# Patient Record
Sex: FEMALE | Race: BLACK OR AFRICAN AMERICAN | Employment: UNEMPLOYED | URBAN - METROPOLITAN AREA
[De-identification: names, ages, dates, MRNs, and addresses within clinical notes are randomized per-mention and may not be internally consistent; named-entity substitution may affect disease eponyms.]

---

## 2020-07-17 ENCOUNTER — APPOINTMENT (OUTPATIENT)
Dept: CT IMAGING | Age: 60
DRG: 466 | End: 2020-07-17
Attending: EMERGENCY MEDICINE
Payer: COMMERCIAL

## 2020-07-17 ENCOUNTER — HOSPITAL ENCOUNTER (INPATIENT)
Age: 60
LOS: 19 days | Discharge: HOME HEALTH | DRG: 466 | End: 2020-08-07
Attending: EMERGENCY MEDICINE | Admitting: FAMILY MEDICINE
Payer: COMMERCIAL

## 2020-07-17 ENCOUNTER — APPOINTMENT (OUTPATIENT)
Dept: GENERAL RADIOLOGY | Age: 60
DRG: 466 | End: 2020-07-17
Attending: EMERGENCY MEDICINE
Payer: COMMERCIAL

## 2020-07-17 DIAGNOSIS — N19 RENAL FAILURE, UNSPECIFIED CHRONICITY: ICD-10-CM

## 2020-07-17 DIAGNOSIS — R31.0 GROSS HEMATURIA: Primary | ICD-10-CM

## 2020-07-17 DIAGNOSIS — R10.84 ABDOMINAL PAIN, GENERALIZED: ICD-10-CM

## 2020-07-17 PROBLEM — R31.9 HEMATURIA: Status: ACTIVE | Noted: 2020-07-17

## 2020-07-17 LAB
ANION GAP SERPL CALC-SCNC: 15 MMOL/L (ref 5–15)
APPEARANCE UR: ABNORMAL
BACTERIA URNS QL MICRO: NEGATIVE /HPF
BASOPHILS # BLD: 0.1 K/UL (ref 0–0.1)
BASOPHILS NFR BLD: 0 % (ref 0–1)
BILIRUB UR QL: NEGATIVE
BUN SERPL-MCNC: 93 MG/DL (ref 6–20)
BUN/CREAT SERPL: 21 (ref 12–20)
CALCIUM SERPL-MCNC: 9.9 MG/DL (ref 8.5–10.1)
CHLORIDE SERPL-SCNC: 107 MMOL/L (ref 97–108)
CO2 SERPL-SCNC: 18 MMOL/L (ref 21–32)
COLOR UR: ABNORMAL
COMMENT, HOLDF: NORMAL
CREAT SERPL-MCNC: 4.45 MG/DL (ref 0.55–1.02)
DIFFERENTIAL METHOD BLD: ABNORMAL
EOSINOPHIL # BLD: 0.5 K/UL (ref 0–0.4)
EOSINOPHIL NFR BLD: 3 % (ref 0–7)
EPITH CASTS URNS QL MICRO: ABNORMAL /LPF
ERYTHROCYTE [DISTWIDTH] IN BLOOD BY AUTOMATED COUNT: 15.4 % (ref 11.5–14.5)
GLUCOSE SERPL-MCNC: 162 MG/DL (ref 65–100)
GLUCOSE UR STRIP.AUTO-MCNC: NEGATIVE MG/DL
HCT VFR BLD AUTO: 32 % (ref 35–47)
HGB BLD-MCNC: 9.7 G/DL (ref 11.5–16)
HGB UR QL STRIP: ABNORMAL
IMM GRANULOCYTES # BLD AUTO: 0.1 K/UL (ref 0–0.04)
IMM GRANULOCYTES NFR BLD AUTO: 1 % (ref 0–0.5)
KETONES UR QL STRIP.AUTO: NEGATIVE MG/DL
LACTATE SERPL-SCNC: 0.6 MMOL/L (ref 0.4–2)
LEUKOCYTE ESTERASE UR QL STRIP.AUTO: ABNORMAL
LYMPHOCYTES # BLD: 1.1 K/UL (ref 0.8–3.5)
LYMPHOCYTES NFR BLD: 7 % (ref 12–49)
MCH RBC QN AUTO: 28.1 PG (ref 26–34)
MCHC RBC AUTO-ENTMCNC: 30.3 G/DL (ref 30–36.5)
MCV RBC AUTO: 92.8 FL (ref 80–99)
MONOCYTES # BLD: 1 K/UL (ref 0–1)
MONOCYTES NFR BLD: 7 % (ref 5–13)
NEUTS SEG # BLD: 13.2 K/UL (ref 1.8–8)
NEUTS SEG NFR BLD: 82 % (ref 32–75)
NITRITE UR QL STRIP.AUTO: NEGATIVE
NRBC # BLD: 0 K/UL (ref 0–0.01)
NRBC BLD-RTO: 0 PER 100 WBC
PH UR STRIP: 6.5 [PH] (ref 5–8)
PLATELET # BLD AUTO: 353 K/UL (ref 150–400)
PMV BLD AUTO: 9.7 FL (ref 8.9–12.9)
POTASSIUM SERPL-SCNC: 4.8 MMOL/L (ref 3.5–5.1)
PROT UR STRIP-MCNC: >300 MG/DL
RBC # BLD AUTO: 3.45 M/UL (ref 3.8–5.2)
RBC #/AREA URNS HPF: >100 /HPF (ref 0–5)
SAMPLES BEING HELD,HOLD: NORMAL
SODIUM SERPL-SCNC: 140 MMOL/L (ref 136–145)
SP GR UR REFRACTOMETRY: 1.02 (ref 1–1.03)
TROPONIN I SERPL-MCNC: <0.05 NG/ML
UR CULT HOLD, URHOLD: NORMAL
UROBILINOGEN UR QL STRIP.AUTO: 0.2 EU/DL (ref 0.2–1)
WBC # BLD AUTO: 16 K/UL (ref 3.6–11)
WBC URNS QL MICRO: >100 /HPF (ref 0–4)

## 2020-07-17 PROCEDURE — 81001 URINALYSIS AUTO W/SCOPE: CPT

## 2020-07-17 PROCEDURE — 74176 CT ABD & PELVIS W/O CONTRAST: CPT

## 2020-07-17 PROCEDURE — 83605 ASSAY OF LACTIC ACID: CPT

## 2020-07-17 PROCEDURE — 74011250636 HC RX REV CODE- 250/636: Performed by: EMERGENCY MEDICINE

## 2020-07-17 PROCEDURE — 80048 BASIC METABOLIC PNL TOTAL CA: CPT

## 2020-07-17 PROCEDURE — 36415 COLL VENOUS BLD VENIPUNCTURE: CPT

## 2020-07-17 PROCEDURE — 74011250637 HC RX REV CODE- 250/637: Performed by: EMERGENCY MEDICINE

## 2020-07-17 PROCEDURE — 71045 X-RAY EXAM CHEST 1 VIEW: CPT

## 2020-07-17 PROCEDURE — 85025 COMPLETE CBC W/AUTO DIFF WBC: CPT

## 2020-07-17 PROCEDURE — 99285 EMERGENCY DEPT VISIT HI MDM: CPT

## 2020-07-17 PROCEDURE — 99218 HC RM OBSERVATION: CPT

## 2020-07-17 PROCEDURE — 87040 BLOOD CULTURE FOR BACTERIA: CPT

## 2020-07-17 PROCEDURE — 93005 ELECTROCARDIOGRAM TRACING: CPT

## 2020-07-17 PROCEDURE — 84484 ASSAY OF TROPONIN QUANT: CPT

## 2020-07-17 RX ORDER — AMOXICILLIN 500 MG/1
TABLET, FILM COATED ORAL DAILY
COMMUNITY
End: 2020-08-07

## 2020-07-17 RX ORDER — TRAMADOL HYDROCHLORIDE 50 MG/1
50-100 TABLET ORAL
COMMUNITY

## 2020-07-17 RX ORDER — ASCORBIC ACID 500 MG
500 TABLET ORAL 2 TIMES DAILY
COMMUNITY
End: 2021-07-27

## 2020-07-17 RX ORDER — FOLIC ACID 1 MG/1
TABLET ORAL DAILY
COMMUNITY
End: 2021-03-18

## 2020-07-17 RX ORDER — GLIPIZIDE 5 MG/1
TABLET ORAL 2 TIMES DAILY
Status: ON HOLD | COMMUNITY
End: 2021-03-19

## 2020-07-17 RX ORDER — ACETAMINOPHEN 500 MG
1000 TABLET ORAL
Status: COMPLETED | OUTPATIENT
Start: 2020-07-17 | End: 2020-07-17

## 2020-07-17 RX ORDER — AMOXICILLIN 250 MG
1 CAPSULE ORAL DAILY
COMMUNITY

## 2020-07-17 RX ORDER — PANTOPRAZOLE SODIUM 40 MG/1
40 TABLET, DELAYED RELEASE ORAL DAILY
COMMUNITY

## 2020-07-17 RX ORDER — CEPHALEXIN 500 MG/1
500 CAPSULE ORAL 2 TIMES DAILY
COMMUNITY
End: 2020-08-07

## 2020-07-17 RX ORDER — ONDANSETRON HYDROCHLORIDE 8 MG/1
8 TABLET, FILM COATED ORAL
COMMUNITY

## 2020-07-17 RX ORDER — FERROUS FUMARATE 324(106)MG
1 TABLET ORAL DAILY
COMMUNITY
End: 2021-03-18

## 2020-07-17 RX ORDER — SODIUM CHLORIDE 0.9 % (FLUSH) 0.9 %
5-10 SYRINGE (ML) INJECTION AS NEEDED
Status: DISCONTINUED | OUTPATIENT
Start: 2020-07-17 | End: 2020-08-07 | Stop reason: HOSPADM

## 2020-07-17 RX ORDER — ASPIRIN 325 MG
325 TABLET, DELAYED RELEASE (ENTERIC COATED) ORAL
COMMUNITY
End: 2021-03-18

## 2020-07-17 RX ORDER — GABAPENTIN 100 MG/1
CAPSULE ORAL 3 TIMES DAILY
Status: ON HOLD | COMMUNITY
End: 2021-03-19

## 2020-07-17 RX ORDER — ACETAMINOPHEN 500 MG
500 TABLET ORAL
COMMUNITY

## 2020-07-17 RX ADMIN — ACETAMINOPHEN 1000 MG: 500 TABLET ORAL at 22:32

## 2020-07-17 RX ADMIN — SODIUM CHLORIDE 1000 ML: 900 INJECTION, SOLUTION INTRAVENOUS at 21:02

## 2020-07-17 NOTE — ED TRIAGE NOTES
Patient arrives from home by EMS with c/o urinary pain for \"a few weeks\". She has been on cephalexin, amoxicillin for UTI. Patient finished medication as prescribed and the pain never went away. Patient states she has been passing clots. Having involuntary urges to urinate. 's during triage. EKG at bedside.

## 2020-07-18 ENCOUNTER — ANESTHESIA (OUTPATIENT)
Dept: SURGERY | Age: 60
DRG: 466 | End: 2020-07-18
Payer: COMMERCIAL

## 2020-07-18 ENCOUNTER — APPOINTMENT (OUTPATIENT)
Dept: GENERAL RADIOLOGY | Age: 60
DRG: 466 | End: 2020-07-18
Attending: UROLOGY
Payer: COMMERCIAL

## 2020-07-18 ENCOUNTER — ANESTHESIA EVENT (OUTPATIENT)
Dept: SURGERY | Age: 60
DRG: 466 | End: 2020-07-18
Payer: COMMERCIAL

## 2020-07-18 LAB
ANION GAP SERPL CALC-SCNC: 9 MMOL/L (ref 5–15)
BASOPHILS # BLD: 0.1 K/UL (ref 0–0.1)
BASOPHILS NFR BLD: 1 % (ref 0–1)
BUN SERPL-MCNC: 89 MG/DL (ref 6–20)
BUN/CREAT SERPL: 20 (ref 12–20)
CALCIUM SERPL-MCNC: 9 MG/DL (ref 8.5–10.1)
CHLORIDE SERPL-SCNC: 114 MMOL/L (ref 97–108)
CO2 SERPL-SCNC: 16 MMOL/L (ref 21–32)
CREAT SERPL-MCNC: 4.36 MG/DL (ref 0.55–1.02)
DIFFERENTIAL METHOD BLD: ABNORMAL
EOSINOPHIL # BLD: 0.5 K/UL (ref 0–0.4)
EOSINOPHIL NFR BLD: 4 % (ref 0–7)
ERYTHROCYTE [DISTWIDTH] IN BLOOD BY AUTOMATED COUNT: 15.1 % (ref 11.5–14.5)
GLUCOSE BLD STRIP.AUTO-MCNC: 138 MG/DL (ref 65–100)
GLUCOSE BLD STRIP.AUTO-MCNC: 160 MG/DL (ref 65–100)
GLUCOSE BLD STRIP.AUTO-MCNC: 164 MG/DL (ref 65–100)
GLUCOSE BLD STRIP.AUTO-MCNC: 176 MG/DL (ref 65–100)
GLUCOSE BLD STRIP.AUTO-MCNC: 202 MG/DL (ref 65–100)
GLUCOSE SERPL-MCNC: 154 MG/DL (ref 65–100)
HCT VFR BLD AUTO: 32 % (ref 35–47)
HGB BLD-MCNC: 9.2 G/DL (ref 11.5–16)
IMM GRANULOCYTES # BLD AUTO: 0.1 K/UL (ref 0–0.04)
IMM GRANULOCYTES NFR BLD AUTO: 1 % (ref 0–0.5)
LYMPHOCYTES # BLD: 1.2 K/UL (ref 0.8–3.5)
LYMPHOCYTES NFR BLD: 10 % (ref 12–49)
MCH RBC QN AUTO: 27.8 PG (ref 26–34)
MCHC RBC AUTO-ENTMCNC: 28.8 G/DL (ref 30–36.5)
MCV RBC AUTO: 96.7 FL (ref 80–99)
MONOCYTES # BLD: 1.1 K/UL (ref 0–1)
MONOCYTES NFR BLD: 9 % (ref 5–13)
NEUTS SEG # BLD: 9.2 K/UL (ref 1.8–8)
NEUTS SEG NFR BLD: 75 % (ref 32–75)
NRBC # BLD: 0 K/UL (ref 0–0.01)
NRBC BLD-RTO: 0 PER 100 WBC
PLATELET # BLD AUTO: 304 K/UL (ref 150–400)
PMV BLD AUTO: 9.6 FL (ref 8.9–12.9)
POTASSIUM SERPL-SCNC: 4.4 MMOL/L (ref 3.5–5.1)
RBC # BLD AUTO: 3.31 M/UL (ref 3.8–5.2)
RBC MORPH BLD: ABNORMAL
RBC MORPH BLD: ABNORMAL
SERVICE CMNT-IMP: ABNORMAL
SODIUM SERPL-SCNC: 139 MMOL/L (ref 136–145)
WBC # BLD AUTO: 12.2 K/UL (ref 3.6–11)

## 2020-07-18 PROCEDURE — 36415 COLL VENOUS BLD VENIPUNCTURE: CPT

## 2020-07-18 PROCEDURE — P9045 ALBUMIN (HUMAN), 5%, 250 ML: HCPCS | Performed by: NURSE ANESTHETIST, CERTIFIED REGISTERED

## 2020-07-18 PROCEDURE — 77030026438 HC STYL ET INTUB CARD -A: Performed by: ANESTHESIOLOGY

## 2020-07-18 PROCEDURE — 99218 HC RM OBSERVATION: CPT

## 2020-07-18 PROCEDURE — 77030005546 HC CATH URETH FOL 3W BARD -A: Performed by: UROLOGY

## 2020-07-18 PROCEDURE — 74011250637 HC RX REV CODE- 250/637: Performed by: INTERNAL MEDICINE

## 2020-07-18 PROCEDURE — 74011000258 HC RX REV CODE- 258: Performed by: EMERGENCY MEDICINE

## 2020-07-18 PROCEDURE — 74011000258 HC RX REV CODE- 258: Performed by: INTERNAL MEDICINE

## 2020-07-18 PROCEDURE — 77030040831 HC BAG URINE DRNG MDII -A: Performed by: UROLOGY

## 2020-07-18 PROCEDURE — 74011250636 HC RX REV CODE- 250/636: Performed by: ANESTHESIOLOGY

## 2020-07-18 PROCEDURE — 74011250636 HC RX REV CODE- 250/636: Performed by: INTERNAL MEDICINE

## 2020-07-18 PROCEDURE — C2617 STENT, NON-COR, TEM W/O DEL: HCPCS | Performed by: UROLOGY

## 2020-07-18 PROCEDURE — 77030018832 HC SOL IRR H20 ICUM -A: Performed by: UROLOGY

## 2020-07-18 PROCEDURE — 74011250637 HC RX REV CODE- 250/637

## 2020-07-18 PROCEDURE — 77030018836 HC SOL IRR NACL ICUM -A

## 2020-07-18 PROCEDURE — 76210000017 HC OR PH I REC 1.5 TO 2 HR: Performed by: UROLOGY

## 2020-07-18 PROCEDURE — 74011250636 HC RX REV CODE- 250/636: Performed by: NURSE ANESTHETIST, CERTIFIED REGISTERED

## 2020-07-18 PROCEDURE — 74011250637 HC RX REV CODE- 250/637: Performed by: UROLOGY

## 2020-07-18 PROCEDURE — 0T768DZ DILATION OF RIGHT URETER WITH INTRALUMINAL DEVICE, VIA NATURAL OR ARTIFICIAL OPENING ENDOSCOPIC: ICD-10-PCS | Performed by: UROLOGY

## 2020-07-18 PROCEDURE — 77030008684 HC TU ET CUF COVD -B: Performed by: ANESTHESIOLOGY

## 2020-07-18 PROCEDURE — 74011250636 HC RX REV CODE- 250/636: Performed by: EMERGENCY MEDICINE

## 2020-07-18 PROCEDURE — 74011000250 HC RX REV CODE- 250: Performed by: NURSE ANESTHETIST, CERTIFIED REGISTERED

## 2020-07-18 PROCEDURE — 85025 COMPLETE CBC W/AUTO DIFF WBC: CPT

## 2020-07-18 PROCEDURE — 96365 THER/PROPH/DIAG IV INF INIT: CPT

## 2020-07-18 PROCEDURE — 74011636637 HC RX REV CODE- 636/637: Performed by: INTERNAL MEDICINE

## 2020-07-18 PROCEDURE — 80048 BASIC METABOLIC PNL TOTAL CA: CPT

## 2020-07-18 PROCEDURE — 74430 CONTRAST X-RAY BLADDER: CPT

## 2020-07-18 PROCEDURE — 0TCB8ZZ EXTIRPATION OF MATTER FROM BLADDER, VIA NATURAL OR ARTIFICIAL OPENING ENDOSCOPIC: ICD-10-PCS | Performed by: UROLOGY

## 2020-07-18 PROCEDURE — 82962 GLUCOSE BLOOD TEST: CPT

## 2020-07-18 PROCEDURE — C1769 GUIDE WIRE: HCPCS | Performed by: UROLOGY

## 2020-07-18 PROCEDURE — 74011636637 HC RX REV CODE- 636/637: Performed by: NURSE PRACTITIONER

## 2020-07-18 PROCEDURE — 77030019927 HC TBNG IRR CYSTO BAXT -A: Performed by: UROLOGY

## 2020-07-18 PROCEDURE — 0TP98DZ REMOVAL OF INTRALUMINAL DEVICE FROM URETER, VIA NATURAL OR ARTIFICIAL OPENING ENDOSCOPIC: ICD-10-PCS | Performed by: UROLOGY

## 2020-07-18 PROCEDURE — 76060000035 HC ANESTHESIA 2 TO 2.5 HR: Performed by: UROLOGY

## 2020-07-18 PROCEDURE — 76010000153 HC OR TIME 1.5 TO 2 HR: Performed by: UROLOGY

## 2020-07-18 PROCEDURE — 0T9B80Z DRAINAGE OF BLADDER WITH DRAINAGE DEVICE, VIA NATURAL OR ARTIFICIAL OPENING ENDOSCOPIC: ICD-10-PCS | Performed by: UROLOGY

## 2020-07-18 PROCEDURE — 77030018836 HC SOL IRR NACL ICUM -A: Performed by: UROLOGY

## 2020-07-18 RX ORDER — ACETAMINOPHEN 325 MG/1
650 TABLET ORAL ONCE
Status: CANCELLED | OUTPATIENT
Start: 2020-07-18 | End: 2020-07-18

## 2020-07-18 RX ORDER — PHENYLEPHRINE HCL IN 0.9% NACL 0.4MG/10ML
SYRINGE (ML) INTRAVENOUS AS NEEDED
Status: DISCONTINUED | OUTPATIENT
Start: 2020-07-18 | End: 2020-07-18 | Stop reason: HOSPADM

## 2020-07-18 RX ORDER — HYDROMORPHONE HYDROCHLORIDE 1 MG/ML
0.2 INJECTION, SOLUTION INTRAMUSCULAR; INTRAVENOUS; SUBCUTANEOUS
Status: DISCONTINUED | OUTPATIENT
Start: 2020-07-18 | End: 2020-07-18 | Stop reason: HOSPADM

## 2020-07-18 RX ORDER — DIPHENHYDRAMINE HYDROCHLORIDE 50 MG/ML
12.5 INJECTION, SOLUTION INTRAMUSCULAR; INTRAVENOUS AS NEEDED
Status: DISCONTINUED | OUTPATIENT
Start: 2020-07-18 | End: 2020-07-18 | Stop reason: HOSPADM

## 2020-07-18 RX ORDER — INSULIN LISPRO 100 [IU]/ML
INJECTION, SOLUTION INTRAVENOUS; SUBCUTANEOUS
Status: DISCONTINUED | OUTPATIENT
Start: 2020-07-18 | End: 2020-07-18 | Stop reason: SDUPTHER

## 2020-07-18 RX ORDER — FENTANYL CITRATE 50 UG/ML
25 INJECTION, SOLUTION INTRAMUSCULAR; INTRAVENOUS ONCE
Status: COMPLETED | OUTPATIENT
Start: 2020-07-18 | End: 2020-07-18

## 2020-07-18 RX ORDER — INSULIN LISPRO 100 [IU]/ML
INJECTION, SOLUTION INTRAVENOUS; SUBCUTANEOUS EVERY 6 HOURS
Status: DISCONTINUED | OUTPATIENT
Start: 2020-07-18 | End: 2020-07-18

## 2020-07-18 RX ORDER — MAGNESIUM SULFATE 100 %
4 CRYSTALS MISCELLANEOUS AS NEEDED
Status: DISCONTINUED | OUTPATIENT
Start: 2020-07-18 | End: 2020-08-07 | Stop reason: HOSPADM

## 2020-07-18 RX ORDER — FENTANYL CITRATE 50 UG/ML
50 INJECTION, SOLUTION INTRAMUSCULAR; INTRAVENOUS AS NEEDED
Status: CANCELLED | OUTPATIENT
Start: 2020-07-18

## 2020-07-18 RX ORDER — ROCURONIUM BROMIDE 10 MG/ML
INJECTION, SOLUTION INTRAVENOUS AS NEEDED
Status: DISCONTINUED | OUTPATIENT
Start: 2020-07-18 | End: 2020-07-18 | Stop reason: HOSPADM

## 2020-07-18 RX ORDER — FENTANYL CITRATE 50 UG/ML
INJECTION, SOLUTION INTRAMUSCULAR; INTRAVENOUS AS NEEDED
Status: DISCONTINUED | OUTPATIENT
Start: 2020-07-18 | End: 2020-07-18 | Stop reason: HOSPADM

## 2020-07-18 RX ORDER — SODIUM CHLORIDE 0.9 % (FLUSH) 0.9 %
5-40 SYRINGE (ML) INJECTION AS NEEDED
Status: DISCONTINUED | OUTPATIENT
Start: 2020-07-18 | End: 2020-07-18 | Stop reason: HOSPADM

## 2020-07-18 RX ORDER — DEXMEDETOMIDINE HYDROCHLORIDE 100 UG/ML
INJECTION, SOLUTION INTRAVENOUS AS NEEDED
Status: DISCONTINUED | OUTPATIENT
Start: 2020-07-18 | End: 2020-07-18 | Stop reason: HOSPADM

## 2020-07-18 RX ORDER — ATROPA BELLADONNA AND OPIUM 16.2; 3 MG/1; MG/1
1 SUPPOSITORY RECTAL
Status: DISCONTINUED | OUTPATIENT
Start: 2020-07-18 | End: 2020-08-07 | Stop reason: HOSPADM

## 2020-07-18 RX ORDER — ATROPA BELLADONNA AND OPIUM 16.2; 3 MG/1; MG/1
SUPPOSITORY RECTAL
Status: COMPLETED
Start: 2020-07-18 | End: 2020-07-18

## 2020-07-18 RX ORDER — GABAPENTIN 100 MG/1
100 CAPSULE ORAL 3 TIMES DAILY
Status: DISCONTINUED | OUTPATIENT
Start: 2020-07-18 | End: 2020-07-20

## 2020-07-18 RX ORDER — OXYBUTYNIN CHLORIDE 5 MG/1
10 TABLET, EXTENDED RELEASE ORAL DAILY
Status: DISCONTINUED | OUTPATIENT
Start: 2020-07-18 | End: 2020-08-07 | Stop reason: HOSPADM

## 2020-07-18 RX ORDER — ONDANSETRON 2 MG/ML
4 INJECTION INTRAMUSCULAR; INTRAVENOUS AS NEEDED
Status: DISCONTINUED | OUTPATIENT
Start: 2020-07-18 | End: 2020-07-18 | Stop reason: HOSPADM

## 2020-07-18 RX ORDER — ALBUMIN HUMAN 50 G/1000ML
SOLUTION INTRAVENOUS AS NEEDED
Status: DISCONTINUED | OUTPATIENT
Start: 2020-07-18 | End: 2020-07-18 | Stop reason: HOSPADM

## 2020-07-18 RX ORDER — PROPOFOL 10 MG/ML
INJECTION, EMULSION INTRAVENOUS AS NEEDED
Status: DISCONTINUED | OUTPATIENT
Start: 2020-07-18 | End: 2020-07-18 | Stop reason: HOSPADM

## 2020-07-18 RX ORDER — LIDOCAINE HYDROCHLORIDE 10 MG/ML
0.1 INJECTION, SOLUTION EPIDURAL; INFILTRATION; INTRACAUDAL; PERINEURAL AS NEEDED
Status: CANCELLED | OUTPATIENT
Start: 2020-07-18

## 2020-07-18 RX ORDER — ESMOLOL HYDROCHLORIDE 10 MG/ML
INJECTION INTRAVENOUS AS NEEDED
Status: DISCONTINUED | OUTPATIENT
Start: 2020-07-18 | End: 2020-07-18 | Stop reason: HOSPADM

## 2020-07-18 RX ORDER — SODIUM CHLORIDE 0.9 % (FLUSH) 0.9 %
5-40 SYRINGE (ML) INJECTION EVERY 8 HOURS
Status: DISCONTINUED | OUTPATIENT
Start: 2020-07-18 | End: 2020-07-18 | Stop reason: HOSPADM

## 2020-07-18 RX ORDER — OXYCODONE HYDROCHLORIDE 5 MG/1
5 TABLET ORAL AS NEEDED
Status: DISCONTINUED | OUTPATIENT
Start: 2020-07-18 | End: 2020-07-18 | Stop reason: HOSPADM

## 2020-07-18 RX ORDER — SODIUM CHLORIDE 9 MG/ML
25 INJECTION, SOLUTION INTRAVENOUS CONTINUOUS
Status: CANCELLED | OUTPATIENT
Start: 2020-07-18 | End: 2020-07-19

## 2020-07-18 RX ORDER — SODIUM CHLORIDE, SODIUM LACTATE, POTASSIUM CHLORIDE, CALCIUM CHLORIDE 600; 310; 30; 20 MG/100ML; MG/100ML; MG/100ML; MG/100ML
125 INJECTION, SOLUTION INTRAVENOUS CONTINUOUS
Status: DISCONTINUED | OUTPATIENT
Start: 2020-07-18 | End: 2020-07-18 | Stop reason: HOSPADM

## 2020-07-18 RX ORDER — MORPHINE SULFATE 2 MG/ML
INJECTION, SOLUTION INTRAMUSCULAR; INTRAVENOUS AS NEEDED
Status: DISCONTINUED | OUTPATIENT
Start: 2020-07-18 | End: 2020-07-18 | Stop reason: HOSPADM

## 2020-07-18 RX ORDER — SODIUM CHLORIDE, SODIUM LACTATE, POTASSIUM CHLORIDE, CALCIUM CHLORIDE 600; 310; 30; 20 MG/100ML; MG/100ML; MG/100ML; MG/100ML
25 INJECTION, SOLUTION INTRAVENOUS CONTINUOUS
Status: CANCELLED | OUTPATIENT
Start: 2020-07-18 | End: 2020-07-19

## 2020-07-18 RX ORDER — ACETAMINOPHEN 500 MG
500 TABLET ORAL
Status: DISCONTINUED | OUTPATIENT
Start: 2020-07-18 | End: 2020-08-07 | Stop reason: HOSPADM

## 2020-07-18 RX ORDER — FENTANYL CITRATE 50 UG/ML
25 INJECTION, SOLUTION INTRAMUSCULAR; INTRAVENOUS
Status: COMPLETED | OUTPATIENT
Start: 2020-07-18 | End: 2020-07-18

## 2020-07-18 RX ORDER — MIDAZOLAM HYDROCHLORIDE 1 MG/ML
0.5 INJECTION, SOLUTION INTRAMUSCULAR; INTRAVENOUS
Status: DISCONTINUED | OUTPATIENT
Start: 2020-07-18 | End: 2020-07-18 | Stop reason: HOSPADM

## 2020-07-18 RX ORDER — MORPHINE SULFATE 10 MG/ML
2 INJECTION, SOLUTION INTRAMUSCULAR; INTRAVENOUS
Status: DISCONTINUED | OUTPATIENT
Start: 2020-07-18 | End: 2020-07-18 | Stop reason: HOSPADM

## 2020-07-18 RX ORDER — SODIUM CHLORIDE, SODIUM LACTATE, POTASSIUM CHLORIDE, CALCIUM CHLORIDE 600; 310; 30; 20 MG/100ML; MG/100ML; MG/100ML; MG/100ML
INJECTION, SOLUTION INTRAVENOUS
Status: DISCONTINUED | OUTPATIENT
Start: 2020-07-18 | End: 2020-07-18 | Stop reason: HOSPADM

## 2020-07-18 RX ORDER — DEXTROSE MONOHYDRATE 100 MG/ML
0-250 INJECTION, SOLUTION INTRAVENOUS AS NEEDED
Status: DISCONTINUED | OUTPATIENT
Start: 2020-07-18 | End: 2020-08-07 | Stop reason: HOSPADM

## 2020-07-18 RX ORDER — INSULIN LISPRO 100 [IU]/ML
INJECTION, SOLUTION INTRAVENOUS; SUBCUTANEOUS
Status: DISCONTINUED | OUTPATIENT
Start: 2020-07-19 | End: 2020-08-07 | Stop reason: HOSPADM

## 2020-07-18 RX ORDER — MORPHINE SULFATE 2 MG/ML
2 INJECTION, SOLUTION INTRAMUSCULAR; INTRAVENOUS
Status: DISCONTINUED | OUTPATIENT
Start: 2020-07-18 | End: 2020-07-24

## 2020-07-18 RX ORDER — ATROPA BELLADONNA AND OPIUM 16.2; 3 MG/1; MG/1
1 SUPPOSITORY RECTAL
Status: DISCONTINUED | OUTPATIENT
Start: 2020-07-18 | End: 2020-07-24 | Stop reason: ALTCHOICE

## 2020-07-18 RX ORDER — MIDAZOLAM HYDROCHLORIDE 1 MG/ML
INJECTION, SOLUTION INTRAMUSCULAR; INTRAVENOUS AS NEEDED
Status: DISCONTINUED | OUTPATIENT
Start: 2020-07-18 | End: 2020-07-18 | Stop reason: HOSPADM

## 2020-07-18 RX ORDER — LIDOCAINE HYDROCHLORIDE 20 MG/ML
INJECTION, SOLUTION EPIDURAL; INFILTRATION; INTRACAUDAL; PERINEURAL AS NEEDED
Status: DISCONTINUED | OUTPATIENT
Start: 2020-07-18 | End: 2020-07-18 | Stop reason: HOSPADM

## 2020-07-18 RX ORDER — SODIUM CHLORIDE 0.9 % (FLUSH) 0.9 %
5-40 SYRINGE (ML) INJECTION EVERY 8 HOURS
Status: CANCELLED | OUTPATIENT
Start: 2020-07-18

## 2020-07-18 RX ORDER — ONDANSETRON 2 MG/ML
INJECTION INTRAMUSCULAR; INTRAVENOUS AS NEEDED
Status: DISCONTINUED | OUTPATIENT
Start: 2020-07-18 | End: 2020-07-18 | Stop reason: HOSPADM

## 2020-07-18 RX ORDER — SUCCINYLCHOLINE CHLORIDE 20 MG/ML
INJECTION INTRAMUSCULAR; INTRAVENOUS AS NEEDED
Status: DISCONTINUED | OUTPATIENT
Start: 2020-07-18 | End: 2020-07-18 | Stop reason: HOSPADM

## 2020-07-18 RX ORDER — MIDAZOLAM HYDROCHLORIDE 1 MG/ML
1 INJECTION, SOLUTION INTRAMUSCULAR; INTRAVENOUS AS NEEDED
Status: CANCELLED | OUTPATIENT
Start: 2020-07-18

## 2020-07-18 RX ORDER — DEXAMETHASONE SODIUM PHOSPHATE 4 MG/ML
INJECTION, SOLUTION INTRA-ARTICULAR; INTRALESIONAL; INTRAMUSCULAR; INTRAVENOUS; SOFT TISSUE AS NEEDED
Status: DISCONTINUED | OUTPATIENT
Start: 2020-07-18 | End: 2020-07-18 | Stop reason: HOSPADM

## 2020-07-18 RX ORDER — SODIUM CHLORIDE 0.9 % (FLUSH) 0.9 %
5-40 SYRINGE (ML) INJECTION AS NEEDED
Status: CANCELLED | OUTPATIENT
Start: 2020-07-18

## 2020-07-18 RX ORDER — PANTOPRAZOLE SODIUM 40 MG/1
40 TABLET, DELAYED RELEASE ORAL DAILY
Status: DISCONTINUED | OUTPATIENT
Start: 2020-07-18 | End: 2020-08-07 | Stop reason: HOSPADM

## 2020-07-18 RX ADMIN — LIDOCAINE HYDROCHLORIDE 100 MG: 20 INJECTION, SOLUTION EPIDURAL; INFILTRATION; INTRACAUDAL; PERINEURAL at 11:34

## 2020-07-18 RX ADMIN — FENTANYL CITRATE 25 MCG: 50 INJECTION, SOLUTION INTRAMUSCULAR; INTRAVENOUS at 13:43

## 2020-07-18 RX ADMIN — FENTANYL CITRATE 25 MCG: 50 INJECTION, SOLUTION INTRAMUSCULAR; INTRAVENOUS at 12:42

## 2020-07-18 RX ADMIN — CEFTRIAXONE 1 G: 1 INJECTION, POWDER, FOR SOLUTION INTRAMUSCULAR; INTRAVENOUS at 21:22

## 2020-07-18 RX ADMIN — Medication 80 MCG: at 12:12

## 2020-07-18 RX ADMIN — FENTANYL CITRATE 100 MCG: 50 INJECTION, SOLUTION INTRAMUSCULAR; INTRAVENOUS at 11:34

## 2020-07-18 RX ADMIN — FENTANYL CITRATE 50 MCG: 50 INJECTION, SOLUTION INTRAMUSCULAR; INTRAVENOUS at 12:37

## 2020-07-18 RX ADMIN — DEXMEDETOMIDINE HYDROCHLORIDE 10 MCG: 100 INJECTION, SOLUTION, CONCENTRATE INTRAVENOUS at 12:30

## 2020-07-18 RX ADMIN — MORPHINE SULFATE 2 MG: 2 INJECTION, SOLUTION INTRAMUSCULAR; INTRAVENOUS at 13:11

## 2020-07-18 RX ADMIN — ATROPA BELLADONNA AND OPIUM 1 SUPPOSITORY: 16.2; 3 SUPPOSITORY RECTAL at 19:00

## 2020-07-18 RX ADMIN — SUCCINYLCHOLINE CHLORIDE 180 MG: 20 INJECTION, SOLUTION INTRAMUSCULAR; INTRAVENOUS at 11:34

## 2020-07-18 RX ADMIN — ESMOLOL HYDROCHLORIDE 5 MG: 10 INJECTION, SOLUTION INTRAVENOUS at 12:52

## 2020-07-18 RX ADMIN — ONDANSETRON HYDROCHLORIDE 4 MG: 2 INJECTION, SOLUTION INTRAMUSCULAR; INTRAVENOUS at 11:56

## 2020-07-18 RX ADMIN — FENTANYL CITRATE 25 MCG: 50 INJECTION, SOLUTION INTRAMUSCULAR; INTRAVENOUS at 13:38

## 2020-07-18 RX ADMIN — Medication 120 MCG: at 12:23

## 2020-07-18 RX ADMIN — MORPHINE SULFATE 2 MG: 2 INJECTION, SOLUTION INTRAMUSCULAR; INTRAVENOUS at 13:14

## 2020-07-18 RX ADMIN — INSULIN LISPRO 2 UNITS: 100 INJECTION, SOLUTION INTRAVENOUS; SUBCUTANEOUS at 07:18

## 2020-07-18 RX ADMIN — FENTANYL CITRATE 25 MCG: 50 INJECTION, SOLUTION INTRAMUSCULAR; INTRAVENOUS at 06:45

## 2020-07-18 RX ADMIN — ROCURONIUM BROMIDE 10 MG: 10 SOLUTION INTRAVENOUS at 11:34

## 2020-07-18 RX ADMIN — ATROPA BELLADONNA AND OPIUM 1 SUPPOSITORY: 16.2; 3 SUPPOSITORY RECTAL at 14:05

## 2020-07-18 RX ADMIN — MEPERIDINE HYDROCHLORIDE 12.5 MG: 25 INJECTION INTRAMUSCULAR; INTRAVENOUS; SUBCUTANEOUS at 14:22

## 2020-07-18 RX ADMIN — DEXMEDETOMIDINE HYDROCHLORIDE 10 MCG: 100 INJECTION, SOLUTION, CONCENTRATE INTRAVENOUS at 11:55

## 2020-07-18 RX ADMIN — FENTANYL CITRATE 25 MCG: 50 INJECTION, SOLUTION INTRAMUSCULAR; INTRAVENOUS at 13:48

## 2020-07-18 RX ADMIN — Medication 80 MCG: at 11:49

## 2020-07-18 RX ADMIN — ALBUMIN (HUMAN) 250 ML: 12.5 INJECTION, SOLUTION INTRAVENOUS at 12:34

## 2020-07-18 RX ADMIN — PROPOFOL 200 MG: 10 INJECTION, EMULSION INTRAVENOUS at 11:34

## 2020-07-18 RX ADMIN — INSULIN LISPRO 2 UNITS: 100 INJECTION, SOLUTION INTRAVENOUS; SUBCUTANEOUS at 14:12

## 2020-07-18 RX ADMIN — CEFTRIAXONE 1 G: 1 INJECTION, POWDER, FOR SOLUTION INTRAMUSCULAR; INTRAVENOUS at 01:51

## 2020-07-18 RX ADMIN — DEXAMETHASONE SODIUM PHOSPHATE 4 MG: 4 INJECTION, SOLUTION INTRAMUSCULAR; INTRAVENOUS at 11:56

## 2020-07-18 RX ADMIN — MIDAZOLAM 2 MG: 1 INJECTION INTRAMUSCULAR; INTRAVENOUS at 11:34

## 2020-07-18 RX ADMIN — FENTANYL CITRATE 25 MCG: 50 INJECTION, SOLUTION INTRAMUSCULAR; INTRAVENOUS at 13:54

## 2020-07-18 RX ADMIN — SODIUM CHLORIDE, POTASSIUM CHLORIDE, SODIUM LACTATE AND CALCIUM CHLORIDE: 600; 310; 30; 20 INJECTION, SOLUTION INTRAVENOUS at 11:26

## 2020-07-18 RX ADMIN — PANTOPRAZOLE SODIUM 40 MG: 40 TABLET, DELAYED RELEASE ORAL at 08:16

## 2020-07-18 RX ADMIN — OXYBUTYNIN CHLORIDE 10 MG: 5 TABLET, EXTENDED RELEASE ORAL at 19:18

## 2020-07-18 RX ADMIN — INSULIN LISPRO 2 UNITS: 100 INJECTION, SOLUTION INTRAVENOUS; SUBCUTANEOUS at 23:45

## 2020-07-18 RX ADMIN — Medication 120 MCG: at 12:45

## 2020-07-18 NOTE — CONSULTS
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 Need to consider eventual right stent replacement. Possibly later today. Hematurianoted. Consider UTI high in differential.  We will also assess during cystoscopy. Blevins catheter placement with nursing staff this morning at 6 AM.    Left atrophic kidneynoted and chronic. CKD with DAPHNEbaseline creatinine approximately 3. Infection/sepsisas noted previously risk for decompensation is elevated. I agree with patient's transfer to coronary care ICU from previous telemetry bed as we had discussed with the ER team previous. Alternative to intervention with cystoscopy stent could be nephrostomy tube placement. Notably patient DNR. She does wish to proceed. She understands risks of anesthesia are elevated and risk for morbidity and mortality is elevated. Keep n.p.o.  Plan for cystoscopy right stent exchange w Dr. Ismael Tapia later today if ok with medical team.  Mk Bright with OR team for ~11AM today if possible.   ____________________________________________________________________________    SUBJECTIVE:  Date of Consultation:  July 18, 2020  Referring Physician: Floyd Solano MD  Reason for Consultation:  Hematuria, chronic stent out of urethra    Primary care MD: Eulogio Jovel MD  - None  Code state: DNR    History of Present Illness:   62y.o. year old female - She was admitted to the hospital for Hematuria [R31.9]. Established patient of Dr. 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   Comes in for hematuria and signs of infection with stent migrated partially out of urethra. Films and images from previous history including prior HCA images/notes reviewed per prior note. Patient denies any significant right CVA tenderness at this time. Past Medical History:   Diagnosis Date    Diabetes ([address]     Hernia, hiatal     Hypertension     Kidney stones     both kidneys    Osteoarthritis     Renal failure     left kidney      Past Surgical History:   Procedure Laterality Date    HX HYSTERECTOMY      uterus not removed    HX OTHER SURGICAL      right kidney stent      History reviewed. No pertinent family history. Social History     Tobacco Use    Smoking status: Never Smoker    Smokeless tobacco: Never Used   Substance Use Topics    Alcohol use: Never     Frequency: Never     Allergies   Allergen Reactions    Bacitracin Other (comments)     Syncope and lip swelling. Prior to Admission medications    Medication Sig Start Date End Date Taking? Authorizing Provider   ascorbic acid, vitamin C, (Vitamin C) 500 mg tablet Take 500 mg by mouth two (2) times a day. Yes Hakeem, MD Juan Luis   aspirin delayed-release 325 mg tablet Take 325 mg by mouth every six (6) hours as needed for Pain. Yes Hakeem, MD Juan Luis   cephALEXin (KEFLEX) 500 mg capsule Take 500 mg by mouth two (2) times a day. Yes Juan Luis Palacio MD   gabapentin (NEURONTIN) 100 mg capsule Take  by mouth three (3) times daily. Yes Juan Luis Palacio MD   pantoprazole (PROTONIX) 40 mg tablet Take 40 mg by mouth daily. Yes Juan Luis Palacio MD   traMADoL (ULTRAM) 50 mg tablet Take  mg by mouth every eight (8) hours as needed for Pain. Yes Hakeem, MD Juan Luis   senna-docusate (Senokot-S) 8.6-50 mg per tablet Take 1 Tab by mouth daily. Yes Juan Luis Palacio MD   ferrous fumarate 324 mg (106 mg iron) tab Take 1 Tab by mouth daily. Yes Juan Luis Palacio MD   acetaminophen (TYLENOL) 500 mg tablet Take 500 mg by mouth every eight (8) hours as needed for Pain.    Yes Juan Luis Palacio MD   multivitamin tablet Take 1 Tab by mouth daily. Yes Hakeem, MD Juan Luis   ondansetron hcl (Zofran) 8 mg tablet Take 8 mg by mouth every eight (8) hours as needed for Nausea or Vomiting. Yes Hakeem, MD Juan Luis   folic acid (FOLVITE) 1 mg tablet Take  by mouth daily. Yes Hakeem, MD Juan Luis   glipiZIDE (GLUCOTROL) 5 mg tablet Take  by mouth two (2) times a day. Yes Hakeem, MD Juan Luis   amoxicillin 500 mg tab Take  by mouth daily. Yes Hakeem, MD Juan Luis         Review of Systems: (positive-underlined)   Unexplained weight loss, Dry eyes, Dry mouth, Chest pain, SOB, Constipation, Extremity weakness, Pallor, TIA symptoms, Confusion, Easy bruising    OBJECTIVE:  Patient Vitals for the past 8 hrs:   BP Temp Pulse Resp SpO2 Weight   20 0530 106/52 98.4 °F (36.9 °C) 100 30 98 % 150.4 kg (331 lb 9.2 oz)   20 0357 92/60 98.9 °F (37.2 °C) 100 20 99 %    20 0339 106/64 98.4 °F (36.9 °C) 100 20 99 %    20 0200 119/70  100 20 96 %    20 0145 102/55  (!) 106 20 97 %    20 0130 118/61  99 19 95 %    20 0115 115/63  (!) 103 20 96 %    20 0100 126/61  (!) 106 21 97 %    20 0045 129/68  (!) 111 19 97 %    20 0030  98.6 °F (37 °C) (!) 105 16 95 %    20 0015 108/67  (!) 104 21 98 %    20 0000 106/59  (!) 111 21 96 %    20 2345 126/67  (!) 105 23 95 %    20 2330 119/75  89 16 96 %    20 2315 116/60  (!) 112 21 96 %    20 2300 123/67  (!) 105 23 98 %      Temp (24hrs), Av °F (37.2 °C), Min:98.4 °F (36.9 °C), Max:100.8 °F (38.2 °C)    Intake and Output:   No intake/output data recorded. Physical Exam:  Appearance: Well-developed no acute distress  Conjunctiva: WNL  Pupil/iris: WNL  External ears/nose: Normal no lesions or deformities  Hearing:  WNL  Neck: Supple without masses  Thyroid: WNL  Respiratory effort: Breathing easily  Chest palpation: No tactile fremitus  Aortic: No enlargement or bruits  Lower extremity: No edema  Abdomen/flank: Soft nontender without masses no CVA tenderness  Liver/spleen: No organomegaly  Hernia: No ing/ventral hernia  Lymph: No adenopathy  Digits/nails: No clubbing cyanosis or petechiae  Skin inspection: Warm and dry  Skin palpation: No subcutaneous nodularity  Sensation: No sensory deficits  Judgment/Insight: intact  Mood/Affect: normal    Blevins catheter placement at bedside with nurses present. Unable to place stent curled back in bladder secondary to patient discomfort. Recent Results (from the past 24 hour(s))   CBC WITH AUTOMATED DIFF    Collection Time: 07/17/20  8:03 PM   Result Value Ref Range    WBC 16.0 (H) 3.6 - 11.0 K/uL    RBC 3.45 (L) 3.80 - 5.20 M/uL    HGB 9.7 (L) 11.5 - 16.0 g/dL    HCT 32.0 (L) 35.0 - 47.0 %    MCV 92.8 80.0 - 99.0 FL    MCH 28.1 26.0 - 34.0 PG    MCHC 30.3 30.0 - 36.5 g/dL    RDW 15.4 (H) 11.5 - 14.5 %    PLATELET 526 583 - 891 K/uL    MPV 9.7 8.9 - 12.9 FL    NRBC 0.0 0  WBC    ABSOLUTE NRBC 0.00 0.00 - 0.01 K/uL    NEUTROPHILS 82 (H) 32 - 75 %    LYMPHOCYTES 7 (L) 12 - 49 %    MONOCYTES 7 5 - 13 %    EOSINOPHILS 3 0 - 7 %    BASOPHILS 0 0 - 1 %    IMMATURE GRANULOCYTES 1 (H) 0.0 - 0.5 %    ABS. NEUTROPHILS 13.2 (H) 1.8 - 8.0 K/UL    ABS. LYMPHOCYTES 1.1 0.8 - 3.5 K/UL    ABS. MONOCYTES 1.0 0.0 - 1.0 K/UL    ABS. EOSINOPHILS 0.5 (H) 0.0 - 0.4 K/UL    ABS. BASOPHILS 0.1 0.0 - 0.1 K/UL    ABS. IMM.  GRANS. 0.1 (H) 0.00 - 0.04 K/UL    DF AUTOMATED     METABOLIC PANEL, BASIC    Collection Time: 07/17/20  8:03 PM   Result Value Ref Range    Sodium 140 136 - 145 mmol/L    Potassium 4.8 3.5 - 5.1 mmol/L    Chloride 107 97 - 108 mmol/L    CO2 18 (L) 21 - 32 mmol/L    Anion gap 15 5 - 15 mmol/L    Glucose 162 (H) 65 - 100 mg/dL    BUN 93 (H) 6 - 20 MG/DL    Creatinine 4.45 (H) 0.55 - 1.02 MG/DL    BUN/Creatinine ratio 21 (H) 12 - 20      GFR est AA 12 (L) >60 ml/min/1.73m2    GFR est non-AA 10 (L) >60 ml/min/1.73m2    Calcium 9.9 8.5 - 10.1 MG/DL   TROPONIN I Collection Time: 07/17/20  8:03 PM   Result Value Ref Range    Troponin-I, Qt. <0.05 <0.05 ng/mL   URINALYSIS W/MICROSCOPIC    Collection Time: 07/17/20  8:08 PM   Result Value Ref Range    Color RED      Appearance TURBID (A) CLEAR      Specific gravity 1.020 1.003 - 1.030      pH (UA) 6.5 5.0 - 8.0      Protein >300 (A) NEG mg/dL    Glucose Negative NEG mg/dL    Ketone Negative NEG mg/dL    Bilirubin Negative NEG      Blood LARGE (A) NEG      Urobilinogen 0.2 0.2 - 1.0 EU/dL    Nitrites Negative NEG      Leukocyte Esterase MODERATE (A) NEG      WBC >100 (H) 0 - 4 /hpf    RBC >100 (H) 0 - 5 /hpf    Epithelial cells FEW FEW /lpf    Bacteria Negative NEG /hpf   URINE CULTURE HOLD SAMPLE    Collection Time: 07/17/20  8:08 PM    Specimen: Serum; Urine   Result Value Ref Range    Urine culture hold        Urine on hold in Microbiology dept for 2 days. If unpreserved urine is submitted, it cannot be used for addtional testing after 24 hours, recollection will be required. SAMPLES BEING HELD    Collection Time: 07/17/20  8:08 PM   Result Value Ref Range    SAMPLES BEING HELD 1RED 1BLUE 1PINK     COMMENT        Add-on orders for these samples will be processed based on acceptable specimen integrity and analyte stability, which may vary by analyte. LACTIC ACID    Collection Time: 07/17/20  8:08 PM   Result Value Ref Range    Lactic acid 0.6 0.4 - 2.0 MMOL/L   GLUCOSE, POC    Collection Time: 07/18/20  2:29 AM   Result Value Ref Range    Glucose (POC) 138 (H) 65 - 100 mg/dL    Performed by Norma Liriano ([facility])        Current Antimicrobial Therapy (168h ago, onward)    Ordered     Start Stop    07/18/20 0503  cefTRIAXone (ROCEPHIN) 1 g in 0.9% sodium chloride (MBP/ADV) 50 mL  1 g,   IntraVENous,   EVERY 24 HOURS      07/18/20 2200 07/25/20 2159        Cultures:    All Micro Results     Procedure Component Value Units Date/Time    CULTURE, BLOOD [[id number]] Collected:  07/17/20 2003    Order Status:  Completed Specimen:  Blood Updated:  07/17/20 2252    URINE CULTURE HOLD SAMPLE [[id number]] Collected:  07/17/20 2008    Order Status:  Completed Specimen:  Urine from Serum Updated:  07/17/20 2133     Urine culture hold       Urine on hold in Microbiology dept for 2 days.  If unpreserved urine is submitted, it cannot be used for addtional testing after 24 hours, recollection will be required.          CULTURE, URINE [[id number]]     Order Status:  Sent Specimen:  Cath Urine           Signed By: Demarcus Dominguez MD                         July 18, 2020

## 2020-07-18 NOTE — PROGRESS NOTES
TRANSFER - IN REPORT:    Verbal report received from Nava Lion, [facility] (name) on [facility]  being received from PACU (unit) for routine post - op      Report consisted of patients Situation, Background, Assessment and   Recommendations(SBAR). Information from the following report(s) SBAR was reviewed with the receiving nurse. Opportunity for questions and clarification was provided. Assessment completed upon patients arrival to unit and care assumed.

## 2020-07-18 NOTE — PROGRESS NOTES
As documented by Dr. Alfredo Heath. Reviewed with patient and questions answered. Will proceed as planned.

## 2020-07-18 NOTE — PERIOP NOTES
TRANSFER - OUT REPORT:    Verbal report given to [facility] RN on Harrison Varela  being transferred to [facility] for routine post - op       Report consisted of patients Situation, Background, Assessment and   Recommendations(SBAR). Time Pre op antibiotic given: On antibiotics on floor  Anesthesia Stop time: 1328  Blevins Present on Transfer to floor:yes  Order for Blevins on Chart:yes  Discharge Prescriptions with Chart:no    Information from the following report(s) SBAR was reviewed with the receiving nurse. Opportunity for questions and clarification was provided. Is the patient on 02? NO       L/Min        Other     Is the patient on a monitor? YES    Is the nurse transporting with the patient? YES    Surgical Waiting Area notified of patient's transfer from PACU? NO      The following personal items collected during your admission accompanied patient upon transfer:   Dental Appliance: Dental Appliances: None  Vision: Visual Aid: Glasses  Hearing Aid:    Jewelry: Jewelry: None  Clothing: Clothing: None  Other Valuables: Other Valuables:  Other (comment)(smart phone)  Valuables sent to safe: Personal Items Sent to Safe: none

## 2020-07-18 NOTE — ACP (ADVANCE CARE PLANNING)
[facility]  Hospitalist Group                                      Advance Care Planning Note    Name: Melissa Sutton  YOB: 1960  MRN: [id number]  Admission Date: 7/17/2020  7:39 PM    Date of discussion: 7/18/2020    Active Diagnoses:    Hospital Problems  Never Reviewed          Codes Class Noted POA    Hematuria ICD-10-CM: R31.9  ICD-9-CM: 599.70  7/17/2020 Unknown              These active diagnoses are of sufficient risk that focused discussion on advance care planning is indicated in order to allow the patient to thoughtfully consider personal goals of care, and if situations arise that prevent the ability to personally give input, to ensure appropriate representation of their personal desires for different levels and aggressiveness of care. Discussion:     Persons present and participating in discussion: Chelo Hall MD    Discussion: 16-year-old female with multiple comorbidities including diabetes, obesity, bedbound due to pain secondary to wounds, history of recurrent ureteral stent placement and CKD presents with sepsis secondary to UTI and significant hematuria. Patient states that in worsening clinical deterioration and cardiac arrest she does not want to be resuscitated or intubated. She is only okay with intubation just for surgical intervention but no as part of the ACLS or for respiratory decompensation. Patient would like to have central line placement if worsening hypotension. Patient CODE STATUS is DNR/DNI. Time Spent:     Total time spent face-to-face in education and discussion: 18 minutes.      Chelo Eckert MD  Date of Service:  7/18/2020  6:51 AM

## 2020-07-18 NOTE — PROGRESS NOTES
TRANSFER - IN REPORT:    Verbal report received from Ramila RN (name) on Manuel  being received from CCU (unit) for routine post - op and progression of care. Report consisted of patients Situation, Background, Assessment and   Recommendations(SBAR). Information from the following report(s) SBAR was reviewed with the receiving nurse. Opportunity for questions and clarification was provided. Assessment completed upon patients arrival to unit and care assumed.

## 2020-07-18 NOTE — ED PROVIDER NOTES
Date of Service:  7/17/2020    Patient:  Kristen Faith    Chief Complaint:  Urinary Pain       HPI:  Kristen Faith is a 62 y.o.  female who presents for evaluation of flank pain and hematuria. Patient has a longstanding history of renal disease and has current renal stent in place. She is noticed that today she started having hematuria and some right flank pain. She is been seen for this multiple times in the past arrives here tachycardic with complaints of these discomforts. She has some nausea but no vomiting. No chest pain or shortness of breath. No cough fevers or chills. She states that whenever she goes to urinate she has extreme discomfort and large amounts of blood. Past Medical History:   Diagnosis Date    Diabetes ([facility] [address]     Hernia, hiatal     Hypertension     Kidney stones     both kidneys    Osteoarthritis     Renal failure     left kidney       Past Surgical History:   Procedure Laterality Date    HX HYSTERECTOMY      uterus not removed    HX OTHER SURGICAL      right kidney stent         History reviewed. No pertinent family history.     Social History     Socioeconomic History    Marital status: Not on file     Spouse name: Not on file    Number of children: Not on file    Years of education: Not on file    Highest education level: Not on file   Occupational History    Not on file   Social Needs    Financial resource strain: Not on file    Food insecurity     Worry: Not on file     Inability: Not on file    Transportation needs     Medical: Not on file     Non-medical: Not on file   Tobacco Use    Smoking status: Never Smoker    Smokeless tobacco: Never Used   Substance and Sexual Activity    Alcohol use: Never     Frequency: Never    Drug use: Never    Sexual activity: Not on file   Lifestyle    Physical activity     Days per week: Not on file     Minutes per session: Not on file    Stress: Not on file   Relationships    Social connections     Talks on phone: Not on file     Gets together: Not on file     Attends [employer] service: Not on file     Active member of club or organization: Not on file     Attends meetings of clubs or organizations: Not on file     Relationship status: Not on file    Intimate partner violence     Fear of current or ex partner: Not on file     Emotionally abused: Not on file     Physically abused: Not on file     Forced sexual activity: Not on file   Other Topics Concern    Not on file   Social History Narrative    Not on file         ALLERGIES: Bacitracin    Review of Systems   Constitutional: Negative for fever. HENT: Negative for hearing loss. Eyes: Negative for visual disturbance. Respiratory: Negative for shortness of breath. Cardiovascular: Negative for chest pain. Gastrointestinal: Positive for nausea. Negative for abdominal pain, diarrhea and vomiting. Genitourinary: Positive for flank pain and hematuria. Negative for dysuria. Musculoskeletal: Negative for back pain. Skin: Negative for rash. Neurological: Negative for dizziness and light-headedness. Psychiatric/Behavioral: Negative for confusion. There were no vitals filed for this visit. Physical Exam  Vitals signs and nursing note reviewed. Constitutional:       General: She is not in acute distress. Appearance: She is well-developed. She is not ill-appearing, toxic-appearing or diaphoretic. HENT:      Head: Normocephalic and atraumatic. Eyes:      General: No scleral icterus. Conjunctiva/sclera: Conjunctivae normal.      Pupils: Pupils are equal, round, and reactive to light. Neck:      Musculoskeletal: Neck supple. Vascular: No JVD. Trachea: No tracheal deviation. Cardiovascular:      Rate and Rhythm: Normal rate and regular rhythm. Pulmonary:      Effort: Pulmonary effort is normal. No respiratory distress. Abdominal:      General: Bowel sounds are normal. There is no distension.       Palpations: Abdomen is soft.      Tenderness: There is abdominal tenderness in the right lower quadrant and suprapubic area. There is no right CVA tenderness, left CVA tenderness or guarding.   Musculoskeletal: Normal range of motion.         General: No tenderness.   Skin:     General: Skin is warm.      Capillary Refill: Capillary refill takes less than 2 seconds.      Findings: No rash.   Neurological:      Mental Status: She is alert and oriented to person, place, and time.   Psychiatric:         Mood and Affect: Mood normal.         Behavior: Behavior normal.          MDM  Number of Diagnoses or Management Options  Abdominal pain, generalized:   Gross hematuria:   Renal failure, unspecified chronicity:     ED Course as of Jul 17 2247 Fri Jul 17, 2020 2101 WBC(!): 16.0 [GG]   2102 Temp(!): 100.8 °F (38.2 °C) [GG]   2102 Pulse (Heart Rate)(!): 114 [GG]   2206 Leukocyte Esterase(!): MODERATE [GG]      ED Course User Index  [GG] Ambrose Leahy S, DO     VITAL SIGNS:  Patient Vitals for the past 4 hrs:   Temp Pulse Resp BP SpO2   07/17/20 2045 — (!) 114 25 107/59 95 %   07/17/20 2030 (!) 100.8 °F (38.2 °C) — — — —   07/17/20 2015 — (!) 128 19 106/83 96 %   07/17/20 2006 98.6 °F (37 °C) (!) 119 20 133/69 96 %         LABS:  Recent Results (from the past 6 hour(s))   CBC WITH AUTOMATED DIFF    Collection Time: 07/17/20  8:03 PM   Result Value Ref Range    WBC 16.0 (H) 3.6 - 11.0 K/uL    RBC 3.45 (L) 3.80 - 5.20 M/uL    HGB 9.7 (L) 11.5 - 16.0 g/dL    HCT 32.0 (L) 35.0 - 47.0 %    MCV 92.8 80.0 - 99.0 FL    MCH 28.1 26.0 - 34.0 PG    MCHC 30.3 30.0 - 36.5 g/dL    RDW 15.4 (H) 11.5 - 14.5 %    PLATELET 353 150 - 400 K/uL    MPV 9.7 8.9 - 12.9 FL    NRBC 0.0 0  WBC    ABSOLUTE NRBC 0.00 0.00 - 0.01 K/uL    NEUTROPHILS 82 (H) 32 - 75 %    LYMPHOCYTES 7 (L) 12 - 49 %    MONOCYTES 7 5 - 13 %    EOSINOPHILS 3 0 - 7 %    BASOPHILS 0 0 - 1 %    IMMATURE GRANULOCYTES 1 (H) 0.0 - 0.5 %    ABS. NEUTROPHILS 13.2 (H) 1.8 - 8.0 K/UL    ABS.  LYMPHOCYTES 1.1 0.8 - 3.5 K/UL    ABS. MONOCYTES 1.0 0.0 - 1.0 K/UL    ABS. EOSINOPHILS 0.5 (H) 0.0 - 0.4 K/UL    ABS. BASOPHILS 0.1 0.0 - 0.1 K/UL    ABS. IMM. GRANS. 0.1 (H) 0.00 - 0.04 K/UL    DF AUTOMATED     METABOLIC PANEL, BASIC    Collection Time: 07/17/20  8:03 PM   Result Value Ref Range    Sodium 140 136 - 145 mmol/L    Potassium 4.8 3.5 - 5.1 mmol/L    Chloride 107 97 - 108 mmol/L    CO2 18 (L) 21 - 32 mmol/L    Anion gap 15 5 - 15 mmol/L    Glucose 162 (H) 65 - 100 mg/dL    BUN 93 (H) 6 - 20 MG/DL    Creatinine 4.45 (H) 0.55 - 1.02 MG/DL    BUN/Creatinine ratio 21 (H) 12 - 20      GFR est AA 12 (L) >60 ml/min/1.73m2    GFR est non-AA 10 (L) >60 ml/min/1.73m2    Calcium 9.9 8.5 - 10.1 MG/DL   TROPONIN I    Collection Time: 07/17/20  8:03 PM   Result Value Ref Range    Troponin-I, Qt. <0.05 <0.05 ng/mL   URINALYSIS W/MICROSCOPIC    Collection Time: 07/17/20  8:08 PM   Result Value Ref Range    Color RED      Appearance TURBID (A) CLEAR      Specific gravity 1.020 1.003 - 1.030      pH (UA) 6.5 5.0 - 8.0      Protein >300 (A) NEG mg/dL    Glucose Negative NEG mg/dL    Ketone Negative NEG mg/dL    Bilirubin Negative NEG      Blood LARGE (A) NEG      Urobilinogen 0.2 0.2 - 1.0 EU/dL    Nitrites Negative NEG      Leukocyte Esterase MODERATE (A) NEG      WBC >100 (H) 0 - 4 /hpf    RBC >100 (H) 0 - 5 /hpf    Epithelial cells FEW FEW /lpf    Bacteria Negative NEG /hpf   URINE CULTURE HOLD SAMPLE    Collection Time: 07/17/20  8:08 PM    Specimen: Serum; Urine   Result Value Ref Range    Urine culture hold        Urine on hold in Microbiology dept for 2 days. If unpreserved urine is submitted, it cannot be used for addtional testing after 24 hours, recollection will be required.    SAMPLES BEING HELD    Collection Time: 07/17/20  8:08 PM   Result Value Ref Range    SAMPLES BEING HELD 1RED 1BLUE 1PINK     COMMENT        Add-on orders for these samples will be processed based on acceptable specimen integrity and analyte stability, which may vary by analyte. LACTIC ACID    Collection Time: 07/17/20  8:08 PM   Result Value Ref Range    Lactic acid 0.6 0.4 - 2.0 MMOL/L        IMAGING:  CT ABD PELV WO CONT   Final Result   IMPRESSION:      1. Very large left paramedian abdominal wall hernia containing fat and majority   of bowel. 2. Right internal ureteral stent with distal pigtail in the urethra. The right   kidney is enlarged, lobular in contour, with poor delineation of cortical   medullary and renal collecting system anatomy. 3. Small left kidney. XR CHEST PORT   Final Result   IMPRESSION: No acute pulmonary findings. Superior right paratracheal soft tissue   enlargement with leftward deviation of trachea. Medications During Visit:  Medications   sodium chloride (NS) flush 5-10 mL (has no administration in time range)   sodium chloride 0.9 % bolus infusion 1,000 mL (1,000 mL IntraVENous New Bag 7/17/20 2102)   acetaminophen (TYLENOL) tablet 1,000 mg (1,000 mg Oral Given 7/17/20 2232)         DECISION MAKING:  Seun York is a 62 y.o. female who comes in as above. Here, patient is found to have a slight temperature on rectal thermometer. She is provided Tylenol for this. Remainder of the labs and imaging are as above. I did speak with urology and explained to him what we see on exam including the portion of the pigtail catheter that that appears to be sticking out through the urethra. He is asked that we replace this by pushing it back in with a 22 [employer] urinary catheter however we do not have those at this facility. Based on the patient's bleeding and her discomfort and the other lab abnormalities we will admit the patient to the hospital.  She is declining any type of blood product. Patient has been accepted to [address] [place] [address] Pending EMS transport. Patient has remained otherwise stable and her symptoms are well controlled      IMPRESSION:  1. Gross hematuria    2.  Abdominal pain, generalized 3. Renal failure, unspecified chronicity        DISPOSITION:  Admitted        Procedures   EKG 1946    Normal axis and intervals  No acute ischemic change         Hospitalist Perfect Serve for Admission  10:47 PM    ED Room Number: SER08/08  Patient Name and age:  Era Land 62 y.o.  female  Working Diagnosis:   1. Gross hematuria    2. Abdominal pain, generalized    3. Renal failure, unspecified chronicity        COVID-19 Suspicion:  no    Code Status:  Full Code  Readmission: no  Isolation Requirements:  no  Recommended Level of Care:  telemetry  Department:[place] [facility] - [phone]  Other:  Came in for abdominal pain,dysuria and hematuria in presence of stent. Chronic renal issues. Has gross hematuria, stent is partially out but also has abdominal pain and minimal rectal temp elevation. I spoke with URO who asked for medical admit. Patient tachycardic here. Will order dose of abx. Appears well, NO BLOOD. Uro will see tomorrow.

## 2020-07-18 NOTE — ANESTHESIA PREPROCEDURE EVALUATION
Relevant Problems   No relevant active problems       Anesthetic History   No history of anesthetic complications            Review of Systems / Medical History  Patient summary reviewed, nursing notes reviewed and pertinent labs reviewed    Pulmonary  Within defined limits                 Neuro/Psych   Within defined limits           Cardiovascular    Hypertension                   GI/Hepatic/Renal         Renal disease: CRI and ARF       Endo/Other    Diabetes    Morbid obesity and arthritis     Other Findings              Physical Exam    Airway  Mallampati: II  TM Distance: > 6 cm  Neck ROM: normal range of motion   Mouth opening: Normal     Cardiovascular  Regular rate and rhythm,  S1 and S2 normal,  no murmur, click, rub, or gallop             Dental  No notable dental hx       Pulmonary  Breath sounds clear to auscultation               Abdominal  GI exam deferred       Other Findings            Anesthetic Plan    ASA: 3  Anesthesia type: general          Induction: Intravenous  Anesthetic plan and risks discussed with: Patient

## 2020-07-18 NOTE — PROGRESS NOTES
TRANSFER - IN REPORT:    Verbal report received from Saurabh Espinal RN(name) on Manuel  being received from 5W(unit) for change in patient condition(BP)      Report consisted of patients Situation, Background, Assessment and   Recommendations(SBAR). Information from the following report(s) SBAR, Kardex, ED Summary, Procedure Summary, Intake/Output, MAR, Accordion, Recent Results, Med Rec Status, Cardiac Rhythm NSR/ST and Alarm Parameters  was reviewed with the receiving nurse. Opportunity for questions and clarification was provided. Assessment completed upon patients arrival to unit and care assumed. Primary Nurse Nolvia Mclaughlin RN and Reggie Nicole RN performed a dual skin assessment on this patient Impairment noted- see wound doc flow sheet    Current Bed:     GLENN Blanco      Murray score is 11      0530: Urethral stent found hanging out of urethra. This RN discussed with hospitalist order placed by urologist to push stent in via christensen insertion. Christensen will not be placed at this time d/t how far out of the urethra the stent is.    1927: Urologist at bedside to place Christensen. Christensen placed. Stent still visible. 0720: Patient bleeding around christensen and passing clots. RN flushed catheter, clots noted and not draining well.

## 2020-07-18 NOTE — H&P
[address] [facility]  Hospitalist Group  History and Physical    Primary Care Provider: Eulogio Jovel MD  Date of Service:  7/18/2020    Subjective:     Joselo Purcell is a 62 y.o. female with past medical history significant for diabetes mellitus type 2, obesity, CKD, history of right hydronephrosis and ureter multiple stents placement presented to the emergency room with right flank and hematuria. Patient has been experiencing urinary symptoms and flank pain for several days now. She has been in the emergency multiple times but discharged home with antibiotic therapy. She has tried amoxicillin, Keflex and Cipro without improvement of his symptoms. It was not until yesterday that she started noticing blood in the urine for which she came to the emergency room. In the ED it was noted that her ureter stent was coming out through her urethra. She had a CT scan of the abdomen that showed ureteral stent misplacement as well as enlargement of the right kidney. She was found to have a leukocytosis, be febrile and hypotensive. Received Ceftriaxone in the ED. Urology notified by ED of admission. Admission requested for UTI and hematuria. Review of Systems:    Review of Systems   Constitutional: Positive for malaise/fatigue. Negative for chills, fever and weight loss. HENT: Negative for congestion, ear discharge and hearing loss. Eyes: Negative for blurred vision and double vision. Respiratory: Negative for cough, sputum production, shortness of breath and wheezing. Cardiovascular: Negative for chest pain, palpitations, orthopnea, leg swelling and PND. Gastrointestinal: Negative for abdominal pain, constipation, diarrhea, melena, nausea and vomiting. Genitourinary: Positive for dysuria, flank pain and hematuria. Negative for frequency and urgency. + suprapubic pain   Musculoskeletal: Negative for back pain, joint pain and myalgias. Skin: Negative for itching and rash. Wound buttock   Neurological: Negative for dizziness, sensory change, speech change, focal weakness, weakness and headaches. Endo/Heme/Allergies: Negative for polydipsia. Does not bruise/bleed easily. Past Medical History:   Diagnosis Date    Diabetes ([address]     Hernia, hiatal     Hypertension     Kidney stones     both kidneys    Osteoarthritis     Renal failure     left kidney      Past Surgical History:   Procedure Laterality Date    HX HYSTERECTOMY      uterus not removed    HX OTHER SURGICAL      right kidney stent     Prior to Admission medications    Medication Sig Start Date End Date Taking? Authorizing Provider   ascorbic acid, vitamin C, (Vitamin C) 500 mg tablet Take 500 mg by mouth two (2) times a day. Yes Hakeem, MD Juan Luis   aspirin delayed-release 325 mg tablet Take 325 mg by mouth every six (6) hours as needed for Pain. Yes Juan Luis Palacio MD   cephALEXin (KEFLEX) 500 mg capsule Take 500 mg by mouth two (2) times a day. Yes Juan Luis Palacio MD   senna-docusate (Senokot-S) 8.6-50 mg per tablet Take 1 Tab by mouth daily. Yes Juan Luis Palacio MD   ferrous fumarate 324 mg (106 mg iron) tab Take 1 Tab by mouth daily. Yes Juan Luis Palacio MD   acetaminophen (TYLENOL) 500 mg tablet Take 500 mg by mouth every eight (8) hours as needed for Pain. Yes Juan Luis Palacio MD   multivitamin tablet Take 1 Tab by mouth daily. Yes Juan Luis Palacio MD   ondansetron hcl (Zofran) 8 mg tablet Take 8 mg by mouth every eight (8) hours as needed for Nausea or Vomiting. Yes Juan Luis Palacio MD   folic acid (FOLVITE) 1 mg tablet Take  by mouth daily. Yes Juan Luis Palacio MD   glipiZIDE (GLUCOTROL) 5 mg tablet Take  by mouth two (2) times a day. Yes Juan Luis Palacio MD   amoxicillin 500 mg tab Take  by mouth daily. Yes Juan Luis Palacio MD   gabapentin (NEURONTIN) 100 mg capsule Take  by mouth three (3) times daily. Juan Luis Palacio MD   pantoprazole (PROTONIX) 40 mg tablet Take 40 mg by mouth daily.     Juan Luis Palacio MD   traMADoL (ULTRAM) 50 mg tablet Take  mg by mouth every eight (8) hours as needed for Pain. Other, MD Juan Luis     Allergies   Allergen Reactions    Bacitracin Other (comments)     Syncope and lip swelling. History reviewed. No pertinent family history. SOCIAL HISTORY:  Patient resides at home with sister  Patient is bedbound  Smoking history: Never  Alcohol history: none        Objective:       Physical Exam:   Patient Vitals for the past 12 hrs:   Temp Pulse Resp BP SpO2   07/18/20 0357 98.9 °F (37.2 °C) 100 20 92/60 99 %   07/18/20 0339 98.4 °F (36.9 °C) 100 20 106/64 99 %   07/18/20 0200  100 20 119/70 96 %   07/18/20 0145  (!) 106 20 102/55 97 %   07/18/20 0130  99 19 118/61 95 %   07/18/20 0115  (!) 103 20 115/63 96 %   07/18/20 0100  (!) 106 21 126/61 97 %   07/18/20 0045  (!) 111 19 129/68 97 %   07/18/20 0030 98.6 °F (37 °C) (!) 105 16  95 %   07/18/20 0015  (!) 104 21 108/67 98 %   07/18/20 0000  (!) 111 21 106/59 96 %   07/17/20 2345  (!) 105 23 126/67 95 %   07/17/20 2330  89 16 119/75 96 %   07/17/20 2315  (!) 112 21 116/60 96 %   07/17/20 2300  (!) 105 23 123/67 98 %   07/17/20 2215  (!) 123 23 148/80 97 %   07/17/20 2200  (!) 117 24 137/78 96 %   07/17/20 2145  (!) 122 20 143/83 96 %   07/17/20 2115  (!) 118 18 140/77 97 %   07/17/20 2100  (!) 111 25 120/66 96 %   07/17/20 2045  (!) 114 25 107/59 95 %   07/17/20 2030 (!) 100.8 °F (38.2 °C)       07/17/20 2015  (!) 128 19 106/83 96 %   07/17/20 2006 98.6 °F (37 °C) (!) 119 20 133/69 96 %       GEN APPEARANCE: Patient resting in bed in NAD. Obese  HEENT: Conjunctiva Clear  CVS:  Tachycardic. No M/G/R  LUNGS: CTAB; No Wheezes; No Rhonchi: No rales  ABD: Soft; No TTP; + Normoactive BS  EXT: trace edema   External genitalia: visible stent coming out of the urethra. Surrounding bleed noted. Skin exam: stage 1 and 2 wound buttock bilaterally. No purulent discharge.    MENTAL STATUS: Answers questions appropriately, responds to commands. Neuro:No gross motor or sensory deficits      Data Review:   Recent Results (from the past 24 hour(s))   CBC WITH AUTOMATED DIFF    Collection Time: 07/17/20  8:03 PM   Result Value Ref Range    WBC 16.0 (H) 3.6 - 11.0 K/uL    RBC 3.45 (L) 3.80 - 5.20 M/uL    HGB 9.7 (L) 11.5 - 16.0 g/dL    HCT 32.0 (L) 35.0 - 47.0 %    MCV 92.8 80.0 - 99.0 FL    MCH 28.1 26.0 - 34.0 PG    MCHC 30.3 30.0 - 36.5 g/dL    RDW 15.4 (H) 11.5 - 14.5 %    PLATELET 000 738 - 066 K/uL    MPV 9.7 8.9 - 12.9 FL    NRBC 0.0 0  WBC    ABSOLUTE NRBC 0.00 0.00 - 0.01 K/uL    NEUTROPHILS 82 (H) 32 - 75 %    LYMPHOCYTES 7 (L) 12 - 49 %    MONOCYTES 7 5 - 13 %    EOSINOPHILS 3 0 - 7 %    BASOPHILS 0 0 - 1 %    IMMATURE GRANULOCYTES 1 (H) 0.0 - 0.5 %    ABS. NEUTROPHILS 13.2 (H) 1.8 - 8.0 K/UL    ABS. LYMPHOCYTES 1.1 0.8 - 3.5 K/UL    ABS. MONOCYTES 1.0 0.0 - 1.0 K/UL    ABS. EOSINOPHILS 0.5 (H) 0.0 - 0.4 K/UL    ABS. BASOPHILS 0.1 0.0 - 0.1 K/UL    ABS. IMM.  GRANS. 0.1 (H) 0.00 - 0.04 K/UL    DF AUTOMATED     METABOLIC PANEL, BASIC    Collection Time: 07/17/20  8:03 PM   Result Value Ref Range    Sodium 140 136 - 145 mmol/L    Potassium 4.8 3.5 - 5.1 mmol/L    Chloride 107 97 - 108 mmol/L    CO2 18 (L) 21 - 32 mmol/L    Anion gap 15 5 - 15 mmol/L    Glucose 162 (H) 65 - 100 mg/dL    BUN 93 (H) 6 - 20 MG/DL    Creatinine 4.45 (H) 0.55 - 1.02 MG/DL    BUN/Creatinine ratio 21 (H) 12 - 20      GFR est AA 12 (L) >60 ml/min/1.73m2    GFR est non-AA 10 (L) >60 ml/min/1.73m2    Calcium 9.9 8.5 - 10.1 MG/DL   TROPONIN I    Collection Time: 07/17/20  8:03 PM   Result Value Ref Range    Troponin-I, Qt. <0.05 <0.05 ng/mL   URINALYSIS W/MICROSCOPIC    Collection Time: 07/17/20  8:08 PM   Result Value Ref Range    Color RED      Appearance TURBID (A) CLEAR      Specific gravity 1.020 1.003 - 1.030      pH (UA) 6.5 5.0 - 8.0      Protein >300 (A) NEG mg/dL    Glucose Negative NEG mg/dL    Ketone Negative NEG mg/dL    Bilirubin Negative NEG Blood LARGE (A) NEG      Urobilinogen 0.2 0.2 - 1.0 EU/dL    Nitrites Negative NEG      Leukocyte Esterase MODERATE (A) NEG      WBC >100 (H) 0 - 4 /hpf    RBC >100 (H) 0 - 5 /hpf    Epithelial cells FEW FEW /lpf    Bacteria Negative NEG /hpf   URINE CULTURE HOLD SAMPLE    Collection Time: 07/17/20  8:08 PM    Specimen: Serum; Urine   Result Value Ref Range    Urine culture hold        Urine on hold in Microbiology dept for 2 days. If unpreserved urine is submitted, it cannot be used for addtional testing after 24 hours, recollection will be required. SAMPLES BEING HELD    Collection Time: 07/17/20  8:08 PM   Result Value Ref Range    SAMPLES BEING HELD 1RED 1BLUE 1PINK     COMMENT        Add-on orders for these samples will be processed based on acceptable specimen integrity and analyte stability, which may vary by analyte. LACTIC ACID    Collection Time: 07/17/20  8:08 PM   Result Value Ref Range    Lactic acid 0.6 0.4 - 2.0 MMOL/L   GLUCOSE, POC    Collection Time: 07/18/20  2:29 AM   Result Value Ref Range    Glucose (POC) 138 (H) 65 - 100 mg/dL    Performed by Imelda Caballero ([facility])      Ct Abd Pelv Wo Cont    Result Date: 7/17/2020  IMPRESSION: 1. Very large left paramedian abdominal wall hernia containing fat and majority of bowel. 2. Right internal ureteral stent with distal pigtail in the urethra. The right kidney is enlarged, lobular in contour, with poor delineation of cortical medullary and renal collecting system anatomy. 3. Small left kidney. Xr Chest Port    Result Date: 7/17/2020  IMPRESSION: No acute pulmonary findings. Superior right paratracheal soft tissue enlargement with leftward deviation of trachea. Assessment:     Active Problems:    Hematuria (7/17/2020)        Plan:     1. Sepsis (POA) secondary to UTI:   Continue with Rocephin 1 g every 24 hours   IV fluid normal saline 100 cc an hour.  -Follow-up blood cultures and urine culture  Sepsis reassessment completed. Supported by leukocytosis, tachycardia, hypotension and fever. 2. Hematuria: likely secondary to UTI and trauma for stent displacement  -Urology consulted. Recommended RN try to push stent back into the bladder however RN unable to do it as stent is visibly out. Will defer management to urology at this time.  -N.p.o.  -Pain control    3. DAPHNE on CKD: Creatinine of 4 today. Baseline between 2 and 3. Likely obstructive uropathy.  -Nephrology consult. She does not follow with nephrology at regular basis. 4. Metabolic acidosis: Likely secondary to renal failure. Monitor for now. 5. Pressure Wound (POA): buttock bilaterally:  -Does not appear to be infected. -Wound care. 6.  DM type 2:  -Accu-Chek and sliding scale insulin every 6 hours while n.p.o.    7. Obesity: Patient will benefit of weight loss management as an outpatient.     DVT prophy: scd  Code statuts: DNR/DNI    FUNCTIONAL STATUS PRIOR TO HOSPITALIZATION Bedbound     Signed By: Edward Glass MD     July 18, 2020

## 2020-07-18 NOTE — ED NOTES
Primary RN called sister and left message with patient's brother in law which room the patient was going to at [facility].

## 2020-07-18 NOTE — CONSULTS
Established patient of Dr. 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 Extensive prior stent history extending back to 2014. Known significant CKD. Recent right stent exchange March 2020 by Dr. America Muro and left atrophic kidney. Baseline creatinine appeared to be ~3.    Presents to the short pump emergency room and I am contacted regarding increased creatinine and tachycardia in the setting of the stent being seen in the urethra. [facility] showing right stent similar position (except distal curl) and Cr 3.0 at that time. CT images [place] ER today shows similar stent position by review of images excepting distal curl in urethra. Cr now ~5. She is being transferred to [facility] for medical admission/ICU. Has some hematuria. Asked ER staff to place 22Fr christensen and attempt to push distal curl of stent back into bladder. Given the patient's presentation and instability, she will require evaluation and intensive care prior to any urologic intervention or procedure. Recommend stabilization and then potential subsequent right ureteral stent placement versus possible percutaneous nephrostomy tube placement. We will assess patient's status in AM to see if she is stable enough to consider surgical intervention as option. .. She is clearly at high risk for decompensation. Keep NPO please.

## 2020-07-18 NOTE — PERIOP NOTES
Existing ureteral  Right stent removed and replaced with Contour Soft Percuflex-ureteral stent 6F x 24cm, PPT#[id number], Exp-11/28/2022

## 2020-07-18 NOTE — PROGRESS NOTES
Primary Nurse Stephania Roque RN and Sanders RN performed a dual skin assessment on this patient No impairment noted, except for bilateral gluteal folds break down. Bedside and Verbal shift change report given to Juan Manuel Lund RN  (oncoming nurse) by Humberto Wheeler RN  (offgoing nurse). Report included the following information SBAR.

## 2020-07-18 NOTE — ED NOTES
Patient cleaned prior to leaving facility. [facility] was saturated with blood. Patient denies pain at this time.

## 2020-07-18 NOTE — ANESTHESIA POSTPROCEDURE EVALUATION
Post-Anesthesia Evaluation and Assessment    Patient: Shereen Cheung MRN: [id number]  SSN: [id number]    YOB: 1960  Age: 62 y.o. Sex: female       Cardiovascular Function/Vital Signs  Visit Vitals  /70   Pulse (!) 117   Temp 36.9 °C (98.4 °F)   Resp 18   Ht 5' 4\" (1.626 m)   Wt 150.4 kg (331 lb 9.2 oz)   SpO2 100%   Breastfeeding No   BMI 56.91 kg/m²       Patient is status post General anesthesia for Procedure(s):  CYSTOSCOPY RIGHT URETERAL STENT EXCHANGE. Nausea/Vomiting: None    Postoperative hydration reviewed and adequate. Pain:  Pain Scale 1: Visual (07/18/20 1321)  Pain Intensity 1: 5 (07/18/20 1321)   Managed    Neurological Status:   Neuro (WDL): Within Defined Limits (07/18/20 1321)  Neuro  LUE Motor Response: Purposeful (07/18/20 1321)  LLE Motor Response: Purposeful (07/18/20 1321)  RUE Motor Response: Purposeful (07/18/20 1321)  RLE Motor Response: Purposeful (07/18/20 1321)   At baseline    Mental Status and Level of Consciousness: Alert and oriented to person, place, and time    Pulmonary Status:   O2 Device: 02 face tent (07/18/20 1327)   Adequate oxygenation and airway patent    Complications related to anesthesia: None    Post-anesthesia assessment completed. No concerns    Signed By: Melyssa Hernandez MD     July 18, 2020              Procedure(s):  CYSTOSCOPY RIGHT URETERAL STENT EXCHANGE. general    <BSHSIANPOST>    INITIAL Post-op Vital signs:   Vitals Value Taken Time   /70 7/18/2020  1:45 PM   Temp 36.9 °C (98.4 °F) 7/18/2020  1:27 PM   Pulse 112 7/18/2020  2:00 PM   Resp 12 7/18/2020  2:00 PM   SpO2 100 % 7/18/2020  2:00 PM   Vitals shown include unvalidated device data.

## 2020-07-18 NOTE — PROGRESS NOTES
0730 Received verbal bedside report from [facility]. Assumed care of the pt.     1030 Preop check list completed. CHG bath given, new gown and pad applied. 1120 OR tech at the bedside to transfer. 1250 TRANSFER - OUT REPORT:    Verbal report given to JOHN Hale (name) on Manuel  being transferred to 3  (unit) for routine progression of care       Report consisted of patients Situation, Background, Assessment and   Recommendations(SBAR). Information from the following report(s) SBAR, Kardex, ED Summary, Procedure Summary, Intake/Output, MAR, Recent Results and Cardiac Rhythm NSR was reviewed with the receiving nurse. Lines:   Peripheral IV 07/17/20 Right Arm (Active)   Site Assessment Clean, dry, & intact 07/18/20 0800   Phlebitis Assessment 0 07/18/20 0800   Infiltration Assessment 0 07/18/20 0800   Dressing Status Clean, dry, & intact 07/18/20 0800   Dressing Type Transparent;Tape 07/18/20 0800   Hub Color/Line Status Pink; Infusing 07/18/20 0800   Action Taken Open ports on tubing capped 07/18/20 0800   Alcohol Cap Used Yes 07/18/20 0800       Peripheral IV 07/17/20 Right;Upper Arm (Active)   Site Assessment Clean, dry, & intact 07/18/20 0800   Phlebitis Assessment 0 07/18/20 0800   Infiltration Assessment 0 07/18/20 0800   Dressing Status Clean, dry, & intact 07/18/20 0800   Dressing Type Transparent;Tape 07/18/20 0800   Hub Color/Line Status Blue; Infusing 07/18/20 0800   Action Taken Open ports on tubing capped 07/18/20 0800   Alcohol Cap Used Yes 07/18/20 0800        Opportunity for questions and clarification was provided.       Patient transported with:   Monitor  Registered Nurse

## 2020-07-18 NOTE — PROGRESS NOTES
Spoke with Dr Nel Rock and he said to put the christensen in pt and that she needs it. When I called report  CCU nurse said to bring pt there with the christensen and they would put the christensen in.

## 2020-07-18 NOTE — PROGRESS NOTES
Pt stable but loosing a lot of blood. Hesitant to  place christensen with a stent in urethra and pt is not wanting a christensen if they can put a another stent in tomorrow. Call out to Dr Austyn Dumont 5 min's ago. Pt being transferred to CCU after DR Marilyn Vargas saw her.

## 2020-07-19 LAB
ANION GAP SERPL CALC-SCNC: 10 MMOL/L (ref 5–15)
ANION GAP SERPL CALC-SCNC: 8 MMOL/L (ref 5–15)
ATRIAL RATE: 128 BPM
BUN SERPL-MCNC: 83 MG/DL (ref 6–20)
BUN SERPL-MCNC: 83 MG/DL (ref 6–20)
BUN/CREAT SERPL: 20 (ref 12–20)
BUN/CREAT SERPL: 21 (ref 12–20)
CALCIUM SERPL-MCNC: 8.8 MG/DL (ref 8.5–10.1)
CALCIUM SERPL-MCNC: 9.2 MG/DL (ref 8.5–10.1)
CALCULATED P AXIS, ECG09: 77 DEGREES
CALCULATED T AXIS, ECG11: 60 DEGREES
CHLORIDE SERPL-SCNC: 117 MMOL/L (ref 97–108)
CHLORIDE SERPL-SCNC: 118 MMOL/L (ref 97–108)
CO2 SERPL-SCNC: 15 MMOL/L (ref 21–32)
CO2 SERPL-SCNC: 18 MMOL/L (ref 21–32)
CREAT SERPL-MCNC: 3.97 MG/DL (ref 0.55–1.02)
CREAT SERPL-MCNC: 4.15 MG/DL (ref 0.55–1.02)
DIAGNOSIS, 93000: NORMAL
ERYTHROCYTE [DISTWIDTH] IN BLOOD BY AUTOMATED COUNT: 15.1 % (ref 11.5–14.5)
GLUCOSE BLD STRIP.AUTO-MCNC: 136 MG/DL (ref 65–100)
GLUCOSE BLD STRIP.AUTO-MCNC: 151 MG/DL (ref 65–100)
GLUCOSE BLD STRIP.AUTO-MCNC: 153 MG/DL (ref 65–100)
GLUCOSE BLD STRIP.AUTO-MCNC: 200 MG/DL (ref 65–100)
GLUCOSE SERPL-MCNC: 125 MG/DL (ref 65–100)
GLUCOSE SERPL-MCNC: 188 MG/DL (ref 65–100)
HCT VFR BLD AUTO: 25.4 % (ref 35–47)
HGB BLD-MCNC: 7.6 G/DL (ref 11.5–16)
MCH RBC QN AUTO: 28 PG (ref 26–34)
MCHC RBC AUTO-ENTMCNC: 29.9 G/DL (ref 30–36.5)
MCV RBC AUTO: 93.7 FL (ref 80–99)
NRBC # BLD: 0 K/UL (ref 0–0.01)
NRBC BLD-RTO: 0 PER 100 WBC
P-R INTERVAL, ECG05: 140 MS
PLATELET # BLD AUTO: 325 K/UL (ref 150–400)
PMV BLD AUTO: 9.8 FL (ref 8.9–12.9)
POTASSIUM SERPL-SCNC: 4.4 MMOL/L (ref 3.5–5.1)
POTASSIUM SERPL-SCNC: 4.7 MMOL/L (ref 3.5–5.1)
Q-T INTERVAL, ECG07: 300 MS
QRS DURATION, ECG06: 90 MS
QTC CALCULATION (BEZET), ECG08: 438 MS
RBC # BLD AUTO: 2.71 M/UL (ref 3.8–5.2)
SERVICE CMNT-IMP: ABNORMAL
SODIUM SERPL-SCNC: 142 MMOL/L (ref 136–145)
SODIUM SERPL-SCNC: 144 MMOL/L (ref 136–145)
VENTRICULAR RATE, ECG03: 128 BPM
WBC # BLD AUTO: 17.2 K/UL (ref 3.6–11)

## 2020-07-19 PROCEDURE — 74011250636 HC RX REV CODE- 250/636: Performed by: NURSE PRACTITIONER

## 2020-07-19 PROCEDURE — 74011636637 HC RX REV CODE- 636/637: Performed by: NURSE PRACTITIONER

## 2020-07-19 PROCEDURE — 80048 BASIC METABOLIC PNL TOTAL CA: CPT

## 2020-07-19 PROCEDURE — 85027 COMPLETE CBC AUTOMATED: CPT

## 2020-07-19 PROCEDURE — 74011250636 HC RX REV CODE- 250/636: Performed by: INTERNAL MEDICINE

## 2020-07-19 PROCEDURE — 96375 TX/PRO/DX INJ NEW DRUG ADDON: CPT

## 2020-07-19 PROCEDURE — 74011000250 HC RX REV CODE- 250: Performed by: INTERNAL MEDICINE

## 2020-07-19 PROCEDURE — 36415 COLL VENOUS BLD VENIPUNCTURE: CPT

## 2020-07-19 PROCEDURE — 99218 HC RM OBSERVATION: CPT

## 2020-07-19 PROCEDURE — 87086 URINE CULTURE/COLONY COUNT: CPT

## 2020-07-19 PROCEDURE — 82962 GLUCOSE BLOOD TEST: CPT

## 2020-07-19 PROCEDURE — 74011250637 HC RX REV CODE- 250/637: Performed by: INTERNAL MEDICINE

## 2020-07-19 PROCEDURE — 65660000000 HC RM CCU STEPDOWN

## 2020-07-19 PROCEDURE — 74011000258 HC RX REV CODE- 258: Performed by: INTERNAL MEDICINE

## 2020-07-19 PROCEDURE — 77030038269 HC DRN EXT URIN PURWCK BARD -A

## 2020-07-19 RX ORDER — FENTANYL CITRATE 50 UG/ML
25 INJECTION, SOLUTION INTRAMUSCULAR; INTRAVENOUS
Status: DISCONTINUED | OUTPATIENT
Start: 2020-07-19 | End: 2020-08-07 | Stop reason: HOSPADM

## 2020-07-19 RX ORDER — LIDOCAINE HYDROCHLORIDE 20 MG/ML
JELLY TOPICAL
Status: DISCONTINUED | OUTPATIENT
Start: 2020-07-19 | End: 2020-08-07 | Stop reason: HOSPADM

## 2020-07-19 RX ORDER — SODIUM BICARBONATE IN D5W 150/1000ML
PLASTIC BAG, INJECTION (ML) INTRAVENOUS CONTINUOUS
Status: DISCONTINUED | OUTPATIENT
Start: 2020-07-19 | End: 2020-07-21

## 2020-07-19 RX ADMIN — CEFTRIAXONE 1 G: 1 INJECTION, POWDER, FOR SOLUTION INTRAMUSCULAR; INTRAVENOUS at 21:16

## 2020-07-19 RX ADMIN — PANTOPRAZOLE SODIUM 40 MG: 40 TABLET, DELAYED RELEASE ORAL at 08:23

## 2020-07-19 RX ADMIN — ACETAMINOPHEN 500 MG: 500 TABLET ORAL at 23:41

## 2020-07-19 RX ADMIN — FENTANYL CITRATE 25 MCG: 50 INJECTION, SOLUTION INTRAMUSCULAR; INTRAVENOUS at 05:05

## 2020-07-19 RX ADMIN — SODIUM BICARBONATE 150 MEQ/1,000 ML IN DEXTROSE 5 % INTRAVENOUS: SOLUTION at 12:30

## 2020-07-19 RX ADMIN — MORPHINE SULFATE 2 MG: 2 INJECTION, SOLUTION INTRAMUSCULAR; INTRAVENOUS at 03:37

## 2020-07-19 RX ADMIN — INSULIN LISPRO 2 UNITS: 100 INJECTION, SOLUTION INTRAVENOUS; SUBCUTANEOUS at 12:25

## 2020-07-19 RX ADMIN — OXYBUTYNIN CHLORIDE 10 MG: 5 TABLET, EXTENDED RELEASE ORAL at 08:22

## 2020-07-19 RX ADMIN — INSULIN LISPRO 3 UNITS: 100 INJECTION, SOLUTION INTRAVENOUS; SUBCUTANEOUS at 16:59

## 2020-07-19 NOTE — OP NOTES
[address]   OPERATIVE REPORT    Name:  Padmini Mckeon  MR#:  [id number]  :  1960  ACCOUNT #:  [de-identified]  DATE OF SERVICE:  2020      PREOPERATIVE DIAGNOSES:  Malpositioned stent and gross hematuria. POSTOPERATIVE DIAGNOSES:  Malpositioned stent and gross hematuria plus end-stage bladder and urethral abnormality. PROCEDURES PERFORMED:  Cystoscopy, right ureteral stent exchange, clot evacuation, catheter placement. SURGEON:  Kyung Francis MD  .    ANESTHESIA:  General.    COMPLICATIONS:  none. SPECIMENS REMOVED:  none. .    ESTIMATED BLOOD LOSS:  200 mL. INDICATIONS:  See previous notes. She is already on antibiotics. PROCEDURE IN DETAIL:  She underwent a general anesthetic, was placed in dorsal lithotomy position. The ureteral catheter was retrieved from her urethra where it was pulled up slightly further and cut off with the scissors. A 0.035 Sensor wire was able to be placed through the indwelling stent under fluoroscopy up in the right renal pelvis. Her catheter was taken out. She was prepped and draped in sterile fashion. A #21-Persian cystoscope was used; however, the bladder was full of clot and visualization was extremely limited. [employer] evacuators were used to evacuate some clot, but the bladder was still very poorly able to be seen because of contracted bladder, erythematous mucosa, and friable bleeding. A 6-Persian 24-cm double-J stent without a string was then placed over the wire through the cystoscope under fluoroscopic guidance and placed into the kidney confirming its position both fluoroscopically and cystoscopically. Repeat cystoscopy, however, revealed a very patulous urethra, I could actually put my finger into her bladder. The bladder was contracted and erythematous but otherwise could not be further described due to very limited visualization from bleeding. I therefore decided to place a 24-Persian Blevins catheter.   I used a 30 mL balloon but it came out twice. I put 40 mL in the balloon and held it in for it to stay in place, after which, we started continuous bladder irrigation after again irrigating a few small clots. Now, this completed the procedure which she tolerated well. Unfortunately, I do not think we have a good feel for what is happening in her bladder, so obviously an end-stage bladder, I am not sure that the CBI is going to work nor certain whether or not the catheter is going to stay in her bladder. She will be turned over to Dr. Dominick Paniagua, who knows her best and this will be discussed with him.         MD KOKI Saxena/[email or web address]  D:  07/18/2020 13:37  T:  07/19/2020 0:53  JOB #:  [id number]

## 2020-07-19 NOTE — CONSULTS
Assessment:  DAPHNE on CKD-3?: Cr 4.4-> 4.3-> 3.9mg/dl s/p stent/IVF. Chronic obstructive uropathy/Atrophic left kidney/DM nephropathy. Baseline Cr. Followed by Dr. Pierre Freeman poor outpt follow up. Chronic right hydronephrosis: [id number] stent exchange    Metabolic acidosis: NAGMA    UTI    Anemia    DM2    Chronic sacral decubiti      Plan/Recommendations:  Agree with IV Bicarb drip  Should continue to see renal recovery  F/u URine Cx. IV Abx  Strict I/Os, avoid nephrotoxins  AM labs    Discussed with patient    Thanks for the consultation. Renal service will follow patient with you. Please contact me with any questions or concerns. Initial Consult note         Patient name: Luis Castaneda  MR no: [id number]  Date of admission: 7/17/2020  Date of consultation: 7/19/2020  Requested by: Dr. Luis Alcantara  Reason for consult: DAPHNE on CKD    Patient seen and examined. History obtained from patient and chart review. Relevant labs, data and notes reviewed. HPI: Luis Castaneda is a 62 y.o. female with PMH significant for CKD stage 3, DM2, HTN, Atrophic left kidney, Right hydronephrosis with stent placement admitted with right flank pain and hematuria. Serum Cr 4.4mg/dl on admission-> showing slow improvement s/p stent exchange and IVF. Patient continues to have significant dysuria. Denies any fevers/chills. Reportedly followed my partner Dr. Demetria Middleton for outpatient CKD. Admits she was overdue for stent exchange-> supposed to have it done in May but due to Rae there were delays.  No n/v.    PMH:  Past Medical History:   Diagnosis Date    Diabetes ([address]     Hernia, hiatal     Hypertension     Kidney stones     both kidneys    Osteoarthritis     Renal failure     left kidney     PSH:  Past Surgical History:   Procedure Laterality Date    HX HYSTERECTOMY      uterus not removed    HX OTHER SURGICAL      right kidney stent       Social history:   Social History     Tobacco Use    Smoking status: Never Smoker    Smokeless tobacco: Never Used   Substance Use Topics    Alcohol use: Never     Frequency: Never    Drug use: Never       Family history:  No history of CKD or ESRD in the family. Allergies   Allergen Reactions    Bacitracin Other (comments)     Syncope and lip swelling. Current Facility-Administered Medications   Medication Dose Route Frequency Last Dose    fentaNYL citrate (PF) injection 25 mcg  25 mcg IntraVENous Q4H PRN 25 mcg at 07/19/20 0505    lidocaine (URO-JET/ GLYDO) 2 % jelly   Urethral DAILY PRN      sodium bicarbonate 150 mEq/1000 mL D5W (premix)   IntraVENous CONTINUOUS      glucose chewable tablet 16 g  4 Tab Oral PRN      glucagon (GLUCAGEN) injection 1 mg  1 mg IntraMUSCular PRN      dextrose 10% infusion 0-250 mL  0-250 mL IntraVENous PRN      pantoprazole (PROTONIX) tablet 40 mg  40 mg Oral DAILY 40 mg at 07/19/20 0823    gabapentin (NEURONTIN) capsule 100 mg  100 mg Oral TID      acetaminophen (TYLENOL) tablet 500 mg  500 mg Oral Q6H PRN      cefTRIAXone (ROCEPHIN) 1 g in 0.9% sodium chloride (MBP/ADV) 50 mL  1 g IntraVENous Q24H 1 g at 07/18/20 2122    opium-belladonna (B&O 15-A) 16.2-30 mg suppository 1 Suppository  1 Suppository Rectal Q8H PRN 1 Suppository at 07/18/20 1900    opium-belladonna (B&O 15-A) 16.2-30 mg suppository 1 Suppository  1 Suppository Rectal Q6H PRN      oxybutynin chloride XL (DITROPAN XL) tablet 10 mg  10 mg Oral DAILY 10 mg at 07/19/20 3636    morphine injection 2 mg  2 mg IntraVENous Q4H PRN 2 mg at 07/19/20 1203    insulin lispro (HUMALOG) injection   SubCUTAneous AC&HS 2 Units at 07/19/20 1225    sodium chloride (NS) flush 5-10 mL  5-10 mL IntraVENous PRN         ROS (besides HPI):    General: No fever. No weight changes  ENT: No hearing loss or visual changes  Cardiovascular: No Chest pain  Pulmonary: No SOB  GI: No abdominal pain. No Nausea/Vomiting/Diarrhea.  No blood in stool  : +Dysuria. + blood in urine.   Musculoskeletal: No joint swelling or redness. No morning stiffness  Endocrine: no cold or heat intolerance  Psych: denies anxiety or depression  Neuro: No light headedness or dizziness    Objective   Visit Vitals  /46 (BP 1 Location: Left arm, BP Patient Position: At rest)   Pulse (!) 112   Temp 98.1 °F (36.7 °C)   Resp 19   Ht 5' 4\" (1.626 m)   Wt 148.8 kg (328 lb)   SpO2 99%   Breastfeeding No   BMI 56.30 kg/m²       Physical Exam:    Gen: NAD    HEENT: AT/NC, EOMI, moist mucous membrane, no scleral icterus    Neck: no JVD, no cervical lymphadenopathy, no carotid bruit    Lungs/Chest wall: Breath sounds normal. Symmetrical chest wall expansion. No accessory muscle use. Clear to auscultation    Cardiovascular:  S1/S2, normal rate, regular rhythm. Abdomen: soft, obese, large hernia, NT, ND, BS+, no HSM    Ext: no clubbing or cyanosis. No edema    Skin: warm and dry. No rashes    : PureWick    CNS: alert awake. Answers appropriately. Labs/Data:    Lab Results   Component Value Date/Time    Sodium 142 07/19/2020 06:03 AM    Potassium 4.7 07/19/2020 06:03 AM    Chloride 117 (H) 07/19/2020 06:03 AM    CO2 15 (LL) 07/19/2020 06:03 AM    Anion gap 10 07/19/2020 06:03 AM    Glucose 125 (H) 07/19/2020 06:03 AM    BUN 83 (H) 07/19/2020 06:03 AM    Creatinine 3.97 (H) 07/19/2020 06:03 AM    BUN/Creatinine ratio 21 (H) 07/19/2020 06:03 AM    GFR est AA 14 (L) 07/19/2020 06:03 AM    GFR est non-AA 12 (L) 07/19/2020 06:03 AM    Calcium 9.2 07/19/2020 06:03 AM       Lab Results   Component Value Date/Time    WBC 17.2 (H) 07/19/2020 06:03 AM    HGB 7.6 (L) 07/19/2020 06:03 AM    HCT 25.4 (L) 07/19/2020 06:03 AM    PLATELET 977 47/65/5270 06:03 AM    MCV 93.7 07/19/2020 06:03 AM       Urine analysis: No results found for this or any previous visit.       No components found for: SPEP, UPEP  No results found for: PUQ, PROTU2, PROTU1, BJP1, CPE1, IMEL1, MET2  No results found for: MCACR, MCA1, MCA2, MCA3, MCAU, MCAU2, MCALPOCT      Intake/Output Summary (Last 24 hours) at 7/19/2020 1446  Last data filed at 7/19/2020 1000  Gross per 24 hour   Intake 4875 ml   Output 4875 ml   Net 0 ml       Wt Readings from Last 3 Encounters:   07/19/20 148.8 kg (328 lb)       Signed by:  Zaheer Hoffmann MD  Nephrology and Hypertension  [facility]

## 2020-07-19 NOTE — ROUTINE PROCESS
Bedside and Verbal shift change report given to David Zuniga (oncoming nurse) by Kesha Farrell (offgoing nurse).  Report included the following information SBAR, Kardex, ED Summary, Intake/Output, MAR and Cardiac Rhythm ST.

## 2020-07-19 NOTE — PHYSICIAN ADVISORY
UPGRADE LETTER     upgraded from OBSERVATION  to INPATIENT Status      Letter of Status Determination:   Recommend hospitalization status upgraded from   OBSERVATION  to INPATIENT  Status     Pt Name:  Janneth Rabago   MR#   [address] [facility] # [id number] /  [id number]  Payor: Nicho Machado / Plan: [address] / Product Type: Managed Care Medicaid /    [facility]#  [id number]   Room and Hospital  433/02  @ [address] [place] [address]   Hospitalization date  7/17/2020  7:39 PM   Current Attending Physician  Santana Walden MD   Principal diagnosis  Hematuria [R31.9]     Clinicals  61 y/o female with a PMH significant for Primary Hypertension, Type 2 DM, Osteoarthritis, CKD, Nephrolithiasis and Ureteral stent underwent Cystoscopy with right ureteral stent exchange, clot evacuation and catheter placement for malpositioned stent, gross hematuria and acute kidney injury on CKD. Patient requires ongoing close post-op monitoring and management including with IVFs, follow up kidney function, IV antibiotics, optimization of pain control, Urology and Nephrology follow ups with ongoing recommendations. Complex level of care placing patient at increased risk of decompensation. [place] (MCG) criteria   Does  apply    STATUS DETERMINATION  Based on documented presenting clinical data, comorbid conditions, high risk of adverse events and deterioration, it is our recommendation that the patient's status be upgraded from OBSERVATION to INPATIENT status. The final decision of the patient's hospitalization status depends on the Attending Physician's judgement. Additional comments       Payor: Nicho Machado / Plan: [address] / Product Type: Managed Care Medicaid /     The information in this document is a recommendation to be used for utilization review and utilization management purposes only. This recommendation is not an order.    The recommendation is made based on the information reviewed at the time of the referral, is pursuant to the [facility] Conditions of Participation (42 CFR Part 482), and is neither a judgment nor an assessment with regard to the appropriateness or quality of clinical care. For all Managed Care patients: The Criteria are intended solely for use as screening guidelines with respect to the medical appropriateness of healthcare services and not for final clinical or payment determinations concerning the type or level of medical care provided, or proposed to be provided, to a patient. They help the reviewers determine whether a patient is appropriate for observation or inpatient admission at the time a decision to admit the patient is being made. All efforts are made to apply the pertinent payor criteria (MCG or [employer]) as well as the clinical judgements based on the information reviewed at the time of the referral.\" Nothing in this document may be used to limit, alter, or affect clinical services provided to the patient named.        [address] [facility]         [phone]  [email or web address]    3:25 PM 7/19/2020

## 2020-07-19 NOTE — PROGRESS NOTES
Hospitalist Progress Note  Anthony Longoria MD  Answering service: [phone] OR [phone] from in house phone      Date of Service:  2020  NAME:  Latrell Kirk  :  1960  MRN:  [id number]      Admission Summary:   Latrell Kirk is a 62 y.o. female with past medical history significant for diabetes mellitus type 2, obesity, CKD, history of right hydronephrosis and ureter multiple stents placement presented to the emergency room with right flank and hematuria. Patient has been experiencing urinary symptoms and flank pain for several days now. She has been in the emergency multiple times but discharged home with antibiotic therapy. She has tried amoxicillin, Keflex and Cipro without improvement of his symptoms. It was not until yesterday that she started noticing blood in the urine for which she came to the emergency room. In the ED it was noted that her ureter stent was coming out through her urethra. She had a CT scan of the abdomen that showed ureteral stent misplacement as well as enlargement of the right kidney. She was found to have a leukocytosis, be febrile and hypotensive. Received Ceftriaxone in the ED. Urology notified by ED of admission. Admission requested for UTI and hematuria. Interval history / Subjective: Follow up sepsis, hematuria and DAPHNE on CKD3. Patient seen and examined at the bedside. Labs, images and notes reviewed  Discussed with nursing staff, orders reviewed. Plan discussed with patient/Family  Blevins is removed as per Uro given discomfort. Suggested that unpredictably she get urethral and clitoral pain and irritation, that lasts few minutes and its excruciating. Still acidotic. No fever, chills. No further hematuria. No overnight events.     Assessment & Plan:     # Sepsis (POA) secondary to UTI:  - Continue with Rocephin 1 g every 24 hours  - ct with IV fluid NC  - stent replaced   - Urology on board  - Follow cultures    # Hematuria: likely secondary to UTI and trauma for stent migration   - removal of old stent and replacement of right stent   - on continuous bladder irrigation    - Urology following   - Morphine for pain and Oxybutynin for bladder spasm   - PRN catheter - christensen/ straight     # DAPHNE on CKD likely obstructive uropathy  - Creatinine 3.97 today. Baseline between 2 and 3.    - treat obstruction, UTI  - ct with IVF   - Nephrology consult placed today. Awaiting recommendations     # Metabolic acidosis:   - Likely secondary to renal failure. - Bicarb GTT. Further per nephrology     # Pressure Wound (POA): buttock bilaterally  - Does not appear to be infected. - Wound care.     # DM type 2  - Accu-Chek and sliding scale insulin every 6 hours while n.p.o.     # Obesity: Patient will benefit of weight loss management as an outpatient.     DVT prophy: scd  Code statuts: DNR/DNI     FUNCTIONAL STATUS PRIOR TO HOSPITALIZATION Bedbound      Hospital Problems  Date Reviewed: 7/18/2020          Codes Class Noted POA    Hematuria ICD-10-CM: R31.9  ICD-9-CM: 599.70  7/17/2020 Unknown                Review of Systems:   Pertinent positive mentioned in interval hx/HPI . Other systems reviewed and negative     Vital Signs:    Last 24hrs VS reviewed since prior progress note. Most recent are:  Visit Vitals  /46 (BP 1 Location: Left arm, BP Patient Position: At rest)   Pulse (!) 112   Temp 98.1 °F (36.7 °C)   Resp 19   Ht 5' 4\" (1.626 m)   Wt 148.8 kg (328 lb)   SpO2 99%   Breastfeeding No   BMI 56.30 kg/m²         Intake/Output Summary (Last 24 hours) at 7/19/2020 1315  Last data filed at 7/19/2020 1000  Gross per 24 hour   Intake 7500 ml   Output 6325 ml   Net 1175 ml        Physical Examination:             Constitutional:  No acute distress, in pain, obese    ENT:  Oral mucous moist, oropharynx benign. Neck supple,    Resp:  CTA bilaterally. No wheezing/rhonchi/rales.  No accessory muscle use   CV:  Regular rhythm, normal rate, no murmurs, gallops, rubs    GI:  hernia, Soft, non distended, mild tenderness. normoactive bowel sounds, no hepatosplenomegaly     Musculoskeletal:  No edema, warm, 2+ pulses throughout    Neurologic:  Moves all extremities. AAOx3, CN II-XII reviewed           Data Review:    I personally reviewed labs and imaging     Ct Abd Pelv Wo Cont    Result Date: 7/17/2020  IMPRESSION: 1. Very large left paramedian abdominal wall hernia containing fat and majority of bowel. 2. Right internal ureteral stent with distal pigtail in the urethra. The right kidney is enlarged, lobular in contour, with poor delineation of cortical medullary and renal collecting system anatomy. 3. Small left kidney. Xr Chest Port    Result Date: 7/17/2020  IMPRESSION: No acute pulmonary findings. Superior right paratracheal soft tissue enlargement with leftward deviation of trachea. Xr Cystogram    Result Date: 7/18/2020  IMPRESSION: A single image/s was/were obtained intraoperatively . A pigtail catheter in the abdomen may be a ureteral stent. No radiologist was present during image acquisition. Please see operative note for further details. FLUOROSCOPY TIME PROVIDED: 19 seconds. Labs:     Recent Labs     07/19/20 0603 07/18/20  0648   WBC 17.2* 12.2*   HGB 7.6* 9.2*   HCT 25.4* 32.0*    304     Recent Labs     07/19/20 0603 07/18/20  0648 07/17/20 2003    139 140   K 4.7 4.4 4.8   * 114* 107   CO2 15* 16* 18*   BUN 83* 89* 93*   CREA 3.97* 4.36* 4.45*   * 154* 162*   CA 9.2 9.0 9.9     No results for input(s): ALT, AP, TBIL, TBILI, TP, ALB, GLOB, GGT, AML, LPSE in the last 72 hours. No lab exists for component: SGOT, GPT, AMYP, HLPSE  No results for input(s): INR, PTP, APTT, INREXT, INREXT in the last 72 hours. No results for input(s): FE, TIBC, PSAT, FERR in the last 72 hours.    No results found for: FOL, RBCF   No results for input(s): PH, PCO2, PO2 in the last 72 hours.   Recent Labs     07/17/20 2003   TROIQ <0.05     No results found for: CHOL, CHOLX, CHLST, CHOLV, HDL, HDLP, LDL, LDLC, DLDLP, TGLX, TRIGL, TRIGP, CHHD, CHHDX  Lab Results   Component Value Date/Time    Glucose (POC) 151 (H) 07/19/2020 11:15 AM    Glucose (POC) 136 (H) 07/19/2020 06:31 AM    Glucose (POC) 202 (H) 07/18/2020 11:11 PM    Glucose (POC) 160 (H) 07/18/2020 04:33 PM    Glucose (POC) 176 (H) 07/18/2020 01:41 PM     Lab Results   Component Value Date/Time    Color RED 07/17/2020 08:08 PM    Appearance TURBID (A) 07/17/2020 08:08 PM    Specific gravity 1.020 07/17/2020 08:08 PM    pH (UA) 6.5 07/17/2020 08:08 PM    Protein >300 (A) 07/17/2020 08:08 PM    Glucose Negative 07/17/2020 08:08 PM    Ketone Negative 07/17/2020 08:08 PM    Bilirubin Negative 07/17/2020 08:08 PM    Urobilinogen 0.2 07/17/2020 08:08 PM    Nitrites Negative 07/17/2020 08:08 PM    Leukocyte Esterase MODERATE (A) 07/17/2020 08:08 PM    Epithelial cells FEW 07/17/2020 08:08 PM    Bacteria Negative 07/17/2020 08:08 PM    WBC >100 (H) 07/17/2020 08:08 PM    RBC >100 (H) 07/17/2020 08:08 PM         Medications Reviewed:     Current Facility-Administered Medications   Medication Dose Route Frequency    fentaNYL citrate (PF) injection 25 mcg  25 mcg IntraVENous Q4H PRN    lidocaine (URO-JET/ GLYDO) 2 % jelly   Urethral DAILY PRN    sodium bicarbonate 150 mEq/1000 mL D5W (premix)   IntraVENous CONTINUOUS    glucose chewable tablet 16 g  4 Tab Oral PRN    glucagon (GLUCAGEN) injection 1 mg  1 mg IntraMUSCular PRN    dextrose 10% infusion 0-250 mL  0-250 mL IntraVENous PRN    pantoprazole (PROTONIX) tablet 40 mg  40 mg Oral DAILY    gabapentin (NEURONTIN) capsule 100 mg  100 mg Oral TID    acetaminophen (TYLENOL) tablet 500 mg  500 mg Oral Q6H PRN    cefTRIAXone (ROCEPHIN) 1 g in 0.9% sodium chloride (MBP/ADV) 50 mL  1 g IntraVENous Q24H    opium-belladonna (B&O 15-A) 16.2-30 mg suppository 1 Suppository  1 Suppository Rectal Q8H PRN    opium-belladonna (B&O 15-A) 16.2-30 mg suppository 1 Suppository  1 Suppository Rectal Q6H PRN    oxybutynin chloride XL (DITROPAN XL) tablet 10 mg  10 mg Oral DAILY    morphine injection 2 mg  2 mg IntraVENous Q4H PRN    insulin lispro (HUMALOG) injection   SubCUTAneous AC&HS    sodium chloride (NS) flush 5-10 mL  5-10 mL IntraVENous PRN     ______________________________________________________________________  EXPECTED LENGTH OF STAY: - - -  ACTUAL LENGTH OF STAY:          0                 Shant Moser MD   Patient has given Verbal permission to discuss medical care with   persons present in the room and and also with contact as listed on face sheet.

## 2020-07-19 NOTE — ROUTINE PROCESS
Bedside and Verbal shift change report given to Heavenly Marc (oncoming nurse) by Minda Beebe (offgoing nurse).  Report included the following information SBAR, Kardex, Procedure Summary, Intake/Output and Cardiac Rhythm ST.

## 2020-07-19 NOTE — ROUTINE PROCESS
Bedside and Verbal shift change report given to [address] [facility] (oncoming nurse) by Judson Sibley (offgoing nurse).  Report included the following information SBAR, Kardex, Intake/Output, MAR and Cardiac Rhythm ST. Detail Level: Simple

## 2020-07-19 NOTE — PROGRESS NOTES
Patient has gluteal fold escoriations and wounds. Complains of the bed pinching, she has been in bed since December. She reports her doctor has told her to stay in bed until wounds heal as they open with any movement.   Sizewise bed ordered confirmation number [id number]

## 2020-07-19 NOTE — PROGRESS NOTES
Urology Progress Note    Subjective:     Daily Progress Note: 2020 8:43 AM    Aleta Gardner is doing better than expected. She reports pain is well controlled. Indwelling catheter is draining well. Clear on slow CBI    Objective:     Visit Vitals  BP (!) 89/45 (BP 1 Location: Left arm, BP Patient Position: At rest)   Pulse (!) 112   Temp 98.3 °F (36.8 °C)   Resp 19   Ht 5' 4\" (1.626 m)   Wt 148.8 kg (328 lb)   SpO2 97%   Breastfeeding No   BMI 56.30 kg/m²        Temp (24hrs), Av.2 °F (36.8 °C), Min:97.6 °F (36.4 °C), Max:98.5 °F (36.9 °C)      Intake and Output:   1901 -  0700  In: 4241 [I.V.:1075]  Out: 4215 [Urine:4015]   0701 -  1900  In: 2470   Out: 975 [Urine:975]    Physical Exam:   General appearance: alert, cooperative, no distress    Lab/Data Review: All lab results for the last 24 hours reviewed. Assessment/Plan:     Active Problems:    Hematuria (2020)        Plan:  DC Blevins. Diapers. OK to advance diet. Reinsert prn clots that can't pass.   Dr. Stan King to f/u

## 2020-07-19 NOTE — ED NOTES
TRANSFER - OUT REPORT:    Verbal report given to González Hamlin RN (name) on Manuel  being transferred to [id number] (unit) for routine progression of care       Report consisted of patients Situation, Background, Assessment and   Recommendations(SBAR). Information from the following report(s) SBAR, ED Summary, Intake/Output, MAR, Recent Results and Cardiac Rhythm Sinus tach was reviewed with the receiving nurse. Lines:   Peripheral IV 07/17/20 Right Arm (Active)   Site Assessment Clean, dry, & intact 07/18/20 2019   Phlebitis Assessment 0 07/18/20 2019   Infiltration Assessment 0 07/18/20 2019   Dressing Status Clean, dry, & intact 07/18/20 2019   Dressing Type Tape;Transparent 07/18/20 2019   Hub Color/Line Status Blue; Infusing 07/18/20 2019   Action Taken Open ports on tubing capped 07/18/20 2019   Alcohol Cap Used Yes 07/18/20 2019       Peripheral IV 07/17/20 Right;Upper Arm (Active)   Site Assessment Clean, dry, & intact 07/18/20 2019   Phlebitis Assessment 0 07/18/20 2019   Infiltration Assessment 0 07/18/20 2019   Dressing Status Clean, dry, & intact 07/18/20 2019   Dressing Type Tape;Transparent 07/18/20 2019   Hub Color/Line Status Blue;Capped 07/18/20 2019   Action Taken Open ports on tubing capped 07/18/20 2019   Alcohol Cap Used Yes 07/18/20 2019        Opportunity for questions and clarification was provided.       Patient transported with:  [facility] ALS team.

## 2020-07-19 NOTE — PROGRESS NOTES
[address]  QP   :1960                       Consulted for renal failure  She has been seen by NS in past  I will ask nurse to call them      Dereck Vera MD  [facility]  [facility] [address], [address]  [place], [address] [place]  Phone - [phone]         Fax - [phone] [address]  Phone - [phone]        Fax - [phone]     [email or web address]

## 2020-07-19 NOTE — ROUTINE PROCESS
0330 Pt complaining of pain, stated \"It feels like my clitoris is on fire! \" pt refusing to take suppository for her pain stated it doesn't help. this rn explained that the sensation she's feeling is from her urethra not her clitoris. Pt verbalized understanding, stated she would like something else for pain. 9496 IV morphine given. 0445 Small clots & sediment noted in christensen tubing, pt continuing to have waves of pain. Jordana Flowers, NP made aware, fentanyl ordered for pain. 0600 Pt agreed to increase bladder irrigation rate, tolerated well.

## 2020-07-19 NOTE — PROGRESS NOTES
2000: Bedside shift change report given to Derrell Lundy RN (oncoming nurse) by Maritza Griffith RN (offgoing nurse). Report included the following information SBAR, Kardex, ED Summary, Intake/Output, MAR, Recent Results, Med Rec Status, Cardiac Rhythm SR/ST and Alarm Parameters . 2330: Bedside shift change report given to Courtney Gomez RN (oncoming nurse) by Derrell Lundy RN (offgoing nurse). Report included the following information SBAR, Kardex, ED Summary, Intake/Output, MAR, Recent Results, Med Rec Status, Cardiac Rhythm SR/ST and Alarm Parameters .

## 2020-07-20 ENCOUNTER — APPOINTMENT (OUTPATIENT)
Dept: ULTRASOUND IMAGING | Age: 60
DRG: 466 | End: 2020-07-20
Attending: INTERNAL MEDICINE
Payer: COMMERCIAL

## 2020-07-20 LAB
ALBUMIN SERPL-MCNC: 2.3 G/DL (ref 3.5–5)
ALBUMIN/GLOB SERPL: 0.5 {RATIO} (ref 1.1–2.2)
ALP SERPL-CCNC: 49 U/L (ref 45–117)
ALT SERPL-CCNC: 9 U/L (ref 12–78)
ANION GAP SERPL CALC-SCNC: 9 MMOL/L (ref 5–15)
AST SERPL-CCNC: 4 U/L (ref 15–37)
BACTERIA SPEC CULT: NORMAL
BILIRUB SERPL-MCNC: 0.1 MG/DL (ref 0.2–1)
BUN SERPL-MCNC: 83 MG/DL (ref 6–20)
BUN/CREAT SERPL: 18 (ref 12–20)
CALCIUM SERPL-MCNC: 8.6 MG/DL (ref 8.5–10.1)
CC UR VC: NORMAL
CHLORIDE SERPL-SCNC: 115 MMOL/L (ref 97–108)
CO2 SERPL-SCNC: 18 MMOL/L (ref 21–32)
COMMENT, HOLDF: NORMAL
CREAT SERPL-MCNC: 4.55 MG/DL (ref 0.55–1.02)
ERYTHROCYTE [DISTWIDTH] IN BLOOD BY AUTOMATED COUNT: 15.5 % (ref 11.5–14.5)
GLOBULIN SER CALC-MCNC: 4.3 G/DL (ref 2–4)
GLUCOSE BLD STRIP.AUTO-MCNC: 136 MG/DL (ref 65–100)
GLUCOSE BLD STRIP.AUTO-MCNC: 150 MG/DL (ref 65–100)
GLUCOSE BLD STRIP.AUTO-MCNC: 151 MG/DL (ref 65–100)
GLUCOSE BLD STRIP.AUTO-MCNC: 191 MG/DL (ref 65–100)
GLUCOSE SERPL-MCNC: 130 MG/DL (ref 65–100)
HCT VFR BLD AUTO: 22 % (ref 35–47)
HGB BLD-MCNC: 6.3 G/DL (ref 11.5–16)
IRON SATN MFR SERPL: 11 % (ref 20–50)
IRON SERPL-MCNC: 25 UG/DL (ref 35–150)
MAGNESIUM SERPL-MCNC: 2.3 MG/DL (ref 1.6–2.4)
MCH RBC QN AUTO: 27.2 PG (ref 26–34)
MCHC RBC AUTO-ENTMCNC: 28.6 G/DL (ref 30–36.5)
MCV RBC AUTO: 94.8 FL (ref 80–99)
NRBC # BLD: 0 K/UL (ref 0–0.01)
NRBC BLD-RTO: 0 PER 100 WBC
PHOSPHATE SERPL-MCNC: 3.8 MG/DL (ref 2.6–4.7)
PLATELET # BLD AUTO: 267 K/UL (ref 150–400)
PMV BLD AUTO: 9.7 FL (ref 8.9–12.9)
POTASSIUM SERPL-SCNC: 4.2 MMOL/L (ref 3.5–5.1)
PROT SERPL-MCNC: 6.6 G/DL (ref 6.4–8.2)
RBC # BLD AUTO: 2.32 M/UL (ref 3.8–5.2)
SAMPLES BEING HELD,HOLD: NORMAL
SERVICE CMNT-IMP: ABNORMAL
SERVICE CMNT-IMP: NORMAL
SODIUM SERPL-SCNC: 142 MMOL/L (ref 136–145)
TIBC SERPL-MCNC: 219 UG/DL (ref 250–450)
WBC # BLD AUTO: 11.1 K/UL (ref 3.6–11)

## 2020-07-20 PROCEDURE — 82962 GLUCOSE BLOOD TEST: CPT

## 2020-07-20 PROCEDURE — 85027 COMPLETE CBC AUTOMATED: CPT

## 2020-07-20 PROCEDURE — 74011000250 HC RX REV CODE- 250: Performed by: INTERNAL MEDICINE

## 2020-07-20 PROCEDURE — 74011000258 HC RX REV CODE- 258: Performed by: INTERNAL MEDICINE

## 2020-07-20 PROCEDURE — 76770 US EXAM ABDO BACK WALL COMP: CPT

## 2020-07-20 PROCEDURE — 83540 ASSAY OF IRON: CPT

## 2020-07-20 PROCEDURE — 65660000000 HC RM CCU STEPDOWN

## 2020-07-20 PROCEDURE — 74011250637 HC RX REV CODE- 250/637: Performed by: INTERNAL MEDICINE

## 2020-07-20 PROCEDURE — 97530 THERAPEUTIC ACTIVITIES: CPT

## 2020-07-20 PROCEDURE — 97162 PT EVAL MOD COMPLEX 30 MIN: CPT

## 2020-07-20 PROCEDURE — 83735 ASSAY OF MAGNESIUM: CPT

## 2020-07-20 PROCEDURE — 97165 OT EVAL LOW COMPLEX 30 MIN: CPT

## 2020-07-20 PROCEDURE — 36415 COLL VENOUS BLD VENIPUNCTURE: CPT

## 2020-07-20 PROCEDURE — 74011250637 HC RX REV CODE- 250/637: Performed by: NURSE PRACTITIONER

## 2020-07-20 PROCEDURE — 84100 ASSAY OF PHOSPHORUS: CPT

## 2020-07-20 PROCEDURE — 74011636637 HC RX REV CODE- 636/637: Performed by: NURSE PRACTITIONER

## 2020-07-20 PROCEDURE — 74011250636 HC RX REV CODE- 250/636: Performed by: INTERNAL MEDICINE

## 2020-07-20 PROCEDURE — 80053 COMPREHEN METABOLIC PANEL: CPT

## 2020-07-20 RX ORDER — GABAPENTIN 100 MG/1
100 CAPSULE ORAL 2 TIMES DAILY
Status: DISCONTINUED | OUTPATIENT
Start: 2020-07-20 | End: 2020-07-24

## 2020-07-20 RX ORDER — TRAMADOL HYDROCHLORIDE 50 MG/1
50 TABLET ORAL
Status: DISCONTINUED | OUTPATIENT
Start: 2020-07-20 | End: 2020-08-07 | Stop reason: HOSPADM

## 2020-07-20 RX ADMIN — SODIUM BICARBONATE 150 MEQ/1,000 ML IN DEXTROSE 5 % INTRAVENOUS: SOLUTION at 02:38

## 2020-07-20 RX ADMIN — PANTOPRAZOLE SODIUM 40 MG: 40 TABLET, DELAYED RELEASE ORAL at 08:59

## 2020-07-20 RX ADMIN — CEFTRIAXONE 1 G: 1 INJECTION, POWDER, FOR SOLUTION INTRAMUSCULAR; INTRAVENOUS at 23:56

## 2020-07-20 RX ADMIN — INSULIN LISPRO 2 UNITS: 100 INJECTION, SOLUTION INTRAVENOUS; SUBCUTANEOUS at 12:13

## 2020-07-20 RX ADMIN — OXYBUTYNIN CHLORIDE 10 MG: 5 TABLET, EXTENDED RELEASE ORAL at 08:59

## 2020-07-20 RX ADMIN — TRAMADOL HYDROCHLORIDE 50 MG: 50 TABLET, FILM COATED ORAL at 06:58

## 2020-07-20 RX ADMIN — SODIUM BICARBONATE 150 MEQ/1,000 ML IN DEXTROSE 5 % INTRAVENOUS: SOLUTION at 16:40

## 2020-07-20 RX ADMIN — TRAMADOL HYDROCHLORIDE 50 MG: 50 TABLET, FILM COATED ORAL at 18:14

## 2020-07-20 RX ADMIN — INSULIN LISPRO 2 UNITS: 100 INJECTION, SOLUTION INTRAVENOUS; SUBCUTANEOUS at 16:48

## 2020-07-20 NOTE — PROGRESS NOTES
Patient name: Kate Hernandez  MRN: [id number]    Nephrology Progress note:    Assessment:  DAPHNE on CKD-3?: Cr 4.4-> 4.3-> 3.9mg/dl s/p stent/IVF. But today Cr bumped to 4.55mg/dl. Chronic obstructive uropathy/Atrophic left kidney/DM nephropathy. Baseline Cr? Followed by Dr. Yasmin Atkins poor outpt follow up.     Chronic right hydronephrosis: [id number] stent exchange     Metabolic acidosis: NAGMA     UTI     Anemia: Hgb suboptimal    DM2     Chronic sacral decubiti    Plan/Recommendations:  Increase bicarb drip to 100ml/hr  Renal ultrasound  Iron studies. Will consider CATHIE vs IV Iron  F/u URine Cx. IV Abx  Strict I/Os, avoid nephrotoxins  AM labs      Subjective:  UOP 2.3L. Reports feeling slightly better but continues to have dysuria    ROS:   No nausea, no vomiting  No chest pain, no shortness of breath    Exam:  Visit Vitals  /54 (BP 1 Location: Left arm, BP Patient Position: Lying left side; At rest)   Pulse 87   Temp 98.2 °F (36.8 °C)   Resp 18   Ht 5' 4\" (1.626 m)   Wt 149.7 kg (330 lb)   SpO2 100%   Breastfeeding No   BMI 56.64 kg/m²     Wt Readings from Last 3 Encounters:   07/20/20 149.7 kg (330 lb)       Intake/Output Summary (Last 24 hours) at 7/20/2020 0948  Last data filed at 7/20/2020 0730  Gross per 24 hour   Intake 540 ml   Output 1925 ml   Net -1385 ml       Gen: NAD  HEENT: No icterus, mmm, no oral exudate, AT/NC  Neck: No JVD. No cervical lymphadenopathy. No carotid bruit  Lungs/Chest wall: Clear. No accessory muscle use.  Bilateral symmetrical chest wall expansion  Cardiovascular: Regular rate, normal rhythm. Palpable peripheral pulses. No murmur. No pericardial rub  Abdomen/: Soft, NT, ND, BS+. No palpable organomegaly  Ext: No clubbing, No cyanosis. No peripheral edema  Skin: warm, dry. No rash or ulcers  CNS: alert and awake.  Answers appropriately      Current Facility-Administered Medications   Medication Dose Route Frequency Last Dose    traMADoL (ULTRAM) tablet 50 mg  50 mg Oral Q8H PRN 50 mg at 07/20/20 0658    fentaNYL citrate (PF) injection 25 mcg  25 mcg IntraVENous Q4H PRN 25 mcg at 07/19/20 0505    lidocaine (URO-JET/ GLYDO) 2 % jelly   Urethral DAILY PRN      sodium bicarbonate 150 mEq/1000 mL D5W (premix)   IntraVENous CONTINUOUS      glucose chewable tablet 16 g  4 Tab Oral PRN      glucagon (GLUCAGEN) injection 1 mg  1 mg IntraMUSCular PRN      dextrose 10% infusion 0-250 mL  0-250 mL IntraVENous PRN      pantoprazole (PROTONIX) tablet 40 mg  40 mg Oral DAILY 40 mg at 07/20/20 0859    gabapentin (NEURONTIN) capsule 100 mg  100 mg Oral TID      acetaminophen (TYLENOL) tablet 500 mg  500 mg Oral Q6H  mg at 07/19/20 2341    cefTRIAXone (ROCEPHIN) 1 g in 0.9% sodium chloride (MBP/ADV) 50 mL  1 g IntraVENous Q24H 1 g at 07/19/20 2116    opium-belladonna (B&O 15-A) 16.2-30 mg suppository 1 Suppository  1 Suppository Rectal Q8H PRN 1 Suppository at 07/18/20 1900    opium-belladonna (B&O 15-A) 16.2-30 mg suppository 1 Suppository  1 Suppository Rectal Q6H PRN      oxybutynin chloride XL (DITROPAN XL) tablet 10 mg  10 mg Oral DAILY 10 mg at 07/20/20 0859    morphine injection 2 mg  2 mg IntraVENous Q4H PRN 2 mg at 07/19/20 2852    insulin lispro (HUMALOG) injection   SubCUTAneous AC&HS Stopped at 07/19/20 2200    sodium chloride (NS) flush 5-10 mL  5-10 mL IntraVENous PRN         Labs/Data:    Lab Results   Component Value Date/Time    WBC 11.1 (H) 07/20/2020 06:20 AM    HGB 6.3 (L) 07/20/2020 06:20 AM    HCT 22.0 (L) 07/20/2020 06:20 AM    PLATELET 434 32/71/0615 06:20 AM    MCV 94.8 07/20/2020 06:20 AM       Lab Results   Component Value Date/Time    Sodium 142 07/20/2020 06:20 AM    Potassium 4.2 07/20/2020 06:20 AM    Chloride 115 (H) 07/20/2020 06:20 AM    CO2 18 (L) 07/20/2020 06:20 AM    Anion gap 9 07/20/2020 06:20 AM    Glucose 130 (H) 07/20/2020 06:20 AM    BUN 83 (H) 07/20/2020 06:20 AM    Creatinine 4.55 (H) 07/20/2020 06:20 AM    BUN/Creatinine ratio 18 07/20/2020 06:20 AM    GFR est AA 12 (L) 07/20/2020 06:20 AM    GFR est non-AA 10 (L) 07/20/2020 06:20 AM    Calcium 8.6 07/20/2020 06:20 AM       Patient seen and examined. Chart reviewed. Labs, data and other pertinent notes reviewed in last 24 hrs.     Discussed with patient, RN and Dr. Bobby Watts by:  Jany Joseph MD  [id number] [email or web address]

## 2020-07-20 NOTE — PROGRESS NOTES
Verbal shift change report given to Daniela Nath RN (oncoming nurse) by Ashlyn Nix RN (offgoing nurse). Report included the following information SBAR, Procedure Summary, Intake/Output, MAR, Recent Results and Cardiac Rhythm Sinus tach.

## 2020-07-20 NOTE — PROGRESS NOTES
Progress Note    Patient: Rajesh Pineda MRN: [id number]  SSN: [id number]    YOB: 1960  Age: 62 y.o. Sex: female        ADMITTED:  7/17/2020 to Anthony Rock MD  for Hematuria [R31.9]  Hematuria [R31.9]         Rajesh Pineda is 2 Days Post-Op Procedure(s):  CYSTOSCOPY RIGHT URETERAL STENT EXCHANGE. This is a known patient of Dr. Mik Collazo. Last OV note below from 02/2020 (by Dr. Nel Hilton). Blevins discontinued yesterday, currently utilizing a pure wick    Patient reports an engorged/painful sensation to her clitoris when she voids, that dissipates post void. Reports continued dysuria. UA is clear yellow with slight pink tinge with no clots. Denies SP discomfort and flank pain. Denies fevers. afvss  WBC: 11.1  Bcx: ngtd  Hgb: 6.3  Cr: 4.55, nephrology following  UA: >100 WBCs, >100 RBCs  Ucx: pending  +rocephin  +oxybutynin  Good UOP: 2350cc    CT abd/pelv wo: IMPRESSION:     1. Very large left paramedian abdominal wall hernia containing fat and majority  of bowel. 2. Right internal ureteral stent with distal pigtail in the urethra. The right  kidney is enlarged, lobular in contour, with poor delineation of cortical  medullary and renal collecting system anatomy. 3. Small left kidney.    ==last OV note below==    Ms. Ben Damian is a 69-year-old female with a history of chronic kidney disease, hydronephrosis, morbid obesity, diabetes, uric acid kidney stones and hypertension. She presents in the clinic hospital follow up. She currently has a right-sided indwelling ureteral stent that was placed sometime in October. Creatinine at time of hospital admission was 5.74. She is here today with her sister who provides part of the history. Her sister is currently her caregiver and notes that she has worked as a nurse. Currently on Amoxicillin but when she voided in her pad, it was very milky in appearance so switched her to Cipro 500mg x 10 days.     The patient has decubitus ulcers and has a very limited range of motion given body habitus. Her and her sister are possible interested in getting a christensen catheter. PAST MEDICAL HISTORY:    Allergies: NEOSPORIN  ([id number]-PRAMOXINE OINT) (Critical)  DENIES: Latex, Shellfish, X-Ray Dye, Iodine. Medications: CIPROFLOXACIN  MG ORAL TABLET (CIPROFLOXACIN HCL) one bid; Route: ORAL  MULTIVITAMIN WOMEN ORAL TABLET (MULTIPLE VITAMINS-MINERALS) daily; Route: ORAL  GLIPIZIDE 5 MG ORAL TABLET (GLIPIZIDE) ; Route: ORAL  BUSPIRONE HCL 5 MG ORAL TABLET (BUSPIRONE HCL) 2x a day; Route: ORAL  VITAMIN C PLUS 500 MG ORAL TABLET (BIOFLAVONOID PRODUCTS) 2x a day; Route: ORAL  GABAPENTIN 100 MG ORAL CAPSULE (GABAPENTIN) 3x a day; Route: ORAL  OXYCODONE HCL 5 MG ORAL TABLET (OXYCODONE HCL) every 8 hours; Route: ORAL  TRAMADOL HCL 50 MG ORAL TABLET (TRAMADOL HCL) 3xdaily; Route: ORAL  FERROUS SULFATE 325 (65 FE) MG ORAL TABLET DELAYED RELEASE (FERROUS SULFATE) daily; Route: ORAL    Problems: Decubitus ulcer (ICD-707.00) (HMQ18-R91.90)  Morbid obesity (ICD-278.01) (SHB27-N98.01)  Acute renal failure (ICD-584.9) (DHC90-Z82.9)    Illnesses: Diabetes. DENIES: Heart Disease, Pacemaker/Defibrillator, Lung Disease, High Blood Pressure, Bowel Problems, Stroke/Seizure, Kidney Problems, Bleeding Problems, HIV, Hepatitis, or Cancer. Surgeries: Lithotripsy, D & C, and Hysterectomy. Family History: Breast cancer. DENIES: Kidney cancer, Kidney disease, Kidney stones, Uterine cancer, Cervical cancer, Ovarian cancer. Social History: Unemployed. Single. Smoking status: never smoker. Does not drink alcohol. System Review: Admits to: Dry Mouth, Leg Swelling, Shortness of Breath, Lower Extremity Weakness, Dry Skin, Difficulty Walking, and Easy Bleeding. DENIES: Unexplained Weight Loss, Dry Eyes, Constipation, Involuntary Urine Loss, Psychiatric Problems, Impaired Sex Drive, Rash.      EXAMINATION: Appearance: well-developed NAD Eyes: anicteric  Respiratory: breathing easily Abdomen/Flank: morbid obesity  Musculoskelatal: appears bedridden  Skin: turgor, dressing on right buttock  Neuro: grossly intact Psych: normal affect       URINALYSIS    Prescription(s) Today: CIPROFLOXACIN  MG ORAL TABLET (CIPROFLOXACIN HCL) one bid  #20[Tablet] x 0,  2020, Jane Benitez LPN    IMPRESSION:    1. ACUTE RENAL FAILURE (QZB09-V39.9) - New    2. MORBID OBESITY (FGB12-I26.01) - New    3. DECUBITUS ULCER (EHH46-G68.90) - New    PLAN: Ms. Jenny Dangelo agrees to a cysto stent change. Discussed also placing a temporary wilson at the time of her procedure. She agrees to a CMP & H&H today. She is aware that her procedure will need to be done in the hospital. All questions and concerns were addressed prior to the patient leaving the clinic. The patient understands and agrees with the plan. Today's Services  E&M Service    Upcoming Orders  Schedule Surgery - Schedule for RIGHT CYSTO STENT CHANGE WITH PROBABLE PLACEMENT OF TEMPORARY WILSON CATHETER at Hospital under General anesthesia. Requesting Next Available (Me).  CMP, Hematocrit          By signing my name below, Toribio Barth, attest that this documentation has been prepared under the direct and in the presence of Bess Rivas MD, MD.  Electronically Signed: Yanique Tijerina   2020 2:49 PM    I, Dr. Bess Rivas MD, personally performed the services described in this documentation. All medical record entries made by the scribe were at my direction and in my presence. I have reviewed the chart and agree that the record reflects my personal performance and is accurate and complete.           Electronically signed by Bess Rivas MD on 2020 at 10:04 PM    ________________________________________________________________________              Vitals:  Temp (24hrs), Av.3 °F (36.8 °C), Min:98.1 °F (36.7 °C), Max:98.6 °F (37 °C)     Blood pressure 110/54, pulse 87, temperature 98.2 °F (36.8 °C), resp. rate 18, height 5' 4\" (1.626 m), weight 149.7 kg (330 lb), SpO2 100 %, not currently breastfeeding. I&O's:  07/18 1901 - 07/20 0700  In: 5465 [P.O.:540; I.V.:50]  Out: 6896 [Urine:5175]   07/20 0701 - 07/20 1900  In: -   Out: 550 [Urine:550]           Labs:   Recent Labs     07/20/20  0620 07/19/20  0603 07/18/20  0648   WBC 11.1* 17.2* 12.2*   HGB 6.3* 7.6* 9.2*   HCT 22.0* 25.4* 32.0*    325 304     Recent Labs     07/20/20  0620 07/19/20  1559 07/19/20  0603    144 142   K 4.2 4.4 4.7   * 118* 117*   CO2 18* 18* 15*   * 188* 125*   BUN 83* 83* 83*   CREA 4.55* 4.15* 3.97*   CA 8.6 8.8 9.2        Cultures:      Imaging:       Assessment:     - 2 Days Post-Op Procedure(s):  CYSTOSCOPY RIGHT URETERAL STENT EXCHANGE    Active Problems:    Hematuria (7/17/2020)        Plan:     pod2 cysto, right ureteral stent exchange  Hematuria  Anemia  DAPHNE on CKD-3?- nephrology following  Transfuse per protocol. Monitor H&H. Continue with purewick. Would consider pyridium PRN, however Cr 4.55. Reinsert christensen prn for clots that can't pass.    Patient will need outpatient f/u post discharge with VU. [phone]    Signed By: Nacho Mead NP - July 20, 2020

## 2020-07-20 NOTE — ROUTINE PROCESS
Bedside shift change report given to Sofia Espitia (oncoming nurse) by Luz Ribeiro (offgoing nurse). Report included the following information SBAR, Kardex, Intake/Output, MAR, Recent Results and Cardiac Rhythm NSR/ST.

## 2020-07-20 NOTE — WOUND CARE
WOCN Note:     New consult for sacrum. Chart shows:  Admitted for hematuria  History of stents, DM    Assessment:   A&O x 4 and reports tenderness on gluteal fold. Able to turn independently for assessment. Bed: SizeWise with foam mattress    Bilateral heel and sacral skin intact and without erythema. 1. POA, MASD to gluteal cleft and bilateral gluteal folds; all with linear splits in a larger field of intact hypopigmented skin. Dusted with stoma powder over splits and then applied barrier cream.     Recommendations:    Gluteal cleft & folds: clean gently, dust open splits with stoma powder and then apply z-guard cream over all of buttocks.       Transition of Care: Plan to follow weekly and as needed while admitted to hospital.    HOLLY CliftonN, RN, [facility]  Certified Wound, Ostomy, Continence Nurse  office [phone]  pager [phone] or call  to page

## 2020-07-20 NOTE — PROGRESS NOTES
Problem: Mobility Impaired (Adult and Pediatric)  Goal: *Acute Goals and Plan of Care (Insert Text)  Description: FUNCTIONAL STATUS PRIOR TO ADMISSION: Pt has been essentially bed bound since December 10th. Pt reported she was walking with a RW short distances prior to this. Pt had sustained a fall in October and had gone to rehab and then fell again in December. HOME SUPPORT PRIOR TO ADMISSION: The patient lived with her sister. Physical Therapy Goals  Initiated 7/20/2020  1. Patient will move from supine to sit and sit to supine , scoot up and down, and roll side to side in bed with minimal assistance/contact guard assist within 7 day(s). 2.  Patient will transfer from bed to chair and chair to bed with moderate assistance (squat pivot versus sliding board) using the least restrictive device within 7 day(s). 3.  Patient will tolerate seated EOB x 10 minutes with supervision within 7 days in prep for transfers OOB    Outcome: Not Progressing Towards Goal   PHYSICAL THERAPY EVALUATION  Patient: Ildefonso Reyes (62 y.o. female)  Date: 7/20/2020  Primary Diagnosis: Hematuria [R31.9]  Hematuria [R31.9]  Procedure(s) (LRB):  CYSTOSCOPY RIGHT URETERAL STENT EXCHANGE (Right) 2 Days Post-Op   Precautions:   Fall      ASSESSMENT  Based on the objective data described below, the patient presents with generalized weakness, decreased bilateral knee ROM, decreased functional mobility, chronic bilateral posterior thigh wounds limiting mobility since December s/p admission on 7/17/2020 for R ureteral stent exchange. Pt received supine in bed and agreeable to therapy. Pt reported she has been bedbound since December and has not had clearance from MDs to sit up with wounds. Pt demonstrated ability to roll L and R with use of bed railings with supervision. Pt educated on pressure relief and frequent repositioning to improve mobility as well as optimize wound healing.  Pt will continue to benefit from PT to progress mobility as tolerated. Pt is appears to be motivated to regain strength and improve ability to transfer OOB. Pt will likely need rehab upon discharge once level of activity has been clarified (time limit to sit up?) and pts ability to tolerate increased activity    Current Level of Function Impacting Discharge (mobility/balance): SBA rolling with use of bed railings    Functional Outcome Measure: The patient scored Total: 25/100 on the Barthel Index which is indicative of 75% impaired ability to care for basic self needs/dependency on others. Other factors to consider for discharge: ? Time restriction sitting up     Patient will benefit from skilled therapy intervention to address the above noted impairments. PLAN :  Recommendations and Planned Interventions: bed mobility training, transfer training, gait training, therapeutic exercises, neuromuscular re-education, patient and family training/education, and therapeutic activities      Frequency/Duration: Patient will be followed by physical therapy:  3 times a week to address goals. Recommendation for discharge: (in order for the patient to meet his/her long term goals)  To be determined: Rehab pending level of tolerance to activity    This discharge recommendation:  Has not yet been discussed the attending provider and/or case management    IF patient discharges home will need the following DME: mechanical lift, wheelchair, cushion         SUBJECTIVE:   Patient stated I would like to get up and move again.     OBJECTIVE DATA SUMMARY:   HISTORY:    Past Medical History:   Diagnosis Date    Diabetes ([address]     Hernia, hiatal     Hypertension     Kidney stones     both kidneys    Osteoarthritis     Renal failure     left kidney     Past Surgical History:   Procedure Laterality Date    HX HYSTERECTOMY      uterus not removed    HX OTHER SURGICAL      right kidney stent       Personal factors and/or comorbidities impacting plan of care:     Home Situation  Home Environment: Private residence  # Steps to Enter: 3  One/Two Story Residence: Two story, live on 1st floor  Living Alone: No  Support Systems: Family member(s)  Patient Expects to be Discharged to[de-identified] Private residence  Current DME Used/Available at Home: Hospital bed, Crutches, [address], [place], straight, Walker, rolling, Commode, bedside    EXAMINATION/PRESENTATION/DECISION MAKING:   Critical Behavior:              Hearing: Auditory  Auditory Impairment: None  Skin:    Edema:   Range Of Motion:  AROM: Generally decreased, functional(R shoulder decr, bilateral knees decr)                       Strength:    Strength: Generally decreased, functional                    Tone & Sensation:                  Sensation: Intact               Coordination:     Vision:      Functional Mobility:  Bed Mobility:  Rolling: Stand-by assistance(use of bed railings for support)       Balance:   Sitting: (NT)      Therapeutic Exercises: Ankle pumps/circles, knee flexion with assist    Functional Measure:  Barthel Index:    Bathin  Bladder: 5  Bowels: 5  Groomin  Dressin  Feeding: 10  Mobility: 0  Stairs: 0  Toilet Use: 0  Transfer (Bed to Chair and Back): 0  Total: 25/100       The Barthel ADL Index: Guidelines  1. The index should be used as a record of what a patient does, not as a record of what a patient could do. 2. The main aim is to establish degree of independence from any help, physical or verbal, however minor and for whatever reason. 3. The need for supervision renders the patient not independent. 4. A patient's performance should be established using the best available evidence. Asking the patient, friends/relatives and nurses are the usual sources, but direct observation and common sense are also important. However direct testing is not needed. 5. Usually the patient's performance over the preceding 24-48 hours is important, but occasionally longer periods will be relevant.   6. Middle categories imply that the patient supplies over 50 per cent of the effort. 7. Use of aids to be independent is allowed. Dilan Alberts., Barthel, DMesfinWMesfin (1557). Functional evaluation: the Barthel Index. [address] (14)2. ASHLEY London Gwenith Parma., [place]., [place], [address] (1999). Measuring the change indisability after inpatient rehabilitation; comparison of the responsiveness of the Barthel Index and Functional [place] Measure. Journal of Neurology, Neurosurgery, and Psychiatry, 66(4), 291-341. Susan Salinas, NANTHONY.JEANETTE, ANDREA Gonzalez, & Annemarie Hernandez M.A. (2004.) Assessment of post-stroke quality of life in cost-effectiveness studies: The usefulness of the Barthel Index and the EuroQoL-5D. Quality of Life Research, 13, 810-16           Based on the above components, the patient evaluation is determined to be of the following complexity level: LOW     Pain Rating:  Pt c/o R shoulder and bilateral knee pain    Activity Tolerance:   Fair  Please refer to the flowsheet for vital signs taken during this treatment. After treatment patient left in no apparent distress:   Supine in bed    COMMUNICATION/EDUCATION:   The patients plan of care was discussed with: Occupational therapist and Registered nurse. Fall prevention education was provided and the patient/caregiver indicated understanding., Patient/family have participated as able in goal setting and plan of care. , and Patient/family agree to work toward stated goals and plan of care.     Thank you for this referral.  Toma Sanchez, PT, DPT   Time Calculation: 19 mins

## 2020-07-20 NOTE — PROGRESS NOTES
Bedside and Verbal shift change report given to Ivy (oncoming nurse) by Marla Pierson (offgoing nurse).  Report included the following information SBAR, MAR, Recent Results, Med Rec Status and Cardiac Rhythm NSR ST.

## 2020-07-20 NOTE — PROGRESS NOTES
Problem: Self Care Deficits Care Plan (Adult)  Goal: *Acute Goals and Plan of Care (Insert Text)  Description:   FUNCTIONAL STATUS PRIOR TO ADMISSION:  Patient was last modified independent for ADLs/ ambulatory with RW in December 2019. Patient reports she has been bedbound since d/t wounds. Patient reports she has not even been able to sit EOB at home. Performing all ADLs at bed-level with assistance for bathing, dressing, and toileting. HOME SUPPORT: Patient lived with sister to provide assistance    Occupational Therapy Goals  Initiated 7/20/2020  1. Patient will perform lower body dressing using AE PRN with moderate assistance  within 7 day(s). 2.  Patient will perform bathing with minimal assistance  within 7 day(s). 4.  Patient will perform toilet transfers with moderate assistance  within 7 day(s). 5.  Patient will perform all aspects of toileting with moderate assistance  within 7 day(s). 6.  Patient will participate in upper extremity therapeutic exercise/activities with supervision/set-up for 10 minutes within 7 day(s). Outcome: Progressing Towards Goal    OCCUPATIONAL THERAPY EVALUATION  Patient: Macey Sandoval (89 y.o. female)  Date: 7/20/2020  Primary Diagnosis: Hematuria [R31.9]  Hematuria [R31.9]  Procedure(s) (LRB):  CYSTOSCOPY RIGHT URETERAL STENT EXCHANGE (Right) 2 Days Post-Op   Precautions: Fall    ASSESSMENT  Based on the objective data described below, the patient presents with general debility, R shoulder pain with impaired AROM (patient reports d/t possible rotator cuff injury PTA), impaired LB access, and limited mobility s/p admission for hematuria POD 2 R uretal stent exchange. Patient report she was last modified independent for ADLs/ ambulatory with RW in December 2019. Patient reports she has been bedbound since d/t b/l posterior thigh wounds.  Patient reports she has not even been able to sit EOB at home and has been performing all ADLs at bed-level with assistance for bathing, dressing, and toileting from sister. In OT eval today patient practiced rolling to each side with stand-by assistance. She reports she has not received clearance from wound care or MD to mobilize with wounds and will need clarification. Discharge recommendation TBD, likely rehab, pending activity tolerance/ clarification on permitted mobility with wounds/ progress in acute setting. Current Level of Function Impacting Discharge (ADLs/self-care): set-up UB ADLs, total assistance LB ADLs    Functional Outcome Measure: The patient scored 25/100 on the Barthel Index outcome measure which is indicative of ~75% impairment in functional performance. Patient will benefit from skilled therapy intervention to address the above noted impairments. PLAN :  Recommendations and Planned Interventions: self care training, functional mobility training, therapeutic exercise, balance training, therapeutic activities, endurance activities, patient education, home safety training, and family training/education    Frequency/Duration: Patient will be followed by occupational therapy 3 times a week to address goals. Recommendation for discharge: (in order for the patient to meet his/her long term goals)  Discharge recommendation TBD, likely rehab, pending activity tolerance/ clarification on permitted mobility with wounds/ progress in acute setting. This discharge recommendation:  Has not yet been discussed the attending provider and/or case management       SUBJECTIVE:   Patient stated I can't sit up yet with my wounds.     OBJECTIVE DATA SUMMARY:   HISTORY:   Past Medical History:   Diagnosis Date    Diabetes ([address]     Hernia, hiatal     Hypertension     Kidney stones     both kidneys    Osteoarthritis     Renal failure     left kidney     Past Surgical History:   Procedure Laterality Date    HX HYSTERECTOMY      uterus not removed    HX OTHER SURGICAL      right kidney stent       Expanded or extensive additional review of patient history:     Home Situation  Home Environment: Private residence  # Steps to Enter: 3  One/Two Story Residence: Two story, live on 1st floor  Living Alone: No  Support Systems: Family member(s)  Patient Expects to be Discharged to[de-identified] Private residence  Current DME Used/Available at Home: Hospital bed, Crutches, Sushant Radha, straight, Walker, rolling, Commode, bedside    Hand dominance: Left    EXAMINATION OF PERFORMANCE DEFICITS:  Cognitive/Behavioral Status:  Neurologic State: Alert  Orientation Level: Oriented X4  Cognition: Follows commands             Skin: see wound care note    Edema: none noted    Hearing: Auditory  Auditory Impairment: None    Vision/Perceptual:                           Acuity: Within Defined Limits         Range of Motion:    AROM: Generally decreased, functional(R shoulder decr, bilateral knees decr)                         Strength:    Strength: Generally decreased, functional                Coordination:     Fine Motor Skills-Upper: Left Intact; Right Intact    Gross Motor Skills-Upper: Left Intact; Right Intact    Tone & Sensation:       Sensation: Intact                      Balance:  Sitting: (NT)    Functional Mobility and Transfers for ADLs:  Bed Mobility:  Rolling: Stand-by assistance(use of bed railings for support)        ADL Assessment:  Feeding: Independent(inferred)    Oral Facial Hygiene/Grooming: Setup(inferred, in bed)    Bathing: Moderate assistance(inferred for LB)    Upper Body Dressing: Setup(inferred)    Lower Body Dressing: Total assistance(inferred d/t impaired LB access/ limited mobility)    Toileting: Total assistance(inferred d/t impaired LB access/ limited mobility)            Functional Measure:  Barthel Index:    Bathin  Bladder: 5  Bowels: 5  Groomin  Dressin  Feeding: 10  Mobility: 0  Stairs: 0  Toilet Use: 0  Transfer (Bed to Chair and Back): 0  Total: 25/100        The Barthel ADL Index: Guidelines  1.  The index should be used as a record of what a patient does, not as a record of what a patient could do. 2. The main aim is to establish degree of independence from any help, physical or verbal, however minor and for whatever reason. 3. The need for supervision renders the patient not independent. 4. A patient's performance should be established using the best available evidence. Asking the patient, friends/relatives and nurses are the usual sources, but direct observation and common sense are also important. However direct testing is not needed. 5. Usually the patient's performance over the preceding 24-48 hours is important, but occasionally longer periods will be relevant. 6. Middle categories imply that the patient supplies over 50 per cent of the effort. 7. Use of aids to be independent is allowed. Kalee Brewer., Barthel, D.W. (1616). Functional evaluation: the Barthel Index. [address] [facility] (14)2. ASHLEY Anton, Herbert Andino., Lily Morrison., [place], [address] [place] [address] (1999). Measuring the change indisability after inpatient rehabilitation; comparison of the responsiveness of the Barthel Index and Functional [place] Measure. Journal of Neurology, Neurosurgery, and Psychiatry, 66(4), 698-286. Teresa Felix, N.J.A, CELESTE Gonzalez.NALDO, & Adam Guerrier, M.A. (2004.) Assessment of post-stroke quality of life in cost-effectiveness studies: The usefulness of the Barthel Index and the EuroQoL-5D. Quality of Life Research, 15, 910-08         Occupational Therapy Evaluation Charge Determination   History Examination Decision-Making   LOW Complexity : Brief history review  MEDIUM Complexity : 3-5 performance deficits relating to physical, cognitive , or psychosocial skils that result in activity limitations and / or participation restrictions MEDIUM Complexity : Patient may present with comorbidities that affect occupational performnce.  Miniml to moderate modification of tasks or assistance (eg, physical or verbal ) with assesment(s) is necessary to enable patient to complete evaluation       Based on the above components, the patient evaluation is determined to be of the following complexity level: LOW   Pain Rating:  Patient did not report pain    Activity Tolerance:   VSS, good    After treatment patient left in no apparent distress:    Sidelying to L, call bell left within reach    COMMUNICATION/EDUCATION:   The patients plan of care was discussed with: Physical therapist and Registered nurse. Home safety education was provided and the patient/caregiver indicated understanding., Patient/family have participated as able in goal setting and plan of care. , and Patient/family agree to work toward stated goals and plan of care. This patients plan of care is appropriate for delegation to NIKITA.     Thank you for this referral.  Espinoza Jamil, TRAVIS  Time Calculation: 20 mins

## 2020-07-21 LAB
ALBUMIN SERPL-MCNC: 2.2 G/DL (ref 3.5–5)
ALBUMIN/GLOB SERPL: 0.5 {RATIO} (ref 1.1–2.2)
ALP SERPL-CCNC: 48 U/L (ref 45–117)
ALT SERPL-CCNC: 7 U/L (ref 12–78)
ANION GAP SERPL CALC-SCNC: 8 MMOL/L (ref 5–15)
AST SERPL-CCNC: 4 U/L (ref 15–37)
BILIRUB SERPL-MCNC: 0.1 MG/DL (ref 0.2–1)
BUN SERPL-MCNC: 79 MG/DL (ref 6–20)
BUN/CREAT SERPL: 17 (ref 12–20)
CALCIUM SERPL-MCNC: 8 MG/DL (ref 8.5–10.1)
CHLORIDE SERPL-SCNC: 108 MMOL/L (ref 97–108)
CO2 SERPL-SCNC: 24 MMOL/L (ref 21–32)
CREAT SERPL-MCNC: 4.7 MG/DL (ref 0.55–1.02)
ERYTHROCYTE [DISTWIDTH] IN BLOOD BY AUTOMATED COUNT: 15.3 % (ref 11.5–14.5)
GLOBULIN SER CALC-MCNC: 4.4 G/DL (ref 2–4)
GLUCOSE BLD STRIP.AUTO-MCNC: 138 MG/DL (ref 65–100)
GLUCOSE BLD STRIP.AUTO-MCNC: 146 MG/DL (ref 65–100)
GLUCOSE BLD STRIP.AUTO-MCNC: 177 MG/DL (ref 65–100)
GLUCOSE BLD STRIP.AUTO-MCNC: 185 MG/DL (ref 65–100)
GLUCOSE SERPL-MCNC: 170 MG/DL (ref 65–100)
HCT VFR BLD AUTO: 20.6 % (ref 35–47)
HGB BLD-MCNC: 6.2 G/DL (ref 11.5–16)
MAGNESIUM SERPL-MCNC: 2 MG/DL (ref 1.6–2.4)
MCH RBC QN AUTO: 28.2 PG (ref 26–34)
MCHC RBC AUTO-ENTMCNC: 30.1 G/DL (ref 30–36.5)
MCV RBC AUTO: 93.6 FL (ref 80–99)
NRBC # BLD: 0.02 K/UL (ref 0–0.01)
NRBC BLD-RTO: 0.2 PER 100 WBC
PHOSPHATE SERPL-MCNC: 2.5 MG/DL (ref 2.6–4.7)
PLATELET # BLD AUTO: 273 K/UL (ref 150–400)
PMV BLD AUTO: 9.9 FL (ref 8.9–12.9)
POTASSIUM SERPL-SCNC: 3.9 MMOL/L (ref 3.5–5.1)
PROT SERPL-MCNC: 6.6 G/DL (ref 6.4–8.2)
RBC # BLD AUTO: 2.2 M/UL (ref 3.8–5.2)
SERVICE CMNT-IMP: ABNORMAL
SODIUM SERPL-SCNC: 140 MMOL/L (ref 136–145)
WBC # BLD AUTO: 12 K/UL (ref 3.6–11)

## 2020-07-21 PROCEDURE — 97110 THERAPEUTIC EXERCISES: CPT

## 2020-07-21 PROCEDURE — 83735 ASSAY OF MAGNESIUM: CPT

## 2020-07-21 PROCEDURE — 74011250636 HC RX REV CODE- 250/636: Performed by: INTERNAL MEDICINE

## 2020-07-21 PROCEDURE — 74011000258 HC RX REV CODE- 258: Performed by: INTERNAL MEDICINE

## 2020-07-21 PROCEDURE — 36415 COLL VENOUS BLD VENIPUNCTURE: CPT

## 2020-07-21 PROCEDURE — 74011250637 HC RX REV CODE- 250/637: Performed by: INTERNAL MEDICINE

## 2020-07-21 PROCEDURE — 82962 GLUCOSE BLOOD TEST: CPT

## 2020-07-21 PROCEDURE — 97535 SELF CARE MNGMENT TRAINING: CPT

## 2020-07-21 PROCEDURE — 85027 COMPLETE CBC AUTOMATED: CPT

## 2020-07-21 PROCEDURE — 74011000250 HC RX REV CODE- 250: Performed by: INTERNAL MEDICINE

## 2020-07-21 PROCEDURE — 80053 COMPREHEN METABOLIC PANEL: CPT

## 2020-07-21 PROCEDURE — 84100 ASSAY OF PHOSPHORUS: CPT

## 2020-07-21 PROCEDURE — 65660000000 HC RM CCU STEPDOWN

## 2020-07-21 PROCEDURE — 97530 THERAPEUTIC ACTIVITIES: CPT

## 2020-07-21 PROCEDURE — 74011250637 HC RX REV CODE- 250/637: Performed by: NURSE PRACTITIONER

## 2020-07-21 PROCEDURE — 74011636637 HC RX REV CODE- 636/637: Performed by: NURSE PRACTITIONER

## 2020-07-21 RX ORDER — SODIUM CHLORIDE 9 MG/ML
50 INJECTION, SOLUTION INTRAVENOUS CONTINUOUS
Status: DISCONTINUED | OUTPATIENT
Start: 2020-07-21 | End: 2020-08-05

## 2020-07-21 RX ADMIN — CEFTRIAXONE 1 G: 1 INJECTION, POWDER, FOR SOLUTION INTRAMUSCULAR; INTRAVENOUS at 21:43

## 2020-07-21 RX ADMIN — SODIUM CHLORIDE 75 ML/HR: 900 INJECTION, SOLUTION INTRAVENOUS at 13:10

## 2020-07-21 RX ADMIN — PANTOPRAZOLE SODIUM 40 MG: 40 TABLET, DELAYED RELEASE ORAL at 10:00

## 2020-07-21 RX ADMIN — TRAMADOL HYDROCHLORIDE 50 MG: 50 TABLET, FILM COATED ORAL at 21:43

## 2020-07-21 RX ADMIN — OXYBUTYNIN CHLORIDE 10 MG: 5 TABLET, EXTENDED RELEASE ORAL at 10:00

## 2020-07-21 RX ADMIN — IRON SUCROSE 200 MG: 20 INJECTION, SOLUTION INTRAVENOUS at 13:11

## 2020-07-21 RX ADMIN — INSULIN LISPRO 2 UNITS: 100 INJECTION, SOLUTION INTRAVENOUS; SUBCUTANEOUS at 13:10

## 2020-07-21 RX ADMIN — INSULIN LISPRO 2 UNITS: 100 INJECTION, SOLUTION INTRAVENOUS; SUBCUTANEOUS at 07:48

## 2020-07-21 RX ADMIN — SODIUM BICARBONATE 150 MEQ/1,000 ML IN DEXTROSE 5 % INTRAVENOUS: SOLUTION at 03:20

## 2020-07-21 RX ADMIN — TRAMADOL HYDROCHLORIDE 50 MG: 50 TABLET, FILM COATED ORAL at 10:04

## 2020-07-21 NOTE — ROUTINE PROCESS
Pt HGB 6.2, Hospitalist made aware. Discussed with patient about her hgb drop & only needs one unit to get back to a safe minimum level, patient verbalized understanding. Pt still refuses blood transfusion because she is [employer], wants to talk to nephrology about Epogen in the morning.

## 2020-07-21 NOTE — PROGRESS NOTES
Patient: aKte Hernandez MRN: [id number]  SSN: [id number]    YOB: 1960  Age: 62 y.o. Sex: female        ADMITTED: 2020 to Yasmany Shepherd MD by Grupo Willard MD for Hematuria [R31.9]  Hematuria [R31.9]  POD# 3 Days Post-Op Procedure(s):  CYSTOSCOPY RIGHT URETERAL STENT EXCHANGE    Seen and examined by Dr. Arsenio Conte this AM. Currently utilizing a pure wick, UA clearing.      Patient reports an engorged/painful sensation to her clitoris when she voids, that dissipates post void. Reports continued dysuria. Unable to add pyridium or hiprex d/t renal function    Patient is a [address] witness, therefore no blood transfusions. Per nephrology, considering CATHIE vs Iron IV. DNR  Afebrile;   WBC: 12  Bcx: ngtd  Hgb: 6.2  Cr: 4.70  UA: >100 WBCs, >100 RBCs  Ucx: mixed urogenital wenyd  +rocephin  +oxybutynin  +B&O PRN for bladder spasms  UOP: 1250cc     CT abd/pelv wo:  KIDNEYS/URETERS: The left kidney is small. Mild left renal pelvocaliectasis and  ureteral distention is shown. There is a right internal ureteral stent. The  right kidney appears enlarged, irregular in contour, and has poorly defined  cortical medullary differentiation as well as differentiation of parenchyma and  urine. The distal pigtail portion of the catheter is within the urethra. IMPRESSION:  1. Very large left paramedian abdominal wall hernia containing fat and majority  of bowel. 2. Right internal ureteral stent with distal pigtail in the urethra. The right  kidney is enlarged, lobular in contour, with poor delineation of cortical  medullary and renal collecting system anatomy. 3. Small left kidney. Renal US: IMPRESSION: Bilateral hydronephrosis right much greater than left. Right  ureteral stent in position. Vitals: Temp (24hrs), Av.7 °F (37.1 °C), Min:98.2 °F (36.8 °C), Max:99.1 °F (37.3 °C)    Blood pressure 112/83, pulse (!) 105, temperature 98.8 °F (37.1 °C), resp.  rate 18, height 5' 4\" (1.626 m), weight 149.1 kg (328 lb 10.9 oz), SpO2 97 %, not currently breastfeeding. Intake and Output:  07/19 1901 - 07/21 0700  In: 1414.6 [P.O.:120; I.V.:1294.6]  Out: 1250 [Urine:1250]  No intake/output data recorded. ROMA Output lats 24 hrs: No data found. ROMA Output last 8 hrs: No data found. BM over last 24 hrs: No data found. Labs:  CBC:   Lab Results   Component Value Date/Time    WBC 12.0 (H) 07/21/2020 03:19 AM    HCT 20.6 (L) 07/21/2020 03:19 AM    PLATELET 166 76/89/9820 03:19 AM     BMP:   Lab Results   Component Value Date/Time    Glucose 170 (H) 07/21/2020 03:19 AM    Sodium 140 07/21/2020 03:19 AM    Potassium 3.9 07/21/2020 03:19 AM    Chloride 108 07/21/2020 03:19 AM    CO2 24 07/21/2020 03:19 AM    BUN 79 (H) 07/21/2020 03:19 AM    Creatinine 4.70 (H) 07/21/2020 03:19 AM    Calcium 8.0 (L) 07/21/2020 03:19 AM     Assessment/Plan:     pod3 cysto, right ureteral stent exchange  Bilateral hydronephrosis, R>L  Anemia  DAPHNE on CKD-3?- nephrology following  Monitor H&H, nephrology following, CATHIE vs IV iron  Monitor kidney function  Continue with purewick. Reinsert christensen prn for clots that can't pass.    Patient will need outpatient f/u post discharge with VU. [phone]    Signed By: Angela Vazquez NP - July 21, 2020

## 2020-07-21 NOTE — ROUTINE PROCESS
Bedside and Verbal shift change report given to Rena (oncoming nurse) by Sapphire Galicia (offgoing nurse). Report included the following information SBAR, Kardex, ED Summary, Intake/Output, MAR, Recent Results and Cardiac Rhythm NSR/ST.

## 2020-07-21 NOTE — PROGRESS NOTES
Problem: Falls - Risk of  Goal: *Absence of Falls  Description: Document Melyssa Pinon Fall Risk and appropriate interventions in the flowsheet. Outcome: Progressing Towards Goal  Note: Fall Risk Interventions:            Medication Interventions: Evaluate medications/consider consulting pharmacy    Elimination Interventions: Call light in reach, Patient to call for help with toileting needs    History of Falls Interventions: Investigate reason for fall         Problem: Patient Education: Go to Patient Education Activity  Goal: Patient/Family Education  Outcome: Progressing Towards Goal     Problem: Pressure Injury - Risk of  Goal: *Prevention of pressure injury  Description: Document Murray Scale and appropriate interventions in the flowsheet.   Outcome: Progressing Towards Goal  Note: Pressure Injury Interventions:       Moisture Interventions: Minimize layers    Activity Interventions: PT/OT evaluation    Mobility Interventions: Assess need for specialty bed    Nutrition Interventions: Document food/fluid/supplement intake    Friction and Shear Interventions: Lift sheet, Minimize layers                Problem: Patient Education: Go to Patient Education Activity  Goal: Patient/Family Education  Outcome: Progressing Towards Goal     Problem: Infection - Risk of, Urinary Catheter-Associated Urinary Tract Infection  Goal: *Absence of infection signs and symptoms  Outcome: Progressing Towards Goal     Problem: Patient Education: Go to Patient Education Activity  Goal: Patient/Family Education  Outcome: Progressing Towards Goal     Problem: Chronic Renal Failure  Goal: *Fluid and electrolytes stabilized  Outcome: Progressing Towards Goal     Problem: Patient Education: Go to Patient Education Activity  Goal: Patient/Family Education  Outcome: Progressing Towards Goal     Problem: Pain  Goal: *Control of Pain  Outcome: Progressing Towards Goal  Goal: *PALLIATIVE CARE:  Alleviation of Pain  Outcome: Progressing Towards Goal Problem: Patient Education: Go to Patient Education Activity  Goal: Patient/Family Education  Outcome: Progressing Towards Goal     Problem: Patient Education: Go to Patient Education Activity  Goal: Patient/Family Education  Outcome: Progressing Towards Goal     Problem: Patient Education: Go to Patient Education Activity  Goal: Patient/Family Education  Outcome: Progressing Towards Goal    2000 Bedside and Verbal shift change report given to [facility]  by Denisha Thompson  Report included the following information SBAR, Kardex, ED Summary, Procedure Summary, MAR, Accordion, Recent Results, Cardiac Rhythm SR and Alarm Parameters .

## 2020-07-21 NOTE — PROGRESS NOTES
Problem: Self Care Deficits Care Plan (Adult)  Goal: *Acute Goals and Plan of Care (Insert Text)  Description:   FUNCTIONAL STATUS PRIOR TO ADMISSION:  Patient was last modified independent for ADLs/ ambulatory with RW in December 2019. Patient reports she has been bedbound since d/t wounds. Patient reports she has not even been able to sit EOB at home. Performing all ADLs at bed-level with assistance for bathing, dressing, and toileting. HOME SUPPORT: Patient lived with sister to provide assistance    Occupational Therapy Goals  Initiated 7/20/2020  1. Patient will perform lower body dressing using AE PRN with moderate assistance  within 7 day(s). 2.  Patient will perform bathing with minimal assistance  within 7 day(s). 4.  Patient will perform toilet transfers with moderate assistance  within 7 day(s). 5.  Patient will perform all aspects of toileting with moderate assistance  within 7 day(s). 6.  Patient will participate in upper extremity therapeutic exercise/activities with supervision/set-up for 10 minutes within 7 day(s). Outcome: Progressing Towards Goal    OCCUPATIONAL THERAPY TREATMENT  Patient: Giselle Gutierrez (42 y.o. female)  Date: 7/21/2020  Diagnosis: Hematuria [R31.9]  Hematuria [R31.9]   <principal problem not specified>  Procedure(s) (LRB):  CYSTOSCOPY RIGHT URETERAL STENT EXCHANGE (Right) 3 Days Post-Op  Precautions: Fall  Chart, occupational therapy assessment, plan of care, and goals were reviewed. ASSESSMENT  Patient continues with skilled OT services and is progressing towards goals. Patient is agreeable, participated in session as below. Is D of 3 for sit to supine and to lift /scoot in bed in supine, educated on AE instruction for LE dressing-c/o pain as attempting to lift LE for AE use was painful on buttocks. Patient initiating UE HEP today-issued green theraband. Will bnefit from rehab when appropriate.  Gluteal wounds are a barrier/challenge for participation in therapy--will likely require extensive rehab as she has been in bed x 7 months now and is very debilitated. Continue per POC. Current Level of Function Impacting Discharge (ADLs): total A x 3 to scoot to [facility] and for sit to supine    Other factors to consider for discharge: rec rehab as A x 3 for sit to supine         PLAN :  Patient continues to benefit from skilled intervention to address the above impairments. Continue treatment per established plan of care. to address goals. Recommend with staff: sidelying with pillow for propping to stay off gluteal wounds    Recommend next OT session: progress POC    Recommendation for discharge: (in order for the patient to meet his/her long term goals)  To be determined: will definitely benefit from rehab    This discharge recommendation:  Has not yet been discussed the attending provider and/or case management    IF patient discharges home will need the following DME: TBD       SUBJECTIVE:   Patient stated I have just been in bed since December--they told me to stay in bed.     OBJECTIVE DATA SUMMARY:   Cognitive/Behavioral Status:                      Functional Mobility and Transfers for ADLs:  Bed Mobility:  Rolling: Stand-by assistance  Supine to Sit: Maximum assistance;Assist x1;Additional time(with sheet and danna sliding in bed)  Sit to Supine: Total assistance  Scooting: (UNABLE with A x 1)    Transfers:  Sit to Stand: (of 3 staff)          Balance:       ADL Intervention:          Educated on sidelying for pressure relief off gluteal wounds--verbalied 2-3/10 pain in buttocks in sidelying. Educated on bridging for scooting hips to EOB, completed on silky bed with sheet pull with max A to scoot to EOB    L hip stretch to L hip ER with \"contract/release\" technique utilized x 5 reps to increase L hip IR for positioning L LE /foot in position for bridging in bed. Lower Body Dressing Assistance  Socks:  Moderate assistance(with AE)  Leg Crossed Method Used: No  Position Performed: Seated edge of bed  Adaptive Equipment Used: Sock aid              Therapeutic Exercises:   2 sets x 10 reps for unilateral UE exercises on B UE for tricep extension, seated row  ISSUED green jyoti HESTER GH to @100 degrees of [address] elevation with stiffness, c/o pain at 90 degrees briefly with AAROM--ed to perform SROM for shoulder flexion when supine in bed    Pain:  10/10 pain in buttocks with attempt to scoot EOB to HOE  2-3/10 pain inbuttocks in sidelying. Activity Tolerance:   Good, requires rest breaks, and increaed HR up to 141 bpm with activity EOB--recovers with rest break to >100, was 100 bpm when OT arrived in supine at rest  Please refer to the flowsheet for vital signs taken during this treatment. After treatment patient left in no apparent distress:   Supine in bed, Call bell within reach, and Caregiver / family present    COMMUNICATION/COLLABORATION:   The patients plan of care was discussed with: Registered nurse.      Courtney Bello OTR/L  Time Calculation: 63 mins

## 2020-07-21 NOTE — PROGRESS NOTES
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 But now Cr back up to 4.7mg/dl. Baseline Cr? Chronic obstructive uropathy/Atrophic left kidney/DM nephropathy. Followed by Dr. Nurys david outpt follow up. Last seen in 2015.     Chronic right hydronephrosis: [id number] stent exchange     Metabolic acidosis: NAGMA-> better with IV Bicarb     UTI     Anemia: Hgb suboptimal    DM2     Chronic sacral decubiti    Plan/Recommendations:  Change IVF to NS at 75cc/hr  IV Venofer  F/u URine Cx. IV Abx  Strict I/Os, avoid nephrotoxins  AM labs      Subjective:  UOP 1.2L. Reports improving dysuria. No more hematuria    ROS:   No nausea, no vomiting  No chest pain, no shortness of breath    Exam:  Visit Vitals  /83 (BP 1 Location: Right arm, BP Patient Position: At rest;Supine)   Pulse (!) 105   Temp 98.8 °F (37.1 °C)   Resp 18   Ht 5' 4\" (1.626 m)   Wt 149.1 kg (328 lb 10.9 oz)   SpO2 97%   Breastfeeding No   BMI 56.42 kg/m²     Wt Readings from Last 3 Encounters:   07/21/20 149.1 kg (328 lb 10.9 oz)       Intake/Output Summary (Last 24 hours) at 7/21/2020 1125  Last data filed at 7/20/2020 1912  Gross per 24 hour   Intake    Output 700 ml   Net -700 ml       Gen: NAD  HEENT: AT/NC  Lungs/Chest wall: Clear. No accessory muscle use. Cardiovascular: Regular rate, normal rhythm. Abdomen/: Soft,obese, large abd hernia. PureWick  Ext:  No peripheral edema  CNS: alert and awake.  Answers appropriately      Current Facility-Administered Medications   Medication Dose Route Frequency Last Dose    traMADoL (ULTRAM) tablet 50 mg  50 mg Oral Q8H PRN 50 mg at 07/21/20 1004    gabapentin (NEURONTIN) capsule 100 mg  100 mg Oral BID      fentaNYL citrate (PF) injection 25 mcg  25 mcg IntraVENous Q4H PRN 25 mcg at 07/19/20 0505    lidocaine (URO-JET/ GLYDO) 2 % jelly   Urethral DAILY PRN      sodium bicarbonate 150 mEq/1000 mL D5W (premix)   IntraVENous CONTINUOUS      glucose chewable tablet 16 g  4 Tab Oral PRN      glucagon (GLUCAGEN) injection 1 mg  1 mg IntraMUSCular PRN      dextrose 10% infusion 0-250 mL  0-250 mL IntraVENous PRN      pantoprazole (PROTONIX) tablet 40 mg  40 mg Oral DAILY 40 mg at 07/21/20 1000    acetaminophen (TYLENOL) tablet 500 mg  500 mg Oral Q6H  mg at 07/19/20 2341    cefTRIAXone (ROCEPHIN) 1 g in 0.9% sodium chloride (MBP/ADV) 50 mL  1 g IntraVENous Q24H 1 g at 07/20/20 2356    opium-belladonna (B&O 15-A) 16.2-30 mg suppository 1 Suppository  1 Suppository Rectal Q8H PRN 1 Suppository at 07/18/20 1900    opium-belladonna (B&O 15-A) 16.2-30 mg suppository 1 Suppository  1 Suppository Rectal Q6H PRN      oxybutynin chloride XL (DITROPAN XL) tablet 10 mg  10 mg Oral DAILY 10 mg at 07/21/20 1000    morphine injection 2 mg  2 mg IntraVENous Q4H PRN 2 mg at 07/19/20 0337    insulin lispro (HUMALOG) injection   SubCUTAneous AC&HS 2 Units at 07/21/20 0748    sodium chloride (NS) flush 5-10 mL  5-10 mL IntraVENous PRN         Labs/Data:    Lab Results   Component Value Date/Time    WBC 12.0 (H) 07/21/2020 03:19 AM    HGB 6.2 (L) 07/21/2020 03:19 AM    HCT 20.6 (L) 07/21/2020 03:19 AM    PLATELET 909 47/79/5536 03:19 AM    MCV 93.6 07/21/2020 03:19 AM       Lab Results   Component Value Date/Time    Sodium 140 07/21/2020 03:19 AM    Potassium 3.9 07/21/2020 03:19 AM    Chloride 108 07/21/2020 03:19 AM    CO2 24 07/21/2020 03:19 AM    Anion gap 8 07/21/2020 03:19 AM Glucose 170 (H) 07/21/2020 03:19 AM    BUN 79 (H) 07/21/2020 03:19 AM    Creatinine 4.70 (H) 07/21/2020 03:19 AM    BUN/Creatinine ratio 17 07/21/2020 03:19 AM    GFR est AA 12 (L) 07/21/2020 03:19 AM    GFR est non-AA 10 (L) 07/21/2020 03:19 AM    Calcium 8.0 (L) 07/21/2020 03:19 AM       Patient seen and examined. Chart reviewed. Labs, data and other pertinent notes reviewed in last 24 hrs.     Discussed with patient, RN     Signed by:  Tory Lucia MD  [address] [place]

## 2020-07-21 NOTE — PROGRESS NOTES
JOESPH PLAN:    Patient will need home IV abx. He is a self pay    I asked Sherri Vivas to screen patient for Medicaid last week. I also had a long conversation with patient this morning regarding discharge planning. He wants to move to [place] to leave with his daughter. There are two hospitals close to [facility], [address] [place] [address] and [facility] that I plan to call to see if they could provide the same care that we give to our self pay patients in the [facility]. Patient also asked if he could find out the cost of the drugs for recommended duration of his treatment as his daughter Anshul Corbett [phone] is talking to her [employer] for assistance with part of the cost of the drugs. CM also asked patient if he could afford to stay in a hotel in [place] so he could use the OPIC at [facility], he said he could not afford it at this time as he has not worked since his illness. I plan to speak with Dr. Shaquille Guadalupe to know the exact drugs. However, he is waiting for the culture report. I don't know if he will qualify for a bed at [address] [place] [address] or [facility]. I      will continue to follow and ask IV infusion agencies for pricing as soon as Dr. Shaquille Guadalupe tells me the drug of choice.     Timbo Aguirre MSA, RN,CRM

## 2020-07-21 NOTE — PROGRESS NOTES
Problem: Mobility Impaired (Adult and Pediatric)  Goal: *Acute Goals and Plan of Care (Insert Text)  Description: FUNCTIONAL STATUS PRIOR TO ADMISSION: Pt has been essentially bed bound since December 10th. Pt reported she was walking with a RW short distances prior to this. Pt had sustained a fall in October and had gone to rehab and then fell again in December. HOME SUPPORT PRIOR TO ADMISSION: The patient lived with her sister. Physical Therapy Goals  Initiated 7/20/2020  1. Patient will move from supine to sit and sit to supine , scoot up and down, and roll side to side in bed with minimal assistance/contact guard assist within 7 day(s). 2.  Patient will transfer from bed to chair and chair to bed with moderate assistance (squat pivot versus sliding board) using the least restrictive device within 7 day(s). 3.  Patient will tolerate seated EOB x 10 minutes with supervision within 7 days in prep for transfers OOB    Outcome: Progressing Towards Goal       PHYSICAL THERAPY TREATMENT  Patient: Amanda Aceves (78 y.o. female)  Date: 7/21/2020  Diagnosis: Hematuria [R31.9]  Hematuria [R31.9]   <principal problem not specified>  Procedure(s) (LRB):  CYSTOSCOPY RIGHT URETERAL STENT EXCHANGE (Right) 3 Days Post-Op  Precautions: Fall  Chart, physical therapy assessment, plan of care and goals were reviewed. ASSESSMENT  Patient continues with skilled PT services and is progressing towards goals. Pt agreeable to therapy eager to improve her mobility. Pt was able to roll, but unable to complete supine to sit. Pt is limited by buttock pain. Pt was able to demonstrate supine exercises. Pt has limited right shoulder ROM/      Current Level of Function Impacting Discharge (mobility/balance): total     Other factors to consider for discharge: *bed bound, buttock wound          PLAN :  Patient continues to benefit from skilled intervention to address the above impairments.   Continue treatment per established plan of care. to address goals. Recommendation for discharge: (in order for the patient to meet his/her long term goals)  Therapy up to 5 days/week in SNF setting    This discharge recommendation:  Has not yet been discussed the attending provider and/or case management    IF patient discharges home will need the following DME: to be determined (TBD)       SUBJECTIVE:   Patient stated I want to get better.     OBJECTIVE DATA SUMMARY:   Critical Behavior:  Neurologic State: Alert  Orientation Level: Oriented X4  Cognition: Follows commands     Functional Mobility Training:  Bed Mobility:  Rolling: Stand-by assistance; Additional time          Transfers:                               Balance:     Ambulation/Gait Training:                                                       Stairs: Therapeutic Exercises:   SLR, HS, AP  Pain Rating:  Buttocks     Activity Tolerance:   poor  Please refer to the flowsheet for vital signs taken during this treatment. After treatment patient left in no apparent distress:   Supine in bed and Call bell within reach    COMMUNICATION/COLLABORATION:   The patients plan of care was discussed with: Registered nurse.      Radha Anderson PTA   Time Calculation: 45 mins

## 2020-07-21 NOTE — ROUTINE PROCESS
Faculty or Preceptor Review of Student Work    7/21/2020  - Shift times - 0730 to 1300    The student documentation of patient care for [facility] has been reviewed and approved. All medications have been administered under the direct supervision of the faculty or preceptor.     Nazanin Alejo RN

## 2020-07-21 NOTE — PROGRESS NOTES
Problem: Falls - Risk of  Goal: *Absence of Falls  Description: Document Leo Led Fall Risk and appropriate interventions in the flowsheet. Outcome: Progressing Towards Goal  Note: Fall Risk Interventions:            Medication Interventions: Evaluate medications/consider consulting pharmacy    Elimination Interventions: Call light in reach, Patient to call for help with toileting needs    History of Falls Interventions:  Investigate reason for fall         Problem: Pain  Goal: *Control of Pain  Outcome: Progressing Towards Goal     Problem: Patient Education: Go to Patient Education Activity  Goal: Patient/Family Education  Outcome: Progressing Towards Goal

## 2020-07-21 NOTE — PROGRESS NOTES
Hospitalist Progress Note  Daly Barros MD  Answering service: [phone] OR [phone] from in house phone      Date of Service:  2020  NAME:  Romaine Meza  :  1960  MRN:  [id number]      Admission Summary:   Romaine Meza is a 62 y.o. female with past medical history significant for diabetes mellitus type 2, obesity, CKD, history of right hydronephrosis and ureter multiple stents placement presented to the emergency room with right flank and hematuria. Patient has been experiencing urinary symptoms and flank pain for several days now. She has been in the emergency multiple times but discharged home with antibiotic therapy. She has tried amoxicillin, Keflex and Cipro without improvement of his symptoms. It was not until yesterday that she started noticing blood in the urine for which she came to the emergency room. In the ED it was noted that her ureter stent was coming out through her urethra. She had a CT scan of the abdomen that showed ureteral stent misplacement as well as enlargement of the right kidney. She was found to have a leukocytosis, be febrile and hypotensive. Received Ceftriaxone in the ED. Urology notified by ED of admission. Admission requested for UTI and hematuria. Interval history / Subjective: Follow up sepsis, hematuria and DAPHNE on CKD3. Patient seen and examined at the bedside. Labs, images and notes reviewed  Discussed with nursing staff, orders reviewed. Plan discussed with patient/Family  Feeling better. Able to void without much discomfort significantly improved. Hoping that it will be normal soon. No fever or chills. On IV fentanyl 25 mcg every 4 hours as needed. Creatinine up trended. D/W with nephrology. Also Hb 6.3, patient is Tenriism. No other concerns or questions.     Assessment & Plan:     # Sepsis (POA) secondary to UTI:  - Continue with Rocephin 1 g every 24 hours  - ct with IV fluid NC  - stent replaced   - Urology on board  - Follow cultures    # Hematuria: likely secondary to UTI and trauma for stent migration   - removal of old stent and replacement of right stent   - on continuous bladder irrigation    - Urology following   - Morphine for pain change to fentanyl along with opium/belladonna suppository/tramadol as needed and scheduled oxybutynin for bladder spasm   - PRN catheter - christensen/ straight     # DAPHNE on CKD likely obstructive uropathy  - Creatinine 4.55 today. Baseline between 2 and 3.    - treat obstruction, UTI  - ct with IVF   - Nephrology on board. Recommendations noted. Renal ultrasound. # Anemia, chronic disease/ iron deficiency as well  - Defer IV iron infusion with EPO given patient is [employer] to nephrology  - Iron studies     # Metabolic acidosis:   - Likely secondary to renal failure. - Bicarb GTT. Further per nephrology     # Pressure Wound (POA): buttock bilaterally  - Does not appear to be infected. - Wound care.     # DM type 2  - Accu-Chek and sliding scale insulin every 6 hours while n.p.o.     # Obesity: Patient will benefit of weight loss management as an outpatient.     DVT prophy: scd  Code statuts: DNR/DNI     FUNCTIONAL STATUS PRIOR TO HOSPITALIZATION Bedbound      Hospital Problems  Date Reviewed: 7/18/2020          Codes Class Noted POA    Hematuria ICD-10-CM: R31.9  ICD-9-CM: 599.70  7/17/2020 Unknown                Review of Systems:   Pertinent positive mentioned in interval hx/HPI . Other systems reviewed and negative     Vital Signs:    Last 24hrs VS reviewed since prior progress note.  Most recent are:  Visit Vitals  BP (!) 93/33 (BP 1 Location: Right arm, BP Patient Position: At rest)   Pulse (!) 101   Temp 98.9 °F (37.2 °C)   Resp 18   Ht 5' 4\" (1.626 m)   Wt 149.7 kg (330 lb)   SpO2 100%   Breastfeeding No   BMI 56.64 kg/m²         Intake/Output Summary (Last 24 hours) at 7/20/2020 2051  Last data filed at 7/20/2020 1912  Gross per 24 hour   Intake 1414.6 ml   Output 1250 ml   Net 164.6 ml        Physical Examination:             Constitutional:  No acute distress, in pain, obese    ENT:  Oral mucous moist, oropharynx benign. Neck supple,    Resp:  CTA bilaterally. No wheezing/rhonchi/rales. No accessory muscle use   CV:  Regular rhythm, normal rate, no murmurs, gallops, rubs    GI:  hernia, Soft, non distended, mild tenderness. normoactive bowel sounds, no hepatosplenomegaly     Musculoskeletal:  No edema, warm, 2+ pulses throughout    Neurologic:  Moves all extremities. AAOx3, CN II-XII reviewed           Data Review:    I personally reviewed labs and imaging     Ct Abd Pelv Wo Cont    Result Date: 7/17/2020  IMPRESSION: 1. Very large left paramedian abdominal wall hernia containing fat and majority of bowel. 2. Right internal ureteral stent with distal pigtail in the urethra. The right kidney is enlarged, lobular in contour, with poor delineation of cortical medullary and renal collecting system anatomy. 3. Small left kidney. Us Retroperitoneum Comp    Result Date: 7/20/2020  IMPRESSION: Bilateral hydronephrosis right much greater than left. Right ureteral stent in position. Xr Chest Port    Result Date: 7/17/2020  IMPRESSION: No acute pulmonary findings. Superior right paratracheal soft tissue enlargement with leftward deviation of trachea. Xr Cystogram    Result Date: 7/18/2020  IMPRESSION: A single image/s was/were obtained intraoperatively . A pigtail catheter in the abdomen may be a ureteral stent. No radiologist was present during image acquisition. Please see operative note for further details. FLUOROSCOPY TIME PROVIDED: 19 seconds.         Labs:     Recent Labs     07/20/20  0620 07/19/20  0603   WBC 11.1* 17.2*   HGB 6.3* 7.6*   HCT 22.0* 25.4*    325     Recent Labs     07/20/20  0620 07/19/20  1559 07/19/20  0603    144 142   K 4.2 4.4 4.7   * 118* 117*   CO2 18* 18* 15*   BUN 83* 83* 83*   CREA 4.55* 4.15* 3.97*   * 188* 125*   CA 8.6 8.8 9.2   MG 2.3  --   --    PHOS 3.8  --   --      Recent Labs     07/20/20  0620   ALT 9*   AP 49   TBILI 0.1*   TP 6.6   ALB 2.3*   GLOB 4.3*     No results for input(s): INR, PTP, APTT, INREXT, INREXT in the last 72 hours. Recent Labs     07/20/20  0620   TIBC 219*   PSAT 11*      No results found for: FOL, RBCF   No results for input(s): PH, PCO2, PO2 in the last 72 hours. No results for input(s): CPK, CKNDX, TROIQ in the last 72 hours.     No lab exists for component: CPKMB  No results found for: CHOL, CHOLX, CHLST, CHOLV, HDL, HDLP, LDL, LDLC, DLDLP, TGLX, TRIGL, TRIGP, CHHD, CHHDX  Lab Results   Component Value Date/Time    Glucose (POC) 150 (H) 07/20/2020 04:19 PM    Glucose (POC) 151 (H) 07/20/2020 11:56 AM    Glucose (POC) 136 (H) 07/20/2020 06:41 AM    Glucose (POC) 153 (H) 07/19/2020 10:19 PM    Glucose (POC) 200 (H) 07/19/2020 04:53 PM     Lab Results   Component Value Date/Time    Color RED 07/17/2020 08:08 PM    Appearance TURBID (A) 07/17/2020 08:08 PM    Specific gravity 1.020 07/17/2020 08:08 PM    pH (UA) 6.5 07/17/2020 08:08 PM    Protein >300 (A) 07/17/2020 08:08 PM    Glucose Negative 07/17/2020 08:08 PM    Ketone Negative 07/17/2020 08:08 PM    Bilirubin Negative 07/17/2020 08:08 PM    Urobilinogen 0.2 07/17/2020 08:08 PM    Nitrites Negative 07/17/2020 08:08 PM    Leukocyte Esterase MODERATE (A) 07/17/2020 08:08 PM    Epithelial cells FEW 07/17/2020 08:08 PM    Bacteria Negative 07/17/2020 08:08 PM    WBC >100 (H) 07/17/2020 08:08 PM    RBC >100 (H) 07/17/2020 08:08 PM         Medications Reviewed:     Current Facility-Administered Medications   Medication Dose Route Frequency    traMADoL (ULTRAM) tablet 50 mg  50 mg Oral Q8H PRN    gabapentin (NEURONTIN) capsule 100 mg  100 mg Oral BID    fentaNYL citrate (PF) injection 25 mcg  25 mcg IntraVENous Q4H PRN    lidocaine (URO-JET/ GLYDO) 2 % jelly   Urethral DAILY PRN    sodium bicarbonate 150 mEq/1000 mL D5W (premix)   IntraVENous CONTINUOUS    glucose chewable tablet 16 g  4 Tab Oral PRN    glucagon (GLUCAGEN) injection 1 mg  1 mg IntraMUSCular PRN    dextrose 10% infusion 0-250 mL  0-250 mL IntraVENous PRN    pantoprazole (PROTONIX) tablet 40 mg  40 mg Oral DAILY    acetaminophen (TYLENOL) tablet 500 mg  500 mg Oral Q6H PRN    cefTRIAXone (ROCEPHIN) 1 g in 0.9% sodium chloride (MBP/ADV) 50 mL  1 g IntraVENous Q24H    opium-belladonna (B&O 15-A) 16.2-30 mg suppository 1 Suppository  1 Suppository Rectal Q8H PRN    opium-belladonna (B&O 15-A) 16.2-30 mg suppository 1 Suppository  1 Suppository Rectal Q6H PRN    oxybutynin chloride XL (DITROPAN XL) tablet 10 mg  10 mg Oral DAILY    morphine injection 2 mg  2 mg IntraVENous Q4H PRN    insulin lispro (HUMALOG) injection   SubCUTAneous AC&HS    sodium chloride (NS) flush 5-10 mL  5-10 mL IntraVENous PRN     ______________________________________________________________________  EXPECTED LENGTH OF STAY: 5d 12h  ACTUAL LENGTH OF STAY:          1                 Bendeict Paula MD   Patient has given Verbal permission to discuss medical care with   persons present in the room and and also with contact as listed on face sheet.

## 2020-07-22 LAB
ALBUMIN SERPL-MCNC: 2 G/DL (ref 3.5–5)
ALBUMIN/GLOB SERPL: 0.5 {RATIO} (ref 1.1–2.2)
ALP SERPL-CCNC: 48 U/L (ref 45–117)
ALT SERPL-CCNC: 9 U/L (ref 12–78)
ANION GAP SERPL CALC-SCNC: 7 MMOL/L (ref 5–15)
AST SERPL-CCNC: 7 U/L (ref 15–37)
BILIRUB SERPL-MCNC: 0.1 MG/DL (ref 0.2–1)
BUN SERPL-MCNC: 73 MG/DL (ref 6–20)
BUN/CREAT SERPL: 16 (ref 12–20)
CALCIUM SERPL-MCNC: 8 MG/DL (ref 8.5–10.1)
CHLORIDE SERPL-SCNC: 107 MMOL/L (ref 97–108)
CO2 SERPL-SCNC: 24 MMOL/L (ref 21–32)
CREAT SERPL-MCNC: 4.6 MG/DL (ref 0.55–1.02)
ERYTHROCYTE [DISTWIDTH] IN BLOOD BY AUTOMATED COUNT: 15 % (ref 11.5–14.5)
EST. AVERAGE GLUCOSE BLD GHB EST-MCNC: 143 MG/DL
GLOBULIN SER CALC-MCNC: 4.2 G/DL (ref 2–4)
GLUCOSE BLD STRIP.AUTO-MCNC: 139 MG/DL (ref 65–100)
GLUCOSE BLD STRIP.AUTO-MCNC: 139 MG/DL (ref 65–100)
GLUCOSE BLD STRIP.AUTO-MCNC: 141 MG/DL (ref 65–100)
GLUCOSE BLD STRIP.AUTO-MCNC: 168 MG/DL (ref 65–100)
GLUCOSE SERPL-MCNC: 148 MG/DL (ref 65–100)
HBA1C MFR BLD: 6.6 % (ref 4–5.6)
HCT VFR BLD AUTO: 20.5 % (ref 35–47)
HGB BLD-MCNC: 6 G/DL (ref 11.5–16)
MAGNESIUM SERPL-MCNC: 1.9 MG/DL (ref 1.6–2.4)
MCH RBC QN AUTO: 28.2 PG (ref 26–34)
MCHC RBC AUTO-ENTMCNC: 29.3 G/DL (ref 30–36.5)
MCV RBC AUTO: 96.2 FL (ref 80–99)
NRBC # BLD: 0 K/UL (ref 0–0.01)
NRBC BLD-RTO: 0 PER 100 WBC
PHOSPHATE SERPL-MCNC: 2.9 MG/DL (ref 2.6–4.7)
PLATELET # BLD AUTO: 253 K/UL (ref 150–400)
PMV BLD AUTO: 9.9 FL (ref 8.9–12.9)
POTASSIUM SERPL-SCNC: 3.8 MMOL/L (ref 3.5–5.1)
PROT SERPL-MCNC: 6.2 G/DL (ref 6.4–8.2)
RBC # BLD AUTO: 2.13 M/UL (ref 3.8–5.2)
SERVICE CMNT-IMP: ABNORMAL
SODIUM SERPL-SCNC: 138 MMOL/L (ref 136–145)
WBC # BLD AUTO: 12.1 K/UL (ref 3.6–11)

## 2020-07-22 PROCEDURE — 84100 ASSAY OF PHOSPHORUS: CPT

## 2020-07-22 PROCEDURE — 82962 GLUCOSE BLOOD TEST: CPT

## 2020-07-22 PROCEDURE — 65660000000 HC RM CCU STEPDOWN

## 2020-07-22 PROCEDURE — 74011250637 HC RX REV CODE- 250/637: Performed by: INTERNAL MEDICINE

## 2020-07-22 PROCEDURE — 83735 ASSAY OF MAGNESIUM: CPT

## 2020-07-22 PROCEDURE — 36415 COLL VENOUS BLD VENIPUNCTURE: CPT

## 2020-07-22 PROCEDURE — 74011000258 HC RX REV CODE- 258: Performed by: INTERNAL MEDICINE

## 2020-07-22 PROCEDURE — 85027 COMPLETE CBC AUTOMATED: CPT

## 2020-07-22 PROCEDURE — 74011250637 HC RX REV CODE- 250/637: Performed by: NURSE PRACTITIONER

## 2020-07-22 PROCEDURE — 83036 HEMOGLOBIN GLYCOSYLATED A1C: CPT

## 2020-07-22 PROCEDURE — 74011250636 HC RX REV CODE- 250/636: Performed by: INTERNAL MEDICINE

## 2020-07-22 PROCEDURE — 80053 COMPREHEN METABOLIC PANEL: CPT

## 2020-07-22 PROCEDURE — 74011636637 HC RX REV CODE- 636/637: Performed by: NURSE PRACTITIONER

## 2020-07-22 RX ADMIN — IRON SUCROSE 200 MG: 20 INJECTION, SOLUTION INTRAVENOUS at 14:58

## 2020-07-22 RX ADMIN — PANTOPRAZOLE SODIUM 40 MG: 40 TABLET, DELAYED RELEASE ORAL at 10:02

## 2020-07-22 RX ADMIN — TRAMADOL HYDROCHLORIDE 50 MG: 50 TABLET, FILM COATED ORAL at 06:40

## 2020-07-22 RX ADMIN — INSULIN LISPRO 2 UNITS: 100 INJECTION, SOLUTION INTRAVENOUS; SUBCUTANEOUS at 07:01

## 2020-07-22 RX ADMIN — CEFTRIAXONE 1 G: 1 INJECTION, POWDER, FOR SOLUTION INTRAMUSCULAR; INTRAVENOUS at 22:06

## 2020-07-22 RX ADMIN — OXYBUTYNIN CHLORIDE 10 MG: 5 TABLET, EXTENDED RELEASE ORAL at 10:02

## 2020-07-22 RX ADMIN — TRAMADOL HYDROCHLORIDE 50 MG: 50 TABLET, FILM COATED ORAL at 18:52

## 2020-07-22 RX ADMIN — EPOETIN ALFA-EPBX 20000 UNITS: 10000 INJECTION, SOLUTION INTRAVENOUS; SUBCUTANEOUS at 12:24

## 2020-07-22 RX ADMIN — SODIUM CHLORIDE 75 ML/HR: 900 INJECTION, SOLUTION INTRAVENOUS at 15:01

## 2020-07-22 NOTE — ACP (ADVANCE CARE PLANNING)
[address]  Hospitalist Group                                      Advance Care Planning Note    Name: India Sharma  YOB: 1960  MRN: [id number]  Admission Date: 7/17/2020  7:39 PM    Date of discussion: 7/21/2020    Active Diagnoses:    Hospital Problems  Date Reviewed: 7/18/2020          Codes Class Noted POA    Hematuria ICD-10-CM: R31.9  ICD-9-CM: 599.70  7/17/2020 Unknown          # Sepsis  # Hematuria  # DAPHNE on CKD 3  # Anemia, SREEDHAR, unable to accept transfusion due to [address] [place] witness  # Obstructive uropathy with bilateral hydronephrosis  # DM2  # Morbid obesity  # Pressure wounds    These active diagnoses are of sufficient risk that focused discussion on advance care planning is indicated in order to allow the patient to thoughtfully consider personal goals of care, and if situations arise that prevent the ability to personally give input, to ensure appropriate representation of their personal desires for different levels and aggressiveness of care. Discussion:     Persons present and participating in discussion: Carie Whatley MD     Discussion:   I had a detailed discussion regarding patient's CODE STATUS with patient. I explained to him why we need to have a discussion regarding the CODE STATUS, what the discussion entails, and what we want accomplished with this discussion. I explained to him what CODE STATUS means, the various kinds of code statuses including DNR/DNI, partial code, full code. Explained to him what each of that entails, the resuscitative modalities and partial code and full code, what DNR/DNI entails, and explained to him in detail the expectation associated with each CODE STATUS. I answered all his questions and concerns. He reports that he would like him to be a Full Code at this time  [address] Full code. Time Spent:     Total time spent face-to-face in education and discussion: 17 minutes.      Roosevelt Pena MD  Date of Service:  7/21/2020  10:52 PM

## 2020-07-22 NOTE — PROGRESS NOTES
Problem: Falls - Risk of  Goal: *Absence of Falls  Description: Document Thora Bare Fall Risk and appropriate interventions in the flowsheet. Outcome: Progressing Towards Goal  Note: Fall Risk Interventions:            Medication Interventions: Evaluate medications/consider consulting pharmacy    Elimination Interventions: Call light in reach    History of Falls Interventions: Door open when patient unattended         Problem: Pressure Injury - Risk of  Goal: *Prevention of pressure injury  Description: Document Murray Scale and appropriate interventions in the flowsheet.   Outcome: Progressing Towards Goal  Note: Pressure Injury Interventions:  Sensory Interventions: Assess need for specialty bed    Moisture Interventions: Absorbent underpads    Activity Interventions: Pressure redistribution bed/mattress(bed type), PT/OT evaluation    Mobility Interventions: Pressure redistribution bed/mattress (bed type)    Nutrition Interventions: Document food/fluid/supplement intake    Friction and Shear Interventions: Apply protective barrier, creams and emollients, Minimize layers                Problem: Chronic Renal Failure  Goal: *Fluid and electrolytes stabilized  Outcome: Progressing Towards Goal     Problem: Pain  Goal: *Control of Pain  Outcome: Progressing Towards Goal

## 2020-07-22 NOTE — PROGRESS NOTES
JOESPH PLAN:    RUR-17%    Patient was admitted from her sister's residence    Patient declined SNF placement    Patient prefers home PT/OT. She has no preference of [facility]     CM sent referrals to [address] via [facility]. CM to follow up in am.    Patient will need transportation home to [facility] (Sister's residence)    Reason for Admission:   Hematuria                   RUR Score: 17%                    Plan for utilizing home health:  TBD        PCP: First and Last name:  Sonia Jiang   Name of Practice:    Are you a current patient: Yes    Approximate date of last visit: 7/16/20   Can you participate in a virtual visit with your PCP: Yes                  Current Advanced Directive/Advance Care Plan: Patient has a DNR order                         Transition of Care Plan:  Cm met with patient to discuss discharge planning. She is independent with some of her ADLs and her sister Marsha Lot [phone] whom she lives with assists with her IADLs needs. Patient has a wound that her sister a retired nurse does the wound care. Patient has a cane and a walker but has not  walked since December last year. Patient did not voice any discharge barriers. CM to follow for any discharge needs. Theodora Zuniga MSA, RN, CRM  Care Management Interventions  PCP Verified by CM: Yes  Palliative Care Criteria Met (RRAT>21 & CHF Dx)?: No  Mode of Transport at Discharge: [facility]  Transition of Care Consult (CM Consult): [address]: No  Reason Outside [facility]: Out of service area  [place] [address]: No  Discharge Durable Medical Equipment: No  Health Maintenance Reviewed: Yes  Physical Therapy Consult: Yes  Occupational Therapy Consult: Yes  Speech Therapy Consult: No  Current Support Network: Other(Camilaoves with her sister)  Confirm Follow Up Transport: Family  The Plan for Transition of Care is Related to the Following Treatment Goals :  To continue therpies at home  The Patient and/or Patient Representative was Provided with a Choice of Provider and Agrees with the Discharge Plan?: Yes  Freedom of Choice List was Provided with Basic Dialogue that Supports the Patient's Individualized Plan of Care/Goals, Treatment Preferences and Shares the Quality Data Associated with the Providers?: Yes  Discharge Location  Discharge Placement: Home with home health

## 2020-07-22 NOTE — PROGRESS NOTES
[facility]  Hospitalist Group                                                                                          Hospitalist Progress Note  Se Alves MD  Answering service: [phone] from in house phone        Date of Service:  2020  NAME:  Malinda Bradshaw  :  1960  MRN:  [id number]      Admission Summary:     Malinda Bradshaw is a 62 y.o. female with past medical history significant for diabetes mellitus type 2, obesity, CKD, history of right hydronephrosis and ureter multiple stents placement presented to the emergency room with right flank and hematuria. Patient has been experiencing urinary symptoms and flank pain for several days now. She has been in the emergency multiple times but discharged home with antibiotic therapy. She has tried amoxicillin, Keflex and Cipro without improvement of his symptoms. It was not until yesterday that she started noticing blood in the urine for which she came to the emergency room. In the ED it was noted that her ureter stent was coming out through her urethra. She had a CT scan of the abdomen that showed ureteral stent misplacement as well as enlargement of the right kidney. She was found to have a leukocytosis, be febrile and hypotensive. Received Ceftriaxone in the ED. Urology notified by ED of admission. Admission requested for UTI and hematuria.      Interval history / Subjective:     She said she feels better, urine is clear     Assessment & Plan:     Sepsis secondary to UTI POA  -has leukocytosis, tachycardia, hypotension and fever  -continue rocephin, IVF  -Blood cx no growth so far  -follow up urine cx    Hematuria likely secondary to UTI and stent displacement   -s/p removal of old stent and replacement of right stent  -on ditropan  -urine is cleared, off CBI  -continue prn morphine, tylenol  -Urologist is consulted    Acute blood loss anemia due to hematuria  -trending down, Hgb 6.0 , patient is Monique Connor witness and doesn't receive blood transfusion   -on iv iron sucrose, epoetin  -monitor H/H    DAPHNE on CKD stage V  -creatinine up but stable, creatine 4.60  -continue IVF  -avoid nephrotoxin   -monitor renal function  -nephrologist on board    T2DM with hyperglycemia  -A1c 6.6  -continue sliding scale and monitor finger stick glucose    Obesity  -diet and weight loss program    Debility  -bed bound    Moisture associated skin injury to bilateral gluteal fold  -continue wound care per wound care nurse       Code status: DNR  DVT prophylaxis: SCD    Care Plan discussed with: Patient/Family and Nurse  Anticipated Disposition: Home w/Family  Anticipated Discharge: 24 hours to 48 hours     Hospital Problems  Date Reviewed: 7/18/2020          Codes Class Noted POA    Hematuria ICD-10-CM: R31.9  ICD-9-CM: 599.70  7/17/2020 Unknown                Vital Signs:    Last 24hrs VS reviewed since prior progress note. Most recent are:  Visit Vitals  /53 (BP 1 Location: Right arm, BP Patient Position: At rest)   Pulse 93   Temp 98.5 °F (36.9 °C)   Resp 16   Ht 5' 4\" (1.626 m)   Wt 149.1 kg (328 lb 10.9 oz)   SpO2 99%   Breastfeeding No   BMI 56.42 kg/m²         Intake/Output Summary (Last 24 hours) at 7/22/2020 1225  Last data filed at 7/22/2020 1005  Gross per 24 hour   Intake 510 ml   Output 1900 ml   Net -1390 ml        Physical Examination:             Constitutional:  No acute distress, cooperative, pleasant    ENT:  Oral mucosa moist, oropharynx benign. Resp:  CTA bilaterally. No wheezing/rhonchi/rales. No accessory muscle use   CV:  Regular rhythm, normal rate, no murmurs, gallops, rubs    GI:  Soft, non distended, non tender. normoactive bowel sounds, no hepatosplenomegaly     Musculoskeletal:  No edema,      Neurologic:  Moves all extremities, motor UE 5/5, LE 4/5.   AAOx3, CN II-XII reviewed     Skin:  Hypopigmented skin  area bilateral gluteal fold       Data Review:    Review and/or order of clinical lab test  Review and/or order of tests in the radiology section of CPT  Review and/or order of tests in the medicine section of CPT      Labs:     Recent Labs     07/22/20 0330 07/21/20 0319   WBC 12.1* 12.0*   HGB 6.0* 6.2*   HCT 20.5* 20.6*    273     Recent Labs     07/22/20  0330 07/21/20 0319 07/20/20  0620    140 142   K 3.8 3.9 4.2    108 115*   CO2 24 24 18*   BUN 73* 79* 83*   CREA 4.60* 4.70* 4.55*   * 170* 130*   CA 8.0* 8.0* 8.6   MG 1.9 2.0 2.3   PHOS 2.9 2.5* 3.8     Recent Labs     07/22/20 0330 07/21/20 0319 07/20/20  0620   ALT 9* 7* 9*   AP 48 48 49   TBILI 0.1* 0.1* 0.1*   TP 6.2* 6.6 6.6   ALB 2.0* 2.2* 2.3*   GLOB 4.2* 4.4* 4.3*     No results for input(s): INR, PTP, APTT, INREXT, INREXT in the last 72 hours. Recent Labs     07/20/20 0620   TIBC 219*   PSAT 11*      No results found for: FOL, RBCF   No results for input(s): PH, PCO2, PO2 in the last 72 hours. No results for input(s): CPK, CKNDX, TROIQ in the last 72 hours.     No lab exists for component: CPKMB  No results found for: CHOL, CHOLX, CHLST, CHOLV, HDL, HDLP, LDL, LDLC, DLDLP, TGLX, TRIGL, TRIGP, CHHD, CHHDX  Lab Results   Component Value Date/Time    Glucose (POC) 139 (H) 07/22/2020 11:02 AM    Glucose (POC) 168 (H) 07/22/2020 06:39 AM    Glucose (POC) 146 (H) 07/21/2020 09:41 PM    Glucose (POC) 138 (H) 07/21/2020 04:26 PM    Glucose (POC) 177 (H) 07/21/2020 11:02 AM     Lab Results   Component Value Date/Time    Color RED 07/17/2020 08:08 PM    Appearance TURBID (A) 07/17/2020 08:08 PM    Specific gravity 1.020 07/17/2020 08:08 PM    pH (UA) 6.5 07/17/2020 08:08 PM    Protein >300 (A) 07/17/2020 08:08 PM    Glucose Negative 07/17/2020 08:08 PM    Ketone Negative 07/17/2020 08:08 PM    Bilirubin Negative 07/17/2020 08:08 PM    Urobilinogen 0.2 07/17/2020 08:08 PM    Nitrites Negative 07/17/2020 08:08 PM    Leukocyte Esterase MODERATE (A) 07/17/2020 08:08 PM    Epithelial cells FEW 07/17/2020 08:08 PM    Bacteria Negative 07/17/2020 08:08 PM    WBC >100 (H) 07/17/2020 08:08 PM    RBC >100 (H) 07/17/2020 08:08 PM         Medications Reviewed:     Current Facility-Administered Medications   Medication Dose Route Frequency    epoetin frieda-epbx (RETACRIT) injection 20,000 Units  20,000 Units SubCUTAneous Q7D    0.9% sodium chloride infusion  75 mL/hr IntraVENous CONTINUOUS    iron sucrose (VENOFER) 200 mg in 0.9% sodium chloride 100 mL IVPB  200 mg IntraVENous Q24H    traMADoL (ULTRAM) tablet 50 mg  50 mg Oral Q8H PRN    gabapentin (NEURONTIN) capsule 100 mg  100 mg Oral BID    fentaNYL citrate (PF) injection 25 mcg  25 mcg IntraVENous Q4H PRN    lidocaine (URO-JET/ GLYDO) 2 % jelly   Urethral DAILY PRN    glucose chewable tablet 16 g  4 Tab Oral PRN    glucagon (GLUCAGEN) injection 1 mg  1 mg IntraMUSCular PRN    dextrose 10% infusion 0-250 mL  0-250 mL IntraVENous PRN    pantoprazole (PROTONIX) tablet 40 mg  40 mg Oral DAILY    acetaminophen (TYLENOL) tablet 500 mg  500 mg Oral Q6H PRN    cefTRIAXone (ROCEPHIN) 1 g in 0.9% sodium chloride (MBP/ADV) 50 mL  1 g IntraVENous Q24H    opium-belladonna (B&O 15-A) 16.2-30 mg suppository 1 Suppository  1 Suppository Rectal Q8H PRN    opium-belladonna (B&O 15-A) 16.2-30 mg suppository 1 Suppository  1 Suppository Rectal Q6H PRN    oxybutynin chloride XL (DITROPAN XL) tablet 10 mg  10 mg Oral DAILY    morphine injection 2 mg  2 mg IntraVENous Q4H PRN    insulin lispro (HUMALOG) injection   SubCUTAneous AC&HS    sodium chloride (NS) flush 5-10 mL  5-10 mL IntraVENous PRN     ______________________________________________________________________  EXPECTED LENGTH OF STAY: 5d 12h  ACTUAL LENGTH OF STAY:          3                 Yolanda Joshua MD

## 2020-07-22 NOTE — PROGRESS NOTES
Patient name: Kate Hernandez  MRN: [id number]    Nephrology Progress note:    Assessment:  DAPHNE on CKD-3/4?: Cr continues to fluctuate despite right ureteral stent replacement. Baseline Cr? Chronic obstructive uropathy/Atrophic left kidney/DM nephropathy. Followed by Dr. Yasmin david outpt follow up. Last seen in 2015.     Chronic right hydronephrosis: x9anmay stent exchange. Missed her last one due to COVID     Metabolic acidosis: NAGMA-> better with IV Bicarb     UTI     Anemia: Hgb sub-optimal. Yazidi. IV Iron/CATHIE    DM2: HgbA1c 6.6     Chronic sacral decubiti    Plan/Recommendations:  Change IVF to NS at 75cc/hr  IV Venofer  Retacrit  IV Abx  Strict I/Os, avoid nephrotoxins  AM labs      Subjective:  UOP incompletely recorded. Reports improving dysuria. No more hematuria    ROS:   No nausea, no vomiting  No chest pain, no shortness of breath    Exam:  Visit Vitals  /74 (BP 1 Location: Right arm, BP Patient Position: At rest)   Pulse 97   Temp 98.3 °F (36.8 °C)   Resp 16   Ht 5' 4\" (1.626 m)   Wt 149.1 kg (328 lb 10.9 oz)   SpO2 99%   Breastfeeding No   BMI 56.42 kg/m²     Wt Readings from Last 3 Encounters:   07/21/20 149.1 kg (328 lb 10.9 oz)       Intake/Output Summary (Last 24 hours) at 7/22/2020 0955  Last data filed at 7/22/2020 6101  Gross per 24 hour   Intake 1010 ml   Output 1000 ml   Net 10 ml       Gen: NAD  HEENT: AT/NC  Lungs/Chest wall: Clear. No accessory muscle use. Cardiovascular: Regular rate, normal rhythm.    Abdomen/: Soft,obese, large abd hernia. PureWick  Ext:  No peripheral edema  CNS: alert and awake.  Answers appropriately      Current Facility-Administered Medications   Medication Dose Route Frequency Last Dose    0.9% sodium chloride infusion  75 mL/hr IntraVENous CONTINUOUS 75 mL/hr at 07/21/20 1310    iron sucrose (VENOFER) 200 mg in 0.9% sodium chloride 100 mL IVPB  200 mg IntraVENous Q24H 200 mg at 07/21/20 1311    traMADoL (ULTRAM) tablet 50 mg  50 mg Oral Q8H PRN 50 mg at 07/22/20 0640    gabapentin (NEURONTIN) capsule 100 mg  100 mg Oral BID      fentaNYL citrate (PF) injection 25 mcg  25 mcg IntraVENous Q4H PRN 25 mcg at 07/19/20 0505    lidocaine (URO-JET/ GLYDO) 2 % jelly   Urethral DAILY PRN      glucose chewable tablet 16 g  4 Tab Oral PRN      glucagon (GLUCAGEN) injection 1 mg  1 mg IntraMUSCular PRN      dextrose 10% infusion 0-250 mL  0-250 mL IntraVENous PRN      pantoprazole (PROTONIX) tablet 40 mg  40 mg Oral DAILY 40 mg at 07/21/20 1000    acetaminophen (TYLENOL) tablet 500 mg  500 mg Oral Q6H  mg at 07/19/20 2341    cefTRIAXone (ROCEPHIN) 1 g in 0.9% sodium chloride (MBP/ADV) 50 mL  1 g IntraVENous Q24H 1 g at 07/21/20 2143    opium-belladonna (B&O 15-A) 16.2-30 mg suppository 1 Suppository  1 Suppository Rectal Q8H PRN 1 Suppository at 07/18/20 1900    opium-belladonna (B&O 15-A) 16.2-30 mg suppository 1 Suppository  1 Suppository Rectal Q6H PRN      oxybutynin chloride XL (DITROPAN XL) tablet 10 mg  10 mg Oral DAILY 10 mg at 07/21/20 1000    morphine injection 2 mg  2 mg IntraVENous Q4H PRN 2 mg at 07/19/20 0337    insulin lispro (HUMALOG) injection   SubCUTAneous AC&HS 2 Units at 07/22/20 0701    sodium chloride (NS) flush 5-10 mL  5-10 mL IntraVENous PRN         Labs/Data:    Lab Results   Component Value Date/Time    WBC 12.1 (H) 07/22/2020 03:30 AM    HGB 6.0 (L) 07/22/2020 03:30 AM    HCT 20.5 (L) 07/22/2020 03:30 AM    PLATELET 691 65/70/9296 03:30 AM    MCV 96.2 07/22/2020 03:30 AM       Lab Results   Component Value Date/Time    Sodium 138 07/22/2020 03:30 AM    Potassium 3.8 07/22/2020 03:30 AM    Chloride 107 07/22/2020 03:30 AM    CO2 24 07/22/2020 03:30 AM    Anion gap 7 07/22/2020 03:30 AM    Glucose 148 (H) 07/22/2020 03:30 AM    BUN 73 (H) 07/22/2020 03:30 AM    Creatinine 4.60 (H) 07/22/2020 03:30 AM    BUN/Creatinine ratio 16 07/22/2020 03:30 AM    GFR est AA 12 (L) 07/22/2020 03:30 AM    GFR est non-AA 10 (L) 07/22/2020 03:30 AM    Calcium 8.0 (L) 07/22/2020 03:30 AM       Patient seen and examined. Chart reviewed. Labs, data and other pertinent notes reviewed in last 24 hrs.     Discussed with patient    Signed by:  Sara Ashby MD  [id number]

## 2020-07-23 LAB
ALBUMIN SERPL-MCNC: 2.1 G/DL (ref 3.5–5)
ALBUMIN/GLOB SERPL: 0.5 {RATIO} (ref 1.1–2.2)
ALP SERPL-CCNC: 50 U/L (ref 45–117)
ALT SERPL-CCNC: 8 U/L (ref 12–78)
ANION GAP SERPL CALC-SCNC: 9 MMOL/L (ref 5–15)
AST SERPL-CCNC: 6 U/L (ref 15–37)
BACTERIA SPEC CULT: NORMAL
BILIRUB SERPL-MCNC: 0.2 MG/DL (ref 0.2–1)
BUN SERPL-MCNC: 68 MG/DL (ref 6–20)
BUN/CREAT SERPL: 16 (ref 12–20)
CALCIUM SERPL-MCNC: 8 MG/DL (ref 8.5–10.1)
CHLORIDE SERPL-SCNC: 109 MMOL/L (ref 97–108)
CO2 SERPL-SCNC: 21 MMOL/L (ref 21–32)
CREAT SERPL-MCNC: 4.22 MG/DL (ref 0.55–1.02)
ERYTHROCYTE [DISTWIDTH] IN BLOOD BY AUTOMATED COUNT: 14.9 % (ref 11.5–14.5)
GLOBULIN SER CALC-MCNC: 4.2 G/DL (ref 2–4)
GLUCOSE BLD STRIP.AUTO-MCNC: 119 MG/DL (ref 65–100)
GLUCOSE BLD STRIP.AUTO-MCNC: 121 MG/DL (ref 65–100)
GLUCOSE BLD STRIP.AUTO-MCNC: 123 MG/DL (ref 65–100)
GLUCOSE BLD STRIP.AUTO-MCNC: 136 MG/DL (ref 65–100)
GLUCOSE SERPL-MCNC: 129 MG/DL (ref 65–100)
HCT VFR BLD AUTO: 21 % (ref 35–47)
HGB BLD-MCNC: 6 G/DL (ref 11.5–16)
MAGNESIUM SERPL-MCNC: 1.9 MG/DL (ref 1.6–2.4)
MCH RBC QN AUTO: 27.8 PG (ref 26–34)
MCHC RBC AUTO-ENTMCNC: 28.6 G/DL (ref 30–36.5)
MCV RBC AUTO: 97.2 FL (ref 80–99)
NRBC # BLD: 0.03 K/UL (ref 0–0.01)
NRBC BLD-RTO: 0.2 PER 100 WBC
PHOSPHATE SERPL-MCNC: 3.4 MG/DL (ref 2.6–4.7)
PLATELET # BLD AUTO: 275 K/UL (ref 150–400)
PMV BLD AUTO: 9.7 FL (ref 8.9–12.9)
POTASSIUM SERPL-SCNC: 4 MMOL/L (ref 3.5–5.1)
PROT SERPL-MCNC: 6.3 G/DL (ref 6.4–8.2)
RBC # BLD AUTO: 2.16 M/UL (ref 3.8–5.2)
SERVICE CMNT-IMP: ABNORMAL
SERVICE CMNT-IMP: NORMAL
SODIUM SERPL-SCNC: 139 MMOL/L (ref 136–145)
WBC # BLD AUTO: 14.1 K/UL (ref 3.6–11)

## 2020-07-23 PROCEDURE — 74011250636 HC RX REV CODE- 250/636: Performed by: INTERNAL MEDICINE

## 2020-07-23 PROCEDURE — 84100 ASSAY OF PHOSPHORUS: CPT

## 2020-07-23 PROCEDURE — 82962 GLUCOSE BLOOD TEST: CPT

## 2020-07-23 PROCEDURE — 36415 COLL VENOUS BLD VENIPUNCTURE: CPT

## 2020-07-23 PROCEDURE — 80053 COMPREHEN METABOLIC PANEL: CPT

## 2020-07-23 PROCEDURE — 74011250637 HC RX REV CODE- 250/637: Performed by: NURSE PRACTITIONER

## 2020-07-23 PROCEDURE — 74011250637 HC RX REV CODE- 250/637: Performed by: INTERNAL MEDICINE

## 2020-07-23 PROCEDURE — 97110 THERAPEUTIC EXERCISES: CPT

## 2020-07-23 PROCEDURE — 97530 THERAPEUTIC ACTIVITIES: CPT

## 2020-07-23 PROCEDURE — 97110 THERAPEUTIC EXERCISES: CPT | Performed by: OCCUPATIONAL THERAPIST

## 2020-07-23 PROCEDURE — 74011000258 HC RX REV CODE- 258: Performed by: INTERNAL MEDICINE

## 2020-07-23 PROCEDURE — 65660000000 HC RM CCU STEPDOWN

## 2020-07-23 PROCEDURE — 85027 COMPLETE CBC AUTOMATED: CPT

## 2020-07-23 PROCEDURE — 83735 ASSAY OF MAGNESIUM: CPT

## 2020-07-23 RX ADMIN — IRON SUCROSE 200 MG: 20 INJECTION, SOLUTION INTRAVENOUS at 11:41

## 2020-07-23 RX ADMIN — OXYBUTYNIN CHLORIDE 10 MG: 5 TABLET, EXTENDED RELEASE ORAL at 09:43

## 2020-07-23 RX ADMIN — CEFTRIAXONE 1 G: 1 INJECTION, POWDER, FOR SOLUTION INTRAMUSCULAR; INTRAVENOUS at 21:24

## 2020-07-23 RX ADMIN — TRAMADOL HYDROCHLORIDE 50 MG: 50 TABLET, FILM COATED ORAL at 19:49

## 2020-07-23 RX ADMIN — TRAMADOL HYDROCHLORIDE 50 MG: 50 TABLET, FILM COATED ORAL at 09:43

## 2020-07-23 RX ADMIN — PANTOPRAZOLE SODIUM 40 MG: 40 TABLET, DELAYED RELEASE ORAL at 09:43

## 2020-07-23 RX ADMIN — SODIUM CHLORIDE 75 ML/HR: 900 INJECTION, SOLUTION INTRAVENOUS at 03:03

## 2020-07-23 RX ADMIN — SODIUM CHLORIDE 75 ML/HR: 900 INJECTION, SOLUTION INTRAVENOUS at 19:23

## 2020-07-23 NOTE — PROGRESS NOTES
B/l hydro, R>L  pod5 cysto, right ureteral stent exchange    Zoroastrianism. Currently on epoetin frieda-epbx for anemia     DNR  Afebrile; VSS  WBC: 14.1  Bcx: ngtd  Hgb: 6.0  Cr: 4.22  UA: >100 WBCs, >100 RBCs  Ucx: mixed urogenital wendy  +rocephin  +oxybutynin  +epoetin frieda-epbx  +iron sucrose  +B&O PRN for bladder spasms  UOP: 4200cc    CT abd/pelv wo:  KIDNEYS/URETERS: The left kidney is small. Mild left renal pelvocaliectasis and  ureteral distention is shown. There is a right internal ureteral stent. The  right kidney appears enlarged, irregular in contour, and has poorly defined  cortical medullary differentiation as well as differentiation of parenchyma and  urine. The distal pigtail portion of the catheter is within the urethra.     IMPRESSION:  1. Very large left paramedian abdominal wall hernia containing fat and majority  of bowel. 2. Right internal ureteral stent with distal pigtail in the urethra. The right  kidney is enlarged, lobular in contour, with poor delineation of cortical  medullary and renal collecting system anatomy. 3. Small left kidney.     Renal US: IMPRESSION: Bilateral hydronephrosis right much greater than left. Right  ureteral stent in position. PLAN:  pod5 cysto, right ureteral stent exchange  Bilateral hydronephrosis, R>L  Anemia  DAPHNE on CKD-3?  - Monitor H&H, on epoetin frieda-epbx  - Monitor kidney function  - Continue with purewick  - Reinsert christensen prn for clots that can't pass.    - Unfortunately due to her renal function that she is not a candidate for urinary analgesic    - Patient will need outpatient f/u post discharge with VU. [phone]

## 2020-07-23 NOTE — PROGRESS NOTES
Bedside and Verbal shift change report given to Cinda Butler (oncoming nurse) by Violetta Foley (offgoing nurse). Report included the following information SBAR, Kardex, ED Summary, Procedure Summary, Intake/Output, MAR, Accordion, Recent Results, Cardiac Rhythm SR and Alarm Parameters .

## 2020-07-23 NOTE — PROGRESS NOTES
Patient name: Saumya Ren  MRN: [id number]    Nephrology Progress note:    Assessment:  DAPHNE on CKD-3/4?: Cr finally showing some improvement to 4.2mg/dl after showing fluctuations despite right ureteral stent replacement. Baseline Cr? Chronic obstructive uropathy/Atrophic left kidney/DM nephropathy. Followed by Dr. Bebeto david outpt follow up. Last seen in 2015.     Chronic right hydronephrosis: [id number] stent exchange. Missed her last one due to COVID     Metabolic acidosis: NAGMA-> better with IV Bicarb     UTI     Anemia: Hgb sub-optimal. Cheondoism. IV Iron/CATHIE    DM2: HgbA1c 6.6     Chronic sacral decubiti    Plan/Recommendations:  Ct IV NS at 75cc/hr  IV Venofer  Retacrit  IV Abx  Strict I/Os, avoid nephrotoxins  AM labs      Subjective:  No events overnight. Feels well. UOP 4.2L? Reports improving dysuria. No more hematuria    ROS:   No nausea, no vomiting  No chest pain, no shortness of breath    Exam:  Visit Vitals  /57 (BP 1 Location: Right arm, BP Patient Position: At rest)   Pulse 100   Temp 98.1 °F (36.7 °C)   Resp 18   Ht 5' 4\" (1.626 m)   Wt 149.1 kg (328 lb 10.9 oz)   SpO2 98%   Breastfeeding No   BMI 56.42 kg/m²     Wt Readings from Last 3 Encounters:   07/21/20 149.1 kg (328 lb 10.9 oz)       Intake/Output Summary (Last 24 hours) at 7/23/2020 1750  Last data filed at 7/23/2020 1403  Gross per 24 hour   Intake 150 ml   Output 2150 ml   Net -2000 ml       Gen: NAD  HEENT: AT/NC  Lungs/Chest wall: Clear. No accessory muscle use.   Cardiovascular: Regular rate, normal rhythm. Abdomen/: Soft,obese, large abd hernia. PureWick  Ext:  No peripheral edema  CNS: alert and awake.  Answers appropriately      Current Facility-Administered Medications   Medication Dose Route Frequency Last Dose    epoetin frieda-epbx (RETACRIT) injection 20,000 Units  20,000 Units SubCUTAneous Q7D 20,000 Units at 07/22/20 1224    0.9% sodium chloride infusion  75 mL/hr IntraVENous CONTINUOUS 75 mL/hr at 07/23/20 0303    traMADoL (ULTRAM) tablet 50 mg  50 mg Oral Q8H PRN 50 mg at 07/23/20 0943    gabapentin (NEURONTIN) capsule 100 mg  100 mg Oral BID Stopped at 07/23/20 1800    fentaNYL citrate (PF) injection 25 mcg  25 mcg IntraVENous Q4H PRN 25 mcg at 07/19/20 0505    lidocaine (URO-JET/ GLYDO) 2 % jelly   Urethral DAILY PRN      glucose chewable tablet 16 g  4 Tab Oral PRN      glucagon (GLUCAGEN) injection 1 mg  1 mg IntraMUSCular PRN      dextrose 10% infusion 0-250 mL  0-250 mL IntraVENous PRN      pantoprazole (PROTONIX) tablet 40 mg  40 mg Oral DAILY 40 mg at 07/23/20 0943    acetaminophen (TYLENOL) tablet 500 mg  500 mg Oral Q6H  mg at 07/19/20 2341    cefTRIAXone (ROCEPHIN) 1 g in 0.9% sodium chloride (MBP/ADV) 50 mL  1 g IntraVENous Q24H 1 g at 07/22/20 2206    opium-belladonna (B&O 15-A) 16.2-30 mg suppository 1 Suppository  1 Suppository Rectal Q8H PRN 1 Suppository at 07/18/20 1900    opium-belladonna (B&O 15-A) 16.2-30 mg suppository 1 Suppository  1 Suppository Rectal Q6H PRN      oxybutynin chloride XL (DITROPAN XL) tablet 10 mg  10 mg Oral DAILY 10 mg at 07/23/20 0943    morphine injection 2 mg  2 mg IntraVENous Q4H PRN 2 mg at 07/19/20 9371    insulin lispro (HUMALOG) injection   SubCUTAneous AC&HS Stopped at 07/22/20 1130    sodium chloride (NS) flush 5-10 mL  5-10 mL IntraVENous PRN         Labs/Data:    Lab Results   Component Value Date/Time    WBC 14.1 (H) 07/23/2020 03:00 AM    HGB 6.0 (L) 07/23/2020 03:00 AM    HCT 21.0 (L) 07/23/2020 03:00 AM    PLATELET 281 51/18/4760 03:00 AM    MCV 97.2 07/23/2020 03:00 AM       Lab Results   Component Value Date/Time    Sodium 139 07/23/2020 03:00 AM    Potassium 4.0 07/23/2020 03:00 AM    Chloride 109 (H) 07/23/2020 03:00 AM    CO2 21 07/23/2020 03:00 AM    Anion gap 9 07/23/2020 03:00 AM    Glucose 129 (H) 07/23/2020 03:00 AM    BUN 68 (H) 07/23/2020 03:00 AM    Creatinine 4.22 (H) 07/23/2020 03:00 AM    BUN/Creatinine ratio 16 07/23/2020 03:00 AM    GFR est AA 13 (L) 07/23/2020 03:00 AM    GFR est non-AA 11 (L) 07/23/2020 03:00 AM    Calcium 8.0 (L) 07/23/2020 03:00 AM       Patient seen and examined. Chart reviewed. Labs, data and other pertinent notes reviewed in last 24 hrs.     Discussed with patient    Signed by:  Foster Lu MD  [id number] [employer]

## 2020-07-23 NOTE — PROGRESS NOTES
[address] [facility] Adult  Hospitalist Group                                                                                          Hospitalist Progress Note  Zackary Galarza MD  Answering service: [phone] from in house phone        Date of Service:  2020  NAME:  Romaine Meza  :  1960  MRN:  [id number]      Admission Summary:     Romaine Meza is a 62 y.o. female with past medical history significant for diabetes mellitus type 2, obesity, CKD, history of right hydronephrosis and ureter multiple stents placement presented to the emergency room with right flank and hematuria. Patient has been experiencing urinary symptoms and flank pain for several days now. She has been in the emergency multiple times but discharged home with antibiotic therapy. She has tried amoxicillin, Keflex and Cipro without improvement of his symptoms. It was not until yesterday that she started noticing blood in the urine for which she came to the emergency room. In the ED it was noted that her ureter stent was coming out through her urethra. She had a CT scan of the abdomen that showed ureteral stent misplacement as well as enlargement of the right kidney. She was found to have a leukocytosis, be febrile and hypotensive. Received Ceftriaxone in the ED. Urology notified by ED of admission. Admission requested for UTI and hematuria.      Interval history / Subjective:     She said she feels better, urine is clear     Assessment & Plan:     Sepsis   -Patient presents with leukocytosis, tachycardia, hypotension and fever meeting sepsis criteria  -Sepsis secondary to UTI, blood cultures no growth so far  -continue rocephin, IVF    Hematuria   -likely secondary to UTI and stent displacement   -Status post removal of old stent and replacement of right stent  -on ditropan  -urine is cleared, off CBI  -continue prn morphine, tylenol  -Urology following    Acute blood loss anemia   -due to hematuria  -trending down, Hgb 6.0 7/22, patient is Mu-ism and doesn't receive blood transfusion   -on iv iron sucrose, epoetin  -monitor H/H    DAPHNE on CKD stage V  -creatinine trending down  -continue IVF, avoid nephrotoxin   -monitor renal function  -Nephrology following    Diabetes   -At baseline controlled with hemoglobin A1c of 6.6  -continue sliding scale and monitor blood sugars    Obesity  -Outpatient follow-up for diet and weight loss program    Debility  -bed bound    Moisture associated skin injury to bilateral gluteal fold  -continue wound care per wound care nurse     Code status: DNR  DVT prophylaxis: SCD    Care Plan discussed with: Patient/Family and Nurse  Anticipated Disposition: Home w/Family  Anticipated Discharge: 24 hours to 48 hours     Hospital Problems  Date Reviewed: 7/18/2020          Codes Class Noted POA    Hematuria ICD-10-CM: R31.9  ICD-9-CM: 599.70  7/17/2020 Unknown                Vital Signs:    Last 24hrs VS reviewed since prior progress note. Most recent are:  Visit Vitals  /57 (BP 1 Location: Right arm, BP Patient Position: At rest)   Pulse 100   Temp 98.1 °F (36.7 °C)   Resp 18   Ht 5' 4\" (1.626 m)   Wt 149.1 kg (328 lb 10.9 oz)   SpO2 98%   Breastfeeding No   BMI 56.42 kg/m²         Intake/Output Summary (Last 24 hours) at 7/23/2020 1607  Last data filed at 7/23/2020 1403  Gross per 24 hour   Intake 150 ml   Output 3050 ml   Net -2900 ml        Physical Examination:             Constitutional:  No acute distress, cooperative, pleasant    ENT:  Oral mucosa moist, oropharynx benign. Resp:  CTA bilaterally. No wheezing/rhonchi/rales. No accessory muscle use   CV:  Regular rhythm, normal rate, no murmurs, gallops, rubs    GI:  Soft, non distended, non tender. normoactive bowel sounds, no hepatosplenomegaly     Musculoskeletal:  No edema,      Neurologic:  Moves all extremities, motor UE 5/5, LE 4/5.   AAOx3, CN II-XII reviewed     Skin:  Hypopigmented skin  area bilateral gluteal fold Data Review:    Review and/or order of clinical lab test  Review and/or order of tests in the radiology section of CPT  Review and/or order of tests in the medicine section of CPT      Labs:     Recent Labs     07/23/20  0300 07/22/20  0330   WBC 14.1* 12.1*   HGB 6.0* 6.0*   HCT 21.0* 20.5*    253     Recent Labs     07/23/20  0300 07/22/20  0330 07/21/20  0319    138 140   K 4.0 3.8 3.9   * 107 108   CO2 21 24 24   BUN 68* 73* 79*   CREA 4.22* 4.60* 4.70*   * 148* 170*   CA 8.0* 8.0* 8.0*   MG 1.9 1.9 2.0   PHOS 3.4 2.9 2.5*     Recent Labs     07/23/20  0300 07/22/20  0330 07/21/20  0319   ALT 8* 9* 7*   AP 50 48 48   TBILI 0.2 0.1* 0.1*   TP 6.3* 6.2* 6.6   ALB 2.1* 2.0* 2.2*   GLOB 4.2* 4.2* 4.4*     No results for input(s): INR, PTP, APTT, INREXT, INREXT in the last 72 hours. No results for input(s): FE, TIBC, PSAT, FERR in the last 72 hours. No results found for: FOL, RBCF   No results for input(s): PH, PCO2, PO2 in the last 72 hours. No results for input(s): CPK, CKNDX, TROIQ in the last 72 hours.     No lab exists for component: CPKMB  No results found for: CHOL, CHOLX, CHLST, CHOLV, HDL, HDLP, LDL, LDLC, DLDLP, TGLX, TRIGL, TRIGP, CHHD, CHHDX  Lab Results   Component Value Date/Time    Glucose (POC) 123 (H) 07/23/2020 11:03 AM    Glucose (POC) 121 (H) 07/23/2020 06:33 AM    Glucose (POC) 141 (H) 07/22/2020 09:21 PM    Glucose (POC) 139 (H) 07/22/2020 04:19 PM    Glucose (POC) 139 (H) 07/22/2020 11:02 AM     Lab Results   Component Value Date/Time    Color RED 07/17/2020 08:08 PM    Appearance TURBID (A) 07/17/2020 08:08 PM    Specific gravity 1.020 07/17/2020 08:08 PM    pH (UA) 6.5 07/17/2020 08:08 PM    Protein >300 (A) 07/17/2020 08:08 PM    Glucose Negative 07/17/2020 08:08 PM    Ketone Negative 07/17/2020 08:08 PM    Bilirubin Negative 07/17/2020 08:08 PM    Urobilinogen 0.2 07/17/2020 08:08 PM    Nitrites Negative 07/17/2020 08:08 PM    Leukocyte Esterase MODERATE (A) 07/17/2020 08:08 PM    Epithelial cells FEW 07/17/2020 08:08 PM    Bacteria Negative 07/17/2020 08:08 PM    WBC >100 (H) 07/17/2020 08:08 PM    RBC >100 (H) 07/17/2020 08:08 PM         Medications Reviewed:     Current Facility-Administered Medications   Medication Dose Route Frequency    epoetin frieda-epbx (RETACRIT) injection 20,000 Units  20,000 Units SubCUTAneous Q7D    0.9% sodium chloride infusion  75 mL/hr IntraVENous CONTINUOUS    traMADoL (ULTRAM) tablet 50 mg  50 mg Oral Q8H PRN    gabapentin (NEURONTIN) capsule 100 mg  100 mg Oral BID    fentaNYL citrate (PF) injection 25 mcg  25 mcg IntraVENous Q4H PRN    lidocaine (URO-JET/ GLYDO) 2 % jelly   Urethral DAILY PRN    glucose chewable tablet 16 g  4 Tab Oral PRN    glucagon (GLUCAGEN) injection 1 mg  1 mg IntraMUSCular PRN    dextrose 10% infusion 0-250 mL  0-250 mL IntraVENous PRN    pantoprazole (PROTONIX) tablet 40 mg  40 mg Oral DAILY    acetaminophen (TYLENOL) tablet 500 mg  500 mg Oral Q6H PRN    cefTRIAXone (ROCEPHIN) 1 g in 0.9% sodium chloride (MBP/ADV) 50 mL  1 g IntraVENous Q24H    opium-belladonna (B&O 15-A) 16.2-30 mg suppository 1 Suppository  1 Suppository Rectal Q8H PRN    opium-belladonna (B&O 15-A) 16.2-30 mg suppository 1 Suppository  1 Suppository Rectal Q6H PRN    oxybutynin chloride XL (DITROPAN XL) tablet 10 mg  10 mg Oral DAILY    morphine injection 2 mg  2 mg IntraVENous Q4H PRN    insulin lispro (HUMALOG) injection   SubCUTAneous AC&HS    sodium chloride (NS) flush 5-10 mL  5-10 mL IntraVENous PRN     ______________________________________________________________________  EXPECTED LENGTH OF STAY: 5d 12h  ACTUAL LENGTH OF STAY:          4                 Anuja Campo MD

## 2020-07-23 NOTE — PROGRESS NOTES
Bedside shift change report given to Renetta Orlando (oncoming nurse) by Norberto Cheadle (offgoing nurse).  Report included the following information SBAR, MAR, Recent Results and Cardiac Rhythm SR.

## 2020-07-23 NOTE — CDMP QUERY
Patient admitted with sepsis secondary to UTI POA, noted documentation of urine culture with mixed urogenital wendy-insignificant in 7/21 PN.  Please indicate one of the following and document in the medical record:     Sepsis POA (please specify causative organism)    Sepsis secondary to UTI was ruled out after study   Other, please specify   Clinically unable to determine    The medical record reflects the following:     Risk Factors: DM, R hydronephrosis- stent displacement      Clinical Indicators: WBC: 16.0, Temp 100.8, HR >90 on admission  urine cx- mixed urogenital wendy  7/21 PN- Urine culture mixed urogenital wendy insignificant     Treatment: UA, urine cx, IVFs, rocephin, monitoring       Thank you,    Jovany Valente RN  [facility]  [phone]

## 2020-07-23 NOTE — PROGRESS NOTES
Bedside and Verbal shift change report given to Grecia Dill (oncoming nurse) by Shannen Miranda (offgoing nurse). Report included the following information Kardex, Intake/Output, Recent Results and Cardiac Rhythm Sinus Tach.

## 2020-07-23 NOTE — PROGRESS NOTES
Problem: Mobility Impaired (Adult and Pediatric)  Goal: *Acute Goals and Plan of Care (Insert Text)  Description: FUNCTIONAL STATUS PRIOR TO ADMISSION: Pt has been essentially bed bound since December 10th. Pt reported she was walking with a RW short distances prior to this. Pt had sustained a fall in October and had gone to rehab and then fell again in December. HOME SUPPORT PRIOR TO ADMISSION: The patient lived with her sister. Physical Therapy Goals  Initiated 7/20/2020  1. Patient will move from supine to sit and sit to supine , scoot up and down, and roll side to side in bed with minimal assistance/contact guard assist within 7 day(s). 2.  Patient will transfer from bed to chair and chair to bed with moderate assistance (squat pivot versus sliding board) using the least restrictive device within 7 day(s). 3.  Patient will tolerate seated EOB x 10 minutes with supervision within 7 days in prep for transfers OOB    Outcome: Progressing Towards Goal    PHYSICAL THERAPY TREATMENT  Patient: India Sharma (2 y.o. female)  Date: 7/23/2020  Diagnosis: Hematuria [R31.9]  Hematuria [R31.9]   <principal problem not specified>  Procedure(s) (LRB):  CYSTOSCOPY RIGHT URETERAL STENT EXCHANGE (Right) 5 Days Post-Op  Precautions: Fall  Chart, physical therapy assessment, plan of care and goals were reviewed. ASSESSMENT  Patient continues with skilled PT services and is progressing towards goals. Pt was able to achieve EOB utilizing bed controls. Pt unable to sit fully erect due to buttocks  pain. Pt with a left lateral lean Pt was able to verbalize LE exercises. Pt with increase pain with returning supine but improved once sideline. Would recommend SNF to improve mobility and independence and to decrease caregiver burden. Per note pt declined. If declining rehab then HHPT with assistance for self care. Pt is bed bound at this point with inability to tolerate full sitting.      Current Level of Function Impacting Discharge (mobility/balance): bed bound     Other factors to consider for discharge: buttocks pain, bed bound          PLAN :  Patient continues to benefit from skilled intervention to address the above impairments. Continue treatment per established plan of care. to address goals. Recommendation for discharge: (in order for the patient to meet his/her long term goals)  Therapy up to 5 days/week in SNF setting. If declined then HHPT with physical assistance     This discharge recommendation:  Has not yet been discussed the attending provider and/or case management    IF patient discharges home will need the following DME: patient owns DME required for discharge       SUBJECTIVE:   Patient stated I can do more    OBJECTIVE DATA SUMMARY:   Critical Behavior:  Neurologic State: Alert  Orientation Level: Oriented X4  Cognition: Follows commands, Appropriate for age attention/concentration     Functional Mobility Training:  Bed Mobility:  Rolling: Stand-by assistance; Additional time  Supine to Sit: Moderate assistance(utilizing bed controls elevated HOB)  Sit to Supine: Moderate assistance(assisted with bed controls )           Transfers:                                   Balance:  Sitting: Impaired(left lean due to pain)  Ambulation/Gait Training:                                                   Stairs: Therapeutic Exercises:   Seated ankle pumps, LAQ with green band, hamstring curls, hip abd/add. Pain Rating:  Buttocks     Activity Tolerance:   Poor  Please refer to the flowsheet for vital signs taken during this treatment. After treatment patient left in no apparent distress:   Patient positioned in right sidelying for pressure relief and Call bell within reach    COMMUNICATION/COLLABORATION:   The patients plan of care was discussed with: Registered nurse.      Radha Anderson PTA   Time Calculation: 38 mins

## 2020-07-23 NOTE — PROGRESS NOTES
Problem: Self Care Deficits Care Plan (Adult)  Goal: *Acute Goals and Plan of Care (Insert Text)  Description:   FUNCTIONAL STATUS PRIOR TO ADMISSION:  Patient was last modified independent for ADLs/ ambulatory with RW in December 2019. Patient reports she has been bedbound since d/t wounds. Patient reports she has not even been able to sit EOB at home. Performing all ADLs at bed-level with assistance for bathing, dressing, and toileting. HOME SUPPORT: Patient lived with sister to provide assistance    Occupational Therapy Goals  Initiated 7/20/2020  1. Patient will perform lower body dressing using AE PRN with moderate assistance  within 7 day(s). 2.  Patient will perform bathing with minimal assistance  within 7 day(s). 4.  Patient will perform toilet transfers with moderate assistance  within 7 day(s). 5.  Patient will perform all aspects of toileting with moderate assistance  within 7 day(s). 6.  Patient will participate in upper extremity therapeutic exercise/activities with supervision/set-up for 10 minutes within 7 day(s). Outcome: Progressing Towards Goal     OCCUPATIONAL THERAPY TREATMENT  Patient: Isis Landeros (60 y.o. female)  Date: 7/23/2020  Diagnosis: Hematuria [R31.9]  Hematuria [R31.9]   <principal problem not specified>  Procedure(s) (LRB):  CYSTOSCOPY RIGHT URETERAL STENT EXCHANGE (Right) 5 Days Post-Op  Precautions: Fall  Chart, occupational therapy assessment, plan of care, and goals were reviewed. ASSESSMENT  Patient continues with skilled OT services and is progressing towards goals. Receptive to instruction on bed level exercises and feel she will incorporate more throughout day following instruction. Will benefit from further review. Verbalized having trouble motivating herself to do exercises however was concerned how difficult it was to try and sit up and her muscles sore following that she feels she now needs to do more.       Current Level of Function Impacting Discharge (ADLs): reports ability to bathe at bed level     Other factors to consider for discharge: lives with her sister who is a nurse         PLAN :  Patient continues to benefit from skilled intervention to address the above impairments. Continue treatment per established plan of care. to address goals. Recommend with staff: encourage participation at bed level with ADL's    Recommend next OT session: re-ed on isometric exercises    Recommendation for discharge: (in order for the patient to meet his/her long term goals)  Occupational therapy at least 2 days/week in the home     This discharge recommendation:  Has not yet been discussed the attending provider and/or case management    IF patient discharges home will need the following DME: none       SUBJECTIVE:   Patient stated You gave me something to think about.  re: exercises    OBJECTIVE DATA SUMMARY:   Cognitive/Behavioral Status:  Neurologic State: Alert  Orientation Level: Oriented X4  Cognition: Follows commands; Appropriate for age attention/concentration  Perception: Appears intact  Perseveration: No perseveration noted             Therapeutic Exercises:   Educated on benefit of incorporating exercises throughout day and to change perception of exercise to activity and need to keep moving throughout day for benefit of strength, activity tolerance, skin protection, digestion  Instructed on benefit and techniques to grade exercises to alter focus from activity tolerance to strengthening and verrbalized understanding    Instructed in performance and benefit of isometric exercises due to report of right shoulder pain/rotator cuff tear and to provide additional exercises to theraband   Instructed in:   -scapular retraction using bed for resistance   -shoulder extension with elbows at 90 degrees flexion and pushing into bed, cues for breathing technique   -elbow extension with hands fisted, neutral alignment, elbow full extension and pushing into ulnar aspect of palm   -instructed to lower bed and at this time with pillow behind head instructed in modified abdominal \"crunch\" with cue for cervical alignment and breathing   -instructed to alter focus to angle shoulder toward opposite hip to focus on obliques and completed with min cues for breathing    -instructed on glute squeeze as tolerated    -re-educated on use of theraband  And set up for joint protection, completed elbow extension, elbow flexion, horizontal abduciton    Educated on pain management and and performance of all exercises in pain free range/alter performance to perform without pain  Pain:  No complaint of    Activity Tolerance:   Good  Please refer to the flowsheet for vital signs taken during this treatment. After treatment patient left in no apparent distress:   Supine in bed, Call bell within reach and Side rails x 3    COMMUNICATION/COLLABORATION:   The patients plan of care was discussed with: Registered nurse.      Blaire Prasad OTR/L  Time Calculation: 34 mins

## 2020-07-24 LAB
ALBUMIN SERPL-MCNC: 2.2 G/DL (ref 3.5–5)
ALBUMIN/GLOB SERPL: 0.5 {RATIO} (ref 1.1–2.2)
ALP SERPL-CCNC: 52 U/L (ref 45–117)
ALT SERPL-CCNC: 9 U/L (ref 12–78)
ANION GAP SERPL CALC-SCNC: 7 MMOL/L (ref 5–15)
AST SERPL-CCNC: 6 U/L (ref 15–37)
BASOPHILS # BLD: 0.1 K/UL (ref 0–0.1)
BASOPHILS NFR BLD: 1 % (ref 0–1)
BILIRUB SERPL-MCNC: 0.2 MG/DL (ref 0.2–1)
BUN SERPL-MCNC: 60 MG/DL (ref 6–20)
BUN/CREAT SERPL: 16 (ref 12–20)
CALCIUM SERPL-MCNC: 8.4 MG/DL (ref 8.5–10.1)
CHLORIDE SERPL-SCNC: 112 MMOL/L (ref 97–108)
CO2 SERPL-SCNC: 21 MMOL/L (ref 21–32)
CREAT SERPL-MCNC: 3.84 MG/DL (ref 0.55–1.02)
DIFFERENTIAL METHOD BLD: ABNORMAL
EOSINOPHIL # BLD: 0.7 K/UL (ref 0–0.4)
EOSINOPHIL NFR BLD: 5 % (ref 0–7)
ERYTHROCYTE [DISTWIDTH] IN BLOOD BY AUTOMATED COUNT: 15.1 % (ref 11.5–14.5)
GLOBULIN SER CALC-MCNC: 4.5 G/DL (ref 2–4)
GLUCOSE BLD STRIP.AUTO-MCNC: 119 MG/DL (ref 65–100)
GLUCOSE BLD STRIP.AUTO-MCNC: 134 MG/DL (ref 65–100)
GLUCOSE BLD STRIP.AUTO-MCNC: 152 MG/DL (ref 65–100)
GLUCOSE BLD STRIP.AUTO-MCNC: 171 MG/DL (ref 65–100)
GLUCOSE SERPL-MCNC: 126 MG/DL (ref 65–100)
HCT VFR BLD AUTO: 22.5 % (ref 35–47)
HGB BLD-MCNC: 6.4 G/DL (ref 11.5–16)
IMM GRANULOCYTES # BLD AUTO: 0 K/UL
IMM GRANULOCYTES NFR BLD AUTO: 0 %
LYMPHOCYTES # BLD: 1.8 K/UL (ref 0.8–3.5)
LYMPHOCYTES NFR BLD: 13 % (ref 12–49)
MAGNESIUM SERPL-MCNC: 1.9 MG/DL (ref 1.6–2.4)
MCH RBC QN AUTO: 27.6 PG (ref 26–34)
MCHC RBC AUTO-ENTMCNC: 28.4 G/DL (ref 30–36.5)
MCV RBC AUTO: 97 FL (ref 80–99)
MONOCYTES # BLD: 1.6 K/UL (ref 0–1)
MONOCYTES NFR BLD: 11 % (ref 5–13)
MYELOCYTES NFR BLD MANUAL: 1 %
NEUTS BAND NFR BLD MANUAL: 4 % (ref 0–6)
NEUTS SEG # BLD: 9.7 K/UL (ref 1.8–8)
NEUTS SEG NFR BLD: 65 % (ref 32–75)
NRBC # BLD: 0.05 K/UL (ref 0–0.01)
NRBC BLD-RTO: 0.4 PER 100 WBC
PHOSPHATE SERPL-MCNC: 3.3 MG/DL (ref 2.6–4.7)
PLATELET # BLD AUTO: 315 K/UL (ref 150–400)
PLATELET COMMENTS,PCOM: ABNORMAL
PMV BLD AUTO: 9.6 FL (ref 8.9–12.9)
POTASSIUM SERPL-SCNC: 4.3 MMOL/L (ref 3.5–5.1)
PROT SERPL-MCNC: 6.7 G/DL (ref 6.4–8.2)
RBC # BLD AUTO: 2.32 M/UL (ref 3.8–5.2)
RBC MORPH BLD: ABNORMAL
SERVICE CMNT-IMP: ABNORMAL
SODIUM SERPL-SCNC: 140 MMOL/L (ref 136–145)
WBC # BLD AUTO: 14.1 K/UL (ref 3.6–11)

## 2020-07-24 PROCEDURE — 84100 ASSAY OF PHOSPHORUS: CPT

## 2020-07-24 PROCEDURE — 82962 GLUCOSE BLOOD TEST: CPT

## 2020-07-24 PROCEDURE — 74011250636 HC RX REV CODE- 250/636: Performed by: INTERNAL MEDICINE

## 2020-07-24 PROCEDURE — 85025 COMPLETE CBC W/AUTO DIFF WBC: CPT

## 2020-07-24 PROCEDURE — 36415 COLL VENOUS BLD VENIPUNCTURE: CPT

## 2020-07-24 PROCEDURE — 74011250637 HC RX REV CODE- 250/637: Performed by: INTERNAL MEDICINE

## 2020-07-24 PROCEDURE — 74011250637 HC RX REV CODE- 250/637: Performed by: NURSE PRACTITIONER

## 2020-07-24 PROCEDURE — 65660000000 HC RM CCU STEPDOWN

## 2020-07-24 PROCEDURE — 83735 ASSAY OF MAGNESIUM: CPT

## 2020-07-24 PROCEDURE — 80053 COMPREHEN METABOLIC PANEL: CPT

## 2020-07-24 PROCEDURE — 74011000258 HC RX REV CODE- 258: Performed by: UROLOGY

## 2020-07-24 PROCEDURE — 74011636637 HC RX REV CODE- 636/637: Performed by: NURSE PRACTITIONER

## 2020-07-24 PROCEDURE — 74011250636 HC RX REV CODE- 250/636: Performed by: UROLOGY

## 2020-07-24 RX ORDER — MORPHINE SULFATE 2 MG/ML
2 INJECTION, SOLUTION INTRAMUSCULAR; INTRAVENOUS
Status: DISCONTINUED | OUTPATIENT
Start: 2020-07-24 | End: 2020-08-07 | Stop reason: HOSPADM

## 2020-07-24 RX ADMIN — SODIUM CHLORIDE 75 ML/HR: 900 INJECTION, SOLUTION INTRAVENOUS at 22:26

## 2020-07-24 RX ADMIN — INSULIN LISPRO 2 UNITS: 100 INJECTION, SOLUTION INTRAVENOUS; SUBCUTANEOUS at 11:24

## 2020-07-24 RX ADMIN — SODIUM CHLORIDE 75 ML/HR: 900 INJECTION, SOLUTION INTRAVENOUS at 09:03

## 2020-07-24 RX ADMIN — TRAMADOL HYDROCHLORIDE 50 MG: 50 TABLET, FILM COATED ORAL at 22:31

## 2020-07-24 RX ADMIN — ACETAMINOPHEN 500 MG: 500 TABLET ORAL at 08:59

## 2020-07-24 RX ADMIN — CEFTRIAXONE 1 G: 1 INJECTION, POWDER, FOR SOLUTION INTRAMUSCULAR; INTRAVENOUS at 22:26

## 2020-07-24 RX ADMIN — OXYBUTYNIN CHLORIDE 10 MG: 5 TABLET, EXTENDED RELEASE ORAL at 08:59

## 2020-07-24 RX ADMIN — PANTOPRAZOLE SODIUM 40 MG: 40 TABLET, DELAYED RELEASE ORAL at 09:00

## 2020-07-24 NOTE — PROGRESS NOTES
Bedside and Verbal shift change report given to Henna Gracia (oncoming nurse) by Judy Frankel (offgoing nurse). Report included the following information Kardex, Intake/Output, MAR, Recent Results and Cardiac Rhythm Sinus tach.

## 2020-07-24 NOTE — PROGRESS NOTES
TRANSFER - OUT REPORT:    Verbal report given to Pauline(name) on Kate Hernandez  being transferred to [address] [place] [address](unit) for routine progression of care       Report consisted of patients Situation, Background, Assessment and   Recommendations(SBAR). Information from the following report(s) SBAR, ED Summary, Procedure Summary, Intake/Output, MAR and Cardiac Rhythm normal sinus was reviewed with the receiving nurse. Lines:   Peripheral IV 07/20/20 Distal;Left;Posterior Forearm (Active)   Site Assessment Clean, dry, & intact 07/24/20 0026   Phlebitis Assessment 0 07/24/20 0026   Infiltration Assessment 0 07/24/20 0026   Dressing Status Clean, dry, & intact 07/24/20 0026   Dressing Type Tape;Transparent 07/24/20 0026   Hub Color/Line Status Blue; Infusing 07/24/20 0756   Action Taken Open ports on tubing capped 07/24/20 0026   Alcohol Cap Used Yes 07/24/20 0756        Opportunity for questions and clarification was provided.       Patient transported with:   [employer]

## 2020-07-24 NOTE — PROGRESS NOTES
JOESPH PLAN:     RUR-17%     Patient was admitted from her sister's residence     Patient declined SNF placement     Patient prefers home PT/OT. She has no preference of [facility]      YULISA sent a referrals to [facility], [facility] [address] [facility] [phone] and [facility] [phone] for SN/PT/OT this am.    Patient will need transportation home to [address] [place] [address], [facility] [address]. (Sister's residence        None of the [facility] CM sent referrals to via [employer] on 7/22/20 accepted patient. [facility], [facility], [facility], [facility], [facility] are out of network. [facility] is out of service area. [facility] is out of the network    RAI Felix, [address] [place], RN, CRM.

## 2020-07-24 NOTE — WOUND CARE
WOCN Note:     Follow-up visit for gluteal cleft and gluteal folds. Chart shows:  Admitted for hematuria  History of stents, DM     Assessment:   A&O x 4 and reports tenderness on gluteal fold. Able to turn independently for assessment from supine to right. Bed: SizeWise with air mattress     1. POA, MASD to gluteal cleft and bilateral gluteal folds; all with linear splits in a larger field of intact hypopigmented skin. Areas appear smaller with skip epithelialization. All open areas have 100% moist red bases with depth less than 0.2cm. cleaned away buildup of barrier cream and stoma powder. She is requesting a dressing to cover area for pain relief when sitting. I applied Opticel AG and foam dressing.       Recommendations:    Gluteal cleft & folds: apply small piece of Opticel AG and cover with foam dressing.  Change daily and as needed.      Transition of Care: Plan to follow weekly and as needed while admitted to hospital.    HOLLY HillsN, RN, [facility]  Certified Wound, Ostomy, Continence Nurse  office [phone]  pager [phone] or call  to page

## 2020-07-24 NOTE — PROGRESS NOTES
[facility]                                                                                          Hospitalist Progress Note  Zackary Galarza MD  Answering service: [phone] OR 36 from in house phone        Date of Service:  2020  NAME:  Romaine Meza  :  1960  MRN:  [id number]      Admission Summary:     Romaine Meza is a 62 y.o. female with past medical history significant for diabetes mellitus type 2, obesity, CKD, history of right hydronephrosis and ureter multiple stents placement presented to the emergency room with right flank and hematuria. Patient has been experiencing urinary symptoms and flank pain for several days now. She has been in the emergency multiple times but discharged home with antibiotic therapy. She has tried amoxicillin, Keflex and Cipro without improvement of his symptoms. It was not until yesterday that she started noticing blood in the urine for which she came to the emergency room. In the ED it was noted that her ureter stent was coming out through her urethra. She had a CT scan of the abdomen that showed ureteral stent misplacement as well as enlargement of the right kidney. She was found to have a leukocytosis, be febrile and hypotensive. Received Ceftriaxone in the ED. Urology notified by ED of admission. Admission requested for UTI and hematuria.      Interval history / Subjective:     She said she feels better, urine is clear     Assessment & Plan:     Sepsis   -Patient presents with leukocytosis, tachycardia, hypotension and fever meeting sepsis criteria  -Sepsis secondary to UTI, blood cultures no growth so far  -continue rocephin, IVF  -Recheck urinalysis today as patient complaining of still burning sensation    Hematuria   -likely secondary to UTI and stent displacement   -Status post removal of old stent and replacement of right stent  -on ditropan  -urine is cleared, off CBI  -continue prn morphine, tylenol  -Urology following    Acute blood loss anemia   -due to hematuria  -trending down, Hgb 6.0 7/22, patient is [place] and doesn't receive blood transfusion   -on iv iron sucrose, epoetin  -monitor H/H    DAPHNE on CKD stage V  -creatinine trending down  -continue IVF, avoid nephrotoxin   -monitor renal function  -Nephrology following    Diabetes   -At baseline controlled with hemoglobin A1c of 6.6  -continue sliding scale and monitor blood sugars    Obesity  -Outpatient follow-up for diet and weight loss program    Debility  -bed bound    Moisture associated skin injury to bilateral gluteal fold  -continue wound care per wound care nurse     Code status: DNR  DVT prophylaxis: SCD    Care Plan discussed with: Patient/Family and Nurse  Anticipated Disposition: Home w/Family  Anticipated Discharge: 24 hours to 48 hours     Hospital Problems  Date Reviewed: 7/18/2020          Codes Class Noted POA    Hematuria ICD-10-CM: R31.9  ICD-9-CM: 599.70  7/17/2020 Unknown                Vital Signs:    Last 24hrs VS reviewed since prior progress note. Most recent are:  Visit Vitals  BP 96/72 (BP 1 Location: Left arm, BP Patient Position: At rest)   Pulse 87   Temp 98.3 °F (36.8 °C)   Resp 18   Ht 5' 4\" (1.626 m)   Wt 149.1 kg (328 lb 10.9 oz)   SpO2 97%   Breastfeeding No   BMI 56.42 kg/m²         Intake/Output Summary (Last 24 hours) at 7/24/2020 1301  Last data filed at 7/24/2020 1126  Gross per 24 hour   Intake 350 ml   Output 3800 ml   Net -3450 ml        Physical Examination:             Constitutional:  No acute distress, cooperative, pleasant    ENT:  Oral mucosa moist, oropharynx benign. Resp:  CTA bilaterally. No wheezing/rhonchi/rales. No accessory muscle use   CV:  Regular rhythm, normal rate, no murmurs, gallops, rubs    GI:  Soft, non distended, non tender. normoactive bowel sounds, no hepatosplenomegaly     Musculoskeletal:  No edema,      Neurologic:  Moves all extremities, motor UE 5/5, LE 4/5. AAOx3, CN II-XII reviewed     Skin:  Hypopigmented skin  area bilateral gluteal fold       Data Review:    Review and/or order of clinical lab test  Review and/or order of tests in the radiology section of CPT  Review and/or order of tests in the medicine section of CPT      Labs:     Recent Labs     07/24/20 0225 07/23/20  0300   WBC 14.1* 14.1*   HGB 6.4* 6.0*   HCT 22.5* 21.0*    275     Recent Labs     07/24/20 0225 07/23/20 0300 07/22/20  0330    139 138   K 4.3 4.0 3.8   * 109* 107   CO2 21 21 24   BUN 60* 68* 73*   CREA 3.84* 4.22* 4.60*   * 129* 148*   CA 8.4* 8.0* 8.0*   MG 1.9 1.9 1.9   PHOS 3.3 3.4 2.9     Recent Labs     07/24/20 0225 07/23/20 0300 07/22/20  0330   ALT 9* 8* 9*   AP 52 50 48   TBILI 0.2 0.2 0.1*   TP 6.7 6.3* 6.2*   ALB 2.2* 2.1* 2.0*   GLOB 4.5* 4.2* 4.2*     No results for input(s): INR, PTP, APTT, INREXT, INREXT in the last 72 hours. No results for input(s): FE, TIBC, PSAT, FERR in the last 72 hours. No results found for: FOL, RBCF   No results for input(s): PH, PCO2, PO2 in the last 72 hours. No results for input(s): CPK, CKNDX, TROIQ in the last 72 hours.     No lab exists for component: CPKMB  No results found for: CHOL, CHOLX, CHLST, CHOLV, HDL, HDLP, LDL, LDLC, DLDLP, TGLX, TRIGL, TRIGP, CHHD, CHHDX  Lab Results   Component Value Date/Time    Glucose (POC) 171 (H) 07/24/2020 10:57 AM    Glucose (POC) 134 (H) 07/24/2020 06:13 AM    Glucose (POC) 136 (H) 07/23/2020 09:17 PM    Glucose (POC) 119 (H) 07/23/2020 04:49 PM    Glucose (POC) 123 (H) 07/23/2020 11:03 AM     Lab Results   Component Value Date/Time    Color RED 07/17/2020 08:08 PM    Appearance TURBID (A) 07/17/2020 08:08 PM    Specific gravity 1.020 07/17/2020 08:08 PM    pH (UA) 6.5 07/17/2020 08:08 PM    Protein >300 (A) 07/17/2020 08:08 PM    Glucose Negative 07/17/2020 08:08 PM    Ketone Negative 07/17/2020 08:08 PM    Bilirubin Negative 07/17/2020 08:08 PM    Urobilinogen 0.2 07/17/2020 08:08 PM    Nitrites Negative 07/17/2020 08:08 PM    Leukocyte Esterase MODERATE (A) 07/17/2020 08:08 PM    Epithelial cells FEW 07/17/2020 08:08 PM    Bacteria Negative 07/17/2020 08:08 PM    WBC >100 (H) 07/17/2020 08:08 PM    RBC >100 (H) 07/17/2020 08:08 PM         Medications Reviewed:     Current Facility-Administered Medications   Medication Dose Route Frequency    epoetin frieda-epbx (RETACRIT) injection 20,000 Units  20,000 Units SubCUTAneous Q7D    0.9% sodium chloride infusion  75 mL/hr IntraVENous CONTINUOUS    traMADoL (ULTRAM) tablet 50 mg  50 mg Oral Q8H PRN    fentaNYL citrate (PF) injection 25 mcg  25 mcg IntraVENous Q4H PRN    lidocaine (URO-JET/ GLYDO) 2 % jelly   Urethral DAILY PRN    glucose chewable tablet 16 g  4 Tab Oral PRN    glucagon (GLUCAGEN) injection 1 mg  1 mg IntraMUSCular PRN    dextrose 10% infusion 0-250 mL  0-250 mL IntraVENous PRN    pantoprazole (PROTONIX) tablet 40 mg  40 mg Oral DAILY    acetaminophen (TYLENOL) tablet 500 mg  500 mg Oral Q6H PRN    cefTRIAXone (ROCEPHIN) 1 g in 0.9% sodium chloride (MBP/ADV) 50 mL  1 g IntraVENous Q24H    opium-belladonna (B&O 15-A) 16.2-30 mg suppository 1 Suppository  1 Suppository Rectal Q8H PRN    opium-belladonna (B&O 15-A) 16.2-30 mg suppository 1 Suppository  1 Suppository Rectal Q6H PRN    oxybutynin chloride XL (DITROPAN XL) tablet 10 mg  10 mg Oral DAILY    morphine injection 2 mg  2 mg IntraVENous Q4H PRN    insulin lispro (HUMALOG) injection   SubCUTAneous AC&HS    sodium chloride (NS) flush 5-10 mL  5-10 mL IntraVENous PRN     ______________________________________________________________________  EXPECTED LENGTH OF STAY: 5d 12h  ACTUAL LENGTH OF STAY:          5                 Tia Dee MD

## 2020-07-24 NOTE — PROGRESS NOTES
Patient name: Malinda Bradshaw  MRN: [id number]    Nephrology Progress note:    Assessment:  DAPHNE on CKD-3/4?: Cr finally showing improvement over the last few days-> Cr down to 3.8mg/dl after showing fluctuations s/p right ureteral stent replacement. Baseline Cr? Chronic obstructive uropathy/Atrophic left kidney/DM nephropathy. Followed by Dr. Niki Gates poor outpt follow up. Last seen in 2015.     Chronic right hydronephrosis: d0nlqer stent exchange. Missed her last one due to COVID pandemic     Metabolic acidosis: NAGMA-> better with IV Bicarb     UTI     Anemia: Hgb sub-optimal. [employer]. IV Iron/CATHIE-> slightly better     DM2: HgbA1c 6.6     Chronic sacral decubiti    Plan/Recommendations:  Ct IV NS at 75cc/hr  Finished IV Venofer  Retacrit  IV Abx  Strict I/Os, avoid nephrotoxins  AM labs      Subjective:  No events overnight. Feels well. UOP excellent. Reports improving dysuria.  No more hematuria    ROS:   No nausea, no vomiting  No chest pain, no shortness of breath    Exam:  Visit Vitals  /72 (BP 1 Location: Left arm, BP Patient Position: At rest)   Pulse 82   Temp 98.1 °F (36.7 °C)   Resp 18   Ht 5' 4\" (1.626 m)   Wt 149.1 kg (328 lb 10.9 oz)   SpO2 98%   Breastfeeding No   BMI 56.42 kg/m²     Wt Readings from Last 3 Encounters:   No data found for Wt       Intake/Output Summary (Last 24 hours) at 7/24/2020 1631  Last data filed at 7/24/2020 1504  Gross per 24 hour   Intake 700 ml   Output 3750 ml   Net -3050 ml       Gen: NAD  HEENT: AT/NC  Lungs/Chest wall: Clear. No accessory muscle use. Cardiovascular: Regular rate, normal rhythm. Abdomen/: Soft,obese, large abd hernia. [employer]  Ext:  No peripheral edema  CNS: alert and awake.  Answers appropriately      Current Facility-Administered Medications   Medication Dose Route Frequency Last Dose    morphine injection 2 mg  2 mg IntraVENous Q4H PRN      epoetin frieda-epbx (RETACRIT) injection 20,000 Units  20,000 Units SubCUTAneous Q7D 20,000 Units at 07/22/20 1224    0.9% sodium chloride infusion  75 mL/hr IntraVENous CONTINUOUS 75 mL/hr at 07/24/20 0903    traMADoL (ULTRAM) tablet 50 mg  50 mg Oral Q8H PRN 50 mg at 07/23/20 1949    fentaNYL citrate (PF) injection 25 mcg  25 mcg IntraVENous Q4H PRN 25 mcg at 07/19/20 0505    lidocaine (URO-JET/ GLYDO) 2 % jelly   Urethral DAILY PRN      glucose chewable tablet 16 g  4 Tab Oral PRN      glucagon (GLUCAGEN) injection 1 mg  1 mg IntraMUSCular PRN      dextrose 10% infusion 0-250 mL  0-250 mL IntraVENous PRN      pantoprazole (PROTONIX) tablet 40 mg  40 mg Oral DAILY 40 mg at 07/24/20 0900    acetaminophen (TYLENOL) tablet 500 mg  500 mg Oral Q6H  mg at 07/24/20 0859    cefTRIAXone (ROCEPHIN) 1 g in 0.9% sodium chloride (MBP/ADV) 50 mL  1 g IntraVENous Q24H 1 g at 07/23/20 2124    opium-belladonna (B&O 15-A) 16.2-30 mg suppository 1 Suppository  1 Suppository Rectal Q8H PRN 1 Suppository at 07/18/20 1900    oxybutynin chloride XL (DITROPAN XL) tablet 10 mg  10 mg Oral DAILY 10 mg at 07/24/20 0859    insulin lispro (HUMALOG) injection   SubCUTAneous AC&HS 2 Units at 07/24/20 1124    sodium chloride (NS) flush 5-10 mL  5-10 mL IntraVENous PRN         Labs/Data:    Lab Results   Component Value Date/Time    WBC 14.1 (H) 07/24/2020 02:25 AM    HGB 6.4 (L) 07/24/2020 02:25 AM    HCT 22.5 (L) 07/24/2020 02:25 AM    PLATELET 490 86/35/3604 02:25 AM    MCV 97.0 07/24/2020 02:25 AM       Lab Results   Component Value Date/Time    Sodium 140 07/24/2020 02:25 AM Potassium 4.3 07/24/2020 02:25 AM    Chloride 112 (H) 07/24/2020 02:25 AM    CO2 21 07/24/2020 02:25 AM    Anion gap 7 07/24/2020 02:25 AM    Glucose 126 (H) 07/24/2020 02:25 AM    BUN 60 (H) 07/24/2020 02:25 AM    Creatinine 3.84 (H) 07/24/2020 02:25 AM    BUN/Creatinine ratio 16 07/24/2020 02:25 AM    GFR est AA 15 (L) 07/24/2020 02:25 AM    GFR est non-AA 12 (L) 07/24/2020 02:25 AM    Calcium 8.4 (L) 07/24/2020 02:25 AM       Patient seen and examined. Chart reviewed. Labs, data and other pertinent notes reviewed in last 24 hrs.     Discussed with patient    Signed by:  Charissa Joe MD  [id number]

## 2020-07-25 LAB
ANION GAP SERPL CALC-SCNC: 8 MMOL/L (ref 5–15)
APPEARANCE UR: ABNORMAL
BACTERIA URNS QL MICRO: ABNORMAL /HPF
BILIRUB UR QL: NEGATIVE
BUN SERPL-MCNC: 55 MG/DL (ref 6–20)
BUN/CREAT SERPL: 15 (ref 12–20)
CALCIUM SERPL-MCNC: 8.5 MG/DL (ref 8.5–10.1)
CHLORIDE SERPL-SCNC: 115 MMOL/L (ref 97–108)
CO2 SERPL-SCNC: 22 MMOL/L (ref 21–32)
COLOR UR: ABNORMAL
CREAT SERPL-MCNC: 3.67 MG/DL (ref 0.55–1.02)
EPITH CASTS URNS QL MICRO: ABNORMAL /LPF
ERYTHROCYTE [DISTWIDTH] IN BLOOD BY AUTOMATED COUNT: 15.4 % (ref 11.5–14.5)
GLUCOSE BLD STRIP.AUTO-MCNC: 124 MG/DL (ref 65–100)
GLUCOSE BLD STRIP.AUTO-MCNC: 133 MG/DL (ref 65–100)
GLUCOSE BLD STRIP.AUTO-MCNC: 137 MG/DL (ref 65–100)
GLUCOSE BLD STRIP.AUTO-MCNC: 137 MG/DL (ref 65–100)
GLUCOSE SERPL-MCNC: 110 MG/DL (ref 65–100)
GLUCOSE UR STRIP.AUTO-MCNC: NEGATIVE MG/DL
HCT VFR BLD AUTO: 22.7 % (ref 35–47)
HGB BLD-MCNC: 6.5 G/DL (ref 11.5–16)
HGB UR QL STRIP: ABNORMAL
KETONES UR QL STRIP.AUTO: NEGATIVE MG/DL
LEUKOCYTE ESTERASE UR QL STRIP.AUTO: ABNORMAL
MAGNESIUM SERPL-MCNC: 2 MG/DL (ref 1.6–2.4)
MCH RBC QN AUTO: 28 PG (ref 26–34)
MCHC RBC AUTO-ENTMCNC: 28.6 G/DL (ref 30–36.5)
MCV RBC AUTO: 97.8 FL (ref 80–99)
NITRITE UR QL STRIP.AUTO: NEGATIVE
NRBC # BLD: 0.1 K/UL (ref 0–0.01)
NRBC BLD-RTO: 0.8 PER 100 WBC
PH UR STRIP: 6.5 [PH] (ref 5–8)
PHOSPHATE SERPL-MCNC: 3.2 MG/DL (ref 2.6–4.7)
PLATELET # BLD AUTO: 338 K/UL (ref 150–400)
PMV BLD AUTO: 9.2 FL (ref 8.9–12.9)
POTASSIUM SERPL-SCNC: 4.4 MMOL/L (ref 3.5–5.1)
PROT UR STRIP-MCNC: 30 MG/DL
RBC # BLD AUTO: 2.32 M/UL (ref 3.8–5.2)
RBC #/AREA URNS HPF: ABNORMAL /HPF (ref 0–5)
SERVICE CMNT-IMP: ABNORMAL
SODIUM SERPL-SCNC: 145 MMOL/L (ref 136–145)
SP GR UR REFRACTOMETRY: 1.01 (ref 1–1.03)
UROBILINOGEN UR QL STRIP.AUTO: 0.2 EU/DL (ref 0.2–1)
WBC # BLD AUTO: 12.5 K/UL (ref 3.6–11)
WBC URNS QL MICRO: >100 /HPF (ref 0–4)

## 2020-07-25 PROCEDURE — 87086 URINE CULTURE/COLONY COUNT: CPT

## 2020-07-25 PROCEDURE — 74011250637 HC RX REV CODE- 250/637: Performed by: NURSE PRACTITIONER

## 2020-07-25 PROCEDURE — 82962 GLUCOSE BLOOD TEST: CPT

## 2020-07-25 PROCEDURE — 74011250637 HC RX REV CODE- 250/637: Performed by: INTERNAL MEDICINE

## 2020-07-25 PROCEDURE — 97535 SELF CARE MNGMENT TRAINING: CPT

## 2020-07-25 PROCEDURE — 80048 BASIC METABOLIC PNL TOTAL CA: CPT

## 2020-07-25 PROCEDURE — 85027 COMPLETE CBC AUTOMATED: CPT

## 2020-07-25 PROCEDURE — 83735 ASSAY OF MAGNESIUM: CPT

## 2020-07-25 PROCEDURE — 65660000000 HC RM CCU STEPDOWN

## 2020-07-25 PROCEDURE — 87077 CULTURE AEROBIC IDENTIFY: CPT

## 2020-07-25 PROCEDURE — 74011250637 HC RX REV CODE- 250/637: Performed by: UROLOGY

## 2020-07-25 PROCEDURE — 84100 ASSAY OF PHOSPHORUS: CPT

## 2020-07-25 PROCEDURE — 87186 SC STD MICRODIL/AGAR DIL: CPT

## 2020-07-25 PROCEDURE — 36415 COLL VENOUS BLD VENIPUNCTURE: CPT

## 2020-07-25 PROCEDURE — 81001 URINALYSIS AUTO W/SCOPE: CPT

## 2020-07-25 RX ADMIN — TRAMADOL HYDROCHLORIDE 50 MG: 50 TABLET, FILM COATED ORAL at 17:37

## 2020-07-25 RX ADMIN — PANTOPRAZOLE SODIUM 40 MG: 40 TABLET, DELAYED RELEASE ORAL at 11:04

## 2020-07-25 RX ADMIN — TRAMADOL HYDROCHLORIDE 50 MG: 50 TABLET, FILM COATED ORAL at 07:08

## 2020-07-25 RX ADMIN — OXYBUTYNIN CHLORIDE 10 MG: 5 TABLET, EXTENDED RELEASE ORAL at 11:04

## 2020-07-25 NOTE — PROGRESS NOTES
[facility]                                                                                          Hospitalist Progress Note  Katie Pinto NP  Answering service: [phone] OR [phone] from in house phone        Date of Service:  2020  NAME:  Shawanda Coon  :  1960  MRN:  [id number]      Admission Summary:   Laura Saavedra a 62 y.o. female with past medical history significant for diabetes mellitus type 2, obesity, CKD, history of kidney stones, right hydronephrosis and ureter multiple stents placement presented to the emergency room with right flank and hematuria.  Patient has been experiencing urinary symptoms and flank pain for several days now. Rupert Kumar has been in the emergency multiple times but discharged home with antibiotic therapy. Rupert Kumar has tried amoxicillin, Keflex and Cipro without improvement of his symptoms.  It was not until yesterday that she started noticing blood in the urine for which she came to the emergency room.  In the ED it was noted that her ureter stent was coming out through her urethra.  She had a CT scan of the abdomen that showed ureteral stent misplacement as well as enlargement of the right kidney.  She was found to have a leukocytosis, febrile and hypotensive. Received Ceftriaxone in the ED. Urology notified by ED of admission and patient admitted for UTI and hematuria. Interval history / Subjective: Follow-up for Sepsis, DAPHNE on CKD V, IDDM, Morbid Obesity.   -Patient seen and examined, c/o bladder spasms however states better with ditropan and improved since admit to hospital.     Assessment & Plan:     Sepsis: presents with leukocytosis, tachycardia, hypotension and fever meeting sepsis criteria.   -Sepsis secondary to UTI  -blood cultures: NGTD  -continue rocephin, IVF  -Urinalysis, culture     Hematuria: likely secondary to UTI and malpositioned stent and gross hematuria plus end-stage bladder and urethral abnormality: Urology.  -Status post removal of old stent and replacement of right stent  -on ditropan  -urine has cleared, off CBI  -continue prn morphine, tylenol  -Urology: 7/18 Cystoscopy, right ureteral stent exchange, clot evacuation, catheter placement.     Acute blood loss anemia:   -due to hematuria  -trend: patient is [employer]ism and doesn't receive blood transfusion   -on IV iron sucrose, epoetin  -monitor H/H  -Hgb 6.5 on 7/25     DAPHNE on CKD stage V:  -creatinine trending down  -continue IVF, avoid nephrotoxin   -monitor renal function  -Nephrology following     Diabetes   -At baseline controlled with hemoglobin A1c of 6.6  -continue sliding scale and monitor blood sugars     Obesity  -Outpatient follow-up for diet and weight loss program     Debility  -bed bound     Moisture associated skin injury to bilateral gluteal fold  -continue wound care per wound care nurse       DVTppx: SCDs  Code Status: DNR  Diet: Cardiac  Activity: OOB to chair TID and PRN  Discharge: TBD       Care Plan discussed with: Patient/Family and Nurse  Anticipated Disposition: Home w/Family  Anticipated Discharge: 24 hours to 48 hours     Hospital Problems  Date Reviewed: 7/18/2020          Codes Class Noted POA    Hematuria ICD-10-CM: R31.9  ICD-9-CM: 599.70  7/17/2020 Unknown                Review of Systems:   A comprehensive review of systems was negative except for that written in the HPI. Vital Signs:    Last 24hrs VS reviewed since prior progress note.  Most recent are:  Visit Vitals  /82 (BP 1 Location: Left arm, BP Patient Position: At rest)   Pulse 82   Temp 98.3 °F (36.8 °C)   Resp 16   Ht 5' 4\" (1.626 m)   Wt 149.1 kg (328 lb 10.9 oz)   SpO2 98%   Breastfeeding No   BMI 56.42 kg/m²         Intake/Output Summary (Last 24 hours) at 7/25/2020 1145  Last data filed at 7/25/2020 1122  Gross per 24 hour   Intake 650 ml   Output 1850 ml   Net -1200 ml        Physical Examination:       Constitutional:  No acute distress, cooperative, pleasant    ENT:  Oral mucosa moist, oropharynx benign. Resp:  CTA bilaterally. No wheezing/rhonchi/rales. No accessory muscle use   CV:  Regular rhythm, normal rate, no murmurs, gallops, rubs    GI:  Soft, non distended, non tender, morbidly obese, bowel sounds present, no  hepatosplenomegaly     Musculoskeletal:  No edema, moves all extremities. Neurologic:  Moves all extremities, motor UE 5/5, LE 4/5. AAOx3, CN II-XII reviewed                         Skin:  Hypopigmented skin  area bilateral gluteal fold         Data Review:    Review and/or order of clinical lab test      Labs:     Recent Labs     07/25/20 0450 07/24/20 0225   WBC 12.5* 14.1*   HGB 6.5* 6.4*   HCT 22.7* 22.5*    315     Recent Labs     07/25/20 0450 07/24/20 0225 07/23/20  0300    140 139   K 4.4 4.3 4.0   * 112* 109*   CO2 22 21 21   BUN 55* 60* 68*   CREA 3.67* 3.84* 4.22*   * 126* 129*   CA 8.5 8.4* 8.0*   MG 2.0 1.9 1.9   PHOS 3.2 3.3 3.4     Recent Labs     07/24/20 0225 07/23/20  0300   ALT 9* 8*   AP 52 50   TBILI 0.2 0.2   TP 6.7 6.3*   ALB 2.2* 2.1*   GLOB 4.5* 4.2*     No results for input(s): INR, PTP, APTT, INREXT in the last 72 hours. No results for input(s): FE, TIBC, PSAT, FERR in the last 72 hours. No results found for: FOL, RBCF   No results for input(s): PH, PCO2, PO2 in the last 72 hours. No results for input(s): CPK, CKNDX, TROIQ in the last 72 hours.     No lab exists for component: CPKMB  No results found for: CHOL, CHOLX, CHLST, CHOLV, HDL, HDLP, LDL, LDLC, DLDLP, TGLX, TRIGL, TRIGP, CHHD, CHHDX  Lab Results   Component Value Date/Time    Glucose (POC) 124 (H) 07/25/2020 06:40 AM    Glucose (POC) 152 (H) 07/24/2020 10:05 PM    Glucose (POC) 119 (H) 07/24/2020 04:30 PM    Glucose (POC) 171 (H) 07/24/2020 10:57 AM    Glucose (POC) 134 (H) 07/24/2020 06:13 AM     Lab Results   Component Value Date/Time    Color RED 07/17/2020 08:08 PM    Appearance TURBID (A) 07/17/2020 08:08 PM    Specific gravity 1.020 07/17/2020 08:08 PM    pH (UA) 6.5 07/17/2020 08:08 PM    Protein >300 (A) 07/17/2020 08:08 PM    Glucose Negative 07/17/2020 08:08 PM    Ketone Negative 07/17/2020 08:08 PM    Bilirubin Negative 07/17/2020 08:08 PM    Urobilinogen 0.2 07/17/2020 08:08 PM    Nitrites Negative 07/17/2020 08:08 PM    Leukocyte Esterase MODERATE (A) 07/17/2020 08:08 PM    Epithelial cells FEW 07/17/2020 08:08 PM    Bacteria Negative 07/17/2020 08:08 PM    WBC >100 (H) 07/17/2020 08:08 PM    RBC >100 (H) 07/17/2020 08:08 PM         Medications Reviewed:     Current Facility-Administered Medications   Medication Dose Route Frequency    morphine injection 2 mg  2 mg IntraVENous Q4H PRN    epoetin frieda-epbx (RETACRIT) injection 20,000 Units  20,000 Units SubCUTAneous Q7D    0.9% sodium chloride infusion  75 mL/hr IntraVENous CONTINUOUS    traMADoL (ULTRAM) tablet 50 mg  50 mg Oral Q8H PRN    fentaNYL citrate (PF) injection 25 mcg  25 mcg IntraVENous Q4H PRN    lidocaine (URO-JET/ GLYDO) 2 % jelly   Urethral DAILY PRN    glucose chewable tablet 16 g  4 Tab Oral PRN    glucagon (GLUCAGEN) injection 1 mg  1 mg IntraMUSCular PRN    dextrose 10% infusion 0-250 mL  0-250 mL IntraVENous PRN    pantoprazole (PROTONIX) tablet 40 mg  40 mg Oral DAILY    acetaminophen (TYLENOL) tablet 500 mg  500 mg Oral Q6H PRN    opium-belladonna (B&O 15-A) 16.2-30 mg suppository 1 Suppository  1 Suppository Rectal Q8H PRN    oxybutynin chloride XL (DITROPAN XL) tablet 10 mg  10 mg Oral DAILY    insulin lispro (HUMALOG) injection   SubCUTAneous AC&HS    sodium chloride (NS) flush 5-10 mL  5-10 mL IntraVENous PRN     ______________________________________________________________________  EXPECTED LENGTH OF STAY: 5d 12h  ACTUAL LENGTH OF STAY:          6                 Kaylan Balderrama NP

## 2020-07-25 NOTE — PROGRESS NOTES
Problem: Self Care Deficits Care Plan (Adult)  Goal: *Acute Goals and Plan of Care (Insert Text)  Description:   FUNCTIONAL STATUS PRIOR TO ADMISSION:  Patient was last modified independent for ADLs/ ambulatory with RW in December 2019. Patient reports she has been bedbound since d/t wounds. Patient reports she has not even been able to sit EOB at home. Performing all ADLs at bed-level with assistance for bathing, dressing, and toileting. HOME SUPPORT: Patient lived with sister to provide assistance    Occupational Therapy Goals  Initiated 7/20/2020  1. Patient will perform lower body dressing using AE PRN with moderate assistance  within 7 day(s). 2.  Patient will perform bathing with minimal assistance  within 7 day(s). 4.  Patient will perform toilet transfers with moderate assistance  within 7 day(s). 5.  Patient will perform all aspects of toileting with moderate assistance  within 7 day(s). 6.  Patient will participate in upper extremity therapeutic exercise/activities with supervision/set-up for 10 minutes within 7 day(s). Outcome: Progressing Towards Goal   OCCUPATIONAL THERAPY TREATMENT  Patient: Macey Sandoval (89 y.o. female)  Date: 7/25/2020  Diagnosis: Hematuria [R31.9]  Hematuria [R31.9]   <principal problem not specified>  Procedure(s) (LRB):  CYSTOSCOPY RIGHT URETERAL STENT EXCHANGE (Right) 7 Days Post-Op  Precautions: Fall  Chart, occupational therapy assessment, plan of care, and goals were reviewed. ASSESSMENT  Patient continues with skilled OT services and is progressing towards goals. Patient demonstrating understanding of theraband and isometric exercises this AM. Participating in self care with set up for UEs, mod to max assist perineal and total assist LEs. Noted Hgb of 6.5, but patient does not accept blood transfusions. Participation impacted by body habitus, large left abdominal hernia, impaired reach to LEs, incontinence of urine, buttock wounds.       Current Level of Function Impacting Discharge (ADLs): self care with set up for UEs, mod to max assist perineal and total assist LEs, toileting with total assist    Other factors to consider for discharge: lives with sister         PLAN :  Patient continues to benefit from skilled intervention to address the above impairments. Continue treatment per established plan of care. to address goals. Recommend with staff: UE self care with set up overall, LE self care with max to total assist, toileting with total assist     Recommend next OT session: LE self care, EOB? Recommendation for discharge: (in order for the patient to meet his/her long term goals)  Occupational therapy at least 2 days/week in the home AND ensure assist and/or supervision for safety with mobility and self care    This discharge recommendation:  Has been made in collaboration with the attending provider and/or case management    IF patient discharges home will need the following DME: none       SUBJECTIVE:   Patient stated I like to do as much as I can.     OBJECTIVE DATA SUMMARY:   Cognitive/Behavioral Status:     Orientation Level: Oriented X4  Cognition: Appropriate decision making; Appropriate for age attention/concentration; Appropriate safety awareness; Follows commands  Perception: Appears intact  Perseveration: No perseveration noted  Safety/Judgement: Awareness of environment    Functional Mobility and Transfers for ADLs:  Bed Mobility:  Rolling: Modified independent;Assist x1      ADL Intervention:       Grooming  Grooming Assistance: Set-up  Position Performed: Long sitting on bed  Washing Face: Modified independent    Upper Body Bathing  Bathing Assistance: Set-up  Position Performed: Long sitting on bed    Lower Body Bathing  Perineal  : Maximum assistance  Position Performed: Other(long sit)  Cues:  Other(comment)(physical assist)  Lower Body : Total assistance (dependent)  Position Performed: Long sitting on bed  Cues: Physical assistance    Upper [address]: Moderate assistance(IV)         Toileting  Bladder Hygiene: Total assistance (dependent)  Adaptive Equipment: Other (comment)(; incontinence)    Cognitive Retraining  Safety/Judgement: Awareness of environment    Therapeutic Exercises:   Demonstrated understanding of isometric and theraband exercises. Performed theraband exercises for triceps extension for 1 set of 10 reps bilaterally without cues. Activity Tolerance:   Fair  Please refer to the flowsheet for vital signs taken during this treatment. After treatment patient left in no apparent distress:   Patient positioned in left sidelying for pressure relief and Call bell within reach    COMMUNICATION/COLLABORATION:   The patients plan of care was discussed with: Registered nurse.      AGUS Dewitt  Time Calculation: 38 mins

## 2020-07-25 NOTE — PROGRESS NOTES
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 Baseline Cr? Chronic obstructive uropathy/Atrophic left kidney/DM nephropathy. Followed by Dr. Missael david outpt follow up. Last seen in 2015.     Chronic right hydronephrosis: [id number] stent exchange. Missed her last one due to COVID pandemic     Metabolic acidosis: NAGMA-> better with IV Bicarb     UTI     Anemia: Hgb sub-optimal. Gnosticism. IV Iron/CATHIE     DM2: HgbA1c 6.6     Chronic sacral decubiti    Plan/Recommendations:  Very anemic,  Renal function better  Cathie. .. Subjective:  No events overnight.  Exam:  Visit Vitals  /82 (BP 1 Location: Left arm, BP Patient Position: At rest)   Pulse 90   Temp 98.1 °F (36.7 °C)   Resp 13   Ht 5' 4\" (1.626 m)   Wt 149.1 kg (328 lb 10.9 oz)   SpO2 97%   Breastfeeding No   BMI 56.42 kg/m²     Wt Readings from Last 3 Encounters:   No data found for Wt       Intake/Output Summary (Last 24 hours) at 7/25/2020 1231  Last data filed at 7/25/2020 1122  Gross per 24 hour   Intake 650 ml   Output 1850 ml   Net -1200 ml       Gen: NAD           Labs/Data:    Lab Results   Component Value Date/Time    WBC 12.5 (H) 07/25/2020 04:50 AM    HGB 6.5 (L) 07/25/2020 04:50 AM    HCT 22.7 (L) 07/25/2020 04:50 AM    PLATELET 765 67/68/9553 04:50 AM    MCV 97.8 07/25/2020 04:50 AM       Lab Results Component Value Date/Time    Sodium 145 07/25/2020 04:50 AM    Potassium 4.4 07/25/2020 04:50 AM    Chloride 115 (H) 07/25/2020 04:50 AM    CO2 22 07/25/2020 04:50 AM    Anion gap 8 07/25/2020 04:50 AM    Glucose 110 (H) 07/25/2020 04:50 AM    BUN 55 (H) 07/25/2020 04:50 AM    Creatinine 3.67 (H) 07/25/2020 04:50 AM    BUN/Creatinine ratio 15 07/25/2020 04:50 AM    GFR est AA 15 (L) 07/25/2020 04:50 AM    GFR est non-AA 13 (L) 07/25/2020 04:50 AM    Calcium 8.5 07/25/2020 04:50 AM

## 2020-07-25 NOTE — PROGRESS NOTES
Spiritual Care Assessment/Progress Note  [facility]      NAME: Joselo Purcell      MRN: [id number]  AGE: 62 y.o.  SEX: female  Zoroastrianism Affiliation: Ez witness   Language: English     7/25/2020     Total Time (in minutes): 6     Spiritual Assessment begun in [facility] [phone] through conversation with:         [x]Patient        [] Family    [] Friend(s)        Reason for Consult: Initial/Spiritual assessment, patient floor     Spiritual beliefs: (Please include comment if needed)     [x] Identifies with a elizabeth tradition:         [x] Supported by a elizabeth community:            [] Claims no spiritual orientation:           [] Seeking spiritual identity:                [] Adheres to an individual form of spirituality:           [] Not able to assess:                           Identified resources for coping:      [x] Prayer                               [] Music                  [] Guided Imagery     [x] Family/friends                 [] Pet visits     [] Devotional reading                         [] Unknown     [] Other:                                               Interventions offered during this visit: (See comments for more details)    Patient Interventions: Affirmation of emotions/emotional suffering, Affirmation of elizabeth, Normalization of emotional/spiritual concerns, Prayer (assurance of)           Plan of Care:     [x] Support spiritual and/or cultural needs    [] Support AMD and/or advance care planning process      [] Support grieving process   [] Coordinate Rites and/or Rituals    [] Coordination with community clergy   [] No spiritual needs identified at this time   [] Detailed Plan of Care below (See Comments)  [] Make referral to Music Therapy  [] Make referral to Pet Therapy     [] Make referral to Addiction services  [] Make referral to [facility]  [] Make referral to Spiritual Care Partner  [] No future visits requested        [x] Follow up visits as needed     Comments:  for initial visit. Pt welcomed visit and was appreciative of  support. Pt shared that she is a Jehovah Witness and its a little different. Let her know of  support and availability.  provided pastoral listening, support and assurance of prayer. Pt welcomed to continued prayer.  follow up as needed.      [address] [place], MACE   [phone]

## 2020-07-26 LAB
ALBUMIN SERPL-MCNC: 2.2 G/DL (ref 3.5–5)
ANION GAP SERPL CALC-SCNC: 7 MMOL/L (ref 5–15)
BUN SERPL-MCNC: 54 MG/DL (ref 6–20)
BUN/CREAT SERPL: 15 (ref 12–20)
CALCIUM SERPL-MCNC: 8.3 MG/DL (ref 8.5–10.1)
CHLORIDE SERPL-SCNC: 115 MMOL/L (ref 97–108)
CO2 SERPL-SCNC: 22 MMOL/L (ref 21–32)
CREAT SERPL-MCNC: 3.55 MG/DL (ref 0.55–1.02)
ERYTHROCYTE [DISTWIDTH] IN BLOOD BY AUTOMATED COUNT: 15.5 % (ref 11.5–14.5)
GLUCOSE BLD STRIP.AUTO-MCNC: 101 MG/DL (ref 65–100)
GLUCOSE BLD STRIP.AUTO-MCNC: 119 MG/DL (ref 65–100)
GLUCOSE BLD STRIP.AUTO-MCNC: 120 MG/DL (ref 65–100)
GLUCOSE BLD STRIP.AUTO-MCNC: 152 MG/DL (ref 65–100)
GLUCOSE SERPL-MCNC: 123 MG/DL (ref 65–100)
HCT VFR BLD AUTO: 22.4 % (ref 35–47)
HGB BLD-MCNC: 6.3 G/DL (ref 11.5–16)
MCH RBC QN AUTO: 28 PG (ref 26–34)
MCHC RBC AUTO-ENTMCNC: 28.1 G/DL (ref 30–36.5)
MCV RBC AUTO: 99.6 FL (ref 80–99)
NRBC # BLD: 0.09 K/UL (ref 0–0.01)
NRBC BLD-RTO: 0.7 PER 100 WBC
PHOSPHATE SERPL-MCNC: 3.3 MG/DL (ref 2.6–4.7)
PLATELET # BLD AUTO: 374 K/UL (ref 150–400)
PMV BLD AUTO: 9.5 FL (ref 8.9–12.9)
POTASSIUM SERPL-SCNC: 4.4 MMOL/L (ref 3.5–5.1)
RBC # BLD AUTO: 2.25 M/UL (ref 3.8–5.2)
SERVICE CMNT-IMP: ABNORMAL
SODIUM SERPL-SCNC: 144 MMOL/L (ref 136–145)
WBC # BLD AUTO: 12.7 K/UL (ref 3.6–11)

## 2020-07-26 PROCEDURE — 36415 COLL VENOUS BLD VENIPUNCTURE: CPT

## 2020-07-26 PROCEDURE — 74011250637 HC RX REV CODE- 250/637: Performed by: INTERNAL MEDICINE

## 2020-07-26 PROCEDURE — 85027 COMPLETE CBC AUTOMATED: CPT

## 2020-07-26 PROCEDURE — 74011636637 HC RX REV CODE- 636/637: Performed by: NURSE PRACTITIONER

## 2020-07-26 PROCEDURE — 80069 RENAL FUNCTION PANEL: CPT

## 2020-07-26 PROCEDURE — 74011250637 HC RX REV CODE- 250/637: Performed by: NURSE PRACTITIONER

## 2020-07-26 PROCEDURE — 82962 GLUCOSE BLOOD TEST: CPT

## 2020-07-26 PROCEDURE — 74011250637 HC RX REV CODE- 250/637: Performed by: UROLOGY

## 2020-07-26 PROCEDURE — 65270000032 HC RM SEMIPRIVATE

## 2020-07-26 RX ADMIN — TRAMADOL HYDROCHLORIDE 50 MG: 50 TABLET, FILM COATED ORAL at 14:50

## 2020-07-26 RX ADMIN — INSULIN LISPRO 2 UNITS: 100 INJECTION, SOLUTION INTRAVENOUS; SUBCUTANEOUS at 13:19

## 2020-07-26 RX ADMIN — PANTOPRAZOLE SODIUM 40 MG: 40 TABLET, DELAYED RELEASE ORAL at 10:10

## 2020-07-26 RX ADMIN — OXYBUTYNIN CHLORIDE 10 MG: 5 TABLET, EXTENDED RELEASE ORAL at 10:10

## 2020-07-26 RX ADMIN — TRAMADOL HYDROCHLORIDE 50 MG: 50 TABLET, FILM COATED ORAL at 22:29

## 2020-07-26 RX ADMIN — TRAMADOL HYDROCHLORIDE 50 MG: 50 TABLET, FILM COATED ORAL at 02:48

## 2020-07-26 NOTE — PROGRESS NOTES
[address] [facility] Adult  Hospitalist Group                                                                                          Hospitalist Progress Note  Ivett Herrera NP  Answering service: [phone] OR [phone] from in house phone        Date of Service:  2020  NAME:  Inna Hull  :  1960  MRN:  [id number]      Admission Summary:   Vannesa Ward a 62 y.o. female with past medical history significant for diabetes mellitus type 2, obesity, CKD, history of kidney stones, right hydronephrosis and ureter multiple stents placement [id number] stent exchange, missed her last one due to COVID pandemic, presented to the emergency room with right flank pain and hematuria.  Patient has been experiencing urinary symptoms and flank pain for several days now. Antelmo Park has been in the emergency multiple times but discharged home with antibiotic therapy. Antelmo Park has tried amoxicillin, Keflex and Cipro without improvement of his symptoms.  It was not until yesterday that she started noticing blood in the urine for which she came to the emergency room.  In the ED it was noted that her ureter stent was coming out through her urethra.  She had a CT scan of the abdomen that showed ureteral stent misplacement as well as enlargement of the right kidney.  She was found to have a leukocytosis, febrile and hypotensive. Received Ceftriaxone in the ED. Urology notified by ED of admission and patient admitted for UTI and hematuria.        Interval history / Subjective: Follow-up for Sepsis, DAPHNE on CKD V, IDDM, Morbid Obesity.   -Patient seen and examined, no c/o's. Assessment & Plan:     Sepsis: presents with leukocytosis, tachycardia, hypotension and fever meeting sepsis criteria.   -Sepsis secondary to UTI  -blood cultures: NGTD  -continue rocephin, IVF  -Urinalysis, culture     Hematuria: likely secondary to UTI and malpositioned stent and gross hematuria plus end-stage bladder and urethral abnormality: Urology.  -Status post removal of old stent and replacement of right stent  -on ditropan  -urine has cleared, off CBI  -continue prn morphine, tylenol  -Urology: 7/18 Cystoscopy, right ureteral stent exchange, clot evacuation, catheter placement.     Acute blood loss anemia:   -due to hematuria  -trend: patient is Christianity and doesn't receive blood transfusion   -on IV iron sucrose, epoetin  -monitor H/H  -Hgb 6.3 on 7/26     DAPHNE on CKD stage V:  -creatinine trending down  -continue IVF, avoid nephrotoxin   -monitor renal function  -Nephrology following     Diabetes   -At baseline controlled with hemoglobin A1c of 6.6  -continue sliding scale and monitor blood sugars     Obesity  -Outpatient follow-up for diet and weight loss program     Debility  -bed bound     Moisture associated skin injury to bilateral gluteal fold  -continue wound care per wound care nurse         DVTppx: SCDs  Code Status: DNR  Diet: Cardiac  Activity: OOB to chair TID and PRN  Discharge: TBD      Care Plan discussed with: Patient/Family and Nurse  Anticipated Disposition: Home w/Family  Anticipated Discharge: 24 hours to 48 hours     Hospital Problems  Date Reviewed: 7/18/2020          Codes Class Noted POA    Hematuria ICD-10-CM: R31.9  ICD-9-CM: 599.70  7/17/2020 Unknown                Review of Systems:   A comprehensive review of systems was negative except for that written in the HPI. Vital Signs:    Last 24hrs VS reviewed since prior progress note.  Most recent are:  Visit Vitals  /65 (BP 1 Location: Right arm, BP Patient Position: At rest)   Pulse 81   Temp 98.3 °F (36.8 °C)   Resp 16   Ht 5' 4\" (1.626 m)   Wt 149.1 kg (328 lb 10.9 oz)   SpO2 96%   Breastfeeding No   BMI 56.42 kg/m²         Intake/Output Summary (Last 24 hours) at 7/26/2020 1249  Last data filed at 7/26/2020 1008  Gross per 24 hour   Intake 200 ml   Output 4400 ml   Net -4200 ml        Physical Examination:       Constitutional:  No acute distress, cooperative, pleasant    ENT:  Oral mucosa moist.    Resp:  CTA bilaterally. No wheezing/rhonchi/rales. No accessory muscle use   CV:  Regular rhythm, normal rate, no murmurs, gallops, rubs    GI:  Soft, non distended, non tender, morbidly obese, bowel  sounds present, no  hepatosplenomegaly     Musculoskeletal:  No edema, moves all extremities.     [facility], motor UE 5/5, LE 4/5.  AAOx3.                         Skin:  Hypopigmented skin  area bilateral gluteal fold         Data Review:    Review and/or order of clinical lab test      Labs:     Recent Labs     07/26/20 0300 07/25/20 0450   WBC 12.7* 12.5*   HGB 6.3* 6.5*   HCT 22.4* 22.7*    338     Recent Labs     07/26/20 0300 07/25/20 0450 07/24/20 0225    145 140   K 4.4 4.4 4.3   * 115* 112*   CO2 22 22 21   BUN 54* 55* 60*   CREA 3.55* 3.67* 3.84*   * 110* 126*   CA 8.3* 8.5 8.4*   MG  --  2.0 1.9   PHOS 3.3 3.2 3.3     Recent Labs     07/26/20 0300 07/24/20 0225   ALT  --  9*   AP  --  52   TBILI  --  0.2   TP  --  6.7   ALB 2.2* 2.2*   GLOB  --  4.5*     No results for input(s): INR, PTP, APTT, INREXT in the last 72 hours. No results for input(s): FE, TIBC, PSAT, FERR in the last 72 hours. No results found for: FOL, RBCF   No results for input(s): PH, PCO2, PO2 in the last 72 hours. No results for input(s): CPK, CKNDX, TROIQ in the last 72 hours.     No lab exists for component: CPKMB  No results found for: CHOL, CHOLX, CHLST, CHOLV, HDL, HDLP, LDL, LDLC, DLDLP, TGLX, TRIGL, TRIGP, CHHD, CHHDX  Lab Results   Component Value Date/Time    Glucose (POC) 152 (H) 07/26/2020 11:33 AM    Glucose (POC) 120 (H) 07/26/2020 06:36 AM    Glucose (POC) 137 (H) 07/25/2020 10:29 PM    Glucose (POC) 137 (H) 07/25/2020 04:33 PM    Glucose (POC) 133 (H) 07/25/2020 12:11 PM     Lab Results   Component Value Date/Time    Color YELLOW/STRAW 07/25/2020 02:15 PM    Appearance TURBID (A) 07/25/2020 02:15 PM    Specific gravity 1.008 07/25/2020 02:15 PM    Specific gravity 1.020 07/17/2020 08:08 PM    pH (UA) 6.5 07/25/2020 02:15 PM    Protein 30 (A) 07/25/2020 02:15 PM    Glucose Negative 07/25/2020 02:15 PM    Ketone Negative 07/25/2020 02:15 PM    Bilirubin Negative 07/25/2020 02:15 PM    Urobilinogen 0.2 07/25/2020 02:15 PM    Nitrites Negative 07/25/2020 02:15 PM    Leukocyte Esterase LARGE (A) 07/25/2020 02:15 PM    Epithelial cells FEW 07/25/2020 02:15 PM    Bacteria 1+ (A) 07/25/2020 02:15 PM    WBC >100 (H) 07/25/2020 02:15 PM    RBC 5-10 07/25/2020 02:15 PM         Medications Reviewed:     Current Facility-Administered Medications   Medication Dose Route Frequency    morphine injection 2 mg  2 mg IntraVENous Q4H PRN    epoetin frieda-epbx (RETACRIT) injection 20,000 Units  20,000 Units SubCUTAneous Q7D    0.9% sodium chloride infusion  75 mL/hr IntraVENous CONTINUOUS    traMADoL (ULTRAM) tablet 50 mg  50 mg Oral Q8H PRN    fentaNYL citrate (PF) injection 25 mcg  25 mcg IntraVENous Q4H PRN    lidocaine (URO-JET/ GLYDO) 2 % jelly   Urethral DAILY PRN    glucose chewable tablet 16 g  4 Tab Oral PRN    glucagon (GLUCAGEN) injection 1 mg  1 mg IntraMUSCular PRN    dextrose 10% infusion 0-250 mL  0-250 mL IntraVENous PRN    pantoprazole (PROTONIX) tablet 40 mg  40 mg Oral DAILY    acetaminophen (TYLENOL) tablet 500 mg  500 mg Oral Q6H PRN    opium-belladonna (B&O 15-A) 16.2-30 mg suppository 1 Suppository  1 Suppository Rectal Q8H PRN    oxybutynin chloride XL (DITROPAN XL) tablet 10 mg  10 mg Oral DAILY    insulin lispro (HUMALOG) injection   SubCUTAneous AC&HS    sodium chloride (NS) flush 5-10 mL  5-10 mL IntraVENous PRN     ______________________________________________________________________  EXPECTED LENGTH OF STAY: 5d 12h  ACTUAL LENGTH OF STAY:          7                 Kaylan Balderrama NP

## 2020-07-26 NOTE — PROGRESS NOTES
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 Baseline Cr? Chronic obstructive uropathy/Atrophic left kidney/DM nephropathy. Followed by Dr. Jacquie Santiago poor outpt follow up. Last seen in 2015.     Chronic right hydronephrosis: [id number] stent exchange. Missed her last one due to COVID pandemic     Metabolic acidosis: NAGMA-> better with IV Bicarb     UTI     Anemia: Hgb sub-optimal. Zoroastrianism. IV Iron/CATHIE     DM2: HgbA1c 6.6     Chronic sacral decubiti    Plan/Recommendations:  Very anemic,  Renal function better  Cathie. ..     Subjective:  Resting       Visit Vitals  /65 (BP 1 Location: Right arm, BP Patient Position: At rest)   Pulse 81   Temp 98.3 °F (36.8 °C)   Resp 16   Ht 5' 4\" (1.626 m)   Wt 149.1 kg (328 lb 10.9 oz)   SpO2 96%   Breastfeeding No   BMI 56.42 kg/m²     Wt Readings from Last 3 Encounters:   No data found for Wt       Intake/Output Summary (Last 24 hours) at 7/26/2020 1359  Last data filed at 7/26/2020 1008  Gross per 24 hour   Intake 200 ml   Output 4400 ml   Net -4200 ml       Gen: NAD           Labs/Data:    Lab Results   Component Value Date/Time    WBC 12.7 (H) 07/26/2020 03:00 AM    HGB 6.3 (L) 07/26/2020 03:00 AM    HCT 22.4 (L) 07/26/2020 03:00 AM    PLATELET 198 69/95/7992 03:00 AM    MCV 99.6 (H) 07/26/2020 03:00 AM       Lab Results   Component Value Date/Time    Sodium 144 07/26/2020 03:00 AM    Potassium 4.4 07/26/2020 03:00 AM    Chloride 115 (H) 07/26/2020 03:00 AM    CO2 22 07/26/2020 03:00 AM    Anion gap 7 07/26/2020 03:00 AM    Glucose 123 (H) 07/26/2020 03:00 AM    BUN 54 (H) 07/26/2020 03:00 AM    Creatinine 3.55 (H) 07/26/2020 03:00 AM    BUN/Creatinine ratio 15 07/26/2020 03:00 AM    GFR est AA 16 (L) 07/26/2020 03:00 AM    GFR est non-AA 13 (L) 07/26/2020 03:00 AM    Calcium 8.3 (L) 07/26/2020 03:00 AM

## 2020-07-26 NOTE — PROGRESS NOTES
Bedside and Verbal shift change report given to Bev LOPEZ  (oncoming nurse) by Vanessa Leahy (offgoing nurse). Report included the following information SBAR and Kardex.

## 2020-07-27 LAB
ALBUMIN SERPL-MCNC: 2.3 G/DL (ref 3.5–5)
ANION GAP SERPL CALC-SCNC: 7 MMOL/L (ref 5–15)
BUN SERPL-MCNC: 48 MG/DL (ref 6–20)
BUN/CREAT SERPL: 14 (ref 12–20)
CALCIUM SERPL-MCNC: 8.3 MG/DL (ref 8.5–10.1)
CHLORIDE SERPL-SCNC: 113 MMOL/L (ref 97–108)
CO2 SERPL-SCNC: 21 MMOL/L (ref 21–32)
CREAT SERPL-MCNC: 3.5 MG/DL (ref 0.55–1.02)
ERYTHROCYTE [DISTWIDTH] IN BLOOD BY AUTOMATED COUNT: 16 % (ref 11.5–14.5)
GLUCOSE BLD STRIP.AUTO-MCNC: 119 MG/DL (ref 65–100)
GLUCOSE BLD STRIP.AUTO-MCNC: 124 MG/DL (ref 65–100)
GLUCOSE BLD STRIP.AUTO-MCNC: 124 MG/DL (ref 65–100)
GLUCOSE BLD STRIP.AUTO-MCNC: 128 MG/DL (ref 65–100)
GLUCOSE SERPL-MCNC: 105 MG/DL (ref 65–100)
HCT VFR BLD AUTO: 22.5 % (ref 35–47)
HGB BLD-MCNC: 6.3 G/DL (ref 11.5–16)
MCH RBC QN AUTO: 28 PG (ref 26–34)
MCHC RBC AUTO-ENTMCNC: 28 G/DL (ref 30–36.5)
MCV RBC AUTO: 100 FL (ref 80–99)
NRBC # BLD: 0.03 K/UL (ref 0–0.01)
NRBC BLD-RTO: 0.2 PER 100 WBC
PHOSPHATE SERPL-MCNC: 3.6 MG/DL (ref 2.6–4.7)
PLATELET # BLD AUTO: 380 K/UL (ref 150–400)
PMV BLD AUTO: 9.2 FL (ref 8.9–12.9)
POTASSIUM SERPL-SCNC: 4.3 MMOL/L (ref 3.5–5.1)
RBC # BLD AUTO: 2.25 M/UL (ref 3.8–5.2)
SERVICE CMNT-IMP: ABNORMAL
SODIUM SERPL-SCNC: 141 MMOL/L (ref 136–145)
WBC # BLD AUTO: 15 K/UL (ref 3.6–11)

## 2020-07-27 PROCEDURE — 97530 THERAPEUTIC ACTIVITIES: CPT

## 2020-07-27 PROCEDURE — 74011250637 HC RX REV CODE- 250/637: Performed by: NURSE PRACTITIONER

## 2020-07-27 PROCEDURE — 97535 SELF CARE MNGMENT TRAINING: CPT

## 2020-07-27 PROCEDURE — 74011250636 HC RX REV CODE- 250/636: Performed by: NURSE PRACTITIONER

## 2020-07-27 PROCEDURE — 85027 COMPLETE CBC AUTOMATED: CPT

## 2020-07-27 PROCEDURE — 74011250637 HC RX REV CODE- 250/637: Performed by: INTERNAL MEDICINE

## 2020-07-27 PROCEDURE — 97110 THERAPEUTIC EXERCISES: CPT

## 2020-07-27 PROCEDURE — 74011250637 HC RX REV CODE- 250/637: Performed by: UROLOGY

## 2020-07-27 PROCEDURE — 36415 COLL VENOUS BLD VENIPUNCTURE: CPT

## 2020-07-27 PROCEDURE — 82962 GLUCOSE BLOOD TEST: CPT

## 2020-07-27 PROCEDURE — 74011250636 HC RX REV CODE- 250/636: Performed by: INTERNAL MEDICINE

## 2020-07-27 PROCEDURE — 80069 RENAL FUNCTION PANEL: CPT

## 2020-07-27 PROCEDURE — 74011000258 HC RX REV CODE- 258: Performed by: NURSE PRACTITIONER

## 2020-07-27 PROCEDURE — 65270000032 HC RM SEMIPRIVATE

## 2020-07-27 RX ADMIN — SODIUM CHLORIDE 75 ML/HR: 900 INJECTION, SOLUTION INTRAVENOUS at 21:14

## 2020-07-27 RX ADMIN — TRAMADOL HYDROCHLORIDE 50 MG: 50 TABLET, FILM COATED ORAL at 15:43

## 2020-07-27 RX ADMIN — SODIUM CHLORIDE 75 ML/HR: 900 INJECTION, SOLUTION INTRAVENOUS at 06:50

## 2020-07-27 RX ADMIN — PANTOPRAZOLE SODIUM 40 MG: 40 TABLET, DELAYED RELEASE ORAL at 09:57

## 2020-07-27 RX ADMIN — CEFEPIME HYDROCHLORIDE 1 G: 1 INJECTION, POWDER, FOR SOLUTION INTRAMUSCULAR; INTRAVENOUS at 11:14

## 2020-07-27 RX ADMIN — OXYBUTYNIN CHLORIDE 10 MG: 5 TABLET, EXTENDED RELEASE ORAL at 09:57

## 2020-07-27 RX ADMIN — TRAMADOL HYDROCHLORIDE 50 MG: 50 TABLET, FILM COATED ORAL at 06:44

## 2020-07-27 NOTE — PROGRESS NOTES
[address] [facility] Adult  Hospitalist Group                                                                                          Hospitalist Progress Note  Sydney Campbell NP  Answering service: [phone] OR [phone] from in house phone        Date of Service:  2020  NAME:  Janneth Rabago  :  1960  MRN:  [id number]      Admission Summary:   Prince Velasquez a 62 y.o. female with past medical history significant for diabetes mellitus type 2, obesity, CKD, history of kidney stones, right hydronephrosis and ureter multiple stents placement [id number] stent exchange, missed her last one due to COVID pandemic, presented to the emergency room with right flank pain and hematuria.  Patient has been experiencing urinary symptoms and flank pain for several days now. Adina Rogers has been in the emergency multiple times but discharged home with antibiotic therapy. Adina Rogers has tried amoxicillin, Keflex and Cipro without improvement of his symptoms.  It was not until yesterday that she started noticing blood in the urine for which she came to the emergency room.  In the ED it was noted that her ureter stent was coming out through her urethra.  She had a CT scan of the abdomen that showed ureteral stent misplacement as well as enlargement of the right kidney.  She was found to have a leukocytosis, febrile and hypotensive. Received Ceftriaxone in the ED. Urology notified by ED of admission and patient admitted for UTI and hematuria.     Interval history / Subjective: Follow-up for Sepsis, DAPHNE on CKD V, IDDM, Morbid Obesity.   -Patient seen and examined, no c/o's. Assessment & Plan:     Sepsis: presents with leukocytosis, tachycardia, hypotension and fever meeting sepsis criteria.   -Sepsis secondary to UTI  -blood cultures: NGTD  -continue rocephin, IVF  -Urinalysis, culture: GNR, cefepime added      Hematuria: likely secondary to UTI and malpositioned stent and gross hematuria plus end-stage bladder and urethral abnormality: Urology.  -Status post removal of old stent and replacement of right stent  -on ditropan  -off CBI, urine cloudy repeat culture- GNR 7/27, Cefepime  -continue prn morphine, tylenol  -Urology: 7/18 Cystoscopy, right ureteral stent exchange, clot evacuation, catheter placement.     Acute blood loss anemia:   -due to hematuria  -trend: patient is [employer] and doesn't receive blood transfusion   -on IV iron sucrose, epoetin  -monitor H/H  -Hgb 6.3 on 7/27     DAPHNE on CKD stage V:  -creatinine monitored, improving  -continue IVF, avoid nephrotoxin   -Nephrology following     Diabetes   -At baseline controlled with hemoglobin A1c of 6.6  -continue sliding scale and monitor blood sugars     Obesity  -Outpatient follow-up for diet and weight loss program     Debility  -bed bound     Moisture associated skin injury to bilateral gluteal fold  -continue wound care per wound care nurse         DVTppx: SCDs  Code Status: DNR  Diet: Cardiac  Activity: OOB to chair TID and PRN  Discharge: TBD      Care Plan discussed with: Patient/Family and Nurse  Anticipated Disposition: Home w/Family  Anticipated Discharge: 24 hours to 48 hours     Hospital Problems  Date Reviewed: 7/18/2020          Codes Class Noted POA    Hematuria ICD-10-CM: R31.9  ICD-9-CM: 599.70  7/17/2020 Unknown                Review of Systems:   A comprehensive review of systems was negative except for that written in the HPI. Vital Signs:    Last 24hrs VS reviewed since prior progress note.  Most recent are:  Visit Vitals  /74 (BP 1 Location: Right arm, BP Patient Position: At rest;Supine)   Pulse 83   Temp 98.8 °F (37.1 °C)   Resp 18   Ht 5' 4\" (1.626 m)   Wt 156.9 kg (346 lb)   SpO2 96%   Breastfeeding No   BMI 59.39 kg/m²         Intake/Output Summary (Last 24 hours) at 7/27/2020 1355  Last data filed at 7/27/2020 1226  Gross per 24 hour   Intake 480 ml   Output 3525 ml   Net -3045 ml        Physical Examination: Constitutional:  No acute distress, cooperative, pleasant    ENT:  Oral mucosa moist.    Resp:  CTA bilaterally. No wheezing/rhonchi/rales. No accessory muscle use   CV:  Regular rhythm, normal rate, no murmurs, gallops, rubs    GI:  Soft, non distended, non tender, morbidly obese, bowel  sounds present, no  hepatosplenomegaly     Musculoskeletal:  No edema, moves all extremities.     Neurologic: [facility] all extremities, motor UE 5/5, LE 4/5.  AAOx3.                         Skin:  Hypopigmented skin  area bilateral gluteal fold    Psych:  Good insight, Not anxious nor agitated. Data Review:    Review and/or order of clinical lab test      Labs:     Recent Labs     07/27/20  0543 07/26/20  0300   WBC 15.0* 12.7*   HGB 6.3* 6.3*   HCT 22.5* 22.4*    374     Recent Labs     07/27/20  0543 07/26/20  0300 07/25/20  0450    144 145   K 4.3 4.4 4.4   * 115* 115*   CO2 21 22 22   BUN 48* 54* 55*   CREA 3.50* 3.55* 3.67*   * 123* 110*   CA 8.3* 8.3* 8.5   MG  --   --  2.0   PHOS 3.6 3.3 3.2     Recent Labs     07/27/20  0543 07/26/20  0300   ALB 2.3* 2.2*     No results for input(s): INR, PTP, APTT, INREXT in the last 72 hours. No results for input(s): FE, TIBC, PSAT, FERR in the last 72 hours. No results found for: FOL, RBCF   No results for input(s): PH, PCO2, PO2 in the last 72 hours. No results for input(s): CPK, CKNDX, TROIQ in the last 72 hours.     No lab exists for component: CPKMB  No results found for: CHOL, CHOLX, CHLST, CHOLV, HDL, HDLP, LDL, LDLC, DLDLP, TGLX, TRIGL, TRIGP, CHHD, CHHDX  Lab Results   Component Value Date/Time    Glucose (POC) 124 (H) 07/27/2020 11:16 AM    Glucose (POC) 119 (H) 07/27/2020 06:12 AM    Glucose (POC) 119 (H) 07/26/2020 09:21 PM    Glucose (POC) 101 (H) 07/26/2020 04:05 PM    Glucose (POC) 152 (H) 07/26/2020 11:33 AM     Lab Results   Component Value Date/Time    Color YELLOW/STRAW 07/25/2020 02:15 PM    Appearance TURBID (A) 07/25/2020 02:15 PM    Specific gravity 1.008 07/25/2020 02:15 PM    Specific gravity 1.020 07/17/2020 08:08 PM    pH (UA) 6.5 07/25/2020 02:15 PM    Protein 30 (A) 07/25/2020 02:15 PM    Glucose Negative 07/25/2020 02:15 PM    Ketone Negative 07/25/2020 02:15 PM    Bilirubin Negative 07/25/2020 02:15 PM    Urobilinogen 0.2 07/25/2020 02:15 PM    Nitrites Negative 07/25/2020 02:15 PM    Leukocyte Esterase LARGE (A) 07/25/2020 02:15 PM    Epithelial cells FEW 07/25/2020 02:15 PM    Bacteria 1+ (A) 07/25/2020 02:15 PM    WBC >100 (H) 07/25/2020 02:15 PM    RBC 5-10 07/25/2020 02:15 PM         Medications Reviewed:     Current Facility-Administered Medications   Medication Dose Route Frequency    cefepime (MAXIPIME) 1 g in 0.9% sodium chloride (MBP/ADV) 50 mL  1 g IntraVENous Q24H    morphine injection 2 mg  2 mg IntraVENous Q4H PRN    epoetin frieda-epbx (RETACRIT) injection 20,000 Units  20,000 Units SubCUTAneous Q7D    0.9% sodium chloride infusion  75 mL/hr IntraVENous CONTINUOUS    traMADoL (ULTRAM) tablet 50 mg  50 mg Oral Q8H PRN    fentaNYL citrate (PF) injection 25 mcg  25 mcg IntraVENous Q4H PRN    lidocaine (URO-JET/ GLYDO) 2 % jelly   Urethral DAILY PRN    glucose chewable tablet 16 g  4 Tab Oral PRN    glucagon (GLUCAGEN) injection 1 mg  1 mg IntraMUSCular PRN    dextrose 10% infusion 0-250 mL  0-250 mL IntraVENous PRN    pantoprazole (PROTONIX) tablet 40 mg  40 mg Oral DAILY    acetaminophen (TYLENOL) tablet 500 mg  500 mg Oral Q6H PRN    opium-belladonna (B&O 15-A) 16.2-30 mg suppository 1 Suppository  1 Suppository Rectal Q8H PRN    oxybutynin chloride XL (DITROPAN XL) tablet 10 mg  10 mg Oral DAILY    insulin lispro (HUMALOG) injection   SubCUTAneous AC&HS    sodium chloride (NS) flush 5-10 mL  5-10 mL IntraVENous PRN     ______________________________________________________________________  EXPECTED LENGTH OF STAY: 5d 12h  ACTUAL LENGTH OF STAY:          8                 Kaylan Balderrama NP

## 2020-07-27 NOTE — PROGRESS NOTES
Problem: Self Care Deficits Care Plan (Adult)  Goal: *Acute Goals and Plan of Care (Insert Text)  Description:   FUNCTIONAL STATUS PRIOR TO ADMISSION:  Patient was last modified independent for ADLs/ ambulatory with RW in December 2019. Patient reports she has been bedbound since d/t wounds. Patient reports she has not even been able to sit EOB at home. Performing all ADLs at bed-level with assistance for bathing, dressing, and toileting. HOME SUPPORT: Patient lived with sister to provide assistance    Occupational Therapy Goals  Goals reviewed and updated: 7/27/2020  Initiated 7/20/2020  1. Patient will perform lower body dressing using AE PRN with moderate assistance  within 7 day(s). 2.  Patient will perform bathing with minimal assistance  within 7 day(s). MET for UEs with set up 7/27/2020. Continue for LEs within 7 days. 4.  Patient will perform toilet transfers with moderate assistance  within 7 day(s). 5.  Patient will perform all aspects of toileting with moderate assistance  within 7 day(s). 6.  Patient will participate in upper extremity therapeutic exercise/activities with supervision/set-up for 10 minutes within 7 day(s).      7/27/2020 1638 by AGUS New  Outcome: Progressing Towards Goal   OCCUPATIONAL THERAPY TREATMENT/WEEKLY RE-ASSESSMENT  Patient: India Sharma (83 y.o. female)  Date: 7/27/2020  Diagnosis: Hematuria [R31.9]  Hematuria [R31.9]   <principal problem not specified>  Procedure(s) (LRB):  CYSTOSCOPY RIGHT URETERAL STENT EXCHANGE (Right) 9 Days Post-Op  Precautions: Fall  Chart, occupational therapy assessment, plan of care, and goals were reviewed. ASSESSMENT  Patient continues with skilled OT services and is progressing towards goals. Participation impacted by wounds on buttocks, body habitus, impaired right shoulder mobility and strength (reports a torn rotator cuff), impaired reach to perineal and LEs, urinary incontinence. Patient highly motivated.  Reviewed her exercise program of isometrics and theraband and added triceps extension exercise. Patient demonstrates understanding. Goals upgraded. Noted patient with Hgb of 6.3, but patient does not accept blood transfusions. Current Level of Function Impacting Discharge (ADLs): UE self care with set up, grooming with set up, LE and toileting self care with max to total assist.    Other factors to consider for discharge: wounds on buttocks         PLAN :  Goals have been updated based on progression since last assessment. Patient continues to benefit from skilled intervention to address the above impairments. Continue to follow patient 3 times a week to address goals. Recommend with staff: encourage participation in all aspects of self care in supine and long sit    Recommend next OT session: self care on EOB    Recommendation for discharge: (in order for the patient to meet his/her long term goals)  Occupational therapy at least 2 days/week in the home AND ensure assist and/or supervision for safety with mobility and self care    This discharge recommendation:  Has been made in collaboration with the attending provider and/or case management    IF patient discharges home will need the following DME: none       SUBJECTIVE:   Patient stated I wish one thing would go right for me.     OBJECTIVE DATA SUMMARY:   Cognitive/Behavioral Status:  Neurologic State: Alert  Orientation Level: Oriented X4  Cognition: Appropriate safety awareness; Appropriate decision making; Follows commands  Perception: Appears intact  Perseveration: No perseveration noted  Safety/Judgement: Awareness of environment    Functional Mobility and Transfers for ADLs:  Bed Mobility:  Rolling: Supervision;Assist x1  Supine to Sit: Moderate assistance; Additional time;Bed Modified(HOB elevated)  Sit to Supine: Maximum assistance; Additional time(for LEs)  Scooting: Moderate assistance; Additional time    Transfers:             Balance:  Sitting: Impaired; Without support  Sitting - Static: Fair (occasional)  Sitting - Dynamic: Fair (occasional)  Standing: (unable to attempt)    ADL Intervention:                 Lower Body Bathing  Perineal  : Moderate assistance  Position Performed: Other;Supine(seated in long sit and supine)  Cues: Other(comment)(physical assist)  Adaptive Equipment: Other(comment)(bedrails)              Toileting  Bladder Hygiene: Total assistance (dependent)  Adaptive Equipment: Other (comment)(bed rails)    Cognitive Retraining  Safety/Judgement: Awareness of environment    Therapeutic Exercises:   See above      Activity Tolerance:   Fair  Please refer to the flowsheet for vital signs taken during this treatment. After treatment patient left in no apparent distress:   Supine in bed, Patient positioned in right sidelying for pressure relief, and Call bell within reach    COMMUNICATION/COLLABORATION:   The patients plan of care was discussed with: Occupational therapist and Registered nurse.      AGUS Marks  Time Calculation: 30 mins

## 2020-07-27 NOTE — PROGRESS NOTES
JOESPH: In need of home health    RUR: 18%    Noted transfer to this floor and need for home health. Multiple referrals were sent by previous  with no success due to no one accepting patients' insurance. This CM sent more referrals; waiting for response. 3:00pm: [facility] does not travel to patients' home.      Juvenal BARRERAW, ACM

## 2020-07-27 NOTE — PROGRESS NOTES
Day #1 of Cefepime   Indication:  UTI  Current regimen:  2 g IV every 8 hours  Abx regimen: Cefepime monotherapy  Recent Labs     20  0543 20  0300 20  0450   WBC 15.0* 12.7* 12.5*   CREA 3.50* 3.55* 3.67*   BUN 48* 54* 55*     Est CrCl: 26.1 ml/min; UO: 0.9 ml/kg/hr  Temp (24hrs), Av.4 °F (36.9 °C), Min:98.2 °F (36.8 °C), Max:98.8 °F (37.1 °C)    Cultures:    Blood: NG (final)   Urine: Mixed urogenital wendy (final)   Urine: >100,000 colonies/mL GNR (prelim)    Plan: Change to cefepime 1 g IV every 24 hours per [facility] P&T Committee Protocol with respect to renal function and indication. Pharmacy will continue to monitor patient daily and will make dosage adjustments based upon changing renal function.

## 2020-07-27 NOTE — PROGRESS NOTES
Nutrition Assessment     Type and Reason for Visit: RD nutrition re-screen/LOS    Nutrition Recommendations/Plan:   1. Recommend referral outpatient dietitian for wt loss education in more appropriate setting  2. Continue current diet    Nutrition Assessment:     Pt admitted for sepsis 2/2 UTI, abx rx. Past Medical History:   Diagnosis Date    Diabetes ([address]     Hernia, hiatal     Hypertension     Kidney stones     both kidneys    Osteoarthritis     Renal failure     left kidney   Urology following with cystoscopy and right ureteral stent exchange on 7/18. Pt bedbound with morbid obesity. Renal following for CKD4. IV iron given this admit. Appetite has been excellent. Morbid obesity but would recommend outpatient education in more appropriate setting. Will rescreen per protocol.     Malnutrition Assessment:  Malnutrition Status: No malnutrition     Estimated Daily Nutrient Needs:  Energy (kcal):  2129  Protein (g):  125 (0.8g/kg)       Fluid (ml/day):  2129 - 1ml/kcal    Nutrition Related Findings:  BM 7/25       Current Nutrition Therapies:  DIET: DIABETIC CONSISTENT CARB Regular  Meal intake:   Patient Vitals for the past 168 hrs:   % Diet Eaten   07/26/20 1452 100 %   07/26/20 1000 100 %   07/25/20 1739 100 %   07/25/20 1122 100 %   07/24/20 1430 100 %   07/24/20 0859 100 %     Anthropometric Measures:  · Height:  5' 4\" (162.6 cm)  · Current Body Wt:  156.9 kg (345 lb 14.4 oz)  · BMI: 59.3   Wt Readings from Last 10 Encounters:   07/27/20 156.9 kg (346 lb)     Nutrition Diagnosis:   · Overweight/obesity related to excessive energy intake(limited mobility) as evidenced by BMI(bedbound)    Nutrition Intervention:  Food and/or Nutrient Delivery: Continue current diet  Nutrition Education and Counseling: Education needed(in outpatient setting)  Coordination of Nutrition Care: No recommendation at this time    Goals:  Gradual wt loss of 1-2# per week       Nutrition Monitoring and Evaluation: Behavioral-Environmental Outcomes: Knowledge or skill, Readiness for change  Food/Nutrient Intake Outcomes: Food and nutrient intake  Physical Signs/Symptoms Outcomes: Weight    Discharge Planning:    Recommend pursue outpatient nutrition counseling     Kallie Olivas, FERNANDO [address], Pager #[phone] or via ALTHIA

## 2020-07-27 NOTE — PROGRESS NOTES
Problem: Mobility Impaired (Adult and Pediatric)  Goal: *Acute Goals and Plan of Care (Insert Text)  Description: FUNCTIONAL STATUS PRIOR TO ADMISSION: Pt has been essentially bed bound since December 10th. Pt reported she was walking with a RW short distances prior to this. Pt had sustained a fall in October and had gone to rehab and then fell again in December. HOME SUPPORT PRIOR TO ADMISSION: The patient lived with her sister. Physical Therapy Goals  Initiated 7/20/2020, reassessment completed 7/25/20 and goals remain appropriate at this time. 1.  Patient will move from supine to sit and sit to supine , scoot up and down, and roll side to side in bed with minimal assistance/contact guard assist within 7 day(s). 2.  Patient will transfer from bed to chair and chair to bed with moderate assistance (squat pivot versus sliding board) using the least restrictive device within 7 day(s). 3.  Patient will tolerate seated EOB x 10 minutes with supervision within 7 days in prep for transfers OOB    Outcome: Progressing Towards Goal   PHYSICAL THERAPY TREATMENT: WEEKLY REASSESSMENT  Patient: Macey Sandoval (79 y.o. female)  Date: 7/27/2020  Primary Diagnosis: Hematuria [R31.9]  Hematuria [R31.9]  Procedure(s) (LRB):  CYSTOSCOPY RIGHT URETERAL STENT EXCHANGE (Right) 9 Days Post-Op   Precautions:   Fall      ASSESSMENT  Patient continues with skilled PT services and is progressing towards goals. She is motivated to be able to mobilize more and do more for herself and her pain pain is somewhat improved compared to initial assessment. However,  she remains very weak throughout her extremities and trunk. She was able to tolerate sitting EOB for about 15 minutes today, working on lateral scoot along the EOB with assistance to clear buttocks fully.   We will increase her frequency to enable more work on exercises and bed mobility as she is able to tolerate working towards eventual sliding board transfer to a wheelchair, which is her ultimate goal.     Patient's progression toward goals since last assessment: improved pain control, improving strength    Current Level of Function Impacting Discharge (mobility/balance): mod assist supine to sit, but max assist to return to supine    Other factors to consider for discharge: bed bound since 12/19         PLAN :  Goals have been updated based on progression since last assessment. Patient continues to benefit from skilled intervention to address the above impairments. Recommendations and Planned Interventions: bed mobility training, transfer training, therapeutic exercises, patient and family training/education, and therapeutic activities      Frequency/Duration: Patient will be followed by physical therapy:  5 times a week to address goals. Recommendation for discharge: (in order for the patient to meet his/her long term goals)  Physical therapy at least 2 days/week in the home AND ensure assist and/or supervision for safety with all bed mobility    This discharge recommendation:  A follow-up discussion with the attending provider and/or case management is planned    IF patient discharges home will need the following DME: none at this time, but eventually, she will need a wheelchair since she does not have one yet. She has been at her sister's home in hospital bed for months already. SUBJECTIVE:   Patient stated I just want to be able to get in a chair and push myself around.     OBJECTIVE DATA SUMMARY:   HISTORY:    Past Medical History:   Diagnosis Date    Diabetes ([address]     Hernia, hiatal     Hypertension     Kidney stones     both kidneys    Osteoarthritis     Renal failure     left kidney     Past Surgical History:   Procedure Laterality Date    HX HYSTERECTOMY      uterus not removed    HX OTHER SURGICAL      right kidney stent         Home Situation  Home Environment: Private residence  # Steps to Enter: 3  One/Two Story Residence: Two story, live on 1st floor  Living Alone: No  Support Systems: Family member(s)  Patient Expects to be Discharged to[de-identified] Private residence  Current DME Used/Available at Home: Hospital bed, Crutches, [place], straight, Walker, rolling, Commode, bedside    EXAMINATION/PRESENTATION/DECISION MAKING:   Critical Behavior:  Neurologic State: Alert  Orientation Level: Oriented X4  Cognition: Follows commands  Safety/Judgement: Awareness of environment  Hearing: Auditory  Auditory Impairment: None  Skin:  per wound healing nurses  Edema:   Range Of Motion:  AROM: Generally decreased, functional(bilat knee flexion contractures)                       Strength:    Strength: Generally decreased, functional(LEs 3/5, UEs 3+/5. trunk 3/5)                    Tone & Sensation:   Tone: Normal              Sensation: Intact               Coordination:  Coordination: Within functional limits  Vision:      Functional Mobility:  Bed Mobility:  Rolling: Supervision; Additional time;Bed Modified(using bedrails to assist)  Supine to Sit: Moderate assistance; Additional time;Bed Modified(HOB elevated)  Sit to Supine: Maximum assistance; Additional time(for LEs)  Scooting: Moderate assistance; Additional time  Transfers:                             Balance:   Sitting: Impaired; Without support  Sitting - Static: Fair (occasional)  Sitting - Dynamic: Fair (occasional)  Standing: (unable to attempt)  Ambulation/Gait Training:                                                         Stairs: Therapeutic Exercises:   Sitting balance, press up in sitting, lateral scoot, arm press while in supine with HOB elevated          Pain Rating:  Pain in L buttock wound moreso than the R    Activity Tolerance:   Fair and requires rest breaks  Please refer to the flowsheet for vital signs taken during this treatment.     After treatment patient left in no apparent distress:   Supine in bed, Call bell within reach, and Side rails x 3    COMMUNICATION/EDUCATION:   The patients plan of care was discussed with: Registered nurse. Fall prevention education was provided and the patient/caregiver indicated understanding., Patient/family have participated as able in goal setting and plan of care. , and Patient/family agree to work toward stated goals and plan of care.     Thank you for this referral.  Magdalena Jin, PT   Time Calculation: 35 mins

## 2020-07-27 NOTE — WOUND CARE
WOCN Note: Follow-up visit for gluteal cleft and gluteal folds.  
  
Chart shows: 
Admitted for hematuria History of stents, DM 
  
Assessment:  
A&O x 4 and reports tenderness on gluteal fold. Able to turn independently for assessment from supine to right.    
Bed: SizeWise with air mattress 
  
1. POA, MASD to gluteal cleft and bilateral gluteal folds; all with linear splits in a larger field of intact hypopigmented skin.   
Areas appear smaller with skip epithelialization. All open areas have 100% moist red bases. Gluteal cleft = 1.7 x 0.2 x 0.1 cm Right gluteal fold = 0.8 x 1 x 0.2 cm with pinpoint satellite area Left gluteal fold = 0.7 x 1.2 x 0.2 cm Applied Opticel AG and foam dressing.   
  
Recommendations:   
Gluteal cleft & folds: apply small piece of Opticel AG and cover with foam dressing. Change daily and as needed.  
  
Transition of Care: Plan to follow weekly and as needed while admitted to hospital. 
 
HOLLY MedellinN, RN, [facility] Certified Wound, Ostomy, Continence Nurse 
office [phone] 
pager [phone] or call  to page

## 2020-07-27 NOTE — PROGRESS NOTES
Patient name: Mirela Abreu  MRN: [id number]    Nephrology Progress note:    Assessment:  DAPHNE on CKD-3/4?: Cr finally showing improvement over the last few days-> Cr down to 3.8mg/dl after showing fluctuations s/p right ureteral stent replacement. Baseline Cr? Chronic obstructive uropathy/Atrophic left kidney/DM nephropathy. Followed by Dr. Luz Maria Fernandes poor outpt follow up. Last seen in 2015.     Chronic right hydronephrosis: [id number] stent exchange. Missed her last one due to COVID pandemic     Metabolic acidosis: NAGMA-> better with IV Bicarb     UTI     Anemia: Hgb sub-optimal. Mandaeism. IV Iron/TONG     DM2: HgbA1c 6.6     Chronic sacral decubiti    Plan/Recommendations:  Very anemic,  Renal function better  Tong. ..   Will follow  Subjective:  Resting again      Visit Vitals  /74 (BP 1 Location: Right arm, BP Patient Position: At rest;Supine)   Pulse 83   Temp 98.8 °F (37.1 °C)   Resp 18   Ht 5' 4\" (1.626 m)   Wt 156.9 kg (346 lb)   SpO2 96%   Breastfeeding No   BMI 59.39 kg/m²     Wt Readings from Last 3 Encounters:   07/27/20 156.9 kg (346 lb)       Intake/Output Summary (Last 24 hours) at 7/27/2020 1240  Last data filed at 7/27/2020 1226  Gross per 24 hour   Intake 480 ml   Output 3525 ml   Net -3045 ml       Gen: NAD           Labs/Data:    Lab Results   Component Value Date/Time    WBC 15.0 (H) 07/27/2020 05:43 AM    HGB 6.3 (L) 07/27/2020 05:43 AM    HCT 22.5 (L) 07/27/2020 05:43 AM    PLATELET 657 46/26/5144 05:43 AM    .0 (H) 07/27/2020 05:43 AM       Lab Results   Component Value Date/Time    Sodium 141 07/27/2020 05:43 AM    Potassium 4.3 07/27/2020 05:43 AM    Chloride 113 (H) 07/27/2020 05:43 AM    CO2 21 07/27/2020 05:43 AM    Anion gap 7 07/27/2020 05:43 AM    Glucose 105 (H) 07/27/2020 05:43 AM    BUN 48 (H) 07/27/2020 05:43 AM    Creatinine 3.50 (H) 07/27/2020 05:43 AM    BUN/Creatinine ratio 14 07/27/2020 05:43 AM    GFR est AA 16 (L) 07/27/2020 05:43 AM    GFR est non-AA 13 (L) 07/27/2020 05:43 AM    Calcium 8.3 (L) 07/27/2020 05:43 AM

## 2020-07-28 LAB
ALBUMIN SERPL-MCNC: 2.3 G/DL (ref 3.5–5)
ANION GAP SERPL CALC-SCNC: 7 MMOL/L (ref 5–15)
BUN SERPL-MCNC: 45 MG/DL (ref 6–20)
BUN/CREAT SERPL: 14 (ref 12–20)
CALCIUM SERPL-MCNC: 8.5 MG/DL (ref 8.5–10.1)
CHLORIDE SERPL-SCNC: 115 MMOL/L (ref 97–108)
CO2 SERPL-SCNC: 22 MMOL/L (ref 21–32)
CREAT SERPL-MCNC: 3.18 MG/DL (ref 0.55–1.02)
ERYTHROCYTE [DISTWIDTH] IN BLOOD BY AUTOMATED COUNT: 16.2 % (ref 11.5–14.5)
GLUCOSE BLD STRIP.AUTO-MCNC: 105 MG/DL (ref 65–100)
GLUCOSE BLD STRIP.AUTO-MCNC: 114 MG/DL (ref 65–100)
GLUCOSE BLD STRIP.AUTO-MCNC: 120 MG/DL (ref 65–100)
GLUCOSE BLD STRIP.AUTO-MCNC: 120 MG/DL (ref 65–100)
GLUCOSE SERPL-MCNC: 105 MG/DL (ref 65–100)
HCT VFR BLD AUTO: 22.5 % (ref 35–47)
HGB BLD-MCNC: 6.4 G/DL (ref 11.5–16)
MCH RBC QN AUTO: 28.1 PG (ref 26–34)
MCHC RBC AUTO-ENTMCNC: 28.4 G/DL (ref 30–36.5)
MCV RBC AUTO: 98.7 FL (ref 80–99)
NRBC # BLD: 0.04 K/UL (ref 0–0.01)
NRBC BLD-RTO: 0.3 PER 100 WBC
PHOSPHATE SERPL-MCNC: 3.7 MG/DL (ref 2.6–4.7)
PLATELET # BLD AUTO: 369 K/UL (ref 150–400)
PMV BLD AUTO: 8.8 FL (ref 8.9–12.9)
POTASSIUM SERPL-SCNC: 4.8 MMOL/L (ref 3.5–5.1)
RBC # BLD AUTO: 2.28 M/UL (ref 3.8–5.2)
SERVICE CMNT-IMP: ABNORMAL
SODIUM SERPL-SCNC: 144 MMOL/L (ref 136–145)
WBC # BLD AUTO: 13.9 K/UL (ref 3.6–11)

## 2020-07-28 PROCEDURE — 74011250637 HC RX REV CODE- 250/637: Performed by: NURSE PRACTITIONER

## 2020-07-28 PROCEDURE — 74011250636 HC RX REV CODE- 250/636: Performed by: INTERNAL MEDICINE

## 2020-07-28 PROCEDURE — 97530 THERAPEUTIC ACTIVITIES: CPT

## 2020-07-28 PROCEDURE — 74011250637 HC RX REV CODE- 250/637: Performed by: INTERNAL MEDICINE

## 2020-07-28 PROCEDURE — 65270000032 HC RM SEMIPRIVATE

## 2020-07-28 PROCEDURE — 82962 GLUCOSE BLOOD TEST: CPT

## 2020-07-28 PROCEDURE — 80069 RENAL FUNCTION PANEL: CPT

## 2020-07-28 PROCEDURE — 97110 THERAPEUTIC EXERCISES: CPT

## 2020-07-28 PROCEDURE — 85027 COMPLETE CBC AUTOMATED: CPT

## 2020-07-28 PROCEDURE — 74011250637 HC RX REV CODE- 250/637: Performed by: UROLOGY

## 2020-07-28 PROCEDURE — 36415 COLL VENOUS BLD VENIPUNCTURE: CPT

## 2020-07-28 PROCEDURE — 74011000258 HC RX REV CODE- 258: Performed by: NURSE PRACTITIONER

## 2020-07-28 PROCEDURE — 74011250636 HC RX REV CODE- 250/636: Performed by: NURSE PRACTITIONER

## 2020-07-28 RX ORDER — LANOLIN ALCOHOL/MO/W.PET/CERES
9 CREAM (GRAM) TOPICAL
Status: DISCONTINUED | OUTPATIENT
Start: 2020-07-28 | End: 2020-08-07 | Stop reason: HOSPADM

## 2020-07-28 RX ADMIN — TRAMADOL HYDROCHLORIDE 50 MG: 50 TABLET, FILM COATED ORAL at 16:50

## 2020-07-28 RX ADMIN — OXYBUTYNIN CHLORIDE 10 MG: 5 TABLET, EXTENDED RELEASE ORAL at 08:45

## 2020-07-28 RX ADMIN — CEFEPIME HYDROCHLORIDE 1 G: 1 INJECTION, POWDER, FOR SOLUTION INTRAMUSCULAR; INTRAVENOUS at 08:46

## 2020-07-28 RX ADMIN — SODIUM CHLORIDE 75 ML/HR: 900 INJECTION, SOLUTION INTRAVENOUS at 22:29

## 2020-07-28 RX ADMIN — PANTOPRAZOLE SODIUM 40 MG: 40 TABLET, DELAYED RELEASE ORAL at 08:46

## 2020-07-28 RX ADMIN — TRAMADOL HYDROCHLORIDE 50 MG: 50 TABLET, FILM COATED ORAL at 00:01

## 2020-07-28 RX ADMIN — TRAMADOL HYDROCHLORIDE 50 MG: 50 TABLET, FILM COATED ORAL at 08:46

## 2020-07-28 RX ADMIN — MELATONIN 9 MG: at 22:28

## 2020-07-28 RX ADMIN — SODIUM CHLORIDE 75 ML/HR: 900 INJECTION, SOLUTION INTRAVENOUS at 08:49

## 2020-07-28 NOTE — PROGRESS NOTES
Patient name: Macey Sandoval  MRN: [id number]    Nephrology Progress note:    Assessment:  DAPHNE on CKD-3/4?: Cr finally showing improvement over the last few days-> Cr down to 3.8mg/dl after showing fluctuations s/p right ureteral stent replacement. Baseline Cr? Chronic obstructive uropathy/Atrophic left kidney/DM nephropathy. Followed by Dr. Sebastián david outpt follow up. Last seen in 2015.     Chronic right hydronephrosis: [id number] stent exchange. Missed her last one due to COVID pandemic     Metabolic acidosis: NAGMA-> better with IV Bicarb     UTI     Anemia: Hgb sub-optimal. [facility]. IV Iron/CATHIE     DM2: HgbA1c 6.6     Chronic sacral decubiti    Plan/Recommendations:  Very anemic,  Renal function better  Cathie. ..   Will follow  Subjective:  weak      Visit Vitals  /74   Pulse 89   Temp 98.9 °F (37.2 °C)   Resp 18   Ht 5' 4\" (1.626 m)   Wt 156.9 kg (346 lb)   SpO2 98%   Breastfeeding No   BMI 59.39 kg/m²     Wt Readings from Last 3 Encounters:   07/27/20 156.9 kg (346 lb)       Intake/Output Summary (Last 24 hours) at 7/28/2020 1047  Last data filed at 7/28/2020 0811  Gross per 24 hour   Intake 480 ml   Output 3200 ml   Net -2720 ml       Gen: NAD           Labs/Data:    Lab Results   Component Value Date/Time    WBC 13.9 (H) 07/28/2020 05:38 AM    HGB 6.4 (L) 07/28/2020 05:38 AM    HCT 22.5 (L) 07/28/2020 05:38 AM    PLATELET 246 65/72/0596 05:38 AM    MCV 98.7 07/28/2020 05:38 AM       Lab Results   Component Value Date/Time    Sodium 144 07/28/2020 05:38 AM Potassium 4.8 07/28/2020 05:38 AM    Chloride 115 (H) 07/28/2020 05:38 AM    CO2 22 07/28/2020 05:38 AM    Anion gap 7 07/28/2020 05:38 AM    Glucose 105 (H) 07/28/2020 05:38 AM    BUN 45 (H) 07/28/2020 05:38 AM    Creatinine 3.18 (H) 07/28/2020 05:38 AM    BUN/Creatinine ratio 14 07/28/2020 05:38 AM    GFR est AA 18 (L) 07/28/2020 05:38 AM    GFR est non-AA 15 (L) 07/28/2020 05:38 AM    Calcium 8.5 07/28/2020 05:38 AM

## 2020-07-28 NOTE — PROGRESS NOTES
Problem: Mobility Impaired (Adult and Pediatric)  Goal: *Acute Goals and Plan of Care (Insert Text)  Description: FUNCTIONAL STATUS PRIOR TO ADMISSION: Pt has been essentially bed bound since December 10th. Pt reported she was walking with a RW short distances prior to this. Pt had sustained a fall in October and had gone to rehab and then fell again in December. HOME SUPPORT PRIOR TO ADMISSION: The patient lived with her sister. Physical Therapy Goals  Initiated 7/20/2020, reassessment completed 7/25/20 and goals remain appropriate at this time. 1.  Patient will move from supine to sit and sit to supine , scoot up and down, and roll side to side in bed with minimal assistance/contact guard assist within 7 day(s). 2.  Patient will transfer from bed to chair and chair to bed with moderate assistance (squat pivot versus sliding board) using the least restrictive device within 7 day(s). 3.  Patient will tolerate seated EOB x 10 minutes with supervision within 7 days in prep for transfers OOB    Outcome: Progressing Towards Goal   PHYSICAL THERAPY TREATMENT  Patient: Seun York (22 y.o. female)  Date: 7/28/2020  Diagnosis: Hematuria [R31.9]  Hematuria [R31.9]   <principal problem not specified>  Procedure(s) (LRB):  CYSTOSCOPY RIGHT URETERAL STENT EXCHANGE (Right) 10 Days Post-Op  Precautions: Fall  Chart, physical therapy assessment, plan of care and goals were reviewed. ASSESSMENT  Patient continues with skilled PT services and is slowly progressing towards goals. Pt presents with decreased activity tolerance due to episodes of increased pain in L thigh/buttock pain, limited due to R shoulder with rotator cuff injury, and painful R knee with ROM. Pt is very motivated and hopeful she will be able to return to her previous level of function of 8 -9 months ago. Pt puts forth her best effort throughout session.   She was able to complete supine to sit transfer with bed modified and heavy use of bed rails and minimal assist only to lift her LLE to return to supine position. Pt tolerated sitting ~ 20 min while performing BUE/BLE exercises, weight shifting, and attempt to lift buttocks from bed. Pt limited with clearing buttocks from bed due to onset of increased pain. Pt reports her short term goal is to be able to transfer to a wheelchair and to have intensive rehab when she is able to mobilize and tolerate increased activity at a later time. Current Level of Function Impacting Discharge (mobility/balance): minimal assistance for bed mobility     Other factors to consider for discharge: well below her baseline, limiting by pain, bilateral buttock wounds, fall risk, obesity          PLAN :  Patient continues to benefit from skilled intervention to address the above impairments. Continue treatment per established plan of care. to address goals. Recommendation for discharge: (in order for the patient to meet his/her long term goals)  Physical therapy at least 2 days/week in the home AND ensure assist and/or supervision for safety with mobility and ADLs    This discharge recommendation:  Has not yet been discussed the attending provider and/or case management    IF patient discharges home will need the following DME: wheelchair and sliding/transfer board       SUBJECTIVE:   Patient stated I don't want to stay in a wheelchair for the rest of my life.     OBJECTIVE DATA SUMMARY:   Critical Behavior:  Neurologic State: Alert  Orientation Level: Oriented X4  Cognition: Follows commands  Safety/Judgement: Awareness of environment  Functional Mobility Training:  Bed Mobility:     Supine to Sit: Adaptive equipment;Bed Modified;Supervision;Assist x1, heavy use of bed rail   Sit to Supine: Adaptive equipment;Bed Modified;Assist x1;Minimum assistance      Pt tolerated sitting EOB ~ 20 min, attempted to clear buttocks from bed however after one attempt pt had onset of increased L buttock/posterior thigh pain Balance:  Sitting: Intact              Pain Rating:  Verbal: 5  Location: buttock/posterior thigh pain     Activity Tolerance:   Fair    After treatment patient left in no apparent distress:   Supine in bed and Call bell within reach    COMMUNICATION/COLLABORATION:   The patients plan of care was discussed with: Registered nurse.      Raimla Vieira Service   Time Calculation: 37 mins

## 2020-07-28 NOTE — PROGRESS NOTES
[facility] Adult  Hospitalist Group                                                                                          Hospitalist Progress Note  Chelsie Ness NP  Answering service: [phone] OR [phone] from in house phone        Date of Service:  2020  NAME:  Amanda Aceves  :  1960  MRN:  [id number]      Admission Summary:   Carli Sosa a 62 y.o. female with past medical history significant for diabetes mellitus type 2, obesity, CKD, history of kidney stones, right hydronephrosis and ureter multiple stents placement [id number] stent exchange, missed her last one due to COVID pandemic, presented to the emergency room with right flank pain and hematuria.  Patient has been experiencing urinary symptoms and flank pain for several days now. She has been in the emergency multiple times but discharged home with antibiotic therapy. Unruly Villafana has tried amoxicillin, Keflex and Cipro without improvement of his symptoms.  It was not until yesterday that she started noticing blood in the urine for which she came to the emergency room.  In the ED it was noted that her ureter stent was coming out through her urethra.  She had a CT scan of the abdomen that showed ureteral stent misplacement as well as enlargement of the right kidney.  She was found to have a leukocytosis, febrile and hypotensive. Received Ceftriaxone in the ED. Urology notified by ED of admission and patient admitted for UTI and hematuria.     Interval history / Subjective: Follow-up for Sepsis, DAPHNE on CKD V, IDDM, Morbid Obesity.   -Patient seen and examined, no c/o's. She did state she has not been sleeping well, will add melatonin. Assessment & Plan:     Sepsis: presents with leukocytosis, tachycardia, hypotension and fever meeting sepsis criteria.   -Sepsis secondary to UTI  -blood cultures: NGTD  -continue rocephin, IVF  -Urinalysis, culture: GNR, cefepime added   -ID consulted     Hematuria: likely secondary to UTI and malpositioned stent and gross hematuria plus end-stage bladder and urethral abnormality: Urology.  -Status post removal of old stent and replacement of right stent  -on ditropan  -off CBI, urine cloudy repeat culture- GNR 7/27, Cefepime  -continue prn morphine, tylenol  -Urology: 7/18 Cystoscopy, right ureteral stent exchange, clot evacuation, catheter placement.     Acute blood loss anemia:   -due to hematuria  -trend: patient is Caodaism and doesn't receive blood transfusion   -on IV iron sucrose, epoetin  -monitor H/H  -Hgb 6.3 on 7/27     DAPHNE on CKD stage V:  -creatinine monitored, improving  -continue IVF, avoid nephrotoxin   -Nephrology following     Diabetes   -At baseline controlled with hemoglobin A1c of 6.6  -continue sliding scale and monitor blood sugars     Obesity  -Outpatient follow-up for diet and weight loss program     Debility  -bed bound     Moisture associated skin injury to bilateral gluteal fold  -continue wound care per wound care nurse         DVTppx: SCDs  Code Status: DNR  Diet: Cardiac  Activity: OOB to chair TID and PRN  Discharge: TBD      Care Plan discussed with: Patient/Family and Nurse  Anticipated Disposition: Home w/Family  Anticipated Discharge: 24 hours to 48 hours     Hospital Problems  Date Reviewed: 7/18/2020          Codes Class Noted POA    Hematuria ICD-10-CM: R31.9  ICD-9-CM: 599.70  7/17/2020 Unknown                Review of Systems:   A comprehensive review of systems was negative except for that written in the HPI. Vital Signs:    Last 24hrs VS reviewed since prior progress note.  Most recent are:  Visit Vitals  /74   Pulse 89   Temp 98.9 °F (37.2 °C)   Resp 18   Ht 5' 4\" (1.626 m)   Wt 156.9 kg (346 lb)   SpO2 98%   Breastfeeding No   BMI 59.39 kg/m²         Intake/Output Summary (Last 24 hours) at 7/28/2020 1401  Last data filed at 7/28/2020 1239  Gross per 24 hour   Intake 240 ml   Output 3100 ml   Net -2860 ml        Physical Examination: Constitutional:  No acute distress, cooperative, pleasant    ENT:  Oral mucosa moist.    Resp:  CTA bilaterally. No wheezing/rhonchi/rales. No accessory muscle use   CV:  Regular rhythm, normal rate, no murmurs, gallops, rubs    GI:  Soft, non distended, non tender, morbidly obese, bowel  sounds present, no  hepatosplenomegaly     Musculoskeletal:  No edema, moves all extremities.     Neurologic: [facility] all extremities, motor UE 5/5, LE 4/5.  AAOx3.                         Skin:  Hypopigmented skin  area bilateral gluteal fold                        Psych:  Good insight, Not anxious nor agitated. Data Review:    Review and/or order of clinical lab test      Labs:     Recent Labs     07/28/20 0538 07/27/20  0543   WBC 13.9* 15.0*   HGB 6.4* 6.3*   HCT 22.5* 22.5*    380     Recent Labs     07/28/20 0538 07/27/20  0543 07/26/20  0300    141 144   K 4.8 4.3 4.4   * 113* 115*   CO2 22 21 22   BUN 45* 48* 54*   CREA 3.18* 3.50* 3.55*   * 105* 123*   CA 8.5 8.3* 8.3*   PHOS 3.7 3.6 3.3     Recent Labs     07/28/20 0538 07/27/20  0543 07/26/20  0300   ALB 2.3* 2.3* 2.2*     No results for input(s): INR, PTP, APTT, INREXT in the last 72 hours. No results for input(s): FE, TIBC, PSAT, FERR in the last 72 hours. No results found for: FOL, RBCF   No results for input(s): PH, PCO2, PO2 in the last 72 hours. No results for input(s): CPK, CKNDX, TROIQ in the last 72 hours.     No lab exists for component: CPKMB  No results found for: CHOL, CHOLX, CHLST, CHOLV, HDL, HDLP, LDL, LDLC, DLDLP, TGLX, TRIGL, TRIGP, CHHD, CHHDX  Lab Results   Component Value Date/Time    Glucose (POC) 120 (H) 07/28/2020 11:26 AM    Glucose (POC) 105 (H) 07/28/2020 06:37 AM    Glucose (POC) 128 (H) 07/27/2020 10:05 PM    Glucose (POC) 124 (H) 07/27/2020 04:18 PM    Glucose (POC) 124 (H) 07/27/2020 11:16 AM     Lab Results   Component Value Date/Time    Color YELLOW/STRAW 07/25/2020 02:15 PM    Appearance TURBID (A) 07/25/2020 02:15 PM    Specific gravity 1.008 07/25/2020 02:15 PM    Specific gravity 1.020 07/17/2020 08:08 PM    pH (UA) 6.5 07/25/2020 02:15 PM    Protein 30 (A) 07/25/2020 02:15 PM    Glucose Negative 07/25/2020 02:15 PM    Ketone Negative 07/25/2020 02:15 PM    Bilirubin Negative 07/25/2020 02:15 PM    Urobilinogen 0.2 07/25/2020 02:15 PM    Nitrites Negative 07/25/2020 02:15 PM    Leukocyte Esterase LARGE (A) 07/25/2020 02:15 PM    Epithelial cells FEW 07/25/2020 02:15 PM    Bacteria 1+ (A) 07/25/2020 02:15 PM    WBC >100 (H) 07/25/2020 02:15 PM    RBC 5-10 07/25/2020 02:15 PM         Medications Reviewed:     Current Facility-Administered Medications   Medication Dose Route Frequency    melatonin tablet 9 mg  9 mg Oral QHS    cefepime (MAXIPIME) 1 g in 0.9% sodium chloride (MBP/ADV) 50 mL  1 g IntraVENous Q24H    morphine injection 2 mg  2 mg IntraVENous Q4H PRN    epoetin fridea-epbx (RETACRIT) injection 20,000 Units  20,000 Units SubCUTAneous Q7D    0.9% sodium chloride infusion  75 mL/hr IntraVENous CONTINUOUS    traMADoL (ULTRAM) tablet 50 mg  50 mg Oral Q8H PRN    fentaNYL citrate (PF) injection 25 mcg  25 mcg IntraVENous Q4H PRN    lidocaine (URO-JET/ GLYDO) 2 % jelly   Urethral DAILY PRN    glucose chewable tablet 16 g  4 Tab Oral PRN    glucagon (GLUCAGEN) injection 1 mg  1 mg IntraMUSCular PRN    dextrose 10% infusion 0-250 mL  0-250 mL IntraVENous PRN    pantoprazole (PROTONIX) tablet 40 mg  40 mg Oral DAILY    acetaminophen (TYLENOL) tablet 500 mg  500 mg Oral Q6H PRN    opium-belladonna (B&O 15-A) 16.2-30 mg suppository 1 Suppository  1 Suppository Rectal Q8H PRN    oxybutynin chloride XL (DITROPAN XL) tablet 10 mg  10 mg Oral DAILY    insulin lispro (HUMALOG) injection   SubCUTAneous AC&HS    sodium chloride (NS) flush 5-10 mL  5-10 mL IntraVENous PRN     ______________________________________________________________________  EXPECTED LENGTH OF STAY: 5d 12h  ACTUAL LENGTH OF STAY:          [address], NP

## 2020-07-28 NOTE — PROGRESS NOTES
JOESPH:    RUR: 16%    CM followed up with patient in the room, informed her this CM has yet to find a home health agency that accepts her insurance. Patient suggested this CM call her sister to see if she could assist with agencies that travel to their address. CM called sister Aron Mahajan, [phone]) to suggested CM send a referral to [facility] (called [facility] in that area). CM called them at [phone] and they do not accept her insurance. 2:35pm: CM sent a mass referral to about 150 home health agencies to see if any accept her insurance.      Amol GARCIA, ACM

## 2020-07-28 NOTE — ROUTINE PROCESS
Bedside and Verbal shift change report given to Don Feldman (oncoming nurse) by Antonella Manuel RN (offgoing nurse). Report included the following information SBAR, Kardex, Intake/Output, MAR and Recent Results.

## 2020-07-29 LAB
ALBUMIN SERPL-MCNC: 2.3 G/DL (ref 3.5–5)
ANION GAP SERPL CALC-SCNC: 8 MMOL/L (ref 5–15)
BACTERIA SPEC CULT: ABNORMAL
BASOPHILS # BLD: 0 K/UL (ref 0–0.1)
BASOPHILS NFR BLD: 0 % (ref 0–1)
BUN SERPL-MCNC: 41 MG/DL (ref 6–20)
BUN/CREAT SERPL: 13 (ref 12–20)
CALCIUM SERPL-MCNC: 8.4 MG/DL (ref 8.5–10.1)
CC UR VC: ABNORMAL
CHLORIDE SERPL-SCNC: 115 MMOL/L (ref 97–108)
CO2 SERPL-SCNC: 20 MMOL/L (ref 21–32)
CREAT SERPL-MCNC: 3.18 MG/DL (ref 0.55–1.02)
DIFFERENTIAL METHOD BLD: ABNORMAL
EOSINOPHIL # BLD: 0.4 K/UL (ref 0–0.4)
EOSINOPHIL NFR BLD: 3 % (ref 0–7)
ERYTHROCYTE [DISTWIDTH] IN BLOOD BY AUTOMATED COUNT: 16.4 % (ref 11.5–14.5)
GLUCOSE BLD STRIP.AUTO-MCNC: 105 MG/DL (ref 65–100)
GLUCOSE BLD STRIP.AUTO-MCNC: 122 MG/DL (ref 65–100)
GLUCOSE BLD STRIP.AUTO-MCNC: 137 MG/DL (ref 65–100)
GLUCOSE BLD STRIP.AUTO-MCNC: 145 MG/DL (ref 65–100)
GLUCOSE SERPL-MCNC: 97 MG/DL (ref 65–100)
HCT VFR BLD AUTO: 22.6 % (ref 35–47)
HGB BLD-MCNC: 6.5 G/DL (ref 11.5–16)
IMM GRANULOCYTES # BLD AUTO: 0 K/UL
IMM GRANULOCYTES NFR BLD AUTO: 0 %
LYMPHOCYTES # BLD: 2.1 K/UL (ref 0.8–3.5)
LYMPHOCYTES NFR BLD: 17 % (ref 12–49)
MCH RBC QN AUTO: 28.5 PG (ref 26–34)
MCHC RBC AUTO-ENTMCNC: 28.8 G/DL (ref 30–36.5)
MCV RBC AUTO: 99.1 FL (ref 80–99)
METAMYELOCYTES NFR BLD MANUAL: 2 %
MONOCYTES # BLD: 1.1 K/UL (ref 0–1)
MONOCYTES NFR BLD: 9 % (ref 5–13)
NEUTS SEG # BLD: 8.3 K/UL (ref 1.8–8)
NEUTS SEG NFR BLD: 69 % (ref 32–75)
NRBC # BLD: 0.03 K/UL (ref 0–0.01)
NRBC BLD-RTO: 0.2 PER 100 WBC
PHOSPHATE SERPL-MCNC: 4 MG/DL (ref 2.6–4.7)
PLATELET # BLD AUTO: 369 K/UL (ref 150–400)
PMV BLD AUTO: 9 FL (ref 8.9–12.9)
POTASSIUM SERPL-SCNC: 4.2 MMOL/L (ref 3.5–5.1)
RBC # BLD AUTO: 2.28 M/UL (ref 3.8–5.2)
RBC MORPH BLD: ABNORMAL
SERVICE CMNT-IMP: ABNORMAL
SODIUM SERPL-SCNC: 143 MMOL/L (ref 136–145)
WBC # BLD AUTO: 12.1 K/UL (ref 3.6–11)

## 2020-07-29 PROCEDURE — 74011250637 HC RX REV CODE- 250/637: Performed by: NURSE PRACTITIONER

## 2020-07-29 PROCEDURE — 74011250636 HC RX REV CODE- 250/636: Performed by: INTERNAL MEDICINE

## 2020-07-29 PROCEDURE — 74011000258 HC RX REV CODE- 258: Performed by: NURSE PRACTITIONER

## 2020-07-29 PROCEDURE — 36415 COLL VENOUS BLD VENIPUNCTURE: CPT

## 2020-07-29 PROCEDURE — 82962 GLUCOSE BLOOD TEST: CPT

## 2020-07-29 PROCEDURE — 97530 THERAPEUTIC ACTIVITIES: CPT

## 2020-07-29 PROCEDURE — 74011250637 HC RX REV CODE- 250/637: Performed by: INTERNAL MEDICINE

## 2020-07-29 PROCEDURE — 74011250636 HC RX REV CODE- 250/636: Performed by: NURSE PRACTITIONER

## 2020-07-29 PROCEDURE — 85025 COMPLETE CBC W/AUTO DIFF WBC: CPT

## 2020-07-29 PROCEDURE — 74011636637 HC RX REV CODE- 636/637: Performed by: NURSE PRACTITIONER

## 2020-07-29 PROCEDURE — 97110 THERAPEUTIC EXERCISES: CPT

## 2020-07-29 PROCEDURE — 74011250637 HC RX REV CODE- 250/637: Performed by: UROLOGY

## 2020-07-29 PROCEDURE — 65270000032 HC RM SEMIPRIVATE

## 2020-07-29 PROCEDURE — 80069 RENAL FUNCTION PANEL: CPT

## 2020-07-29 RX ADMIN — EPOETIN ALFA-EPBX 20000 UNITS: 10000 INJECTION, SOLUTION INTRAVENOUS; SUBCUTANEOUS at 17:51

## 2020-07-29 RX ADMIN — SODIUM CHLORIDE 75 ML/HR: 900 INJECTION, SOLUTION INTRAVENOUS at 20:00

## 2020-07-29 RX ADMIN — CEFEPIME HYDROCHLORIDE 1 G: 1 INJECTION, POWDER, FOR SOLUTION INTRAMUSCULAR; INTRAVENOUS at 08:58

## 2020-07-29 RX ADMIN — TRAMADOL HYDROCHLORIDE 50 MG: 50 TABLET, FILM COATED ORAL at 05:33

## 2020-07-29 RX ADMIN — PANTOPRAZOLE SODIUM 40 MG: 40 TABLET, DELAYED RELEASE ORAL at 08:58

## 2020-07-29 RX ADMIN — INSULIN LISPRO 2 UNITS: 100 INJECTION, SOLUTION INTRAVENOUS; SUBCUTANEOUS at 12:24

## 2020-07-29 RX ADMIN — TRAMADOL HYDROCHLORIDE 50 MG: 50 TABLET, FILM COATED ORAL at 23:30

## 2020-07-29 RX ADMIN — MEROPENEM 500 MG: 500 INJECTION, POWDER, FOR SOLUTION INTRAVENOUS at 15:27

## 2020-07-29 RX ADMIN — TRAMADOL HYDROCHLORIDE 50 MG: 50 TABLET, FILM COATED ORAL at 15:26

## 2020-07-29 RX ADMIN — MELATONIN 9 MG: at 21:37

## 2020-07-29 RX ADMIN — OXYBUTYNIN CHLORIDE 10 MG: 5 TABLET, EXTENDED RELEASE ORAL at 08:58

## 2020-07-29 RX ADMIN — MEROPENEM 500 MG: 500 INJECTION, POWDER, FOR SOLUTION INTRAVENOUS at 23:30

## 2020-07-29 NOTE — PROGRESS NOTES
Patient name: Cristo Yeung  MRN: [id number]    Nephrology Progress note:    Assessment:  DAPHNE on CKD-3/4?: Cr finally showing improvement over the last few days-> Cr down to 3.8mg/dl after showing fluctuations s/p right ureteral stent replacement. Baseline Cr? Chronic obstructive uropathy/Atrophic left kidney/DM nephropathy. Followed by Dr. Ruby Ramsay poor outpt follow up. Last seen in 2015.     Chronic right hydronephrosis: o2rtsib stent exchange. Missed her last one due to COVID pandemic     Metabolic acidosis: NAGMA-> better with IV Bicarb     UTI     Anemia: Hgb sub-optimal. Gnosticism. IV Iron/CATHIE     DM2: HgbA1c 6.6     Chronic sacral decubiti    Plan/Recommendations:  Very anemic,  Renal function better  Cathie. ..   Will follow  Subjective:  Labs reviewed      Visit Vitals  /63   Pulse 80   Temp 98.1 °F (36.7 °C)   Resp 16   Ht 5' 4\" (1.626 m)   Wt (!) 160.1 kg (353 lb)   SpO2 96%   Breastfeeding No   BMI 60.59 kg/m²     Wt Readings from Last 3 Encounters:   07/29/20 (!) 160.1 kg (353 lb)       Intake/Output Summary (Last 24 hours) at 7/29/2020 1215  Last data filed at 7/29/2020 0700  Gross per 24 hour   Intake 240 ml   Output 3650 ml   Net -3410 ml       Gen: NAD           Labs/Data:    Lab Results   Component Value Date/Time    WBC 12.1 (H) 07/29/2020 05:30 AM    HGB 6.5 (L) 07/29/2020 05:30 AM    HCT 22.6 (L) 07/29/2020 05:30 AM    PLATELET 171 54/26/6640 05:30 AM    MCV 99.1 (H) 07/29/2020 05:30 AM       Lab Results   Component Value Date/Time    Sodium 143 07/29/2020 05:30 AM    Potassium 4.2 07/29/2020 05:30 AM    Chloride 115 (H) 07/29/2020 05:30 AM    CO2 20 (L) 07/29/2020 05:30 AM    Anion gap 8 07/29/2020 05:30 AM    Glucose 97 07/29/2020 05:30 AM    BUN 41 (H) 07/29/2020 05:30 AM    Creatinine 3.18 (H) 07/29/2020 05:30 AM    BUN/Creatinine ratio 13 07/29/2020 05:30 AM    GFR est AA 18 (L) 07/29/2020 05:30 AM    GFR est non-AA 15 (L) 07/29/2020 05:30 AM    Calcium 8.4 (L) 07/29/2020 05:30 AM

## 2020-07-29 NOTE — PROGRESS NOTES
Problem: Self Care Deficits Care Plan (Adult)  Goal: *Acute Goals and Plan of Care (Insert Text)  Description:   FUNCTIONAL STATUS PRIOR TO ADMISSION:  Patient was last modified independent for ADLs/ ambulatory with RW in December 2019. Patient reports she has been bedbound since d/t wounds. Patient reports she has not even been able to sit EOB at home. Performing all ADLs at bed-level with assistance for bathing, dressing, and toileting. HOME SUPPORT: Patient lived with sister to provide assistance    Occupational Therapy Goals  Goals reviewed and updated: 7/27/2020  Initiated 7/20/2020  1. Patient will perform lower body dressing using AE PRN with moderate assistance  within 7 day(s). 2.  Patient will perform bathing with minimal assistance  within 7 day(s). MET for UEs with set up 7/27/2020. Continue for LEs within 7 days. 4.  Patient will perform toilet transfers with moderate assistance  within 7 day(s). 5.  Patient will perform all aspects of toileting with moderate assistance  within 7 day(s). 6.  Patient will participate in upper extremity therapeutic exercise/activities with supervision/set-up for 10 minutes within 7 day(s). Outcome: Progressing Towards Goal     OCCUPATIONAL THERAPY TREATMENT  Patient: Charbel Marte (79 y.o. female)  Date: 7/29/2020  Diagnosis: Hematuria [R31.9]  Hematuria [R31.9]   <principal problem not specified>  Procedure(s) (LRB):  CYSTOSCOPY RIGHT URETERAL STENT EXCHANGE (Right) 11 Days Post-Op  Precautions: Fall  Chart, occupational therapy assessment, plan of care, and goals were reviewed. ASSESSMENT  Patient continues with skilled OT services and is slowly progressing towards goals.   Pt noted with increased EOB balance and sit tolerance, completing 32 minutes of 0-1 UE supported EOB sitting, with increased challenges of UE strengthening and dynamic core movement, with no LOB and decreased c/o pain (pt reported 5/10; however, stated today's EOB sitting hurt less than  yesterday). Pt with continued active engagement in therapy; however, body habitus continues to limit self-care independence. Pt continues to benefit from skilled OT to address functional deficits in an overall effort at maximizing pt's highest level of safe functional independence prior to discharge. Current Level of Function Impacting Discharge (ADLs): Max A    Other factors to consider for discharge: morbid obesity         PLAN :  Patient continues to benefit from skilled intervention to address the above impairments. Continue treatment per established plan of care. to address goals. Recommend with staff: frequent positional changes, active bed level self-care    Recommend next OT session: POC progression    Recommendation for discharge: (in order for the patient to meet his/her long term goals)  Occupational therapy at least 2 days/week in the home AND ensure assist and/or supervision for safety with ADLs/IADLs    This discharge recommendation:  Has not yet been discussed the attending provider and/or case management    IF patient discharges home will need the following DME: W/C and slide board       SUBJECTIVE:   Patient stated it doesn't hurt as much to sit up today.     OBJECTIVE DATA SUMMARY:   Cognitive/Behavioral Status:  Neurologic State: Alert  Orientation Level: Oriented X4  Cognition: Follows commands  Perception: Appears intact  Perseveration: No perseveration noted  Safety/Judgement: Awareness of environment; Insight into deficits    Functional Mobility and Transfers for ADLs:  Bed Mobility:  Rolling: Supervision; Additional time  Supine to Sit: Supervision; Additional time;Bed Modified  Sit to Supine: Minimum assistance; Additional time    Balance:  Sitting: Intact; Without support  Sitting - Static: Good (unsupported)  Sitting - Dynamic: Good (unsupported); Fair (occasional)  Standing: (unable)    ADL Intervention:    In prep for increased EOB ADL indep, pt engaged in 32 minutes of EOB sitting, with trunk flexion/extension, lateral flexion and rotation, as well as, sustained reach above heart level, 0-1 UE support, no LOB, intermittent rest breaks. Cognitive Retraining  Safety/Judgement: Awareness of environment; Insight into deficits    Therapeutic Exercises:   Completed 3 sets of 10 reps of triceps extension, 2 sets of 10 reps of seated rows and 2 sets of 10 reps of biceps flexion, seated EOB, with Supervision and Min verbal/visual cues for technique. Pain:  5/10; nursing aware and following    Activity Tolerance:   Fair and requires rest breaks  Please refer to the flowsheet for vital signs taken during this treatment. After treatment patient left in no apparent distress:   Supine in bed, Call bell within reach, and Side rails x 3    COMMUNICATION/COLLABORATION:   The patients plan of care was discussed with: Physical therapist and Registered nurse.      Abdelrahman Jennings OT  Time Calculation: 54 mins

## 2020-07-29 NOTE — PROGRESS NOTES
[address] [facility]                                                                                          Hospitalist Progress Note  Radha Trujillo NP  Answering service: [phone] OR [phone] from in house phone        Date of Service:  2020  NAME:  Reid Madrigal  :  1960  MRN:  [id number]      Admission Summary:   Cherie Vega a 62 y.o. female with past medical history significant for diabetes mellitus type 2, obesity, CKD, history of kidney stones, right hydronephrosis and ureter multiple stents placement [id number] stent exchange, missed her last one due to COVID pandemic, presented to the emergency room with right flank pain and hematuria. Patient has been experiencing urinary symptoms and flank pain for several days now. She has been in the emergency multiple times but discharged home with antibiotic therapy. Benji Rivera has tried amoxicillin, Keflex and Cipro without improvement of his symptoms.  It was not until yesterday that she started noticing blood in the urine for which she came to the emergency room.  In the ED it was noted that her ureter stent was coming out through her urethra.  She had a CT scan of the abdomen that showed ureteral stent misplacement as well as enlargement of the right kidney.  She was found to have a leukocytosis, febrile and hypotensive. Received Ceftriaxone in the ED. Urology notified by ED of admission and patient admitted for UTI and hematuria. Interval history / Subjective: Follow-up for Sepsis, DAPHNE on CKD V, IDDM, Morbid Obesity.   -Patient seen and examined, no c/o's. Assessment & Plan:     Sepsis: presents with leukocytosis, tachycardia, hypotension and fever meeting sepsis criteria.   -Sepsis secondary to UTI  -blood cultures: NGTD  -continue rocephin, IVF  -Urinalysis, culture: GNR, KLEBSIELLA PNEUMONIAE: cefepime added   -ID consulted     Hematuria: likely secondary to UTI and malpositioned stent and gross hematuria plus end-stage bladder and urethral abnormality: Urology.  -Status post removal of old stent and replacement of right stent  -on ditropan  -off CBI, urine cloudy repeat culture- GNR 7/27, Cefepime  -continue prn morphine, tylenol  -Urology: 7/18 Cystoscopy, right ureteral stent exchange, clot evacuation, catheter placement.     Acute blood loss anemia:   -due to hematuria  -trend: patient is [employer] and doesn't receive blood transfusion   -on IV iron sucrose, epoetin  -monitor H/H  -Hgb 6.5 on 7/29     DAPHNE on CKD stage V:  -creatinine monitored, improving  -continue IVF, avoid nephrotoxin   -Nephrology following     Diabetes   -At baseline controlled with hemoglobin A1c of 6.6  -continue sliding scale and monitor blood sugars     Obesity  -Outpatient follow-up for diet and weight loss program     Debility  -bed bound     Moisture associated skin injury to bilateral gluteal fold  -continue wound care per wound care nurse         DVTppx: SCDs  Code Status: DNR  Diet: Cardiac  Activity: OOB to chair TID and PRN  Discharge: TBD   Care Plan discussed with: Patient/Family and Nurse  Anticipated Disposition: Home w/Family  Anticipated Discharge: 24 hours to 48 hours     Hospital Problems  Date Reviewed: 7/18/2020          Codes Class Noted POA    Hematuria ICD-10-CM: R31.9  ICD-9-CM: 599.70  7/17/2020 Unknown                Review of Systems:   A comprehensive review of systems was negative except for that written in the HPI. Vital Signs:    Last 24hrs VS reviewed since prior progress note.  Most recent are:  Visit Vitals  /63   Pulse 80   Temp 98.1 °F (36.7 °C)   Resp 16   Ht 5' 4\" (1.626 m)   Wt (!) 160.1 kg (353 lb)   SpO2 96%   Breastfeeding No   BMI 60.59 kg/m²         Intake/Output Summary (Last 24 hours) at 7/29/2020 1134  Last data filed at 7/29/2020 0700  Gross per 24 hour   Intake 240 ml   Output 3650 ml   Net -3410 ml        Physical Examination:                    Constitutional:  No acute distress, cooperative, pleasant    ENT:  Oral mucosa moist.    Resp:  CTA bilaterally. No wheezing/rhonchi/rales. No accessory muscle use   CV:  Regular rhythm, normal rate, no murmurs, gallops, rubs    GI:  Soft, non distended, non tender, morbidly obese, bowel  sounds present, no  hepatosplenomegaly     Musculoskeletal:  No edema, moves all extremities.     Neurologic: [facility] all extremities, motor UE 5/5, LE 4/5.  AAOx3.                         Skin:  Hypopigmented skin  area bilateral gluteal fold                        Psych:  Good insight, Not anxious nor agitated. Data Review:    Review and/or order of clinical lab test      Labs:     Recent Labs     07/29/20 0530 07/28/20  0538   WBC 12.1* 13.9*   HGB 6.5* 6.4*   HCT 22.6* 22.5*    369     Recent Labs     07/29/20 0530 07/28/20  0538 07/27/20  0543    144 141   K 4.2 4.8 4.3   * 115* 113*   CO2 20* 22 21   BUN 41* 45* 48*   CREA 3.18* 3.18* 3.50*   GLU 97 105* 105*   CA 8.4* 8.5 8.3*   PHOS 4.0 3.7 3.6     Recent Labs     07/29/20 0530 07/28/20  0538 07/27/20  0543   ALB 2.3* 2.3* 2.3*     No results for input(s): INR, PTP, APTT, INREXT in the last 72 hours. No results for input(s): FE, TIBC, PSAT, FERR in the last 72 hours. No results found for: FOL, RBCF   No results for input(s): PH, PCO2, PO2 in the last 72 hours. No results for input(s): CPK, CKNDX, TROIQ in the last 72 hours.     No lab exists for component: CPKMB  No results found for: CHOL, CHOLX, CHLST, CHOLV, HDL, HDLP, LDL, LDLC, DLDLP, TGLX, TRIGL, TRIGP, CHHD, CHHDX  Lab Results   Component Value Date/Time    Glucose (POC) 105 (H) 07/29/2020 06:23 AM    Glucose (POC) 120 (H) 07/28/2020 09:38 PM    Glucose (POC) 114 (H) 07/28/2020 04:45 PM    Glucose (POC) 120 (H) 07/28/2020 11:26 AM    Glucose (POC) 105 (H) 07/28/2020 06:37 AM     Lab Results   Component Value Date/Time    Color YELLOW/STRAW 07/25/2020 02:15 PM    Appearance TURBID (A) 07/25/2020 02:15 PM Specific gravity 1.008 07/25/2020 02:15 PM    Specific gravity 1.020 07/17/2020 08:08 PM    pH (UA) 6.5 07/25/2020 02:15 PM    Protein 30 (A) 07/25/2020 02:15 PM    Glucose Negative 07/25/2020 02:15 PM    Ketone Negative 07/25/2020 02:15 PM    Bilirubin Negative 07/25/2020 02:15 PM    Urobilinogen 0.2 07/25/2020 02:15 PM    Nitrites Negative 07/25/2020 02:15 PM    Leukocyte Esterase LARGE (A) 07/25/2020 02:15 PM    Epithelial cells FEW 07/25/2020 02:15 PM    Bacteria 1+ (A) 07/25/2020 02:15 PM    WBC >100 (H) 07/25/2020 02:15 PM    RBC 5-10 07/25/2020 02:15 PM         Medications Reviewed:     Current Facility-Administered Medications   Medication Dose Route Frequency    melatonin tablet 9 mg  9 mg Oral QHS    cefepime (MAXIPIME) 1 g in 0.9% sodium chloride (MBP/ADV) 50 mL  1 g IntraVENous Q24H    morphine injection 2 mg  2 mg IntraVENous Q4H PRN    epoetin frieda-epbx (RETACRIT) injection 20,000 Units  20,000 Units SubCUTAneous Q7D    0.9% sodium chloride infusion  75 mL/hr IntraVENous CONTINUOUS    traMADoL (ULTRAM) tablet 50 mg  50 mg Oral Q8H PRN    fentaNYL citrate (PF) injection 25 mcg  25 mcg IntraVENous Q4H PRN    lidocaine (URO-JET/ GLYDO) 2 % jelly   Urethral DAILY PRN    glucose chewable tablet 16 g  4 Tab Oral PRN    glucagon (GLUCAGEN) injection 1 mg  1 mg IntraMUSCular PRN    dextrose 10% infusion 0-250 mL  0-250 mL IntraVENous PRN    pantoprazole (PROTONIX) tablet 40 mg  40 mg Oral DAILY    acetaminophen (TYLENOL) tablet 500 mg  500 mg Oral Q6H PRN    opium-belladonna (B&O 15-A) 16.2-30 mg suppository 1 Suppository  1 Suppository Rectal Q8H PRN    oxybutynin chloride XL (DITROPAN XL) tablet 10 mg  10 mg Oral DAILY    insulin lispro (HUMALOG) injection   SubCUTAneous AC&HS    sodium chloride (NS) flush 5-10 mL  5-10 mL IntraVENous PRN     ______________________________________________________________________  EXPECTED LENGTH OF STAY: 5d 12h  ACTUAL LENGTH OF STAY: [address], NP

## 2020-07-29 NOTE — PROGRESS NOTES
JOESPH: home with sister and home health    RUR: 18%    Response received from [facility] ([phone]) that they can accept patient for home health services and do travel to [place], [place]. They will need to be called once patient is discharged with any updates on orders (current orders are only for PT and OT) and discharge summary will need to be faxed as well. CM notified patient of the good news.     Colton Landa BSW, ACM

## 2020-07-29 NOTE — PROGRESS NOTES
Bedside shift change report given to Kassi Tijerina RN (oncoming nurse) by Joy Jon RN (offgoing nurse). Report included the following information SBAR, Kardex, Intake/Output, MAR and Recent Results.

## 2020-07-29 NOTE — PROGRESS NOTES
Bedside shift change report given to Jeramie Bridges (oncoming nurse) by Gilma Hess RN (offgoing nurse). Report included the following information SBAR and Kardex.

## 2020-07-30 LAB
ALBUMIN SERPL-MCNC: 2.4 G/DL (ref 3.5–5)
ANION GAP SERPL CALC-SCNC: 7 MMOL/L (ref 5–15)
BASOPHILS # BLD: 0.2 K/UL (ref 0–0.1)
BASOPHILS NFR BLD: 2 % (ref 0–1)
BUN SERPL-MCNC: 37 MG/DL (ref 6–20)
BUN/CREAT SERPL: 12 (ref 12–20)
CALCIUM SERPL-MCNC: 8.4 MG/DL (ref 8.5–10.1)
CHLORIDE SERPL-SCNC: 115 MMOL/L (ref 97–108)
CO2 SERPL-SCNC: 21 MMOL/L (ref 21–32)
CREAT SERPL-MCNC: 3.08 MG/DL (ref 0.55–1.02)
DIFFERENTIAL METHOD BLD: ABNORMAL
EOSINOPHIL # BLD: 0 K/UL (ref 0–0.4)
EOSINOPHIL NFR BLD: 0 % (ref 0–7)
ERYTHROCYTE [DISTWIDTH] IN BLOOD BY AUTOMATED COUNT: 16.5 % (ref 11.5–14.5)
GLUCOSE BLD STRIP.AUTO-MCNC: 120 MG/DL (ref 65–100)
GLUCOSE BLD STRIP.AUTO-MCNC: 123 MG/DL (ref 65–100)
GLUCOSE BLD STRIP.AUTO-MCNC: 129 MG/DL (ref 65–100)
GLUCOSE BLD STRIP.AUTO-MCNC: 139 MG/DL (ref 65–100)
GLUCOSE BLD STRIP.AUTO-MCNC: 140 MG/DL (ref 65–100)
GLUCOSE SERPL-MCNC: 109 MG/DL (ref 65–100)
HCT VFR BLD AUTO: 23.2 % (ref 35–47)
HGB BLD-MCNC: 6.7 G/DL (ref 11.5–16)
IMM GRANULOCYTES # BLD AUTO: 0 K/UL
IMM GRANULOCYTES NFR BLD AUTO: 0 %
LYMPHOCYTES # BLD: 2.7 K/UL (ref 0.8–3.5)
LYMPHOCYTES NFR BLD: 23 % (ref 12–49)
MCH RBC QN AUTO: 28.6 PG (ref 26–34)
MCHC RBC AUTO-ENTMCNC: 28.9 G/DL (ref 30–36.5)
MCV RBC AUTO: 99.1 FL (ref 80–99)
METAMYELOCYTES NFR BLD MANUAL: 1 %
MONOCYTES # BLD: 0.1 K/UL (ref 0–1)
MONOCYTES NFR BLD: 1 % (ref 5–13)
NEUTS BAND NFR BLD MANUAL: 2 % (ref 0–6)
NEUTS SEG # BLD: 8.5 K/UL (ref 1.8–8)
NEUTS SEG NFR BLD: 71 % (ref 32–75)
NRBC # BLD: 0.03 K/UL (ref 0–0.01)
NRBC BLD-RTO: 0.3 PER 100 WBC
PHOSPHATE SERPL-MCNC: 4 MG/DL (ref 2.6–4.7)
PLATELET # BLD AUTO: 366 K/UL (ref 150–400)
PMV BLD AUTO: 8.8 FL (ref 8.9–12.9)
POTASSIUM SERPL-SCNC: 4.4 MMOL/L (ref 3.5–5.1)
RBC # BLD AUTO: 2.34 M/UL (ref 3.8–5.2)
RBC MORPH BLD: ABNORMAL
RBC MORPH BLD: ABNORMAL
SERVICE CMNT-IMP: ABNORMAL
SODIUM SERPL-SCNC: 143 MMOL/L (ref 136–145)
WBC # BLD AUTO: 11.7 K/UL (ref 3.6–11)

## 2020-07-30 PROCEDURE — 74011250637 HC RX REV CODE- 250/637: Performed by: INTERNAL MEDICINE

## 2020-07-30 PROCEDURE — 97110 THERAPEUTIC EXERCISES: CPT

## 2020-07-30 PROCEDURE — 97530 THERAPEUTIC ACTIVITIES: CPT

## 2020-07-30 PROCEDURE — 36415 COLL VENOUS BLD VENIPUNCTURE: CPT

## 2020-07-30 PROCEDURE — 74011250637 HC RX REV CODE- 250/637: Performed by: NURSE PRACTITIONER

## 2020-07-30 PROCEDURE — 65270000032 HC RM SEMIPRIVATE

## 2020-07-30 PROCEDURE — 74011250636 HC RX REV CODE- 250/636: Performed by: INTERNAL MEDICINE

## 2020-07-30 PROCEDURE — 74011250637 HC RX REV CODE- 250/637: Performed by: UROLOGY

## 2020-07-30 PROCEDURE — 85025 COMPLETE CBC W/AUTO DIFF WBC: CPT

## 2020-07-30 PROCEDURE — 74011250636 HC RX REV CODE- 250/636: Performed by: NURSE PRACTITIONER

## 2020-07-30 PROCEDURE — 82962 GLUCOSE BLOOD TEST: CPT

## 2020-07-30 PROCEDURE — 74011000258 HC RX REV CODE- 258: Performed by: NURSE PRACTITIONER

## 2020-07-30 PROCEDURE — 80069 RENAL FUNCTION PANEL: CPT

## 2020-07-30 RX ADMIN — MEROPENEM 500 MG: 500 INJECTION, POWDER, FOR SOLUTION INTRAVENOUS at 06:14

## 2020-07-30 RX ADMIN — MEROPENEM 500 MG: 500 INJECTION, POWDER, FOR SOLUTION INTRAVENOUS at 14:33

## 2020-07-30 RX ADMIN — PANTOPRAZOLE SODIUM 40 MG: 40 TABLET, DELAYED RELEASE ORAL at 08:14

## 2020-07-30 RX ADMIN — SODIUM CHLORIDE 75 ML/HR: 900 INJECTION, SOLUTION INTRAVENOUS at 06:14

## 2020-07-30 RX ADMIN — SODIUM CHLORIDE 75 ML/HR: 900 INJECTION, SOLUTION INTRAVENOUS at 20:16

## 2020-07-30 RX ADMIN — MEROPENEM 500 MG: 500 INJECTION, POWDER, FOR SOLUTION INTRAVENOUS at 22:48

## 2020-07-30 RX ADMIN — TRAMADOL HYDROCHLORIDE 50 MG: 50 TABLET, FILM COATED ORAL at 08:20

## 2020-07-30 RX ADMIN — OXYBUTYNIN CHLORIDE 10 MG: 5 TABLET, EXTENDED RELEASE ORAL at 08:14

## 2020-07-30 RX ADMIN — MELATONIN 9 MG: at 22:48

## 2020-07-30 NOTE — CONSULTS
ID Consult Note  NAME:  Severo Elliot   :   1960   MRN:   [id number]   Date/Time:  2020 7:21 PM  Subjective:   REASON FOR CONSULT: Urinary tract infection  Mylene Cabrera is a 62 y.o. with a history of right hydronephrosis, renal failure and nephrolithiasis. Over the past days leading to her admission, she had been complaining of right flank pain which she rated as severe. It would radiate towards her abdomen. she says that it felt like it was cutting her belly. She also had some associated dysuria. She Also developed hematuria. She did not have any relief as an outpatient. She did not have any nausea or vomiting. She did not have any chills. Because of her symptoms, she came to the emergency room where a CT scan revealed right hydronephrosis. Her urinalysis was consistent with a urinary tract infection. She was started on ceftriaxone. She was seen by urology who noted that the right ureteral stent has migrated out of the meatus. On , she had a cystoscopy, right ureteral stent exchange and clot evacuation. She was then placed on continuous bladder irrigation. Despite this, she continued to have dysuria. A repeat urinalysis was done on  and it showed more than 100 white blood cells the culture is growing a Klebsiella pneumoniae that is very resistant. We are now being asked to see her in consult. Coincidentally, her kidney function has worsened during this admission. Her creatinine peaked to about four 4.7 it is now in the threes.       Past Medical History:   Diagnosis Date    Diabetes ([address]     Hernia, hiatal     Hypertension     Kidney stones     both kidneys    Osteoarthritis     Renal failure     left kidney      Past Surgical History:   Procedure Laterality Date    HX HYSTERECTOMY      uterus not removed    HX OTHER SURGICAL      right kidney stent     Social History     Tobacco Use    Smoking status: Never Smoker    Smokeless tobacco: Never Used   Substance Use Topics    Alcohol use: Never     Frequency: Never   She does not engage in recreational drug use    Family history    Her mother had lung cancer. Her father had a myocardial infarction. Both parents were diabetics. Allergies   Allergen Reactions    Bacitracin Other (comments)     Syncope and lip swelling. Home Medications:  Prior to Admission Medications   Prescriptions Last Dose Informant Patient Reported? Taking?   acetaminophen (TYLENOL) 500 mg tablet   Yes Yes   Sig: Take 500 mg by mouth every eight (8) hours as needed for Pain.   amoxicillin 500 mg tab   Yes Yes   Sig: Take  by mouth daily. ascorbic acid, vitamin C, (Vitamin C) 500 mg tablet   Yes Yes   Sig: Take 500 mg by mouth two (2) times a day. aspirin delayed-release 325 mg tablet   Yes Yes   Sig: Take 325 mg by mouth every six (6) hours as needed for Pain. cephALEXin (KEFLEX) 500 mg capsule   Yes Yes   Sig: Take 500 mg by mouth two (2) times a day. ferrous fumarate 324 mg (106 mg iron) tab   Yes Yes   Sig: Take 1 Tab by mouth daily. folic acid (FOLVITE) 1 mg tablet   Yes Yes   Sig: Take  by mouth daily. gabapentin (NEURONTIN) 100 mg capsule   Yes No   Sig: Take  by mouth three (3) times daily. glipiZIDE (GLUCOTROL) 5 mg tablet   Yes Yes   Sig: Take  by mouth two (2) times a day. multivitamin tablet   Yes Yes   Sig: Take 1 Tab by mouth daily. ondansetron hcl (Zofran) 8 mg tablet   Yes Yes   Sig: Take 8 mg by mouth every eight (8) hours as needed for Nausea or Vomiting. pantoprazole (PROTONIX) 40 mg tablet   Yes No   Sig: Take 40 mg by mouth daily. senna-docusate (Senokot-S) 8.6-50 mg per tablet   Yes Yes   Sig: Take 1 Tab by mouth daily. traMADoL (ULTRAM) 50 mg tablet   Yes No   Sig: Take  mg by mouth every eight (8) hours as needed for Pain.       Facility-Administered Medications: None     Hospital medications:  Current Facility-Administered Medications   Medication Dose Route Frequency    meropenem (MERREM) 500 mg in 0.9% sodium chloride (MBP/ADV) 50 mL  500 mg IntraVENous Q8H    melatonin tablet 9 mg  9 mg Oral QHS    morphine injection 2 mg  2 mg IntraVENous Q4H PRN    epoetin frieda-epbx (RETACRIT) injection 20,000 Units  20,000 Units SubCUTAneous Q7D    0.9% sodium chloride infusion  75 mL/hr IntraVENous CONTINUOUS    traMADoL (ULTRAM) tablet 50 mg  50 mg Oral Q8H PRN    fentaNYL citrate (PF) injection 25 mcg  25 mcg IntraVENous Q4H PRN    lidocaine (URO-JET/ GLYDO) 2 % jelly   Urethral DAILY PRN    glucose chewable tablet 16 g  4 Tab Oral PRN    glucagon (GLUCAGEN) injection 1 mg  1 mg IntraMUSCular PRN    dextrose 10% infusion 0-250 mL  0-250 mL IntraVENous PRN    pantoprazole (PROTONIX) tablet 40 mg  40 mg Oral DAILY    acetaminophen (TYLENOL) tablet 500 mg  500 mg Oral Q6H PRN    opium-belladonna (B&O 15-A) 16.2-30 mg suppository 1 Suppository  1 Suppository Rectal Q8H PRN    oxybutynin chloride XL (DITROPAN XL) tablet 10 mg  10 mg Oral DAILY    insulin lispro (HUMALOG) injection   SubCUTAneous AC&HS    sodium chloride (NS) flush 5-10 mL  5-10 mL IntraVENous PRN     REVIEW OF SYSTEMS:        Const:   negative weight loss  Eyes:   negative diplopia or visual changes, negative eye pain  ENT:   negative coryza, negative sore throat  Resp:   negative cough, hemoptysis, dyspnea  Cards:  negative for chest pain, palpitations  GI:   Negative for hematemesis and hematochezia  Skin:   negative for rash and pruritus  Heme:   negative for easy bruising and gum/nose bleeding  MS:  negative for myalgias, arthralgias  Neurolo:  negative for headaches, dizziness, vertigo, memory problems   Psych:  negative for hallucinations        Objective:   VITALS:    Visit Vitals  /65 (BP 1 Location: Right arm, BP Patient Position: At rest)   Pulse 99   Temp 98.2 °F (36.8 °C)   Resp 18   Ht 5' 4\" (1.626 m)   Wt (!) 161.9 kg (357 lb)   SpO2 97%   Breastfeeding No   BMI 61.28 kg/m²     Temp (24hrs), Av.2 °F (36.8 °C), Min:98 °F (36.7 °C), Max:98.4 °F (36.9 °C)    PHYSICAL EXAM:   General:    Alert, cooperative, no distress, appears stated age. Head:   Normocephalic, without obvious abnormality, atraumatic. Eyes:   Conjunctivae clear, anicteric sclerae. Nose:  Nares normal.   Throat:    Lips and tongue normal.  No Thrush  Neck:  Supple, symmetrical    no carotid bruit and no JVD. :    No CVA tenderness  Lungs:   Clear to auscultation bilaterally. No Wheezing or Rhonchi. No rales. Heart:   Regular rate and rhythm,  no murmur, rub or gallop. Abdomen:   Soft, non-tender,not distended. Bowel sounds normal.   Extremities: Knees, ankles, wrists, elbows are not warm and not tender. No pedal edema  Skin:     No rashes or lesions. Not Jaundiced  Lymph: Cervical normal  Neurologic: Full use of extraocular muscles, no facial asymmetry, tongue midline muscle strength 5 out of 5    LAB DATA REVIEWED:    Recent Results (from the past 48 hour(s))   GLUCOSE, POC    Collection Time: 07/28/20  9:38 PM   Result Value Ref Range    Glucose (POC) 120 (H) 65 - 100 mg/dL    Performed by [employer]    CBC WITH AUTOMATED DIFF    Collection Time: 07/29/20  5:30 AM   Result Value Ref Range    WBC 12.1 (H) 3.6 - 11.0 K/uL    RBC 2.28 (L) 3.80 - 5.20 M/uL    HGB 6.5 (L) 11.5 - 16.0 g/dL    HCT 22.6 (L) 35.0 - 47.0 %    MCV 99.1 (H) 80.0 - 99.0 FL    MCH 28.5 26.0 - 34.0 PG    MCHC 28.8 (L) 30.0 - 36.5 g/dL    RDW 16.4 (H) 11.5 - 14.5 %    PLATELET 422 210 - 640 K/uL    MPV 9.0 8.9 - 12.9 FL    NRBC 0.2 (H) 0  WBC    ABSOLUTE NRBC 0.03 (H) 0.00 - 0.01 K/uL    NEUTROPHILS 69 32 - 75 %    LYMPHOCYTES 17 12 - 49 %    MONOCYTES 9 5 - 13 %    EOSINOPHILS 3 0 - 7 %    BASOPHILS 0 0 - 1 %    METAMYELOCYTES 2 (H) 0 %    IMMATURE GRANULOCYTES 0 %    ABS. NEUTROPHILS 8.3 (H) 1.8 - 8.0 K/UL    ABS. LYMPHOCYTES 2.1 0.8 - 3.5 K/UL    ABS. MONOCYTES 1.1 (H) 0.0 - 1.0 K/UL    ABS. EOSINOPHILS 0.4 0.0 - 0.4 K/UL    ABS.  BASOPHILS 0.0 0.0 - 0.1 K/UL    ABS. IMM.  GRANS. 0.0 K/UL    DF MANUAL      RBC COMMENTS ANISOCYTOSIS  1+        RBC COMMENTS POLYCHROMASIA  1+        RBC COMMENTS MACROCYTOSIS  1+       RENAL FUNCTION PANEL    Collection Time: 07/29/20  5:30 AM   Result Value Ref Range    Sodium 143 136 - 145 mmol/L    Potassium 4.2 3.5 - 5.1 mmol/L    Chloride 115 (H) 97 - 108 mmol/L    CO2 20 (L) 21 - 32 mmol/L    Anion gap 8 5 - 15 mmol/L    Glucose 97 65 - 100 mg/dL    BUN 41 (H) 6 - 20 MG/DL    Creatinine 3.18 (H) 0.55 - 1.02 MG/DL    BUN/Creatinine ratio 13 12 - 20      GFR est AA 18 (L) >60 ml/min/1.73m2    GFR est non-AA 15 (L) >60 ml/min/1.73m2    Calcium 8.4 (L) 8.5 - 10.1 MG/DL    Phosphorus 4.0 2.6 - 4.7 MG/DL    Albumin 2.3 (L) 3.5 - 5.0 g/dL   GLUCOSE, POC    Collection Time: 07/29/20  6:23 AM   Result Value Ref Range    Glucose (POC) 105 (H) 65 - 100 mg/dL    Performed by Columba Whelan    GLUCOSE, POC    Collection Time: 07/29/20 11:34 AM   Result Value Ref Range    Glucose (POC) 145 (H) 65 - 100 mg/dL    Performed by Trice Martinez    GLUCOSE, POC    Collection Time: 07/29/20  4:17 PM   Result Value Ref Range    Glucose (POC) 122 (H) 65 - 100 mg/dL    Performed by Trice Martinez    GLUCOSE, POC    Collection Time: 07/29/20  9:07 PM   Result Value Ref Range    Glucose (POC) 137 (H) 65 - 100 mg/dL    Performed by Wm Lueng    CBC WITH AUTOMATED DIFF    Collection Time: 07/30/20  5:02 AM   Result Value Ref Range    WBC 11.7 (H) 3.6 - 11.0 K/uL    RBC 2.34 (L) 3.80 - 5.20 M/uL    HGB 6.7 (L) 11.5 - 16.0 g/dL    HCT 23.2 (L) 35.0 - 47.0 %    MCV 99.1 (H) 80.0 - 99.0 FL    MCH 28.6 26.0 - 34.0 PG    MCHC 28.9 (L) 30.0 - 36.5 g/dL    RDW 16.5 (H) 11.5 - 14.5 %    PLATELET 106 865 - 565 K/uL    MPV 8.8 (L) 8.9 - 12.9 FL    NRBC 0.3 (H) 0  WBC    ABSOLUTE NRBC 0.03 (H) 0.00 - 0.01 K/uL    NEUTROPHILS 71 32 - 75 %    BAND NEUTROPHILS 2 0 - 6 %    LYMPHOCYTES 23 12 - 49 %    MONOCYTES 1 (L) 5 - 13 %    EOSINOPHILS 0 0 - 7 %    BASOPHILS 2 (H) 0 - 1 %    METAMYELOCYTES 1 (H) 0 %    IMMATURE GRANULOCYTES 0 %    ABS. NEUTROPHILS 8.5 (H) 1.8 - 8.0 K/UL    ABS. LYMPHOCYTES 2.7 0.8 - 3.5 K/UL    ABS. MONOCYTES 0.1 0.0 - 1.0 K/UL    ABS. EOSINOPHILS 0.0 0.0 - 0.4 K/UL    ABS. BASOPHILS 0.2 (H) 0.0 - 0.1 K/UL    ABS. IMM. GRANS. 0.0 K/UL    DF MANUAL      RBC COMMENTS ANISOCYTOSIS  1+        RBC COMMENTS POLYCHROMASIA  PRESENT       RENAL FUNCTION PANEL    Collection Time: 07/30/20  5:02 AM   Result Value Ref Range    Sodium 143 136 - 145 mmol/L    Potassium 4.4 3.5 - 5.1 mmol/L    Chloride 115 (H) 97 - 108 mmol/L    CO2 21 21 - 32 mmol/L    Anion gap 7 5 - 15 mmol/L    Glucose 109 (H) 65 - 100 mg/dL    BUN 37 (H) 6 - 20 MG/DL    Creatinine 3.08 (H) 0.55 - 1.02 MG/DL    BUN/Creatinine ratio 12 12 - 20      GFR est AA 19 (L) >60 ml/min/1.73m2    GFR est non-AA 15 (L) >60 ml/min/1.73m2    Calcium 8.4 (L) 8.5 - 10.1 MG/DL    Phosphorus 4.0 2.6 - 4.7 MG/DL    Albumin 2.4 (L) 3.5 - 5.0 g/dL   GLUCOSE, POC    Collection Time: 07/30/20  6:40 AM   Result Value Ref Range    Glucose (POC) 120 (H) 65 - 100 mg/dL    Performed by Barbara Zapata    GLUCOSE, POC    Collection Time: 07/30/20 10:27 AM   Result Value Ref Range    Glucose (POC) 140 (H) 65 - 100 mg/dL    Performed by Cami Lang    GLUCOSE, POC    Collection Time: 07/30/20 11:56 AM   Result Value Ref Range    Glucose (POC) 139 (H) 65 - 100 mg/dL    Performed by Cami Lang    GLUCOSE, POC    Collection Time: 07/30/20  4:16 PM   Result Value Ref Range    Glucose (POC) 123 (H) 65 - 100 mg/dL    Performed by Diamond Cheung    #1 complicated urinary tract infection    #2 renal failure    #3 right hydronephrosis status post stent exchange    #4 diabetes    #5 hypertension    PLAN    She is growing a very resistant Klebsiella. There are no oral options. She will need 10 days of Merrem.                    ___________________________________________________  ID: Telly Red MD

## 2020-07-30 NOTE — PROGRESS NOTES
Patient name: Isis Landeros  MRN: [id number]    Nephrology Progress note:    Assessment:  DAPHNE on CKD-3/4?: Cr finally showing improvement over the last few days-> Cr down to 3.8mg/dl after showing fluctuations s/p right ureteral stent replacement. Baseline Cr? Chronic obstructive uropathy/Atrophic left kidney/DM nephropathy. Followed by Dr. Judie Good poor outpt follow up. Last seen in 2015.     Chronic right hydronephrosis: [id number] stent exchange. Missed her last one due to COVID pandemic     Metabolic acidosis: NAGMA-> better with IV Bicarb     UTI     Anemia: Hgb sub-optimal. [facility]. IV Iron/CATHIE     DM2: HgbA1c 6.6     Chronic sacral decubiti    Plan/Recommendations:  Very anemic,   Renal function better  Cathie. ..   Will follow  Subjective:  No issues except weak      Visit Vitals  /64 (BP 1 Location: Right arm, BP Patient Position: At rest)   Pulse 86   Temp 98.2 °F (36.8 °C)   Resp 18   Ht 5' 4\" (1.626 m)   Wt (!) 161.9 kg (357 lb)   SpO2 96%   Breastfeeding No   BMI 61.28 kg/m²     Wt Readings from Last 3 Encounters:   07/30/20 (!) 161.9 kg (357 lb)       Intake/Output Summary (Last 24 hours) at 7/30/2020 0905  Last data filed at 7/30/2020 0451  Gross per 24 hour   Intake    Output 4100 ml   Net -4100 ml       Gen: NAD           Labs/Data:    Lab Results   Component Value Date/Time    WBC 11.7 (H) 07/30/2020 05:02 AM    HGB 6.7 (L) 07/30/2020 05:02 AM    HCT 23.2 (L) 07/30/2020 05:02 AM    PLATELET 933 25/94/4962 05:02 AM    MCV 99.1 (H) 07/30/2020 05:02 AM Lab Results   Component Value Date/Time    Sodium 143 07/30/2020 05:02 AM    Potassium 4.4 07/30/2020 05:02 AM    Chloride 115 (H) 07/30/2020 05:02 AM    CO2 21 07/30/2020 05:02 AM    Anion gap 7 07/30/2020 05:02 AM    Glucose 109 (H) 07/30/2020 05:02 AM    BUN 37 (H) 07/30/2020 05:02 AM    Creatinine 3.08 (H) 07/30/2020 05:02 AM    BUN/Creatinine ratio 12 07/30/2020 05:02 AM    GFR est AA 19 (L) 07/30/2020 05:02 AM    GFR est non-AA 15 (L) 07/30/2020 05:02 AM    Calcium 8.4 (L) 07/30/2020 05:02 AM

## 2020-07-30 NOTE — PROGRESS NOTES
[address] [facility] Adult  Hospitalist Group                                                                                          Hospitalist Progress Note  Vernon Muñoz MD  Answering service: [phone] OR [phone] from in house phone        Date of Service:  2020  NAME:  Melissa Sutton  :  1960  MRN:  [id number]      Admission Summary:     Bright Devlin a 62 y.o. female with past medical history significant for diabetes mellitus type 2, obesity, CKD, history of kidney stones, right hydronephrosis and ureter multiple stents placement q 3 month stent exchange, missed her last one due to COVID pandemic, presented to the emergency room with right flank pain and hematuria. Patient has been experiencing urinary symptoms and flank pain for several days now. She has been in the emergency multiple times but discharged home with antibiotic therapy. Evelyn Mckay has tried amoxicillin, Keflex and Cipro without improvement of her symptoms. She started noticing blood in the urine for which she came to the emergency room.  In the ED it was noted that her ureter stent was coming out through her urethra.  She had a CT scan of the abdomen that showed ureteral stent misplacement as well as enlargement of the right kidney.  She was found to have a leukocytosis, febrile and hypotensive. Received Ceftriaxone in the ED.  Urology notified by ED of admission and patient admitted for UTI and hematuria.     Interval history / Subjective:     She said she feels better     Assessment & Plan:     Sepsis secondary to UTI POA  -on meropenem   -presents with leukocytosis, tachycardia, hypotension and fever meeting sepsis criteria.  -blood cultures: NGTD  -continue rocephin, IVF  -Urinalysis, culture: Klebsiella Pneumoniae    -ID consulted     Hematuria: likely secondary to UTI and malpositioned stent and gross hematuria plus end-stage bladder and urethral abnormality   -Status post removal of old stent and replacement of right stent  -on ditropan  -off CBI, urine cloudy repeat culture- GNR 7/27, Cefepime  -continue prn morphine, tylenol  -Urology on board: 7/18 Cystoscopy, right ureteral stent exchange, clot evacuation, catheter placement.  -christensen out, has purewick catheter now     Acute blood loss anemia due to hematuria  -improving but still low, Hgb 6.7  patient is [employer] and doesn't receive blood transfusion   -recieved IV iron sucrose, epoetin  -monitor H/H     DAPHNE on CKD stage V:  -creatinine improving, cr 3.08  -continue IVF, avoid nephrotoxin   -Nephrology on board     Diabetes   -At baseline controlled with hemoglobin A1c of 6.6  -continue sliding scale and monitor blood sugars     Obesity  -Outpatient follow-up for diet and weight loss program     Debility  -bed bound     Moisture associated skin injury to bilateral gluteal fold  -continue wound care per wound care nurse       Code status: DNR  DVT prophylaxis: SCD    Care Plan discussed with: Patient/Family and Nurse  Anticipated Disposition: TBD  Anticipated Discharge: Greater than 48 hours     Hospital Problems  Date Reviewed: 7/18/2020          Codes Class Noted POA    Hematuria ICD-10-CM: R31.9  ICD-9-CM: 599.70  7/17/2020 Unknown              Vital Signs:    Last 24hrs VS reviewed since prior progress note. Most recent are:  Visit Vitals  /64 (BP 1 Location: Right arm, BP Patient Position: At rest)   Pulse 86   Temp 98.2 °F (36.8 °C)   Resp 18   Ht 5' 4\" (1.626 m)   Wt (!) 161.9 kg (357 lb)   SpO2 96%   Breastfeeding No   BMI 61.28 kg/m²         Intake/Output Summary (Last 24 hours) at 7/30/2020 1256  Last data filed at 7/30/2020 0948  Gross per 24 hour   Intake    Output 5000 ml   Net -5000 ml        Physical Examination:             Constitutional:  No acute distress, cooperative, pleasant    ENT:  Oral mucosa moist, oropharynx benign. Resp:  CTA bilaterally. No wheezing/rhonchi/rales.  No accessory muscle use   CV:  Regular rhythm, normal rate, no murmurs, gallops, rubs    GI:  Soft, non distended, non tender. normoactive bowel sounds, no hepatosplenomegaly     Musculoskeletal:  No edema     Neurologic:  Conscious and alert, AAOx3, motor UE 5/5, LE 4/5, CN II-XII reviewed     Skin:  no skin rash       Data Review:    Review and/or order of clinical lab test  Review and/or order of tests in the radiology section of CPT  Review and/or order of tests in the medicine section of CPT      Labs:     Recent Labs     07/30/20  0502 07/29/20  0530   WBC 11.7* 12.1*   HGB 6.7* 6.5*   HCT 23.2* 22.6*    369     Recent Labs     07/30/20  0502 07/29/20  0530 07/28/20  0538    143 144   K 4.4 4.2 4.8   * 115* 115*   CO2 21 20* 22   BUN 37* 41* 45*   CREA 3.08* 3.18* 3.18*   * 97 105*   CA 8.4* 8.4* 8.5   PHOS 4.0 4.0 3.7     Recent Labs     07/30/20  0502 07/29/20  0530 07/28/20  0538   ALB 2.4* 2.3* 2.3*     No results for input(s): INR, PTP, APTT, INREXT in the last 72 hours. No results for input(s): FE, TIBC, PSAT, FERR in the last 72 hours. No results found for: FOL, RBCF   No results for input(s): PH, PCO2, PO2 in the last 72 hours. No results for input(s): CPK, CKNDX, TROIQ in the last 72 hours.     No lab exists for component: CPKMB  No results found for: CHOL, CHOLX, CHLST, CHOLV, HDL, HDLP, LDL, LDLC, DLDLP, TGLX, TRIGL, TRIGP, CHHD, CHHDX  Lab Results   Component Value Date/Time    Glucose (POC) 139 (H) 07/30/2020 11:56 AM    Glucose (POC) 140 (H) 07/30/2020 10:27 AM    Glucose (POC) 120 (H) 07/30/2020 06:40 AM    Glucose (POC) 137 (H) 07/29/2020 09:07 PM    Glucose (POC) 122 (H) 07/29/2020 04:17 PM     Lab Results   Component Value Date/Time    Color YELLOW/STRAW 07/25/2020 02:15 PM    Appearance TURBID (A) 07/25/2020 02:15 PM    Specific gravity 1.008 07/25/2020 02:15 PM    Specific gravity 1.020 07/17/2020 08:08 PM    pH (UA) 6.5 07/25/2020 02:15 PM    Protein 30 (A) 07/25/2020 02:15 PM    Glucose Negative 07/25/2020 02:15 PM Ketone Negative 07/25/2020 02:15 PM    Bilirubin Negative 07/25/2020 02:15 PM    Urobilinogen 0.2 07/25/2020 02:15 PM    Nitrites Negative 07/25/2020 02:15 PM    Leukocyte Esterase LARGE (A) 07/25/2020 02:15 PM    Epithelial cells FEW 07/25/2020 02:15 PM    Bacteria 1+ (A) 07/25/2020 02:15 PM    WBC >100 (H) 07/25/2020 02:15 PM    RBC 5-10 07/25/2020 02:15 PM         Medications Reviewed:     Current Facility-Administered Medications   Medication Dose Route Frequency    meropenem (MERREM) 500 mg in 0.9% sodium chloride (MBP/ADV) 50 mL  500 mg IntraVENous Q8H    melatonin tablet 9 mg  9 mg Oral QHS    morphine injection 2 mg  2 mg IntraVENous Q4H PRN    epoetin frieda-epbx (RETACRIT) injection 20,000 Units  20,000 Units SubCUTAneous Q7D    0.9% sodium chloride infusion  75 mL/hr IntraVENous CONTINUOUS    traMADoL (ULTRAM) tablet 50 mg  50 mg Oral Q8H PRN    fentaNYL citrate (PF) injection 25 mcg  25 mcg IntraVENous Q4H PRN    lidocaine (URO-JET/ GLYDO) 2 % jelly   Urethral DAILY PRN    glucose chewable tablet 16 g  4 Tab Oral PRN    glucagon (GLUCAGEN) injection 1 mg  1 mg IntraMUSCular PRN    dextrose 10% infusion 0-250 mL  0-250 mL IntraVENous PRN    pantoprazole (PROTONIX) tablet 40 mg  40 mg Oral DAILY    acetaminophen (TYLENOL) tablet 500 mg  500 mg Oral Q6H PRN    opium-belladonna (B&O 15-A) 16.2-30 mg suppository 1 Suppository  1 Suppository Rectal Q8H PRN    oxybutynin chloride XL (DITROPAN XL) tablet 10 mg  10 mg Oral DAILY    insulin lispro (HUMALOG) injection   SubCUTAneous AC&HS    sodium chloride (NS) flush 5-10 mL  5-10 mL IntraVENous PRN     ______________________________________________________________________  EXPECTED LENGTH OF STAY: 5d 12h  ACTUAL LENGTH OF STAY:          11                 Michael Grossman MD

## 2020-07-30 NOTE — PROGRESS NOTES
Bedside shift change report given to [facility] (oncoming nurse) by Shania Paige (offgoing nurse). Report included the following information SBAR.

## 2020-07-30 NOTE — PROGRESS NOTES
Problem: Mobility Impaired (Adult and Pediatric)  Goal: *Acute Goals and Plan of Care (Insert Text)  Description: FUNCTIONAL STATUS PRIOR TO ADMISSION: Pt has been essentially bed bound since December 10th. Pt reported she was walking with a RW short distances prior to this. Pt had sustained a fall in October and had gone to rehab and then fell again in December. HOME SUPPORT PRIOR TO ADMISSION: The patient lived with her sister. Physical Therapy Goals  Initiated 7/20/2020, reassessment completed 7/25/20 and goals remain appropriate at this time. 1.  Patient will move from supine to sit and sit to supine , scoot up and down, and roll side to side in bed with minimal assistance/contact guard assist within 7 day(s). 2.  Patient will transfer from bed to chair and chair to bed with moderate assistance (squat pivot versus sliding board) using the least restrictive device within 7 day(s). 3.  Patient will tolerate seated EOB x 10 minutes with supervision within 7 days in prep for transfers OOB    Outcome: Progressing Towards Goal   PHYSICAL THERAPY TREATMENT  Patient: Gretchen Medrano (89 y.o. female)  Date: 7/30/2020  Diagnosis: Hematuria [R31.9]  Hematuria [R31.9]   <principal problem not specified>  Procedure(s) (LRB):  CYSTOSCOPY RIGHT URETERAL STENT EXCHANGE (Right) 12 Days Post-Op  Precautions: Fall  Chart, physical therapy assessment, plan of care and goals were reviewed. ASSESSMENT  Patient continues with skilled PT services and is slowly progressing towards goals. Pt eager to participate and motivated to increase LOF. Barriers to function with mobility remain L>R LE weakness, impaired balance, body habitus, general deconditioning. Safety with mobility further complicated by bed height. Pt tolerated EOB activity for LE strengthening and scooting along edge toward L.  Demo tendency to slide forward with fatigue; provided guarding bilat knees and assist with sliding back onto bed, however pt ultimately required max A x 3 to return to supine. Instructed pt in additional supine LE strength ex. Pt demo good effort with all activities. Will continue to progress mobility training as tolerated. Current Level of Function Impacting Discharge (mobility/balance): supine to sit min A, sit to supine max A x 3    Other factors to consider for discharge: pt bed bound since Dec, highly motivated to participate          PLAN :  Patient continues to benefit from skilled intervention to address the above impairments. Continue treatment per established plan of care. to address goals. Recommendation for discharge: (in order for the patient to meet his/her long term goals)  Physical therapy at least 2 days/week in the home AND ensure assist and/or supervision for safety with mobility    This discharge recommendation:  Has been made in collaboration with the attending provider and/or case management    IF patient discharges home will need the following DME: to be determined (TBD)       SUBJECTIVE:   Patient stated I really want to work to get back to where I was.     OBJECTIVE DATA SUMMARY:   Critical Behavior:  Neurologic State: Alert  Orientation Level: Oriented X4  Cognition: Follows commands  Safety/Judgement: Awareness of environment, Insight into deficits  Functional Mobility Training:  Bed Mobility:     Supine to Sit: Minimum assistance; Additional time(use of bed rail)  Sit to Supine: Maximum assistance;Assist x2  Scooting: Maximum assistance(mod A for small scoot to L, max A for scoot back onto bed)        Transfers:      deferred                             Balance:  Sitting: Impaired  Sitting - Static: Good (unsupported)  Sitting - Dynamic: Fair (occasional)(complicated by bed height )    Therapeutic Exercises:   Seated: LAQs, ham curls with green Tband, hip abd with green Tband, heel raises; supine: assisted hip abd, bridging  Pain Rating:  Pt report pain L buttock and bilat knees    Activity Tolerance: Good  Please refer to the flowsheet for vital signs taken during this treatment. After treatment patient left in no apparent distress:   Supine in bed, Heels elevated for pressure relief, Call bell within reach, and Side rails x 3    COMMUNICATION/COLLABORATION:   The patients plan of care was discussed with: Registered nurse.      Marino Luz, PT   Time Calculation: 50 mins

## 2020-07-30 NOTE — ROUTINE PROCESS
Bedside shift change report given to Marlene 91Minerva (oncoming nurse) by Uriel Guadalupe RN (offgoing nurse). Report included the following information SBAR and Kardex.

## 2020-07-31 LAB
ALBUMIN SERPL-MCNC: 2.4 G/DL (ref 3.5–5)
ALBUMIN/GLOB SERPL: 0.5 {RATIO} (ref 1.1–2.2)
ALP SERPL-CCNC: 48 U/L (ref 45–117)
ALT SERPL-CCNC: 7 U/L (ref 12–78)
ANION GAP SERPL CALC-SCNC: 7 MMOL/L (ref 5–15)
AST SERPL-CCNC: 7 U/L (ref 15–37)
BILIRUB SERPL-MCNC: 0.1 MG/DL (ref 0.2–1)
BUN SERPL-MCNC: 36 MG/DL (ref 6–20)
BUN/CREAT SERPL: 12 (ref 12–20)
CALCIUM SERPL-MCNC: 8.5 MG/DL (ref 8.5–10.1)
CHLORIDE SERPL-SCNC: 116 MMOL/L (ref 97–108)
CO2 SERPL-SCNC: 21 MMOL/L (ref 21–32)
CREAT SERPL-MCNC: 2.94 MG/DL (ref 0.55–1.02)
ERYTHROCYTE [DISTWIDTH] IN BLOOD BY AUTOMATED COUNT: 16.5 % (ref 11.5–14.5)
GLOBULIN SER CALC-MCNC: 4.5 G/DL (ref 2–4)
GLUCOSE BLD STRIP.AUTO-MCNC: 113 MG/DL (ref 65–100)
GLUCOSE BLD STRIP.AUTO-MCNC: 117 MG/DL (ref 65–100)
GLUCOSE BLD STRIP.AUTO-MCNC: 117 MG/DL (ref 65–100)
GLUCOSE BLD STRIP.AUTO-MCNC: 126 MG/DL (ref 65–100)
GLUCOSE SERPL-MCNC: 114 MG/DL (ref 65–100)
HCT VFR BLD AUTO: 23.7 % (ref 35–47)
HGB BLD-MCNC: 6.8 G/DL (ref 11.5–16)
MCH RBC QN AUTO: 28.5 PG (ref 26–34)
MCHC RBC AUTO-ENTMCNC: 28.7 G/DL (ref 30–36.5)
MCV RBC AUTO: 99.2 FL (ref 80–99)
NRBC # BLD: 0.05 K/UL (ref 0–0.01)
NRBC BLD-RTO: 0.4 PER 100 WBC
PLATELET # BLD AUTO: 394 K/UL (ref 150–400)
PMV BLD AUTO: 9 FL (ref 8.9–12.9)
POTASSIUM SERPL-SCNC: 4.2 MMOL/L (ref 3.5–5.1)
PROT SERPL-MCNC: 6.9 G/DL (ref 6.4–8.2)
RBC # BLD AUTO: 2.39 M/UL (ref 3.8–5.2)
SERVICE CMNT-IMP: ABNORMAL
SODIUM SERPL-SCNC: 144 MMOL/L (ref 136–145)
WBC # BLD AUTO: 12.1 K/UL (ref 3.6–11)

## 2020-07-31 PROCEDURE — 74011250636 HC RX REV CODE- 250/636: Performed by: NURSE PRACTITIONER

## 2020-07-31 PROCEDURE — 74011000258 HC RX REV CODE- 258: Performed by: NURSE PRACTITIONER

## 2020-07-31 PROCEDURE — 74011250637 HC RX REV CODE- 250/637: Performed by: INTERNAL MEDICINE

## 2020-07-31 PROCEDURE — 82962 GLUCOSE BLOOD TEST: CPT

## 2020-07-31 PROCEDURE — 74011250637 HC RX REV CODE- 250/637: Performed by: UROLOGY

## 2020-07-31 PROCEDURE — 65270000032 HC RM SEMIPRIVATE

## 2020-07-31 PROCEDURE — 36415 COLL VENOUS BLD VENIPUNCTURE: CPT

## 2020-07-31 PROCEDURE — 74011250636 HC RX REV CODE- 250/636: Performed by: INTERNAL MEDICINE

## 2020-07-31 PROCEDURE — 74011000250 HC RX REV CODE- 250: Performed by: HOSPITALIST

## 2020-07-31 PROCEDURE — 80053 COMPREHEN METABOLIC PANEL: CPT

## 2020-07-31 PROCEDURE — 74011250637 HC RX REV CODE- 250/637: Performed by: NURSE PRACTITIONER

## 2020-07-31 PROCEDURE — 85027 COMPLETE CBC AUTOMATED: CPT

## 2020-07-31 RX ORDER — LIDOCAINE 4 G/100G
1 PATCH TOPICAL EVERY 24 HOURS
Status: DISCONTINUED | OUTPATIENT
Start: 2020-07-31 | End: 2020-08-07 | Stop reason: HOSPADM

## 2020-07-31 RX ADMIN — MEROPENEM 500 MG: 500 INJECTION, POWDER, FOR SOLUTION INTRAVENOUS at 14:56

## 2020-07-31 RX ADMIN — Medication 10 ML: at 14:56

## 2020-07-31 RX ADMIN — SODIUM CHLORIDE 75 ML/HR: 900 INJECTION, SOLUTION INTRAVENOUS at 06:28

## 2020-07-31 RX ADMIN — OXYBUTYNIN CHLORIDE 10 MG: 5 TABLET, EXTENDED RELEASE ORAL at 08:57

## 2020-07-31 RX ADMIN — TRAMADOL HYDROCHLORIDE 50 MG: 50 TABLET, FILM COATED ORAL at 14:54

## 2020-07-31 RX ADMIN — TRAMADOL HYDROCHLORIDE 50 MG: 50 TABLET, FILM COATED ORAL at 04:58

## 2020-07-31 RX ADMIN — MEROPENEM 500 MG: 500 INJECTION, POWDER, FOR SOLUTION INTRAVENOUS at 22:48

## 2020-07-31 RX ADMIN — MEROPENEM 500 MG: 500 INJECTION, POWDER, FOR SOLUTION INTRAVENOUS at 06:28

## 2020-07-31 RX ADMIN — PANTOPRAZOLE SODIUM 40 MG: 40 TABLET, DELAYED RELEASE ORAL at 08:57

## 2020-07-31 RX ADMIN — TRAMADOL HYDROCHLORIDE 50 MG: 50 TABLET, FILM COATED ORAL at 22:52

## 2020-07-31 NOTE — PROGRESS NOTES
Physical Therapy  7/31/2020    Chart reviewed. Noted pt w/ low Hgb of 6.8. Unsure of POC regarding PRBC's at this time. Will defer therapy at this time and follow up as able and appropriate.      Laya Means, PTA

## 2020-07-31 NOTE — PROGRESS NOTES
Transition of Care: This CM spoke with [facility] [phone] x [phone] after speaking with Dr Keenan Valencia, patient is not going to need dialysis for dc at this point. Referral has been accepted by [facility] [phone]. Home Health Orders were placed on 7/27/2020 for [facility] PT and OT. ( home health will not be able to see until Tuesday 8/4/2020. This CM supported patient as she was upset about dc planning. Per sister Ny Phillips [phone] patient will require [facility] phone [phone] for transport. It has not been indicated that patient is medically ready for dc. Cm will need to follow. Address that patient will be going to is [facility] and sister is most reachable to the above number. Addendum:    Per Dr Keenan Valencia, patient may need HH SN as well as IV for DC. Per Dr Keenan Valencia this has not been determined as of today 7/31/2020. CM will need to follow.       Ngoc Rizzo  12:01 PM

## 2020-07-31 NOTE — PROGRESS NOTES
[facility]  [facility]ist [facility]                                                                                          Hospitalist Progress Note  Balbir Matias MD  Answering service: [phone] from in house phone        Date of Service:  2020  NAME:  Nicolle Chamberlain  :  1960  MRN:  [id number]      Admission Summary:     Gabby Freed a 62 y.o. female with past medical history significant for diabetes mellitus type 2, obesity, CKD, history of kidney stones, right hydronephrosis and ureter multiple stents placement q 3 month stent exchange, missed her last one due to COVID pandemic, presented to the emergency room with right flank pain and hematuria. Patient has been experiencing urinary symptoms and flank pain for several days now. She has been in the emergency multiple times but discharged home with antibiotic therapy. Tarik Sumner has tried amoxicillin, Keflex and Cipro without improvement of her symptoms. She started noticing blood in the urine for which she came to the emergency room.  In the ED it was noted that her ureter stent was coming out through her urethra.  She had a CT scan of the abdomen that showed ureteral stent misplacement as well as enlargement of the right kidney.  She was found to have a leukocytosis, febrile and hypotensive. Received Ceftriaxone in the ED.  Urology notified by ED of admission and patient admitted for UTI and hematuria.     Interval history / Subjective:     She said she feels better today,      Assessment & Plan:     Sepsis secondary to UTI POA  -on meropenem   -presents with leukocytosis, tachycardia, hypotension and fever meeting sepsis criteria.  -blood cultures: NGTD  -rocephin switched to iv meropenem  -Urinalysis, culture: Klebsiella Pneumoniae    -ID on board, recommended 10 days of iv abx     Hematuria likely secondary to UTI and malpositioned stent and gross hematuria plus end-stage bladder and urethral abnormality   -Status post removal of old stent and replacement of right stent  -on ditropan  -off CBI, urine cloudy repeat culture- GNR 7/27, Cefepime  -continue prn morphine, tylenol  -Urology on board: 7/18 Cystoscopy, right ureteral stent exchange, clot evacuation, catheter placement.  -christensen out, has purewick catheter now     Acute blood loss anemia due to hematuria  -improving but still low, Hgb 6.8 patient is [employer] and doesn't receive blood transfusion   -recieved IV iron sucrose, epoetin  -monitor H/H     DAPHNE on CKD stage V:  -creatinine improving, Creatinine 2.94  -continue IVF, avoid nephrotoxin   -Nephrology on board     T2DM  -At baseline controlled with hemoglobin A1c of 6.6  -continue sliding scale and monitor blood sugars     Obesity  -Outpatient follow-up for diet and weight loss program     Debility  -bed bound     Moisture associated skin injury to bilateral gluteal fold  -continue wound care per wound care nurse       Code status: DNR  DVT prophylaxis: SCD    Care Plan discussed with: Patient/Family and Nurse  Anticipated Disposition: TBD  Anticipated Discharge: Greater than 48 hours     Hospital Problems  Date Reviewed: 7/18/2020          Codes Class Noted POA    Hematuria ICD-10-CM: R31.9  ICD-9-CM: 599.70  7/17/2020 Unknown              Vital Signs:    Last 24hrs VS reviewed since prior progress note. Most recent are:  Visit Vitals  /74 (BP 1 Location: Right arm, BP Patient Position: At rest)   Pulse (!) 105   Temp 99.1 °F (37.3 °C)   Resp 17   Ht 5' 4\" (1.626 m)   Wt (!) 161.9 kg (357 lb)   SpO2 92%   Breastfeeding No   BMI 61.28 kg/m²         Intake/Output Summary (Last 24 hours) at 7/31/2020 1239  Last data filed at 7/31/2020 0730  Gross per 24 hour   Intake    Output 2550 ml   Net -2550 ml        Physical Examination:             Constitutional:  No acute distress, cooperative, pleasant    ENT:  Oral mucosa moist, oropharynx benign. Resp:  CTA bilaterally. No wheezing/rhonchi/rales.  No accessory muscle use   CV:  Regular rhythm, normal rate, no murmurs, gallops, rubs    GI:  Soft, non distended, non tender. normoactive bowel sounds, no hepatosplenomegaly     Musculoskeletal:  No edema     Neurologic:  Conscious and alert, AAOx3, motor UE 5/5, LE 4/5, CN II-XII reviewed     Skin:  no skin rash       Data Review:    Review and/or order of clinical lab test  Review and/or order of tests in the radiology section of CPT  Review and/or order of tests in the medicine section of CPT      Labs:     Recent Labs     07/31/20  0504 07/30/20  0502   WBC 12.1* 11.7*   HGB 6.8* 6.7*   HCT 23.7* 23.2*    366     Recent Labs     07/31/20  0504 07/30/20  0502 07/29/20  0530    143 143   K 4.2 4.4 4.2   * 115* 115*   CO2 21 21 20*   BUN 36* 37* 41*   CREA 2.94* 3.08* 3.18*   * 109* 97   CA 8.5 8.4* 8.4*   PHOS  --  4.0 4.0     Recent Labs     07/31/20  0504 07/30/20  0502 07/29/20  0530   ALT 7*  --   --    AP 48  --   --    TBILI 0.1*  --   --    TP 6.9  --   --    ALB 2.4* 2.4* 2.3*   GLOB 4.5*  --   --      No results for input(s): INR, PTP, APTT, INREXT, INREXT in the last 72 hours. No results for input(s): FE, TIBC, PSAT, FERR in the last 72 hours. No results found for: FOL, RBCF   No results for input(s): PH, PCO2, PO2 in the last 72 hours. No results for input(s): CPK, CKNDX, TROIQ in the last 72 hours.     No lab exists for component: CPKMB  No results found for: CHOL, CHOLX, CHLST, CHOLV, HDL, HDLP, LDL, LDLC, DLDLP, TGLX, TRIGL, TRIGP, CHHD, CHHDX  Lab Results   Component Value Date/Time    Glucose (POC) 113 (H) 07/31/2020 11:43 AM    Glucose (POC) 126 (H) 07/31/2020 06:35 AM    Glucose (POC) 129 (H) 07/30/2020 09:48 PM    Glucose (POC) 123 (H) 07/30/2020 04:16 PM    Glucose (POC) 139 (H) 07/30/2020 11:56 AM     Lab Results   Component Value Date/Time    Color YELLOW/STRAW 07/25/2020 02:15 PM    Appearance TURBID (A) 07/25/2020 02:15 PM    Specific gravity 1.008 07/25/2020 02:15 PM    Specific gravity 1.020 07/17/2020 08:08 PM    pH (UA) 6.5 07/25/2020 02:15 PM    Protein 30 (A) 07/25/2020 02:15 PM    Glucose Negative 07/25/2020 02:15 PM    Ketone Negative 07/25/2020 02:15 PM    Bilirubin Negative 07/25/2020 02:15 PM    Urobilinogen 0.2 07/25/2020 02:15 PM    Nitrites Negative 07/25/2020 02:15 PM    Leukocyte Esterase LARGE (A) 07/25/2020 02:15 PM    Epithelial cells FEW 07/25/2020 02:15 PM    Bacteria 1+ (A) 07/25/2020 02:15 PM    WBC >100 (H) 07/25/2020 02:15 PM    RBC 5-10 07/25/2020 02:15 PM         Medications Reviewed:     Current Facility-Administered Medications   Medication Dose Route Frequency    lidocaine 4 % patch 1 Patch  1 Patch TransDERmal Q24H    meropenem (MERREM) 500 mg in 0.9% sodium chloride (MBP/ADV) 50 mL  500 mg IntraVENous Q8H    melatonin tablet 9 mg  9 mg Oral QHS    morphine injection 2 mg  2 mg IntraVENous Q4H PRN    epoetin frieda-epbx (RETACRIT) injection 20,000 Units  20,000 Units SubCUTAneous Q7D    0.9% sodium chloride infusion  75 mL/hr IntraVENous CONTINUOUS    traMADoL (ULTRAM) tablet 50 mg  50 mg Oral Q8H PRN    fentaNYL citrate (PF) injection 25 mcg  25 mcg IntraVENous Q4H PRN    lidocaine (URO-JET/ GLYDO) 2 % jelly   Urethral DAILY PRN    glucose chewable tablet 16 g  4 Tab Oral PRN    glucagon (GLUCAGEN) injection 1 mg  1 mg IntraMUSCular PRN    dextrose 10% infusion 0-250 mL  0-250 mL IntraVENous PRN    pantoprazole (PROTONIX) tablet 40 mg  40 mg Oral DAILY    acetaminophen (TYLENOL) tablet 500 mg  500 mg Oral Q6H PRN    opium-belladonna (B&O 15-A) 16.2-30 mg suppository 1 Suppository  1 Suppository Rectal Q8H PRN    oxybutynin chloride XL (DITROPAN XL) tablet 10 mg  10 mg Oral DAILY    insulin lispro (HUMALOG) injection   SubCUTAneous AC&HS    sodium chloride (NS) flush 5-10 mL  5-10 mL IntraVENous PRN     ______________________________________________________________________  EXPECTED LENGTH OF STAY: 5d 12h  ACTUAL LENGTH OF STAY:          12                 Leah Terry MD

## 2020-07-31 NOTE — PROGRESS NOTES
Patient name: Romaine Meza  MRN: [id number]    Nephrology Progress note:    Assessment:  DAHPNE on CKD-3/4?: Cr finally showing improvement over the last few days-> Cr down to 3.8mg/dl after showing fluctuations s/p right ureteral stent replacement. Baseline Cr? Chronic obstructive uropathy/Atrophic left kidney/DM nephropathy. Followed by Dr. Shaquille Molina poor outpt follow up. Last seen in 2015.     Chronic right hydronephrosis: [id number] stent exchange. Missed her last one due to COVID pandemic     Metabolic acidosis: NAGMA-> better with IV Bicarb     UTI     Anemia: Hgb sub-optimal. [employer]. IV Iron/CATHIE     DM2: HgbA1c 6.6     Chronic sacral decubiti    Plan/Recommendations:    anemic,   Renal function better  Cathie. ..   Will follow back on Monday;please call if any questions over the weekend  Subjective:  No issues except weak      Visit Vitals  /74 (BP 1 Location: Right arm, BP Patient Position: At rest)   Pulse (!) 105   Temp 99.1 °F (37.3 °C)   Resp 17   Ht 5' 4\" (1.626 m)   Wt (!) 161.9 kg (357 lb)   SpO2 92%   Breastfeeding No   BMI 61.28 kg/m²     Wt Readings from Last 3 Encounters:   07/30/20 (!) 161.9 kg (357 lb)       Intake/Output Summary (Last 24 hours) at 7/31/2020 0904  Last data filed at 7/31/2020 0730  Gross per 24 hour   Intake    Output 3450 ml   Net -3450 ml       Gen: NAD           Labs/Data:    Lab Results   Component Value Date/Time    WBC 12.1 (H) 07/31/2020 05:04 AM    HGB 6.8 (L) 07/31/2020 05:04 AM    HCT 23.7 (L) 07/31/2020 05:04 AM    PLATELET 342 07/31/2020 05:04 AM    MCV 99.2 (H) 07/31/2020 05:04 AM       Lab Results   Component Value Date/Time    Sodium 144 07/31/2020 05:04 AM    Potassium 4.2 07/31/2020 05:04 AM    Chloride 116 (H) 07/31/2020 05:04 AM    CO2 21 07/31/2020 05:04 AM    Anion gap 7 07/31/2020 05:04 AM    Glucose 114 (H) 07/31/2020 05:04 AM    BUN 36 (H) 07/31/2020 05:04 AM    Creatinine 2.94 (H) 07/31/2020 05:04 AM    BUN/Creatinine ratio 12 07/31/2020 05:04 AM    GFR est AA 20 (L) 07/31/2020 05:04 AM    GFR est non-AA 16 (L) 07/31/2020 05:04 AM    Calcium 8.5 07/31/2020 05:04 AM

## 2020-07-31 NOTE — ROUTINE PROCESS
Bedside shift change report given to Maicol Blake (oncoming nurse) by Karin Turpin RN (offgoing nurse). Report included the following information SBAR and Kardex.

## 2020-08-01 LAB
ALBUMIN SERPL-MCNC: 2.4 G/DL (ref 3.5–5)
ANION GAP SERPL CALC-SCNC: 7 MMOL/L (ref 5–15)
BASOPHILS # BLD: 0 K/UL (ref 0–0.1)
BASOPHILS NFR BLD: 0 % (ref 0–1)
BUN SERPL-MCNC: 34 MG/DL (ref 6–20)
BUN/CREAT SERPL: 12 (ref 12–20)
CALCIUM SERPL-MCNC: 8.3 MG/DL (ref 8.5–10.1)
CHLORIDE SERPL-SCNC: 116 MMOL/L (ref 97–108)
CO2 SERPL-SCNC: 21 MMOL/L (ref 21–32)
CREAT SERPL-MCNC: 2.85 MG/DL (ref 0.55–1.02)
DIFFERENTIAL METHOD BLD: ABNORMAL
EOSINOPHIL # BLD: 0.6 K/UL (ref 0–0.4)
EOSINOPHIL NFR BLD: 5 % (ref 0–7)
ERYTHROCYTE [DISTWIDTH] IN BLOOD BY AUTOMATED COUNT: 17 % (ref 11.5–14.5)
GLUCOSE BLD STRIP.AUTO-MCNC: 103 MG/DL (ref 65–100)
GLUCOSE BLD STRIP.AUTO-MCNC: 122 MG/DL (ref 65–100)
GLUCOSE BLD STRIP.AUTO-MCNC: 126 MG/DL (ref 65–100)
GLUCOSE BLD STRIP.AUTO-MCNC: 146 MG/DL (ref 65–100)
GLUCOSE SERPL-MCNC: 113 MG/DL (ref 65–100)
HCT VFR BLD AUTO: 24.8 % (ref 35–47)
HGB BLD-MCNC: 7.1 G/DL (ref 11.5–16)
IMM GRANULOCYTES # BLD AUTO: 0.2 K/UL (ref 0–0.04)
IMM GRANULOCYTES NFR BLD AUTO: 2 % (ref 0–0.5)
LYMPHOCYTES # BLD: 1.7 K/UL (ref 0.8–3.5)
LYMPHOCYTES NFR BLD: 14 % (ref 12–49)
MCH RBC QN AUTO: 28.4 PG (ref 26–34)
MCHC RBC AUTO-ENTMCNC: 28.6 G/DL (ref 30–36.5)
MCV RBC AUTO: 99.2 FL (ref 80–99)
MONOCYTES # BLD: 0.8 K/UL (ref 0–1)
MONOCYTES NFR BLD: 7 % (ref 5–13)
NEUTS SEG # BLD: 8.5 K/UL (ref 1.8–8)
NEUTS SEG NFR BLD: 72 % (ref 32–75)
NRBC # BLD: 0.06 K/UL (ref 0–0.01)
NRBC BLD-RTO: 0.5 PER 100 WBC
PHOSPHATE SERPL-MCNC: 3.5 MG/DL (ref 2.6–4.7)
PLATELET # BLD AUTO: 376 K/UL (ref 150–400)
PMV BLD AUTO: 8.8 FL (ref 8.9–12.9)
POTASSIUM SERPL-SCNC: 4.2 MMOL/L (ref 3.5–5.1)
RBC # BLD AUTO: 2.5 M/UL (ref 3.8–5.2)
RBC MORPH BLD: ABNORMAL
SERVICE CMNT-IMP: ABNORMAL
SODIUM SERPL-SCNC: 144 MMOL/L (ref 136–145)
WBC # BLD AUTO: 11.8 K/UL (ref 3.6–11)

## 2020-08-01 PROCEDURE — 36415 COLL VENOUS BLD VENIPUNCTURE: CPT

## 2020-08-01 PROCEDURE — 80069 RENAL FUNCTION PANEL: CPT

## 2020-08-01 PROCEDURE — 74011250637 HC RX REV CODE- 250/637: Performed by: INTERNAL MEDICINE

## 2020-08-01 PROCEDURE — 74011250637 HC RX REV CODE- 250/637: Performed by: NURSE PRACTITIONER

## 2020-08-01 PROCEDURE — 74011250637 HC RX REV CODE- 250/637: Performed by: UROLOGY

## 2020-08-01 PROCEDURE — 74011250636 HC RX REV CODE- 250/636: Performed by: NURSE PRACTITIONER

## 2020-08-01 PROCEDURE — 74011250636 HC RX REV CODE- 250/636: Performed by: INTERNAL MEDICINE

## 2020-08-01 PROCEDURE — 65270000032 HC RM SEMIPRIVATE

## 2020-08-01 PROCEDURE — 82962 GLUCOSE BLOOD TEST: CPT

## 2020-08-01 PROCEDURE — 85025 COMPLETE CBC W/AUTO DIFF WBC: CPT

## 2020-08-01 PROCEDURE — 74011000258 HC RX REV CODE- 258: Performed by: NURSE PRACTITIONER

## 2020-08-01 PROCEDURE — 74011000250 HC RX REV CODE- 250: Performed by: HOSPITALIST

## 2020-08-01 RX ADMIN — PANTOPRAZOLE SODIUM 40 MG: 40 TABLET, DELAYED RELEASE ORAL at 09:36

## 2020-08-01 RX ADMIN — SODIUM CHLORIDE 75 ML/HR: 900 INJECTION, SOLUTION INTRAVENOUS at 02:59

## 2020-08-01 RX ADMIN — OXYBUTYNIN CHLORIDE 10 MG: 5 TABLET, EXTENDED RELEASE ORAL at 09:36

## 2020-08-01 RX ADMIN — MEROPENEM 500 MG: 500 INJECTION, POWDER, FOR SOLUTION INTRAVENOUS at 07:03

## 2020-08-01 RX ADMIN — MELATONIN 9 MG: at 21:34

## 2020-08-01 RX ADMIN — TRAMADOL HYDROCHLORIDE 50 MG: 50 TABLET, FILM COATED ORAL at 21:35

## 2020-08-01 RX ADMIN — MEROPENEM 500 MG: 500 INJECTION, POWDER, FOR SOLUTION INTRAVENOUS at 16:31

## 2020-08-01 RX ADMIN — TRAMADOL HYDROCHLORIDE 50 MG: 50 TABLET, FILM COATED ORAL at 07:02

## 2020-08-01 RX ADMIN — ACETAMINOPHEN 500 MG: 500 TABLET ORAL at 03:05

## 2020-08-01 RX ADMIN — SODIUM CHLORIDE 75 ML/HR: 900 INJECTION, SOLUTION INTRAVENOUS at 16:32

## 2020-08-01 NOTE — PROGRESS NOTES
[facility]  Hospitalist Group                                                                                          Hospitalist Progress Note  John Noel MD  Answering service: [phone] from in house phone        Date of Service:  2020  NAME:  Janneth Rabago  :  1960  MRN:  [id number]      Admission Summary:     Prince Velasquez a 62 y.o. female with past medical history significant for diabetes mellitus type 2, obesity, CKD, history of kidney stones, right hydronephrosis and ureter multiple stents placement q 3 month stent exchange, missed her last one due to COVID pandemic, presented to the emergency room with right flank pain and hematuria. Patient has been experiencing urinary symptoms and flank pain for several days now. She has been in the emergency multiple times but discharged home with antibiotic therapy. Adina Rogers has tried amoxicillin, Keflex and Cipro without improvement of her symptoms. She started noticing blood in the urine for which she came to the emergency room.  In the ED it was noted that her ureter stent was coming out through her urethra.  She had a CT scan of the abdomen that showed ureteral stent misplacement as well as enlargement of the right kidney.  She was found to have a leukocytosis, febrile and hypotensive. Received Ceftriaxone in the ED.  Urology notified by ED of admission and patient admitted for UTI and hematuria.     Interval history / Subjective:     She said she feels better, no left side chest pain or shortness of breath     Assessment & Plan:     Sepsis secondary to UTI POA  -on meropenem   -presents with leukocytosis, tachycardia, hypotension and fever meeting sepsis criteria.  -blood cultures: NGTD  -rocephin switched to iv meropenem  -Urinalysis, culture: Klebsiella Pneumoniae    -ID on board, recommended 10 days of iv abx     Hematuria likely secondary to UTI and malpositioned stent and gross hematuria plus end-stage bladder and urethral abnormality   -Status post removal of old stent and replacement of right stent  -on ditropan  -off CBI, urine cloudy repeat culture- GNR 7/27, Cefepime  -continue prn morphine, tylenol  -Urology on board: 7/18 Cystoscopy, right ureteral stent exchange, clot evacuation, catheter placement.  -christensen out, has purewick catheter now     Acute blood loss anemia due to hematuria  -improving but still low, Hgb 7.1patient is [facility] and doesn't receive blood transfusion   -recieved IV iron sucrose, epoetin  -monitor H/H     DAPHNE on CKD stage V:  -creatinine improving, Creatinine 2.85  -continue IVF, avoid nephrotoxin   -Nephrology on board     T2DM  -At baseline controlled with hemoglobin A1c of 6.6  -continue sliding scale and monitor blood sugars     Obesity  -Outpatient follow-up for diet and weight loss program     Debility  -bed bound     Moisture associated skin injury to bilateral gluteal fold  -continue wound care per wound care nurse       Code status: DNR  DVT prophylaxis: SCD    Care Plan discussed with: Patient/Family and Nurse  Anticipated Disposition: TBD  Anticipated Discharge: Greater than 48 hours     Hospital Problems  Date Reviewed: 7/18/2020          Codes Class Noted POA    Hematuria ICD-10-CM: R31.9  ICD-9-CM: 599.70  7/17/2020 Unknown              Vital Signs:    Last 24hrs VS reviewed since prior progress note. Most recent are:  Visit Vitals  /80 (BP 1 Location: Right arm, BP Patient Position: At rest)   Pulse 83   Temp 98.2 °F (36.8 °C)   Resp 16   Ht 5' 4\" (1.626 m)   Wt (!) 161.9 kg (357 lb)   SpO2 96%   Breastfeeding No   BMI 61.28 kg/m²         Intake/Output Summary (Last 24 hours) at 8/1/2020 5398  Last data filed at 8/1/2020 0703  Gross per 24 hour   Intake    Output 3450 ml   Net -3450 ml        Physical Examination:             Constitutional:  No acute distress, cooperative, pleasant    ENT:  Oral mucosa moist, oropharynx benign. Resp:  CTA bilaterally.  No wheezing/rhonchi/rales. No accessory muscle use   CV:  Regular rhythm, normal rate, no murmurs, gallops, rubs    GI:  Soft, non distended, non tender. normoactive bowel sounds, no hepatosplenomegaly     Musculoskeletal:  No edema     Neurologic:  Conscious and alert, AAOx3, motor UE 5/5, LE 4/5, CN II-XII reviewed     Skin:  no skin rash       Data Review:    Review and/or order of clinical lab test  Review and/or order of tests in the radiology section of CPT  Review and/or order of tests in the medicine section of CPT      Labs:     Recent Labs     08/01/20  0559 07/31/20  0504   WBC 11.8* 12.1*   HGB 7.1* 6.8*   HCT 24.8* 23.7*    394     Recent Labs     08/01/20  0559 07/31/20  0504 07/30/20  0502    144 143   K 4.2 4.2 4.4   * 116* 115*   CO2 21 21 21   BUN 34* 36* 37*   CREA 2.85* 2.94* 3.08*   * 114* 109*   CA 8.3* 8.5 8.4*   PHOS 3.5  --  4.0     Recent Labs     08/01/20  0559 07/31/20  0504 07/30/20  0502   ALT  --  7*  --    AP  --  48  --    TBILI  --  0.1*  --    TP  --  6.9  --    ALB 2.4* 2.4* 2.4*   GLOB  --  4.5*  --      No results for input(s): INR, PTP, APTT, INREXT, INREXT in the last 72 hours. No results for input(s): FE, TIBC, PSAT, FERR in the last 72 hours. No results found for: FOL, RBCF   No results for input(s): PH, PCO2, PO2 in the last 72 hours. No results for input(s): CPK, CKNDX, TROIQ in the last 72 hours.     No lab exists for component: CPKMB  No results found for: CHOL, CHOLX, CHLST, CHOLV, HDL, HDLP, LDL, LDLC, DLDLP, TGLX, TRIGL, TRIGP, CHHD, CHHDX  Lab Results   Component Value Date/Time    Glucose (POC) 122 (H) 08/01/2020 06:34 AM    Glucose (POC) 117 (H) 07/31/2020 09:15 PM    Glucose (POC) 117 (H) 07/31/2020 04:20 PM    Glucose (POC) 113 (H) 07/31/2020 11:43 AM    Glucose (POC) 126 (H) 07/31/2020 06:35 AM     Lab Results   Component Value Date/Time    Color YELLOW/STRAW 07/25/2020 02:15 PM    Appearance TURBID (A) 07/25/2020 02:15 PM    Specific gravity 1.008 07/25/2020 02:15 PM    Specific gravity 1.020 07/17/2020 08:08 PM    pH (UA) 6.5 07/25/2020 02:15 PM    Protein 30 (A) 07/25/2020 02:15 PM    Glucose Negative 07/25/2020 02:15 PM    Ketone Negative 07/25/2020 02:15 PM    Bilirubin Negative 07/25/2020 02:15 PM    Urobilinogen 0.2 07/25/2020 02:15 PM    Nitrites Negative 07/25/2020 02:15 PM    Leukocyte Esterase LARGE (A) 07/25/2020 02:15 PM    Epithelial cells FEW 07/25/2020 02:15 PM    Bacteria 1+ (A) 07/25/2020 02:15 PM    WBC >100 (H) 07/25/2020 02:15 PM    RBC 5-10 07/25/2020 02:15 PM         Medications Reviewed:     Current Facility-Administered Medications   Medication Dose Route Frequency    lidocaine 4 % patch 1 Patch  1 Patch TransDERmal Q24H    meropenem (MERREM) 500 mg in 0.9% sodium chloride (MBP/ADV) 50 mL  500 mg IntraVENous Q8H    melatonin tablet 9 mg  9 mg Oral QHS    morphine injection 2 mg  2 mg IntraVENous Q4H PRN    epoetin frieda-epbx (RETACRIT) injection 20,000 Units  20,000 Units SubCUTAneous Q7D    0.9% sodium chloride infusion  75 mL/hr IntraVENous CONTINUOUS    traMADoL (ULTRAM) tablet 50 mg  50 mg Oral Q8H PRN    fentaNYL citrate (PF) injection 25 mcg  25 mcg IntraVENous Q4H PRN    lidocaine (URO-JET/ GLYDO) 2 % jelly   Urethral DAILY PRN    glucose chewable tablet 16 g  4 Tab Oral PRN    glucagon (GLUCAGEN) injection 1 mg  1 mg IntraMUSCular PRN    dextrose 10% infusion 0-250 mL  0-250 mL IntraVENous PRN    pantoprazole (PROTONIX) tablet 40 mg  40 mg Oral DAILY    acetaminophen (TYLENOL) tablet 500 mg  500 mg Oral Q6H PRN    opium-belladonna (B&O 15-A) 16.2-30 mg suppository 1 Suppository  1 Suppository Rectal Q8H PRN    oxybutynin chloride XL (DITROPAN XL) tablet 10 mg  10 mg Oral DAILY    insulin lispro (HUMALOG) injection   SubCUTAneous AC&HS    sodium chloride (NS) flush 5-10 mL  5-10 mL IntraVENous PRN     ______________________________________________________________________  EXPECTED LENGTH OF STAY: 5d 12h  ACTUAL LENGTH OF STAY:          Wyvonnia Cockayne, MD

## 2020-08-01 NOTE — PROGRESS NOTES
Bedside shift change report given to Angela Ames RN (oncoming nurse) by Lacy Dunn RN (offgoing nurse). Report included the following information SBAR, Kardex, Intake/Output, MAR and Recent Results.

## 2020-08-01 NOTE — ROUTINE PROCESS
Bedside and Verbal shift change report given to Delores Schmidt (oncoming nurse) by Chuy Gibbs (offgoing nurse). Report included the following information SBAR, Kardex and Recent Results.

## 2020-08-02 LAB
ALBUMIN SERPL-MCNC: 2.1 G/DL (ref 3.5–5)
ANION GAP SERPL CALC-SCNC: 8 MMOL/L (ref 5–15)
BUN SERPL-MCNC: 34 MG/DL (ref 6–20)
BUN/CREAT SERPL: 13 (ref 12–20)
CALCIUM SERPL-MCNC: 7.9 MG/DL (ref 8.5–10.1)
CHLORIDE SERPL-SCNC: 117 MMOL/L (ref 97–108)
CO2 SERPL-SCNC: 16 MMOL/L (ref 21–32)
CREAT SERPL-MCNC: 2.62 MG/DL (ref 0.55–1.02)
ERYTHROCYTE [DISTWIDTH] IN BLOOD BY AUTOMATED COUNT: 17.2 % (ref 11.5–14.5)
GLUCOSE BLD STRIP.AUTO-MCNC: 113 MG/DL (ref 65–100)
GLUCOSE BLD STRIP.AUTO-MCNC: 122 MG/DL (ref 65–100)
GLUCOSE BLD STRIP.AUTO-MCNC: 130 MG/DL (ref 65–100)
GLUCOSE BLD STRIP.AUTO-MCNC: 149 MG/DL (ref 65–100)
GLUCOSE SERPL-MCNC: 124 MG/DL (ref 65–100)
HCT VFR BLD AUTO: 24.6 % (ref 35–47)
HGB BLD-MCNC: 7.1 G/DL (ref 11.5–16)
MCH RBC QN AUTO: 28.7 PG (ref 26–34)
MCHC RBC AUTO-ENTMCNC: 28.9 G/DL (ref 30–36.5)
MCV RBC AUTO: 99.6 FL (ref 80–99)
NRBC # BLD: 0.03 K/UL (ref 0–0.01)
NRBC BLD-RTO: 0.3 PER 100 WBC
PHOSPHATE SERPL-MCNC: 3.9 MG/DL (ref 2.6–4.7)
PLATELET # BLD AUTO: 344 K/UL (ref 150–400)
PMV BLD AUTO: 8.7 FL (ref 8.9–12.9)
POTASSIUM SERPL-SCNC: 5.6 MMOL/L (ref 3.5–5.1)
RBC # BLD AUTO: 2.47 M/UL (ref 3.8–5.2)
SERVICE CMNT-IMP: ABNORMAL
SODIUM SERPL-SCNC: 141 MMOL/L (ref 136–145)
WBC # BLD AUTO: 9.9 K/UL (ref 3.6–11)

## 2020-08-02 PROCEDURE — 82962 GLUCOSE BLOOD TEST: CPT

## 2020-08-02 PROCEDURE — 74011000250 HC RX REV CODE- 250: Performed by: HOSPITALIST

## 2020-08-02 PROCEDURE — 85027 COMPLETE CBC AUTOMATED: CPT

## 2020-08-02 PROCEDURE — 74011250637 HC RX REV CODE- 250/637: Performed by: UROLOGY

## 2020-08-02 PROCEDURE — 74011250637 HC RX REV CODE- 250/637: Performed by: NURSE PRACTITIONER

## 2020-08-02 PROCEDURE — 74011250636 HC RX REV CODE- 250/636: Performed by: INTERNAL MEDICINE

## 2020-08-02 PROCEDURE — 74011250637 HC RX REV CODE- 250/637: Performed by: INTERNAL MEDICINE

## 2020-08-02 PROCEDURE — 80069 RENAL FUNCTION PANEL: CPT

## 2020-08-02 PROCEDURE — 36415 COLL VENOUS BLD VENIPUNCTURE: CPT

## 2020-08-02 PROCEDURE — 74011000258 HC RX REV CODE- 258: Performed by: NURSE PRACTITIONER

## 2020-08-02 PROCEDURE — 65270000032 HC RM SEMIPRIVATE

## 2020-08-02 PROCEDURE — 74011250636 HC RX REV CODE- 250/636: Performed by: NURSE PRACTITIONER

## 2020-08-02 RX ADMIN — MEROPENEM 500 MG: 500 INJECTION, POWDER, FOR SOLUTION INTRAVENOUS at 16:22

## 2020-08-02 RX ADMIN — TRAMADOL HYDROCHLORIDE 50 MG: 50 TABLET, FILM COATED ORAL at 16:21

## 2020-08-02 RX ADMIN — MEROPENEM 500 MG: 500 INJECTION, POWDER, FOR SOLUTION INTRAVENOUS at 07:23

## 2020-08-02 RX ADMIN — SODIUM CHLORIDE 75 ML/HR: 900 INJECTION, SOLUTION INTRAVENOUS at 16:20

## 2020-08-02 RX ADMIN — TRAMADOL HYDROCHLORIDE 50 MG: 50 TABLET, FILM COATED ORAL at 07:22

## 2020-08-02 RX ADMIN — MEROPENEM 500 MG: 500 INJECTION, POWDER, FOR SOLUTION INTRAVENOUS at 00:41

## 2020-08-02 RX ADMIN — PANTOPRAZOLE SODIUM 40 MG: 40 TABLET, DELAYED RELEASE ORAL at 09:09

## 2020-08-02 RX ADMIN — SODIUM CHLORIDE 75 ML/HR: 900 INJECTION, SOLUTION INTRAVENOUS at 07:22

## 2020-08-02 RX ADMIN — OXYBUTYNIN CHLORIDE 10 MG: 5 TABLET, EXTENDED RELEASE ORAL at 09:09

## 2020-08-02 NOTE — ROUTINE PROCESS
Bedside and Verbal shift change report given to Rajesh De (oncoming nurse) by Chuy Gibbs (offgoing nurse). Report included the following information SBAR, Kardex and Recent Results.

## 2020-08-02 NOTE — PROGRESS NOTES
[facility] [facility] Adult  Hospitalist Group                                                                                          Hospitalist Progress Note  Lacy Cain MD  Answering service: [phone] OR [phone] from in house phone        Date of Service:  2020  NAME:  Kristen Faith  :  1960  MRN:  [id number]      Admission Summary:     Saritha Jenkins a 62 y.o. female with past medical history significant for diabetes mellitus type 2, obesity, CKD, history of kidney stones, right hydronephrosis and ureter multiple stents placement q 3 month stent exchange, missed her last one due to COVID pandemic, presented to the emergency room with right flank pain and hematuria. Patient has been experiencing urinary symptoms and flank pain for several days now. She has been in the emergency multiple times but discharged home with antibiotic therapy. Matt Zambrano has tried amoxicillin, Keflex and Cipro without improvement of her symptoms. She started noticing blood in the urine for which she came to the emergency room.  In the ED it was noted that her ureter stent was coming out through her urethra.  She had a CT scan of the abdomen that showed ureteral stent misplacement as well as enlargement of the right kidney.  She was found to have a leukocytosis, febrile and hypotensive. Received Ceftriaxone in the ED.  Urology notified by ED of admission and patient admitted for UTI and hematuria.     Interval history / Subjective:     She said she feels better, no left side chest pain or shortness of breath     Assessment & Plan:     Sepsis secondary to UTI POA  -on meropenem   -presents with leukocytosis, tachycardia, hypotension and fever meeting sepsis criteria.  -blood cultures: NGTD  -rocephin switched to iv meropenem  -Urinalysis, culture: Klebsiella Pneumoniae    -ID on board, recommended 10 days of iv abx     Hematuria likely secondary to UTI and malpositioned stent and gross hematuria plus end-stage bladder and urethral abnormality   -Status post removal of old stent and replacement of right stent  -on ditropan  -off CBI, urine cloudy repeat culture- GNR 7/27, Cefepime  -continue prn morphine, tylenol  -Urology on board: 7/18 Cystoscopy, right ureteral stent exchange, clot evacuation, catheter placement.  -christensen out, has purewick catheter now, urine cleared     Acute blood loss anemia due to hematuria  -improving but still low, Hgb 7.1patient is [facility] and doesn't receive blood transfusion   -recieved IV iron sucrose, epoetin  -monitor H/H     DAPHNE on CKD stage V:  -creatinine improving, Creatinine 2.62  -continue IVF, avoid nephrotoxin   -Nephrology on board     T2DM  -At baseline controlled with hemoglobin A1c of 6.6  -continue sliding scale and monitor blood sugars     Obesity  -Outpatient follow-up for diet and weight loss program     Debility  -bed bound     Moisture associated skin injury to bilateral gluteal fold  -continue wound care per wound care nurse       Code status: DNR  DVT prophylaxis: SCD    Care Plan discussed with: Patient/Family and Nurse  Anticipated Disposition: TBD  Anticipated Discharge: Greater than 48 hours     Hospital Problems  Date Reviewed: 7/18/2020          Codes Class Noted POA    Hematuria ICD-10-CM: R31.9  ICD-9-CM: 599.70  7/17/2020 Unknown              Vital Signs:    Last 24hrs VS reviewed since prior progress note. Most recent are:  Visit Vitals  /70 (BP 1 Location: Right arm, BP Patient Position: At rest)   Pulse 81   Temp 98 °F (36.7 °C)   Resp 14   Ht 5' 4\" (1.626 m)   Wt (!) 162.4 kg (358 lb)   SpO2 97%   Breastfeeding No   BMI 61.45 kg/m²         Intake/Output Summary (Last 24 hours) at 8/2/2020 8021  Last data filed at 8/1/2020 0930  Gross per 24 hour   Intake    Output 400 ml   Net -400 ml        Physical Examination:             Constitutional:  No acute distress, cooperative, pleasant    ENT:  Oral mucosa moist, oropharynx benign.     Resp:  CTA bilaterally. No wheezing/rhonchi/rales. No accessory muscle use   CV:  Regular rhythm, normal rate, no murmurs, gallops, rubs    GI:  Soft, non distended, non tender. normoactive bowel sounds, no hepatosplenomegaly     Musculoskeletal:  No edema     Neurologic:  Conscious and alert, AAOx3, motor UE 5/5, LE 4/5, CN II-XII reviewed     Skin:  no skin rash       Data Review:    Review and/or order of clinical lab test  Review and/or order of tests in the radiology section of CPT  Review and/or order of tests in the medicine section of CPT      Labs:     Recent Labs     08/02/20  0133 08/01/20  0559   WBC 9.9 11.8*   HGB 7.1* 7.1*   HCT 24.6* 24.8*    376     Recent Labs     08/02/20 0055 08/01/20  0559 07/31/20  0504    144 144   K 5.6* 4.2 4.2   * 116* 116*   CO2 16* 21 21   BUN 34* 34* 36*   CREA 2.62* 2.85* 2.94*   * 113* 114*   CA 7.9* 8.3* 8.5   PHOS 3.9 3.5  --      Recent Labs     08/02/20 0055 08/01/20 0559 07/31/20  0504   ALT  --   --  7*   AP  --   --  48   TBILI  --   --  0.1*   TP  --   --  6.9   ALB 2.1* 2.4* 2.4*   GLOB  --   --  4.5*     No results for input(s): INR, PTP, APTT, INREXT, INREXT in the last 72 hours.   No results for input(s): FE, TIBC, PSAT, FERR in the last 72 hours.   No results found for: FOL, RBCF   No results for input(s): PH, PCO2, PO2 in the last 72 hours.  No results for input(s): CPK, CKNDX, TROIQ in the last 72 hours.    No lab exists for component: CPKMB  No results found for: CHOL, CHOLX, CHLST, CHOLV, HDL, HDLP, LDL, LDLC, DLDLP, TGLX, TRIGL, TRIGP, CHHD, CHHDX  Lab Results   Component Value Date/Time    Glucose (POC) 122 (H) 08/02/2020 06:12 AM    Glucose (POC) 146 (H) 08/01/2020 09:30 PM    Glucose (POC) 126 (H) 08/01/2020 04:53 PM    Glucose (POC) 103 (H) 08/01/2020 12:07 PM    Glucose (POC) 122 (H) 08/01/2020 06:34 AM     Lab Results   Component Value Date/Time    Color YELLOW/STRAW 07/25/2020 02:15 PM    Appearance TURBID (A) 07/25/2020 02:15  PM    Specific gravity 1.008 07/25/2020 02:15 PM    Specific gravity 1.020 07/17/2020 08:08 PM    pH (UA) 6.5 07/25/2020 02:15 PM    Protein 30 (A) 07/25/2020 02:15 PM    Glucose Negative 07/25/2020 02:15 PM    Ketone Negative 07/25/2020 02:15 PM    Bilirubin Negative 07/25/2020 02:15 PM    Urobilinogen 0.2 07/25/2020 02:15 PM    Nitrites Negative 07/25/2020 02:15 PM    Leukocyte Esterase LARGE (A) 07/25/2020 02:15 PM    Epithelial cells FEW 07/25/2020 02:15 PM    Bacteria 1+ (A) 07/25/2020 02:15 PM    WBC >100 (H) 07/25/2020 02:15 PM    RBC 5-10 07/25/2020 02:15 PM         Medications Reviewed:     Current Facility-Administered Medications   Medication Dose Route Frequency    lidocaine 4 % patch 1 Patch  1 Patch TransDERmal Q24H    meropenem (MERREM) 500 mg in 0.9% sodium chloride (MBP/ADV) 50 mL  500 mg IntraVENous Q8H    melatonin tablet 9 mg  9 mg Oral QHS    morphine injection 2 mg  2 mg IntraVENous Q4H PRN    epoetin frieda-epbx (RETACRIT) injection 20,000 Units  20,000 Units SubCUTAneous Q7D    0.9% sodium chloride infusion  75 mL/hr IntraVENous CONTINUOUS    traMADoL (ULTRAM) tablet 50 mg  50 mg Oral Q8H PRN    fentaNYL citrate (PF) injection 25 mcg  25 mcg IntraVENous Q4H PRN    lidocaine (URO-JET/ GLYDO) 2 % jelly   Urethral DAILY PRN    glucose chewable tablet 16 g  4 Tab Oral PRN    glucagon (GLUCAGEN) injection 1 mg  1 mg IntraMUSCular PRN    dextrose 10% infusion 0-250 mL  0-250 mL IntraVENous PRN    pantoprazole (PROTONIX) tablet 40 mg  40 mg Oral DAILY    acetaminophen (TYLENOL) tablet 500 mg  500 mg Oral Q6H PRN    opium-belladonna (B&O 15-A) 16.2-30 mg suppository 1 Suppository  1 Suppository Rectal Q8H PRN    oxybutynin chloride XL (DITROPAN XL) tablet 10 mg  10 mg Oral DAILY    insulin lispro (HUMALOG) injection   SubCUTAneous AC&HS    sodium chloride (NS) flush 5-10 mL  5-10 mL IntraVENous PRN     ______________________________________________________________________  EXPECTED LENGTH OF STAY: 5d 12h  ACTUAL LENGTH OF STAY:          14                 Jennyfer Chopra MD

## 2020-08-02 NOTE — PROGRESS NOTES
Bedside shift change report given to Krystin Flower RN (oncoming nurse) by Katrina Garrett RN (offgoing nurse). Report included the following information SBAR, Kardex, Intake/Output and MAR.

## 2020-08-03 LAB
ALBUMIN SERPL-MCNC: 2.3 G/DL (ref 3.5–5)
ANION GAP SERPL CALC-SCNC: 8 MMOL/L (ref 5–15)
BUN SERPL-MCNC: 35 MG/DL (ref 6–20)
BUN/CREAT SERPL: 14 (ref 12–20)
CALCIUM SERPL-MCNC: 8.3 MG/DL (ref 8.5–10.1)
CHLORIDE SERPL-SCNC: 115 MMOL/L (ref 97–108)
CO2 SERPL-SCNC: 21 MMOL/L (ref 21–32)
CREAT SERPL-MCNC: 2.52 MG/DL (ref 0.55–1.02)
ERYTHROCYTE [DISTWIDTH] IN BLOOD BY AUTOMATED COUNT: 17.3 % (ref 11.5–14.5)
GLUCOSE BLD STRIP.AUTO-MCNC: 108 MG/DL (ref 65–100)
GLUCOSE BLD STRIP.AUTO-MCNC: 139 MG/DL (ref 65–100)
GLUCOSE BLD STRIP.AUTO-MCNC: 141 MG/DL (ref 65–100)
GLUCOSE BLD STRIP.AUTO-MCNC: 97 MG/DL (ref 65–100)
GLUCOSE SERPL-MCNC: 123 MG/DL (ref 65–100)
HCT VFR BLD AUTO: 25 % (ref 35–47)
HGB BLD-MCNC: 7 G/DL (ref 11.5–16)
MCH RBC QN AUTO: 28.2 PG (ref 26–34)
MCHC RBC AUTO-ENTMCNC: 28 G/DL (ref 30–36.5)
MCV RBC AUTO: 100.8 FL (ref 80–99)
NRBC # BLD: 0.03 K/UL (ref 0–0.01)
NRBC BLD-RTO: 0.3 PER 100 WBC
PHOSPHATE SERPL-MCNC: 3.7 MG/DL (ref 2.6–4.7)
PLATELET # BLD AUTO: 392 K/UL (ref 150–400)
PMV BLD AUTO: 9 FL (ref 8.9–12.9)
POTASSIUM SERPL-SCNC: 4 MMOL/L (ref 3.5–5.1)
RBC # BLD AUTO: 2.48 M/UL (ref 3.8–5.2)
SERVICE CMNT-IMP: ABNORMAL
SERVICE CMNT-IMP: NORMAL
SODIUM SERPL-SCNC: 144 MMOL/L (ref 136–145)
WBC # BLD AUTO: 11.2 K/UL (ref 3.6–11)

## 2020-08-03 PROCEDURE — 74011250637 HC RX REV CODE- 250/637: Performed by: NURSE PRACTITIONER

## 2020-08-03 PROCEDURE — 74011000258 HC RX REV CODE- 258: Performed by: NURSE PRACTITIONER

## 2020-08-03 PROCEDURE — 74011250636 HC RX REV CODE- 250/636: Performed by: INTERNAL MEDICINE

## 2020-08-03 PROCEDURE — 82962 GLUCOSE BLOOD TEST: CPT

## 2020-08-03 PROCEDURE — 36415 COLL VENOUS BLD VENIPUNCTURE: CPT

## 2020-08-03 PROCEDURE — 65270000029 HC RM PRIVATE

## 2020-08-03 PROCEDURE — 74011250636 HC RX REV CODE- 250/636: Performed by: NURSE PRACTITIONER

## 2020-08-03 PROCEDURE — 85027 COMPLETE CBC AUTOMATED: CPT

## 2020-08-03 PROCEDURE — 74011000250 HC RX REV CODE- 250: Performed by: HOSPITALIST

## 2020-08-03 PROCEDURE — 74011250637 HC RX REV CODE- 250/637: Performed by: INTERNAL MEDICINE

## 2020-08-03 PROCEDURE — 80069 RENAL FUNCTION PANEL: CPT

## 2020-08-03 PROCEDURE — 74011250637 HC RX REV CODE- 250/637: Performed by: UROLOGY

## 2020-08-03 PROCEDURE — 97530 THERAPEUTIC ACTIVITIES: CPT

## 2020-08-03 PROCEDURE — 74011636637 HC RX REV CODE- 636/637: Performed by: NURSE PRACTITIONER

## 2020-08-03 PROCEDURE — 97110 THERAPEUTIC EXERCISES: CPT

## 2020-08-03 RX ADMIN — MEROPENEM 500 MG: 500 INJECTION, POWDER, FOR SOLUTION INTRAVENOUS at 16:56

## 2020-08-03 RX ADMIN — OXYBUTYNIN CHLORIDE 10 MG: 5 TABLET, EXTENDED RELEASE ORAL at 09:52

## 2020-08-03 RX ADMIN — MELATONIN 9 MG: at 00:02

## 2020-08-03 RX ADMIN — MEROPENEM 500 MG: 500 INJECTION, POWDER, FOR SOLUTION INTRAVENOUS at 08:16

## 2020-08-03 RX ADMIN — TRAMADOL HYDROCHLORIDE 50 MG: 50 TABLET, FILM COATED ORAL at 13:29

## 2020-08-03 RX ADMIN — TRAMADOL HYDROCHLORIDE 50 MG: 50 TABLET, FILM COATED ORAL at 04:49

## 2020-08-03 RX ADMIN — INSULIN LISPRO 2 UNITS: 100 INJECTION, SOLUTION INTRAVENOUS; SUBCUTANEOUS at 13:28

## 2020-08-03 RX ADMIN — MEROPENEM 500 MG: 500 INJECTION, POWDER, FOR SOLUTION INTRAVENOUS at 00:03

## 2020-08-03 RX ADMIN — TRAMADOL HYDROCHLORIDE 50 MG: 50 TABLET, FILM COATED ORAL at 22:11

## 2020-08-03 RX ADMIN — SODIUM CHLORIDE 75 ML/HR: 900 INJECTION, SOLUTION INTRAVENOUS at 19:27

## 2020-08-03 RX ADMIN — PANTOPRAZOLE SODIUM 40 MG: 40 TABLET, DELAYED RELEASE ORAL at 09:52

## 2020-08-03 NOTE — PROGRESS NOTES
Bedside shift change report given to Earlene Cano RN (oncoming nurse) by Charlie Correa RN (offgoing nurse). Report included the following information SBAR, Kardex, Intake/Output, MAR and Recent Results.

## 2020-08-03 NOTE — PROGRESS NOTES
Problem: Self Care Deficits Care Plan (Adult)  Goal: *Acute Goals and Plan of Care (Insert Text)  Description:   FUNCTIONAL STATUS PRIOR TO ADMISSION:  Patient was last modified independent for ADLs/ ambulatory with RW in December 2019. Patient reports she has been bedbound since d/t wounds. Patient reports she has not even been able to sit EOB at home. Performing all ADLs at bed-level with assistance for bathing, dressing, and toileting. HOME SUPPORT: Patient lived with sister to provide assistance    Occupational Therapy Goals  Goals reviewed and updated: 7/27/2020, 8/3/2020  Initiated 7/20/2020  1. Patient will perform lower body dressing using AE PRN with moderate assistance  within 7 day(s). 2.  Patient will perform bathing with minimal assistance  within 7 day(s). MET for UEs with set up 7/27/2020. Continue for LEs within 7 days. 4.  Patient will perform BSC toilet transfers with moderate assistance  within 7 day(s). 5.  Patient will perform all aspects of toileting with moderate assistance  within 7 day(s). 6.  Patient will participate in upper extremity therapeutic exercise/activities with supervision/set-up for 10 minutes within 7 day(s). Outcome: Progressing Towards Goal   OCCUPATIONAL THERAPY TREATMENT/WEEKLY RE-ASSESSMENT  Patient: Robert Foster (14 y.o. female)  Date: 8/3/2020  Diagnosis: Hematuria [R31.9]  Hematuria [R31.9]   <principal problem not specified>  Procedure(s) (LRB):  CYSTOSCOPY RIGHT URETERAL STENT EXCHANGE (Right) 16 Days Post-Op  Precautions: Fall  Chart, occupational therapy assessment, plan of care, and goals were reviewed. ASSESSMENT  Patient continues with skilled OT services and is progressing towards goals. ADLs limited by anxiety, fear of falling, girth, wounds, strength core and LEs, ability to come to standing.       Current Level of Function Impacting Discharge (ADLs): setup upper body ADLs, max A lower body ADLs, bed mobility overall moderate A, unable to stand    Other factors to consider for discharge: can stay with sister         PLAN :  Goals have been updated based on progression since last assessment. Patient continues to benefit from skilled intervention to address the above impairments. Continue to follow patient 3 times a week to address goals. Recommend with staff: EOB leave her sitting to complete ADLs, assist with B LEs to lie back in bed, karen lift to bariatric chair schedule    Recommend next OT session: functional reach to lower body to increase comfort sitting EOB and reaching out of base of support and low    Recommendation for discharge: (in order for the patient to meet his/her long term goals)  Therapy up to 5 days/week in SNF setting or an intensive home health therapy program. Patient desiring to discharge home 2* will have [facility] and sister. If they are unable to assist her then recommend SNF rehab. This discharge recommendation:  Has not yet been discussed the attending provider and/or case management    IF patient discharges home will need the following DME: AE: long handled bathing, AE: long handled dressing, bedside commode, hospital bed, mechanical lift, walker: standard and wheelchair - all bariatric 2* 358, short stature and width       SUBJECTIVE:   Patient stated I am so afraid!     OBJECTIVE DATA SUMMARY:   Cognitive/Behavioral Status:                      Functional Mobility and Transfers for ADLs:  Bed Mobility:  Rolling: Supervision  Supine to Sit: Supervision(bed rail L and footboard to weight shift)   Sit-supine with max A trunk and LEs    Transfers: see PT NOTE             Balance:  Sitting: Intact; Without support    ADL Intervention:  Feeding  Feeding Assistance: Independent    Grooming  Grooming Assistance: Supervision  Position Performed: Seated edge of bed  Brushing Teeth: Supervision(setup on beside )     patient demonstrated while sitting EOB, total time ~30 mins.  PT then arrived, required skilled assistance physically, emotionally and psychologically to stand; see PT note. toileting: PureWick. Demonstrated functional reach to bottom while side lying. Lower body dressing: stating feels okay with sock aid and reacher use while in bed. Therapeutic Exercises:   Patient demonstrated theraband green hard resistance at footboard, shoulder flexion 5 reps with setup. Instruction on benefits of squeezing core and trying to lift up, patient demonstrated 5 reps with supervision. Pain:  Wounds - sacrum    Activity Tolerance:   Good  Please refer to the flowsheet for vital signs taken during this treatment. After treatment patient left in no apparent distress:   Call bell within reach and sitting EOB with PT    COMMUNICATION/COLLABORATION:   The patients plan of care was discussed with: Physical therapist and Registered nurse.      Roscoe Pettit  Time Calculation: 49 mins

## 2020-08-03 NOTE — PROGRESS NOTES
ID Progress Note  8/3/2020    Subjective:     Afebrile. No dysuria. Objective:     Vitals:   Visit Vitals  /80   Pulse 80   Temp 98.3 °F (36.8 °C)   Resp 18   Ht 5' 4\" (1.626 m)   Wt (!) 162.4 kg (358 lb 0.4 oz)   SpO2 98%   Breastfeeding No   BMI 61.46 kg/m²        Tmax:  Temp (24hrs), Av.4 °F (36.9 °C), Min:98.2 °F (36.8 °C), Max:98.6 °F (37 °C)      Exam:    Not in distress  Abdomen: soft, nontender    Labs:   Lab Results   Component Value Date/Time    WBC 11.2 (H) 2020 03:34 AM    HGB 7.0 (L) 2020 03:34 AM    HCT 25.0 (L) 2020 03:34 AM    PLATELET 777  03:34 AM    .8 (H) 2020 03:34 AM     Lab Results   Component Value Date/Time    Sodium 144 2020 03:34 AM    Potassium 4.0 2020 03:34 AM    Chloride 115 (H) 2020 03:34 AM    CO2 21 2020 03:34 AM    Anion gap 8 2020 03:34 AM    Glucose 123 (H) 2020 03:34 AM    BUN 35 (H) 2020 03:34 AM    Creatinine 2.52 (H) 2020 03:34 AM    BUN/Creatinine ratio 14 2020 03:34 AM    GFR est AA 24 (L) 2020 03:34 AM    GFR est non-AA 20 (L) 2020 03:34 AM    Calcium 8.3 (L) 2020 03:34 AM    Bilirubin, total 0.1 (L) 2020 05:04 AM    Alk. phosphatase 48 2020 05:04 AM    Protein, total 6.9 2020 05:04 AM    Albumin 2.3 (L) 2020 03:34 AM    Globulin 4.5 (H) 2020 05:04 AM    A-G Ratio 0.5 (L) 2020 05:04 AM    ALT (SGPT) 7 (L) 2020 05:04 AM         Assessment:     #1 complicated urinary tract infection     #2 renal failure     #3 right hydronephrosis status post stent exchange     #4 diabetes     #5 hypertension      Recommendations:     merrem until aug 7.      Tia Shields MD

## 2020-08-03 NOTE — PROGRESS NOTES
[facility] [facility]  Hospitalist Group                                                                                          Hospitalist Progress Note  Anisa Choudhury MD  Answering service: [phone] from in house phone        Date of Service:  8/3/2020  NAME:  Seun York  :  1960  MRN:  [id number]      Admission Summary:     Sulma Win a 62 y.o. female with past medical history significant for diabetes mellitus type 2, obesity, CKD, history of kidney stones, right hydronephrosis and ureter multiple stents placement q 3 month stent exchange, missed her last one due to COVID pandemic, presented to the emergency room with right flank pain and hematuria. Patient has been experiencing urinary symptoms and flank pain for several days now. She has been in the emergency multiple times but discharged home with antibiotic therapy. Ondina Orellana has tried amoxicillin, Keflex and Cipro without improvement of her symptoms. She started noticing blood in the urine for which she came to the emergency room.  In the ED it was noted that her ureter stent was coming out through her urethra.  She had a CT scan of the abdomen that showed ureteral stent misplacement as well as enlargement of the right kidney.  She was found to have a leukocytosis, febrile and hypotensive. Received Ceftriaxone in the ED.  Urology notified by ED of admission and patient admitted for UTI and hematuria.     Interval history / Subjective:     She said she feels better, no left side chest pain or shortness of breath  Has very difficult IV access, iv access team trying it     Assessment & Plan:     Sepsis secondary to UTI POA  -on meropenem, until   -presents with leukocytosis, tachycardia, hypotension and fever meeting sepsis criteria.  -blood cultures: NGTD  -rocephin switched to iv meropenem  -Urinalysis, culture: Klebsiella Pneumoniae    -ID on board, recommended 10 days of iv abx     Hematuria likely secondary to UTI and malpositioned stent and gross hematuria plus end-stage bladder and urethral abnormality   -Status post removal of old stent and replacement of right stent  -on ditropan  -off CBI, urine cloudy repeat culture- GNR 7/27, Cefepime  -continue prn morphine, tylenol  -Urology on board: 7/18 Cystoscopy, right ureteral stent exchange, clot evacuation, catheter placement.  -christensen out, has purewick catheter now, urine cleared     Acute blood loss anemia due to hematuria  -improving but still low, Hgb 7.1patient is Tenriism and doesn't receive blood transfusion   -recieved IV iron sucrose, epoetin  -monitor H/H     DAPHNE on CKD stage V:  -creatinine improving, Creatinine 2.62  -continue IVF, avoid nephrotoxin   -Nephrology on board     T2DM  -At baseline controlled with hemoglobin A1c of 6.6  -continue sliding scale and monitor blood sugars     Obesity  -Outpatient follow-up for diet and weight loss program     Debility  -bed bound     Moisture associated skin injury to bilateral gluteal fold  -continue wound care per wound care nurse       Code status: DNR  DVT prophylaxis: SCD    Care Plan discussed with: Patient/Family and Nurse  Anticipated Disposition: Home with [facility] vs SNF  Anticipated Discharge: Greater than 48 hours     Hospital Problems  Date Reviewed: 7/18/2020          Codes Class Noted POA    Hematuria ICD-10-CM: R31.9  ICD-9-CM: 599.70  7/17/2020 Unknown              Vital Signs:    Last 24hrs VS reviewed since prior progress note.  Most recent are:  Visit Vitals  /80   Pulse 80   Temp 98.3 °F (36.8 °C)   Resp 18   Ht 5' 4\" (1.626 m)   Wt (!) 162.4 kg (358 lb 0.4 oz)   SpO2 98%   Breastfeeding No   BMI 61.46 kg/m²         Intake/Output Summary (Last 24 hours) at 8/3/2020 1535  Last data filed at 8/3/2020 1000  Gross per 24 hour   Intake 200 ml   Output 3050 ml   Net -2850 ml        Physical Examination:             Constitutional:  No acute distress, cooperative, pleasant    ENT:  Oral mucosa moist, oropharynx benign. Resp:  CTA bilaterally. No wheezing/rhonchi/rales. No accessory muscle use   CV:  Regular rhythm, normal rate, no murmurs, gallops, rubs    GI:  Soft, non distended, non tender. normoactive bowel sounds, no hepatosplenomegaly     Musculoskeletal:  No edema     Neurologic:  Conscious and alert, AAOx3, motor UE 5/5, LE 4/5, CN II-XII reviewed     Skin:  no skin rash       Data Review:    Review and/or order of clinical lab test  Review and/or order of tests in the radiology section of CPT  Review and/or order of tests in the medicine section of CPT      Labs:     Recent Labs     08/03/20 0334 08/02/20  0133   WBC 11.2* 9.9   HGB 7.0* 7.1*   HCT 25.0* 24.6*    344     Recent Labs     08/03/20 0334 08/02/20 0055 08/01/20  0559    141 144   K 4.0 5.6* 4.2   * 117* 116*   CO2 21 16* 21   BUN 35* 34* 34*   CREA 2.52* 2.62* 2.85*   * 124* 113*   CA 8.3* 7.9* 8.3*   PHOS 3.7 3.9 3.5     Recent Labs     08/03/20 0334 08/02/20 0055 08/01/20  0559   ALB 2.3* 2.1* 2.4*     No results for input(s): INR, PTP, APTT, INREXT, INREXT in the last 72 hours. No results for input(s): FE, TIBC, PSAT, FERR in the last 72 hours. No results found for: FOL, RBCF   No results for input(s): PH, PCO2, PO2 in the last 72 hours. No results for input(s): CPK, CKNDX, TROIQ in the last 72 hours.     No lab exists for component: CPKMB  No results found for: CHOL, CHOLX, CHLST, CHOLV, HDL, HDLP, LDL, LDLC, DLDLP, TGLX, TRIGL, TRIGP, CHHD, [facility]  Lab Results   Component Value Date/Time    Glucose (POC) 141 (H) 08/03/2020 11:21 AM    Glucose (POC) 139 (H) 08/03/2020 06:41 AM    Glucose (POC) 149 (H) 08/02/2020 09:13 PM    Glucose (POC) 130 (H) 08/02/2020 04:37 PM    Glucose (POC) 113 (H) 08/02/2020 11:26 AM     Lab Results   Component Value Date/Time    Color YELLOW/STRAW 07/25/2020 02:15 PM    Appearance TURBID (A) 07/25/2020 02:15 PM    Specific gravity 1.008 07/25/2020 02:15 PM    Specific gravity 1.020 07/17/2020 08:08 PM    pH (UA) 6.5 07/25/2020 02:15 PM    Protein 30 (A) 07/25/2020 02:15 PM    Glucose Negative 07/25/2020 02:15 PM    Ketone Negative 07/25/2020 02:15 PM    Bilirubin Negative 07/25/2020 02:15 PM    Urobilinogen 0.2 07/25/2020 02:15 PM    Nitrites Negative 07/25/2020 02:15 PM    Leukocyte Esterase LARGE (A) 07/25/2020 02:15 PM    Epithelial cells FEW 07/25/2020 02:15 PM    Bacteria 1+ (A) 07/25/2020 02:15 PM    WBC >100 (H) 07/25/2020 02:15 PM    RBC 5-10 07/25/2020 02:15 PM         Medications Reviewed:     Current Facility-Administered Medications   Medication Dose Route Frequency    lidocaine 4 % patch 1 Patch  1 Patch TransDERmal Q24H    meropenem (MERREM) 500 mg in 0.9% sodium chloride (MBP/ADV) 50 mL  500 mg IntraVENous Q8H    melatonin tablet 9 mg  9 mg Oral QHS    morphine injection 2 mg  2 mg IntraVENous Q4H PRN    epoetin frieda-epbx (RETACRIT) injection 20,000 Units  20,000 Units SubCUTAneous Q7D    0.9% sodium chloride infusion  75 mL/hr IntraVENous CONTINUOUS    traMADoL (ULTRAM) tablet 50 mg  50 mg Oral Q8H PRN    fentaNYL citrate (PF) injection 25 mcg  25 mcg IntraVENous Q4H PRN    lidocaine (URO-JET/ GLYDO) 2 % jelly   Urethral DAILY PRN    glucose chewable tablet 16 g  4 Tab Oral PRN    glucagon (GLUCAGEN) injection 1 mg  1 mg IntraMUSCular PRN    dextrose 10% infusion 0-250 mL  0-250 mL IntraVENous PRN    pantoprazole (PROTONIX) tablet 40 mg  40 mg Oral DAILY    acetaminophen (TYLENOL) tablet 500 mg  500 mg Oral Q6H PRN    opium-belladonna (B&O 15-A) 16.2-30 mg suppository 1 Suppository  1 Suppository Rectal Q8H PRN    oxybutynin chloride XL (DITROPAN XL) tablet 10 mg  10 mg Oral DAILY    insulin lispro (HUMALOG) injection   SubCUTAneous AC&HS    sodium chloride (NS) flush 5-10 mL  5-10 mL IntraVENous PRN     ______________________________________________________________________  EXPECTED LENGTH OF STAY: 5d 12h  ACTUAL LENGTH OF STAY: MD John

## 2020-08-03 NOTE — PROGRESS NOTES
Problem: Mobility Impaired (Adult and Pediatric)  Goal: *Acute Goals and Plan of Care (Insert Text)  Description: FUNCTIONAL STATUS PRIOR TO ADMISSION: Pt has been essentially bed bound since December 10th. Pt reported she was walking with a RW short distances prior to this. Pt had sustained a fall in October and had gone to rehab and then fell again in December. HOME SUPPORT PRIOR TO ADMISSION: The patient lived with her sister. Physical Therapy Goals  Initiated 7/20/2020, reassessment completed 7/25/20 and goals remain appropriate at this time. 1.  Patient will move from supine to sit and sit to supine , scoot up and down, and roll side to side in bed with minimal assistance/contact guard assist within 7 day(s). 2.  Patient will transfer from bed to chair and chair to bed with moderate assistance (squat pivot versus sliding board) using the least restrictive device within 7 day(s). 3.  Patient will tolerate seated EOB x 10 minutes with supervision within 7 days in prep for transfers OOB    Outcome: Progressing Towards Goal    PHYSICAL THERAPY TREATMENT: WEEKLY REASSESSMENT  Patient: Robert Foster (87 y.o. female)  Date: 8/3/2020  Primary Diagnosis: Hematuria [R31.9]  Hematuria [R31.9]  Procedure(s) (LRB):  CYSTOSCOPY RIGHT URETERAL STENT EXCHANGE (Right) 16 Days Post-Op   Precautions:   Fall      ASSESSMENT  Patient continues with skilled PT services and is progressing towards goals. Pt received EOB with OT and states that she is very afraid of OOB mobility. Pt tolerates scooting forward with 12-14 inches of femur on bed, once scooted for <12 inches patient becomes increasingly fearful and convinced that she is sliding off the bed. Pt reassured and educated that leaning back increases the forward sliding momentum, educated to lean forward or sit up.  Pt performed mod A x2 standing attempt during increased fear showing strong commitment towards progression, unable to fully stand however clears buttocks B. Pt returned to supine for rest break as she is continuing to feel unsteady and with a loss of balance. Pt able to perform bed mobility mainly supervision except for sit to supine, min A to lift legs into bed with increased fear response likely complicating movement. Post rest break pt performed supine to sit with supervision, scooted with repeated fear response, and attempted second stand despite fear. Pt stands enough to clear buttocks again, returned to supine. Pt shows determination and good motivation to participate despite emotional complications. Patient's progression toward goals since last assessment: patient able to tolerate attempt to stand, supervision for mobility    Current Level of Function Impacting Discharge (mobility/balance): patient mod A for attempt to stand, unable to achieve full stand    Functional Outcome Measure: The patient abojtp74/100  on the barthel outcome measure which is indicative of high complexity. Other factors to consider for discharge:          PLAN :  Goals have been updated based on progression since last assessment. Patient continues to benefit from skilled intervention to address the above impairments. Recommendations and Planned Interventions: bed mobility training, transfer training, gait training, therapeutic exercises, neuromuscular re-education, patient and family training/education and therapeutic activities      Frequency/Duration: Patient will be followed by physical therapy:  5 times a week to address goals. Recommendation for discharge: (in order for the patient to meet his/her long term goals)  Therapy 3 hours per day 5-7 days per week, if this is not an option SNF for therapy    This discharge recommendation:  Has not yet been discussed the attending provider and/or case management    IF patient discharges home will need the following DME: to be determined (TBD)         SUBJECTIVE:   Patient stated I am so afraid.     OBJECTIVE DATA SUMMARY:   HISTORY:    Past Medical History:   Diagnosis Date    Diabetes ([facility] [address]     Hernia, hiatal     Hypertension     Kidney stones     both kidneys    Osteoarthritis     Renal failure     left kidney     Past Surgical History:   Procedure Laterality Date    HX HYSTERECTOMY      uterus not removed    HX OTHER SURGICAL      right kidney stent       Personal factors and/or comorbidities impacting plan of care:     Home Situation  Home Environment: Private residence  # Steps to Enter: 3  One/Two Story Residence: Two story, live on 1st floor  Living Alone: No  Support Systems: Family member(s)  Patient Expects to be Discharged to[de-identified] Private residence  Current DME Used/Available at Home: Hospital bed, Crutches, [place], straight, Walker, rolling, Commode, bedside    EXAMINATION/PRESENTATION/DECISION MAKING:   Critical Behavior:  Neurologic State: Alert  Orientation Level: Oriented X4  Cognition: Follows commands  Safety/Judgement: Awareness of environment, Insight into deficits  Hearing: Auditory  Auditory Impairment: None  Skin:  Intact x wounds   Edema: intact  Range Of Motion:                          Strength: Tone & Sensation:                                  Coordination:     Vision:      Functional Mobility:  Bed Mobility:  Rolling: Supervision  Supine to Sit: Supervision(bed rail L and footboard)        Transfers:                             Balance:   Sitting: Intact; Without support  Ambulation/Gait Training:     Therapeutic Exercises:   LAQ x10 B    Functional Measure:  Barthel Index:    Bathin  Bladder: 5  Bowels: 5  Groomin  Dressin  Feeding: 10  Mobility: 0  Stairs: 0  Toilet Use: 0  Transfer (Bed to Chair and Back): 0  Total: 25/100       The Barthel ADL Index: Guidelines  1. The index should be used as a record of what a patient does, not as a record of what a patient could do.   2. The main aim is to establish degree of independence from any help, physical or verbal, however minor and for whatever reason. 3. The need for supervision renders the patient not independent. 4. A patient's performance should be established using the best available evidence. Asking the patient, friends/relatives and nurses are the usual sources, but direct observation and common sense are also important. However direct testing is not needed. 5. Usually the patient's performance over the preceding 24-48 hours is important, but occasionally longer periods will be relevant. 6. Middle categories imply that the patient supplies over 50 per cent of the effort. 7. Use of aids to be independent is allowed. Pieter Bernstein., Barthel, D.W. ([id number]). Functional evaluation: the Barthel Index. [address] [facility] (14)2. Cesia Wray justus ASHLEY Ha, Gloria Theodore., Tawanna Ormond., [place], [address] [place] [address] Ave (1999). Measuring the change indisability after inpatient rehabilitation; comparison of the responsiveness of the Barthel Index and Functional [place] Measure. Journal of Neurology, Neurosurgery, and Psychiatry, 66(4), 561-145. KELLY Miranda, ANDREA Gonzalez, & Audra Subramanian M.A. (2004.) Assessment of post-stroke quality of life in cost-effectiveness studies: The usefulness of the Barthel Index and the EuroQoL-5D. Quality of Life Research, 13, 427-43           Pain Rating:  Wounds on bottom, knees, unrated, controlled    Activity Tolerance:   Fair, SpO2 stable on RA, requires frequent rest breaks, observed SOB with activity and increased fear  Please refer to the flowsheet for vital signs taken during this treatment. After treatment patient left in no apparent distress:   Supine in bed and Call bell within reach    COMMUNICATION/EDUCATION:   The patients plan of care was discussed with: Registered nurse. Fall prevention education was provided and the patient/caregiver indicated understanding. and Patient/family have participated as able in goal setting and plan of care.     Thank you for this referral.  Bev Love, PT

## 2020-08-03 NOTE — PROGRESS NOTES
TRANSFER - IN REPORT:    Verbal report received from James Massey RN(name) on Harrison Varela  being received from [facility](unit) for routine progression of care      Report consisted of patients Situation, Background, Assessment and   Recommendations(SBAR). Information from the following report(s) SBAR, Kardex, [facility] and Recent Results was reviewed with the receiving nurse. Opportunity for questions and clarification was provided. Assessment completed upon patients arrival to unit and care assumed.

## 2020-08-03 NOTE — PROGRESS NOTES
Pt's IV infiltrated earlier this afternoon. Pt had been stuck multiple times during night shift to get line in right hand. Paged the picc team but they were not able to help and will not help unless someone on day shift attempts to restart. Nurse manager attempted twice to resite line but was unsuccessful. Will attempt to recall PICC team for assistance.

## 2020-08-03 NOTE — PROGRESS NOTES
TRANSFER - OUT REPORT:    Verbal report given to Kim Sánchez (name) on [facility]  being transferred to [facility](unit) for lateral transfer so bed could be given to another post op patient         Report consisted of patients Situation, Background, Assessment and   Recommendations(SBAR). Information from the following report(s) SBAR and Kardex was reviewed with the receiving nurse. Lines:   Peripheral IV 08/03/20 Right Arm (Active)        Opportunity for questions and clarification was provided. Patient transported with:   Tech, reported that wound care would be done but bed placement pushing to get pt moved , therefore this nurse was unable to completed wound care as reported. tp transferred by PCT in her bed.

## 2020-08-03 NOTE — PROGRESS NOTES
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 Cr down to 2.52. after showing initial fluctuations. s/p right ureteral stent replacement. Baseline Cr? Chronic obstructive uropathy/Atrophic left kidney/DM nephropathy. Followed by Dr. Dileep david outpt follow up. Last seen in 2015.     Chronic right hydronephrosis: [id number] stent exchange. Missed her last one due to [place] pandemic; changed during this admit. Hematuria- resolved. Off CBI   Metabolic acidosis: resolved. Off HCO3 drip   Hyperkalemia- resolved  UTI  Anemia: Hgb sub-optimal. [place]. IV Iron/CATHIE   DM2: HgbA1c 6.6  Chronic sacral decubiti    Plan/Recommendations:  Ct IVF- reduce rate to 50 ml/hr; taper to off by tomm if BP and oral intake is adequate  Ct CATHIE  AM labs  Avoid nephrotoxins  IV ABx per 1ry team    D/W pt and RN    Subjective:  Up in bed.  Feels Ok    ROS- denies n/v/cp or sob    Visit Vitals  /80   Pulse 80   Temp 98.3 °F (36.8 °C)   Resp 18   Ht 5' 4\" (1.626 m)   Wt (!) 162.4 kg (358 lb 0.4 oz)   SpO2 98%   Breastfeeding No   BMI 61.46 kg/m²     Wt Readings from Last 3 Encounters:   08/03/20 (!) 162.4 kg (358 lb 0.4 oz)       Intake/Output Summary (Last 24 hours) at 8/3/2020 1023  Last data filed at 8/3/2020 0719  Gross per 24 hour   Intake    Output 3050 ml   Net -3050 ml       NAD  Clear  RRR, distant  Soft, large ventral hernia on L  Tr edema  AOx3      Labs/Data:    Lab Results   Component Value Date/Time WBC 11.2 (H) 08/03/2020 03:34 AM    HGB 7.0 (L) 08/03/2020 03:34 AM    HCT 25.0 (L) 08/03/2020 03:34 AM    PLATELET 541 82/19/7191 03:34 AM    .8 (H) 08/03/2020 03:34 AM       Lab Results   Component Value Date/Time    Sodium 144 08/03/2020 03:34 AM    Potassium 4.0 08/03/2020 03:34 AM    Chloride 115 (H) 08/03/2020 03:34 AM    CO2 21 08/03/2020 03:34 AM    Anion gap 8 08/03/2020 03:34 AM    Glucose 123 (H) 08/03/2020 03:34 AM    BUN 35 (H) 08/03/2020 03:34 AM    Creatinine 2.52 (H) 08/03/2020 03:34 AM    BUN/Creatinine ratio 14 08/03/2020 03:34 AM    GFR est AA 24 (L) 08/03/2020 03:34 AM    GFR est non-AA 20 (L) 08/03/2020 03:34 AM    Calcium 8.3 (L) 08/03/2020 03:34 AM

## 2020-08-04 LAB
ALBUMIN SERPL-MCNC: 2.4 G/DL (ref 3.5–5)
ANION GAP SERPL CALC-SCNC: 7 MMOL/L (ref 5–15)
BUN SERPL-MCNC: 35 MG/DL (ref 6–20)
BUN/CREAT SERPL: 14 (ref 12–20)
CALCIUM SERPL-MCNC: 8.5 MG/DL (ref 8.5–10.1)
CHLORIDE SERPL-SCNC: 116 MMOL/L (ref 97–108)
CO2 SERPL-SCNC: 21 MMOL/L (ref 21–32)
CREAT SERPL-MCNC: 2.51 MG/DL (ref 0.55–1.02)
ERYTHROCYTE [DISTWIDTH] IN BLOOD BY AUTOMATED COUNT: 17.5 % (ref 11.5–14.5)
GLUCOSE BLD STRIP.AUTO-MCNC: 110 MG/DL (ref 65–100)
GLUCOSE BLD STRIP.AUTO-MCNC: 110 MG/DL (ref 65–100)
GLUCOSE BLD STRIP.AUTO-MCNC: 122 MG/DL (ref 65–100)
GLUCOSE BLD STRIP.AUTO-MCNC: 130 MG/DL (ref 65–100)
GLUCOSE SERPL-MCNC: 125 MG/DL (ref 65–100)
HCT VFR BLD AUTO: 25.9 % (ref 35–47)
HGB BLD-MCNC: 7.4 G/DL (ref 11.5–16)
MCH RBC QN AUTO: 28.6 PG (ref 26–34)
MCHC RBC AUTO-ENTMCNC: 28.6 G/DL (ref 30–36.5)
MCV RBC AUTO: 100 FL (ref 80–99)
NRBC # BLD: 0 K/UL (ref 0–0.01)
NRBC BLD-RTO: 0 PER 100 WBC
PHOSPHATE SERPL-MCNC: 4 MG/DL (ref 2.6–4.7)
PLATELET # BLD AUTO: 359 K/UL (ref 150–400)
PMV BLD AUTO: 9 FL (ref 8.9–12.9)
POTASSIUM SERPL-SCNC: 4.3 MMOL/L (ref 3.5–5.1)
RBC # BLD AUTO: 2.59 M/UL (ref 3.8–5.2)
SERVICE CMNT-IMP: ABNORMAL
SODIUM SERPL-SCNC: 144 MMOL/L (ref 136–145)
WBC # BLD AUTO: 10.3 K/UL (ref 3.6–11)

## 2020-08-04 PROCEDURE — 80069 RENAL FUNCTION PANEL: CPT

## 2020-08-04 PROCEDURE — 97530 THERAPEUTIC ACTIVITIES: CPT

## 2020-08-04 PROCEDURE — 82962 GLUCOSE BLOOD TEST: CPT

## 2020-08-04 PROCEDURE — 85027 COMPLETE CBC AUTOMATED: CPT

## 2020-08-04 PROCEDURE — 74011250636 HC RX REV CODE- 250/636: Performed by: NURSE PRACTITIONER

## 2020-08-04 PROCEDURE — 65270000029 HC RM PRIVATE

## 2020-08-04 PROCEDURE — 74011250637 HC RX REV CODE- 250/637: Performed by: INTERNAL MEDICINE

## 2020-08-04 PROCEDURE — 74011250637 HC RX REV CODE- 250/637: Performed by: NURSE PRACTITIONER

## 2020-08-04 PROCEDURE — 74011000258 HC RX REV CODE- 258: Performed by: NURSE PRACTITIONER

## 2020-08-04 PROCEDURE — 74011250636 HC RX REV CODE- 250/636: Performed by: INTERNAL MEDICINE

## 2020-08-04 PROCEDURE — 74011000250 HC RX REV CODE- 250: Performed by: HOSPITALIST

## 2020-08-04 PROCEDURE — 36415 COLL VENOUS BLD VENIPUNCTURE: CPT

## 2020-08-04 PROCEDURE — 74011250637 HC RX REV CODE- 250/637: Performed by: UROLOGY

## 2020-08-04 RX ADMIN — MEROPENEM 500 MG: 500 INJECTION, POWDER, FOR SOLUTION INTRAVENOUS at 00:59

## 2020-08-04 RX ADMIN — OXYBUTYNIN CHLORIDE 10 MG: 5 TABLET, EXTENDED RELEASE ORAL at 08:33

## 2020-08-04 RX ADMIN — TRAMADOL HYDROCHLORIDE 50 MG: 50 TABLET, FILM COATED ORAL at 17:32

## 2020-08-04 RX ADMIN — PANTOPRAZOLE SODIUM 40 MG: 40 TABLET, DELAYED RELEASE ORAL at 08:33

## 2020-08-04 RX ADMIN — MEROPENEM 500 MG: 500 INJECTION, POWDER, FOR SOLUTION INTRAVENOUS at 17:32

## 2020-08-04 RX ADMIN — TRAMADOL HYDROCHLORIDE 50 MG: 50 TABLET, FILM COATED ORAL at 06:39

## 2020-08-04 RX ADMIN — SODIUM CHLORIDE 50 ML/HR: 900 INJECTION, SOLUTION INTRAVENOUS at 17:34

## 2020-08-04 RX ADMIN — MEROPENEM 500 MG: 500 INJECTION, POWDER, FOR SOLUTION INTRAVENOUS at 07:22

## 2020-08-04 NOTE — PROGRESS NOTES
Problem: Pressure Injury - Risk of  Goal: *Prevention of pressure injury  Description: Document Murray Scale and appropriate interventions in the flowsheet. Outcome: Progressing Towards Goal  Note: Pressure Injury Interventions:  Sensory Interventions: Minimize linen layers, Maintain/enhance activity level, Keep linens dry and wrinkle-free, Assess changes in LOC, Assess need for specialty bed, Turn and reposition approx.  every two hours (pillows and wedges if needed), Pressure redistribution bed/mattress (bed type)    Moisture Interventions: Absorbent underpads, Apply protective barrier, creams and emollients, Check for incontinence Q2 hours and as needed, Maintain skin hydration (lotion/cream), Limit adult briefs, Moisture barrier    Activity Interventions: Pressure redistribution bed/mattress(bed type), Increase time out of bed    Mobility Interventions: Pressure redistribution bed/mattress (bed type), HOB 30 degrees or less, Float heels    Nutrition Interventions: Document food/fluid/supplement intake    Friction and Shear Interventions: Apply protective barrier, creams and emollients, Feet elevated on foot rest                Problem: Patient Education: Go to Patient Education Activity  Goal: Patient/Family Education  Outcome: Progressing Towards Goal     Problem: Pain  Goal: *Control of Pain  Outcome: Progressing Towards Goal     Problem: Patient Education: Go to Patient Education Activity  Goal: Patient/Family Education  Outcome: Progressing Towards Goal

## 2020-08-04 NOTE — ADT AUTH CERT NOTES
Utilization Reviews  
 
   
Urinary Tract Infection (UTI) - Care Day 18 (8/3/2020) by Jay Billingsley RN  
 
   
Review Entered  Review Status 8/4/2020 11:20  Completed   
   
Criteria Review Care Day: 18 Care Date: 8/3/2020 Level of Care: Inpatient Floor Guideline Day 3 Level Of Care (X) Floor to discharge [H] 8/4/2020 11:20:11 EDT by Jeanna Alvarez   
  floor Clinical Status ( ) * Hemodynamic stability ( ) * Mental status at baseline ( ) * Antibiotic regimen for next level of care established ( ) * Urine output adequate ( ) * Renal function at baseline or acceptable for next level of care ( ) * Pain absent or managed ( ) * Oral intake adequate ( ) * Fever absent or acceptable for next level of care ( ) * Vomiting absent   
( ) * Discharge plans and education understood Activity ( ) * Ambulatory Routes ( ) * Oral hydration, medications, [I] and diet Interventions (X) Renal function tests, urinalysis 8/4/2020 11:20:11 EDT by Jeanna Alvarez   
  BUN 35, creatinine 2.52 Medications (X) Antibiotics [J]   
8/4/2020 11:20:11 EDT by Kandy Valdes (X) Possible analgesics 8/4/2020 11:20:11 EDT by Jeanna Alvarez   
  Ultra   
* Milestone Additional Notes Assessment:   
DAPHNE on CKD-3/4?: slowly improving. Cr down to 2.52. after showing initial fluctuations. s/p right ureteral stent replacement. Baseline Cr? Chronic obstructive uropathy/Atrophic left kidney/DM nephropathy. Followed by Dr. Hilary david outpt follow up. Last seen in 2015.   
    
Chronic right hydronephrosis: [id number] stent exchange. Missed her last one due to [place] pandemic; changed during this admit. Hematuria- resolved. Off CBI   
 Metabolic acidosis: resolved. Off HCO3 drip    
Hyperkalemia- resolved UTI Anemia: Hgb sub-optimal. Rastafarian. IV Iron/CATHIE   
DM2: HgbA1c 6.6 Chronic sacral decubiti   
    
 Plan/Recommendations:   
Ct IVF- reduce rate to 50 ml/hr; taper to off by tomm if BP and oral intake is adequate Ct CATHIE AM labs Avoid nephrotoxins IV ABx per 1ry team   
    
D/W pt and RN   
    
Subjective:   
Up in bed. Feels Ok   
    
ROS- denies n/v/cp or sob   
    
Visit Vitals /80 Pulse 80 Temp 98.3 °F (36.8 °C) Resp 18 Ht 5' 4\" (1.626 m) Wt (!) 162.4 kg (358 lb 0.4 oz) SpO2 98% Breastfeeding No   
BMI 61.46 kg/m² Wt Readings from Last 3 Encounters:   
08/03/20 (!) 162.4 kg (358 lb 0.4 oz) Intake/Output Summary (Last 24 hours) at 8/3/2020 1023 Last data filed at 8/3/2020 0143 Gross per 24 hour Intake - Output 3050 ml Net -3050 ml   
  
    
NAD Clear RRR, distant Soft, large ventral hernia on L Tr edema AOx3   
    
    
Labs/Data:   
    
Lab Results Component Value Date/Time   
  WBC 11.2 (H) 08/03/2020 03:34 AM   
  HGB 7.0 (L) 08/03/2020 03:34 AM   
  HCT 25.0 (L) 08/03/2020 03:34 AM   
  PLATELET 162 36/58/4383 03:34 AM   
  .8 (H) 08/03/2020 03:34 AM   
  
    
Lab Results Component Value Date/Time   
  Sodium 144 08/03/2020 03:34 AM   
  Potassium 4.0 08/03/2020 03:34 AM   
  Chloride 115 (H) 08/03/2020 03:34 AM   
  CO2 21 08/03/2020 03:34 AM   
  Anion gap 8 08/03/2020 03:34 AM   
  Glucose 123 (H) 08/03/2020 03:34 AM   
  BUN 35 (H) 08/03/2020 03:34 AM   
  Creatinine 2.52 (H) 08/03/2020 03:34 AM   
  BUN/Creatinine ratio 14 08/03/2020 03:34 AM   
  GFR est AA 24 (L) 08/03/2020 03:34 AM   
  GFR est non-AA 20 (L) 08/03/2020 03:34 AM   
  Calcium 8.3 (L) 08/03/2020 03:34 AM   
  
Infectious Disease- Assessment Plan Assessment:   
    
#1 complicated urinary tract infection   
    
#2 renal failure   
    
#3 right hydronephrosis status post stent exchange   
    
#4 diabetes   
    
#5 hypertension   
    
    
Recommendations:   
    
merrem until aug 7.   
    
Medications,   
 0.9% sodium chloride infusion     
Rate: 50 mL/hr Dose: 50 mL/hr Freq: CONTINUOUS Route: IV Start: 07/21/20 1200   
  
insulin lispro (HUMALOG) injection     
Freq: 4 TIMES DAILY BEFORE MEALS & NIGHTLY Route: SC 2 Units x1 Start: 07/19/20 0000   
  
meropenem (MERREM) 500 mg in 0.9% sodium chloride (MBP/ADV) 50 mL     
Dose: 500 mg Freq: EVERY 8 HOURS Route: IV Start: 07/29/20 1500   
  
traMADoL (ULTRAM) tablet 50 mg     
Dose: 50 mg   
Freq: EVERY 8 HOURS AS NEEDED Route: PO   
PRN Reasons: Moderate Pain,Severe Pain   
   
Urinary Tract Infection (UTI) - Care Day 17 (8/2/2020) by Champ Campoverde RN  
 
   
Review Entered  Review Status 8/4/2020 11:11  Completed   
   
Criteria Review Care Day: 17 Care Date: 8/2/2020 Level of Care: Inpatient Floor Guideline Day 3 Level Of Care (X) Floor to discharge [H] 8/4/2020 11:11:06 EDT by Ying Rudd   
  floor Clinical Status ( ) * Hemodynamic stability ( ) * Mental status at baseline ( ) * Antibiotic regimen for next level of care established ( ) * Urine output adequate ( ) * Renal function at baseline or acceptable for next level of care ( ) * Pain absent or managed ( ) * Oral intake adequate ( ) * Fever absent or acceptable for next level of care ( ) * Vomiting absent   
( ) * Discharge plans and education understood Activity ( ) * Ambulatory Routes ( ) * Oral hydration, medications, [I] and diet Interventions (X) Renal function tests, urinalysis 8/4/2020 11:11:06 EDT by Ying Rudd   
  BUN 34, creatinine 2.62 Medications (X) Antibiotics [J]   
8/4/2020 11:11:06 EDT by Aminah Pain (X) Possible analgesics 8/4/2020 11:11:06 EDT by Ying Rudd   
  Ultram PO   
* Milestone Additional Notes Assessment & Plan:   
    
Sepsis secondary to UTI POA   
-on meropenem   
-presents with leukocytosis, tachycardia, hypotension and fever meeting sepsis criteria.   
-blood cultures: NGTD   
-rocephin switched to iv meropenem   
-Urinalysis, culture: Klebsiella Pneumoniae     
-ID on board, recommended 10 days of iv abx   
    
Hematuria likely secondary to UTI and malpositioned stent and gross hematuria plus end-stage bladder and urethral abnormality   
-Status post removal of old stent and replacement of right stent   
-on ditropan   
-off CBI, urine cloudy repeat culture- GNR 7/27, Cefepime   
-continue prn morphine, tylenol   
-Urology on board: 7/18 Cystoscopy, right ureteral stent exchange, clot evacuation, catheter placement.   
-christensen out, has purewick catheter now, urine cleared   
    
Acute blood loss anemia due to hematuria   
-improving but still low, Hgb 7.1patient is Jehovah's Witness and doesn't receive blood transfusion   
-recieved IV iron sucrose, epoetin   
-monitor H/H   
    
DAPHNE on CKD stage V:   
-creatinine improving, Creatinine 2.62   
-continue IVF, avoid nephrotoxin   
-Nephrology on board   
    
T2DM   
-At baseline controlled with hemoglobin A1c of 6.6   
-continue sliding scale and monitor blood sugars   
    
Obesity   
-Outpatient follow-up for diet and weight loss program   
    
Debility   
-bed bound   
    
Moisture associated skin injury to bilateral gluteal fold   
-continue wound care per wound care nurse    
    
    
Code status: DNR   
DVT prophylaxis: SCD   
    
Care Plan discussed with: Patient/Family and Nurse Anticipated Disposition: TBD Visit Vitals /70 (BP 1 Location: Right arm, BP Patient Position: At rest) Pulse 81 Temp 98 °F (36.7 °C) Resp 14 Ht 5' 4\" (1.626 m) Wt (!) 162.4 kg (358 lb) SpO2 97% Breastfeeding No   
BMI 61.45 kg/m²   
  
    
  
Intake/Output Summary (Last 24 hours) at 8/2/2020 1198 Last data filed at 8/1/2020 0930 Gross per 24 hour Intake - Output 400 ml Net -400 ml   
    
    
Physical Examination:   
    
    
                                                
Constitutional: No acute distress, cooperative, pleasant    
ENT: Oral mucosa moist, oropharynx benign. Resp: CTA bilaterally. No wheezing/rhonchi/rales. No accessory muscle use CV: Regular rhythm, normal rate, no murmurs, gallops, rubs   
 GI: Soft, non distended, non tender. normoactive bowel sounds, no hepatosplenomegaly   
 Musculoskeletal: No edema   
 Neurologic: Conscious and alert, AAOx3, motor UE 5/5, LE 4/5, CN II-XII reviewed   
                      Skin:  no skin rash Results for Daiana Peace (MRN [id number]) as of 8/4/2020 10:49   
  
8/2/2020 00:55 Sodium: 141 Potassium: 5.6 (H) Chloride: 117 (H)   
CO2: 16 (L) Anion gap: 8 Glucose: 124 (H) BUN: 34 (H) Creatinine: 2.62 (H) Medications,0.9% sodium chloride infusion     
Rate: 50 mL/hr Dose: 50 mL/hr Freq: CONTINUOUS Route: IV Start: 07/21/20 1200 Order ID: [id number]   
  
lidocaine 4 % patch 1 Patch     
Dose: 1 Patch Freq: EVERY 24 HOURS Route: TD   
  
meropenem (MERREM) 500 mg in 0.9% sodium chloride (MBP/ADV) 50 mL     
Dose: 500 mg Freq: EVERY 8 HOURS Route: IV Start: 07/29/20 1500   
  
oxybutynin chloride XL (DITROPAN XL) tablet 10 mg     
Dose: 10 mg   
Freq: DAILY Route: PO   
Start: 07/18/20 1600   
   
Urinary Tract Infection (UTI) - Care Day 16 (8/1/2020) by Merlinda Ocean, RN  
 
   
Review Entered  Review Status 8/4/2020 10:54  Completed   
   
Criteria Review Care Day: 16 Care Date: 8/1/2020 Level of Care: Inpatient Floor Guideline Day 3 Level Of Care (X) Floor to discharge [H] 8/4/2020 10:54:42 EDT by Bee Cunningham   
  floor Clinical Status ( ) * Hemodynamic stability ( ) * Mental status at baseline ( ) * Antibiotic regimen for next level of care established ( ) * Urine output adequate ( ) * Renal function at baseline or acceptable for next level of care ( ) * Pain absent or managed ( ) * Oral intake adequate ( ) * Fever absent or acceptable for next level of care ( ) * Vomiting absent   
( ) * Discharge plans and education understood Activity ( ) * Ambulatory Routes ( ) * Oral hydration, medications, [I] and diet Interventions (X) Renal function tests, urinalysis 8/4/2020 10:54:42 EDT by Dianne Nageotte   
  BUN 35, creatinine 2.51 Medications (X) Antibiotics [J]   
8/4/2020 10:54:42 EDT by Martinez Simpson (X) Possible analgesics 8/4/2020 10:54:42 EDT by Dianne Nageotte   
  Ultram   
* Milestone Additional Notes She said she feels better, no left side chest pain or shortness of breath   
    
Assessment & Plan:   
    
Sepsis secondary to UTI POA   
-on meropenem   
-presents with leukocytosis, tachycardia, hypotension and fever meeting sepsis criteria.   
-blood cultures: NGTD   
-rocephin switched to iv meropenem   
-Urinalysis, culture: Klebsiella Pneumoniae     
-ID on board, recommended 10 days of iv abx   
    
Hematuria likely secondary to UTI and malpositioned stent and gross hematuria plus end-stage bladder and urethral abnormality   
-Status post removal of old stent and replacement of right stent   
-on ditropan   
-off CBI, urine cloudy repeat culture- GNR 7/27, Cefepime   
-continue prn morphine, tylenol   
-Urology on board: 7/18 Cystoscopy, right ureteral stent exchange, clot evacuation, catheter placement.   
-christensen out, has purewick catheter now   
    
Acute blood loss anemia due to hematuria   
-improving but still low, Hgb 7.1patient is Jehovah's Witness and doesn't receive blood transfusion   
-recieved IV iron sucrose, epoetin   
-monitor H/H   
    
DAPHNE on CKD stage V:   
-creatinine improving, Creatinine 2.85   
-continue IVF, avoid nephrotoxin   
-Nephrology on board   
    
T2DM   
-At baseline controlled with hemoglobin A1c of 6.6   
-continue sliding scale and monitor blood sugars   
    
Obesity -Outpatient follow-up for diet and weight loss program   
    
Debility   
-bed bound   
    
Moisture associated skin injury to bilateral gluteal fold   
-continue wound care per wound care nurse    
    
    
Code status: DNR   
DVT prophylaxis: SCD   
    
Results for Kimberli Quarles (MRN [id number]) as of 8/4/2020 10:49   
  
8/1/2020 05:59 Sodium: 144 Potassium: 4.2 Chloride: 116 (H)   
CO2: 21 Anion gap: 7 Glucose: 113 (H) BUN: 34 (H) Creatinine: 2.85 (H) BUN/Creatinine ratio: 12 Calcium: 8.3 (L) Phosphorus: 3.5 GFR est non-AA: 17 (L) GFR est AA: 21 (L) Albumin: 2.4 (L) Results for Kimberli Quarles (MRN [id number]) as of 8/4/2020 10:49   
  
8/1/2020 05:59 WBC: 11.8 (H) NRBC: 0.5 (H) RBC: 2.50 (L) HGB: 7.1 (L) HCT: 24.8 (L) Results for Kimberli Quarles (MRN [id number]) as of 8/4/2020 10:49   
  
8/1/2020 05:59 WBC: 11.8 (H) NRBC: 0.5 (H) RBC: 2.50 (L) HGB: 7.1 (L) HCT: 24.8 (L) Visit Vitals /80 (BP 1 Location: Right arm, BP Patient Position: At rest) Pulse 83 Temp 98.2 °F (36.8 °C) Resp 16 Ht 5' 4\" (1.626 m) Wt (!) 161.9 kg (357 lb) SpO2 96% Breastfeeding No   
BMI 61.28 kg/m²   
  
    
  
Intake/Output Summary (Last 24 hours) at 8/1/2020 7321 Last data filed at 8/1/2020 7718 Gross per 24 hour Intake - Output 3450 ml Net -3450 ml   
    
    
Physical Examination:   
                                                
Constitutional: No acute distress, cooperative, pleasant    
ENT: Oral mucosa moist, oropharynx benign. Resp: CTA bilaterally. No wheezing/rhonchi/rales. No accessory muscle use CV: Regular rhythm, normal rate, no murmurs, gallops, rubs   
 GI: Soft, non distended, non tender. normoactive bowel sounds, no hepatosplenomegaly   
 Musculoskeletal: No edema   
 Neurologic: Conscious and alert, AAOx3, motor UE 5/5, LE 4/5, CN II-XII reviewed                       Skin:  no skin rash Labs:   
    
Recent Labs   
  08/01/20   
0559 07/31/20   
0452 WBC 11.8* 12.1* HGB 7.1* 6.8* HCT 24.8* 23.7*   
 394 Recent Labs   
  08/01/20   
0559 07/31/20   
9810 07/30/20   
0502  144 143   
K 4.2 4.2 4.4   
* 116* 115* CO2 21 21 21 BUN 34* 36* 37* CREA 2.85* 2.94* 3.08* * 114* 109* CA 8.3* 8.5 8.4* PHOS 3.5 -- 4.0 Recent Labs   
  08/01/20   
0559 07/31/20   
7642 07/30/20   
0502 ALT -- 7* --   
AP -- 48 --   
TBILI -- 0.1* --   
TP -- 6.9 --   
ALB 2.4* 2.4* 2.4*   
GLOB -- 4.5* --   
  
 Medications,   
0.9% sodium chloride infusion     
Rate: 50 mL/hr Dose: 50 mL/hr Freq: CONTINUOUS Route: IV Start: 07/21/20 1200   
  
acetaminophen (TYLENOL) tablet 500 mg     
Dose: 500 mg Freq: EVERY 6 HOURS AS NEEDED Route: PO x1 PRN Reasons: Mild Pain,Fever,Headache   
  
meropenem (MERREM) 500 mg in 0.9% sodium chloride (MBP/ADV) 50 mL     
Dose: 500 mg Freq: EVERY 8 HOURS Route: IV Start: 07/29/20 1500   
  
pantoprazole (PROTONIX) tablet 40 mg     
Dose: 40 mg   
Freq: DAILY Route: PO   
Start: 07/18/20 0900   
   
Urinary Tract Infection (UTI) - Care Day 15 (7/31/2020) by Marta Olivas RN  
 
   
Review Entered  Review Status 8/4/2020 10:40  Completed   
   
Criteria Review Care Day: 15 Care Date: 7/31/2020 Level of Care: Inpatient Floor Guideline Day 3 Level Of Care (X) Floor to discharge [H] 8/4/2020 10:40:41 EDT by Zay Gonzales   
  Floor Clinical Status ( ) * Hemodynamic stability ( ) * Mental status at baseline ( ) * Antibiotic regimen for next level of care established ( ) * Urine output adequate ( ) * Renal function at baseline or acceptable for next level of care ( ) * Pain absent or managed ( ) * Oral intake adequate ( ) * Fever absent or acceptable for next level of care ( ) * Vomiting absent ( ) * Discharge plans and education understood Activity ( ) * Ambulatory Routes ( ) * Oral hydration, medications, [I] and diet Medications (X) Possible analgesics 8/4/2020 10:40:41 EDT by Kenan Cortez PO   
* Milestone Additional Notes Assessment & Plan:   
    
Sepsis secondary to UTI POA   
-on meropenem   
-presents with leukocytosis, tachycardia, hypotension and fever meeting sepsis criteria.   
-blood cultures: NGTD   
-rocephin switched to iv meropenem   
-Urinalysis, culture: Klebsiella Pneumoniae     
-ID on board, recommended 10 days of iv abx   
    
Hematuria likely secondary to UTI and malpositioned stent and gross hematuria plus end-stage bladder and urethral abnormality   
-Status post removal of old stent and replacement of right stent   
-on ditropan   
-off CBI, urine cloudy repeat culture- GNR 7/27, Cefepime   
-continue prn morphine, tylenol   
-Urology on board: 7/18 Cystoscopy, right ureteral stent exchange, clot evacuation, catheter placement.   
-christensen out, has purewick catheter now   
    
Acute blood loss anemia due to hematuria   
-improving but still low, Hgb 6.8 patient is Jehovah's Witness and doesn't receive blood transfusion   
-recieved IV iron sucrose, epoetin   
-monitor H/H   
    
DAPHNE on CKD stage V:   
-creatinine improving, Creatinine 2.94   
-continue IVF, avoid nephrotoxin   
-Nephrology on board   
    
T2DM   
-At baseline controlled with hemoglobin A1c of 6.6   
-continue sliding scale and monitor blood sugars   
    
Obesity   
-Outpatient follow-up for diet and weight loss program   
    
Debility   
-bed bound   
    
Moisture associated skin injury to bilateral gluteal fold   
-continue wound care per wound care nurse    
    
    
Code status: DNR   
DVT prophylaxis: SCD   
    
Care Plan discussed with: Patient/Family and Nurse Anticipated Disposition: TBD Nephrology Progress note:   
    
Assessment: DAPHNE on CKD-3/4?: Cr finally showing improvement over the last few days-> Cr down to 3.8mg/dl after showing fluctuations s/p right ureteral stent replacement. Baseline Cr? Chronic obstructive uropathy/Atrophic left kidney/DM nephropathy. Followed by Dr. Shaquille david outpt follow up. Last seen in 2015.   
    
Chronic right hydronephrosis: [id number] stent exchange. Missed her last one due to COVID pandemic   
    
Metabolic acidosis: NAGMA-> better with IV Bicarb   
    
UTI   
    
Anemia: Hgb sub-optimal. Spiritism. IV Iron/CATHIE   
  
DM2: HgbA1c 6.6   
    
Chronic sacral decubiti   
    
Plan/Recommendations:   
  anemic, Renal function better   
Cathie. .. Will follow back on Monday;please call if any questions over the weekend Subjective: No issues except weak   
    
    
Visit Vitals /74 (BP 1 Location: Right arm, BP Patient Position: At rest) Pulse (!) 105 Temp 99.1 °F (37.3 °C) Resp 17 Ht 5' 4\" (1.626 m) Wt (!) 161.9 kg (357 lb) SpO2 92% Breastfeeding No   
BMI 61.28 kg/m² Wt Readings from Last 3 Encounters:   
07/30/20 (!) 161.9 kg (357 lb) Intake/Output Summary (Last 24 hours) at 7/31/2020 2705 Last data filed at 7/31/2020 0730 Gross per 24 hour Intake - Output 3450 ml Net -3450 ml   
  
    
Gen: NAD   
    
    
    
    
Labs/Data:   
    
Lab Results Component Value Date/Time   
  WBC 12.1 (H) 07/31/2020 05:04 AM   
  HGB 6.8 (L) 07/31/2020 05:04 AM   
  HCT 23.7 (L) 07/31/2020 05:04 AM   
  PLATELET 156 37/04/1348 05:04 AM   
  MCV 99.2 (H) 07/31/2020 05:04 AM   
  
    
Lab Results Component Value Date/Time   
  Sodium 144 07/31/2020 05:04 AM   
  Potassium 4.2 07/31/2020 05:04 AM   
  Chloride 116 (H) 07/31/2020 05:04 AM   
  CO2 21 07/31/2020 05:04 AM   
  Anion gap 7 07/31/2020 05:04 AM   
  Glucose 114 (H) 07/31/2020 05:04 AM   
  BUN 36 (H) 07/31/2020 05:04 AM   
  Creatinine 2.94 (H) 07/31/2020 05:04 AM   
   BUN/Creatinine ratio 12 07/31/2020 05:04 AM   
  GFR est AA 20 (L) 07/31/2020 05:04 AM   
  GFR est non-AA 16 (L) 07/31/2020 05:04 AM   
  Calcium 8.5 07/31/2020 05:04 AM   
    
Medications,   
0.9% sodium chloride infusion     
Rate: 50 mL/hr Dose: 50 mL/hr Freq: CONTINUOUS Route: IV Start: 07/21/20 1200   
  
meropenem (MERREM) 500 mg in 0.9% sodium chloride (MBP/ADV) 50 mL     
Dose: 500 mg Freq: EVERY 8 HOURS Route: IV Start: 07/29/20 1500   
  
oxybutynin chloride XL (DITROPAN XL) tablet 10 mg     
Dose: 10 mg   
Freq: DAILY Route: PO   
Start: 07/18/20 1600   
  
traMADoL (ULTRAM) tablet 50 mg     
Dose: 50 mg   
Freq: EVERY 8 HOURS AS NEEDED Route: PO   
PRN Reasons: Moderate Pain,Severe Pain

## 2020-08-04 NOTE — PROGRESS NOTES
Occupational Therapy: Attempted to see patient x 2. Patient with leaking IV. Nurse notified and attending to same. Second attempt, nurse reporting need to start a new IV. Will follow and see as able and appropriate.   AGUS Marks/ELIAS

## 2020-08-04 NOTE — PROGRESS NOTES
JOESPH:  RUR: 18%    Chart reviewed. Patient admitted here from home on 7/17/20 with complaints of urinary pain. Patient has hx of renal disease and current renal stent in place. Patient transferred to [address] on 8/3/20.     8:10am CM met with Dr. Nickolas Villarreal. Patient will be on iv meropenum until 8/7/20. Discharge Plan:  Home with sister Ken Caceres [phone]) with  [facility] via [facility] ([phone]). Patient to be transported to [address] [place] [address] (This is 5 [place] [address] [place], [place] 45 minutes from [place]). Per previous CM notes, [facility] transport indicated. Transport will be placed on Will Call later in the week. Discharge planned for Friday 8/7/20. CM will speak with patient/sister later today. Cheryl Dewitt, [address], [facility]  [phone]    1:04p  CM spoke with Liban Lee. Address confirmed. 5 steps to entrance. Home is a [facility]. Bariatric transport indicated (patient weighs 336 lbs). Tad Cools needed once inside residence. Sister is asking for prescription for pur wick. CM passing on request to assigned nurse.

## 2020-08-04 NOTE — PROGRESS NOTES
Problem: Mobility Impaired (Adult and Pediatric)  Goal: *Acute Goals and Plan of Care (Insert Text)  Description: FUNCTIONAL STATUS PRIOR TO ADMISSION: Pt has been essentially bed bound since December 10th. Pt reported she was walking with a RW short distances prior to this. Pt had sustained a fall in October and had gone to rehab and then fell again in December. HOME SUPPORT PRIOR TO ADMISSION: The patient lived with her sister. Physical Therapy Goals  Initiated 7/20/2020, reassessment completed 7/25/20 and goals remain appropriate at this time. 1.  Patient will move from supine to sit and sit to supine , scoot up and down, and roll side to side in bed with minimal assistance/contact guard assist within 7 day(s). 2.  Patient will transfer from bed to chair and chair to bed with moderate assistance (squat pivot versus sliding board) using the least restrictive device within 7 day(s). 3.  Patient will tolerate seated EOB x 10 minutes with supervision within 7 days in prep for transfers OOB    Outcome: Progressing Towards Goal    PHYSICAL THERAPY TREATMENT  Patient: Mone Haney (28 y.o. female)  Date: 8/4/2020  Diagnosis: Hematuria [R31.9]  Hematuria [R31.9]   <principal problem not specified>  Procedure(s) (LRB):  CYSTOSCOPY RIGHT URETERAL STENT EXCHANGE (Right) 17 Days Post-Op  Precautions: Fall  Chart, physical therapy assessment, plan of care and goals were reviewed. ASSESSMENT  Patient continues with skilled PT services and is progressing towards goals. Pt tolerates bed mobility supervision with decreased anxiety and tolerates without assistance. Pt feels more comfortable with deflated mattress and recliner locked in front of her to decrease fear. Pt able to pull up on recliner with RUE and push off bedrail with L and bend forward to pick buttocks completely off bed, approx 60% of standing extension prior to increase fear limiting.  Pt performs x5 reps with min-mod A x2 progressing to min A x2. Pt demos great increase in effort and tolerance today and feels like she has accomplished a huge personal goal. Pt returned to bed with min A through BLE and able to boost self up in bed in trendelenberg. Current Level of Function Impacting Discharge (mobility/balance): min -mod A for standing trials    Other factors to consider for discharge:          PLAN :  Patient continues to benefit from skilled intervention to address the above impairments. Continue treatment per established plan of care. to address goals. Recommendation for discharge: (in order for the patient to meet his/her long term goals)  Physical therapy at least 2 days/week in the home     This discharge recommendation:  Has been made in collaboration with the attending provider and/or case management    IF patient discharges home will need the following DME: to be determined (TBD)       SUBJECTIVE:   Patient stated I achieved my own goal. I havent stood in eight months.     OBJECTIVE DATA SUMMARY:   Critical Behavior:  Neurologic State: Alert  Orientation Level: Oriented X4  Cognition: Appropriate decision making, Appropriate for age attention/concentration, Appropriate safety awareness, Follows commands  Safety/Judgement: Awareness of environment, Insight into deficits  Functional Mobility Training:  Bed Mobility:  Rolling: Supervision  Supine to Sit: Supervision  Sit to Supine: Minimum assistance  Scooting: Contact guard assistance;Supervision        Transfers:  Sit to Stand: Moderate assistance;Minimum assistance; Additional time; Adaptive equipment(knee blocking)  Stand to Sit: Minimum assistance; Adaptive equipment; Additional time(knee blocking)                             Balance:  Sitting: Intact; Without support  Sitting - Static: Good (unsupported)  Sitting - Dynamic: Good (unsupported)  Standing: Impaired; With support(achieved stand)  Standing - Static: Fair;Constant support(BUE support and knee blocking)  Ambulation/Gait Training:     Pain Rating:  B knees, buttocks    Activity Tolerance:   Fair, requires frequent rest breaks, observed SOB with activity, and fear limitations  Please refer to the flowsheet for vital signs taken during this treatment. After treatment patient left in no apparent distress:   Supine in bed and Call bell within reach    COMMUNICATION/COLLABORATION:   The patients plan of care was discussed with: Registered nurse and Case management.      Angy Smith, PT   Time Calculation: 30 mins

## 2020-08-04 NOTE — PROGRESS NOTES
Primary Nurse Maritza Gamez, RN and Rosa Olivas RN performed a dual skin assessment on this patient Impairment noted- see wound doc flow sheet  Murray score is 16    Sacral/gluteal cleft wound  Moisture under breasts and abdominal pannus  Large Left abdomen hernia

## 2020-08-04 NOTE — PROGRESS NOTES
Physical Therapy  Initiated session but noted IV leaking. Nursing notified and she arrived to attempt new IV . Will attempt to follow up later today.   Dorene Case, PT

## 2020-08-04 NOTE — PROGRESS NOTES
Nutrition Assessment     Type and Reason for Visit: RD nutrition re-screen/LOS    Nutrition Recommendations/Plan:   1. Recommend referral outpatient dietitian for wt loss education in more appropriate setting  2. Continue current diet    Nutrition Assessment:     Pt admitted for sepsis 2/2 UTI, abx rx. Past Medical History:   Diagnosis Date    Diabetes ([address]     Hernia, hiatal     Hypertension     Kidney stones     both kidneys    Osteoarthritis     Renal failure     left kidney   Urology following with cystoscopy and right ureteral stent exchange on 7/18. complicated UTI. Pt bedbound with morbid obesity. Renal following for CKD4. IV iron given this admit. IV abx until 8/7, will remain inpatient until that time. Appetite remains excellent. Morbid obesity but would recommend outpatient education in more appropriate setting. Will rescreen per protocol.     Malnutrition Assessment:  Malnutrition Status: No malnutrition     Estimated Daily Nutrient Needs:  Energy (kcal):  2100(MSJ x 1)  Protein (g):  120(0.8 gm/kg)       Fluid (ml/day):  1 mL/kcal    Nutrition Related Findings:  BM 8/1      Current Nutrition Therapies:  DIET: DIABETIC CONSISTENT CARB Regular  Meal intake:   Patient Vitals for the past 168 hrs:   % Diet Eaten   08/03/20 1000 100 %     Anthropometric Measures:  · Height:  5' 4\" (162.6 cm)  · Current Body Wt:  152.4 kg (335 lb 15.7 oz)  · BMI: 57.6   Wt Readings from Last 10 Encounters:   08/04/20 152.4 kg (336 lb)   07/27/20 156.9 kg (346 lb)     Nutrition Diagnosis:   · Overweight/obesity related to excessive energy intake(limited mobility) as evidenced by BMI(bedbound)    Nutrition Intervention:  Food and/or Nutrient Delivery: Continue current diet  Nutrition Education and Counseling: Education needed(in outpatient setting)  Coordination of Nutrition Care: No recommendation at this time    Goals:  continued PO >/=75% of meals over next 7 days       Nutrition Monitoring and Evaluation: Behavioral-Environmental Outcomes: Knowledge or skill, Readiness for change  Food/Nutrient Intake Outcomes: Food and nutrient intake  Physical Signs/Symptoms Outcomes: Weight    Discharge Planning:    Recommend pursue outpatient nutrition counseling     Recent Labs     08/04/20  0507 08/03/20  0334 08/02/20  0055   * 123* 124*   BUN 35* 35* 34*   CREA 2.51* 2.52* 2.62*    144 141   K 4.3 4.0 5.6*   * 115* 117*   CO2 21 21 16*   CA 8.5 8.3* 7.9*   PHOS 4.0 3.7 3.9       Recent Labs     08/04/20  0507 08/03/20  0334 08/02/20  0055   ALB 2.4* 2.3* 2.1*       Recent Labs     08/04/20  0507 08/03/20  0334   WBC 10.3 11.2*   HGB 7.4* 7.0*   HCT 25.9* 25.0*    392         Recent Labs     08/04/20  1146 08/04/20  0717 08/03/20  2126 08/03/20  1640 08/03/20  1121 08/03/20  0641 08/02/20  2113 08/02/20  1637 08/02/20  1126 08/02/20  0612 08/01/20  2130 08/01/20  1653   GLUCPOC 110* 110* 108* 97 141* 139* 149* 130* 113* 122* 146* 126*       Lab Results   Component Value Date/Time    Hemoglobin A1c 6.6 (H) 07/22/2020 03:30 AM         Rocio Steel RD  Available via [employer]  Or Pager# [phone]

## 2020-08-04 NOTE — PROGRESS NOTES
[facility]  Hospitalist Group                                                                                          Hospitalist Progress Note  Duarte Huizar MD  Answering service: [phone] from in house phone        Date of Service:  2020  NAME:  Amanda Aceves  :  1960  MRN:  [id number]      Admission Summary:     Carli Sosa a 62 y.o. female with past medical history significant for diabetes mellitus type 2, obesity, CKD, history of kidney stones, right hydronephrosis and ureter multiple stents placement q 3 month stent exchange, missed her last one due to COVID pandemic, presented to the emergency room with right flank pain and hematuria. Patient has been experiencing urinary symptoms and flank pain for several days now. She has been in the emergency multiple times but discharged home with antibiotic therapy. Unruly Villafana has tried amoxicillin, Keflex and Cipro without improvement of her symptoms. She started noticing blood in the urine for which she came to the emergency room.  In the ED it was noted that her ureter stent was coming out through her urethra.  She had a CT scan of the abdomen that showed ureteral stent misplacement as well as enlargement of the right kidney.  She was found to have a leukocytosis, febrile and hypotensive. Received Ceftriaxone in the ED.  Urology notified by ED of admission and patient admitted for UTI and hematuria.     Interval history / Subjective:     She said she feels better, no left side chest pain or shortness of breath  Has very difficult IV access, iv access team trying it     Assessment & Plan:     Sepsis secondary to UTI POA  -on meropenem, until   -presents with leukocytosis, tachycardia, hypotension and fever meeting sepsis criteria.  -blood cultures: NGTD  -rocephin switched to iv meropenem  -Urinalysis, culture: Klebsiella Pneumoniae    -ID on board, recommended 10 days of iv abx     Hematuria likely secondary to UTI and malpositioned stent and gross hematuria plus end-stage bladder and urethral abnormality   -Status post removal of old stent and replacement of right stent  -on ditropan  -off CBI, urine cloudy repeat culture- GNR 7/27, Cefepime  -continue prn morphine, tylenol  -Urology on board: 7/18 Cystoscopy, right ureteral stent exchange, clot evacuation, catheter placement.  -christensen out, has purewick catheter now, urine cleared     Acute blood loss anemia due to hematuria  -improving but still low, Hgb 7.1patient is [place] and doesn't receive blood transfusion   -recieved IV iron sucrose, epoetin  -monitor H/H     DAPHNE on CKD stage V:  -creatinine improving, Creatinine 2.62  -continue IVF, avoid nephrotoxin   -Nephrology on board     T2DM  -At baseline controlled with hemoglobin A1c of 6.6  -continue sliding scale and monitor blood sugars     Obesity  -Outpatient follow-up for diet and weight loss program     Debility  -bed bound     Moisture associated skin injury to bilateral gluteal fold  -continue wound care per wound care nurse       Code status: DNR  DVT prophylaxis: SCD    Care Plan discussed with: Patient/Family and Nurse  Anticipated Disposition: Home with [facility] vs SNF, Patient preferred to go home with [facility]  Anticipated Discharge: Greater than 48 hours     Hospital Problems  Date Reviewed: 7/18/2020          Codes Class Noted POA    Hematuria ICD-10-CM: R31.9  ICD-9-CM: 599.70  7/17/2020 Unknown              Vital Signs:    Last 24hrs VS reviewed since prior progress note.  Most recent are:  Visit Vitals  /60 (BP 1 Location: Right arm, BP Patient Position: At rest)   Pulse 96   Temp 97.9 °F (36.6 °C)   Resp 18   Ht 5' 4\" (1.626 m)   Wt 152.4 kg (336 lb)   SpO2 98%   Breastfeeding No   BMI 57.67 kg/m²         Intake/Output Summary (Last 24 hours) at 8/4/2020 0846  Last data filed at 8/4/2020 0837  Gross per 24 hour   Intake 200 ml   Output 1700 ml   Net -1500 ml        Physical Examination: Constitutional:  No acute distress, cooperative, pleasant    ENT:  Oral mucosa moist, oropharynx benign. Resp:  CTA bilaterally. No wheezing/rhonchi/rales. No accessory muscle use   CV:  Regular rhythm, normal rate, no murmurs, gallops, rubs    GI:  Soft, non distended, non tender. normoactive bowel sounds, no hepatosplenomegaly     Musculoskeletal:  No edema     Neurologic:  Conscious and alert, AAOx3, motor UE 5/5, LE 4/5, CN II-XII reviewed     Skin:  no skin rash       Data Review:    Review and/or order of clinical lab test  Review and/or order of tests in the radiology section of CPT  Review and/or order of tests in the medicine section of CPT      Labs:     Recent Labs     08/04/20 0507 08/03/20  0334   WBC 10.3 11.2*   HGB 7.4* 7.0*   HCT 25.9* 25.0*    392     Recent Labs     08/04/20  0507 08/03/20  0334 08/02/20  0055    144 141   K 4.3 4.0 5.6*   * 115* 117*   CO2 21 21 16*   BUN 35* 35* 34*   CREA 2.51* 2.52* 2.62*   * 123* 124*   CA 8.5 8.3* 7.9*   PHOS 4.0 3.7 3.9     Recent Labs     08/04/20  0507 08/03/20  0334 08/02/20  0055   ALB 2.4* 2.3* 2.1*     No results for input(s): INR, PTP, APTT, INREXT, INREXT in the last 72 hours. No results for input(s): FE, TIBC, PSAT, FERR in the last 72 hours. No results found for: FOL, RBCF   No results for input(s): PH, PCO2, PO2 in the last 72 hours. No results for input(s): CPK, CKNDX, TROIQ in the last 72 hours.     No lab exists for component: CPKMB  No results found for: CHOL, CHOLX, CHLST, CHOLV, HDL, HDLP, LDL, LDLC, DLDLP, TGLX, TRIGL, TRIGP, CHHD, [facility]  Lab Results   Component Value Date/Time    Glucose (POC) 110 (H) 08/04/2020 07:17 AM    Glucose (POC) 108 (H) 08/03/2020 09:26 PM    Glucose (POC) 97 08/03/2020 04:40 PM    Glucose (POC) 141 (H) 08/03/2020 11:21 AM    Glucose (POC) 139 (H) 08/03/2020 06:41 AM     Lab Results   Component Value Date/Time    Color YELLOW/STRAW 07/25/2020 02:15 PM    Appearance TURBID (A) 07/25/2020 02:15 PM    Specific gravity 1.008 07/25/2020 02:15 PM    Specific gravity 1.020 07/17/2020 08:08 PM    pH (UA) 6.5 07/25/2020 02:15 PM    Protein 30 (A) 07/25/2020 02:15 PM    Glucose Negative 07/25/2020 02:15 PM    Ketone Negative 07/25/2020 02:15 PM    Bilirubin Negative 07/25/2020 02:15 PM    Urobilinogen 0.2 07/25/2020 02:15 PM    Nitrites Negative 07/25/2020 02:15 PM    Leukocyte Esterase LARGE (A) 07/25/2020 02:15 PM    Epithelial cells FEW 07/25/2020 02:15 PM    Bacteria 1+ (A) 07/25/2020 02:15 PM    WBC >100 (H) 07/25/2020 02:15 PM    RBC 5-10 07/25/2020 02:15 PM         Medications Reviewed:     Current Facility-Administered Medications   Medication Dose Route Frequency    lidocaine 4 % patch 1 Patch  1 Patch TransDERmal Q24H    meropenem (MERREM) 500 mg in 0.9% sodium chloride (MBP/ADV) 50 mL  500 mg IntraVENous Q8H    melatonin tablet 9 mg  9 mg Oral QHS    morphine injection 2 mg  2 mg IntraVENous Q4H PRN    epoetin frieda-epbx (RETACRIT) injection 20,000 Units  20,000 Units SubCUTAneous Q7D    0.9% sodium chloride infusion  75 mL/hr IntraVENous CONTINUOUS    traMADoL (ULTRAM) tablet 50 mg  50 mg Oral Q8H PRN    fentaNYL citrate (PF) injection 25 mcg  25 mcg IntraVENous Q4H PRN    lidocaine (URO-JET/ GLYDO) 2 % jelly   Urethral DAILY PRN    glucose chewable tablet 16 g  4 Tab Oral PRN    glucagon (GLUCAGEN) injection 1 mg  1 mg IntraMUSCular PRN    dextrose 10% infusion 0-250 mL  0-250 mL IntraVENous PRN    pantoprazole (PROTONIX) tablet 40 mg  40 mg Oral DAILY    acetaminophen (TYLENOL) tablet 500 mg  500 mg Oral Q6H PRN    opium-belladonna (B&O 15-A) 16.2-30 mg suppository 1 Suppository  1 Suppository Rectal Q8H PRN    oxybutynin chloride XL (DITROPAN XL) tablet 10 mg  10 mg Oral DAILY    insulin lispro (HUMALOG) injection   SubCUTAneous AC&HS    sodium chloride (NS) flush 5-10 mL  5-10 mL IntraVENous PRN ______________________________________________________________________  EXPECTED LENGTH OF STAY: 5d 12h  ACTUAL LENGTH OF STAY:          16                 John Noel MD

## 2020-08-04 NOTE — PROGRESS NOTES
Problem: Falls - Risk of  Goal: *Absence of Falls  Description: Document Kajal Nayak Fall Risk and appropriate interventions in the flowsheet. Outcome: Progressing Towards Goal  Note: Fall Risk Interventions:  Mobility Interventions: Communicate number of staff needed for ambulation/transfer, OT consult for ADLs, Patient to call before getting OOB, PT Consult for mobility concerns, PT Consult for assist device competence, Strengthening exercises (ROM-active/passive)         Medication Interventions: Evaluate medications/consider consulting pharmacy, Patient to call before getting OOB, Teach patient to arise slowly    Elimination Interventions: Call light in reach, Patient to call for help with toileting needs, Toileting schedule/hourly rounds(purewick in place)    History of Falls Interventions: Consult care management for discharge planning         Problem: Patient Education: Go to Patient Education Activity  Goal: Patient/Family Education  Outcome: Progressing Towards Goal     Problem: Pressure Injury - Risk of  Goal: *Prevention of pressure injury  Description: Document Murray Scale and appropriate interventions in the flowsheet. Outcome: Progressing Towards Goal  Note: Pressure Injury Interventions:  Sensory Interventions: Minimize linen layers, Maintain/enhance activity level, Keep linens dry and wrinkle-free, Assess changes in LOC, Assess need for specialty bed, Turn and reposition approx.  every two hours (pillows and wedges if needed), Pressure redistribution bed/mattress (bed type)    Moisture Interventions: Absorbent underpads, Apply protective barrier, creams and emollients, Check for incontinence Q2 hours and as needed, Internal/External urinary devices, Minimize layers, Maintain skin hydration (lotion/cream)    Activity Interventions: Pressure redistribution bed/mattress(bed type), PT/OT evaluation    Mobility Interventions: HOB 30 degrees or less, Pressure redistribution bed/mattress (bed type), PT/OT evaluation, Turn and reposition approx. every two hours(pillow and wedges)    Nutrition Interventions: Document food/fluid/supplement intake    Friction and Shear Interventions: Apply protective barrier, creams and emollients, Feet elevated on foot rest, Lift sheet, Minimize layers                Problem: Patient Education: Go to Patient Education Activity  Goal: Patient/Family Education  Outcome: Progressing Towards Goal     Problem: Infection - Risk of, Urinary Catheter-Associated Urinary Tract Infection  Goal: *Absence of infection signs and symptoms  Outcome: Progressing Towards Goal     Problem: Patient Education: Go to Patient Education Activity  Goal: Patient/Family Education  Outcome: Progressing Towards Goal     Problem: Chronic Renal Failure  Goal: *Fluid and electrolytes stabilized  Outcome: Progressing Towards Goal     Problem: Patient Education: Go to Patient Education Activity  Goal: Patient/Family Education  Outcome: Progressing Towards Goal     Problem: Pain  Goal: *Control of Pain  Outcome: Progressing Towards Goal  Goal: *PALLIATIVE CARE:  Alleviation of Pain  Outcome: Progressing Towards Goal     Problem: Patient Education: Go to Patient Education Activity  Goal: Patient/Family Education  Outcome: Progressing Towards Goal     Problem: Patient Education: Go to Patient Education Activity  Goal: Patient/Family Education  Outcome: Progressing Towards Goal     Problem: Patient Education: Go to Patient Education Activity  Goal: Patient/Family Education  Outcome: Progressing Towards Goal     Problem: Diabetes Self-Management  Goal: *Disease process and treatment process  Description: Define diabetes and identify own type of diabetes; list 3 options for treating diabetes. Outcome: Progressing Towards Goal  Goal: *Incorporating nutritional management into lifestyle  Description: Describe effect of type, amount and timing of food on blood glucose; list 3 methods for planning meals.   Outcome: Progressing Towards Goal  Goal: *Incorporating physical activity into lifestyle  Description: State effect of exercise on blood glucose levels. Outcome: Progressing Towards Goal  Goal: *Developing strategies to promote health/change behavior  Description: Define the ABC's of diabetes; identify appropriate screenings, schedule and personal plan for screenings. Outcome: Progressing Towards Goal  Goal: *Using medications safely  Description: State effect of diabetes medications on diabetes; name diabetes medication taking, action and side effects. Outcome: Progressing Towards Goal  Goal: *Monitoring blood glucose, interpreting and using results  Description: Identify recommended blood glucose targets  and personal targets. Outcome: Progressing Towards Goal  Goal: *Prevention, detection, treatment of acute complications  Description: List symptoms of hyper- and hypoglycemia; describe how to treat low blood sugar and actions for lowering  high blood glucose level. Outcome: Progressing Towards Goal  Goal: *Prevention, detection and treatment of chronic complications  Description: Define the natural course of diabetes and describe the relationship of blood glucose levels to long term complications of diabetes.   Outcome: Progressing Towards Goal  Goal: *Developing strategies to address psychosocial issues  Description: Describe feelings about living with diabetes; identify support needed and support network  Outcome: Progressing Towards Goal  Goal: *Insulin pump training  Outcome: Progressing Towards Goal  Goal: *Sick day guidelines  Outcome: Progressing Towards Goal  Goal: *Patient Specific Goal (EDIT GOAL, INSERT TEXT)  Outcome: Progressing Towards Goal     Problem: Patient Education: Go to Patient Education Activity  Goal: Patient/Family Education  Outcome: Progressing Towards Goal     Problem: Discharge Planning  Goal: *Discharge to safe environment  Outcome: Progressing Towards Goal     Problem: Nutrition Deficit  Goal: *Optimize nutritional status  Outcome: Progressing Towards Goal

## 2020-08-04 NOTE — PROGRESS NOTES
Problem: Falls - Risk of  Goal: *Absence of Falls  Description: Document Melyssa Pinon Fall Risk and appropriate interventions in the flowsheet. Outcome: Progressing Towards Goal  Note: Fall Risk Interventions:  Mobility Interventions: Communicate number of staff needed for ambulation/transfer, OT consult for ADLs, Patient to call before getting OOB, PT Consult for mobility concerns, PT Consult for assist device competence, Strengthening exercises (ROM-active/passive)         Medication Interventions: Evaluate medications/consider consulting pharmacy    Elimination Interventions: Call light in reach, Patient to call for help with toileting needs, Toileting schedule/hourly rounds(purewick in place)    History of Falls Interventions: Consult care management for discharge planning, Evaluate medications/consider consulting pharmacy         Problem: Pressure Injury - Risk of  Goal: *Prevention of pressure injury  Description: Document Murray Scale and appropriate interventions in the flowsheet. Outcome: Progressing Towards Goal  Note: Pressure Injury Interventions:  Sensory Interventions: Minimize linen layers, Maintain/enhance activity level, Keep linens dry and wrinkle-free, Assess changes in LOC, Assess need for specialty bed, Turn and reposition approx.  every two hours (pillows and wedges if needed), Pressure redistribution bed/mattress (bed type)    Moisture Interventions: Absorbent underpads, Apply protective barrier, creams and emollients, Check for incontinence Q2 hours and as needed, Internal/External urinary devices, Maintain skin hydration (lotion/cream), Minimize layers, Moisture barrier, Limit adult briefs    Activity Interventions: Pressure redistribution bed/mattress(bed type), PT/OT evaluation    Mobility Interventions: Float heels, HOB 30 degrees or less, Pressure redistribution bed/mattress (bed type), PT/OT evaluation    Nutrition Interventions: Document food/fluid/supplement intake    Friction and Shear Interventions: Apply protective barrier, creams and emollients, Feet elevated on foot rest, Lift sheet, Minimize layers                Problem: Infection - Risk of, Urinary Catheter-Associated Urinary Tract Infection  Goal: *Absence of infection signs and symptoms  Outcome: Progressing Towards Goal     Problem: Patient Education: Go to Patient Education Activity  Goal: Patient/Family Education  Outcome: Progressing Towards Goal     Problem: Pain  Goal: *Control of Pain  Outcome: Progressing Towards Goal  Goal: *PALLIATIVE CARE:  Alleviation of Pain  Outcome: Progressing Towards Goal     Problem: Patient Education: Go to Patient Education Activity  Goal: Patient/Family Education  Outcome: Progressing Towards Goal     Problem: Patient Education: Go to Patient Education Activity  Goal: Patient/Family Education  Outcome: Progressing Towards Goal     Problem: Patient Education: Go to Patient Education Activity  Goal: Patient/Family Education  Outcome: Progressing Towards Goal     Problem: Nutrition Deficit  Goal: *Optimize nutritional status  Outcome: Progressing Towards Goal

## 2020-08-04 NOTE — PROGRESS NOTES
ID Progress Note  2020    Subjective:     Afebrile. No dysuria. Objective:     Vitals:   Visit Vitals  /67 (BP 1 Location: Right arm, BP Patient Position: At rest)   Pulse 82   Temp 98.1 °F (36.7 °C)   Resp 18   Ht 5' 4\" (1.626 m)   Wt 152.4 kg (336 lb)   SpO2 97%   Breastfeeding No   BMI 57.67 kg/m²        Tmax:  Temp (24hrs), Av.2 °F (36.8 °C), Min:97.9 °F (36.6 °C), Max:98.5 °F (36.9 °C)      Exam:    Not in distress  Abdomen: soft, nontender    Labs:   Lab Results   Component Value Date/Time    WBC 10.3 2020 05:07 AM    HGB 7.4 (L) 2020 05:07 AM    HCT 25.9 (L) 2020 05:07 AM    PLATELET 231  05:07 AM    .0 (H) 2020 05:07 AM     Lab Results   Component Value Date/Time    Sodium 144 2020 05:07 AM    Potassium 4.3 2020 05:07 AM    Chloride 116 (H) 2020 05:07 AM    CO2 21 2020 05:07 AM    Anion gap 7 2020 05:07 AM    Glucose 125 (H) 2020 05:07 AM    BUN 35 (H) 2020 05:07 AM    Creatinine 2.51 (H) 2020 05:07 AM    BUN/Creatinine ratio 14 2020 05:07 AM    GFR est AA 24 (L) 2020 05:07 AM    GFR est non-AA 20 (L) 2020 05:07 AM    Calcium 8.5 2020 05:07 AM    Bilirubin, total 0.1 (L) 2020 05:04 AM    Alk. phosphatase 48 2020 05:04 AM    Protein, total 6.9 2020 05:04 AM    Albumin 2.4 (L) 2020 05:07 AM    Globulin 4.5 (H) 2020 05:04 AM    A-G Ratio 0.5 (L) 2020 05:04 AM    ALT (SGPT) 7 (L) 2020 05:04 AM         Assessment:     #1 complicated urinary tract infection     #2 renal failure     #3 right hydronephrosis status post stent exchange     #4 diabetes     #5 hypertension      Recommendations:     merrem until aug 7. Seems she will stay here to complete treatment. Will sign off. PLease call with questions.      Daniel Coley MD

## 2020-08-05 LAB
ALBUMIN SERPL-MCNC: 2.5 G/DL (ref 3.5–5)
ANION GAP SERPL CALC-SCNC: 7 MMOL/L (ref 5–15)
BUN SERPL-MCNC: 36 MG/DL (ref 6–20)
BUN/CREAT SERPL: 14 (ref 12–20)
CALCIUM SERPL-MCNC: 8.3 MG/DL (ref 8.5–10.1)
CHLORIDE SERPL-SCNC: 115 MMOL/L (ref 97–108)
CO2 SERPL-SCNC: 22 MMOL/L (ref 21–32)
CREAT SERPL-MCNC: 2.51 MG/DL (ref 0.55–1.02)
ERYTHROCYTE [DISTWIDTH] IN BLOOD BY AUTOMATED COUNT: 17.1 % (ref 11.5–14.5)
GLUCOSE BLD STRIP.AUTO-MCNC: 110 MG/DL (ref 65–100)
GLUCOSE BLD STRIP.AUTO-MCNC: 111 MG/DL (ref 65–100)
GLUCOSE BLD STRIP.AUTO-MCNC: 134 MG/DL (ref 65–100)
GLUCOSE BLD STRIP.AUTO-MCNC: 154 MG/DL (ref 65–100)
GLUCOSE SERPL-MCNC: 107 MG/DL (ref 65–100)
HCT VFR BLD AUTO: 25.8 % (ref 35–47)
HGB BLD-MCNC: 7.3 G/DL (ref 11.5–16)
MCH RBC QN AUTO: 28.2 PG (ref 26–34)
MCHC RBC AUTO-ENTMCNC: 28.3 G/DL (ref 30–36.5)
MCV RBC AUTO: 99.6 FL (ref 80–99)
NRBC # BLD: 0 K/UL (ref 0–0.01)
NRBC BLD-RTO: 0 PER 100 WBC
PHOSPHATE SERPL-MCNC: 4 MG/DL (ref 2.6–4.7)
PLATELET # BLD AUTO: 348 K/UL (ref 150–400)
PMV BLD AUTO: 9.1 FL (ref 8.9–12.9)
POTASSIUM SERPL-SCNC: 4.2 MMOL/L (ref 3.5–5.1)
RBC # BLD AUTO: 2.59 M/UL (ref 3.8–5.2)
SERVICE CMNT-IMP: ABNORMAL
SODIUM SERPL-SCNC: 144 MMOL/L (ref 136–145)
WBC # BLD AUTO: 11.1 K/UL (ref 3.6–11)

## 2020-08-05 PROCEDURE — 74011250637 HC RX REV CODE- 250/637: Performed by: INTERNAL MEDICINE

## 2020-08-05 PROCEDURE — 77030038269 HC DRN EXT URIN PURWCK BARD -A

## 2020-08-05 PROCEDURE — 82962 GLUCOSE BLOOD TEST: CPT

## 2020-08-05 PROCEDURE — 97110 THERAPEUTIC EXERCISES: CPT

## 2020-08-05 PROCEDURE — 74011000258 HC RX REV CODE- 258: Performed by: NURSE PRACTITIONER

## 2020-08-05 PROCEDURE — 97535 SELF CARE MNGMENT TRAINING: CPT

## 2020-08-05 PROCEDURE — 74011250636 HC RX REV CODE- 250/636: Performed by: INTERNAL MEDICINE

## 2020-08-05 PROCEDURE — 65270000029 HC RM PRIVATE

## 2020-08-05 PROCEDURE — 97530 THERAPEUTIC ACTIVITIES: CPT

## 2020-08-05 PROCEDURE — 85027 COMPLETE CBC AUTOMATED: CPT

## 2020-08-05 PROCEDURE — 74011250636 HC RX REV CODE- 250/636: Performed by: NURSE PRACTITIONER

## 2020-08-05 PROCEDURE — 80069 RENAL FUNCTION PANEL: CPT

## 2020-08-05 PROCEDURE — 36415 COLL VENOUS BLD VENIPUNCTURE: CPT

## 2020-08-05 PROCEDURE — 74011250637 HC RX REV CODE- 250/637: Performed by: NURSE PRACTITIONER

## 2020-08-05 PROCEDURE — 74011250637 HC RX REV CODE- 250/637: Performed by: UROLOGY

## 2020-08-05 RX ADMIN — PANTOPRAZOLE SODIUM 40 MG: 40 TABLET, DELAYED RELEASE ORAL at 09:22

## 2020-08-05 RX ADMIN — TRAMADOL HYDROCHLORIDE 50 MG: 50 TABLET, FILM COATED ORAL at 17:13

## 2020-08-05 RX ADMIN — MEROPENEM 500 MG: 500 INJECTION, POWDER, FOR SOLUTION INTRAVENOUS at 00:14

## 2020-08-05 RX ADMIN — MEROPENEM 500 MG: 500 INJECTION, POWDER, FOR SOLUTION INTRAVENOUS at 07:38

## 2020-08-05 RX ADMIN — MEROPENEM 500 MG: 500 INJECTION, POWDER, FOR SOLUTION INTRAVENOUS at 17:13

## 2020-08-05 RX ADMIN — OXYBUTYNIN CHLORIDE 10 MG: 5 TABLET, EXTENDED RELEASE ORAL at 09:22

## 2020-08-05 RX ADMIN — EPOETIN ALFA-EPBX 20000 UNITS: 10000 INJECTION, SOLUTION INTRAVENOUS; SUBCUTANEOUS at 17:09

## 2020-08-05 RX ADMIN — TRAMADOL HYDROCHLORIDE 50 MG: 50 TABLET, FILM COATED ORAL at 07:04

## 2020-08-05 RX ADMIN — SODIUM CHLORIDE 50 ML/HR: 900 INJECTION, SOLUTION INTRAVENOUS at 09:24

## 2020-08-05 NOTE — PROGRESS NOTES
Problem: Mobility Impaired (Adult and Pediatric)  Goal: *Acute Goals and Plan of Care (Insert Text)  Description: FUNCTIONAL STATUS PRIOR TO ADMISSION: Pt has been essentially bed bound since December 10th. Pt reported she was walking with a RW short distances prior to this. Pt had sustained a fall in October and had gone to rehab and then fell again in December. HOME SUPPORT PRIOR TO ADMISSION: The patient lived with her sister. Physical Therapy Goals  Initiated 7/20/2020, reassessment completed 7/25/20 and goals remain appropriate at this time. 1.  Patient will move from supine to sit and sit to supine , scoot up and down, and roll side to side in bed with minimal assistance/contact guard assist within 7 day(s). 2.  Patient will transfer from bed to chair and chair to bed with moderate assistance (squat pivot versus sliding board) using the least restrictive device within 7 day(s). 3.  Patient will tolerate seated EOB x 10 minutes with supervision within 7 days in prep for transfers OOB    Outcome: Progressing Towards Goal       PHYSICAL THERAPY TREATMENT  Patient: Joselo Purcell (89 y.o. female)  Date: 8/5/2020  Diagnosis: Hematuria [R31.9]  Hematuria [R31.9]   <principal problem not specified>  Procedure(s) (LRB):  CYSTOSCOPY RIGHT URETERAL STENT EXCHANGE (Right) 18 Days Post-Op  Precautions: Fall  Chart, physical therapy assessment, plan of care and goals were reviewed. ASSESSMENT  Patient continues with skilled PT services and is progressing towards goals. Pt tolerates supine to sit with min A and handholds to get to EOB. Verbally discussed standing trials to perform, of note patient demos improved mobility with little to no feedback during stand, and holding information on performance until the sessions standing is over.  Pt tolerates seven total standing trials; stands #1-3 for approx 3 seconds, stand #4 for 5 seconds, stand #5 for 9 seconds, stand #6 for 11 seconds, #7 for 13 seconds, #8 for 23 seconds. Pt appears completely shocked once told regarding performance, very invested in progress today, states that she feels like her body is starting to participate. Pt continues to require min A for rolling back into bed, may perform better at home with a true mattress as the air mattress tends to hinder her movement. Current Level of Function Impacting Discharge (mobility/balance): min A overall, increasing confidence    Other factors to consider for discharge: body habitus         PLAN :  Patient continues to benefit from skilled intervention to address the above impairments. Continue treatment per established plan of care. to address goals. Recommendation for discharge: (in order for the patient to meet his/her long term goals)  Physical therapy at least 2 days/week in the home     This discharge recommendation:  Has been made in collaboration with the attending provider and/or case management    IF patient discharges home will need the following DME: patient owns DME required for discharge       SUBJECTIVE:   Patient stated I feel so much better, however my kneecaps are hurting me now.     OBJECTIVE DATA SUMMARY:   Critical Behavior:  Neurologic State: Alert  Orientation Level: Oriented X4  Cognition: Appropriate for age attention/concentration, Follows commands, Appropriate safety awareness, Appropriate decision making  Safety/Judgement: Awareness of environment  Functional Mobility Training:  Bed Mobility:  Rolling: Supervision  Supine to Sit: Minimum assistance  Sit to Supine: Minimum assistance           Transfers:  Sit to Stand: Contact guard assistance;Minimum assistance;Assist x2(CGA for the last 3)  Stand to Sit: Contact guard assistance;Minimum assistance(CGA for the last 3)                             Balance:  Sitting: Intact  Sitting - Static: Good (unsupported)  Sitting - Dynamic: Good (unsupported)  Standing: Impaired; With support  Standing - Static: Fair  Ambulation/Gait Training:          Pain Rating:  B knees, bottom wound    Activity Tolerance:   Fair and requires frequent rest breaks  Please refer to the flowsheet for vital signs taken during this treatment. After treatment patient left in no apparent distress:   Supine in bed and Call bell within reach    COMMUNICATION/COLLABORATION:   The patients plan of care was discussed with: Registered nurse and Case management.      Angy Smith, PT   Time Calculation: 40 mins

## 2020-08-05 NOTE — PROGRESS NOTES
Problem: Falls - Risk of  Goal: *Absence of Falls  Description: Document Josi Fernandes Fall Risk and appropriate interventions in the flowsheet. Outcome: Progressing Towards Goal  Note: Fall Risk Interventions:  Mobility Interventions: Communicate number of staff needed for ambulation/transfer, OT consult for ADLs, Patient to call before getting OOB, PT Consult for mobility concerns, PT Consult for assist device competence, Strengthening exercises (ROM-active/passive)         Medication Interventions: Evaluate medications/consider consulting pharmacy    Elimination Interventions: Call light in reach, Patient to call for help with toileting needs, Toileting schedule/hourly rounds(purewick in place)    History of Falls Interventions: Consult care management for discharge planning, Evaluate medications/consider consulting pharmacy         Problem: Patient Education: Go to Patient Education Activity  Goal: Patient/Family Education  Outcome: Progressing Towards Goal     Problem: Pressure Injury - Risk of  Goal: *Prevention of pressure injury  Description: Document Murray Scale and appropriate interventions in the flowsheet. Outcome: Progressing Towards Goal  Note: Pressure Injury Interventions:  Sensory Interventions: Minimize linen layers, Maintain/enhance activity level, Keep linens dry and wrinkle-free, Assess changes in LOC, Assess need for specialty bed, Turn and reposition approx.  every two hours (pillows and wedges if needed), Pressure redistribution bed/mattress (bed type)    Moisture Interventions: Absorbent underpads, Apply protective barrier, creams and emollients, Check for incontinence Q2 hours and as needed, Internal/External urinary devices, Minimize layers    Activity Interventions: Assess need for specialty bed, Pressure redistribution bed/mattress(bed type), PT/OT evaluation    Mobility Interventions: Assess need for specialty bed, HOB 30 degrees or less, Pressure redistribution bed/mattress (bed type), PT/OT evaluation    Nutrition Interventions: Document food/fluid/supplement intake    Friction and Shear Interventions: Apply protective barrier, creams and emollients, HOB 30 degrees or less, Minimize layers                Problem: Patient Education: Go to Patient Education Activity  Goal: Patient/Family Education  Outcome: Progressing Towards Goal     Problem: Infection - Risk of, Urinary Catheter-Associated Urinary Tract Infection  Goal: *Absence of infection signs and symptoms  Outcome: Progressing Towards Goal     Problem: Patient Education: Go to Patient Education Activity  Goal: Patient/Family Education  Outcome: Progressing Towards Goal     Problem: Chronic Renal Failure  Goal: *Fluid and electrolytes stabilized  Outcome: Progressing Towards Goal     Problem: Patient Education: Go to Patient Education Activity  Goal: Patient/Family Education  Outcome: Progressing Towards Goal     Problem: Pain  Goal: *Control of Pain  Outcome: Progressing Towards Goal  Goal: *PALLIATIVE CARE:  Alleviation of Pain  Outcome: Progressing Towards Goal     Problem: Patient Education: Go to Patient Education Activity  Goal: Patient/Family Education  Outcome: Progressing Towards Goal     Problem: Patient Education: Go to Patient Education Activity  Goal: Patient/Family Education  Outcome: Progressing Towards Goal     Problem: Patient Education: Go to Patient Education Activity  Goal: Patient/Family Education  Outcome: Progressing Towards Goal     Problem: Diabetes Self-Management  Goal: *Disease process and treatment process  Description: Define diabetes and identify own type of diabetes; list 3 options for treating diabetes. Outcome: Progressing Towards Goal  Goal: *Incorporating nutritional management into lifestyle  Description: Describe effect of type, amount and timing of food on blood glucose; list 3 methods for planning meals.   Outcome: Progressing Towards Goal  Goal: *Incorporating physical activity into lifestyle  Description: State effect of exercise on blood glucose levels. Outcome: Progressing Towards Goal  Goal: *Developing strategies to promote health/change behavior  Description: Define the ABC's of diabetes; identify appropriate screenings, schedule and personal plan for screenings. Outcome: Progressing Towards Goal  Goal: *Using medications safely  Description: State effect of diabetes medications on diabetes; name diabetes medication taking, action and side effects. Outcome: Progressing Towards Goal  Goal: *Monitoring blood glucose, interpreting and using results  Description: Identify recommended blood glucose targets  and personal targets. Outcome: Progressing Towards Goal  Goal: *Prevention, detection, treatment of acute complications  Description: List symptoms of hyper- and hypoglycemia; describe how to treat low blood sugar and actions for lowering  high blood glucose level. Outcome: Progressing Towards Goal  Goal: *Prevention, detection and treatment of chronic complications  Description: Define the natural course of diabetes and describe the relationship of blood glucose levels to long term complications of diabetes.   Outcome: Progressing Towards Goal  Goal: *Developing strategies to address psychosocial issues  Description: Describe feelings about living with diabetes; identify support needed and support network  Outcome: Progressing Towards Goal  Goal: *Insulin pump training  Outcome: Progressing Towards Goal  Goal: *Sick day guidelines  Outcome: Progressing Towards Goal  Goal: *Patient Specific Goal (EDIT GOAL, INSERT TEXT)  Outcome: Progressing Towards Goal     Problem: Patient Education: Go to Patient Education Activity  Goal: Patient/Family Education  Outcome: Progressing Towards Goal     Problem: Discharge Planning  Goal: *Discharge to safe environment  Outcome: Progressing Towards Goal     Problem: Nutrition Deficit  Goal: *Optimize nutritional status  Outcome: Progressing Towards Goal

## 2020-08-05 NOTE — PROGRESS NOTES
Problem: Self Care Deficits Care Plan (Adult)  Goal: *Acute Goals and Plan of Care (Insert Text)  Description:   FUNCTIONAL STATUS PRIOR TO ADMISSION:  Patient was last modified independent for ADLs/ ambulatory with RW in December 2019. Patient reports she has been bedbound since d/t wounds. Patient reports she has not even been able to sit EOB at home. Performing all ADLs at bed-level with assistance for bathing, dressing, and toileting. HOME SUPPORT: Patient lived with sister to provide assistance    Occupational Therapy Goals  Goals reviewed and updated: 7/27/2020, 8/3/2020  Initiated 7/20/2020  1. Patient will perform lower body dressing using AE PRN with moderate assistance  within 7 day(s). 2.  Patient will perform bathing with minimal assistance  within 7 day(s). MET for UEs with set up 7/27/2020. Continue for LEs within 7 days. 4.  Patient will perform BSC toilet transfers with moderate assistance  within 7 day(s). 5.  Patient will perform all aspects of toileting with moderate assistance  within 7 day(s). 6.  Patient will participate in upper extremity therapeutic exercise/activities with supervision/set-up for 10 minutes within 7 day(s). Outcome: Progressing Towards Goal   OCCUPATIONAL THERAPY TREATMENT  Patient: [facility] (61 y.o. female)  Date: 8/5/2020  Diagnosis: Hematuria [R31.9]  Hematuria [R31.9]   <principal problem not specified>  Procedure(s) (LRB):  CYSTOSCOPY RIGHT URETERAL STENT EXCHANGE (Right) 18 Days Post-Op  Precautions: Fall  Chart, occupational therapy assessment, plan of care, and goals were reviewed. ASSESSMENT  Patient continues with skilled OT services and is progressing towards goals. Patient making daily progress toward mobility and seated self care.  Bed mobility with min assist to move to sit, sat on EOB for self care and bilateral UE resistive exercise for 25 min with mattress deflated, and moved back to supine with min assist. Mobility impacted by mattress despite being deflated. Participation impacted by body habitus with large left abdominal hernia, impaired reach to LEs, and LEs weakness. Current Level of Function Impacting Discharge (ADLs): UE self care with set up, LE self care with mod assist for lower body and perineal, LE dressing with maximum assist, total assist for toileting (incontinent of urine)     Other factors to consider for discharge: none         PLAN :  Patient continues to benefit from skilled intervention to address the above impairments. Continue treatment per established plan of care. to address goals. Recommend with staff: seated on EOB with mattress deflated for meals and self care 3 times a day, set up for UE self care, mod to total assist for LE self care and toileting, transfers supine to/from sit with min assist and extra time (mattress deflated)    Recommend next OT session: self care on EOB     Recommendation for discharge: (in order for the patient to meet his/her long term goals) Patient choosing:  Occupational therapy at least 2 days/week in the home AND ensure assist and/or supervision for safety with mobility and self care     This discharge recommendation:  Has been made in collaboration with the attending provider and/or case management    IF patient discharges home will need the following DME:AE: long handled bathing, AE: long handled dressing,  mechanical lift, walker: standard and wheelchair - all bariatric 2* 358, short stature and width. Per chart, patient has a bariatric BSC and hospital bed       SUBJECTIVE:   Patient stated I want to keep moving forward.     OBJECTIVE DATA SUMMARY:   Cognitive/Behavioral Status:  Neurologic State: Alert  Orientation Level: Oriented X4  Cognition: Appropriate for age attention/concentration; Follows commands; Appropriate safety awareness; Appropriate decision making  Perception: Appears intact  Perseveration: No perseveration noted  Safety/Judgement: Awareness of environment    Functional Mobility and Transfers for ADLs:  Bed Mobility:  Rolling: Supervision;Assist x1;Adaptive equipment  Supine to Sit: Minimum assistance; Other (comment)(impacted by mattress inflation fluctuating)  Sit to Supine: Minimum assistance; Additional time; Adaptive equipment      Balance:  Sitting: Intact; Without support  Sitting - Static: Good (unsupported)  Sitting - Dynamic: Good (unsupported)    ADL Intervention:          Lower Body Bathing  Perineal  : Moderate assistance  Position Performed: Supine  Lower Body : Moderate assistance  Position Performed: Seated edge of bed  Cues: Physical assistance  Adaptive Equipment: Other (comment)(bedrail)         Lower Body Dressing Assistance  Socks: Total assistance (dependent)(without AE)    Toileting  Toileting Assistance: Total assistance(dependent)  Bladder Hygiene: Total assistance (dependent)(incontinence)  Bowel Hygiene: Total assistance (dependent)    Cognitive Retraining  Safety/Judgement: Awareness of environment    Therapeutic Exercises: Bilateral UEs/green theraband    EXERCISE   Sets   Reps   Active Active Assist   Resistive   Comments   Shoulder Flexion   []               []               []                  Shoulder Abduction   []               []               []                  Shoulder external rotation   []               []               []                  Elbow flexion 2 10 []               []               [x]                  Elbow extension 2 10 []               []               [x]                  Finger flexion/extension   []               []               []                         Activity Tolerance:   Good  Please refer to the flowsheet for vital signs taken during this treatment. After treatment patient left in no apparent distress:   Supine in bed, Call bell within reach, and Side rails x 3    COMMUNICATION/COLLABORATION:   The patients plan of care was discussed with: Physical therapist and Registered nurse.      Rima Manzo Brian Pickard  Time Calculation: 43 mins

## 2020-08-05 NOTE — WOUND CARE
WOCN Note: Follow-up visit for gluteal cleft and gluteal folds. Patient assessed in Room: 614 Sitting at side of bed with OT Bed= Size Wise with air mattress. Assessment:  
Patient is A&O x 4 with no complaints of pain. Communicative, in continent/ Pure [place]. PT to work with patient for mobility steadiness. No christensen: brief with Pure [place] Patient reports no pain. Gluteal cleft, right gluteal fold and left gluteal fold area dry and resolved. Applied Z-Guard paste. Area of moisture build up, protect with barrier cream. 
 
 
Recommendations: - Z-guard Barrier Cream to gluteal cleft, right and left gluteal fold. Generous amount to decrease build up of moisture on the skin. Twice daily and PRN incontinence. Minimize layers of linen/pads under patient to optimize support surface. Turn/reposition approximately every 2 hours and offload heels. Manage incontinence / promote continence; Z-Guard Barrier Paste to buttocks and sacrum daily and as needed with incontinence care. Discussed above plan with patient, sitting at edge of bed with OT. Transition of Care: Plan to follow weekly and as needed while admitted to hospital.   
 
Edmond JOHNSON RN Wound Care Department Office: [phone] Pager: [phone]

## 2020-08-05 NOTE — PROGRESS NOTES
Problem: Falls - Risk of  Goal: *Absence of Falls  Description: Document [place]via Fall Risk and appropriate interventions in the flowsheet. Outcome: Progressing Towards Goal  Note: Fall Risk Interventions:  Mobility Interventions: Communicate number of staff needed for ambulation/transfer         Medication Interventions: Evaluate medications/consider consulting pharmacy    Elimination Interventions: Call light in reach    History of Falls Interventions: Consult care management for discharge planning         Problem: Patient Education: Go to Patient Education Activity  Goal: Patient/Family Education  Outcome: Progressing Towards Goal     Problem: Pressure Injury - Risk of  Goal: *Prevention of pressure injury  Description: Document Murray Scale and appropriate interventions in the flowsheet. Outcome: Progressing Towards Goal  Note: Pressure Injury Interventions:  Sensory Interventions: Minimize linen layers, Maintain/enhance activity level, Keep linens dry and wrinkle-free, Assess changes in LOC, Assess need for specialty bed, Turn and reposition approx.  every two hours (pillows and wedges if needed), Pressure redistribution bed/mattress (bed type)    Moisture Interventions: Internal/External urinary devices, Limit adult briefs, Maintain skin hydration (lotion/cream)    Activity Interventions: Assess need for specialty bed, Chair cushion    Mobility Interventions: Assess need for specialty bed, Float heels, PT/OT evaluation    Nutrition Interventions: Document food/fluid/supplement intake    Friction and Shear Interventions: Apply protective barrier, creams and emollients                Problem: Patient Education: Go to Patient Education Activity  Goal: Patient/Family Education  Outcome: Progressing Towards Goal     Problem: Infection - Risk of, Urinary Catheter-Associated Urinary Tract Infection  Goal: *Absence of infection signs and symptoms  Outcome: Progressing Towards Goal     Problem: Patient Education: Go to Patient Education Activity  Goal: Patient/Family Education  Outcome: Progressing Towards Goal     Problem: Chronic Renal Failure  Goal: *Fluid and electrolytes stabilized  Outcome: Progressing Towards Goal     Problem: Patient Education: Go to Patient Education Activity  Goal: Patient/Family Education  Outcome: Progressing Towards Goal     Problem: Pain  Goal: *Control of Pain  Outcome: Progressing Towards Goal  Goal: *PALLIATIVE CARE:  Alleviation of Pain  Outcome: Progressing Towards Goal     Problem: Patient Education: Go to Patient Education Activity  Goal: Patient/Family Education  Outcome: Progressing Towards Goal     Problem: Patient Education: Go to Patient Education Activity  Goal: Patient/Family Education  Outcome: Progressing Towards Goal     Problem: Patient Education: Go to Patient Education Activity  Goal: Patient/Family Education  Outcome: Progressing Towards Goal     Problem: Diabetes Self-Management  Goal: *Disease process and treatment process  Description: Define diabetes and identify own type of diabetes; list 3 options for treating diabetes. Outcome: Progressing Towards Goal  Goal: *Incorporating nutritional management into lifestyle  Description: Describe effect of type, amount and timing of food on blood glucose; list 3 methods for planning meals. Outcome: Progressing Towards Goal  Goal: *Incorporating physical activity into lifestyle  Description: State effect of exercise on blood glucose levels. Outcome: Progressing Towards Goal  Goal: *Developing strategies to promote health/change behavior  Description: Define the ABC's of diabetes; identify appropriate screenings, schedule and personal plan for screenings. Outcome: Progressing Towards Goal  Goal: *Using medications safely  Description: State effect of diabetes medications on diabetes; name diabetes medication taking, action and side effects.   Outcome: Progressing Towards Goal  Goal: *Monitoring blood glucose, interpreting and using results  Description: Identify recommended blood glucose targets  and personal targets. Outcome: Progressing Towards Goal  Goal: *Prevention, detection, treatment of acute complications  Description: List symptoms of hyper- and hypoglycemia; describe how to treat low blood sugar and actions for lowering  high blood glucose level. Outcome: Progressing Towards Goal  Goal: *Prevention, detection and treatment of chronic complications  Description: Define the natural course of diabetes and describe the relationship of blood glucose levels to long term complications of diabetes.   Outcome: Progressing Towards Goal  Goal: *Developing strategies to address psychosocial issues  Description: Describe feelings about living with diabetes; identify support needed and support network  Outcome: Progressing Towards Goal  Goal: *Insulin pump training  Outcome: Progressing Towards Goal  Goal: *Sick day guidelines  Outcome: Progressing Towards Goal  Goal: *Patient Specific Goal (EDIT GOAL, INSERT TEXT)  Outcome: Progressing Towards Goal     Problem: Patient Education: Go to Patient Education Activity  Goal: Patient/Family Education  Outcome: Progressing Towards Goal     Problem: Discharge Planning  Goal: *Discharge to safe environment  Outcome: Progressing Towards Goal     Problem: Nutrition Deficit  Goal: *Optimize nutritional status  Outcome: Progressing Towards Goal

## 2020-08-05 NOTE — PROGRESS NOTES
Patient name: Darshan Bullock  MRN: [id number]    Nephrology Progress note:    Assessment:  DAPHNE on CKD-3/4?: slowly improving. Cr down to 2.5's. holding at 2.5's. s/p right ureteral stent replacement. Baseline Cr? Chronic obstructive uropathy/Atrophic left kidney/DM nephropathy. Followed by Dr. Bonny david outpt follow up. Last seen in 2015.     Chronic right hydronephrosis: [id number] stent exchange. Missed her last one due to [place] pandemic; changed during this admit. Hematuria- resolved. Off CBI  Metabolic acidosis: resolved. Off HCO3 drip   Hyperkalemia- resolved  Complicated UTI  Anemia: of CKD + blood loss from hematuria  Hgb sub-optimal. Latter-day. IV Iron/CATHIE   DM2: HgbA1c 6.6  Chronic sacral decubiti    Plan/Recommendations:  D/C IVF  Cr around 2.5 might be her new basln  Ct CATHIE  AM labs  Avoid nephrotoxins  IV ABx per 1ry team- Merem until Aug 7    D/W pt     Subjective:  No events overnight.  No acute c/o  Finally had BM this am- constipated yesterday    ROS- denies n/v/cp or sob    Visit Vitals  /65 (BP 1 Location: Right arm, BP Patient Position: At rest)   Pulse 98   Temp 97.9 °F (36.6 °C)   Resp 20   Ht 5' 4\" (1.626 m)   Wt 147 kg (324 lb)   SpO2 95%   Breastfeeding No   BMI 55.61 kg/m²     Wt Readings from Last 3 Encounters:   08/05/20 147 kg (324 lb)       Intake/Output Summary (Last 24 hours) at 8/5/2020 1004  Last data filed at 8/5/2020 0652  Gross per 24 hour   Intake    Output 3100 ml   Net -3100 ml       NAD  Clear  RRR, distant  +large ventral hernia on L  Tr edema  AOx3      Labs/Data:    Lab Results   Component Value Date/Time    WBC 11.1 (H) 08/05/2020 03:01 AM    HGB 7.3 (L) 08/05/2020 03:01 AM    HCT 25.8 (L) 08/05/2020 03:01 AM    PLATELET 977 64/11/4788 03:01 AM    MCV 99.6 (H) 08/05/2020 03:01 AM       Lab Results   Component Value Date/Time    Sodium 144 08/05/2020 03:01 AM    Potassium 4.2 08/05/2020 03:01 AM    Chloride 115 (H) 08/05/2020 03:01 AM    CO2 22 08/05/2020 03:01 AM    Anion gap 7 08/05/2020 03:01 AM    Glucose 107 (H) 08/05/2020 03:01 AM    BUN 36 (H) 08/05/2020 03:01 AM    Creatinine 2.51 (H) 08/05/2020 03:01 AM    BUN/Creatinine ratio 14 08/05/2020 03:01 AM    GFR est AA 24 (L) 08/05/2020 03:01 AM    GFR est non-AA 20 (L) 08/05/2020 03:01 AM    Calcium 8.3 (L) 08/05/2020 03:01 AM

## 2020-08-05 NOTE — PROGRESS NOTES
[facility] Adult  Hospitalist Group                                                                                          Hospitalist Progress Note  Leah Terry MD  Answering service: [phone] from in house phone        Date of Service:  2020  NAME:  Ildefonso Reyes  :  1960  MRN:  [id number]      Admission Summary:     Ailyn Yu a 62 y.o. female with past medical history significant for diabetes mellitus type 2, obesity, CKD, history of kidney stones, right hydronephrosis and ureter multiple stents placement q 3 month stent exchange, missed her last one due to COVID pandemic, presented to the emergency room with right flank pain and hematuria. Patient has been experiencing urinary symptoms and flank pain for several days now. She has been in the emergency multiple times but discharged home with antibiotic therapy. Farhan Dixon has tried amoxicillin, Keflex and Cipro without improvement of her symptoms. She started noticing blood in the urine for which she came to the emergency room.  In the ED it was noted that her ureter stent was coming out through her urethra.  She had a CT scan of the abdomen that showed ureteral stent misplacement as well as enlargement of the right kidney.  She was found to have a leukocytosis, febrile and hypotensive. Received Ceftriaxone in the ED.  Urology notified by ED of admission and patient admitted for UTI and hematuria.     Interval history / Subjective:     She said she feels better, no left side chest pain or shortness of breath     Assessment & Plan:     Sepsis secondary to UTI POA  -on meropenem, until   -presents with leukocytosis, tachycardia, hypotension and fever meeting sepsis criteria.  -blood cultures: NGTD  -rocephin switched to iv meropenem  -Urinalysis, culture: Klebsiella Pneumoniae    -ID on board, recommended 10 days of iv abx     Hematuria likely secondary to UTI and malpositioned stent and gross hematuria plus end-stage bladder and urethral abnormality   -Status post removal of old stent and replacement of right stent  -on ditropan  -off CBI, urine cloudy repeat culture- GNR 7/27, Cefepime  -continue prn morphine, tylenol  -Urology on board: 7/18 Cystoscopy, right ureteral stent exchange, clot evacuation, catheter placement.  -christensen out, has purewick catheter now, urine cleared     Acute blood loss anemia due to hematuria  -improving but still low, Hgb 7.3, patient is Yarsani and doesn't receive blood transfusion   -recieved IV iron sucrose, epoetin  -monitor H/H     DAPHNE on CKD stage V:  -creatinine improving, Creatinine 2.51  -continue IVF, avoid nephrotoxin   -Nephrology on board     T2DM  -At baseline controlled with hemoglobin A1c of 6.6  -continue sliding scale and monitor blood sugars     Obesity  -Outpatient follow-up for diet and weight loss program     Debility  -bed bound     Moisture associated skin injury to bilateral gluteal fold  -continue wound care per wound care nurse     Can be discharged after last dose of Meropenem 8/7,      Code status: DNR  DVT prophylaxis: SCD    Care Plan discussed with: Patient/Family and Nurse  Anticipated Disposition: Home with [facility] vs SNF, Patient preferred to go home with [facility]  Anticipated Discharge: Greater than 48 hours     Hospital Problems  Date Reviewed: 7/18/2020          Codes Class Noted POA    Hematuria ICD-10-CM: R31.9  ICD-9-CM: 599.70  7/17/2020 Unknown              Vital Signs:    Last 24hrs VS reviewed since prior progress note.  Most recent are:  Visit Vitals  /65 (BP 1 Location: Right arm, BP Patient Position: At rest)   Pulse 98   Temp 97.9 °F (36.6 °C)   Resp 20   Ht 5' 4\" (1.626 m)   Wt 147 kg (324 lb)   SpO2 95%   Breastfeeding No   BMI 55.61 kg/m²         Intake/Output Summary (Last 24 hours) at 8/5/2020 1027  Last data filed at 8/5/2020 0652  Gross per 24 hour   Intake    Output 3100 ml   Net -3100 ml        Physical Examination: Constitutional:  No acute distress, cooperative, pleasant    ENT:  Oral mucosa moist, oropharynx benign. Resp:  CTA bilaterally. No wheezing/rhonchi/rales. No accessory muscle use   CV:  Regular rhythm, normal rate, no murmurs, gallops, rubs    GI:  Soft, non distended, non tender. normoactive bowel sounds, no hepatosplenomegaly     Musculoskeletal:  No edema     Neurologic:  Conscious and alert, AAOx3, motor UE 5/5, LE 4/5, CN II-XII reviewed     Skin:  no skin rash       Data Review:    Review and/or order of clinical lab test  Review and/or order of tests in the radiology section of CPT  Review and/or order of tests in the medicine section of CPT      Labs:     Recent Labs     08/05/20 0301 08/04/20  0507   WBC 11.1* 10.3   HGB 7.3* 7.4*   HCT 25.8* 25.9*    359     Recent Labs     08/05/20 0301 08/04/20  0507 08/03/20  0334    144 144   K 4.2 4.3 4.0   * 116* 115*   CO2 22 21 21   BUN 36* 35* 35*   CREA 2.51* 2.51* 2.52*   * 125* 123*   CA 8.3* 8.5 8.3*   PHOS 4.0 4.0 3.7     Recent Labs     08/05/20 0301 08/04/20  0507 08/03/20  0334   ALB 2.5* 2.4* 2.3*     No results for input(s): INR, PTP, APTT, INREXT, INREXT in the last 72 hours. No results for input(s): FE, TIBC, PSAT, FERR in the last 72 hours. No results found for: FOL, RBCF   No results for input(s): PH, PCO2, PO2 in the last 72 hours. No results for input(s): CPK, CKNDX, TROIQ in the last 72 hours.     No lab exists for component: CPKMB  No results found for: CHOL, CHOLX, CHLST, CHOLV, HDL, HDLP, LDL, LDLC, DLDLP, TGLX, TRIGL, TRIGP, CHHD, [address]  [facility]  Lab Results   Component Value Date/Time    Glucose (POC) 134 (H) 08/05/2020 07:02 AM    Glucose (POC) 122 (H) 08/04/2020 09:21 PM    Glucose (POC) 130 (H) 08/04/2020 04:53 PM    Glucose (POC) 110 (H) 08/04/2020 11:46 AM    Glucose (POC) 110 (H) 08/04/2020 07:17 AM     Lab Results   Component Value Date/Time    Color YELLOW/STRAW 07/25/2020 02:15 PM    Appearance TURBID (A) 07/25/2020 02:15 PM    Specific gravity 1.008 07/25/2020 02:15 PM    Specific gravity 1.020 07/17/2020 08:08 PM    pH (UA) 6.5 07/25/2020 02:15 PM    Protein 30 (A) 07/25/2020 02:15 PM    Glucose Negative 07/25/2020 02:15 PM    Ketone Negative 07/25/2020 02:15 PM    Bilirubin Negative 07/25/2020 02:15 PM    Urobilinogen 0.2 07/25/2020 02:15 PM    Nitrites Negative 07/25/2020 02:15 PM    Leukocyte Esterase LARGE (A) 07/25/2020 02:15 PM    Epithelial cells FEW 07/25/2020 02:15 PM    Bacteria 1+ (A) 07/25/2020 02:15 PM    WBC >100 (H) 07/25/2020 02:15 PM    RBC 5-10 07/25/2020 02:15 PM         Medications Reviewed:     Current Facility-Administered Medications   Medication Dose Route Frequency    lidocaine 4 % patch 1 Patch  1 Patch TransDERmal Q24H    meropenem (MERREM) 500 mg in 0.9% sodium chloride (MBP/ADV) 50 mL  500 mg IntraVENous Q8H    melatonin tablet 9 mg  9 mg Oral QHS    morphine injection 2 mg  2 mg IntraVENous Q4H PRN    epoetin frieda-epbx (RETACRIT) injection 20,000 Units  20,000 Units SubCUTAneous Q7D    traMADoL (ULTRAM) tablet 50 mg  50 mg Oral Q8H PRN    fentaNYL citrate (PF) injection 25 mcg  25 mcg IntraVENous Q4H PRN    lidocaine (URO-JET/ GLYDO) 2 % jelly   Urethral DAILY PRN    glucose chewable tablet 16 g  4 Tab Oral PRN    glucagon (GLUCAGEN) injection 1 mg  1 mg IntraMUSCular PRN    dextrose 10% infusion 0-250 mL  0-250 mL IntraVENous PRN    pantoprazole (PROTONIX) tablet 40 mg  40 mg Oral DAILY    acetaminophen (TYLENOL) tablet 500 mg  500 mg Oral Q6H PRN    opium-belladonna (B&O 15-A) 16.2-30 mg suppository 1 Suppository  1 Suppository Rectal Q8H PRN    oxybutynin chloride XL (DITROPAN XL) tablet 10 mg  10 mg Oral DAILY    insulin lispro (HUMALOG) injection   SubCUTAneous AC&HS    sodium chloride (NS) flush 5-10 mL  5-10 mL IntraVENous PRN     ______________________________________________________________________  EXPECTED LENGTH OF STAY: 5d 12h  ACTUAL LENGTH OF STAY:          Gino Gupta MD

## 2020-08-06 LAB
ANION GAP SERPL CALC-SCNC: 8 MMOL/L (ref 5–15)
BUN SERPL-MCNC: 37 MG/DL (ref 6–20)
BUN/CREAT SERPL: 16 (ref 12–20)
CALCIUM SERPL-MCNC: 8.3 MG/DL (ref 8.5–10.1)
CHLORIDE SERPL-SCNC: 115 MMOL/L (ref 97–108)
CO2 SERPL-SCNC: 21 MMOL/L (ref 21–32)
CREAT SERPL-MCNC: 2.38 MG/DL (ref 0.55–1.02)
ERYTHROCYTE [DISTWIDTH] IN BLOOD BY AUTOMATED COUNT: 16.9 % (ref 11.5–14.5)
GLUCOSE BLD STRIP.AUTO-MCNC: 116 MG/DL (ref 65–100)
GLUCOSE BLD STRIP.AUTO-MCNC: 116 MG/DL (ref 65–100)
GLUCOSE BLD STRIP.AUTO-MCNC: 131 MG/DL (ref 65–100)
GLUCOSE BLD STRIP.AUTO-MCNC: 131 MG/DL (ref 65–100)
GLUCOSE SERPL-MCNC: 134 MG/DL (ref 65–100)
HCT VFR BLD AUTO: 26.2 % (ref 35–47)
HGB BLD-MCNC: 7.4 G/DL (ref 11.5–16)
MCH RBC QN AUTO: 28.1 PG (ref 26–34)
MCHC RBC AUTO-ENTMCNC: 28.2 G/DL (ref 30–36.5)
MCV RBC AUTO: 99.6 FL (ref 80–99)
NRBC # BLD: 0 K/UL (ref 0–0.01)
NRBC BLD-RTO: 0 PER 100 WBC
PLATELET # BLD AUTO: 338 K/UL (ref 150–400)
PMV BLD AUTO: 9.2 FL (ref 8.9–12.9)
POTASSIUM SERPL-SCNC: 4.1 MMOL/L (ref 3.5–5.1)
RBC # BLD AUTO: 2.63 M/UL (ref 3.8–5.2)
SERVICE CMNT-IMP: ABNORMAL
SODIUM SERPL-SCNC: 144 MMOL/L (ref 136–145)
WBC # BLD AUTO: 10.2 K/UL (ref 3.6–11)

## 2020-08-06 PROCEDURE — 74011000258 HC RX REV CODE- 258: Performed by: NURSE PRACTITIONER

## 2020-08-06 PROCEDURE — 97530 THERAPEUTIC ACTIVITIES: CPT

## 2020-08-06 PROCEDURE — 74011250637 HC RX REV CODE- 250/637: Performed by: INTERNAL MEDICINE

## 2020-08-06 PROCEDURE — 85027 COMPLETE CBC AUTOMATED: CPT

## 2020-08-06 PROCEDURE — 65270000029 HC RM PRIVATE

## 2020-08-06 PROCEDURE — 36415 COLL VENOUS BLD VENIPUNCTURE: CPT

## 2020-08-06 PROCEDURE — 74011250637 HC RX REV CODE- 250/637: Performed by: NURSE PRACTITIONER

## 2020-08-06 PROCEDURE — 80048 BASIC METABOLIC PNL TOTAL CA: CPT

## 2020-08-06 PROCEDURE — 74011250637 HC RX REV CODE- 250/637: Performed by: UROLOGY

## 2020-08-06 PROCEDURE — 82962 GLUCOSE BLOOD TEST: CPT

## 2020-08-06 PROCEDURE — 74011250636 HC RX REV CODE- 250/636: Performed by: NURSE PRACTITIONER

## 2020-08-06 RX ADMIN — TRAMADOL HYDROCHLORIDE 50 MG: 50 TABLET, FILM COATED ORAL at 01:15

## 2020-08-06 RX ADMIN — MEROPENEM 500 MG: 500 INJECTION, POWDER, FOR SOLUTION INTRAVENOUS at 07:09

## 2020-08-06 RX ADMIN — OXYBUTYNIN CHLORIDE 10 MG: 5 TABLET, EXTENDED RELEASE ORAL at 08:51

## 2020-08-06 RX ADMIN — PANTOPRAZOLE SODIUM 40 MG: 40 TABLET, DELAYED RELEASE ORAL at 08:51

## 2020-08-06 RX ADMIN — MEROPENEM 500 MG: 500 INJECTION, POWDER, FOR SOLUTION INTRAVENOUS at 17:04

## 2020-08-06 RX ADMIN — TRAMADOL HYDROCHLORIDE 50 MG: 50 TABLET, FILM COATED ORAL at 17:06

## 2020-08-06 RX ADMIN — TRAMADOL HYDROCHLORIDE 50 MG: 50 TABLET, FILM COATED ORAL at 08:50

## 2020-08-06 RX ADMIN — MEROPENEM 500 MG: 500 INJECTION, POWDER, FOR SOLUTION INTRAVENOUS at 00:19

## 2020-08-06 RX ADMIN — MEROPENEM 500 MG: 500 INJECTION, POWDER, FOR SOLUTION INTRAVENOUS at 23:29

## 2020-08-06 NOTE — PROGRESS NOTES
JOESPH:  RUR: 18%     Chart reviewed. Patient admitted here from home on 7/17/20 with complaints of urinary pain. Patient has hx of renal disease and current renal stent in place. Patient transferred to [address] on 8/3/20. Patient will be on iv meropenum until 8/7/20. Discharge Plan:  Home with sister Lucina Huntley [phone]) with [address] [facility] PT/OT via [address] [place] [facility] ([phone]). Patient to be transported to [facility]. (This is 5 miles [address] [place], [place] 45 minutes from [place]). Per previous CM notes, [facility] transport indicated. Transport requested for 10am tomorrow--Friday. Packet placed on hard chart.     4:13p  CM contacted [facility] [phone] [facility]). Conf # [id number]. CM informed that arrangements had been made with  [facility]. Stefano Castellano responded that she would take information however could not guarantee [facility] would be the assigned provider.     Amina Meadows, [address] [facility], [address] [place]

## 2020-08-06 NOTE — PROGRESS NOTES
Problem: Mobility Impaired (Adult and Pediatric)  Goal: *Acute Goals and Plan of Care (Insert Text)  Description: FUNCTIONAL STATUS PRIOR TO ADMISSION: Pt has been essentially bed bound since December 10th. Pt reported she was walking with a RW short distances prior to this. Pt had sustained a fall in October and had gone to rehab and then fell again in December. HOME SUPPORT PRIOR TO ADMISSION: The patient lived with her sister. Physical Therapy Goals  Initiated 7/20/2020, reassessment completed 7/25/20 and goals remain appropriate at this time. 1.  Patient will move from supine to sit and sit to supine , scoot up and down, and roll side to side in bed with minimal assistance/contact guard assist within 7 day(s). 2.  Patient will transfer from bed to chair and chair to bed with moderate assistance (squat pivot versus sliding board) using the least restrictive device within 7 day(s). 3.  Patient will tolerate seated EOB x 10 minutes with supervision within 7 days in prep for transfers OOB    Outcome: Progressing Towards Goal       PHYSICAL THERAPY TREATMENT  Patient: Reid Madrigal (60 y.o. female)  Date: 8/6/2020  Diagnosis: Hematuria [R31.9]  Hematuria [R31.9]   <principal problem not specified>  Procedure(s) (LRB):  CYSTOSCOPY RIGHT URETERAL STENT EXCHANGE (Right) 19 Days Post-Op  Precautions: Fall  Chart, physical therapy assessment, plan of care and goals were reviewed. ASSESSMENT  Patient continues with skilled PT services and is progressing towards goals. Pt tolerates supine to sit with min A and handholds to get to EOB. Verbally discussed standing trials to perform, that patient should attempt some hip ext once fully standing. Continued with little to no feedback during stand, and holding information on performance until the sessions standing is over.  Pt tolerates seven total standing trials; stands #1 15 sec, #2 55 sec assist x2, #3 for 34 seconds with assist of one to stand and maintain, stand #4 for 28 seconds, stand #5 for 36 seconds, stand #6 for 48 seconds with CGA for standing but no assist to maintain, #7 for 40 seconds. Pt continues to have rewarding feeling when informed of her standing trials and was even doubtful that she completed 55seconds despite using timer. Pt able to fully stand with locked or near locked knees with guiding cues to bring hips over JOVITA however remains bent over chair, improve hip ext mminimally and trunk ext with cues but largely has difficulty following cues or \"new movement\" due to anxiety. Pt continues to require min A for rolling back into bed, may perform better at home with a true mattress as the air mattress tends to hinder her movement. .     Current Level of Function Impacting Discharge (mobility/balance): min A-CGA    Other factors to consider for discharge: patient will be transported home via bariatric transport, supportive family         PLAN :  Patient continues to benefit from skilled intervention to address the above impairments. Continue treatment per established plan of care. to address goals. Recommendation for discharge: (in order for the patient to meet his/her long term goals)  Physical therapy at least 2 days/week in the home     This discharge recommendation:  Has been made in collaboration with the attending provider and/or case management    IF patient discharges home will need the following DME: patient owns DME required for discharge       SUBJECTIVE:   Patient stated I am so happy with my progress.     OBJECTIVE DATA SUMMARY:   Critical Behavior:  Neurologic State: Alert  Orientation Level: Oriented X4  Cognition: Appropriate decision making, Appropriate for age attention/concentration, Appropriate safety awareness  Safety/Judgement: Awareness of environment  Functional Mobility Training:  Bed Mobility:  Rolling: Supervision  Supine to Sit: Minimum assistance  Sit to Supine: Minimum assistance           Transfers:  Sit to Stand: Contact guard assistance  Stand to Sit: Contact guard assistance                             Balance:  Sitting: Intact  Standing: With support; Impaired  Standing - Static: Fair  Ambulation/Gait Training:         Pain Rating:  Controlled, B knee pain from movement    Activity Tolerance:   Fair and requires frequent rest breaks  Please refer to the flowsheet for vital signs taken during this treatment. After treatment patient left in no apparent distress:   Call bell within reach and Caregiver / family present    COMMUNICATION/COLLABORATION:   The patients plan of care was discussed with: Registered nurse and Case management.      Angy Smith, PT   Time Calculation: 40 mins

## 2020-08-06 NOTE — PROGRESS NOTES
Patient name: Jose Bolden  MRN: [id number]    Nephrology Progress note:    Assessment:  DAPHNE on CKD-3/4?: slowly improving. Cr down to 2.38 today. s/p right ureteral stent replacement. Baseline Cr? Chronic obstructive uropathy/Atrophic left kidney/DM nephropathy. Followed by Dr. Dileep Odonnell poor outpt follow up. Last seen in 2015.     Chronic right hydronephrosis: a0ldhnd stent exchange. Missed her last one due to [place] pandemic; changed during this admit. Hematuria- resolved. Off CBI  Metabolic acidosis: resolved. Off HCO3 drip   Hyperkalemia- resolved  Complicated UTI  Anemia: of CKD + blood loss from hematuria  Hgb sub-optimal. [employer]. IV Iron/CATHIE   DM2: HgbA1c 6.6  Chronic sacral decubiti    Plan/Recommendations:  Improving renal fxn. Counseled importance of f/u as outpt after d/c. Will arrange after d/c  Ct CATHIE  Avoid nephrotoxins  IV ABx per 1ry team- Merem until Aug 7    D/W pt     Subjective:  Feels better. Resting.  No acute c/o    ROS- denies n/v/cp or sob    Visit Vitals  /79   Pulse 94   Temp 98 °F (36.7 °C)   Resp 18   Ht 5' 4\" (1.626 m)   Wt 155.1 kg (342 lb)   SpO2 97%   Breastfeeding No   BMI 58.70 kg/m²     Wt Readings from Last 3 Encounters:   08/06/20 155.1 kg (342 lb)       Intake/Output Summary (Last 24 hours) at 8/6/2020 0913  Last data filed at 8/6/2020 0538  Gross per 24 hour   Intake 720 ml   Output 3600 ml   Net -2880 ml       NAD  Clear  RRR, distant  Tr edema  AOx3      Labs/Data:    Lab Results   Component Value Date/Time    WBC 10.2 08/06/2020 02:35 AM    HGB 7.4 (L) 08/06/2020 02:35 AM    HCT 26.2 (L) 08/06/2020 02:35 AM    PLATELET 217 80/74/5634 02:35 AM    MCV 99.6 (H) 08/06/2020 02:35 AM       Lab Results   Component Value Date/Time    Sodium 144 08/06/2020 02:35 AM    Potassium 4.1 08/06/2020 02:35 AM    Chloride 115 (H) 08/06/2020 02:35 AM    CO2 21 08/06/2020 02:35 AM    Anion gap 8 08/06/2020 02:35 AM    Glucose 134 (H) 08/06/2020 02:35 AM    BUN 37 (H) 08/06/2020 02:35 AM    Creatinine 2.38 (H) 08/06/2020 02:35 AM    BUN/Creatinine ratio 16 08/06/2020 02:35 AM    GFR est AA 25 (L) 08/06/2020 02:35 AM    GFR est non-AA 21 (L) 08/06/2020 02:35 AM    Calcium 8.3 (L) 08/06/2020 02:35 AM

## 2020-08-06 NOTE — PROGRESS NOTES
[address] [facility] Adult  Hospitalist Group                                                                                          Hospitalist Progress Note  Jim Severance, MD  Answering service: [phone] OR [phone] from in house phone        Date of Service:  2020  NAME:  Gretchen Medrano  :  1960  MRN:  [id number]      Admission Summary:   62 y.o. female with past medical history significant for diabetes mellitus type 2, obesity, CKD, history of kidney stones, right hydronephrosis and ureter multiple stents placement q 3 month stent exchange, missed her last one due to COVID pandemic, presented to the emergency room with right flank pain and hematuria. Patient has been experiencing urinary symptoms and flank pain for several days now. She has been in the emergency multiple times but discharged home with antibiotic therapy. Damian Bledsoe has tried amoxicillin, Keflex and Cipro without improvement of her symptoms. She started noticing blood in the urine for which she came to the emergency room.  In the ED it was noted that her ureter stent was coming out through her urethra.  She had a CT scan of the abdomen that showed ureteral stent misplacement as well as enlargement of the right kidney.  She was found to have a leukocytosis, febrile and hypotensive. Received Ceftriaxone in the ED. Urology notified by ED of admission and patient admitted for UTI and hematuria.     Interval history / Subjective:   Pt seen and examined, bedbound. Pleasant, morbidly obese. Feels well.  No acute events, no dysuria     Assessment & Plan:     Sepsis secondary to UTI POA  -on meropenem, until   -presents with leukocytosis, tachycardia, hypotension and fever meeting sepsis criteria.  -blood cultures: NGTD  -rocephin switched to iv meropenem  -Urinalysis, culture: Klebsiella Pneumoniae    -ID on board, recommended 10 days of iv abx     Hematuria likely secondary to UTI and malpositioned stent and gross hematuria plus end-stage bladder and urethral abnormality   -Status post removal of old stent and replacement of right stent  -on ditropan  -off CBI, urine cloudy repeat culture- GNR 7/27, Cefepime  -continue prn morphine, tylenol  -Urology on board: 7/18 Cystoscopy, right ureteral stent exchange, clot evacuation, catheter placement.  -christensen out, has purewick catheter now, urine cleared     Acute blood loss anemia due to hematuria  -improving but still low, Hgb 7.3, patient is [facility] and doesn't receive blood transfusion   -recieved IV iron sucrose, epoetin -monitor H/H     DAPHNE on CKD stage V:  -creatinine improving, Creatinine 2.51  -continue IVF, avoid nephrotoxin   -Nephrology on board     T2DM  -At baseline controlled with hemoglobin A1c of 6.6  -continue sliding scale and monitor blood sugars     Obesity-Outpatient follow-up for diet and weight loss program  Debility-bed bound  Moisture associated skin injury to bilateral gluteal fold  -continue wound care per wound care nurse     Can be discharged after last dose of Meropenem 8/7,    Code status: DNR  DVT prophylaxis: SCD  Care Plan discussed with: Patient/Family and Nurse  Anticipated Disposition: Home with [facility] vs SNF, Patient preferred to go home with [facility]  Anticipated Discharge: Greater than 48 hours     Hospital Problems  Date Reviewed: 7/18/2020          Codes Class Noted POA    Hematuria ICD-10-CM: R31.9  ICD-9-CM: 599.70  7/17/2020 Unknown              Vital Signs:    Last 24hrs VS reviewed since prior progress note.  Most recent are:  Visit Vitals  /79   Pulse 94   Temp 98 °F (36.7 °C)   Resp 18   Ht 5' 4\" (1.626 m)   Wt 155.1 kg (342 lb)   SpO2 97%   Breastfeeding No   BMI 58.70 kg/m²         Intake/Output Summary (Last 24 hours) at 8/6/2020 1408  Last data filed at 8/6/2020 0945  Gross per 24 hour   Intake 480 ml   Output 3600 ml   Net -3120 ml        Physical Examination:             Constitutional:  No acute distress, cooperative, pleasant    ENT:  Oral mucosa moist, oropharynx benign. Resp:  CTA bilaterally. No wheezing/rhonchi/rales. No accessory muscle use   CV:  Regular rhythm, normal rate, no murmurs, gallops, rubs    GI:  Soft, non distended, non tender. normoactive bowel sounds, no hepatosplenomegaly     Musculoskeletal:  No edema     Neurologic:  Conscious and alert, AAOx3, motor UE 5/5, LE 4/5, CN II-XII reviewed     Skin:  no skin rash       Data Review:    Review and/or order of clinical lab test  Review and/or order of tests in the radiology section of CPT  Review and/or order of tests in the medicine section of CPT      Labs:     Recent Labs     08/06/20 0235 08/05/20  0301   WBC 10.2 11.1*   HGB 7.4* 7.3*   HCT 26.2* 25.8*    348     Recent Labs     08/06/20 0235 08/05/20 0301 08/04/20  0507    144 144   K 4.1 4.2 4.3   * 115* 116*   CO2 21 22 21   BUN 37* 36* 35*   CREA 2.38* 2.51* 2.51*   * 107* 125*   CA 8.3* 8.3* 8.5   PHOS  --  4.0 4.0     Recent Labs     08/05/20 0301 08/04/20  0507   ALB 2.5* 2.4*     No results for input(s): INR, PTP, APTT, INREXT, INREXT in the last 72 hours. No results for input(s): FE, TIBC, PSAT, FERR in the last 72 hours. No results found for: FOL, RBCF   No results for input(s): PH, PCO2, PO2 in the last 72 hours. No results for input(s): CPK, CKNDX, TROIQ in the last 72 hours.     No lab exists for component: CPKMB  No results found for: CHOL, CHOLX, CHLST, CHOLV, HDL, HDLP, LDL, LDLC, DLDLP, TGLX, TRIGL, TRIGP, CHHD, [facility]  Lab Results   Component Value Date/Time    Glucose (POC) 116 (H) 08/06/2020 11:33 AM    Glucose (POC) 116 (H) 08/06/2020 07:19 AM    Glucose (POC) 154 (H) 08/05/2020 09:18 PM    Glucose (POC) 111 (H) 08/05/2020 04:59 PM    Glucose (POC) 110 (H) 08/05/2020 11:22 AM     Lab Results   Component Value Date/Time    Color YELLOW/STRAW 07/25/2020 02:15 PM    Appearance TURBID (A) 07/25/2020 02:15 PM    Specific gravity 1.008 07/25/2020 02:15 PM    Specific gravity 1.020 07/17/2020 08:08 PM    pH (UA) 6.5 07/25/2020 02:15 PM    Protein 30 (A) 07/25/2020 02:15 PM    Glucose Negative 07/25/2020 02:15 PM    Ketone Negative 07/25/2020 02:15 PM    Bilirubin Negative 07/25/2020 02:15 PM    Urobilinogen 0.2 07/25/2020 02:15 PM    Nitrites Negative 07/25/2020 02:15 PM    Leukocyte Esterase LARGE (A) 07/25/2020 02:15 PM    Epithelial cells FEW 07/25/2020 02:15 PM    Bacteria 1+ (A) 07/25/2020 02:15 PM    WBC >100 (H) 07/25/2020 02:15 PM    RBC 5-10 07/25/2020 02:15 PM         Medications Reviewed:     Current Facility-Administered Medications   Medication Dose Route Frequency    lidocaine 4 % patch 1 Patch  1 Patch TransDERmal Q24H    meropenem (MERREM) 500 mg in 0.9% sodium chloride (MBP/ADV) 50 mL  500 mg IntraVENous Q8H    melatonin tablet 9 mg  9 mg Oral QHS    morphine injection 2 mg  2 mg IntraVENous Q4H PRN    epoetin frieda-epbx (RETACRIT) injection 20,000 Units  20,000 Units SubCUTAneous Q7D    traMADoL (ULTRAM) tablet 50 mg  50 mg Oral Q8H PRN    fentaNYL citrate (PF) injection 25 mcg  25 mcg IntraVENous Q4H PRN    lidocaine (URO-JET/ GLYDO) 2 % jelly   Urethral DAILY PRN    glucose chewable tablet 16 g  4 Tab Oral PRN    glucagon (GLUCAGEN) injection 1 mg  1 mg IntraMUSCular PRN    dextrose 10% infusion 0-250 mL  0-250 mL IntraVENous PRN    pantoprazole (PROTONIX) tablet 40 mg  40 mg Oral DAILY    acetaminophen (TYLENOL) tablet 500 mg  500 mg Oral Q6H PRN    opium-belladonna (B&O 15-A) 16.2-30 mg suppository 1 Suppository  1 Suppository Rectal Q8H PRN    oxybutynin chloride XL (DITROPAN XL) tablet 10 mg  10 mg Oral DAILY    insulin lispro (HUMALOG) injection   SubCUTAneous AC&HS    sodium chloride (NS) flush 5-10 mL  5-10 mL IntraVENous PRN     ______________________________________________________________________  EXPECTED LENGTH OF STAY: 5d 12h  ACTUAL LENGTH OF STAY:          [address], MD

## 2020-08-07 VITALS
OXYGEN SATURATION: 95 % | HEIGHT: 64 IN | RESPIRATION RATE: 20 BRPM | DIASTOLIC BLOOD PRESSURE: 62 MMHG | BODY MASS INDEX: 50.02 KG/M2 | WEIGHT: 293 LBS | HEART RATE: 83 BPM | TEMPERATURE: 98.6 F | SYSTOLIC BLOOD PRESSURE: 157 MMHG

## 2020-08-07 LAB
ANION GAP SERPL CALC-SCNC: 6 MMOL/L (ref 5–15)
BASOPHILS # BLD: 0 K/UL (ref 0–0.1)
BASOPHILS NFR BLD: 0 % (ref 0–1)
BUN SERPL-MCNC: 38 MG/DL (ref 6–20)
BUN/CREAT SERPL: 16 (ref 12–20)
CALCIUM SERPL-MCNC: 8.9 MG/DL (ref 8.5–10.1)
CHLORIDE SERPL-SCNC: 113 MMOL/L (ref 97–108)
CO2 SERPL-SCNC: 25 MMOL/L (ref 21–32)
CREAT SERPL-MCNC: 2.45 MG/DL (ref 0.55–1.02)
DIFFERENTIAL METHOD BLD: ABNORMAL
EOSINOPHIL # BLD: 0.4 K/UL (ref 0–0.4)
EOSINOPHIL NFR BLD: 4 % (ref 0–7)
ERYTHROCYTE [DISTWIDTH] IN BLOOD BY AUTOMATED COUNT: 16.7 % (ref 11.5–14.5)
GLUCOSE BLD STRIP.AUTO-MCNC: 116 MG/DL (ref 65–100)
GLUCOSE SERPL-MCNC: 110 MG/DL (ref 65–100)
HCT VFR BLD AUTO: 26.3 % (ref 35–47)
HGB BLD-MCNC: 7.5 G/DL (ref 11.5–16)
IMM GRANULOCYTES # BLD AUTO: 0 K/UL (ref 0–0.04)
IMM GRANULOCYTES NFR BLD AUTO: 0 % (ref 0–0.5)
LYMPHOCYTES # BLD: 1.9 K/UL (ref 0.8–3.5)
LYMPHOCYTES NFR BLD: 20 % (ref 12–49)
MCH RBC QN AUTO: 28.4 PG (ref 26–34)
MCHC RBC AUTO-ENTMCNC: 28.5 G/DL (ref 30–36.5)
MCV RBC AUTO: 99.6 FL (ref 80–99)
MONOCYTES # BLD: 0.8 K/UL (ref 0–1)
MONOCYTES NFR BLD: 8 % (ref 5–13)
NEUTS SEG # BLD: 6.5 K/UL (ref 1.8–8)
NEUTS SEG NFR BLD: 68 % (ref 32–75)
NRBC # BLD: 0 K/UL (ref 0–0.01)
NRBC BLD-RTO: 0 PER 100 WBC
PLATELET # BLD AUTO: 300 K/UL (ref 150–400)
PLATELET COMMENTS,PCOM: ABNORMAL
PMV BLD AUTO: 8.9 FL (ref 8.9–12.9)
POTASSIUM SERPL-SCNC: 4.2 MMOL/L (ref 3.5–5.1)
RBC # BLD AUTO: 2.64 M/UL (ref 3.8–5.2)
RBC MORPH BLD: ABNORMAL
RBC MORPH BLD: ABNORMAL
SERVICE CMNT-IMP: ABNORMAL
SODIUM SERPL-SCNC: 144 MMOL/L (ref 136–145)
WBC # BLD AUTO: 9.6 K/UL (ref 3.6–11)

## 2020-08-07 PROCEDURE — 85025 COMPLETE CBC W/AUTO DIFF WBC: CPT

## 2020-08-07 PROCEDURE — 74011250637 HC RX REV CODE- 250/637: Performed by: INTERNAL MEDICINE

## 2020-08-07 PROCEDURE — 36415 COLL VENOUS BLD VENIPUNCTURE: CPT

## 2020-08-07 PROCEDURE — 80048 BASIC METABOLIC PNL TOTAL CA: CPT

## 2020-08-07 PROCEDURE — 74011250637 HC RX REV CODE- 250/637: Performed by: NURSE PRACTITIONER

## 2020-08-07 PROCEDURE — 74011000258 HC RX REV CODE- 258: Performed by: NURSE PRACTITIONER

## 2020-08-07 PROCEDURE — 82962 GLUCOSE BLOOD TEST: CPT

## 2020-08-07 PROCEDURE — 74011250636 HC RX REV CODE- 250/636: Performed by: NURSE PRACTITIONER

## 2020-08-07 PROCEDURE — 74011250637 HC RX REV CODE- 250/637: Performed by: UROLOGY

## 2020-08-07 RX ORDER — OXYBUTYNIN CHLORIDE 10 MG/1
10 TABLET, EXTENDED RELEASE ORAL DAILY
Qty: 30 TAB | Refills: 0 | Status: SHIPPED | OUTPATIENT
Start: 2020-08-08 | End: 2020-09-07

## 2020-08-07 RX ADMIN — TRAMADOL HYDROCHLORIDE 50 MG: 50 TABLET, FILM COATED ORAL at 07:37

## 2020-08-07 RX ADMIN — MEROPENEM 500 MG: 500 INJECTION, POWDER, FOR SOLUTION INTRAVENOUS at 07:37

## 2020-08-07 RX ADMIN — PANTOPRAZOLE SODIUM 40 MG: 40 TABLET, DELAYED RELEASE ORAL at 08:08

## 2020-08-07 RX ADMIN — OXYBUTYNIN CHLORIDE 10 MG: 5 TABLET, EXTENDED RELEASE ORAL at 08:08

## 2020-08-07 NOTE — PROGRESS NOTES
JOESPH:  RUR: 18%     Discharge Plan: Home with sister Li Sparks [phone]) with [address] PT/OT via [address] [place] [address] ([phone]). 1.  [facility] confirms BLS Bariatric Transport for 10 am today. 2.  Perfect [employer] message sent to Dr. Kavon Anguiano requesting d/c orders. 3.  CM called patient's sister Li Sparks) to advise of transport at [place] [address].  4.  Assigned  Nurse Pastor Jean Paul RN) informed of d/c plans.     Eric Grande, [address], [address] [facility]

## 2020-08-07 NOTE — DISCHARGE INSTRUCTIONS
Discharge Instructions       PATIENT ID: Inna Hull  MRN: [id number]   YOB: 1960    DATE OF ADMISSION: 7/17/2020  7:39 PM    DATE OF DISCHARGE: 8/7/2020    PRIMARY CARE PROVIDER: Gardenia Sanchez MD     ATTENDING PHYSICIAN: Krystina Sánchez MD  DISCHARGING PROVIDER: Shayy Martinez MD    To contact this individual call [phone] and ask the  to page. If unavailable ask to be transferred the Adult Hospitalist Department. DISCHARGE DIAGNOSES UTI    CONSULTATIONS: IP CONSULT TO UROLOGY  IP CONSULT TO NEPHROLOGY  IP CONSULT TO NEPHROLOGY  IP CONSULT TO INFECTIOUS DISEASES    PROCEDURES/SURGERIES: Procedure(s):  CYSTOSCOPY RIGHT URETERAL STENT EXCHANGE    FOLLOW UP APPOINTMENTS:   Follow-up Information     Follow up With Specialties Details Why Darrell Monteiro Alert  [phone]    Gardenia Sanchez MD Internal Medicine In 1 week  [address] [place] [address]              ADDITIONAL CARE RECOMMENDATIONS: follow up with PCP     DIET: Resume previous diet    DISCHARGE MEDICATIONS:  oxybutynin    · It is important that you take the medication exactly as they are prescribed. · Keep your medication in the bottles provided by the pharmacist and keep a list of the medication names, dosages, and times to be taken in your wallet. · Do not take other medications without consulting your doctor. NOTIFY YOUR PHYSICIAN FOR ANY OF THE FOLLOWING:   Fever over 101 degrees for 24 hours. Chest pain, shortness of breath, fever, chills, nausea, vomiting, diarrhea, change in mentation, falling, weakness, bleeding. Severe pain or pain not relieved by medications. Or, any other signs or symptoms that you may have questions about. It was our pleasure to help take care of you and we hope you get well very soon.     DISPOSITION:    Home With:   OT  PT  [place]  RN       SNF/Inpatient Rehab/LTAC   x Independent/assisted living    Hospice    Other:     CDMP Checked:   Yes x     PROBLEM LIST Updated:  Yes x     Signed:   Ananda Aiken MD  8/7/2020  9:13 AM

## 2020-08-07 NOTE — PROGRESS NOTES
Attempted to schedule hospital follow up PCP appointment. Office nurse will contact patient at home with appointment information. Pending patient discharge.   Shannon Shaw, Care Management Specialist.

## 2020-08-07 NOTE — PROGRESS NOTES
Problem: Falls - Risk of  Goal: *Absence of Falls  Description: Document Daiana Garnica Fall Risk and appropriate interventions in the flowsheet. Outcome: Resolved/Met     Problem: Patient Education: Go to Patient Education Activity  Goal: Patient/Family Education  Outcome: Resolved/Met     Problem: Pressure Injury - Risk of  Goal: *Prevention of pressure injury  Description: Document Murray Scale and appropriate interventions in the flowsheet. Outcome: Resolved/Met     Problem: Patient Education: Go to Patient Education Activity  Goal: Patient/Family Education  Outcome: Resolved/Met     Problem: Infection - Risk of, Urinary Catheter-Associated Urinary Tract Infection  Goal: *Absence of infection signs and symptoms  Outcome: Resolved/Met     Problem: Patient Education: Go to Patient Education Activity  Goal: Patient/Family Education  Outcome: Resolved/Met     Problem: Chronic Renal Failure  Goal: *Fluid and electrolytes stabilized  Outcome: Resolved/Met     Problem: Patient Education: Go to Patient Education Activity  Goal: Patient/Family Education  Outcome: Resolved/Met     Problem: Pain  Goal: *Control of Pain  Outcome: Resolved/Met  Goal: *PALLIATIVE CARE:  Alleviation of Pain  Outcome: Resolved/Met     Problem: Patient Education: Go to Patient Education Activity  Goal: Patient/Family Education  Outcome: Resolved/Met     Problem: Patient Education: Go to Patient Education Activity  Goal: Patient/Family Education  Outcome: Resolved/Met     Problem: Patient Education: Go to Patient Education Activity  Goal: Patient/Family Education  Outcome: Resolved/Met     Problem: Diabetes Self-Management  Goal: *Disease process and treatment process  Description: Define diabetes and identify own type of diabetes; list 3 options for treating diabetes.   Outcome: Resolved/Met  Goal: *Incorporating nutritional management into lifestyle  Description: Describe effect of type, amount and timing of food on blood glucose; list 3 methods for planning meals. Outcome: Resolved/Met  Goal: *Incorporating physical activity into lifestyle  Description: State effect of exercise on blood glucose levels. Outcome: Resolved/Met  Goal: *Developing strategies to promote health/change behavior  Description: Define the ABC's of diabetes; identify appropriate screenings, schedule and personal plan for screenings. Outcome: Resolved/Met  Goal: *Using medications safely  Description: State effect of diabetes medications on diabetes; name diabetes medication taking, action and side effects. Outcome: Resolved/Met  Goal: *Monitoring blood glucose, interpreting and using results  Description: Identify recommended blood glucose targets  and personal targets. Outcome: Resolved/Met  Goal: *Prevention, detection, treatment of acute complications  Description: List symptoms of hyper- and hypoglycemia; describe how to treat low blood sugar and actions for lowering  high blood glucose level. Outcome: Resolved/Met  Goal: *Prevention, detection and treatment of chronic complications  Description: Define the natural course of diabetes and describe the relationship of blood glucose levels to long term complications of diabetes.   Outcome: Resolved/Met  Goal: *Developing strategies to address psychosocial issues  Description: Describe feelings about living with diabetes; identify support needed and support network  Outcome: Resolved/Met  Goal: *Insulin pump training  Outcome: Resolved/Met  Goal: *Sick day guidelines  Outcome: Resolved/Met  Goal: *Patient Specific Goal (EDIT GOAL, INSERT TEXT)  Outcome: Resolved/Met     Problem: Patient Education: Go to Patient Education Activity  Goal: Patient/Family Education  Outcome: Resolved/Met     Problem: Discharge Planning  Goal: *Discharge to safe environment  Outcome: Resolved/Met     Problem: Nutrition Deficit  Goal: *Optimize nutritional status  Outcome: Resolved/Met

## 2020-08-07 NOTE — DISCHARGE SUMMARY
Discharge Summary       PATIENT ID: Giselle Gutierrez  MRN: [id number]   YOB: 1960    DATE OF ADMISSION: 7/17/2020  7:39 PM    DATE OF DISCHARGE: 8/7/2020   PRIMARY CARE PROVIDER: Shane Ross MD     ATTENDING PHYSICIAN: Elisa Mcallister MD  DISCHARGING PROVIDER: Elisa Mcallister MD    To contact this individual call [phone] and ask the  to page. If unavailable ask to be transferred the Adult Hospitalist Department. CONSULTATIONS: IP CONSULT TO UROLOGY  IP CONSULT TO NEPHROLOGY  IP CONSULT TO NEPHROLOGY  IP CONSULT TO INFECTIOUS DISEASES    PROCEDURES/SURGERIES: Procedure(s):  CYSTOSCOPY RIGHT URETERAL STENT EXCHANGE    ADMITTING [address] [place] [address] COURSE:   Treated for UTI, hematuria, evaluated by Urology, treated with iv abx. And nephrology evaluated, will need f/u with nephrology    Risk of Re-Admission: mod  DISCHARGE DIAGNOSES / PLAN:      Sepsis secondary to UTI POA- resolved. -on meropenem, until 8/ 7  -presents with leukocytosis, tachycardia, hypotension and fever meeting sepsis criteria.  -blood cultures: NGTD  -rocephin switched to iv meropenem-Urinalysis, culture: Klebsiella Pneumoniae    -ID on board, recommended 10 days of iv abx finished abx course prior to dc.     Hematuria likely secondary to UTI and malpositioned stent and gross hematuria plus end-stage bladder and urethral abnormality   -Status post removal of old stent and replacement of right stent  -on ditropan-off CBI  -Urology on board: 7/18 Cystoscopy, right ureteral stent exchange, clot evacuation, catheter placement- christensen out, has purewick catheter now, urine cleared     Acute blood loss anemia due to hematuria  - improving but still low, Hgb 7.3, patient is [facility] doesn't receive blood transfusion -recieved IV iron sucrose, epoetin -monitor H/H     DAPHNE on CKD stage V:-creatinine improved to baseline. avoid nephrotoxin -Nephrology evaluated, f/u op.   [id number] baseline controlled with hemoglobin A1c of 6.6. continue home regimen   Obesity-Outpatient follow-up for diet and weight loss program  Debility-bed bound  Moisture associated skin injury to bilateral gluteal fold-continue wound care per wound care nurse      FOLLOW UP APPOINTMENTS:    Follow-up Information     Follow up With Specialties Details Why Patle Porter Situ  [phone]    Manny Sloan MD Internal Medicine In 1 week  [address] [place] [address]            ADDITIONAL CARE RECOMMENDATIONS:  Follow up with PCP and Nephrology    DIET: Resume previous diet    ACTIVITY: Activity as tolerated    DISCHARGE MEDICATIONS:  Current Discharge Medication List      START taking these medications    Details   oxybutynin chloride XL (DITROPAN XL) 10 mg CR tablet Take 1 Tab by mouth daily for 30 days. Qty: 30 Tab, Refills: 0         CONTINUE these medications which have NOT CHANGED    Details   ascorbic acid, vitamin C, (Vitamin C) 500 mg tablet Take 500 mg by mouth two (2) times a day. aspirin delayed-release 325 mg tablet Take 325 mg by mouth every six (6) hours as needed for Pain. senna-docusate (Senokot-S) 8.6-50 mg per tablet Take 1 Tab by mouth daily. ferrous fumarate 324 mg (106 mg iron) tab Take 1 Tab by mouth daily. acetaminophen (TYLENOL) 500 mg tablet Take 500 mg by mouth every eight (8) hours as needed for Pain.      multivitamin tablet Take 1 Tab by mouth daily. ondansetron hcl (Zofran) 8 mg tablet Take 8 mg by mouth every eight (8) hours as needed for Nausea or Vomiting. folic acid (FOLVITE) 1 mg tablet Take  by mouth daily. glipiZIDE (GLUCOTROL) 5 mg tablet Take  by mouth two (2) times a day.      gabapentin (NEURONTIN) 100 mg capsule Take  by mouth three (3) times daily. pantoprazole (PROTONIX) 40 mg tablet Take 40 mg by mouth daily.       traMADoL (ULTRAM) 50 mg tablet Take  mg by mouth every eight (8) hours as needed for Pain. STOP taking these medications       cephALEXin (KEFLEX) 500 mg capsule Comments:   Reason for Stopping:         amoxicillin 500 mg tab Comments:   Reason for Stopping:             NOTIFY YOUR PHYSICIAN FOR ANY OF THE FOLLOWING:   Fever over 101 degrees for 24 hours. Chest pain, shortness of breath, fever, chills, nausea, vomiting, diarrhea, change in mentation, falling, weakness, bleeding. Severe pain or pain not relieved by medications. Or, any other signs or symptoms that you may have questions about. DISPOSITION:    Home With:   OT  PT  HH  RN       Long term SNF/Inpatient Rehab   x Independent/assisted living    Hospice    Other:     PATIENT CONDITION AT DISCHARGE:     Functional status    Poor     Deconditioned     Independent      Cognition     Lucid     Forgetful     Dementia      Catheters/lines (plus indication)    [place]     PICC     PEG     None      Code status     Full code     DNR      PHYSICAL EXAMINATION AT DISCHARGE:  Patient seen and examined at bedside, Condition stable, explained discharge and follow up plans. /62   Pulse 83   Temp 98.6 °F (37 °C)   Resp 20   Ht 5' 4\" (1.626 m)   Wt 156.5 kg (345 lb)   SpO2 95%   Breastfeeding No   BMI 59.22 kg/m²   General:  Alert, oriented, No acute distress, morbid obesity pleasant. bedbound for last few months. Resp:  No accessory muscle use, Good AE. Neuro:  Grossly normal, no focal neuro deficits, follows commands     CHRONIC MEDICAL DIAGNOSES:  Problem List as of 8/7/2020 Date Reviewed: 7/18/2020          Codes Class Noted - Resolved    Hematuria ICD-10-CM: R31.9  ICD-9-CM: 599.70  7/17/2020 - Present              38 minutes were spent with the patient on counseling and coordination of care.     Signed:   Jim Severance, MD  8/7/2020  9:13 AM

## 2020-08-10 ENCOUNTER — PATIENT OUTREACH (OUTPATIENT)
Dept: CASE MANAGEMENT | Age: 60
End: 2020-08-10

## 2020-08-10 NOTE — PROGRESS NOTES
Patient contacted regarding recent discharge and COVID-19 risk. Discussed COVID-19 related testing which was not done at this time. Ambulatory Care Manager contacted the patient by telephone to perform post discharge assessment. Verified name and  with patient as identifiers. Patient has following risk factors of: diabetes, chronic kidney disease and hospital admission 20 - 20. ACM reviewed discharge instructions, medical action plan and red flags related to discharge diagnosis. Reviewed and educated them on any new and changed medications related to discharge diagnosis. Patient denied questions or concerns regarding discharge instructions or medications. Advance Care Planning:   Does patient have an Advance Directive: yes patient encouraged to bring in for scanning to chart     Patient verified healthcare decision makers as correctly listed in chart: Tray Rios [phone] (sister) and Magan Cedeno (sister) [phone]    Education provided regarding infection prevention, and signs and symptoms of COVID-19 and when to seek medical attention with patient who verbalized understanding. Discussed exposure protocols and quarantine from [address] [place], [place] [address]y you at higher risk for severe illness  and given an opportunity for questions and concerns. The patient was supplied the COVID-19 hotline [phone] and local [facility] department hotline [phone]. [facility] recommended patient follow up with PCP and provided [facility] contact information for future reference. From CDC: Are you at higher risk for severe illness?  Wash your hands often and avoid touching eyes, nose and mouth.  Avoid close contact (6 feet, which is about two arm lengths) with people who are sick.  Put distance between yourself and other people if COVID-19 is spreading in your community.  Clean and disinfect frequently touched surfaces.  Avoid all cruise travel and non-essential air travel.    Call your healthcare professional if you have concerns about COVID-19 and your underlying condition or if you are sick. For more information on steps you can take to protect yourself, see CDC's How to Protect Yourself      Patient/family/caregiver given information for [employer] and agrees to enroll no    Plan for follow-up call in 7-14 days based on severity of symptoms and risk factors.     Jacqueline Cardona RN  Ambulatory Care Manager

## 2020-08-11 NOTE — CDMP QUERY
Pt admitted with sepsis secondary to UTI, noted to have displaced R ureteral stent on admission. After further study, is it possible to determine the relationship, if any, between UTI and R ureteral stent  UTI associated with displaced R ureteral stent  UTI unrelated to displaced R ureteral stent  Other, please specify  Clinically unable to determine The medical record reflects the following: 
  Risk Factors: displaced chronic R ureteral stent, DM Clinical Indicators: to ED c/o flank pain and hematuria- h/o renal disease with stent in place  H&P- Hematuria: likely secondary to UTI and trauma for stent displacement  7/18- Cystoscopy, right ureteral stent exchange, clot evacuation, catheter placement. Treatment: urology consult, stent exchange, monitoring Thank you, Carmelo Gamez RN 
[facility] 
[phone]

## 2021-01-12 ENCOUNTER — PATIENT OUTREACH (OUTPATIENT)
Dept: CASE MANAGEMENT | Age: 61
End: 2021-01-12

## 2021-01-12 NOTE — PROGRESS NOTES
Patient called to request help with myChart activation. Name and  verified.   ACM sent email sign up per patient request.  Alie Layton RN  Ambulatory Care Manager

## 2021-01-27 ENCOUNTER — PATIENT OUTREACH (OUTPATIENT)
Dept: CASE MANAGEMENT | Age: 61
End: 2021-01-27

## 2021-01-27 NOTE — PROGRESS NOTES
Returned patient call from voice message. ID verified name and . NADIYA assisted patient with [employer] patient portal activation. [employer] help desk number supplied for back up.    Lola Cornell RN  Ambulatory Care Manager

## 2021-03-18 ENCOUNTER — APPOINTMENT (OUTPATIENT)
Dept: CT IMAGING | Age: 61
DRG: 466 | End: 2021-03-18
Attending: EMERGENCY MEDICINE
Payer: COMMERCIAL

## 2021-03-18 ENCOUNTER — HOSPITAL ENCOUNTER (INPATIENT)
Age: 61
LOS: 12 days | Discharge: HOME HEALTH | DRG: 466 | End: 2021-03-30
Attending: EMERGENCY MEDICINE | Admitting: INTERNAL MEDICINE
Payer: COMMERCIAL

## 2021-03-18 DIAGNOSIS — D72.829 LEUKOCYTOSIS, UNSPECIFIED TYPE: ICD-10-CM

## 2021-03-18 DIAGNOSIS — R31.0 GROSS HEMATURIA: Primary | ICD-10-CM

## 2021-03-18 DIAGNOSIS — R10.9 RIGHT FLANK PAIN: ICD-10-CM

## 2021-03-18 DIAGNOSIS — Z53.1 REFUSAL OF BLOOD TRANSFUSIONS AS PATIENT IS JEHOVAH'S WITNESS: ICD-10-CM

## 2021-03-18 LAB
ABO + RH BLD: NORMAL
ALBUMIN SERPL-MCNC: 3.1 G/DL (ref 3.5–5)
ALBUMIN/GLOB SERPL: 0.6 {RATIO} (ref 1.1–2.2)
ALP SERPL-CCNC: 85 U/L (ref 45–117)
ALT SERPL-CCNC: 13 U/L (ref 12–78)
ANION GAP SERPL CALC-SCNC: 9 MMOL/L (ref 5–15)
APPEARANCE UR: CLEAR
AST SERPL-CCNC: 11 U/L (ref 15–37)
BACTERIA URNS QL MICRO: NEGATIVE /HPF
BASOPHILS # BLD: 0.1 K/UL (ref 0–0.1)
BASOPHILS NFR BLD: 0 % (ref 0–1)
BILIRUB SERPL-MCNC: 0.1 MG/DL (ref 0.2–1)
BILIRUB UR QL CFM: NEGATIVE
BLOOD GROUP ANTIBODIES SERPL: NORMAL
BUN SERPL-MCNC: 50 MG/DL (ref 6–20)
BUN/CREAT SERPL: 21 (ref 12–20)
CALCIUM SERPL-MCNC: 8.9 MG/DL (ref 8.5–10.1)
CHLORIDE SERPL-SCNC: 109 MMOL/L (ref 97–108)
CO2 SERPL-SCNC: 24 MMOL/L (ref 21–32)
COLOR UR: ABNORMAL
COMMENT, HOLDF: NORMAL
CREAT SERPL-MCNC: 2.39 MG/DL (ref 0.55–1.02)
DIFFERENTIAL METHOD BLD: ABNORMAL
EOSINOPHIL # BLD: 0.6 K/UL (ref 0–0.4)
EOSINOPHIL NFR BLD: 4 % (ref 0–7)
EPITH CASTS URNS QL MICRO: ABNORMAL /LPF
ERYTHROCYTE [DISTWIDTH] IN BLOOD BY AUTOMATED COUNT: 15.3 % (ref 11.5–14.5)
GLOBULIN SER CALC-MCNC: 4.9 G/DL (ref 2–4)
GLUCOSE BLD STRIP.AUTO-MCNC: 155 MG/DL (ref 65–100)
GLUCOSE SERPL-MCNC: 142 MG/DL (ref 65–100)
GLUCOSE UR STRIP.AUTO-MCNC: NEGATIVE MG/DL
HCT VFR BLD AUTO: 32.1 % (ref 35–47)
HCT VFR BLD AUTO: 35 % (ref 35–47)
HGB BLD-MCNC: 10.4 G/DL (ref 11.5–16)
HGB BLD-MCNC: 9.6 G/DL (ref 11.5–16)
HGB UR QL STRIP: ABNORMAL
IMM GRANULOCYTES # BLD AUTO: 0.2 K/UL (ref 0–0.04)
IMM GRANULOCYTES NFR BLD AUTO: 1 % (ref 0–0.5)
KETONES UR QL STRIP.AUTO: NEGATIVE MG/DL
LACTATE SERPL-SCNC: 0.7 MMOL/L (ref 0.4–2)
LEUKOCYTE ESTERASE UR QL STRIP.AUTO: ABNORMAL
LIPASE SERPL-CCNC: 169 U/L (ref 73–393)
LYMPHOCYTES # BLD: 1.6 K/UL (ref 0.8–3.5)
LYMPHOCYTES NFR BLD: 10 % (ref 12–49)
MCH RBC QN AUTO: 28.7 PG (ref 26–34)
MCHC RBC AUTO-ENTMCNC: 29.7 G/DL (ref 30–36.5)
MCV RBC AUTO: 96.4 FL (ref 80–99)
MONOCYTES # BLD: 0.7 K/UL (ref 0–1)
MONOCYTES NFR BLD: 4 % (ref 5–13)
NEUTS SEG # BLD: 13 K/UL (ref 1.8–8)
NEUTS SEG NFR BLD: 81 % (ref 32–75)
NITRITE UR QL STRIP.AUTO: NEGATIVE
NRBC # BLD: 0 K/UL (ref 0–0.01)
NRBC BLD-RTO: 0 PER 100 WBC
PH UR STRIP: 6.5 [PH] (ref 5–8)
PLATELET # BLD AUTO: 317 K/UL (ref 150–400)
PMV BLD AUTO: 10.1 FL (ref 8.9–12.9)
POTASSIUM SERPL-SCNC: 4.8 MMOL/L (ref 3.5–5.1)
PROT SERPL-MCNC: 8 G/DL (ref 6.4–8.2)
PROT UR STRIP-MCNC: 100 MG/DL
RBC # BLD AUTO: 3.63 M/UL (ref 3.8–5.2)
RBC #/AREA URNS HPF: >100 /HPF (ref 0–5)
SAMPLES BEING HELD,HOLD: NORMAL
SERVICE CMNT-IMP: ABNORMAL
SODIUM SERPL-SCNC: 142 MMOL/L (ref 136–145)
SP GR UR REFRACTOMETRY: 1.01 (ref 1–1.03)
SPECIMEN EXP DATE BLD: NORMAL
UA: UC IF INDICATED,UAUC: ABNORMAL
UROBILINOGEN UR QL STRIP.AUTO: 0.2 EU/DL (ref 0.2–1)
WBC # BLD AUTO: 16.2 K/UL (ref 3.6–11)
WBC URNS QL MICRO: ABNORMAL /HPF (ref 0–4)

## 2021-03-18 PROCEDURE — 85018 HEMOGLOBIN: CPT

## 2021-03-18 PROCEDURE — 86901 BLOOD TYPING SEROLOGIC RH(D): CPT

## 2021-03-18 PROCEDURE — XW033N5 INTRODUCTION OF MEROPENEM-VABORBACTAM ANTI-INFECTIVE INTO PERIPHERAL VEIN, PERCUTANEOUS APPROACH, NEW TECHNOLOGY GROUP 5: ICD-10-PCS | Performed by: EMERGENCY MEDICINE

## 2021-03-18 PROCEDURE — 74011000258 HC RX REV CODE- 258: Performed by: INTERNAL MEDICINE

## 2021-03-18 PROCEDURE — 99285 EMERGENCY DEPT VISIT HI MDM: CPT

## 2021-03-18 PROCEDURE — 80053 COMPREHEN METABOLIC PANEL: CPT

## 2021-03-18 PROCEDURE — 74011250636 HC RX REV CODE- 250/636: Performed by: INTERNAL MEDICINE

## 2021-03-18 PROCEDURE — 36415 COLL VENOUS BLD VENIPUNCTURE: CPT

## 2021-03-18 PROCEDURE — 74011250636 HC RX REV CODE- 250/636: Performed by: EMERGENCY MEDICINE

## 2021-03-18 PROCEDURE — 74176 CT ABD & PELVIS W/O CONTRAST: CPT

## 2021-03-18 PROCEDURE — 96375 TX/PRO/DX INJ NEW DRUG ADDON: CPT

## 2021-03-18 PROCEDURE — 82962 GLUCOSE BLOOD TEST: CPT

## 2021-03-18 PROCEDURE — 83605 ASSAY OF LACTIC ACID: CPT

## 2021-03-18 PROCEDURE — 83690 ASSAY OF LIPASE: CPT

## 2021-03-18 PROCEDURE — 96374 THER/PROPH/DIAG INJ IV PUSH: CPT

## 2021-03-18 PROCEDURE — 77030038269 HC DRN EXT URIN PURWCK BARD -A

## 2021-03-18 PROCEDURE — 74011250637 HC RX REV CODE- 250/637: Performed by: INTERNAL MEDICINE

## 2021-03-18 PROCEDURE — 81001 URINALYSIS AUTO W/SCOPE: CPT

## 2021-03-18 PROCEDURE — 87086 URINE CULTURE/COLONY COUNT: CPT

## 2021-03-18 PROCEDURE — 65270000029 HC RM PRIVATE

## 2021-03-18 PROCEDURE — 85025 COMPLETE CBC W/AUTO DIFF WBC: CPT

## 2021-03-18 RX ORDER — INSULIN LISPRO 100 [IU]/ML
INJECTION, SOLUTION INTRAVENOUS; SUBCUTANEOUS
Status: DISCONTINUED | OUTPATIENT
Start: 2021-03-18 | End: 2021-03-30 | Stop reason: HOSPADM

## 2021-03-18 RX ORDER — METHIMAZOLE 5 MG/1
10 TABLET ORAL EVERY EVENING
Status: DISCONTINUED | OUTPATIENT
Start: 2021-03-18 | End: 2021-03-30 | Stop reason: HOSPADM

## 2021-03-18 RX ORDER — GABAPENTIN 100 MG/1
100 CAPSULE ORAL 3 TIMES DAILY
Status: DISCONTINUED | OUTPATIENT
Start: 2021-03-18 | End: 2021-03-19

## 2021-03-18 RX ORDER — METHIMAZOLE 10 MG/1
TABLET ORAL DAILY
COMMUNITY

## 2021-03-18 RX ORDER — DEXTROSE 50 % IN WATER (D50W) INTRAVENOUS SYRINGE
25-50 AS NEEDED
Status: DISCONTINUED | OUTPATIENT
Start: 2021-03-18 | End: 2021-03-30 | Stop reason: HOSPADM

## 2021-03-18 RX ORDER — MORPHINE SULFATE 2 MG/ML
1 INJECTION, SOLUTION INTRAMUSCULAR; INTRAVENOUS
Status: DISCONTINUED | OUTPATIENT
Start: 2021-03-18 | End: 2021-03-30 | Stop reason: HOSPADM

## 2021-03-18 RX ORDER — ACETAMINOPHEN 325 MG/1
650 TABLET ORAL
Status: DISCONTINUED | OUTPATIENT
Start: 2021-03-18 | End: 2021-03-30 | Stop reason: HOSPADM

## 2021-03-18 RX ORDER — MAGNESIUM SULFATE 100 %
4 CRYSTALS MISCELLANEOUS AS NEEDED
Status: DISCONTINUED | OUTPATIENT
Start: 2021-03-18 | End: 2021-03-30 | Stop reason: HOSPADM

## 2021-03-18 RX ORDER — SODIUM CHLORIDE 0.9 % (FLUSH) 0.9 %
5-40 SYRINGE (ML) INJECTION EVERY 8 HOURS
Status: DISCONTINUED | OUTPATIENT
Start: 2021-03-18 | End: 2021-03-30 | Stop reason: HOSPADM

## 2021-03-18 RX ORDER — SODIUM CHLORIDE 0.9 % (FLUSH) 0.9 %
5-40 SYRINGE (ML) INJECTION AS NEEDED
Status: DISCONTINUED | OUTPATIENT
Start: 2021-03-18 | End: 2021-03-30 | Stop reason: HOSPADM

## 2021-03-18 RX ORDER — MELOXICAM 15 MG/1
15 TABLET ORAL DAILY
COMMUNITY
End: 2021-07-27

## 2021-03-18 RX ORDER — PANTOPRAZOLE SODIUM 40 MG/1
40 TABLET, DELAYED RELEASE ORAL
Status: DISCONTINUED | OUTPATIENT
Start: 2021-03-19 | End: 2021-03-30 | Stop reason: HOSPADM

## 2021-03-18 RX ORDER — FENTANYL CITRATE 50 UG/ML
100 INJECTION, SOLUTION INTRAMUSCULAR; INTRAVENOUS
Status: COMPLETED | OUTPATIENT
Start: 2021-03-18 | End: 2021-03-18

## 2021-03-18 RX ORDER — MORPHINE SULFATE 4 MG/ML
4 INJECTION INTRAVENOUS
Status: COMPLETED | OUTPATIENT
Start: 2021-03-18 | End: 2021-03-18

## 2021-03-18 RX ORDER — OXYBUTYNIN CHLORIDE 5 MG/1
10 TABLET, EXTENDED RELEASE ORAL DAILY
Status: DISCONTINUED | OUTPATIENT
Start: 2021-03-19 | End: 2021-03-30 | Stop reason: HOSPADM

## 2021-03-18 RX ORDER — ACETAMINOPHEN 650 MG/1
650 SUPPOSITORY RECTAL
Status: DISCONTINUED | OUTPATIENT
Start: 2021-03-18 | End: 2021-03-30 | Stop reason: HOSPADM

## 2021-03-18 RX ORDER — MORPHINE SULFATE 2 MG/ML
1 INJECTION, SOLUTION INTRAMUSCULAR; INTRAVENOUS
Status: DISCONTINUED | OUTPATIENT
Start: 2021-03-18 | End: 2021-03-18 | Stop reason: ALTCHOICE

## 2021-03-18 RX ORDER — AMOXICILLIN 250 MG
1 CAPSULE ORAL DAILY
Status: DISCONTINUED | OUTPATIENT
Start: 2021-03-19 | End: 2021-03-30 | Stop reason: HOSPADM

## 2021-03-18 RX ORDER — SODIUM CHLORIDE 9 MG/ML
75 INJECTION, SOLUTION INTRAVENOUS CONTINUOUS
Status: DISCONTINUED | OUTPATIENT
Start: 2021-03-18 | End: 2021-03-21

## 2021-03-18 RX ORDER — POLYETHYLENE GLYCOL 3350 17 G/17G
17 POWDER, FOR SOLUTION ORAL DAILY PRN
Status: DISCONTINUED | OUTPATIENT
Start: 2021-03-18 | End: 2021-03-30 | Stop reason: HOSPADM

## 2021-03-18 RX ADMIN — MORPHINE SULFATE 1 MG: 2 INJECTION, SOLUTION INTRAMUSCULAR; INTRAVENOUS at 17:36

## 2021-03-18 RX ADMIN — MORPHINE SULFATE 1 MG: 2 INJECTION, SOLUTION INTRAMUSCULAR; INTRAVENOUS at 23:43

## 2021-03-18 RX ADMIN — MORPHINE SULFATE 4 MG: 4 INJECTION INTRAVENOUS at 09:44

## 2021-03-18 RX ADMIN — MEROPENEM 500 MG: 500 INJECTION, POWDER, FOR SOLUTION INTRAVENOUS at 21:46

## 2021-03-18 RX ADMIN — FENTANYL CITRATE 100 MCG: 50 INJECTION INTRAMUSCULAR; INTRAVENOUS at 10:05

## 2021-03-18 RX ADMIN — SODIUM CHLORIDE 1000 ML: 9 INJECTION, SOLUTION INTRAVENOUS at 07:37

## 2021-03-18 RX ADMIN — MORPHINE SULFATE 1 MG: 2 INJECTION, SOLUTION INTRAMUSCULAR; INTRAVENOUS at 20:37

## 2021-03-18 RX ADMIN — METHIMAZOLE 10 MG: 5 TABLET ORAL at 19:46

## 2021-03-18 RX ADMIN — MEROPENEM 2 G: 1 INJECTION, POWDER, FOR SOLUTION INTRAVENOUS at 10:19

## 2021-03-18 RX ADMIN — SODIUM CHLORIDE 75 ML/HR: 9 INJECTION, SOLUTION INTRAVENOUS at 21:46

## 2021-03-18 RX ADMIN — FENTANYL CITRATE 100 MCG: 50 INJECTION INTRAMUSCULAR; INTRAVENOUS at 07:31

## 2021-03-18 NOTE — PROGRESS NOTES
Admission Medication Reconciliation: In progress:    Unable to speak with patient face to face at this time due to general isolation precautions in the ED related to COVID-19 pandemic. Called % [place] unit, asked me to transferred to patient's room--there was no answer in the room after three attempts. No answer at [phone]. Will follow up tomorrow. Thank you for allowing me to participate in the care of your patient. Svitlana Smith PharmD, RN # [phone]     [facility] pharmacy benefit data reflects medications filled and processed through the patient's insurance, however   this data does NOT capture whether the medication was picked up or is currently being taken by the patient. Allergies:  Bacitracin    Significant PMH/Disease States:   Past Medical History:   Diagnosis Date    Diabetes ([address]     Hernia, hiatal     Hypertension     Kidney stones     both kidneys    Osteoarthritis     Renal failure     left kidney     Chief Complaint for this Admission:    Chief Complaint   Patient presents with    Vaginal Bleeding       Facility-Administered Medications: None     Please contact the main inpatient pharmacy with any questions or concerns at [phone] and we will direct you to the clinical pharmacist covering this patient's care while in-house.    ALLEN Falcon

## 2021-03-18 NOTE — H&P
[address] [facility] Adult  Hospitalist Group  History and Physical    Primary Care Provider: Rebeca Crespo MD  Date of Service:  3/18/2021    Subjective:     Mone Haney is a 62 y.o. female with PMH of DM, HTN, Morbid obesity, Renal stone with hx of R ureteral stent for stones, came to [facility] for hematuria since morning, about 7 hours at that time. Pt is Jahovah's witness and does not take any blood products. Pt suggested to be doing ok until yesterday when started having vaginal bleeding with sharp, intense, moderate to severe, intermittent pains from R flank to groin, suprapubic and urethral meatus. No fever/ chills. Pt has had similar experience in past July 2020. And this feels quite same. Pt is jahovah's witness and does not want blood transfusions. Denied any SOB, CP, palpitations, N/V. No leg swelling. Review of Systems:    A comprehensive review of systems was negative except for that written in the History of Present Illness. Past Medical History:   Diagnosis Date    Diabetes ([address]     Hernia, hiatal     Hypertension     Kidney stones     both kidneys    Osteoarthritis     Renal failure     left kidney      Past Surgical History:   Procedure Laterality Date    HX HYSTERECTOMY      uterus not removed    HX OTHER SURGICAL      right kidney stent     Prior to Admission medications    Medication Sig Start Date End Date Taking? Authorizing Provider   OXYBUTYNIN CHLORIDE PO Take  by mouth. Yes Hakeem, MD Juan Luis   methIMAzole (TAPAZOLE) 10 mg tablet Take  by mouth daily. Yes Hakeem, MD Juan Luis   meloxicam (MOBIC) 15 mg tablet Take 15 mg by mouth daily. Yes Hakeem, MD Juan Luis   iron/vitamin B complex (GERITOL PO) Take  by mouth. Yes Hakeem, MD Juan Luis   ascorbic acid, vitamin C, (Vitamin C) 500 mg tablet Take 500 mg by mouth two (2) times a day. Yes Hakeem, MD Juan Luis   gabapentin (NEURONTIN) 100 mg capsule Take  by mouth three (3) times daily.     Juan Luis Palacio MD   pantoprazole (PROTONIX) 40 mg tablet Take 40 mg by mouth daily. Juan Luis Palacio MD   traMADoL (ULTRAM) 50 mg tablet Take  mg by mouth every eight (8) hours as needed for Pain. Juan Luis Palacio MD   senna-docusate (Senokot-S) 8.6-50 mg per tablet Take 1 Tab by mouth daily. Juan Luis Palacio MD   acetaminophen (TYLENOL) 500 mg tablet Take 500 mg by mouth every eight (8) hours as needed for Pain. Juan Luis Palacio MD   multivitamin tablet Take 1 Tab by mouth daily. Juan Luis Palacio MD   ondansetron hcl (Zofran) 8 mg tablet Take 8 mg by mouth every eight (8) hours as needed for Nausea or Vomiting. Juan Luis Palacio MD   glipiZIDE (GLUCOTROL) 5 mg tablet Take  by mouth two (2) times a day. Juan Luis Palacio MD     Allergies   Allergen Reactions    Bacitracin Other (comments)     Syncope and lip swelling. History reviewed. No pertinent family history. SOCIAL HISTORY:  Patient resides at Home. Patient ambulates with device assistance. Smoking history: never  Alcohol history: none        Objective:     Visit Vitals  /64 (BP 1 Location: Left lower arm, BP Patient Position: At rest)   Pulse 97   Temp 98 °F (36.7 °C)   Resp 16   Wt (!) 160.3 kg (353 lb 6.4 oz)   SpO2 98%   BMI 60.66 kg/m²       Physical Exam:   General:          Alert, cooperative, no distress, appears stated age. HEENT:           Atraumatic, anicteric sclerae, pink conjunctivae                          No oral ulcers, mucosa moist, throat clear, dentition fair  Neck:               Supple, symmetrical  Lungs:             Clear to auscultation bilaterally. No Wheezing or Rhonchi. No rales. Chest wall:      No tenderness  No Accessory muscle use. Heart:              Regular  rhythm,  No  murmur   No edema  Abdomen:        Soft, non-tender. Not distended. Bowel sounds normal  Extremities:     No cyanosis. No clubbing,                            Skin turgor normal, Capillary refill normal  Skin:                Not pale.   Not Jaundiced  No rashes   Psych: Not anxious or agitated. Neurologic:      Alert, moves all extremities, answers questions appropriately and responds to commands     Cap refill: Brisk, less than 3 seconds  Pulses: 2+, symmetric in all extremities  Skin: Warm, dry, without rashes or lesions    ECG:     Data Review: All diagnostic labs and studies have been reviewed. Radiology  Ct Abd Pelv Wo Cont    Result Date: 3/18/2021  Right ureteral stent is noted in position with the proximal end in the upper pole right kidney and the distal end in the urinary bladder which is filled with high density may represent hemorrhage. There is right hydronephrosis versus a mass in the lower pole of the right kidney. No prior studies are available for comparison. The left kidney is small and there is mild left hydroureteronephrosis. there is a large abdominal wall hernia in the left containing stomach, small bowel and colon which is incompletely imaged. Recent Results (from the past 24 hour(s))   CBC WITH AUTOMATED DIFF    Collection Time: 03/18/21  7:24 AM   Result Value Ref Range    WBC 16.2 (H) 3.6 - 11.0 K/uL    RBC 3.63 (L) 3.80 - 5.20 M/uL    HGB 10.4 (L) 11.5 - 16.0 g/dL    HCT 35.0 35.0 - 47.0 %    MCV 96.4 80.0 - 99.0 FL    MCH 28.7 26.0 - 34.0 PG    MCHC 29.7 (L) 30.0 - 36.5 g/dL    RDW 15.3 (H) 11.5 - 14.5 %    PLATELET 301 369 - 777 K/uL    MPV 10.1 8.9 - 12.9 FL    NRBC 0.0 0  WBC    ABSOLUTE NRBC 0.00 0.00 - 0.01 K/uL    NEUTROPHILS 81 (H) 32 - 75 %    LYMPHOCYTES 10 (L) 12 - 49 %    MONOCYTES 4 (L) 5 - 13 %    EOSINOPHILS 4 0 - 7 %    BASOPHILS 0 0 - 1 %    IMMATURE GRANULOCYTES 1 (H) 0.0 - 0.5 %    ABS. NEUTROPHILS 13.0 (H) 1.8 - 8.0 K/UL    ABS. LYMPHOCYTES 1.6 0.8 - 3.5 K/UL    ABS. MONOCYTES 0.7 0.0 - 1.0 K/UL    ABS. EOSINOPHILS 0.6 (H) 0.0 - 0.4 K/UL    ABS. BASOPHILS 0.1 0.0 - 0.1 K/UL    ABS. IMM.  GRANS. 0.2 (H) 0.00 - 0.04 K/UL    DF AUTOMATED     METABOLIC PANEL, COMPREHENSIVE    Collection Time: 03/18/21  7:24 AM   Result Value Ref Range    Sodium 142 136 - 145 mmol/L    Potassium 4.8 3.5 - 5.1 mmol/L    Chloride 109 (H) 97 - 108 mmol/L    CO2 24 21 - 32 mmol/L    Anion gap 9 5 - 15 mmol/L    Glucose 142 (H) 65 - 100 mg/dL    BUN 50 (H) 6 - 20 MG/DL    Creatinine 2.39 (H) 0.55 - 1.02 MG/DL    BUN/Creatinine ratio 21 (H) 12 - 20      GFR est AA 25 (L) >60 ml/min/1.73m2    GFR est non-AA 21 (L) >60 ml/min/1.73m2    Calcium 8.9 8.5 - 10.1 MG/DL    Bilirubin, total 0.1 (L) 0.2 - 1.0 MG/DL    ALT (SGPT) 13 12 - 78 U/L    AST (SGOT) 11 (L) 15 - 37 U/L    Alk. phosphatase 85 45 - 117 U/L    Protein, total 8.0 6.4 - 8.2 g/dL    Albumin 3.1 (L) 3.5 - 5.0 g/dL    Globulin 4.9 (H) 2.0 - 4.0 g/dL    A-G Ratio 0.6 (L) 1.1 - 2.2     LIPASE    Collection Time: 03/18/21  7:24 AM   Result Value Ref Range    Lipase 169 73 - 393 U/L   TYPE & SCREEN    Collection Time: 03/18/21  7:24 AM   Result Value Ref Range    Crossmatch Expiration 03/21/2021,2359     ABO/Rh(D) A POSITIVE     Antibody screen NEG    SAMPLES BEING HELD    Collection Time: 03/18/21  7:24 AM   Result Value Ref Range    SAMPLES BEING HELD 1RED, 1BLUE     COMMENT        Add-on orders for these samples will be processed based on acceptable specimen integrity and analyte stability, which may vary by analyte.    LACTIC ACID    Collection Time: 03/18/21  8:11 AM   Result Value Ref Range    Lactic acid 0.7 0.4 - 2.0 MMOL/L   URINALYSIS W/ REFLEX CULTURE    Collection Time: 03/18/21 11:54 AM    Specimen: Miscellaneous sample; Urine    Urine specimen   Result Value Ref Range    Color RED      Appearance CLEAR CLEAR      Specific gravity 1.015 1.003 - 1.030      pH (UA) 6.5 5.0 - 8.0      Protein 100 (A) NEG mg/dL    Glucose Negative NEG mg/dL    Ketone Negative NEG mg/dL    Blood LARGE (A) NEG      Urobilinogen 0.2 0.2 - 1.0 EU/dL    Nitrites Negative NEG      Leukocyte Esterase LARGE (A) NEG      WBC 5-10 0 - 4 /hpf    RBC >100 (H) 0 - 5 /hpf    Epithelial cells FEW FEW /lpf    Bacteria Negative NEG /hpf    UA:UC IF INDICATED CULTURE NOT INDICATED BY UA RESULT     BILIRUBIN, CONFIRM    Collection Time: 03/18/21 11:54 AM   Result Value Ref Range    Bilirubin UA, confirm Negative NEG           Assessment:     Active Problems:    Hematuria (7/17/2020)      # Hematuria  # R hydronephrosis vs mass in lower pole of R kidney  # Hx of R ureteral stent for renal stone, known to Urology  # UTI  # Hx of MDR Klebsiella Pneumoniae UTI in 7/20  # L small kidney with mild hydroureteronephrosis on CT scan A/P on 3/18 in ED  # Acute blood loss anemia, Hb 10.4 in ED  # Pt is Jahovah's Witness and does not want any blood products. Hx of severe anemia in past, managed by IV Iron infusion  # Early SIRS on presenting with ED, with leukocytosis and tachycardia. Better as tachycardia is improved   # Hyperglycemia in pt with DM2  # HTN  # CKD 4, baseline creat around 2.5, Creatinine 2.39 on admission  # Morbid obesity, BMI 60.66  # ? Hyperthyroidism    Plan:     -Admit to inpatient, [facility] bed  -Sick pt. Close monitoring  -Urology consult stat  -Blood and Urine Culture  -ID consult tomorrow  -IV Merrem renal dose, d/w ID-known to Dr. Samra Poon.  Further per ID  -NO BLOOD PRODUCTS - JAHOVAH's WITNESS  -SSI with humalog and serial ACHS  -Continue home med except Tramadol, Glipizide, Meloxicam  -Continue Methimazole, PPI, Gabapentine, Oxybutynin and Sennakot  -CBC, CMP monitoring  -H/H monitoring  -CBT may be needed per urology  -Morphin IV prn for pain    Code status: DNR  DVT Px; SCDs  FUNCTIONAL STATUS PRIOR TO HOSPITALIZATION Ambulatory with Use of Assistive Devices (including history of recent falls)     Signed By: Isaiah August MD     March 18, 2021

## 2021-03-18 NOTE — ACP (ADVANCE CARE PLANNING)
[address] [facility] Adult  Hospitalist Group                                      Advance Care Planning Note    Name: Aleta Gardner  YOB: 1960  MRN: [id number]  Admission Date: 3/18/2021  7:16 AM    Date of discussion: 3/18/2021    Active Diagnoses:    Hospital Problems  Date Reviewed: 7/18/2020          Codes Class Noted POA    Hematuria ICD-10-CM: R31.9  ICD-9-CM: 599.70  7/17/2020 Unknown            # Hematuria  # R hydronephrosis vs mass in lower pole of R kidney  # Hx of R ureteral stent for renal stone, known to Urology  # UTI  # Hx of MDR Klebsiella Pneumoniae UTI in 7/20  # L small kidney with mild hydroureteronephrosis on CT scan A/P on 3/18 in ED  # Acute blood loss anemia, Hb 10.4 in ED  # Pt is Jahovah's Witness and does not want any blood products. Hx of severe anemia in past, managed by IV Iron infusion  # Early SIRS on presenting with ED, with leukocytosis and tachycardia. Better as tachycardia is improved   # Hyperglycemia in pt with DM2  # HTN  # CKD 4, baseline creat around 2.5, Creatinine 2.39 on admission  # Morbid obesity, BMI 60.66  # Hyperthyroidism    These active diagnoses are of sufficient risk that focused discussion on advance care planning is indicated in order to allow the patient to thoughtfully consider personal goals of care, and if situations arise that prevent the ability to personally give input, to ensure appropriate representation of their personal desires for different levels and aggressiveness of care.      Discussion:     Persons present and participating in discussion: Abraham Baron MD    Topics Discussed:  Patient's medical condition and diagnosis: [ x ] yes [  ] no     Surrogate decision maker: [ x ] yes [  ] no     Patient's current physical function/cognitive function/frailty: [ x ] yes [  ] no     Code Status: [ x ] yes [  ] no --- DNR    Artificial Nutrition / Dialysis / Non-Invasive Ventilation / Blood Transfusion: [ x ] yes [  ] no --- No blood products as a Jahovah's witness    Potential Resources for home (durable medical equipment, home nursing, home O2): [ x ] yes [  ] no    Overview of Discussion:   I had a detailed discussion regarding patient's CODE STATUS with patient. I explained to her why we need to have a discussion regarding the CODE STATUS, what the discussion entails, and what we want accomplished with this discussion. I explained to her what CODE STATUS means, the various kinds of code statuses including DNR/DNI, partial code, full code. Explained to her what each of that entails, the resuscitative modalities and partial code and full code, what DNR/DNI entails, and explained to her in detail the expectation associated with each CODE STATUS. I answered all her questions and concerns. She reports that she would like her to be a DNR (do not resuscitate) at this time  CODE STATUS DNR. Time Spent:     Total time spent face-to-face in education and discussion: 18 minutes.      Esther Jaramillo MD  Date of Service:  3/18/2021  6:33 PM

## 2021-03-18 NOTE — ED TRIAGE NOTES
Triage note:vaginal bleeding started last night has been bleeding x 7 hours. with spasm in bladder has a stent in her urethra that may have become displaced stated patient.

## 2021-03-18 NOTE — PROGRESS NOTES
Patient confirmed methimazole 10mg per Shobha Silva MD request.     This RN and Zulma Desai (charge) attempted labs for paired blood cultures, unsuccessfully. CCU RN consulted to attempt labs, labs remain unattainable. Took verbal orders from Shobha Silva MD to hang Meropenem even if blood cultures unable to be drawn. Meropenem passed to night RN awaiting IV pump availablity. Bedside shift change report given to [address] [place] [address] (oncoming nurse) by Nanci Case (offgoing nurse). Report included the following information SBAR, Kardex, Intake/Output, MAR and Recent Results.

## 2021-03-18 NOTE — PROGRESS NOTES
Day #1 of Merrem  Indication:  UTI (duration 7 days)  H/o MDRO K.pneumo in 2020    Current regimen:  1 gram Q 8hrs  Abx regimen: Merrem replaces ceftriaxone  Recent Labs     21  0724   WBC 16.2*   CREA 2.39*   BUN 50*   Est CrCl: 38.3 ml/min  Temp (24hrs), Av.3 °F (36.8 °C), Min:98 °F (36.7 °C), Max:98.6 °F (37 °C)    Cultures:   3/18 Urine - in progress    Plan: Change to 500 mg IV q8h  with respect to indication and renal status (CrCl 26-49 mL/min) per [facility]/[employer]-approved Renal Dosing Adjustment protocol. Pharmacy will continue to monitor this patient daily for changes in clinical status and renal function.     Ceferino Tipton, PharmD  Clinical Pharmacist  [facility] ()

## 2021-03-18 NOTE — ED NOTES
Assumed care, verbal and beside report received from Ana RUIZ RN. Pt resting comfortably in bed, monitor x 2,  alert and oriented x 4 with no complaints at this time. Pt in CT.

## 2021-03-18 NOTE — ED NOTES
Verbal report and paperwork given to [facility] provider; time allotted for questions but denied having any at this time. Pt alert and oriented x4. Pt transported out of ED via [facility] stretcher.

## 2021-03-18 NOTE — ED PROVIDER NOTES
78-year-old female history of right ureteral stent secondary to kidney stones and underlying renal disease, also hypertension, diabetes and obesity, presenting to the ED by EMS for evaluation of vaginal bleeding. She reports this feels quite similar to an episode in July 2020 where her stent became dislodged. She has been experiencing vaginal/urethral bleeding for the last 7 hours. She also reports feeling her bladder spasm and is concerned her stent may have migrated downward towards her bladder. Per EMS, the patient was sitting in Chux pads that were soaked with blood. The patient was initially mildly tachycardic for EMS with high blood pressure, no fever and tolerable pain. The patient denies any shortness of breath, lightheadedness. She does report right-sided flank pain. Pain is severe, 10 out of 10. Of note, she is a [employer] and refuses any blood products. Past Medical History:   Diagnosis Date    Diabetes ([facility] [address]     Hernia, hiatal     Hypertension     Kidney stones     both kidneys    Osteoarthritis     Renal failure     left kidney       Past Surgical History:   Procedure Laterality Date    HX HYSTERECTOMY      uterus not removed    HX OTHER SURGICAL      right kidney stent         History reviewed. No pertinent family history.     Social History     Socioeconomic History    Marital status: SINGLE     Spouse name: Not on file    Number of children: Not on file    Years of education: Not on file    Highest education level: Not on file   Occupational History    Not on file   Social Needs    Financial resource strain: Not on file    Food insecurity     Worry: Not on file     Inability: Not on file    Transportation needs     Medical: Not on file     Non-medical: Not on file   Tobacco Use    Smoking status: Never Smoker    Smokeless tobacco: Never Used   Substance and Sexual Activity    Alcohol use: Never     Frequency: Never    Drug use: Never    Sexual activity: Not on file   Lifestyle    Physical activity     Days per week: Not on file     Minutes per session: Not on file    Stress: Not on file   Relationships    Social connections     Talks on phone: Not on file     Gets together: Not on file     Attends Sabianist service: Not on file     Active member of club or organization: Not on file     Attends meetings of clubs or organizations: Not on file     Relationship status: Not on file    Intimate partner violence     Fear of current or ex partner: Not on file     Emotionally abused: Not on file     Physically abused: Not on file     Forced sexual activity: Not on file   Other Topics Concern    Not on file   Social History Narrative    Not on file         ALLERGIES: Bacitracin    Review of Systems   Constitutional: Negative for chills and fever. HENT: Positive for congestion. Eyes: Negative for visual disturbance. Respiratory: Negative for shortness of breath. Cardiovascular: Negative for chest pain. Gastrointestinal: Negative for abdominal pain, nausea and vomiting. Genitourinary: Positive for flank pain, hematuria and vaginal bleeding. Negative for dysuria. Musculoskeletal: Negative for back pain. Skin: Negative for rash. Allergic/Immunologic: Negative for immunocompromised state. Neurological: Negative for syncope, weakness and headaches. Psychiatric/Behavioral: Negative for confusion. Vitals:    03/18/21 0721   BP: (!) 149/107   Pulse: 94   Resp: 16   Temp: 98.6 °F (37 °C)   SpO2: 97%   Weight: (!) 160.3 kg (353 lb 6.4 oz)            Physical Exam  Vitals signs and nursing note reviewed. Constitutional:       General: She is not in acute distress. Appearance: She is well-developed. She is obese. HENT:      Head: Normocephalic and atraumatic. Eyes:      General: No scleral icterus. Neck:      Trachea: No tracheal deviation. Cardiovascular:      Rate and Rhythm: Normal rate and regular rhythm.       Heart sounds: Normal heart sounds. Pulmonary:      Effort: Pulmonary effort is normal. No respiratory distress. Breath sounds: Normal breath sounds. Abdominal:      General: There is no distension. Palpations: Abdomen is soft. Tenderness: There is no abdominal tenderness. Genitourinary:     Comments: Blood clots on skin surrounding labia and gluteal folds  Musculoskeletal: Normal range of motion. Skin:     General: Skin is warm and dry. Neurological:      Mental Status: She is alert and oriented to person, place, and time. Cranial Nerves: No cranial nerve deficit. MDM  Number of Diagnoses or Management Options  Gross hematuria  Leukocytosis, unspecified type  Refusal of blood transfusions as patient is Baptist  Right flank pain  Diagnosis management comments: 30-year-old female history of renal disease and right ureteral stent, presenting for gross hematuria, episode is concerning for similar 1 in the past where she had stent dislodgment, requiring exchange by urology. She was also anemic at that time, which could be the case today as well. She is Baptist, and willing to accept blood products. She required iron infusion for hemoglobin 7.3. We will pursue a CT of the abdomen pelvis without contrast given her underlying renal disease and consult urology as needed. I suspect she will require admission to [address] [place] [address] for stent exchange and monitoring of blood loss. Amount and/or Complexity of Data Reviewed  Clinical lab tests: ordered  Tests in the radiology section of CPT®: ordered      ED Course as of Mar 19 1002   Thu Mar 18, 2021   0740 Patient reports significant relief of pain after fentanyl 100 mcg IV. She is being taken for CT now. Will perform skin assessment to evaluate sacral wound after her return. If CT is unrevealing, she will require a pelvic exam.    [SL]   0901 CT shows stent in proper location.   There is evidence of hydronephrosis versus renal mass on the right side and gross blood in the bladder. Patient's white blood cell count is 16.2. She is without fever, elevation of lactate. She has no tachycardia or tachypnea. She is only meeting 1 of 4 SIRS criteria at this time, so will defer antibiotics pending urinalysis. I have placed a consult to urology. Of note, on prior visits her antibiotics were changed from ceftriaxone to meropenem given culture results. [SL]   63[address] [facility] Dr. Sachi Prakash, Urologist contacted by telephone. He said the team will evaluate the patient once she arrives at [address] [place] [address]. No recommendations at this time. I have added an initial dose of meropenem given the patient's history and fact that we are delayed in obtaining an accurate urine sample for analysis. [SL]   [address] [facility] Dr. Pierre Wolf, Hospitalist contacted via [employer]. He will forward my message to NP Mary Hummel for admission.    [SL]   [phone] Patient is still having intermittent episodes of severe 10 out of 10 pelvic and right flank pain. She had just received fentanyl 100 mcg when another episode occurred. Will allow time for the medication to take effect and consider administration of Dilaudid 1 mg IV for next dosing.    [SL]      ED Course User Index  [SL] Rhonda Schwarz MD       Procedures    CT Results  (Last 48 hours)               03/18/21 0758  CT ABD PELV WO CONT Final result    Impression:  Right ureteral stent is noted in position with the proximal end in the upper   pole right kidney and the distal end in the urinary bladder which is filled with   high density may represent hemorrhage. There is right hydronephrosis versus a mass in the lower pole of the right   kidney. No prior studies are available for comparison. The left kidney is small and there is mild left hydroureteronephrosis. there is a large abdominal wall hernia in the left containing stomach, small   bowel and colon which is incompletely imaged.        Narrative:  EXAM: CT ABD PELV WO CONT       INDICATION: Eval right stent, severe bleeding and pain       COMPARISON: None       CONTRAST:  None. TECHNIQUE:    Thin axial images were obtained through the abdomen and pelvis. Coronal and   sagittal reformats were generated. Oral contrast was not administered. CT dose   reduction was achieved through use of a standardized protocol tailored for this   examination and automatic exposure control for dose modulation. The absence of intravenous contrast material reduces the sensitivity for   evaluation of the vasculature and solid organs. FINDINGS:    LOWER THORAX: No significant abnormality in the incidentally imaged lower chest.   LIVER: No mass. BILIARY TREE: Gallbladder is within normal limits. CBD is not dilated. SPLEEN: within normal limits. PANCREAS: No focal abnormality. ADRENALS: There is slight prominence of adrenal glands   KIDNEYS/URETERS: Right ureteral stent is noted in position with the proximal end   overlying right upper pole and distal end overlying urinary bladder. There is hydronephrosis versus a mass in the right lower pole. There is high density urinary bladder may represent hemorrhage. No prior studies   are available for comparison. The left kidney is small in size and there is mild left hydroureteronephrosis. There is a large left abdominal wall hernia which is incompletely imaged and   contains colon and small bowel and part of the stomach . There are mildly   dilated loops of small bowel in the midabdomen. STOMACH: Unremarkable. SMALL BOWEL: There are few loops of small bowel which are mildly distended   COLON: No dilatation or wall thickening. APPENDIX: Not identified   PERITONEUM: No ascites or pneumoperitoneum. RETROPERITONEUM: No lymphadenopathy or aortic aneurysm. REPRODUCTIVE ORGANS: Patient is status post hysterectomy   URINARY BLADDER: No mass or calculus. BONES: No destructive bone lesion.  There are severe degenerative changes in the   ABDOMINAL WALL: No mass or hernia. ADDITIONAL COMMENTS: N/A               PFT Results  (Last 48 hours)    None        Echo Results  (Last 48 hours)    None        CXR Results  (Last 48 hours)    None        VENOUS DOPPLER results  (Last 48 hours)    None            Perfect Serve Consult for Admission  9:36 AM    ED Room Number: SER08/08  Patient Name and age:  Isis Landeros 62 y.o.  female  Working Diagnosis:   1. Gross hematuria    2. Refusal of blood transfusions as patient is Oriental orthodox    3. Right flank pain    4. Leukocytosis, unspecified type        COVID-19 Suspicion:  no  Sepsis present:  no  Reassessment needed: yes  Code Status:  Full Code  Readmission: no  Isolation Requirements:  no  Recommended Level of Care:  med/surg  Department:[facility] - [phone]  Other:  Urology consult pending. UA pending as we've been unable to obtain sample (only passing miguel ángel blood clots currently).           Signed By: Yessica ePterson MD     March 19, 2021

## 2021-03-18 NOTE — ED NOTES
TRANSFER - OUT REPORT:    Verbal report given to Tete Calles RN (name) on [facility] being transferred to [address] [place] [address] (unit) for routine progression of care. Report consisted of patients Situation, Background, Assessment and Recommendations(SBAR). Information from the following report(s) SBAR, ED Summary, MAR, Recent Results and Med Rec Status was reviewed with the receiving nurse. Lines:   Peripheral IV 03/18/21 Right; Anterior Antecubital (Active)   Site Assessment Clean, dry, & intact 03/18/21 0722   Phlebitis Assessment 0 03/18/21 0722   Infiltration Assessment 0 03/18/21 0722   Dressing Status Clean, dry, & intact 03/18/21 0722   Dressing Type Transparent 03/18/21 0722   Hub Color/Line Status Pink;Capped;Flushed;Patent 03/18/21 0508   Action Taken Blood drawn 03/18/21 0623        Opportunity for questions and clarification was provided.

## 2021-03-19 LAB
ALBUMIN SERPL-MCNC: 2.8 G/DL (ref 3.5–5)
ALBUMIN/GLOB SERPL: 0.6 {RATIO} (ref 1.1–2.2)
ALP SERPL-CCNC: 82 U/L (ref 45–117)
ALT SERPL-CCNC: 7 U/L (ref 12–78)
ANION GAP SERPL CALC-SCNC: 4 MMOL/L (ref 5–15)
AST SERPL-CCNC: 6 U/L (ref 15–37)
BACTERIA SPEC CULT: NORMAL
BASOPHILS # BLD: 0.1 K/UL (ref 0–0.1)
BASOPHILS NFR BLD: 0 % (ref 0–1)
BILIRUB SERPL-MCNC: 0.2 MG/DL (ref 0.2–1)
BUN SERPL-MCNC: 47 MG/DL (ref 6–20)
BUN/CREAT SERPL: 20 (ref 12–20)
CALCIUM SERPL-MCNC: 8.9 MG/DL (ref 8.5–10.1)
CHLORIDE SERPL-SCNC: 115 MMOL/L (ref 97–108)
CO2 SERPL-SCNC: 23 MMOL/L (ref 21–32)
CREAT SERPL-MCNC: 2.32 MG/DL (ref 0.55–1.02)
DIFFERENTIAL METHOD BLD: ABNORMAL
EOSINOPHIL # BLD: 0.1 K/UL (ref 0–0.4)
EOSINOPHIL NFR BLD: 0 % (ref 0–7)
ERYTHROCYTE [DISTWIDTH] IN BLOOD BY AUTOMATED COUNT: 15.3 % (ref 11.5–14.5)
EST. AVERAGE GLUCOSE BLD GHB EST-MCNC: 146 MG/DL
GLOBULIN SER CALC-MCNC: 4.4 G/DL (ref 2–4)
GLUCOSE BLD STRIP.AUTO-MCNC: 140 MG/DL (ref 65–100)
GLUCOSE BLD STRIP.AUTO-MCNC: 155 MG/DL (ref 65–100)
GLUCOSE BLD STRIP.AUTO-MCNC: 158 MG/DL (ref 65–100)
GLUCOSE BLD STRIP.AUTO-MCNC: 177 MG/DL (ref 65–100)
GLUCOSE SERPL-MCNC: 156 MG/DL (ref 65–100)
HBA1C MFR BLD: 6.7 % (ref 4–5.6)
HCT VFR BLD AUTO: 26.2 % (ref 35–47)
HCT VFR BLD AUTO: 31.2 % (ref 35–47)
HGB BLD-MCNC: 7.9 G/DL (ref 11.5–16)
HGB BLD-MCNC: 9.4 G/DL (ref 11.5–16)
IMM GRANULOCYTES # BLD AUTO: 0.1 K/UL (ref 0–0.04)
IMM GRANULOCYTES NFR BLD AUTO: 1 % (ref 0–0.5)
LYMPHOCYTES # BLD: 1.3 K/UL (ref 0.8–3.5)
LYMPHOCYTES NFR BLD: 7 % (ref 12–49)
MCH RBC QN AUTO: 28.1 PG (ref 26–34)
MCHC RBC AUTO-ENTMCNC: 30.1 G/DL (ref 30–36.5)
MCV RBC AUTO: 93.4 FL (ref 80–99)
MONOCYTES # BLD: 0.7 K/UL (ref 0–1)
MONOCYTES NFR BLD: 4 % (ref 5–13)
NEUTS SEG # BLD: 16.4 K/UL (ref 1.8–8)
NEUTS SEG NFR BLD: 88 % (ref 32–75)
NRBC # BLD: 0 K/UL (ref 0–0.01)
NRBC BLD-RTO: 0 PER 100 WBC
PLATELET # BLD AUTO: 274 K/UL (ref 150–400)
PMV BLD AUTO: 9.9 FL (ref 8.9–12.9)
POTASSIUM SERPL-SCNC: 4.5 MMOL/L (ref 3.5–5.1)
PROT SERPL-MCNC: 7.2 G/DL (ref 6.4–8.2)
RBC # BLD AUTO: 3.34 M/UL (ref 3.8–5.2)
SERVICE CMNT-IMP: ABNORMAL
SERVICE CMNT-IMP: NORMAL
SODIUM SERPL-SCNC: 142 MMOL/L (ref 136–145)
WBC # BLD AUTO: 18.6 K/UL (ref 3.6–11)

## 2021-03-19 PROCEDURE — 74011250636 HC RX REV CODE- 250/636: Performed by: INTERNAL MEDICINE

## 2021-03-19 PROCEDURE — 83036 HEMOGLOBIN GLYCOSYLATED A1C: CPT

## 2021-03-19 PROCEDURE — 74011000258 HC RX REV CODE- 258: Performed by: INTERNAL MEDICINE

## 2021-03-19 PROCEDURE — 65270000029 HC RM PRIVATE

## 2021-03-19 PROCEDURE — 74011250637 HC RX REV CODE- 250/637: Performed by: INTERNAL MEDICINE

## 2021-03-19 PROCEDURE — 85018 HEMOGLOBIN: CPT

## 2021-03-19 PROCEDURE — 36415 COLL VENOUS BLD VENIPUNCTURE: CPT

## 2021-03-19 PROCEDURE — 87040 BLOOD CULTURE FOR BACTERIA: CPT

## 2021-03-19 PROCEDURE — 51798 US URINE CAPACITY MEASURE: CPT

## 2021-03-19 PROCEDURE — 82962 GLUCOSE BLOOD TEST: CPT

## 2021-03-19 PROCEDURE — 74011250636 HC RX REV CODE- 250/636: Performed by: HOSPITALIST

## 2021-03-19 PROCEDURE — 80053 COMPREHEN METABOLIC PANEL: CPT

## 2021-03-19 PROCEDURE — 85025 COMPLETE CBC W/AUTO DIFF WBC: CPT

## 2021-03-19 PROCEDURE — 74011250636 HC RX REV CODE- 250/636: Performed by: NURSE PRACTITIONER

## 2021-03-19 RX ORDER — ATROPA BELLADONNA AND OPIUM 16.2; 3 MG/1; MG/1
1 SUPPOSITORY RECTAL
Status: DISCONTINUED | OUTPATIENT
Start: 2021-03-19 | End: 2021-03-30 | Stop reason: HOSPADM

## 2021-03-19 RX ORDER — FENTANYL CITRATE 50 UG/ML
25 INJECTION, SOLUTION INTRAMUSCULAR; INTRAVENOUS ONCE
Status: COMPLETED | OUTPATIENT
Start: 2021-03-19 | End: 2021-03-19

## 2021-03-19 RX ORDER — FENTANYL CITRATE 50 UG/ML
25 INJECTION, SOLUTION INTRAMUSCULAR; INTRAVENOUS
Status: DISCONTINUED | OUTPATIENT
Start: 2021-03-19 | End: 2021-03-30 | Stop reason: HOSPADM

## 2021-03-19 RX ADMIN — OXYBUTYNIN CHLORIDE 10 MG: 5 TABLET, EXTENDED RELEASE ORAL at 09:01

## 2021-03-19 RX ADMIN — MORPHINE SULFATE 1 MG: 2 INJECTION, SOLUTION INTRAMUSCULAR; INTRAVENOUS at 01:46

## 2021-03-19 RX ADMIN — MORPHINE SULFATE 1 MG: 2 INJECTION, SOLUTION INTRAMUSCULAR; INTRAVENOUS at 06:06

## 2021-03-19 RX ADMIN — METHIMAZOLE 10 MG: 5 TABLET ORAL at 18:19

## 2021-03-19 RX ADMIN — Medication 10 ML: at 14:22

## 2021-03-19 RX ADMIN — DOCUSATE SODIUM 50 MG AND SENNOSIDES 8.6 MG 1 TABLET: 8.6; 5 TABLET, FILM COATED ORAL at 09:01

## 2021-03-19 RX ADMIN — FENTANYL CITRATE 25 MCG: 50 INJECTION, SOLUTION INTRAMUSCULAR; INTRAVENOUS at 09:09

## 2021-03-19 RX ADMIN — MEROPENEM 500 MG: 500 INJECTION, POWDER, FOR SOLUTION INTRAVENOUS at 03:43

## 2021-03-19 RX ADMIN — Medication 10 ML: at 09:02

## 2021-03-19 RX ADMIN — PANTOPRAZOLE SODIUM 40 MG: 40 TABLET, DELAYED RELEASE ORAL at 09:01

## 2021-03-19 RX ADMIN — MEROPENEM 500 MG: 500 INJECTION, POWDER, FOR SOLUTION INTRAVENOUS at 19:28

## 2021-03-19 RX ADMIN — FENTANYL CITRATE 25 MCG: 50 INJECTION, SOLUTION INTRAMUSCULAR; INTRAVENOUS at 00:38

## 2021-03-19 RX ADMIN — FENTANYL CITRATE 25 MCG: 50 INJECTION, SOLUTION INTRAMUSCULAR; INTRAVENOUS at 14:20

## 2021-03-19 RX ADMIN — MEROPENEM 500 MG: 500 INJECTION, POWDER, FOR SOLUTION INTRAVENOUS at 12:02

## 2021-03-19 NOTE — PROGRESS NOTES
Bedside and Verbal shift change report given to Jossy Lozano RN (oncoming nurse) by Kajal Hester RN (offgoing nurse). Report included the following information SBAR, Kardex, Intake/Output, MAR and Recent Results.

## 2021-03-19 NOTE — CONSULTS
Infectious Disease Consult    Today's Date: 3/19/2021   Admit Date: 3/18/2021    Impression:   UTI with hx of MDR klebsiella pneumoniae; ruled out - urine cx (3/18) no growth  Leukocytosis   Hematuria  Hx of Right ureteral obstruction s/p Right ureteral stent exchange 11/24/20  - WBC 18.6k, afebrile    CT of abd/pel: Right ureteral stent is noted in position with the proximal end in the upper pole right kidney and the distal end in the urinary bladder which is filled with high density may represent hemorrhage. There is right hydronephrosis versus a mass in the lower pole of the right kidney. The left kidney is small and there is mild left hydroureteronephrosis, a large abdominal wall hernia in the left containing stomach, small  bowel and colon which is incompletely imaged. Plan:   · Urology following; stent to be exchanged as outpatient  · Will continue IV Merrem until WBC comes down (16.2->18.6)    Anti-infectives:   · IV merrem 3/19    Subjective:   Date of Consultation:  March 19, 2021  Referring Physician: Martha Goldsmith MD    Patient is a 62 y.o. female was admitted to the hospital on 3/18 with dysuria and hematuria that lead to pain in pelvis. Pt was seen by Dr. Sharla Jimenez in 2020 for drug resistant UTI. CT of abd/pel revealed Right ureteral stent is noted in position with the proximal end in the upper pole right kidney and the distal end in the urinary bladder which is filled with high density may represent hemorrhage, right hydronephrosis versus a mass in the lower pole of the right kidney,  mild left hydroureteronephrosis, and large abdominal wall hernia. Pt has been followed by Dr. Stan King at [facility]; s/p right ureteral stent placement, last exchanged on 11/24/20 right ureteral obstruction and hydronephrosis. She also has left mild hydronephrosis, but was decided not to place a stent on the left with history of atrophic left kidney. During visit, pt expressed feeling sleepy due to pain medication. Denies dysuria, voices feeling much better. Pt's other medical hx include type II DM, hypertension, kidney stones, OA, and morbid obesity. ID team was consulted for UTI with hx of MDR klebsiella pneumonniae. Patient Active Problem List   Diagnosis Code    Hematuria R31.9     Past Medical History:   Diagnosis Date    Diabetes ([facility] [address]     Hernia, hiatal     Hypertension     Kidney stones     both kidneys    Osteoarthritis     Renal failure     left kidney      History reviewed. No pertinent family history. Social History     Tobacco Use    Smoking status: Never Smoker    Smokeless tobacco: Never Used   Substance Use Topics    Alcohol use: Never     Frequency: Never     Past Surgical History:   Procedure Laterality Date    HX HYSTERECTOMY      uterus not removed    HX OTHER SURGICAL      right kidney stent      Prior to Admission medications    Medication Sig Start Date End Date Taking? Authorizing Provider   OXYBUTYNIN CHLORIDE PO Take  by mouth. Yes Juan Luis Palacio MD   methIMAzole (TAPAZOLE) 10 mg tablet Take  by mouth daily. Yes Juan Luis Palacio MD   meloxicam (MOBIC) 15 mg tablet Take 15 mg by mouth daily. Yes Juan Luis Palacio MD   iron/vitamin B complex (GERITOL PO) Take  by mouth. Yes Juan Luis Palacio MD   ascorbic acid, vitamin C, (Vitamin C) 500 mg tablet Take 500 mg by mouth two (2) times a day. Yes Juan Luis Palacio MD   pantoprazole (PROTONIX) 40 mg tablet Take 40 mg by mouth daily. Juan Luis Palacio MD   traMADoL (ULTRAM) 50 mg tablet Take  mg by mouth every eight (8) hours as needed for Pain. Juan Luis Palacio MD   senna-docusate (Senokot-S) 8.6-50 mg per tablet Take 1 Tab by mouth daily. Juan Luis Palacio MD   acetaminophen (TYLENOL) 500 mg tablet Take 500 mg by mouth every eight (8) hours as needed for Pain. Juan Luis Palacio MD   ondansetron hcl (Zofran) 8 mg tablet Take 8 mg by mouth every eight (8) hours as needed for Nausea or Vomiting.     Juan Luis Palacio MD a  Allergies   Allergen Reactions    Bacitracin Other (comments)     Syncope and lip swelling. REVIEW OF SYSTEMS:     Total of 12 systems reviewed as follows:   I am not able to complete the review of systems because: The patient is intubated and sedated    The patient has altered mental status due to his acute medical problems    The patient has baseline aphasia from prior stroke(s)    The patient has baseline dementia and is not reliable historian                 POSITIVE= underlined text  Negative = text not underlined  General:  fever, chills, sweats, generalized weakness, weight loss/gain,      loss of appetite   Eyes:    blurred vision, eye pain, loss of vision, double vision  ENT:    rhinorrhea, pharyngitis   Respiratory:   cough, sputum production, SOB, wheezing, MCGHEE, pleuritic pain   Cardiology:   chest pain, palpitations, orthopnea, PND, edema, syncope   Gastrointestinal:  abdominal pain , N/V, dysphagia, diarrhea, constipation, bleeding   Genitourinary:  frequency, urgency, NO dysuria, YES for hematuria, incontinence   Muskuloskeletal :  arthralgia, myalgia   Hematology:  easy bruising, nose or gum bleeding, lymphadenopathy   Dermatological: rash, ulceration, pruritis   Endocrine:   hot flashes or polydipsia   Neurological:  headache, dizziness, confusion, focal weakness, paresthesia,     Speech difficulties, memory loss, gait disturbance  Psychological: Feelings of anxiety, depression, agitation    Objective:     Visit Vitals  /63   Pulse (!) 114   Temp 98.3 °F (36.8 °C)   Resp 19   Ht 5' 4.02\" (1.626 m)   Wt (!) 160.3 kg (353 lb 6.4 oz)   SpO2 97%   BMI 60.63 kg/m²     Temp (24hrs), Av.5 °F (36.9 °C), Min:98.1 °F (36.7 °C), Max:99 °F (37.2 °C)       Lines: peripheral line  PHYSICAL EXAM:   General:    Morbidly obese, Alert, cooperative, no distress, appears stated age.      HEENT: Atraumatic, anicteric sclerae, pink conjunctivae     No oral ulcers, mucosa moist, throat clear  Neck:  Supple, symmetrical, thyroid: non tender  Lungs:   Clear to auscultation bilaterally. No Wheezing or Rhonchi. No rales. Chest wall:  No tenderness  No Accessory muscle use. Heart:   Regular  rhythm,  No  murmur   No edema  Abdomen:   Soft, non-tender. Not distended. Bowel sounds normal  Extremities: No cyanosis. No clubbing  Skin:     Not pale. Not Jaundiced  No rashes   Psych:  Good insight. Not depressed. Not anxious or agitated. Neurologic: EOMs intact. No facial asymmetry. No aphasia or slurred speech. Symmetrical strength, Alert and oriented X 4.        Data Review:     CBC:  Recent Labs     03/19/21  1520 03/19/21  0600 03/18/21  2322 03/18/21  0724   WBC  --  18.6*  --  16.2*   GRANS  --  88*  --  81*   MONOS  --  4*  --  4*   EOS  --  0  --  4   ANEU  --  16.4*  --  13.0*   ABL  --  1.3  --  1.6   HGB 7.9* 9.4* 9.6* 10.4*   HCT 26.2* 31.2* 32.1* 35.0   PLT  --  274  --  317       BMP:  Recent Labs     03/19/21  0600 03/18/21  0724   CREA 2.32* 2.39*   BUN 47* 50*    142   K 4.5 4.8   * 109*   CO2 23 24   AGAP 4* 9   * 142*       LFTS:  Recent Labs     03/19/21  0600 03/18/21  0724   TBILI 0.2 0.1*   ALT 7* 13   AP 82 85   TP 7.2 8.0   ALB 2.8* 3.1*       Microbiology:     All Micro Results     Procedure Component Value Units Date/Time    CULTURE, BLOOD, PAIRED [[id number]] Collected: 03/19/21 1545    Order Status: Completed Specimen: Blood Updated: 03/19/21 1552    CULTURE, URINE [[id number]] Collected: 03/18/21 1149    Order Status: Completed Specimen: Urine from Clean catch Updated: 03/19/21 1331     Special Requests: NO SPECIAL REQUESTS        Culture result:       No significant growth, <10,000 CFU/mL          CULTURE, BLOOD [[id number]] Collected: 03/19/21 0600    Order Status: Completed Updated: 03/19/21 0854            Signed By: Janel Ann NP     March 19, 2021

## 2021-03-19 NOTE — PROGRESS NOTES
Patient seen and examined. Full consult to follow. Mason Alejo is a 81-year-old woman whose medical history is significant for diabetes, right hydronephrosis and obesity. I had seen her in the summer 2020 for a drug-resistant urinary tract infection. Last Wednesday, she developed hematuria. She then developed discomfort in the pelvic area as well as burning when she urinates. She came to [facility] where a CT scan revealed a right ureteral stent, right hydronephrosis versus a mass in the lower pole of the right kidney. There is also a large abdominal wall hernia. On exam, she has clear breath sounds regular rate and rhythm. There is some tenderness in the suprapubic area. Urine culture has been sent. We will continue Merrem for now.

## 2021-03-19 NOTE — PROGRESS NOTES
JOESPH PLAN:  RUR-9%  Disposition -Undetermined at this time-IPR/Home with [facility]  Transportation-BLS    Patient could benefit from PT/OT to determine appropriate disposition. Reason for Admission:  Hematuria, vaginal/urethra bleeding, ureteral stent                     RUR Score:     9%                Plan for utilizing home health:     TBD     PCP: First and Last name:  Love Chavez MD     Name of Practice:    Are you a current patient: Yes/No: Yes   Approximate date of last visit: January 2021   Can you participate in a virtual visit with your PCP: Yes                    Current Advanced Directive/Advance Care Plan: DNR      Healthcare Decision Maker: Khadijah Mcallister  Click here to [address] including selection of the Healthcare Decision Maker Relationship (ie \"Primary\")                           Transition of Care Plan:   CM met with patient to discuss discharge planning. CM noted patient was familiar from previous admission. Patient is temporarily living with her older sister at [address] [facility] [address]. Patient is independent with some of her ADLs while her sister assists with her other needs. Patient said she has not been ambulatory for a few weeks and therefore had remained in bed most of the time. Patient would like to go to an IPR in order to return to her previous baseline of using a walker/cane. Patient has a hospital bed, BSC, walker and a cane at home. Patient verified that she has Medicaid and other information on the demographics. Patient feels that she needs to be more ambulatory in order to move out of her sister's house back to her house in [place], [place]. CM will continue to follow for any discharge needs that may arise. Carolyn Parra MSA, RN, CRM  Care Management Interventions  PCP Verified by CM:  Yes  Palliative Care Criteria Met (RRAT>21 & CHF Dx)?: No  Mode of Transport at Discharge: BLS  Transition of Care Consult (CM Consult): Discharge Planning  MyChart Signup: No  Discharge Durable Medical Equipment: No  Health Maintenance Reviewed: Yes  Physical Therapy Consult: No  Occupational Therapy Consult: No  Speech Therapy Consult: No  Current Support Network: Lives with Caregiver  Confirm Follow Up Transport: Other (see comment)  Discharge Location  Discharge Placement: Unable to determine at this time

## 2021-03-19 NOTE — PROGRESS NOTES
2300 Pt noted with severe hematuria and passing large clots through out shift. Pt in immense amount of pain Morphine 1 mg [id number] being administered with no relief noted. Pt noted to to be severe pain screaming, rated 10/10 as severe pressure and burning pain in her lower abdomen vulva area. Bladder scan complete with <40 result. 0017 notified hospitalist of situation. IV Fentanyl ordered and administered. See MAR. Pt noted pain relieved. 0111 Hospitalist instructed nurse to continue to monitor for retention, if retaining to place christensen, titrate pain meds, and continue to monitor H&H Q8hrs.   0300 amount of clots passing minimized. Pt still noted with severe hematuria.  0600 Bladder Scan result with 23 mls. PRN morphine administered through out shift - see mar with mild relief obtained.

## 2021-03-19 NOTE — CONSULTS
Requesting Provider: Cedrick Goel MD - Reason for Consultation: \"hematuria\"  Pre-existing [facility] Patient:  Yes                Patient: Aleta Gardner MRN: [id number]  SSN: [id number]    YOB: 1960  Age: 62 y.o. Sex: female     Location: [facility]       Code Status: DNR   PCP: Tomasa Hayes MD  - [phone]   Emergency Contact:  Primary Emergency Contact: Gerardo Redman, Home Phone: [phone]   Race/Restorationism/Language: BLACK/ / Harsha Mathis / Vilma Doll: Payor: [place] [place] / Plan: [address] / Product Type: Managed Care Medicaid /    Prior Admission Data: 8/7/20 [facility] ONCOLOGY Brayan Faulkner   Hospitalized:  Hospital Day: 1 - Admitted 3/18/2021  7:16 AM     CONSULTANTS  IP CONSULT TO UROLOGY  IP CONSULT TO INFECTIOUS DISEASES  IP CONSULT TO HOSPITALIST   ADMISSION DIAGNOSES    ICD-10-CM ICD-9-CM   1. Gross hematuria  R31.0 599.71   2. Refusal of blood transfusions as patient is Roman Catholic  Z53.1 V62.6   3. Right flank pain  R10.9 789.09   4. Leukocytosis, unspecified type  D72.829 288.60         Assessment/Plan:       · ? UTI - Follow urine culture and tailor abx. Hx of Klebsiella Penumonaie UTI in 7/2020. ID consult pending. · Gross Hematuria - The right stent is in proper position. Continue empiric coverage for UTI as infection can cause hematuria. Okay to continue to void independently at this time, bladder scans <40 cc. Monitor output closely, bladder scan for decreased UOP. She will require the insertion of a large bore christensen for PVR>350 cc. Monitor Hgb. She is a Roman Catholic and does not accept blood products. She has been managed with iron transfusions in the past.   · Hx of Right ureteral obstruction s/p Right ureteral stent exchange 11/24/20 - She is due to have her stent exchanged.  Will arrange outpatient follow-up to have a cysto and stent exchange arranged after discharge if she remains stable and continues to improve. · ? 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 She would require a MRI to characterize further as she cannot have a CT with contrast due to her poor renal function. This would be difficult to obtain due to her body habitus and machine capabilities with weight restrictions. Additionally, she is not an ideal surgical candidate. · Hx of left atrophic kidney with mild hydronephrosis - unchanged. · Bladder spasms - continue oxybutynin. Start B&O suppository PRN. Will follow on Monday if she remains inpatient. If she is stable and discharged, FU with [facility] has been arranged. Please call with questions or concerns over the weekend. [phone]. - F/U  on 3/26/21 at 9:40 AM at the [facility] location with Dr. Yonis Prakash. Case discussed with Dr. Yonis Prakash. CC: Vaginal Bleeding   HPI: She is a 62 y.o. female with PMH of chronic kidney disease, hydronephrosis, morbid obesity, diabetes, uric acid kidney stones, and hypertension. She is a known patient to Dr. Yonis Prakash at [facility], last seen via telehealth on 12/1/20, see last OV note below. 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 She has required right ureteral stent placement, last exchanged on 11/24/20 by Dr. Yonis Prakash. Additionally, she was noted to have left mild hydronephrosis at that time. It was decided not to place a stent on the left with history of atrophic left kidney. To the [place] ED on 3/18/21 for cc of vaginal and urethral bleeding x 1 day with associated bladder spasms, similar to a prior episode in July of 2020 when her ureteral stent became dislodged. To note, she is a Jainism and does not accept blood products.  She has been managed with iron transfusions in the past. Workup in the ED revealed WBC 16.2, Hgb 10.4, Cr 2.39 (appears to be baseline), lactic 0.7. UA +large leuks, 5-10 WBCs, >100 RBCs, no bacteria. A CT was performed revealing the right ureteral stent in good position. Findings also notable for a possible mass in the lower pole of the right kidney, mild left hydroureteronephrosis, and an abdominal wall hernia containing stomach, small bowel and colon. She was transferred to [facility] for Urology evaluation and hospital admission.     She is AF. . BP wnl. Labs reviewed. WBC 18.6, Hgb 9.4, Cr. 2.32. UCx in process. Hx of Klebsiella Penumonaie UTI in 7/2020 with similar symptoms at that time. BCx in process. She is being treated with meropenem. +Oxybutynin. She is not on anticoagulation or antiplatelets.     CT images personally reviewed. The right kidney appears enlarged and lobular in contour. There is concern for a right lower pole mass versus hydeonephrosis. Prior CT images and US show a parapelvic cyst. The right stent is in proper position, tortuous ureter. Atrophic left kidney with mild hydro consistent with findings noted in 11/20, no stones noted. High density material in the bladder, suspect blood clots.    Nursing notes reviewed. She passed large clots overnight, bladder scan <40 cc. Repeat scan this AM revealed 23 cc. She is seen an examined in the bed. She is bed bound and incontinent of urine. Incontinence pad soaked with bright red-yellow UA, no clots. She reports generalized lower abd pain, improved since admission with intermittent bladder spasms. Abdomen obese, soft, NTTP, LLQ hernia.       ====Last OV Note====  Visit Type  Established  Telemedicine Type: Audio Only    Patient identity confirmed    Patient consent obtained verbally for telehealth visit    Gianna De La Fuente is a 60 year old female who presents today for \"Kidney Problem\". She returns for follow-up. Ms. De La Fuente is a 60-year-old female with a history of chronic kidney disease, hydronephrosis, morbid obesity, diabetes, uric acid kidney stones  and hypertension. She recently had bilateral retrograde pyelograms and a right ureteral stent change. 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 She appeared to have reflux on the left side. She request to be seen via audio visit for follow-up. She seems to be doing well. PAST MEDICAL HISTORY:    Allergies: NEOSPORIN  ([id number]-PRAMOXINE OINT) (Critical)  DENIES: Latex, Shellfish, X-Ray Dye, Iodine. Medications: OXYBUTYNIN CHLORIDE ER 10 MG ORAL TABLET EXTENDED RELEASE 24 HOUR (OXYBUTYNIN CHLORIDE) One po daily; Route: ORAL  ZOFRAN 4 MG ORAL TABLET (ONDANSETRON HCL) One po q4h prn nausea; Route: ORAL  CIPROFLOXACIN  MG ORAL TABLET (CIPROFLOXACIN HCL) one bid; Route: ORAL  MULTIVITAMIN WOMEN ORAL TABLET (MULTIPLE VITAMINS-MINERALS) daily; Route: ORAL  GLIPIZIDE 5 MG ORAL TABLET (GLIPIZIDE) ; Route: ORAL  BUSPIRONE HCL 5 MG ORAL TABLET (BUSPIRONE HCL) 2x a day; Route: ORAL  VITAMIN C PLUS 500 MG ORAL TABLET (BIOFLAVONOID PRODUCTS) 2x a day; Route: ORAL  GABAPENTIN 100 MG ORAL CAPSULE (GABAPENTIN) 3x a day; Route: ORAL  OXYCODONE HCL 5 MG ORAL TABLET (OXYCODONE HCL) every 8 hours; Route: ORAL  TRAMADOL HCL 50 MG ORAL TABLET (TRAMADOL HCL) 3xdaily; Route: ORAL  FERROUS SULFATE 325 (65 FE) MG ORAL TABLET DELAYED RELEASE (FERROUS SULFATE) daily; Route: ORAL    Problems: Hydronephrosis (ICD-591) (WBG89-M60.30)  Decubitus ulcer (ICD-707.00) (AST11-N60.90)  Morbid obesity (ICD-278.01) (SIF92-R38.01)  Acute renal failure (ICD-584.9) (OHI18-I61.9)    Illnesses: Diabetes. DENIES: Heart Disease, Pacemaker/Defibrillator, Lung Disease, High Blood Pressure, Bowel Problems, Stroke/Seizure, Kidney Problems, Bleeding Problems, HIV, Hepatitis, or Cancer. Surgeries: Lithotripsy, D & C, and Hysterectomy. Family History: Breast cancer. DENIES: Kidney cancer, Kidney disease, Kidney stones, Uterine cancer, Cervical cancer, Ovarian cancer. Social History: Unemployed. Single. Smoking status: never smoker. Does not drink alcohol. System Review: Admits to: Dry Mouth, Leg Swelling, Shortness of Breath, Lower Extremity Weakness, Dry Skin, Difficulty Walking, and Easy Bleeding. DENIES: Unexplained Weight Loss, Dry Eyes, Constipation, Involuntary Urine Loss, Psychiatric Problems, Impaired Sex Drive, Rash. URINALYSIS    IMPRESSION:    1. MORBID OBESITY (LZW66-V24.01) - Unchanged    2. HYDRONEPHROSIS (QRG19-E69.30): Patient appears to have right ureteral obstruction. This may be due to to ptotic kidney. She is a poor surgical candidate we will continue with stent changes. I reviewed her prior CT scans and ultrasounds. This appears to be unchanged. Temp (24hrs), Av.3 °F (36.8 °C), Min:98 °F (36.7 °C), Max:98.6 °F (37 °C)       Creatinine   Date/Time Value Ref Range Status   2021 07:24 AM 2.39 (H) 0.55 - 1.02 MG/DL Final   2020 03:23 AM 2.45 (H) 0.55 - 1.02 MG/DL Final   2020 02:35 AM 2.38 (H) 0.55 - 1.02 MG/DL Final   2020 03:01 AM 2.51 (H) 0.55 - 1.02 MG/DL Final   2020 05:07 AM 2.51 (H) 0.55 - 1.02 MG/DL Final     Current Antimicrobial Therapy (168h ago, onward)     Ordered     Start Stop    21  meropenem (MERREM) 500 mg in 0.9% sodium chloride (MBP/ADV) 50 mL MBP  0.5 g,   IntraVENous,   EVERY 8 HOURS      21              Key Anti-Platelet Anticoagulant Meds     The patient is on no antiplatelet meds or anticoagulants.         Diet: DIET DIABETIC CONSISTENT CARB Regular -       Labs     Lab Results   Component Value Date/Time    Lactic acid 0.7 2021 08:11 AM    Lactic acid 0.6 2020 08:08 PM    WBC 16.2 (H) 2021 07:24 AM    HCT 35.0 2021 07:24 AM    PLATELET 630  07:24 AM    Sodium 142 2021 07:24 AM    Potassium 4.8 2021 07:24 AM    Chloride 109 (H) 2021 07:24 AM    CO2 24 2021 07:24 AM    BUN 50 (H) 03/18/2021 07:24 AM    Creatinine 2.39 (H) 03/18/2021 07:24 AM    Glucose 142 (H) 03/18/2021 07:24 AM    Calcium 8.9 03/18/2021 07:24 AM    Magnesium 2.0 07/25/2020 04:50 AM     UA:   Lab Results   Component Value Date/Time    Color RED 03/18/2021 11:54 AM    Appearance CLEAR 03/18/2021 11:54 AM    Specific gravity 1.015 03/18/2021 11:54 AM    Specific gravity 1.008 07/25/2020 02:15 PM    pH (UA) 6.5 03/18/2021 11:54 AM    Protein 100 (A) 03/18/2021 11:54 AM    Glucose Negative 03/18/2021 11:54 AM    Ketone Negative 03/18/2021 11:54 AM    Bilirubin Negative 07/25/2020 02:15 PM    Urobilinogen 0.2 03/18/2021 11:54 AM    Nitrites Negative 03/18/2021 11:54 AM    Leukocyte Esterase LARGE (A) 03/18/2021 11:54 AM    Epithelial cells FEW 03/18/2021 11:54 AM    Bacteria Negative 03/18/2021 11:54 AM    WBC 5-10 03/18/2021 11:54 AM    RBC >100 (H) 03/18/2021 11:54 AM     Imaging     Results for orders placed during the hospital encounter of 03/18/21   CT ABD PELV WO CONT    Narrative EXAM: CT ABD PELV WO CONT    INDICATION: Eval right stent, severe bleeding and pain    COMPARISON: None    CONTRAST:  None. TECHNIQUE:   Thin axial images were obtained through the abdomen and pelvis. Coronal and  sagittal reformats were generated. Oral contrast was not administered. CT dose  reduction was achieved through use of a standardized protocol tailored for this  examination and automatic exposure control for dose modulation. The absence of intravenous contrast material reduces the sensitivity for  evaluation of the vasculature and solid organs. FINDINGS:   LOWER THORAX: No significant abnormality in the incidentally imaged lower chest.  LIVER: No mass. BILIARY TREE: Gallbladder is within normal limits. CBD is not dilated. SPLEEN: within normal limits. PANCREAS: No focal abnormality.   ADRENALS: There is slight prominence of adrenal glands  KIDNEYS/URETERS: Right ureteral stent is noted in position with the proximal end  overlying right upper pole and distal end overlying urinary bladder. There is hydronephrosis versus a mass in the right lower pole. There is high density urinary bladder may represent hemorrhage. No prior studies  are available for comparison. The left kidney is small in size and there is mild left hydroureteronephrosis. There is a large left abdominal wall hernia which is incompletely imaged and  contains colon and small bowel and part of the stomach . There are mildly  dilated loops of small bowel in the midabdomen. STOMACH: Unremarkable. SMALL BOWEL: There are few loops of small bowel which are mildly distended  COLON: No dilatation or wall thickening. APPENDIX: Not identified  PERITONEUM: No ascites or pneumoperitoneum. RETROPERITONEUM: No lymphadenopathy or aortic aneurysm. REPRODUCTIVE ORGANS: Patient is status post hysterectomy  URINARY BLADDER: No mass or calculus. BONES: No destructive bone lesion. There are severe degenerative changes in the  ABDOMINAL WALL: No mass or hernia. ADDITIONAL COMMENTS: N/A      Impression Right ureteral stent is noted in position with the proximal end in the upper  pole right kidney and the distal end in the urinary bladder which is filled with  high density may represent hemorrhage. There is right hydronephrosis versus a mass in the lower pole of the right  kidney. No prior studies are available for comparison. The left kidney is small and there is mild left hydroureteronephrosis. there is a large abdominal wall hernia in the left containing stomach, small  bowel and colon which is incompletely imaged. US Results (most recent):  Results from [place] encounter on 07/17/20   US RETROPERITONEUM COMP    Narrative Indication: Worsening acute renal failure status post right stent placement    Realtime sonographic imaging of the retroperitoneum was performed. The right  kidney measures 14.0 cm and the left kidney measures 12.4 cm. Significant right  hydronephrosis and more moderate left hydronephrosis is noted. Right ureteral  stent is visualized and in satisfactory position. The bladder is decompressed. The visualized portions of the IVC and abdominal aorta are unremarkable. The  common iliac bifurcation is not visualized due to bowel gas. Impression IMPRESSION: Bilateral hydronephrosis right much greater than left. Right  ureteral stent in position. Cultures     All Micro Results     Procedure Component Value Units Date/Time    CULTURE, URINE [[id number]] Collected: 03/18/21 1149    Order Status: Completed Specimen: Urine from Clean catch Updated: 03/18/21 1636    CULTURE, BLOOD, PAIRED [[id number]]     Order Status: Sent Specimen: Blood            Past History: (Complete 2+/3 areas)     Allergies   Allergen Reactions    Bacitracin Other (comments)     Syncope and lip swelling. Current Facility-Administered Medications   Medication Dose Route Frequency    sodium chloride (NS) flush 5-40 mL  5-40 mL IntraVENous Q8H    sodium chloride (NS) flush 5-40 mL  5-40 mL IntraVENous PRN    acetaminophen (TYLENOL) tablet 650 mg  650 mg Oral Q6H PRN    Or    acetaminophen (TYLENOL) suppository 650 mg  650 mg Rectal Q6H PRN    polyethylene glycol (MIRALAX) packet 17 g  17 g Oral DAILY PRN    gabapentin (NEURONTIN) capsule 100 mg  100 mg Oral TID    methIMAzole (TAPAZOLE) tablet 10 mg  10 mg Oral QPM    . PHARMACY TO SUBSTITUTE PER PROTOCOL (Reordered from: OXYBUTYNIN CHLORIDE PO)    Per Protocol    [START ON 3/19/2021] pantoprazole (PROTONIX) tablet 40 mg  40 mg Oral DAILY    [START ON 3/19/2021] senna-docusate (PERICOLACE) 8.6-50 mg per tablet 1 Tab  1 Tab Oral DAILY    glucose chewable tablet 16 g  4 Tab Oral PRN    dextrose (D50W) injection syrg 12.5-25 g  25-50 mL IntraVENous PRN    glucagon (GLUCAGEN) injection 1 mg  1 mg IntraMUSCular PRN    insulin lispro (HUMALOG) injection   SubCUTAneous AC&HS    0.9% sodium chloride infusion  75 mL/hr IntraVENous CONTINUOUS    morphine injection 1 mg  1 mg IntraVENous Q2H PRN    meropenem (MERREM) 500 mg in 0.9% sodium chloride (MBP/ADV) 50 mL MBP  0.5 g IntraVENous Q8H    Prior to Admission medications    Medication Sig Start Date End Date Taking? Authorizing Provider   OXYBUTYNIN CHLORIDE PO Take  by mouth. Yes Juan Luis Palacio MD   methIMAzole (TAPAZOLE) 10 mg tablet Take  by mouth daily. Yes Juan Luis Palacio MD   meloxicam (MOBIC) 15 mg tablet Take 15 mg by mouth daily. Yes Juan Luis Palacio MD   iron/vitamin B complex (GERITOL PO) Take  by mouth. Yes Juan Luis Palacio MD   ascorbic acid, vitamin C, (Vitamin C) 500 mg tablet Take 500 mg by mouth two (2) times a day. Yes Juan Luis Palacio MD   gabapentin (NEURONTIN) 100 mg capsule Take  by mouth three (3) times daily. Juan Luis Palacio MD   pantoprazole (PROTONIX) 40 mg tablet Take 40 mg by mouth daily. Juan Luis Palacio MD   traMADoL (ULTRAM) 50 mg tablet Take  mg by mouth every eight (8) hours as needed for Pain. Juan Luis Palacio MD   senna-docusate (Senokot-S) 8.6-50 mg per tablet Take 1 Tab by mouth daily. Juan Luis Palacio MD   acetaminophen (TYLENOL) 500 mg tablet Take 500 mg by mouth every eight (8) hours as needed for Pain. Juan Luis Palacio MD   multivitamin tablet Take 1 Tab by mouth daily. Juan Luis Palacio MD   ondansetron hcl (Zofran) 8 mg tablet Take 8 mg by mouth every eight (8) hours as needed for Nausea or Vomiting. Juan Luis Palacio MD   glipiZIDE (GLUCOTROL) 5 mg tablet Take  by mouth two (2) times a day. Juan Luis Palacio MD        PMHx:  has a past medical history of Diabetes ([address] Hernia, hiatal, Hypertension, Kidney stones, Osteoarthritis, and Renal failure. PSurgHx:  has a past surgical history that includes hx other surgical and hx hysterectomy. PSocHx:  reports that she has never smoked. She has never used smokeless tobacco. She reports that she does not drink alcohol or use drugs.    ROS:  (Complete - 10 systems) - DENIES: Weightloss (Constitutional), Dry mouth (ENMT), Chest pain (CV), SOB (Respiratory), Constipation (GI), Weakness (MS), Pallor (Skin), TIA Sx (Neuro), Confusion (Psych), Easy bruising (Heme)    Physical Exam: (Comprehesive - 8+ 1995 Systems)     (1) Constitutional:  FIO2:   on SpO2: O2 Sat (%): 98 %  O2 Device: Room air    Patient Vitals for the past 24 hrs:   BP Temp Pulse Resp SpO2 Height Weight   03/18/21 1859      5' 4.02\" (1.626 m)    03/18/21 1544 111/64 98 °F (36.7 °C) 97 16 98 %     03/18/21 1323 110/63 98.4 °F (36.9 °C) 97 18 98 %     03/18/21 1300     94 %     03/18/21 1230 (!) 114/59    92 %     03/18/21 1215     94 %     03/18/21 1200 (!) 114/59    93 %     03/18/21 1145     94 %     03/18/21 1130 (!) 121/55    93 %     03/18/21 1100 127/61    93 %     03/18/21 1030 123/66    95 %     03/18/21 0945     97 %     03/18/21 0930 139/67 98.1 °F (36.7 °C) 81 18 98 %     03/18/21 0845     97 %     03/18/21 0830     98 %     03/18/21 0815     98 %     03/18/21 0802     96 %     03/18/21 0730 114/78    98 %     03/18/21 0721 (!) 149/107 98.6 °F (37 °C) 94 16 97 %  (!) 160.3 kg (353 lb 6.4 oz)          (2) ENMT:   moist mucous membranes, normal sinuses   (3) Respiratory:  breathing easily, no distress   (4) GI:  LLQ abdominal wall hernia   (5) :   Incontinent, yellow-bright red UA   (6) Lymphatic:  no adenopathy, neck supple   (7) Muscloskeletal:  no gross deformity   (8) Skin:  no rash, warm & dry   (9) Neuro:  no focal deficits, normal speech      Signed By: Elayne Fothergill, NP  - March 18, 2021

## 2021-03-19 NOTE — PROGRESS NOTES
1540: first set of paired blood cultures and cbc drawn from right hand    1550: second set of paired blood cultures drawn from left hand

## 2021-03-20 LAB
ALBUMIN SERPL-MCNC: 2.4 G/DL (ref 3.5–5)
ALBUMIN/GLOB SERPL: 0.6 {RATIO} (ref 1.1–2.2)
ALP SERPL-CCNC: 68 U/L (ref 45–117)
ALT SERPL-CCNC: 8 U/L (ref 12–78)
ANION GAP SERPL CALC-SCNC: 6 MMOL/L (ref 5–15)
AST SERPL-CCNC: <3 U/L (ref 15–37)
BILIRUB SERPL-MCNC: 0.1 MG/DL (ref 0.2–1)
BUN SERPL-MCNC: 48 MG/DL (ref 6–20)
BUN/CREAT SERPL: 21 (ref 12–20)
CALCIUM SERPL-MCNC: 8.2 MG/DL (ref 8.5–10.1)
CHLORIDE SERPL-SCNC: 117 MMOL/L (ref 97–108)
CO2 SERPL-SCNC: 22 MMOL/L (ref 21–32)
CREAT SERPL-MCNC: 2.24 MG/DL (ref 0.55–1.02)
GLOBULIN SER CALC-MCNC: 4 G/DL (ref 2–4)
GLUCOSE BLD STRIP.AUTO-MCNC: 120 MG/DL (ref 65–100)
GLUCOSE BLD STRIP.AUTO-MCNC: 121 MG/DL (ref 65–100)
GLUCOSE BLD STRIP.AUTO-MCNC: 129 MG/DL (ref 65–100)
GLUCOSE BLD STRIP.AUTO-MCNC: 144 MG/DL (ref 65–100)
GLUCOSE SERPL-MCNC: 127 MG/DL (ref 65–100)
HCT VFR BLD AUTO: 25.5 % (ref 35–47)
HGB BLD-MCNC: 7.6 G/DL (ref 11.5–16)
POTASSIUM SERPL-SCNC: 4.6 MMOL/L (ref 3.5–5.1)
PROT SERPL-MCNC: 6.4 G/DL (ref 6.4–8.2)
SERVICE CMNT-IMP: ABNORMAL
SODIUM SERPL-SCNC: 145 MMOL/L (ref 136–145)

## 2021-03-20 PROCEDURE — 36415 COLL VENOUS BLD VENIPUNCTURE: CPT

## 2021-03-20 PROCEDURE — 74011250636 HC RX REV CODE- 250/636: Performed by: INTERNAL MEDICINE

## 2021-03-20 PROCEDURE — 80053 COMPREHEN METABOLIC PANEL: CPT

## 2021-03-20 PROCEDURE — 74011000258 HC RX REV CODE- 258: Performed by: INTERNAL MEDICINE

## 2021-03-20 PROCEDURE — 82962 GLUCOSE BLOOD TEST: CPT

## 2021-03-20 PROCEDURE — 85018 HEMOGLOBIN: CPT

## 2021-03-20 PROCEDURE — 77030038269 HC DRN EXT URIN PURWCK BARD -A

## 2021-03-20 PROCEDURE — 74011250637 HC RX REV CODE- 250/637: Performed by: INTERNAL MEDICINE

## 2021-03-20 PROCEDURE — 65270000029 HC RM PRIVATE

## 2021-03-20 PROCEDURE — 74011250637 HC RX REV CODE- 250/637: Performed by: NURSE PRACTITIONER

## 2021-03-20 PROCEDURE — 74011250637 HC RX REV CODE- 250/637: Performed by: HOSPITALIST

## 2021-03-20 RX ORDER — TRAMADOL HYDROCHLORIDE 50 MG/1
50 TABLET ORAL
Status: DISCONTINUED | OUTPATIENT
Start: 2021-03-20 | End: 2021-03-30 | Stop reason: HOSPADM

## 2021-03-20 RX ORDER — HYDROXYZINE 25 MG/1
25 TABLET, FILM COATED ORAL
Status: DISCONTINUED | OUTPATIENT
Start: 2021-03-20 | End: 2021-03-30 | Stop reason: HOSPADM

## 2021-03-20 RX ADMIN — METHIMAZOLE 10 MG: 5 TABLET ORAL at 18:10

## 2021-03-20 RX ADMIN — SODIUM CHLORIDE 75 ML/HR: 9 INJECTION, SOLUTION INTRAVENOUS at 00:57

## 2021-03-20 RX ADMIN — MEROPENEM 500 MG: 500 INJECTION, POWDER, FOR SOLUTION INTRAVENOUS at 04:35

## 2021-03-20 RX ADMIN — TRAMADOL HYDROCHLORIDE 50 MG: 50 TABLET, COATED ORAL at 16:29

## 2021-03-20 RX ADMIN — MEROPENEM 500 MG: 500 INJECTION, POWDER, FOR SOLUTION INTRAVENOUS at 20:14

## 2021-03-20 RX ADMIN — MEROPENEM 500 MG: 500 INJECTION, POWDER, FOR SOLUTION INTRAVENOUS at 12:13

## 2021-03-20 RX ADMIN — OXYBUTYNIN CHLORIDE 10 MG: 5 TABLET, EXTENDED RELEASE ORAL at 09:22

## 2021-03-20 RX ADMIN — ATROPA BELLADONNA AND OPIUM 1 SUPPOSITORY: 16.2; 3 SUPPOSITORY RECTAL at 16:23

## 2021-03-20 RX ADMIN — DOCUSATE SODIUM 50 MG AND SENNOSIDES 8.6 MG 1 TABLET: 8.6; 5 TABLET, FILM COATED ORAL at 09:22

## 2021-03-20 RX ADMIN — PANTOPRAZOLE SODIUM 40 MG: 40 TABLET, DELAYED RELEASE ORAL at 07:30

## 2021-03-20 RX ADMIN — ACETAMINOPHEN 650 MG: 325 TABLET ORAL at 07:36

## 2021-03-20 NOTE — PROGRESS NOTES
[facility]                                                                                          Hospitalist Progress Note  Loraine Medley MD  Answering service: [phone] OR [phone] from in house phone        Date of Service:  3/20/2021  NAME:  Gianna De La Fuente  :  1960  MRN:  [id number]      Admission Summary:   Gianna De La Fuente is a 61 y.o. female with PMH of DM, HTN, Morbid obesity, Renal stone with hx of R ureteral stent for stones, came to [facility] for hematuria since morning, about 7 hours at that time. Pt is Jahovah's witness and does not take any blood products. Pt suggested to be doing ok until yesterday when started having vaginal bleeding with sharp, intense, moderate to severe, intermittent pains from R flank to groin, suprapubic and urethral meatus. No fever/ chills. Pt has had similar experience in past 2020. And this feels quite same.  Pt is jahovah's witness and does not want blood transfusions.       Interval history / Subjective:   Patient seen and examined doing much better feeling better as well patient is on antibiotics discussed with urology no need for placing any catheter at this point patient requested that if possible the stent can be changed this admission will discuss with urology on Monday     Assessment & Plan:     Hematuria with  # R hydronephrosis vs mass in lower pole of R kidney  # Hx of R ureteral stent for renal stone, known to Urology  # UTI-Early SIRS on presenting with ED, with leukocytosis and tachycardia    - cont merrem  F/u cultures ID following     #NO BLOOD PRODUCTS - JAHOVAH's WITNESS  # Continue Methimazole, PPI, Gabapentine, Oxybutynin and Sennakot  # CKD 4, baseline creat around 2.5, Creatinine 2.39 on admission  # Morbid obesity, BMI 60.66    Code status: Full  DVT prophylaxis: SCD     Care Plan discussed with: Patient/Family and Nurse  Anticipated Disposition: Home w/Family  Anticipated Discharge: 24 hours to 48 hours  Hospital Problems  Date Reviewed: 7/18/2020          Codes Class Noted POA    Hematuria ICD-10-CM: R31.9  ICD-9-CM: 599.70  7/17/2020 Unknown                Review of Systems:   A comprehensive review of systems was negative. Vital Signs:    Last 24hrs VS reviewed since prior progress note. Most recent are:  Visit Vitals  /64   Pulse 98   Temp 98.3 °F (36.8 °C)   Resp 20   Ht 5' 4.02\" (1.626 m)   Wt (!) 160.3 kg (353 lb 6.4 oz)   SpO2 98%   BMI 60.63 kg/m²         Intake/Output Summary (Last 24 hours) at 3/20/2021 1119  Last data filed at 3/19/2021 1928  Gross per 24 hour   Intake 571.72 ml   Output    Net 571.72 ml        Physical Examination:     I had a face to face encounter with this patient and independently examined them on 3/20/2021 as outlined below:          Constitutional:  No acute distress, cooperative, pleasant    ENT:  Oral mucosa moist, oropharynx benign. Resp:  CTA bilaterally. No wheezing/rhonchi/rales. No accessory muscle use   CV:  Regular rhythm, normal rate, no murmurs, gallops, rubs    GI:  Soft, non distended, non tender. normoactive bowel sounds, no hepatosplenomegaly     Musculoskeletal:  No edema, warm, 2+ pulses throughout    Neurologic:  Moves all extremities. AAOx3, CN II-XII reviewed     Psych:  Good insight, Not anxious nor agitated. Data Review:    I personally reviewed  Image and labs      Labs:     Recent Labs     03/20/21  0238 03/19/21  1520 03/19/21  0600 03/18/21  0724 03/18/21  0724   WBC  --   --  18.6*  --  16.2*   HGB 7.6* 7.9* 9.4*   < > 10.4*   HCT 25.5* 26.2* 31.2*   < > 35.0   PLT  --   --  274  --  317    < > = values in this interval not displayed.      Recent Labs     03/20/21  0245 03/19/21  0600 03/18/21  0724    142 142   K 4.6 4.5 4.8   * 115* 109*   CO2 22 23 24   BUN 48* 47* 50*   CREA 2.24* 2.32* 2.39*   * 156* 142*   CA 8.2* 8.9 8.9     Recent Labs     03/20/21  0245 03/19/21  0600 03/18/21  0724   ALT 8* 7* 13 AP 68 82 85   TBILI 0.1* 0.2 0.1*   TP 6.4 7.2 8.0   ALB 2.4* 2.8* 3.1*   GLOB 4.0 4.4* 4.9*   LPSE  --   --  169     No results for input(s): INR, PTP, APTT, INREXT, INREXT in the last 72 hours. No results for input(s): FE, TIBC, PSAT, FERR in the last 72 hours. No results found for: FOL, RBCF   No results for input(s): PH, PCO2, PO2 in the last 72 hours. No results for input(s): CPK, CKNDX, TROIQ in the last 72 hours.     No lab exists for component: CPKMB  No results found for: CHOL, CHOLX, CHLST, CHOLV, HDL, HDLP, LDL, LDLC, DLDLP, TGLX, TRIGL, TRIGP, CHHD, CHHDX  Lab Results   Component Value Date/Time    Glucose (POC) 144 (H) 03/20/2021 06:13 AM    Glucose (POC) 140 (H) 03/19/2021 09:18 PM    Glucose (POC) 177 (H) 03/19/2021 04:57 PM    Glucose (POC) 158 (H) 03/19/2021 11:08 AM    Glucose (POC) 155 (H) 03/19/2021 05:56 AM     Lab Results   Component Value Date/Time    Color RED 03/18/2021 11:54 AM    Appearance CLEAR 03/18/2021 11:54 AM    Specific gravity 1.015 03/18/2021 11:54 AM    Specific gravity 1.008 07/25/2020 02:15 PM    pH (UA) 6.5 03/18/2021 11:54 AM    Protein 100 (A) 03/18/2021 11:54 AM    Glucose Negative 03/18/2021 11:54 AM    Ketone Negative 03/18/2021 11:54 AM    Bilirubin Negative 07/25/2020 02:15 PM    Urobilinogen 0.2 03/18/2021 11:54 AM    Nitrites Negative 03/18/2021 11:54 AM    Leukocyte Esterase LARGE (A) 03/18/2021 11:54 AM    Epithelial cells FEW 03/18/2021 11:54 AM    Bacteria Negative 03/18/2021 11:54 AM    WBC 5-10 03/18/2021 11:54 AM    RBC >100 (H) 03/18/2021 11:54 AM         Medications Reviewed:     Current Facility-Administered Medications   Medication Dose Route Frequency    fentaNYL citrate (PF) injection 25 mcg  25 mcg IntraVENous Q4H PRN    opium-belladonna (B&O 15-A) 16.2-30 mg suppository 1 Suppository  1 Suppository Rectal Q8H PRN    sodium chloride (NS) flush 5-40 mL  5-40 mL IntraVENous Q8H    sodium chloride (NS) flush 5-40 mL  5-40 mL IntraVENous PRN    acetaminophen (TYLENOL) tablet 650 mg  650 mg Oral Q6H PRN    Or    acetaminophen (TYLENOL) suppository 650 mg  650 mg Rectal Q6H PRN    polyethylene glycol (MIRALAX) packet 17 g  17 g Oral DAILY PRN    methIMAzole (TAPAZOLE) tablet 10 mg  10 mg Oral QPM    oxybutynin chloride XL (DITROPAN XL) tablet 10 mg  10 mg Oral DAILY    pantoprazole (PROTONIX) tablet 40 mg  40 mg Oral ACB    senna-docusate (PERICOLACE) 8.6-50 mg per tablet 1 Tab  1 Tab Oral DAILY    glucose chewable tablet 16 g  4 Tab Oral PRN    dextrose (D50W) injection syrg 12.5-25 g  25-50 mL IntraVENous PRN    glucagon (GLUCAGEN) injection 1 mg  1 mg IntraMUSCular PRN    insulin lispro (HUMALOG) injection   SubCUTAneous AC&HS    0.9% sodium chloride infusion  75 mL/hr IntraVENous CONTINUOUS    morphine injection 1 mg  1 mg IntraVENous Q2H PRN    meropenem (MERREM) 500 mg in 0.9% sodium chloride (MBP/ADV) 50 mL MBP  0.5 g IntraVENous Q8H     ______________________________________________________________________  EXPECTED LENGTH OF STAY: 2d 7h  ACTUAL LENGTH OF STAY:          2                 Beatris Taveras MD

## 2021-03-20 NOTE — PROGRESS NOTES
[facility]                                                                                          Hospitalist Progress Note  Beatris Taveras MD  Answering service: [phone] OR [phone] from in house phone        Date of Service:  3/20/2021  NAME:  Mone Haney  :  1960  MRN:  [id number]      Admission Summary:   Mone Haney is a 62 y.o. female with PMH of DM, HTN, Morbid obesity, Renal stone with hx of R ureteral stent for stones, came to [facility] for hematuria since morning, about 7 hours at that time. Pt is Jahovah's witness and does not take any blood products. Pt suggested to be doing ok until yesterday when started having vaginal bleeding with sharp, intense, moderate to severe, intermittent pains from R flank to groin, suprapubic and urethral meatus. No fever/ chills. Pt has had similar experience in past 2020. And this feels quite same.   Pt is jahovah's witness and does not want blood transfusions.       Interval history / Subjective:   Pt doing better awaiting for urology to evaluate cont abx      Assessment & Plan:     Hematuria with  # R hydronephrosis vs mass in lower pole of R kidney  # Hx of R ureteral stent for renal stone, known to Urology  # UTI-Early SIRS on presenting with ED, with leukocytosis and tachycardia    - cont merrem  F/u cultures ID following     #NO BLOOD PRODUCTS - JAHOVAH's WITNESS  # Continue Methimazole, PPI, Gabapentine, Oxybutynin and Sennakot  # CKD 4, baseline creat around 2.5, Creatinine 2.39 on admission  # Morbid obesity, BMI 60.66    Code status: Full  DVT prophylaxis: SCD     Care Plan discussed with: Patient/Family and Nurse  Anticipated Disposition: Home w/Family  Anticipated Discharge: 24 hours to 48 hours     Hospital Problems  Date Reviewed: 2020          Codes Class Noted POA    Hematuria ICD-10-CM: R31.9  ICD-9-CM: 599.70  2020 Unknown                Review of Systems:   A comprehensive review of systems was negative. Vital Signs:    Last 24hrs VS reviewed since prior progress note. Most recent are:  Visit Vitals  /64   Pulse 98   Temp 98.3 °F (36.8 °C)   Resp 20   Ht 5' 4.02\" (1.626 m)   Wt (!) 160.3 kg (353 lb 6.4 oz)   SpO2 98%   BMI 60.63 kg/m²         Intake/Output Summary (Last 24 hours) at 3/20/2021 1007  Last data filed at 3/19/2021 1928  Gross per 24 hour   Intake 571.72 ml   Output    Net 571.72 ml        Physical Examination:     I had a face to face encounter with this patient and independently examined them on 3/20/2021 as outlined below:          Constitutional:  No acute distress, cooperative, pleasant    ENT:  Oral mucosa moist, oropharynx benign. Resp:  CTA bilaterally. No wheezing/rhonchi/rales. No accessory muscle use   CV:  Regular rhythm, normal rate, no murmurs, gallops, rubs    GI:  Soft, non distended, non tender. normoactive bowel sounds, no hepatosplenomegaly     Musculoskeletal:  No edema, warm, 2+ pulses throughout    Neurologic:  Moves all extremities. AAOx3, CN II-XII reviewed     Psych:  Good insight, Not anxious nor agitated. Data Review:    I personally reviewed  Image and labs      Labs:     Recent Labs     03/20/21  0238 03/19/21  1520 03/19/21  0600 03/18/21  0724 03/18/21  0724   WBC  --   --  18.6*  --  16.2*   HGB 7.6* 7.9* 9.4*   < > 10.4*   HCT 25.5* 26.2* 31.2*   < > 35.0   PLT  --   --  274  --  317    < > = values in this interval not displayed. Recent Labs     03/20/21  0245 03/19/21  0600 03/18/21  0724    142 142   K 4.6 4.5 4.8   * 115* 109*   CO2 22 23 24   BUN 48* 47* 50*   CREA 2.24* 2.32* 2.39*   * 156* 142*   CA 8.2* 8.9 8.9     Recent Labs     03/20/21  0245 03/19/21  0600 03/18/21  0724   ALT 8* 7* 13   AP 68 82 85   TBILI 0.1* 0.2 0.1*   TP 6.4 7.2 8.0   ALB 2.4* 2.8* 3.1*   GLOB 4.0 4.4* 4.9*   LPSE  --   --  169     No results for input(s): INR, PTP, APTT, INREXT in the last 72 hours.    No results for input(s): FE, TIBC, PSAT, FERR in the last 72 hours. No results found for: FOL, RBCF   No results for input(s): PH, PCO2, PO2 in the last 72 hours. No results for input(s): CPK, CKNDX, TROIQ in the last 72 hours.     No lab exists for component: CPKMB  No results found for: CHOL, CHOLX, CHLST, CHOLV, HDL, HDLP, LDL, LDLC, DLDLP, TGLX, TRIGL, TRIGP, CHHD, CHHDX  Lab Results   Component Value Date/Time    Glucose (POC) 144 (H) 03/20/2021 06:13 AM    Glucose (POC) 140 (H) 03/19/2021 09:18 PM    Glucose (POC) 177 (H) 03/19/2021 04:57 PM    Glucose (POC) 158 (H) 03/19/2021 11:08 AM    Glucose (POC) 155 (H) 03/19/2021 05:56 AM     Lab Results   Component Value Date/Time    Color RED 03/18/2021 11:54 AM    Appearance CLEAR 03/18/2021 11:54 AM    Specific gravity 1.015 03/18/2021 11:54 AM    Specific gravity 1.008 07/25/2020 02:15 PM    pH (UA) 6.5 03/18/2021 11:54 AM    Protein 100 (A) 03/18/2021 11:54 AM    Glucose Negative 03/18/2021 11:54 AM    Ketone Negative 03/18/2021 11:54 AM    Bilirubin Negative 07/25/2020 02:15 PM    Urobilinogen 0.2 03/18/2021 11:54 AM    Nitrites Negative 03/18/2021 11:54 AM    Leukocyte Esterase LARGE (A) 03/18/2021 11:54 AM    Epithelial cells FEW 03/18/2021 11:54 AM    Bacteria Negative 03/18/2021 11:54 AM    WBC 5-10 03/18/2021 11:54 AM    RBC >100 (H) 03/18/2021 11:54 AM         Medications Reviewed:     Current Facility-Administered Medications   Medication Dose Route Frequency    fentaNYL citrate (PF) injection 25 mcg  25 mcg IntraVENous Q4H PRN    opium-belladonna (B&O 15-A) 16.2-30 mg suppository 1 Suppository  1 Suppository Rectal Q8H PRN    sodium chloride (NS) flush 5-40 mL  5-40 mL IntraVENous Q8H    sodium chloride (NS) flush 5-40 mL  5-40 mL IntraVENous PRN    acetaminophen (TYLENOL) tablet 650 mg  650 mg Oral Q6H PRN    Or    acetaminophen (TYLENOL) suppository 650 mg  650 mg Rectal Q6H PRN    polyethylene glycol (MIRALAX) packet 17 g  17 g Oral DAILY PRN    methIMAzole (TAPAZOLE) tablet 10 mg  10 mg Oral QPM    oxybutynin chloride XL (DITROPAN XL) tablet 10 mg  10 mg Oral DAILY    pantoprazole (PROTONIX) tablet 40 mg  40 mg Oral ACB    senna-docusate (PERICOLACE) 8.6-50 mg per tablet 1 Tab  1 Tab Oral DAILY    glucose chewable tablet 16 g  4 Tab Oral PRN    dextrose (D50W) injection syrg 12.5-25 g  25-50 mL IntraVENous PRN    glucagon (GLUCAGEN) injection 1 mg  1 mg IntraMUSCular PRN    insulin lispro (HUMALOG) injection   SubCUTAneous AC&HS    0.9% sodium chloride infusion  75 mL/hr IntraVENous CONTINUOUS    morphine injection 1 mg  1 mg IntraVENous Q2H PRN    meropenem (MERREM) 500 mg in 0.9% sodium chloride (MBP/ADV) 50 mL MBP  0.5 g IntraVENous Q8H     ______________________________________________________________________  EXPECTED LENGTH OF STAY: 2d 7h  ACTUAL LENGTH OF STAY:          2                 Vinayak Booth MD

## 2021-03-20 NOTE — PROGRESS NOTES
Bedside and Verbal shift change report given to Joann Liao RN (oncoming nurse) by Loy Everett RN (offgoing nurse). Report included the following information SBAR, Kardex, MAR and Recent Results.

## 2021-03-21 LAB
ALBUMIN SERPL-MCNC: 2.6 G/DL (ref 3.5–5)
ALBUMIN/GLOB SERPL: 0.7 {RATIO} (ref 1.1–2.2)
ALP SERPL-CCNC: 66 U/L (ref 45–117)
ALT SERPL-CCNC: 9 U/L (ref 12–78)
ANION GAP SERPL CALC-SCNC: 5 MMOL/L (ref 5–15)
AST SERPL-CCNC: <3 U/L (ref 15–37)
BASOPHILS # BLD: 0.1 K/UL (ref 0–0.1)
BASOPHILS NFR BLD: 1 % (ref 0–1)
BILIRUB SERPL-MCNC: 0.2 MG/DL (ref 0.2–1)
BUN SERPL-MCNC: 41 MG/DL (ref 6–20)
BUN/CREAT SERPL: 18 (ref 12–20)
CALCIUM SERPL-MCNC: 8.3 MG/DL (ref 8.5–10.1)
CHLORIDE SERPL-SCNC: 117 MMOL/L (ref 97–108)
CO2 SERPL-SCNC: 23 MMOL/L (ref 21–32)
CREAT SERPL-MCNC: 2.28 MG/DL (ref 0.55–1.02)
DIFFERENTIAL METHOD BLD: ABNORMAL
EOSINOPHIL # BLD: 0.4 K/UL (ref 0–0.4)
EOSINOPHIL NFR BLD: 4 % (ref 0–7)
ERYTHROCYTE [DISTWIDTH] IN BLOOD BY AUTOMATED COUNT: 15 % (ref 11.5–14.5)
GLOBULIN SER CALC-MCNC: 3.5 G/DL (ref 2–4)
GLUCOSE BLD STRIP.AUTO-MCNC: 123 MG/DL (ref 65–100)
GLUCOSE BLD STRIP.AUTO-MCNC: 127 MG/DL (ref 65–100)
GLUCOSE BLD STRIP.AUTO-MCNC: 129 MG/DL (ref 65–100)
GLUCOSE BLD STRIP.AUTO-MCNC: 132 MG/DL (ref 65–100)
GLUCOSE SERPL-MCNC: 105 MG/DL (ref 65–100)
HCT VFR BLD AUTO: 26.1 % (ref 35–47)
HGB BLD-MCNC: 7.7 G/DL (ref 11.5–16)
IMM GRANULOCYTES # BLD AUTO: 0.1 K/UL (ref 0–0.04)
IMM GRANULOCYTES NFR BLD AUTO: 1 % (ref 0–0.5)
LYMPHOCYTES # BLD: 1.3 K/UL (ref 0.8–3.5)
LYMPHOCYTES NFR BLD: 12 % (ref 12–49)
MCH RBC QN AUTO: 27.7 PG (ref 26–34)
MCHC RBC AUTO-ENTMCNC: 29.5 G/DL (ref 30–36.5)
MCV RBC AUTO: 93.9 FL (ref 80–99)
MONOCYTES # BLD: 0.8 K/UL (ref 0–1)
MONOCYTES NFR BLD: 7 % (ref 5–13)
NEUTS SEG # BLD: 8.4 K/UL (ref 1.8–8)
NEUTS SEG NFR BLD: 75 % (ref 32–75)
NRBC # BLD: 0 K/UL (ref 0–0.01)
NRBC BLD-RTO: 0 PER 100 WBC
PLATELET # BLD AUTO: 250 K/UL (ref 150–400)
PMV BLD AUTO: 10.3 FL (ref 8.9–12.9)
POTASSIUM SERPL-SCNC: 4.7 MMOL/L (ref 3.5–5.1)
PROT SERPL-MCNC: 6.1 G/DL (ref 6.4–8.2)
RBC # BLD AUTO: 2.78 M/UL (ref 3.8–5.2)
SERVICE CMNT-IMP: ABNORMAL
SODIUM SERPL-SCNC: 145 MMOL/L (ref 136–145)
WBC # BLD AUTO: 11.1 K/UL (ref 3.6–11)

## 2021-03-21 PROCEDURE — 74011000258 HC RX REV CODE- 258: Performed by: INTERNAL MEDICINE

## 2021-03-21 PROCEDURE — 36415 COLL VENOUS BLD VENIPUNCTURE: CPT

## 2021-03-21 PROCEDURE — 82962 GLUCOSE BLOOD TEST: CPT

## 2021-03-21 PROCEDURE — 85025 COMPLETE CBC W/AUTO DIFF WBC: CPT

## 2021-03-21 PROCEDURE — 65270000029 HC RM PRIVATE

## 2021-03-21 PROCEDURE — 74011250637 HC RX REV CODE- 250/637: Performed by: INTERNAL MEDICINE

## 2021-03-21 PROCEDURE — 80053 COMPREHEN METABOLIC PANEL: CPT

## 2021-03-21 PROCEDURE — 74011250636 HC RX REV CODE- 250/636: Performed by: INTERNAL MEDICINE

## 2021-03-21 PROCEDURE — 74011250637 HC RX REV CODE- 250/637: Performed by: HOSPITALIST

## 2021-03-21 RX ORDER — PHENAZOPYRIDINE HYDROCHLORIDE 100 MG/1
100 TABLET, FILM COATED ORAL
Status: DISCONTINUED | OUTPATIENT
Start: 2021-03-21 | End: 2021-03-21

## 2021-03-21 RX ADMIN — TRAMADOL HYDROCHLORIDE 50 MG: 50 TABLET, COATED ORAL at 05:01

## 2021-03-21 RX ADMIN — Medication 10 ML: at 22:15

## 2021-03-21 RX ADMIN — MEROPENEM 500 MG: 500 INJECTION, POWDER, FOR SOLUTION INTRAVENOUS at 12:28

## 2021-03-21 RX ADMIN — TRAMADOL HYDROCHLORIDE 50 MG: 50 TABLET, COATED ORAL at 22:22

## 2021-03-21 RX ADMIN — MEROPENEM 500 MG: 500 INJECTION, POWDER, FOR SOLUTION INTRAVENOUS at 03:51

## 2021-03-21 RX ADMIN — TRAMADOL HYDROCHLORIDE 50 MG: 50 TABLET, COATED ORAL at 12:31

## 2021-03-21 RX ADMIN — OXYBUTYNIN CHLORIDE 10 MG: 5 TABLET, EXTENDED RELEASE ORAL at 09:38

## 2021-03-21 RX ADMIN — SODIUM CHLORIDE 75 ML/HR: 9 INJECTION, SOLUTION INTRAVENOUS at 07:14

## 2021-03-21 RX ADMIN — PANTOPRAZOLE SODIUM 40 MG: 40 TABLET, DELAYED RELEASE ORAL at 07:14

## 2021-03-21 RX ADMIN — METHIMAZOLE 10 MG: 5 TABLET ORAL at 18:10

## 2021-03-21 RX ADMIN — Medication 10 ML: at 07:14

## 2021-03-21 RX ADMIN — MEROPENEM 500 MG: 500 INJECTION, POWDER, FOR SOLUTION INTRAVENOUS at 19:37

## 2021-03-21 NOTE — PROGRESS NOTES
[address] [facility]  [facility]                                                                                          Hospitalist Progress Note  Christian Weir MD  Answering service: [phone] OR [phone] from in house phone        Date of Service:  3/21/2021  NAME:  Aleta Gardner  :  1960  MRN:  [id number]      Admission Summary:   Aleta Gardner is a 62 y.o. female with PMH of DM, HTN, Morbid obesity, Renal stone with hx of R ureteral stent for stones, came to [facility] for hematuria since morning, about 7 hours at that time. Pt is Jahovah's witness and does not take any blood products. Pt suggested to be doing ok until yesterday when started having vaginal bleeding with sharp, intense, moderate to severe, intermittent pains from R flank to groin, suprapubic and urethral meatus. No fever/ chills. Pt has had similar experience in past 2020. And this feels quite same.   Pt is jahovah's witness and does not want blood transfusions.       Interval history / Subjective:   Patient seen and examined doing much better feeling better   Passing clots again as per pt cont to have bladder spasm  Pt wants to get the stent replaced this admission     Assessment & Plan:     Hematuria with  # R hydronephrosis vs mass in lower pole of R kidney  # Hx of R ureteral stent for renal stone, known to Urology  # UTI-Early SIRS on presenting with ED, with leukocytosis and tachycardia    - cont merrem  F/u cultures ID following     #NO BLOOD PRODUCTS - JAHOVAH's WITNESS  # Continue Methimazole, PPI, Gabapentine, Oxybutynin and Sennakot  # CKD 4, baseline creat around 2.5, Creatinine 2.39 on admission  # Morbid obesity, BMI 60.66    Code status: Full  DVT prophylaxis: SCD     Care Plan discussed with: Patient/Family and Nurse  Anticipated Disposition: Home w/Family  Anticipated Discharge: 24 hours to 48 hours     Hospital Problems  Date Reviewed: 2020          Codes Class Noted POA    Hematuria ICD-10-CM: R31.9  ICD-9-CM: 599.70  7/17/2020 Unknown                Review of Systems:   A comprehensive review of systems was negative. Vital Signs:    Last 24hrs VS reviewed since prior progress note. Most recent are:  Visit Vitals  BP (!) 94/56 (BP 1 Location: Right upper arm, BP Patient Position: At rest)   Pulse 100   Temp 99.8 °F (37.7 °C)   Resp 16   Ht 5' 4.02\" (1.626 m)   Wt (!) 160.3 kg (353 lb 6.4 oz)   SpO2 95%   BMI 60.63 kg/m²         Intake/Output Summary (Last 24 hours) at 3/21/2021 1045  Last data filed at 3/21/2021 0830  Gross per 24 hour   Intake 320 ml   Output    Net 320 ml        Physical Examination:     I had a face to face encounter with this patient and independently examined them on 3/21/2021 as outlined below:          Constitutional:  No acute distress, cooperative, pleasant    ENT:  Oral mucosa moist, oropharynx benign. Resp:  CTA bilaterally. No wheezing/rhonchi/rales. No accessory muscle use   CV:  Regular rhythm, normal rate, no murmurs, gallops, rubs    GI:  Soft, non distended, non tender. normoactive bowel sounds, no hepatosplenomegaly     Musculoskeletal:  No edema, warm, 2+ pulses throughout    Neurologic:  Moves all extremities. AAOx3, CN II-XII reviewed     Psych:  Good insight, Not anxious nor agitated. Data Review:    I personally reviewed  Image and labs      Labs:     Recent Labs     03/21/21 0358 03/20/21  0238 03/19/21  0600 03/19/21  0600   WBC 11.1*  --   --  18.6*   HGB 7.7* 7.6*   < > 9.4*   HCT 26.1* 25.5*   < > 31.2*     --   --  274    < > = values in this interval not displayed.      Recent Labs     03/21/21 0358 03/20/21  0245 03/19/21  0600    145 142   K 4.7 4.6 4.5   * 117* 115*   CO2 23 22 23   BUN 41* 48* 47*   CREA 2.28* 2.24* 2.32*   * 127* 156*   CA 8.3* 8.2* 8.9     Recent Labs     03/21/21  0358 03/20/21  0245 03/19/21  0600   ALT 9* 8* 7*   AP 66 68 82   TBILI 0.2 0.1* 0.2   TP 6.1* 6.4 7.2   ALB 2.6* 2.4* 2.8*   GLOB 3.5 4.0 4.4*     No results for input(s): INR, PTP, APTT, INREXT, INREXT in the last 72 hours. No results for input(s): FE, TIBC, PSAT, FERR in the last 72 hours. No results found for: FOL, RBCF   No results for input(s): PH, PCO2, PO2 in the last 72 hours. No results for input(s): CPK, CKNDX, TROIQ in the last 72 hours.     No lab exists for component: CPKMB  No results found for: CHOL, CHOLX, CHLST, CHOLV, HDL, HDLP, LDL, LDLC, DLDLP, TGLX, TRIGL, TRIGP, CHHD, CHHDX  Lab Results   Component Value Date/Time    Glucose (POC) 123 (H) 03/21/2021 06:41 AM    Glucose (POC) 121 (H) 03/20/2021 09:29 PM    Glucose (POC) 120 (H) 03/20/2021 05:34 PM    Glucose (POC) 129 (H) 03/20/2021 12:17 PM    Glucose (POC) 144 (H) 03/20/2021 06:13 AM     Lab Results   Component Value Date/Time    Color RED 03/18/2021 11:54 AM    Appearance CLEAR 03/18/2021 11:54 AM    Specific gravity 1.015 03/18/2021 11:54 AM    Specific gravity 1.008 07/25/2020 02:15 PM    pH (UA) 6.5 03/18/2021 11:54 AM    Protein 100 (A) 03/18/2021 11:54 AM    Glucose Negative 03/18/2021 11:54 AM    Ketone Negative 03/18/2021 11:54 AM    Bilirubin Negative 07/25/2020 02:15 PM    Urobilinogen 0.2 03/18/2021 11:54 AM    Nitrites Negative 03/18/2021 11:54 AM    Leukocyte Esterase LARGE (A) 03/18/2021 11:54 AM    Epithelial cells FEW 03/18/2021 11:54 AM    Bacteria Negative 03/18/2021 11:54 AM    WBC 5-10 03/18/2021 11:54 AM    RBC >100 (H) 03/18/2021 11:54 AM         Medications Reviewed:     Current Facility-Administered Medications   Medication Dose Route Frequency    traMADoL (ULTRAM) tablet 50 mg  50 mg Oral Q6H PRN    hydrOXYzine HCL (ATARAX) tablet 25 mg  25 mg Oral Q6H PRN    fentaNYL citrate (PF) injection 25 mcg  25 mcg IntraVENous Q4H PRN    opium-belladonna (B&O 15-A) 16.2-30 mg suppository 1 Suppository  1 Suppository Rectal Q8H PRN    sodium chloride (NS) flush 5-40 mL  5-40 mL IntraVENous Q8H    sodium chloride (NS) flush 5-40 mL  5-40 mL IntraVENous PRN    acetaminophen (TYLENOL) tablet 650 mg  650 mg Oral Q6H PRN    Or    acetaminophen (TYLENOL) suppository 650 mg  650 mg Rectal Q6H PRN    polyethylene glycol (MIRALAX) packet 17 g  17 g Oral DAILY PRN    methIMAzole (TAPAZOLE) tablet 10 mg  10 mg Oral QPM    oxybutynin chloride XL (DITROPAN XL) tablet 10 mg  10 mg Oral DAILY    pantoprazole (PROTONIX) tablet 40 mg  40 mg Oral ACB    senna-docusate (PERICOLACE) 8.6-50 mg per tablet 1 Tab  1 Tab Oral DAILY    glucose chewable tablet 16 g  4 Tab Oral PRN    dextrose (D50W) injection syrg 12.5-25 g  25-50 mL IntraVENous PRN    glucagon (GLUCAGEN) injection 1 mg  1 mg IntraMUSCular PRN    insulin lispro (HUMALOG) injection   SubCUTAneous AC&HS    morphine injection 1 mg  1 mg IntraVENous Q2H PRN    meropenem (MERREM) 500 mg in 0.9% sodium chloride (MBP/ADV) 50 mL MBP  0.5 g IntraVENous Q8H     ______________________________________________________________________  EXPECTED LENGTH OF STAY: 2d 7h  ACTUAL LENGTH OF STAY:          3                 Riaz Howard MD

## 2021-03-22 ENCOUNTER — ANESTHESIA EVENT (OUTPATIENT)
Dept: SURGERY | Age: 61
DRG: 466 | End: 2021-03-22
Payer: COMMERCIAL

## 2021-03-22 LAB
ALBUMIN SERPL-MCNC: 2.4 G/DL (ref 3.5–5)
ALBUMIN/GLOB SERPL: 0.5 {RATIO} (ref 1.1–2.2)
ALP SERPL-CCNC: 62 U/L (ref 45–117)
ALT SERPL-CCNC: 9 U/L (ref 12–78)
ANION GAP SERPL CALC-SCNC: 4 MMOL/L (ref 5–15)
AST SERPL-CCNC: 3 U/L (ref 15–37)
BASOPHILS # BLD: 0 K/UL (ref 0–0.1)
BASOPHILS NFR BLD: 0 % (ref 0–1)
BILIRUB SERPL-MCNC: 0.1 MG/DL (ref 0.2–1)
BUN SERPL-MCNC: 43 MG/DL (ref 6–20)
BUN/CREAT SERPL: 17 (ref 12–20)
CALCIUM SERPL-MCNC: 8.3 MG/DL (ref 8.5–10.1)
CHLORIDE SERPL-SCNC: 115 MMOL/L (ref 97–108)
CO2 SERPL-SCNC: 23 MMOL/L (ref 21–32)
COVID-19 RAPID TEST, COVR: NOT DETECTED
CREAT SERPL-MCNC: 2.57 MG/DL (ref 0.55–1.02)
DIFFERENTIAL METHOD BLD: ABNORMAL
EOSINOPHIL # BLD: 0.4 K/UL (ref 0–0.4)
EOSINOPHIL NFR BLD: 4 % (ref 0–7)
ERYTHROCYTE [DISTWIDTH] IN BLOOD BY AUTOMATED COUNT: 14.8 % (ref 11.5–14.5)
GLOBULIN SER CALC-MCNC: 4.5 G/DL (ref 2–4)
GLUCOSE BLD STRIP.AUTO-MCNC: 111 MG/DL (ref 65–100)
GLUCOSE BLD STRIP.AUTO-MCNC: 118 MG/DL (ref 65–100)
GLUCOSE BLD STRIP.AUTO-MCNC: 127 MG/DL (ref 65–100)
GLUCOSE BLD STRIP.AUTO-MCNC: 141 MG/DL (ref 65–100)
GLUCOSE SERPL-MCNC: 123 MG/DL (ref 65–100)
HCT VFR BLD AUTO: 23.8 % (ref 35–47)
HCT VFR BLD AUTO: 25.4 % (ref 35–47)
HGB BLD-MCNC: 7.2 G/DL (ref 11.5–16)
HGB BLD-MCNC: 7.6 G/DL (ref 11.5–16)
IMM GRANULOCYTES # BLD AUTO: 0.1 K/UL (ref 0–0.04)
IMM GRANULOCYTES NFR BLD AUTO: 1 % (ref 0–0.5)
LYMPHOCYTES # BLD: 1.5 K/UL (ref 0.8–3.5)
LYMPHOCYTES NFR BLD: 14 % (ref 12–49)
MCH RBC QN AUTO: 28.1 PG (ref 26–34)
MCHC RBC AUTO-ENTMCNC: 29.9 G/DL (ref 30–36.5)
MCV RBC AUTO: 94.1 FL (ref 80–99)
MONOCYTES # BLD: 0.8 K/UL (ref 0–1)
MONOCYTES NFR BLD: 8 % (ref 5–13)
NEUTS SEG # BLD: 7.7 K/UL (ref 1.8–8)
NEUTS SEG NFR BLD: 73 % (ref 32–75)
NRBC # BLD: 0 K/UL (ref 0–0.01)
NRBC BLD-RTO: 0 PER 100 WBC
PLATELET # BLD AUTO: 243 K/UL (ref 150–400)
POTASSIUM SERPL-SCNC: 4.4 MMOL/L (ref 3.5–5.1)
PROT SERPL-MCNC: 6.9 G/DL (ref 6.4–8.2)
RBC # BLD AUTO: 2.7 M/UL (ref 3.8–5.2)
RBC MORPH BLD: ABNORMAL
SERVICE CMNT-IMP: ABNORMAL
SODIUM SERPL-SCNC: 142 MMOL/L (ref 136–145)
SOURCE, COVRS: NORMAL
WBC # BLD AUTO: 10.5 K/UL (ref 3.6–11)

## 2021-03-22 PROCEDURE — 80053 COMPREHEN METABOLIC PANEL: CPT

## 2021-03-22 PROCEDURE — 85025 COMPLETE CBC W/AUTO DIFF WBC: CPT

## 2021-03-22 PROCEDURE — 74011000258 HC RX REV CODE- 258: Performed by: HOSPITALIST

## 2021-03-22 PROCEDURE — 85018 HEMOGLOBIN: CPT

## 2021-03-22 PROCEDURE — 74011250636 HC RX REV CODE- 250/636: Performed by: INTERNAL MEDICINE

## 2021-03-22 PROCEDURE — 36415 COLL VENOUS BLD VENIPUNCTURE: CPT

## 2021-03-22 PROCEDURE — 74011250637 HC RX REV CODE- 250/637: Performed by: INTERNAL MEDICINE

## 2021-03-22 PROCEDURE — 65270000029 HC RM PRIVATE

## 2021-03-22 PROCEDURE — 74011000258 HC RX REV CODE- 258: Performed by: INTERNAL MEDICINE

## 2021-03-22 PROCEDURE — 74011250636 HC RX REV CODE- 250/636: Performed by: HOSPITALIST

## 2021-03-22 PROCEDURE — 87635 SARS-COV-2 COVID-19 AMP PRB: CPT

## 2021-03-22 PROCEDURE — 74011250637 HC RX REV CODE- 250/637: Performed by: HOSPITALIST

## 2021-03-22 PROCEDURE — 82962 GLUCOSE BLOOD TEST: CPT

## 2021-03-22 RX ADMIN — METHIMAZOLE 10 MG: 5 TABLET ORAL at 18:11

## 2021-03-22 RX ADMIN — IRON SUCROSE 100 MG: 20 INJECTION, SOLUTION INTRAVENOUS at 11:51

## 2021-03-22 RX ADMIN — MEROPENEM 500 MG: 500 INJECTION, POWDER, FOR SOLUTION INTRAVENOUS at 05:02

## 2021-03-22 RX ADMIN — TRAMADOL HYDROCHLORIDE 50 MG: 50 TABLET, COATED ORAL at 10:02

## 2021-03-22 RX ADMIN — DOCUSATE SODIUM 50 MG AND SENNOSIDES 8.6 MG 1 TABLET: 8.6; 5 TABLET, FILM COATED ORAL at 10:02

## 2021-03-22 RX ADMIN — OXYBUTYNIN CHLORIDE 10 MG: 5 TABLET, EXTENDED RELEASE ORAL at 10:01

## 2021-03-22 RX ADMIN — MEROPENEM 500 MG: 500 INJECTION, POWDER, FOR SOLUTION INTRAVENOUS at 12:49

## 2021-03-22 RX ADMIN — MEROPENEM 500 MG: 500 INJECTION, POWDER, FOR SOLUTION INTRAVENOUS at 19:09

## 2021-03-22 RX ADMIN — PANTOPRAZOLE SODIUM 40 MG: 40 TABLET, DELAYED RELEASE ORAL at 06:37

## 2021-03-22 NOTE — PROGRESS NOTES
[facility]                                                                                          Hospitalist Progress Note  Riaz Howard MD  Answering service: [phone] OR [phone] from in house phone        Date of Service:  3/22/2021  NAME:  Ildefonso Reyes  :  1960  MRN:  [id number]      Admission Summary:   Ildefonso Reyes is a 62 y.o. female with PMH of DM, HTN, Morbid obesity, Renal stone with hx of R ureteral stent for stones, came to [facility] for hematuria since morning, about 7 hours at that time. Pt is [employer]ovah's witness and does not take any blood products. Pt suggested to be doing ok until yesterday when started having vaginal bleeding with sharp, intense, moderate to severe, intermittent pains from R flank to groin, suprapubic and urethral meatus. No fever/ chills. Pt has had similar experience in past 2020. And this feels quite same.   Pt is jahovah's witness and does not want blood transfusions.       Interval history / Subjective:   Patient seen and examined doing much better feeling better   STILL PASSING CLOTS HGB 7   D/W UROLOGY STENT EXCHANGE / REMOVAL TOMORROW AM     Assessment & Plan:     Hematuria with  # R hydronephrosis vs mass in lower pole of R kidney  # Hx of R ureteral stent for renal stone, known to Urology  # UTI-Early SIRS on presenting with ED, with leukocytosis and tachycardia    - cont merrem  F/u cultures ID following     #NO BLOOD PRODUCTS - Linette Au will give iron infisuion  # Continue Methimazole, PPI, Gabapentine, Oxybutynin and Sennakot  # CKD 4, baseline creat around 2.5, Creatinine 2.39 on admission--2.57   # Morbid obesity, BMI 60.66    Code status: Full  DVT prophylaxis: SCD     Care Plan discussed with: Patient/Family and Nurse  Anticipated Disposition: Home w/Family  Anticipated Discharge: 24 hours to 48 hours     Hospital Problems  Date Reviewed: 2020          Codes Class Noted POA    Hematuria ICD-10-CM: R31.9  ICD-9-CM: 599.70  7/17/2020 Unknown                Review of Systems:   A comprehensive review of systems was negative. Vital Signs:    Last 24hrs VS reviewed since prior progress note. Most recent are:  Visit Vitals  BP 99/61 (BP 1 Location: Right lower arm, BP Patient Position: At rest)   Pulse 86   Temp 98.2 °F (36.8 °C)   Resp 18   Ht 5' 4.02\" (1.626 m)   Wt (!) 160.3 kg (353 lb 6.4 oz)   SpO2 95%   BMI 60.63 kg/m²       No intake or output data in the 24 hours ending 03/22/21 0934     Physical Examination:     I had a face to face encounter with this patient and independently examined them on 3/22/2021 as outlined below:          Constitutional:  No acute distress, cooperative, pleasant    ENT:  Oral mucosa moist, oropharynx benign. Resp:  CTA bilaterally. No wheezing/rhonchi/rales. No accessory muscle use   CV:  Regular rhythm, normal rate, no murmurs, gallops, rubs    GI:  Soft, non distended, non tender. normoactive bowel sounds, no hepatosplenomegaly     Musculoskeletal:  No edema, warm, 2+ pulses throughout    Neurologic:  Moves all extremities. AAOx3, CN II-XII reviewed     Psych:  Good insight, Not anxious nor agitated. Data Review:    I personally reviewed  Image and labs      Labs:     Recent Labs     03/22/21  0541 03/22/21  0540 03/21/21  0358   WBC 10.5  --  11.1*   HGB 7.6* 7.2* 7.7*   HCT 25.4* 23.8* 26.1*     --  250     Recent Labs     03/22/21  0540 03/21/21  0358 03/20/21  0245    145 145   K 4.4 4.7 4.6   * 117* 117*   CO2 23 23 22   BUN 43* 41* 48*   CREA 2.57* 2.28* 2.24*   * 105* 127*   CA 8.3* 8.3* 8.2*     Recent Labs     03/22/21  0540 03/21/21  0358 03/20/21  0245   ALT 9* 9* 8*   AP 62 66 68   TBILI 0.1* 0.2 0.1*   TP 6.9 6.1* 6.4   ALB 2.4* 2.6* 2.4*   GLOB 4.5* 3.5 4.0     No results for input(s): INR, PTP, APTT, INREXT, INREXT in the last 72 hours. No results for input(s): FE, TIBC, PSAT, FERR in the last 72 hours.    No results found for: FOL, RBCF   No results for input(s): PH, PCO2, PO2 in the last 72 hours. No results for input(s): CPK, CKNDX, TROIQ in the last 72 hours.     No lab exists for component: CPKMB  No results found for: CHOL, CHOLX, CHLST, CHOLV, HDL, HDLP, LDL, LDLC, DLDLP, TGLX, TRIGL, TRIGP, CHHD, CHHDX  Lab Results   Component Value Date/Time    Glucose (POC) 127 (H) 03/22/2021 06:21 AM    Glucose (POC) 129 (H) 03/21/2021 10:10 PM    Glucose (POC) 127 (H) 03/21/2021 04:08 PM    Glucose (POC) 132 (H) 03/21/2021 11:45 AM    Glucose (POC) 123 (H) 03/21/2021 06:41 AM     Lab Results   Component Value Date/Time    Color RED 03/18/2021 11:54 AM    Appearance CLEAR 03/18/2021 11:54 AM    Specific gravity 1.015 03/18/2021 11:54 AM    Specific gravity 1.008 07/25/2020 02:15 PM    pH (UA) 6.5 03/18/2021 11:54 AM    Protein 100 (A) 03/18/2021 11:54 AM    Glucose Negative 03/18/2021 11:54 AM    Ketone Negative 03/18/2021 11:54 AM    Bilirubin Negative 07/25/2020 02:15 PM    Urobilinogen 0.2 03/18/2021 11:54 AM    Nitrites Negative 03/18/2021 11:54 AM    Leukocyte Esterase LARGE (A) 03/18/2021 11:54 AM    Epithelial cells FEW 03/18/2021 11:54 AM    Bacteria Negative 03/18/2021 11:54 AM    WBC 5-10 03/18/2021 11:54 AM    RBC >100 (H) 03/18/2021 11:54 AM         Medications Reviewed:     Current Facility-Administered Medications   Medication Dose Route Frequency    traMADoL (ULTRAM) tablet 50 mg  50 mg Oral Q6H PRN    hydrOXYzine HCL (ATARAX) tablet 25 mg  25 mg Oral Q6H PRN    fentaNYL citrate (PF) injection 25 mcg  25 mcg IntraVENous Q4H PRN    opium-belladonna (B&O 15-A) 16.2-30 mg suppository 1 Suppository  1 Suppository Rectal Q8H PRN    sodium chloride (NS) flush 5-40 mL  5-40 mL IntraVENous Q8H    sodium chloride (NS) flush 5-40 mL  5-40 mL IntraVENous PRN    acetaminophen (TYLENOL) tablet 650 mg  650 mg Oral Q6H PRN    Or    acetaminophen (TYLENOL) suppository 650 mg  650 mg Rectal Q6H PRN    polyethylene glycol (MIRALAX) packet 17 g  17 g Oral DAILY PRN    methIMAzole (TAPAZOLE) tablet 10 mg  10 mg Oral QPM    oxybutynin chloride XL (DITROPAN XL) tablet 10 mg  10 mg Oral DAILY    pantoprazole (PROTONIX) tablet 40 mg  40 mg Oral ACB    senna-docusate (PERICOLACE) 8.6-50 mg per tablet 1 Tab  1 Tab Oral DAILY    glucose chewable tablet 16 g  4 Tab Oral PRN    dextrose (D50W) injection syrg 12.5-25 g  25-50 mL IntraVENous PRN    glucagon (GLUCAGEN) injection 1 mg  1 mg IntraMUSCular PRN    insulin lispro (HUMALOG) injection   SubCUTAneous AC&HS    morphine injection 1 mg  1 mg IntraVENous Q2H PRN    meropenem (MERREM) 500 mg in 0.9% sodium chloride (MBP/ADV) 50 mL MBP  0.5 g IntraVENous Q8H     ______________________________________________________________________  EXPECTED LENGTH OF STAY: 2d 7h  ACTUAL LENGTH OF STAY:          4                 Ollie Cockayne, MD

## 2021-03-22 NOTE — PROGRESS NOTES
Comprehensive Nutrition Assessment    Type and Reason for Visit: Consult    Nutrition Recommendations/Plan:   1. Suggest add PROBIOTIC  2. RD add Activia yogurt daily  3. Add Lactaid milk and notify kitchen of lactose intolerance  4. RD encouraged no sugar alcohols at discharge and consider outpatient diet trial FODMAP diet with RD assist.     Nutrition Assessment:   Hx Type 2 DM, HTN, R-ureteral stent for stones. Admit hematuria. Does not take blood, Yarsani. Rx Venofer. Admit wt 160.3 kg (~353#). One prior encounter wt 345#(8/7/2020). Claims appetite, \"very good and recently lost ~80# bringing wt down to 330# from over 400#\". Achieved wt loss by, \"watching carbs, drinking water, and watching sugars\". Reports BM are soft and sister has to clean her up so started giving her more carbs to firm the stool which in turn she believes is causing her to gain wt back. Rx Merrem now and last BM was loose 3/20. She is also known to have lactose-intolerance. RD consulted to assist to control \"soft BM\". Could consider adding a probiotic. RD to notify kitchen of lactose intolerance. Discussed avoiding sugar alcohols. Could consider trial of a FODMAP diet however, ? Ability to comply for trial. Would be best educated as an outpatient if FODMAP considered. Currently ABX may be contributing to loose BM? Natural teeth, chew/swallow ok. Last A1C 6.7 (3/19/2021) on a self-imposed low CHO/low sugar diet. Good control. Consistent CHO diet now. Rx correctional lispro. Malnutrition Assessment:  Malnutrition Status: No malnutrition    Estimated Daily Nutrient Needs:  Energy (kcal): 1700; Weight Used for Energy Requirements:    Protein (g): 110 gm (2 gm/kg IBW); Weight Used for Protein Requirements: Ideal  Fluid (ml/day): (~7009-0711 mL/day); Method Used for Fluid Requirements: 1 ml/kcal    Nutrition Related Findings:   Morbid obesity; loose stool      Wounds:    None       Current Nutrition Therapies:  DIET DIABETIC CONSISTENT CARB Regular  DIET NPO    Anthropometric Measures:  · Height:  5' 4\" (162.6 cm)  · Current Body Wt:  160.3 kg (353 lb 6.4 oz)   · Admission Body Wt:  353 lb 6.4 oz    · Usual Body Wt:  149.7 kg (330 lb)     · Ideal Body Wt:  120 lbs:  294.5 %   · Adjusted Body Weight: No adjustment    · BMI Category:  Obese class 3 (BMI 40.0 or greater)       Nutrition Diagnosis:   · Altered GI function related to altered GI function(Unknown) as evidenced by (loose BM)    Nutrition Interventions:   Food and/or Nutrient Delivery: Continue current diet(Add Activia yogurt; consider add probiotic)  Nutrition Education and Counseling: Education completed(Discussed bowel habits and diet. )  Coordination of Nutrition Care:     Goals:  no loose stools by discharge       Nutrition Monitoring and Evaluation:   Behavioral-Environmental Outcomes:    Food/Nutrient Intake Outcomes: (BM)  Physical Signs/Symptoms Outcomes: (BM)    Discharge Planning:    Consider Outpatient RD counseling for FODMAP diet.      Electronically signed by Shiraz Degroot RD on 3/22/2021 at 5:24 PM    Contact: [place]

## 2021-03-22 NOTE — PROGRESS NOTES
Patient: Jose Bolden MRN: [id number]  SSN: [id number]    YOB: 1960  Age: 62 y.o. Sex: female        ADMITTED: 3/18/2021 to Raul Tapia MD by Poly Edwards MD for Hematuria [R31.9]  She is a known patient to Dr. 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 She has required right ureteral stent placement, last exchanged on 20 by Dr. Fiona Castillo. Additionally, she was noted to have left mild hydronephrosis at that time. It was decided not to place a stent on the left with history of atrophic left kidney. Notes reviewed from the weekend. She has been passing large clots. Previous bladder scans <40 cc on 3/19, no further scans. She is seen an examined in the bed. She is bed bound and incontinent of urine. Incontinence pad soaked with bright red-yellow UA. She has passed a large clot noted in her brief. She reports having bladder spasms when she needs to pass a clot, relieved with clot passage and B&O suppository. Thom abd, flank, or suprapubic pain. Abdomen obese, soft, NTTP, LLQ hernia.      AFVSS  WBC 18.6 ->10.5   Hgb 10.4 -> 7.6  Cr 2.28 ->2.57 (was 2.45 in 2020)  UA +large leuks, 5-10 WBCs, >100 RBCs, no bacteria. UCx NG  BCx NGTD  +meropenem  +oxybutynin and B&O supp.        Vitals: Temp (24hrs), Av.8 °F (37.1 °C), Min:98.2 °F (36.8 °C), Max:99.8 °F (37.7 °C)    Blood pressure 99/61, pulse 86, temperature 98.2 °F (36.8 °C), resp. rate 18, height 5' 4.02\" (1.626 m), weight (!) 160.3 kg (353 lb 6.4 oz), SpO2 95 %. Intake and Output:  1901 -  07  In: 320 [P.O.:320]  Out: -   No intake/output data recorded.     Labs:  CBC:   Lab Results   Component Value Date/Time    WBC 10.5 2021 05:41 AM    HCT 25.4 (L) 2021 05:41 AM    PLATELET 238  05:41 AM     BMP:   Lab Results   Component Value Date/Time    Glucose 123 (H) 2021 05:40 AM    Sodium 142 03/22/2021 05:40 AM    Potassium 4.4 03/22/2021 05:40 AM    Chloride 115 (H) 03/22/2021 05:40 AM    CO2 23 03/22/2021 05:40 AM    BUN 43 (H) 03/22/2021 05:40 AM    Creatinine 2.57 (H) 03/22/2021 05:40 AM    Calcium 8.3 (L) 03/22/2021 05:40 AM     Assessment/Plan:     · ? UTI - ruled out - urine cx (3/18) no growth. Symptoms improved with antibiotic therapy. + IV Merrem   · Gross Hematuria - CT images reviewed, the right stent is in proper position. She continues to pass large blood clots and her Hgb remains in the 7s. Recommend cystoscopy and clot evacuation. Discussed with the patient and she agrees to proceed. All risks and benefits reviewed. Will arrange with the OR for tomorrow if they have availability. Please bladder scan one time to ensure she is not in clot retention. She will require the insertion of a large bore christensen (22 F or greater) for PVR>350 cc. Monitor Hgb. She is a Denominational and does not accept blood products. She has been managed with iron transfusions in the past.   · Hx of Right ureteral obstruction s/p Right ureteral stent exchange 11/24/20 - She is due to have her stent exchanged. Will arrange to have the stent exchanged this admission with her cysto and clot evac. · ? 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 She would require a MRI to characterize further as she cannot have a CT with contrast due to her poor renal function. This would be difficult to obtain due to her body habitus and machine capabilities with weight restrictions. Additionally, she is not an ideal surgical candidate. · Hx of left atrophic kidney with mild hydronephrosis - unchanged. · Bladder spasms - continue oxybutynin and B&O suppository PRN. Will follow.  Case discussed with Dr. Marianne Garces.     ====UPDATE 9:51 AM=====  She is scheduled for cystoscopy with clot evacuation and fulguration with right ureteral stent exchange tomorrow 3/23 at 730 AM with Dr. Minerva Sue.  Please obtain consent. NPO at midnight.      Signed By: Leslie Jauregui NP - March 22, 2021

## 2021-03-22 NOTE — PROGRESS NOTES
Physician Progress Note      PATIENT:               Dejan Pina  CSN #:                  [id number]  :                       1960  ADMIT DATE:       3/18/2021 7:16 AM  DISCH DATE:  RESPONDING  PROVIDER #:        Zenaida Jenkins MD          QUERY TEXT:    Pt admitted with hematuria . Pt noted to have recent urinary stent . If possible, please document in progress notes and discharge summary the relationship, if any, between hematuria and urinary stent. The medical record reflects the following:  Risk Factors: hematuria  Clinical Indicators:  H/P  # Hematuria  # R hydronephrosis vs mass in lower pole of R kidney  # Hx of R ureteral stent for renal stone, known to Urology      Treatment: urology consult, ID consult    Thank you,  Li Dykes RN, CDI, CCDS  Certified Clinical   [phone]  Options provided:  -- hematuria due to urinary stents  -- hematuria due to UTI  -- hematuria unrelated to stents nor UTI  -- Other - I will add my own diagnosis  -- Disagree - Not applicable / Not valid  -- Disagree - Clinically unable to determine / Unknown  -- Refer to Clinical Documentation Reviewer    PROVIDER RESPONSE TEXT:    This patient has hematuria due to urinary stents.     Query created by: Davis Nelson on 3/22/2021 12:47 PM      Electronically signed by:  Zenaida Jenkins MD 3/22/2021 1:08 PM

## 2021-03-22 NOTE — PROGRESS NOTES
ID Progress Note  3/22/2021    Subjective:     Still having spasms and passing clots  Review of Systems:            Symptom Y/N Comments   Symptom Y/N Comments   Fever/Chills  n     Chest Pain n       Poor Appetite       Edema        Cough       Abdominal Pain y       Sputum       Joint Pain        SOB/MCGHEE n      Pruritis/Rash        Nausea/vomit  n     Tolerating PT/OT        Diarrhea  n     Tolerating Diet        Constipation  n     Other y   bladder spasm       Could NOT obtain due to:       Objective:     Vitals:   Visit Vitals  BP 99/61 (BP 1 Location: Right lower arm, BP Patient Position: At rest)   Pulse 86   Temp 98.2 °F (36.8 °C)   Resp 18   Ht 5' 4.02\" (1.626 m) Comment: Historic 2020   Wt (!) 160.3 kg (353 lb 6.4 oz)   SpO2 95%   BMI 60.63 kg/m²        Tmax:  Temp (24hrs), Av.8 °F (37.1 °C), Min:98.2 °F (36.8 °C), Max:99.8 °F (37.7 °C)      PHYSICAL EXAM:  General: morbidly obese, WD, WN. Alert, cooperative, no acute distress    EENT:  EOMI. Anicteric sclerae. MMM  Resp:  CTA bilaterally, no wheezing or rales. No accessory muscle use  CV:  Regular  rhythm,  No edema  GI:  Soft, Non distended, Non tender. +Bowel sounds  Neurologic:  Alert and oriented X 3, normal speech,   Psych:   Good insight. Not anxious nor agitated  Skin:  No rashes.   No jaundice    Labs:   Lab Results   Component Value Date/Time    WBC 10.5 2021 05:41 AM    HGB 7.6 (L) 2021 05:41 AM    HCT 25.4 (L) 2021 05:41 AM    PLATELET 405  05:41 AM    MCV 94.1 2021 05:41 AM     Lab Results   Component Value Date/Time    Sodium 142 2021 05:40 AM    Potassium 4.4 2021 05:40 AM    Chloride 115 (H) 2021 05:40 AM    CO2 23 2021 05:40 AM    Anion gap 4 (L) 2021 05:40 AM    Glucose 123 (H) 2021 05:40 AM    BUN 43 (H) 2021 05:40 AM    Creatinine 2.57 (H) 2021 05:40 AM    BUN/Creatinine ratio 17 2021 05:40 AM    GFR est AA 23 (L) 2021 05:40 AM    GFR est non-AA 19 (L) 03/22/2021 05:40 AM    Calcium 8.3 (L) 03/22/2021 05:40 AM    Bilirubin, total 0.1 (L) 03/22/2021 05:40 AM    Alk. phosphatase 62 03/22/2021 05:40 AM    Protein, total 6.9 03/22/2021 05:40 AM    Albumin 2.4 (L) 03/22/2021 05:40 AM    Globulin 4.5 (H) 03/22/2021 05:40 AM    A-G Ratio 0.5 (L) 03/22/2021 05:40 AM    ALT (SGPT) 9 (L) 03/22/2021 05:40 AM       CT of abd/pel: Right ureteral stent is noted in position with the proximal end in the upper pole right kidney and the distal end in the urinary bladder which is filled with high density may represent hemorrhage. There is right hydronephrosis versus a mass in the lower pole of the right kidney. The left kidney is small and there is mild left hydroureteronephrosis, a large abdominal wall hernia in the left containing stomach, small  bowel and colon which is incompletely imaged.       Assessment and Plan   UTI with hx of MDR klebsiella pneumoniae; ruled out - urine cx (3/18) no growth  Leukocytosis   Hematuria  Hx of Right ureteral obstruction s/p Right ureteral stent exchange 11/24/20  - WBC 18.6k->10.5k, afebrile     Blood cx (3/19) no growth so far     scheduled for cystoscopy with clot evacuation and fulguration with right ureteral stent exchange tomorrow 3/23 at 730 AM with Dr. Andres Reina with IV merrem for now            Hyunsun Willette Buerger, NADEEM

## 2021-03-22 NOTE — PROGRESS NOTES
Bedside shift change report given to Artur AliceaRN (oncoming nurse) by Rudolph Mohs, RN(offgoing nurse). Report given with SBAR.

## 2021-03-23 ENCOUNTER — APPOINTMENT (OUTPATIENT)
Dept: GENERAL RADIOLOGY | Age: 61
DRG: 466 | End: 2021-03-23
Attending: UROLOGY
Payer: COMMERCIAL

## 2021-03-23 ENCOUNTER — ANESTHESIA (OUTPATIENT)
Dept: SURGERY | Age: 61
DRG: 466 | End: 2021-03-23
Payer: COMMERCIAL

## 2021-03-23 LAB
ALBUMIN SERPL-MCNC: 2.5 G/DL (ref 3.5–5)
ALBUMIN/GLOB SERPL: 0.6 {RATIO} (ref 1.1–2.2)
ALP SERPL-CCNC: 60 U/L (ref 45–117)
ALT SERPL-CCNC: 8 U/L (ref 12–78)
ANION GAP SERPL CALC-SCNC: 6 MMOL/L (ref 5–15)
AST SERPL-CCNC: 4 U/L (ref 15–37)
BASOPHILS # BLD: 0 K/UL (ref 0–0.1)
BASOPHILS NFR BLD: 0 % (ref 0–1)
BILIRUB SERPL-MCNC: 0.1 MG/DL (ref 0.2–1)
BUN SERPL-MCNC: 41 MG/DL (ref 6–20)
BUN/CREAT SERPL: 19 (ref 12–20)
CALCIUM SERPL-MCNC: 8.7 MG/DL (ref 8.5–10.1)
CHLORIDE SERPL-SCNC: 114 MMOL/L (ref 97–108)
CO2 SERPL-SCNC: 24 MMOL/L (ref 21–32)
CREAT SERPL-MCNC: 2.16 MG/DL (ref 0.55–1.02)
DIFFERENTIAL METHOD BLD: ABNORMAL
EOSINOPHIL # BLD: 0.5 K/UL (ref 0–0.4)
EOSINOPHIL NFR BLD: 5 % (ref 0–7)
ERYTHROCYTE [DISTWIDTH] IN BLOOD BY AUTOMATED COUNT: 14.6 % (ref 11.5–14.5)
GLOBULIN SER CALC-MCNC: 4.3 G/DL (ref 2–4)
GLUCOSE BLD STRIP.AUTO-MCNC: 104 MG/DL (ref 65–100)
GLUCOSE BLD STRIP.AUTO-MCNC: 113 MG/DL (ref 65–100)
GLUCOSE BLD STRIP.AUTO-MCNC: 146 MG/DL (ref 65–100)
GLUCOSE BLD STRIP.AUTO-MCNC: 164 MG/DL (ref 65–100)
GLUCOSE BLD STRIP.AUTO-MCNC: 187 MG/DL (ref 65–100)
GLUCOSE SERPL-MCNC: 112 MG/DL (ref 65–100)
HCT VFR BLD AUTO: 24.1 % (ref 35–47)
HGB BLD-MCNC: 7.2 G/DL (ref 11.5–16)
IMM GRANULOCYTES # BLD AUTO: 0.1 K/UL (ref 0–0.04)
IMM GRANULOCYTES NFR BLD AUTO: 1 % (ref 0–0.5)
LYMPHOCYTES # BLD: 1.3 K/UL (ref 0.8–3.5)
LYMPHOCYTES NFR BLD: 14 % (ref 12–49)
MCH RBC QN AUTO: 28 PG (ref 26–34)
MCHC RBC AUTO-ENTMCNC: 29.9 G/DL (ref 30–36.5)
MCV RBC AUTO: 93.8 FL (ref 80–99)
MONOCYTES # BLD: 0.7 K/UL (ref 0–1)
MONOCYTES NFR BLD: 8 % (ref 5–13)
NEUTS SEG # BLD: 6.8 K/UL (ref 1.8–8)
NEUTS SEG NFR BLD: 72 % (ref 32–75)
NRBC # BLD: 0 K/UL (ref 0–0.01)
NRBC BLD-RTO: 0 PER 100 WBC
PLATELET # BLD AUTO: 257 K/UL (ref 150–400)
PMV BLD AUTO: 10.1 FL (ref 8.9–12.9)
POTASSIUM SERPL-SCNC: 4.1 MMOL/L (ref 3.5–5.1)
PROT SERPL-MCNC: 6.8 G/DL (ref 6.4–8.2)
RBC # BLD AUTO: 2.57 M/UL (ref 3.8–5.2)
SERVICE CMNT-IMP: ABNORMAL
SODIUM SERPL-SCNC: 144 MMOL/L (ref 136–145)
WBC # BLD AUTO: 9.6 K/UL (ref 3.6–11)

## 2021-03-23 PROCEDURE — C1758 CATHETER, URETERAL: HCPCS | Performed by: UROLOGY

## 2021-03-23 PROCEDURE — C2617 STENT, NON-COR, TEM W/O DEL: HCPCS | Performed by: UROLOGY

## 2021-03-23 PROCEDURE — 77030019927 HC TBNG IRR CYSTO BAXT -A: Performed by: UROLOGY

## 2021-03-23 PROCEDURE — 74011000258 HC RX REV CODE- 258: Performed by: UROLOGY

## 2021-03-23 PROCEDURE — 77030021707 HC SET IRR FLD WRMR 3M -B: Performed by: UROLOGY

## 2021-03-23 PROCEDURE — 74420 UROGRAPHY RTRGR +-KUB: CPT

## 2021-03-23 PROCEDURE — 74011250636 HC RX REV CODE- 250/636: Performed by: UROLOGY

## 2021-03-23 PROCEDURE — 74011250636 HC RX REV CODE- 250/636: Performed by: ANESTHESIOLOGY

## 2021-03-23 PROCEDURE — 77030037877 HC DRSG MEPILEX >48IN BORD MOLN -A

## 2021-03-23 PROCEDURE — 77030008684 HC TU ET CUF COVD -B: Performed by: NURSE ANESTHETIST, CERTIFIED REGISTERED

## 2021-03-23 PROCEDURE — 74011250636 HC RX REV CODE- 250/636: Performed by: NURSE ANESTHETIST, CERTIFIED REGISTERED

## 2021-03-23 PROCEDURE — 85025 COMPLETE CBC W/AUTO DIFF WBC: CPT

## 2021-03-23 PROCEDURE — 77030038269 HC DRN EXT URIN PURWCK BARD -A

## 2021-03-23 PROCEDURE — 74011000258 HC RX REV CODE- 258: Performed by: INTERNAL MEDICINE

## 2021-03-23 PROCEDURE — 74011250637 HC RX REV CODE- 250/637: Performed by: ANESTHESIOLOGY

## 2021-03-23 PROCEDURE — 77030040922 HC BLNKT HYPOTHRM STRY -A

## 2021-03-23 PROCEDURE — 82962 GLUCOSE BLOOD TEST: CPT

## 2021-03-23 PROCEDURE — 77030018832 HC SOL IRR H20 ICUM -A: Performed by: UROLOGY

## 2021-03-23 PROCEDURE — 65270000029 HC RM PRIVATE

## 2021-03-23 PROCEDURE — BT1D1ZZ FLUOROSCOPY OF RIGHT KIDNEY, URETER AND BLADDER USING LOW OSMOLAR CONTRAST: ICD-10-PCS | Performed by: UROLOGY

## 2021-03-23 PROCEDURE — 74011250637 HC RX REV CODE- 250/637: Performed by: UROLOGY

## 2021-03-23 PROCEDURE — 2709999900 HC NON-CHARGEABLE SUPPLY: Performed by: UROLOGY

## 2021-03-23 PROCEDURE — 74011000636 HC RX REV CODE- 636: Performed by: UROLOGY

## 2021-03-23 PROCEDURE — 74011250636 HC RX REV CODE- 250/636: Performed by: INTERNAL MEDICINE

## 2021-03-23 PROCEDURE — 74011000258 HC RX REV CODE- 258: Performed by: NURSE ANESTHETIST, CERTIFIED REGISTERED

## 2021-03-23 PROCEDURE — 76060000033 HC ANESTHESIA 1 TO 1.5 HR: Performed by: UROLOGY

## 2021-03-23 PROCEDURE — 76210000006 HC OR PH I REC 0.5 TO 1 HR: Performed by: UROLOGY

## 2021-03-23 PROCEDURE — 0T768DZ DILATION OF RIGHT URETER WITH INTRALUMINAL DEVICE, VIA NATURAL OR ARTIFICIAL OPENING ENDOSCOPIC: ICD-10-PCS | Performed by: UROLOGY

## 2021-03-23 PROCEDURE — 76010000149 HC OR TIME 1 TO 1.5 HR: Performed by: UROLOGY

## 2021-03-23 PROCEDURE — 0TP98DZ REMOVAL OF INTRALUMINAL DEVICE FROM URETER, VIA NATURAL OR ARTIFICIAL OPENING ENDOSCOPIC: ICD-10-PCS | Performed by: UROLOGY

## 2021-03-23 PROCEDURE — 80053 COMPREHEN METABOLIC PANEL: CPT

## 2021-03-23 PROCEDURE — 77030026438 HC STYL ET INTUB CARD -A: Performed by: NURSE ANESTHETIST, CERTIFIED REGISTERED

## 2021-03-23 PROCEDURE — 36415 COLL VENOUS BLD VENIPUNCTURE: CPT

## 2021-03-23 PROCEDURE — 74011250637 HC RX REV CODE- 250/637: Performed by: HOSPITALIST

## 2021-03-23 PROCEDURE — 74011000250 HC RX REV CODE- 250: Performed by: NURSE ANESTHETIST, CERTIFIED REGISTERED

## 2021-03-23 RX ORDER — SODIUM CHLORIDE, SODIUM LACTATE, POTASSIUM CHLORIDE, CALCIUM CHLORIDE 600; 310; 30; 20 MG/100ML; MG/100ML; MG/100ML; MG/100ML
125 INJECTION, SOLUTION INTRAVENOUS CONTINUOUS
Status: DISCONTINUED | OUTPATIENT
Start: 2021-03-23 | End: 2021-03-23 | Stop reason: HOSPADM

## 2021-03-23 RX ORDER — HYDROMORPHONE HYDROCHLORIDE 1 MG/ML
0.2 INJECTION, SOLUTION INTRAMUSCULAR; INTRAVENOUS; SUBCUTANEOUS
Status: DISCONTINUED | OUTPATIENT
Start: 2021-03-23 | End: 2021-03-23 | Stop reason: HOSPADM

## 2021-03-23 RX ORDER — DIPHENHYDRAMINE HYDROCHLORIDE 50 MG/ML
12.5 INJECTION, SOLUTION INTRAMUSCULAR; INTRAVENOUS AS NEEDED
Status: DISCONTINUED | OUTPATIENT
Start: 2021-03-23 | End: 2021-03-23 | Stop reason: HOSPADM

## 2021-03-23 RX ORDER — SODIUM CHLORIDE, SODIUM LACTATE, POTASSIUM CHLORIDE, CALCIUM CHLORIDE 600; 310; 30; 20 MG/100ML; MG/100ML; MG/100ML; MG/100ML
75 INJECTION, SOLUTION INTRAVENOUS CONTINUOUS
Status: DISCONTINUED | OUTPATIENT
Start: 2021-03-23 | End: 2021-03-23 | Stop reason: HOSPADM

## 2021-03-23 RX ORDER — SODIUM CHLORIDE 9 MG/ML
25 INJECTION, SOLUTION INTRAVENOUS CONTINUOUS
Status: DISCONTINUED | OUTPATIENT
Start: 2021-03-23 | End: 2021-03-23 | Stop reason: HOSPADM

## 2021-03-23 RX ORDER — DEXAMETHASONE SODIUM PHOSPHATE 4 MG/ML
INJECTION, SOLUTION INTRA-ARTICULAR; INTRALESIONAL; INTRAMUSCULAR; INTRAVENOUS; SOFT TISSUE AS NEEDED
Status: DISCONTINUED | OUTPATIENT
Start: 2021-03-23 | End: 2021-03-23 | Stop reason: HOSPADM

## 2021-03-23 RX ORDER — SUCCINYLCHOLINE CHLORIDE 20 MG/ML
INJECTION INTRAMUSCULAR; INTRAVENOUS AS NEEDED
Status: DISCONTINUED | OUTPATIENT
Start: 2021-03-23 | End: 2021-03-23 | Stop reason: HOSPADM

## 2021-03-23 RX ORDER — SODIUM CHLORIDE 0.9 % (FLUSH) 0.9 %
5-40 SYRINGE (ML) INJECTION AS NEEDED
Status: DISCONTINUED | OUTPATIENT
Start: 2021-03-23 | End: 2021-03-23 | Stop reason: HOSPADM

## 2021-03-23 RX ORDER — ONDANSETRON 2 MG/ML
INJECTION INTRAMUSCULAR; INTRAVENOUS AS NEEDED
Status: DISCONTINUED | OUTPATIENT
Start: 2021-03-23 | End: 2021-03-23 | Stop reason: HOSPADM

## 2021-03-23 RX ORDER — MIDAZOLAM HYDROCHLORIDE 1 MG/ML
1 INJECTION, SOLUTION INTRAMUSCULAR; INTRAVENOUS AS NEEDED
Status: DISCONTINUED | OUTPATIENT
Start: 2021-03-23 | End: 2021-03-23 | Stop reason: HOSPADM

## 2021-03-23 RX ORDER — FENTANYL CITRATE 50 UG/ML
INJECTION, SOLUTION INTRAMUSCULAR; INTRAVENOUS AS NEEDED
Status: DISCONTINUED | OUTPATIENT
Start: 2021-03-23 | End: 2021-03-23 | Stop reason: HOSPADM

## 2021-03-23 RX ORDER — SODIUM CHLORIDE 0.9 % (FLUSH) 0.9 %
5-40 SYRINGE (ML) INJECTION EVERY 8 HOURS
Status: DISCONTINUED | OUTPATIENT
Start: 2021-03-23 | End: 2021-03-23 | Stop reason: HOSPADM

## 2021-03-23 RX ORDER — PHENYLEPHRINE HCL IN 0.9% NACL 0.4MG/10ML
SYRINGE (ML) INTRAVENOUS AS NEEDED
Status: DISCONTINUED | OUTPATIENT
Start: 2021-03-23 | End: 2021-03-23 | Stop reason: HOSPADM

## 2021-03-23 RX ORDER — MIDAZOLAM HYDROCHLORIDE 1 MG/ML
0.5 INJECTION, SOLUTION INTRAMUSCULAR; INTRAVENOUS
Status: DISCONTINUED | OUTPATIENT
Start: 2021-03-23 | End: 2021-03-23 | Stop reason: HOSPADM

## 2021-03-23 RX ORDER — FENTANYL CITRATE 50 UG/ML
25 INJECTION, SOLUTION INTRAMUSCULAR; INTRAVENOUS
Status: DISCONTINUED | OUTPATIENT
Start: 2021-03-23 | End: 2021-03-23 | Stop reason: HOSPADM

## 2021-03-23 RX ORDER — MORPHINE SULFATE 2 MG/ML
2 INJECTION, SOLUTION INTRAMUSCULAR; INTRAVENOUS
Status: DISCONTINUED | OUTPATIENT
Start: 2021-03-23 | End: 2021-03-23 | Stop reason: HOSPADM

## 2021-03-23 RX ORDER — ACETAMINOPHEN 325 MG/1
650 TABLET ORAL ONCE
Status: COMPLETED | OUTPATIENT
Start: 2021-03-23 | End: 2021-03-23

## 2021-03-23 RX ORDER — FENTANYL CITRATE 50 UG/ML
50 INJECTION, SOLUTION INTRAMUSCULAR; INTRAVENOUS AS NEEDED
Status: DISCONTINUED | OUTPATIENT
Start: 2021-03-23 | End: 2021-03-23 | Stop reason: HOSPADM

## 2021-03-23 RX ORDER — LIDOCAINE HYDROCHLORIDE 10 MG/ML
0.1 INJECTION, SOLUTION EPIDURAL; INFILTRATION; INTRACAUDAL; PERINEURAL AS NEEDED
Status: DISCONTINUED | OUTPATIENT
Start: 2021-03-23 | End: 2021-03-23 | Stop reason: HOSPADM

## 2021-03-23 RX ORDER — ONDANSETRON 2 MG/ML
4 INJECTION INTRAMUSCULAR; INTRAVENOUS AS NEEDED
Status: DISCONTINUED | OUTPATIENT
Start: 2021-03-23 | End: 2021-03-23 | Stop reason: HOSPADM

## 2021-03-23 RX ORDER — LIDOCAINE HYDROCHLORIDE 20 MG/ML
INJECTION, SOLUTION EPIDURAL; INFILTRATION; INTRACAUDAL; PERINEURAL AS NEEDED
Status: DISCONTINUED | OUTPATIENT
Start: 2021-03-23 | End: 2021-03-23 | Stop reason: HOSPADM

## 2021-03-23 RX ORDER — ROCURONIUM BROMIDE 10 MG/ML
INJECTION, SOLUTION INTRAVENOUS AS NEEDED
Status: DISCONTINUED | OUTPATIENT
Start: 2021-03-23 | End: 2021-03-23 | Stop reason: HOSPADM

## 2021-03-23 RX ORDER — PROPOFOL 10 MG/ML
INJECTION, EMULSION INTRAVENOUS AS NEEDED
Status: DISCONTINUED | OUTPATIENT
Start: 2021-03-23 | End: 2021-03-23 | Stop reason: HOSPADM

## 2021-03-23 RX ORDER — ROPIVACAINE HYDROCHLORIDE 5 MG/ML
30 INJECTION, SOLUTION EPIDURAL; INFILTRATION; PERINEURAL ONCE
Status: DISCONTINUED | OUTPATIENT
Start: 2021-03-23 | End: 2021-03-23 | Stop reason: HOSPADM

## 2021-03-23 RX ADMIN — FENTANYL CITRATE 25 MCG: 50 INJECTION, SOLUTION INTRAMUSCULAR; INTRAVENOUS at 07:46

## 2021-03-23 RX ADMIN — SUCCINYLCHOLINE CHLORIDE 180 MG: 20 INJECTION, SOLUTION INTRAMUSCULAR; INTRAVENOUS at 07:48

## 2021-03-23 RX ADMIN — CEFAZOLIN SODIUM 3 G: 10 INJECTION, POWDER, FOR SOLUTION INTRAVENOUS at 08:03

## 2021-03-23 RX ADMIN — ACETAMINOPHEN 650 MG: 325 TABLET ORAL at 07:16

## 2021-03-23 RX ADMIN — MEROPENEM 500 MG: 500 INJECTION, POWDER, FOR SOLUTION INTRAVENOUS at 19:20

## 2021-03-23 RX ADMIN — LIDOCAINE HYDROCHLORIDE 100 MG: 20 INJECTION, SOLUTION EPIDURAL; INFILTRATION; INTRACAUDAL; PERINEURAL at 07:48

## 2021-03-23 RX ADMIN — DEXAMETHASONE SODIUM PHOSPHATE 4 MG: 4 INJECTION, SOLUTION INTRAMUSCULAR; INTRAVENOUS at 08:11

## 2021-03-23 RX ADMIN — MEROPENEM 500 MG: 500 INJECTION, POWDER, FOR SOLUTION INTRAVENOUS at 03:48

## 2021-03-23 RX ADMIN — MEROPENEM 500 MG: 500 INJECTION, POWDER, FOR SOLUTION INTRAVENOUS at 12:42

## 2021-03-23 RX ADMIN — FENTANYL CITRATE 50 MCG: 50 INJECTION, SOLUTION INTRAMUSCULAR; INTRAVENOUS at 07:48

## 2021-03-23 RX ADMIN — Medication 80 MCG: at 07:53

## 2021-03-23 RX ADMIN — FENTANYL CITRATE 25 MCG: 50 INJECTION, SOLUTION INTRAMUSCULAR; INTRAVENOUS at 08:55

## 2021-03-23 RX ADMIN — FENTANYL CITRATE 25 MCG: 50 INJECTION, SOLUTION INTRAMUSCULAR; INTRAVENOUS at 08:50

## 2021-03-23 RX ADMIN — DOCUSATE SODIUM 50 MG AND SENNOSIDES 8.6 MG 1 TABLET: 8.6; 5 TABLET, FILM COATED ORAL at 11:18

## 2021-03-23 RX ADMIN — SODIUM CHLORIDE, POTASSIUM CHLORIDE, SODIUM LACTATE AND CALCIUM CHLORIDE 125 ML/HR: 600; 310; 30; 20 INJECTION, SOLUTION INTRAVENOUS at 07:00

## 2021-03-23 RX ADMIN — Medication 10 ML: at 14:00

## 2021-03-23 RX ADMIN — Medication 100 MCG: at 08:00

## 2021-03-23 RX ADMIN — Medication 120 MCG: at 08:05

## 2021-03-23 RX ADMIN — BENZOCAINE AND MENTHOL 1 LOZENGE: 15; 3.6 LOZENGE ORAL at 14:31

## 2021-03-23 RX ADMIN — PROPOFOL 150 MG: 10 INJECTION, EMULSION INTRAVENOUS at 07:48

## 2021-03-23 RX ADMIN — Medication 120 MCG: at 08:09

## 2021-03-23 RX ADMIN — FENTANYL CITRATE 25 MCG: 50 INJECTION, SOLUTION INTRAMUSCULAR; INTRAVENOUS at 07:44

## 2021-03-23 RX ADMIN — Medication 80 MCG: at 07:56

## 2021-03-23 RX ADMIN — ONDANSETRON HYDROCHLORIDE 4 MG: 2 INJECTION, SOLUTION INTRAMUSCULAR; INTRAVENOUS at 08:11

## 2021-03-23 RX ADMIN — ROCURONIUM BROMIDE 5 MG: 10 SOLUTION INTRAVENOUS at 07:48

## 2021-03-23 RX ADMIN — METHIMAZOLE 10 MG: 5 TABLET ORAL at 19:22

## 2021-03-23 RX ADMIN — OXYBUTYNIN CHLORIDE 10 MG: 5 TABLET, EXTENDED RELEASE ORAL at 11:18

## 2021-03-23 RX ADMIN — IRON SUCROSE 100 MG: 20 INJECTION, SOLUTION INTRAVENOUS at 11:18

## 2021-03-23 NOTE — H&P
UROLOGY HISTORY AND PHYSICAL    Patient: Amanda Aceves MRN: [id number]  SSN: [id number]    YOB: 1960  Age: 62 y.o. Sex: female          Date of Encounter:  March 23, 2021  Pre-existing [facility] Patient:            Assessment/Plan:  Hematuria, right ureteral obstruction. Cystoscopy with clot evacuation, right ureteral stent change. History of Present Illness:  Patient is a 62 y.o. female. She presents with hematuria and need for stent change. Past Medical History: Allergies   Allergen Reactions    Bacitracin Other (comments)     Syncope and lip swelling. Patient states used Neosporine to treat wound from hair dye and neosporine seeped into her bloodstream.       Prior to Admission medications    Medication Sig Start Date End Date Taking? Authorizing Provider   OXYBUTYNIN CHLORIDE PO Take  by mouth. Yes Hakeem, MD Juan Luis   methIMAzole (TAPAZOLE) 10 mg tablet Take  by mouth daily. Yes Juan Luis Palacio MD   meloxicam (MOBIC) 15 mg tablet Take 15 mg by mouth daily. Yes Hakeem, MD Juan Luis   iron/vitamin B complex (GERITOL PO) Take  by mouth. Yes Juan Luis Palacio MD   ascorbic acid, vitamin C, (Vitamin C) 500 mg tablet Take 500 mg by mouth two (2) times a day. Yes Juan Luis Palacio MD   pantoprazole (PROTONIX) 40 mg tablet Take 40 mg by mouth daily. Juan Luis Palacio MD   traMADoL (ULTRAM) 50 mg tablet Take  mg by mouth every eight (8) hours as needed for Pain. Juan Luis Palacio MD   senna-docusate (Senokot-S) 8.6-50 mg per tablet Take 1 Tab by mouth daily. Juan Luis Palacio MD   acetaminophen (TYLENOL) 500 mg tablet Take 500 mg by mouth every eight (8) hours as needed for Pain. Juan Luis Palacio MD   ondansetron hcl (Zofran) 8 mg tablet Take 8 mg by mouth every eight (8) hours as needed for Nausea or Vomiting. Juan Luis Palacio MD     PMHx:  has a past medical history of Diabetes ([address] Hernia, hiatal, Hypertension, Kidney stones, Osteoarthritis, and Renal failure.   PSurgHx:  has a past surgical history that includes hx other surgical and hx hysterectomy. PSocHx:  reports that she has never smoked. She has never used smokeless tobacco. She reports that she does not drink alcohol or use drugs. ROS:  negative other than above.     Physical Exam:            General:    appears nontoxic                     Skin:  no clubbing, cyanosis, edema                HEENT:  NCAT, O/P Clear        Throat/Neck:  supple, no LAD                 Chest[de-identified]  CTA      Heart[de-identified]  Regular rate and rhythm             Abdomen/Flank[de-identified]  No CVAT, non-tender soft abdomen, no masses             Bladder[de-identified]  Bladder not palpable     Lymph nodes:  no Cervical, supraclavicular, and axillary LAD     Lab Results   Component Value Date/Time    WBC 9.6 03/23/2021 05:41 AM    HCT 24.1 (L) 03/23/2021 05:41 AM    PLATELET 690 58/50/6221 05:41 AM    Sodium 144 03/23/2021 05:41 AM    Potassium 4.1 03/23/2021 05:41 AM    Chloride 114 (H) 03/23/2021 05:41 AM    CO2 24 03/23/2021 05:41 AM    BUN 41 (H) 03/23/2021 05:41 AM    Creatinine 2.16 (H) 03/23/2021 05:41 AM    Glucose 112 (H) 03/23/2021 05:41 AM    Calcium 8.7 03/23/2021 05:41 AM    Magnesium 2.0 07/25/2020 04:50 AM    Phosphorus 4.0 08/05/2020 03:01 AM       UA:   Lab Results   Component Value Date/Time    Color RED 03/18/2021 11:54 AM    Appearance CLEAR 03/18/2021 11:54 AM    Specific gravity 1.015 03/18/2021 11:54 AM    Specific gravity 1.008 07/25/2020 02:15 PM    pH (UA) 6.5 03/18/2021 11:54 AM    Protein 100 (A) 03/18/2021 11:54 AM    Glucose Negative 03/18/2021 11:54 AM    Ketone Negative 03/18/2021 11:54 AM    Bilirubin Negative 07/25/2020 02:15 PM    Urobilinogen 0.2 03/18/2021 11:54 AM    Nitrites Negative 03/18/2021 11:54 AM    Leukocyte Esterase LARGE (A) 03/18/2021 11:54 AM    Epithelial cells FEW 03/18/2021 11:54 AM    Bacteria Negative 03/18/2021 11:54 AM    WBC 5-10 03/18/2021 11:54 AM    RBC >100 (H) 03/18/2021 11:54 AM       Cultures:   Xrays:     Signed By: Claudine Lopez MD [place]  - March 23, 2021

## 2021-03-23 NOTE — PROGRESS NOTES
[address] [facility]  Hospitalist Group                                                                                          Hospitalist Progress Note  Elia Mckinley MD  Answering service: [phone] OR [phone] from in house phone        Date of Service:  3/23/2021  NAME:  Anselmo Williamson  :  1960  MRN:  [id number]      Admission Summary:   Anselmo Williamson is a 62 y.o. female with PMH of DM, HTN, Morbid obesity, Renal stone with hx of R ureteral stent for stones, came to [facility] for hematuria since morning, about 7 hours at that time. Pt is Jahovah's witness and does not take any blood products. Pt suggested to be doing ok until yesterday when started having vaginal bleeding with sharp, intense, moderate to severe, intermittent pains from R flank to groin, suprapubic and urethral meatus. No fever/ chills. Pt has had similar experience in past 2020. And this feels quite same. Pt is jahovah's witness and does not want blood transfusions.       Interval history / Subjective:   Plan for Cystoscopy with clot evacuation, right ureteral stent change per urology      Assessment & Plan:     Hematuria with  # R hydronephrosis vs mass in lower pole of R kidney  # Hx of R ureteral stent for renal stone, known to Urology  # UTI- Early SIRS on presenting with ED, with leukocytosis and tachycardia    - cont merrem  F/u cultures ID following   - plan for Cystoscopy with clot evacuation, right ureteral stent change. 3/23     #NO BLOOD PRODUCTS - [place] [facility] will give iron infusion hgb 7 expect to drop   # Continue Methimazole, PPI, Gabapentine, Oxybutynin and Sennakot  # CKD 4, baseline creat around 2.5, Creatinine 2.39 on admission--2.57   # Morbid obesity, BMI 60.66    Code status: Full  DVT prophylaxis: SCD     Care Plan discussed with: Patient/Family and Nurse  Anticipated Disposition: Home w/Family  Anticipated Discharge: 24 hours to 48 hours     Hospital Problems  Date Reviewed: 2020 Codes Class Noted POA    Hematuria ICD-10-CM: R31.9  ICD-9-CM: 599.70  7/17/2020 Unknown                Review of Systems:   A comprehensive review of systems was negative. Vital Signs:    Last 24hrs VS reviewed since prior progress note. Most recent are:  Visit Vitals  /63 (BP 1 Location: Right upper arm, BP Patient Position: At rest)   Pulse 93   Temp 98 °F (36.7 °C)   Resp 16   Ht 5' 4\" (1.626 m)   Wt (!) 160.3 kg (353 lb 6.4 oz)   SpO2 90%   BMI 60.66 kg/m²         Intake/Output Summary (Last 24 hours) at 3/23/2021 1031  Last data filed at 3/23/2021 0910  Gross per 24 hour   Intake 750 ml   Output    Net 750 ml        Physical Examination:     I had a face to face encounter with this patient and independently examined them on 3/23/2021 as outlined below:          Constitutional:  No acute distress, cooperative    ENT:  mmm   Resp:  CTA bilaterally. CV:  Regular rhythm, normal rate    GI:  Soft, non distended, non tender. bs+    Musculoskeletal:  No edema, warm, 2+ pulses throughout    Neurologic:  Moves all extremities. AAOx3, CN II-XII reviewed     Psych:  Good insight, Not anxious nor agitated. Data Review:    I personally reviewed  Image and labs      Labs:     Recent Labs     03/23/21  0541 03/22/21  0541   WBC 9.6 10.5   HGB 7.2* 7.6*   HCT 24.1* 25.4*    243     Recent Labs     03/23/21  0541 03/22/21  0540 03/21/21  0358    142 145   K 4.1 4.4 4.7   * 115* 117*   CO2 24 23 23   BUN 41* 43* 41*   CREA 2.16* 2.57* 2.28*   * 123* 105*   CA 8.7 8.3* 8.3*     Recent Labs     03/23/21  0541 03/22/21  0540 03/21/21  0358   ALT 8* 9* 9*   AP 60 62 66   TBILI 0.1* 0.1* 0.2   TP 6.8 6.9 6.1*   ALB 2.5* 2.4* 2.6*   GLOB 4.3* 4.5* 3.5     No results for input(s): INR, PTP, APTT, INREXT, INREXT in the last 72 hours. No results for input(s): FE, TIBC, PSAT, FERR in the last 72 hours.    No results found for: FOL, RBCF   No results for input(s): PH, PCO2, PO2 in the last 72 hours. No results for input(s): CPK, CKNDX, TROIQ in the last 72 hours.     No lab exists for component: CPKMB  No results found for: CHOL, CHOLX, CHLST, CHOLV, HDL, HDLP, LDL, LDLC, DLDLP, TGLX, TRIGL, TRIGP, CHHD, CHHDX  Lab Results   Component Value Date/Time    Glucose (POC) 104 (H) 03/23/2021 09:18 AM    Glucose (POC) 113 (H) 03/23/2021 06:19 AM    Glucose (POC) 118 (H) 03/22/2021 09:07 PM    Glucose (POC) 111 (H) 03/22/2021 05:04 PM    Glucose (POC) 141 (H) 03/22/2021 11:58 AM     Lab Results   Component Value Date/Time    Color RED 03/18/2021 11:54 AM    Appearance CLEAR 03/18/2021 11:54 AM    Specific gravity 1.015 03/18/2021 11:54 AM    Specific gravity 1.008 07/25/2020 02:15 PM    pH (UA) 6.5 03/18/2021 11:54 AM    Protein 100 (A) 03/18/2021 11:54 AM    Glucose Negative 03/18/2021 11:54 AM    Ketone Negative 03/18/2021 11:54 AM    Bilirubin Negative 07/25/2020 02:15 PM    Urobilinogen 0.2 03/18/2021 11:54 AM    Nitrites Negative 03/18/2021 11:54 AM    Leukocyte Esterase LARGE (A) 03/18/2021 11:54 AM    Epithelial cells FEW 03/18/2021 11:54 AM    Bacteria Negative 03/18/2021 11:54 AM    WBC 5-10 03/18/2021 11:54 AM    RBC >100 (H) 03/18/2021 11:54 AM         Medications Reviewed:     Current Facility-Administered Medications   Medication Dose Route Frequency    iron sucrose (VENOFER) 100 mg in 0.9% sodium chloride 100 mL IVPB  100 mg IntraVENous Q24H    traMADoL (ULTRAM) tablet 50 mg  50 mg Oral Q6H PRN    hydrOXYzine HCL (ATARAX) tablet 25 mg  25 mg Oral Q6H PRN    fentaNYL citrate (PF) injection 25 mcg  25 mcg IntraVENous Q4H PRN    opium-belladonna (B&O 15-A) 16.2-30 mg suppository 1 Suppository  1 Suppository Rectal Q8H PRN    sodium chloride (NS) flush 5-40 mL  5-40 mL IntraVENous Q8H    sodium chloride (NS) flush 5-40 mL  5-40 mL IntraVENous PRN    acetaminophen (TYLENOL) tablet 650 mg  650 mg Oral Q6H PRN    Or    acetaminophen (TYLENOL) suppository 650 mg  650 mg Rectal Q6H PRN    polyethylene glycol (MIRALAX) packet 17 g  17 g Oral DAILY PRN    methIMAzole (TAPAZOLE) tablet 10 mg  10 mg Oral QPM    oxybutynin chloride XL (DITROPAN XL) tablet 10 mg  10 mg Oral DAILY    pantoprazole (PROTONIX) tablet 40 mg  40 mg Oral ACB    senna-docusate (PERICOLACE) 8.6-50 mg per tablet 1 Tab  1 Tab Oral DAILY    glucose chewable tablet 16 g  4 Tab Oral PRN    dextrose (D50W) injection syrg 12.5-25 g  25-50 mL IntraVENous PRN    glucagon (GLUCAGEN) injection 1 mg  1 mg IntraMUSCular PRN    insulin lispro (HUMALOG) injection   SubCUTAneous AC&HS    morphine injection 1 mg  1 mg IntraVENous Q2H PRN    meropenem (MERREM) 500 mg in 0.9% sodium chloride (MBP/ADV) 50 mL MBP  0.5 g IntraVENous Q8H     ______________________________________________________________________  EXPECTED LENGTH OF STAY: 2d 7h  ACTUAL LENGTH OF STAY:          5                 James Parra MD

## 2021-03-23 NOTE — PERIOP NOTES
TRANSFER - IN REPORT:    Verbal report received from [facility] on [address]  being received from [phone] for routine progression of care      Report consisted of patients Situation, Background, Assessment and   Recommendations(SBAR). Information from the following report(s) SBAR, Intake/Output, MAR and Recent Results was reviewed with the receiving nurse. Opportunity for questions and clarification was provided. Assessment completed upon patients arrival to unit and care assumed.

## 2021-03-23 NOTE — ANESTHESIA POSTPROCEDURE EVALUATION
Post-Anesthesia Evaluation and Assessment    Patient: Ildefonso Reyes MRN: [id number]  SSN: [id number]    YOB: 1960  Age: 62 y.o. Sex: female      I have evaluated the patient and they are stable and ready for discharge from the PACU. Cardiovascular Function/Vital Signs  Visit Vitals  /60   Pulse 96   Temp 36.8 °C (98.3 °F)   Resp 12   Ht 5' 4\" (1.626 m)   Wt (!) 160.3 kg (353 lb 6.4 oz)   SpO2 99%   BMI 60.66 kg/m²       Patient is status post General anesthesia for Procedure(s):  CYSTOSCOPY CLOT EVACUATION, RIGHT STENT EXCHANGE (URGENT). Nausea/Vomiting: None    Postoperative hydration reviewed and adequate. Pain:  Pain Scale 1: Numeric (0 - 10) (03/23/21 0900)  Pain Intensity 1: 3 (03/23/21 0900)   Managed    Neurological Status:   Neuro (WDL): Within Defined Limits (03/23/21 0845)  Neuro  LLE Motor Response: Weak (03/23/21 0845)  RLE Motor Response: Weak (03/23/21 0845)   At baseline    Mental Status, Level of Consciousness: Alert and  oriented to person, place, and time    Pulmonary Status:   O2 Device: Room air (03/23/21 0905)   Adequate oxygenation and airway patent    Complications related to anesthesia: None    Post-anesthesia assessment completed. No concerns    Signed By: Anthony Mcallister MD     March 23, 2021              Procedure(s):  CYSTOSCOPY CLOT EVACUATION, RIGHT STENT EXCHANGE (URGENT).     general    <BSHSIANPOST>    INITIAL Post-op Vital signs:   Vitals Value Taken Time   /60 03/23/21 0905   Temp 36.8 °C (98.3 °F) 03/23/21 0905   Pulse 96 03/23/21 0905   Resp 12 03/23/21 0905   SpO2 99 % 03/23/21 0905

## 2021-03-23 NOTE — ROUTINE PROCESS
Bedside shift change report given to JOHN Champion (oncoming nurse) by Walter Collins RN(offgoing nurse). Report given with SBAR.

## 2021-03-23 NOTE — PROGRESS NOTES
Progress Note    Patient: Aleta Gardner MRN: [id number]  SSN: [id number]    YOB: 1960  Age: 62 y.o. Sex: female        ADMITTED:  3/18/2021 to Yareli Odonnell MD  for Hematuria [R31.9]         Aleta Gardner is POD 1 of Surgery Procedure(s):  CYSTOSCOPY CLOT EVACUATION, RIGHT STENT EXCHANGE (URGENT). POD #1. Per op note, Bladder urothelium and anatomy appeared normal other than stent irritation. There were no clots or active bleeding at the bladder level. Patient seen in the bed. She reports onset of an \"egg\" taste in her mouth & reflux last evening followed by vomiting after eating a few crackers. Reports some relief with zofran. She reports +flatus. She has no pain. Patient is voiding without difficulty. External female catheter in place with clear yellow UA, no clots. She denies stent colic. AFVSS  WBC 9.6  Hgb 10.4 -> 7.2 ->7.6  Cr 2.19 (was 2.45 in 2020)  UA +large leuks, 5-10 WBCs, >100 RBCs, no bacteria.   UCx NG  BCx NGTD  +meropenem  +oxybutynin and B&O supp.        Vitals:  Temp (24hrs), Av.1 °F (36.7 °C), Min:97.8 °F (36.6 °C), Max:98.3 °F (36.8 °C)     Blood pressure 130/77, pulse (!) 101, temperature 97.9 °F (36.6 °C), resp. rate 16, height 5' 4\" (1.626 m), weight (!) 160.3 kg (353 lb 6.4 oz), SpO2 95 %. I&O's:  No intake/output data recorded.  0701 -  1900  In: 750 [P.O.:50; I.V.:700]  Out: 600 [Urine:600]     Exam:   abdomen soft, voiding independently with clear urine output.      Labs:   Recent Labs     21  0541 21  0541 21  0540 21  0358   WBC 9.6 10.5  --  11.1*   HGB 7.2* 7.6* 7.2* 7.7*   HCT 24.1* 25.4* 23.8* 26.1*    243  --  250     Recent Labs     21  0541 21  0540 21  0358    142 145   K 4.1 4.4 4.7   * 115* 117*   CO2 24 23 23   * 123* 105*   BUN 41* 43* 41*   CREA 2.16* 2.57* 2.28*   CA 8.7 8.3* 8.3*        Cultures:      Imaging: Assessment:     - Day of Surgery Procedure(s):  CYSTOSCOPY CLOT EVACUATION, RIGHT STENT EXCHANGE (URGENT)  POD #1  Active Problems:    Hematuria (7/17/2020)        Plan:     - Hematuria resolved. Hgb improving. Okay to DC home from Urology standpoint if Hgb continues to trend upward. Will arrange outpatient follow-up with [facility]. - Management of reflux per primary team.   - Please call with questions or concerns. [phone]. Supervising MD, Dr. Simon Garner.    Signed By: Jamee Roth NP - March 23, 2021

## 2021-03-23 NOTE — PERIOP NOTES
PATIENT INTERVIEWED IN PREOP. NAME AND ALLERGY  BAND VISIBLE AND CORRECT PER PATIENT. PATIENT HAS UNDERSTANDING OF PROCEDURE AND SURGICAL SITE. EDUCATIONAL NEEDS MET. PATIENT STATES KNEE PAIN AT 3-7.

## 2021-03-23 NOTE — BRIEF OP NOTE
Brief Postoperative Note    Patient: Alexsander Samuels  YOB: 1960  MRN: [id number]    Date of Procedure: 3/23/2021     Pre-Op Diagnosis: HEMATURIA    Post-Op Diagnosis: Same as preoperative diagnosis.       Procedure(s):  CYSTOSCOPY CLOT EVACUATION, RIGHT STENT EXCHANGE (URGENT)    Surgeon(s):  Naveen Daley MD    Surgical Assistant: None    Anesthesia: General     Estimated Blood Loss (mL): Minimal    Complications: None    Specimens: * No specimens in log *     Implants: * No implants in log *    Drains: * No LDAs found *    Findings: hematuria; right hydronephrosis    Electronically Signed by Jaycee Dewitt MD on 3/23/2021 at 8:39 AM

## 2021-03-23 NOTE — OP NOTES
[address] [facility]  OPERATIVE REPORT    Name:  Kristi Harris  MR#:  [id number]  :  1960  ACCOUNT #:  [de-identified]  DATE OF SERVICE:  2021      PREOPERATIVE DIAGNOSES:  Right upper tract obstruction, hematuria, history of renal mass. POSTOPERATIVE DIAGNOSES:  Right upper tract obstruction, hematuria, history of renal mass. PROCEDURES PERFORMED:  Cystoscopy, right retrograde pyelogram, right double-J stent exchange 6-Dominican x 26 cm. SURGEON:  Marian Mojica MD    ASSISTANT:  OR staff. ANESTHESIA:  General.    COMPLICATIONS:  No complications. SPECIMENS REMOVED:  No specimen. IMPLANTS:  None. ESTIMATED BLOOD LOSS:  0.    INDICATIONS:  A 21-year-old bedridden female with a history of right upper tract obstruction, atrophic left kidney and renal insufficiency who has had ongoing gross hematuria and concern for possible bladder clots in need of a stent change. DESCRIPTION OF PROCEDURE:  After informed consent, the patient was brought back to the operative suite where she received general anesthesia, placed in a comfortable dorsal lithotomy position. Genitalia was prepped and draped in usual sterile fashion. A rigid cystoscopy was undertaken transurethrally. No significant urethral erosion. Bladder urothelium and anatomy appeared normal other than stent irritation. There were no clots or active bleeding at the bladder level. The distal end of the right double-J stent was grasped from the right ureteral orifice and brought out through the urethral meatus and cannulated with a guidewire. Guidewire was seen to traverse across the midline to the left side and then in the lumbar region and then back across to the right renal pelvis. Wire was offloaded after placement of a 5-Dominican open-ended catheter.   General retrograde was performed to opacify the collecting system and a new stent was placed, 6-Dominican x 26 cm, seen to coil proximally at the kidney and in the bladder distally. The patient's bladder was evacuated. She was transferred to the recovery area in good condition. No complications.       Almita Romo MD [id number]  D:  03/23/2021 14:36  T:  03/23/2021 15:28  JOB #:  [id number]

## 2021-03-23 NOTE — PERIOP NOTES
Patient: Janneth Rabago MRN: [id number]  SSN: [id number]   YOB: 1960  Age: 62 y.o. Sex: female     Patient is status post Procedure(s):  CYSTOSCOPY CLOT EVACUATION, RIGHT STENT EXCHANGE (URGENT). Surgeon(s) and Role:     * Ena Rodriguez MD - Primary    Local/Dose/Irrigation:                   Peripheral IV 03/20/21 Anterior; Left Forearm (Active)   Site Assessment Clean, dry, & intact 03/22/21 2021   Phlebitis Assessment 0 03/22/21 2021   Infiltration Assessment 0 03/22/21 2021   Dressing Status Clean, dry, & intact 03/22/21 2021   Dressing Type Transparent 03/22/21 2021   Hub Color/Line Status Infusing 03/22/21 2021   Action Taken Open ports on tubing capped 03/22/21 0800   Alcohol Cap Used Yes 03/22/21 0800                           Dressing/Packing:       Splint/Cast:  ]    Other:

## 2021-03-23 NOTE — PERIOP NOTES
CONTOUR SOFT [address] [facility], LOT# [id number], REF# [id number], EXP 11/25/23.

## 2021-03-23 NOTE — PERIOP NOTES
TRANSFER - OUT REPORT:    Verbal report given to Aditya Jacobs RN(name) on Harrison Varela  being transferred to [facility](unit) for routine post - op       Report consisted of patients Situation, Background, Assessment and   Recommendations(SBAR). Information from the following report(s) SBAR, Kardex, OR Summary, Procedure Summary, Intake/Output, MAR and Cardiac Rhythm NSR was reviewed with the receiving nurse. Lines:   Peripheral IV 03/20/21 Anterior; Left Forearm (Active)   Site Assessment Clean, dry, & intact 03/23/21 0845   Phlebitis Assessment 0 03/23/21 0845   Infiltration Assessment 0 03/23/21 0845   Dressing Status Clean, dry, & intact 03/23/21 0845   Dressing Type Transparent;Tape 03/23/21 0845   Hub Color/Line Status Capped 03/23/21 0845   Action Taken Open ports on tubing capped 03/23/21 0845   Alcohol Cap Used Yes 03/23/21 0845       Peripheral IV 03/23/21 Left;Posterior Wrist (Active)   Site Assessment Clean, dry, & intact 03/23/21 0845   Phlebitis Assessment 0 03/23/21 0845   Infiltration Assessment 0 03/23/21 0845   Dressing Status Clean, dry, & intact 03/23/21 0845   Dressing Type Transparent;Tape 03/23/21 0845   Hub Color/Line Status Blue; Infusing 03/23/21 0845   Action Taken Open ports on tubing capped 03/23/21 0845   Alcohol Cap Used Yes 03/23/21 0845        Opportunity for questions and clarification was provided.       Patient transported with:   [employer]

## 2021-03-23 NOTE — PROGRESS NOTES
ID Progress Note  3/23/2021    Subjective:     Denies any discomfort. Feeling tired from anesthesia  Review of Systems:            Symptom Y/N Comments   Symptom Y/N Comments   Fever/Chills  n     Chest Pain n       Poor Appetite       Edema        Cough       Abdominal Pain n      Sputum       Joint Pain        SOB/MCGHEE n      Pruritis/Rash        Nausea/vomit  n     Tolerating PT/OT        Diarrhea  n     Tolerating Diet        Constipation  n     Other           Could NOT obtain due to:       Objective:     Vitals:   Visit Vitals  BP (!) 120/57 (BP 1 Location: Right upper arm, BP Patient Position: At rest)   Pulse 87   Temp 98.1 °F (36.7 °C)   Resp 20   Ht 5' 4\" (1.626 m)   Wt (!) 160.3 kg (353 lb 6.4 oz)   SpO2 97%   BMI 60.66 kg/m²        Tmax:  Temp (24hrs), Av.3 °F (36.8 °C), Min:97.9 °F (36.6 °C), Max:98.6 °F (37 °C)      PHYSICAL EXAM:  General: morbidly obese, WD, WN. Alert, cooperative, no acute distress    EENT:  EOMI. Anicteric sclerae. MMM  Resp:  CTA bilaterally, no wheezing or rales. No accessory muscle use  CV:  Regular  rhythm,  No edema  GI:  Soft, Non distended, Non tender. +Bowel sounds  Neurologic:  Alert and oriented X 3, normal speech,   Psych:   Good insight. Not anxious nor agitated  Skin:  No rashes.   No jaundice    Labs:   Lab Results   Component Value Date/Time    WBC 9.6 2021 05:41 AM    HGB 7.2 (L) 2021 05:41 AM    HCT 24.1 (L) 2021 05:41 AM    PLATELET 582 79/15/9960 05:41 AM    MCV 93.8 2021 05:41 AM     Lab Results   Component Value Date/Time    Sodium 144 2021 05:41 AM    Potassium 4.1 2021 05:41 AM    Chloride 114 (H) 2021 05:41 AM    CO2 24 2021 05:41 AM    Anion gap 6 2021 05:41 AM    Glucose 112 (H) 2021 05:41 AM    BUN 41 (H) 2021 05:41 AM    Creatinine 2.16 (H) 2021 05:41 AM    BUN/Creatinine ratio 19 2021 05:41 AM    GFR est AA 28 (L) 2021 05:41 AM    GFR est non-AA 23 (L) 2021 05:41 AM    Calcium 8.7 03/23/2021 05:41 AM    Bilirubin, total 0.1 (L) 03/23/2021 05:41 AM    Alk. phosphatase 60 03/23/2021 05:41 AM    Protein, total 6.8 03/23/2021 05:41 AM    Albumin 2.5 (L) 03/23/2021 05:41 AM    Globulin 4.3 (H) 03/23/2021 05:41 AM    A-G Ratio 0.6 (L) 03/23/2021 05:41 AM    ALT (SGPT) 8 (L) 03/23/2021 05:41 AM       CT of abd/pel: Right ureteral stent is noted in position with the proximal end in the upper pole right kidney and the distal end in the urinary bladder which is filled with high density may represent hemorrhage. There is right hydronephrosis versus a mass in the lower pole of the right kidney. The left kidney is small and there is mild left hydroureteronephrosis, a large abdominal wall hernia in the left containing stomach, small  bowel and colon which is incompletely imaged.       Assessment and Plan   UTI with hx of MDR klebsiella pneumoniae; ruled out - urine cx (3/18) no growth  Leukocytosis   Hematuria  Hx of Right ureteral obstruction s/p Right ureteral stent exchange 11/24/20  S/p clot evacuation, right stent exchange (3/23)  - WBC 18.6k->10.5k, afebrile     Blood cx (3/19) no growth so far      Urology following     Continue with IV merrem, will d/c tomorrow         Yuriy Helton NP

## 2021-03-23 NOTE — PROGRESS NOTES
Progress Note    Patient: Saumya Ren MRN: [id number]  SSN: [id number]    YOB: 1960  Age: 62 y.o. Sex: female        ADMITTED:  3/18/2021 to Naima Haddad MD  for Hematuria [R31.9]         Saumya Ren is Day of Surgery Procedure(s):  CYSTOSCOPY CLOT EVACUATION, RIGHT STENT EXCHANGE (URGENT). Patient seen in the bed post-op. She is doing well. She has no pain. She has no nausea. She is tolerating a solid diet. Patient is voiding without difficulty. External female catheter in place with clear yellow UA, no clots. She denies stent colic. AFVSS  WBC 9.6  Hgb 10.4 -> 7.2  Cr 2.16 (was 2.45 in 2020)  UA +large leuks, 5-10 WBCs, >100 RBCs, no bacteria. UCx NG  BCx NGTD  +meropenem  +oxybutynin and B&O supp. Vitals:  Temp (24hrs), Av.1 °F (36.7 °C), Min:97.8 °F (36.6 °C), Max:98.6 °F (37 °C)     Blood pressure 130/77, pulse (!) 101, temperature 97.9 °F (36.6 °C), resp. rate 16, height 5' 4\" (1.626 m), weight (!) 160.3 kg (353 lb 6.4 oz), SpO2 95 %. I&O's:  No intake/output data recorded. 701 -  190  In: 750 [P.O.:50; I.V.:700]  Out: -      Exam:   abdomen soft, Purewick with clear urine output. Labs:   Recent Labs     21  0541 21  0541 21  0540 21  0358   WBC 9.6 10.5  --  11.1*   HGB 7.2* 7.6* 7.2* 7.7*   HCT 24.1* 25.4* 23.8* 26.1*    243  --  250     Recent Labs     21  0541 21  0540 21  0358    142 145   K 4.1 4.4 4.7   * 115* 117*   CO2 24 23 23   * 123* 105*   BUN 41* 43* 41*   CREA 2.16* 2.57* 2.28*   CA 8.7 8.3* 8.3*        Cultures:      Imaging:       Assessment:     - Day of Surgery Procedure(s):  CYSTOSCOPY CLOT EVACUATION, RIGHT STENT EXCHANGE (URGENT)    Active Problems:    Hematuria (2020)        Plan:     - B&O suppository PRN stent colic/bladder spasms.   - AM labs. - Will follow. Supervising MD, Dr. Flower Coronado.    Signed By: Izzy Marquez, NP - March 23, 2021

## 2021-03-23 NOTE — PROGRESS NOTES
Bedside and Verbal shift change report given to 1615 Yumiko Michael (oncoming nurse) by Preethi Gonzalez RN (offgoing nurse). Report included the following information SBAR, Kardex and MAR.

## 2021-03-24 LAB
ALBUMIN SERPL-MCNC: 2.7 G/DL (ref 3.5–5)
ALBUMIN/GLOB SERPL: 0.6 {RATIO} (ref 1.1–2.2)
ALP SERPL-CCNC: 66 U/L (ref 45–117)
ALT SERPL-CCNC: 9 U/L (ref 12–78)
ANION GAP SERPL CALC-SCNC: 6 MMOL/L (ref 5–15)
AST SERPL-CCNC: 8 U/L (ref 15–37)
BACTERIA SPEC CULT: NORMAL
BACTERIA SPEC CULT: NORMAL
BILIRUB SERPL-MCNC: 0.1 MG/DL (ref 0.2–1)
BUN SERPL-MCNC: 37 MG/DL (ref 6–20)
BUN/CREAT SERPL: 17 (ref 12–20)
CALCIUM SERPL-MCNC: 8.9 MG/DL (ref 8.5–10.1)
CHLORIDE SERPL-SCNC: 113 MMOL/L (ref 97–108)
CO2 SERPL-SCNC: 23 MMOL/L (ref 21–32)
CREAT SERPL-MCNC: 2.19 MG/DL (ref 0.55–1.02)
GLOBULIN SER CALC-MCNC: 4.4 G/DL (ref 2–4)
GLUCOSE BLD STRIP.AUTO-MCNC: 125 MG/DL (ref 65–100)
GLUCOSE BLD STRIP.AUTO-MCNC: 138 MG/DL (ref 65–100)
GLUCOSE BLD STRIP.AUTO-MCNC: 153 MG/DL (ref 65–100)
GLUCOSE BLD STRIP.AUTO-MCNC: 156 MG/DL (ref 65–100)
GLUCOSE SERPL-MCNC: 125 MG/DL (ref 65–100)
HCT VFR BLD AUTO: 25 % (ref 35–47)
HGB BLD-MCNC: 7.6 G/DL (ref 11.5–16)
POTASSIUM SERPL-SCNC: 4 MMOL/L (ref 3.5–5.1)
PROT SERPL-MCNC: 7.1 G/DL (ref 6.4–8.2)
SERVICE CMNT-IMP: ABNORMAL
SERVICE CMNT-IMP: NORMAL
SERVICE CMNT-IMP: NORMAL
SODIUM SERPL-SCNC: 142 MMOL/L (ref 136–145)

## 2021-03-24 PROCEDURE — 80053 COMPREHEN METABOLIC PANEL: CPT

## 2021-03-24 PROCEDURE — 74011250636 HC RX REV CODE- 250/636: Performed by: UROLOGY

## 2021-03-24 PROCEDURE — 85014 HEMATOCRIT: CPT

## 2021-03-24 PROCEDURE — 97535 SELF CARE MNGMENT TRAINING: CPT

## 2021-03-24 PROCEDURE — 36415 COLL VENOUS BLD VENIPUNCTURE: CPT

## 2021-03-24 PROCEDURE — 97165 OT EVAL LOW COMPLEX 30 MIN: CPT

## 2021-03-24 PROCEDURE — 74011250637 HC RX REV CODE- 250/637: Performed by: UROLOGY

## 2021-03-24 PROCEDURE — 74011250636 HC RX REV CODE- 250/636: Performed by: NURSE PRACTITIONER

## 2021-03-24 PROCEDURE — 97530 THERAPEUTIC ACTIVITIES: CPT

## 2021-03-24 PROCEDURE — 65270000029 HC RM PRIVATE

## 2021-03-24 PROCEDURE — 97162 PT EVAL MOD COMPLEX 30 MIN: CPT

## 2021-03-24 PROCEDURE — 74011000258 HC RX REV CODE- 258: Performed by: UROLOGY

## 2021-03-24 PROCEDURE — 94760 N-INVAS EAR/PLS OXIMETRY 1: CPT

## 2021-03-24 PROCEDURE — 82962 GLUCOSE BLOOD TEST: CPT

## 2021-03-24 RX ORDER — ONDANSETRON 2 MG/ML
4 INJECTION INTRAMUSCULAR; INTRAVENOUS
Status: DISCONTINUED | OUTPATIENT
Start: 2021-03-24 | End: 2021-03-30 | Stop reason: HOSPADM

## 2021-03-24 RX ADMIN — DOCUSATE SODIUM 50 MG AND SENNOSIDES 8.6 MG 1 TABLET: 8.6; 5 TABLET, FILM COATED ORAL at 12:01

## 2021-03-24 RX ADMIN — MEROPENEM 500 MG: 500 INJECTION, POWDER, FOR SOLUTION INTRAVENOUS at 05:28

## 2021-03-24 RX ADMIN — IRON SUCROSE 100 MG: 20 INJECTION, SOLUTION INTRAVENOUS at 19:52

## 2021-03-24 RX ADMIN — Medication 10 ML: at 20:02

## 2021-03-24 RX ADMIN — TRAMADOL HYDROCHLORIDE 50 MG: 50 TABLET, COATED ORAL at 00:36

## 2021-03-24 RX ADMIN — PANTOPRAZOLE SODIUM 40 MG: 40 TABLET, DELAYED RELEASE ORAL at 12:01

## 2021-03-24 RX ADMIN — METHIMAZOLE 10 MG: 5 TABLET ORAL at 20:07

## 2021-03-24 RX ADMIN — TRAMADOL HYDROCHLORIDE 50 MG: 50 TABLET, COATED ORAL at 12:01

## 2021-03-24 RX ADMIN — ONDANSETRON 4 MG: 2 INJECTION INTRAMUSCULAR; INTRAVENOUS at 06:41

## 2021-03-24 NOTE — PROGRESS NOTES
Bedside shift change report given to [facility] (oncoming nurse) by Harman Melchor (offgoing nurse). Report included the following information SBAR.

## 2021-03-24 NOTE — PROGRESS NOTES
JOESPH: Referral pending with [facility] and [address] [facility] rehab. Insurance Kristen Friends will need to be obtained prior to discharge. BLS transport at discharge. Chart reviewed. CM attempted to meet with patient at bedside. Patient was sleeping and did not respond to CM. CM called the patient's sister, Daxa Hew #[phone]. The sister stated that patient is very motivated to get rehab and be able to walk again. CM discussed options for IPR and the sister would like referrals sent to [facility] and [address] CM sent referrals. CM updated patient at bedside, patient in agreement with the plan.     JOSE De La O/CRM

## 2021-03-24 NOTE — PROGRESS NOTES
Spiritual Care Assessment/Progress Note  ST. [address]      NAME: Cristo Yeung      MRN: [id number]  AGE: 62 y.o. SEX: female  Buddhist Affiliation: Jehovah witness   Language: English     3/24/2021     Total Time (in minutes): 5     Spiritual Assessment begun in [address] through conversation with:         []Patient        [] Family    [] Friend(s)        Reason for Consult: Initial/Spiritual assessment, patient floor     Spiritual beliefs: (Please include comment if needed)     [] Identifies with a elizabeth tradition:         [] Supported by a elizabeth community:            [] Claims no spiritual orientation:           [] Seeking spiritual identity:                [] Adheres to an individual form of spirituality:           [x] Not able to assess:                           Identified resources for coping:      [] Prayer                               [] Music                  [] Guided Imagery     [] Family/friends                 [] Pet visits     [] Devotional reading                         [x] Unknown     [] Other:                                               Interventions offered during this visit: (See comments for more details)                Plan of Care:     [] Support spiritual and/or cultural needs    [] Support AMD and/or advance care planning process      [] Support grieving process   [] Coordinate Rites and/or Rituals    [] Coordination with community clergy   [] No spiritual needs identified at this time   [] Detailed Plan of Care below (See Comments)  [] Make referral to Music Therapy  [] Make referral to Pet Therapy     [] Make referral to Addiction services  [] Make referral to [facility]  [] Make referral to Spiritual Care Partner  [] No future visits requested        [x] Follow up upon further referrals     Comments:  visit for initial spiritual assessment. Staff with patient providing care at the time of this visit.   Please contact spiritual care for further referral or consult. Rev.  Wendel Aase, MDiv, [facility], [address] [facility] [place] service: [phone]

## 2021-03-24 NOTE — PROGRESS NOTES
Problem: Self Care Deficits Care Plan (Adult)  Goal: *Acute Goals and Plan of Care (Insert Text)  Description:   FUNCTIONAL STATUS PRIOR TO ADMISSION: Patient was primarily bed bound secondary to LE and buttocks wounds. Patient previously use to roll and sit EOB, but has not done so due to poor wound healing. Patient requires assistance with bathing, dressing, and getting food due to decreased mobility from her sister, with whom she lives. HOME SUPPORT PRIOR TO ADMISSION: Patient lives with her sister at this time to provide assistance with ADL completion    Occupational Therapy Goals  Initiated 3/24/2021  1. Patient will perform grooming with supervision/set-up sitting EOB within 7 day(s). 2.  Patient will perform bathing with minimal assistance/contact guard assist within 7 day(s). 3.  Patient will perform upper body dressing with supervision/set-up within 7 day(s). 4.  Patient will perform toilet transfers to [facility] with moderate assistance  within 7 day(s). 5.  Patient will perform all aspects of toileting with moderate assistance  within 7 day(s). 6.  Patient will participate in upper extremity therapeutic exercise/activities with minimal assistance/contact guard assist for 10 minutes within 7 day(s). 7.  Patient will utilize energy conservation techniques during functional activities with verbal cues within 7 day(s). Outcome: Progressing Towards Goal    OCCUPATIONAL THERAPY EVALUATION  Patient: Macey Sandoval (21 y.o. female)  Date: 3/24/2021  Primary Diagnosis: Hematuria [R31.9]  Procedure(s) (LRB):  CYSTOSCOPY CLOT EVACUATION, RIGHT STENT EXCHANGE (URGENT) (Right) 1 Day Post-Op   Precautions:  Fall    ASSESSMENT  Based on the objective data described below, the patient presents with decreased independence with self care and functional mobility following admission for hematuria and a cystoscopy clot evacuation, right stent exchange Post Op Day #1.  Patient continues to be limited by decreased endurance, decreased activity tolerance, wound healing, decreased strength, decreased functional mobility. Patient received supine in bed and was agreeable to therapy. Patient able to complete rolling to both sides with min A and min verbal cues. Min A to complete supine to sit and sit at the EOB using the bed rails and the HOB elevated. Patient able to sit at the EOB for approximately 10 minutes with supervision. While seated EOB patient completed grooming (brushing teeth) with setup. Min A to complete sit to supine. Patient able to pull and scoot herself up in the bed once placed in trendelenburg. Patient is motivated to keep moving and continue progress toward returning home. Continue education on energy conservation, activity pacing, and DME to aid in increased independence while completing ADLs. Recommend inpatient rehab upon discharge to continue therapy. Current Level of Function Impacting Discharge (ADLs/self-care): min A for bed mobility, set up for grooming EOB    Functional Outcome Measure: The patient scored 30/100 on the Barthel Index outcome measure which is indicative of 70% impairment in ADL completion. Other factors to consider for discharge: assistance in home, decreased strength, impaired balance, decreased activity tolerance. Patient will benefit from skilled therapy intervention to address the above noted impairments. PLAN :  Recommendations and Planned Interventions: self care training, functional mobility training, therapeutic exercise, balance training, therapeutic activities, endurance activities, patient education, home safety training and family training/education    Frequency/Duration: Patient will be followed by occupational therapy 5 times a week to address goals.     Recommendation for discharge: (in order for the patient to meet his/her long term goals)  Therapy 3 hours per day 5-7 days per week    This discharge recommendation:  Has been made in collaboration with the attending provider and/or case management    IF patient discharges home will need the following DME: TBD       SUBJECTIVE:   Patient stated I am willing to get up and move as much as I can.     OBJECTIVE DATA SUMMARY:   HISTORY:   Past Medical History:   Diagnosis Date    Diabetes ([address]     Hernia, hiatal     Hypertension     Kidney stones     both kidneys    Osteoarthritis     Renal failure     left kidney     Past Surgical History:   Procedure Laterality Date    HX HYSTERECTOMY      uterus not removed    HX OTHER SURGICAL      right kidney stent       Expanded or extensive additional review of patient history:     Home Situation  Home Environment: Private residence  # Steps to Enter: 3  One/Two Story Residence: One story(staying with sister at her house)  Living Alone: No  Support Systems: Family member(s)(sister)  Current DME Used/Available at Home: jessica Mcguire, Hospital bed    Hand dominance: Right    EXAMINATION OF PERFORMANCE DEFICITS:  Cognitive/Behavioral Status:  Neurologic State: Alert  Orientation Level: Oriented X4  Cognition: Appropriate decision making; Appropriate safety awareness; Follows commands  Perception: Appears intact  Perseveration: No perseveration noted  Safety/Judgement: Awareness of environment    Skin: small wounds on buttocks, wound nurse present to check wounds    Edema: no edema noted    Hearing: Auditory  Auditory Impairment: None    Vision/Perceptual:       Acuity: Within Defined Limits         Range of Motion:  AROM: Within functional limits  PROM: Within functional limits       Strength:    Strength: Within functional limits       Coordination:  Coordination: Within functional limits  Fine Motor Skills-Upper: Left Intact; Right Intact    Gross Motor Skills-Upper: Left Intact; Right Intact    Tone & Sensation:    Tone: Normal  Sensation: Intact    Balance:  Sitting: Intact  Standing: (not attempted)    Functional Mobility and Transfers for ADLs:  Bed Mobility:  Rolling: Minimum assistance;Assist x1  Supine to Sit: Minimum assistance;Assist x1;Bed Modified  Sit to Supine: Minimum assistance;Assist x1  Scooting: Minimum assistance;Assist x1    Transfers:  Sit to Stand: (not attempted)  Stand to Sit: (not attempted)  Bed to Chair: (not attempted)    ADL Assessment:  Feeding: Independent    Oral Facial Hygiene/Grooming: Setup(EOB)    Bathing: Moderate assistance(inferred (LB bathing))    Upper Body Dressing: Minimum assistance(inferred)    Lower Body Dressing: Moderate assistance(inferred)    Toileting: Total assistance(purewick and brief)       ADL Intervention and task modifications:    Cognitive Retraining  Safety/Judgement: Awareness of environment     Functional Measure:  Barthel Index:    Bathin  Bladder: 5  Bowels: 5  Groomin  Dressin  Feeding: 10  Mobility: 0  Stairs: 0  Toilet Use: 0  Transfer (Bed to Chair and Back): 0  Total: 30/100        The Barthel ADL Index: Guidelines  1. The index should be used as a record of what a patient does, not as a record of what a patient could do. 2. The main aim is to establish degree of independence from any help, physical or verbal, however minor and for whatever reason. 3. The need for supervision renders the patient not independent. 4. A patient's performance should be established using the best available evidence. Asking the patient, friends/relatives and nurses are the usual sources, but direct observation and common sense are also important. However direct testing is not needed. 5. Usually the patient's performance over the preceding 24-48 hours is important, but occasionally longer periods will be relevant. 6. Middle categories imply that the patient supplies over 50 per cent of the effort. 7. Use of aids to be independent is allowed. Jossy Abdi., Barthel, D.W. (6984). Functional evaluation: the Barthel Index. [address] [facility] (14)2.   Alexy Coreas justus ASHLEY Ha, Brijesh Phipps., Omar Mcallister., Martha Dewitt. (1999). Measuring the change indisability after inpatient rehabilitation; comparison of the responsiveness of the Barthel Index and Functional Person Measure. Journal of Neurology, Neurosurgery, and Psychiatry, 66(4), 587-695. KELLY Gardner, ANDREA Gonzalez, & Rocío Valencia M.A. (2004.) Assessment of post-stroke quality of life in cost-effectiveness studies: The usefulness of the Barthel Index and the EuroQoL-5D. Quality of Life Research, 15, 609-45         Occupational Therapy Evaluation Charge Determination   History Examination Decision-Making   LOW Complexity : Brief history review  MEDIUM Complexity : 3-5 performance deficits relating to physical, cognitive , or psychosocial skils that result in activity limitations and / or participation restrictions MEDIUM Complexity : Patient may present with comorbidities that affect occupational performnce. Miniml to moderate modification of tasks or assistance (eg, physical or verbal ) with assesment(s) is necessary to enable patient to complete evaluation       Based on the above components, the patient evaluation is determined to be of the following complexity level: LOW   Pain Rating:  RLE pain      Activity Tolerance:   Good    After treatment patient left in no apparent distress:    Supine in bed, Call bell within reach and Side rails x 3    COMMUNICATION/EDUCATION:   The patients plan of care was discussed with: Physical therapist, Registered nurse and Case management. Home safety education was provided and the patient/caregiver indicated understanding. This patients plan of care is appropriate for delegation to [facility]. Thank you for this referral.  [address]  Time Calculation: 41 mins     Regarding student involvement in patient care:  A student participated in this treatment session. Per CMS Medicare statements and AOTA guidelines I certify that the following was true:  1.  I was present and directly observed the entire session. 2. I made all skilled judgments and clinical decisions regarding care. 3. I am the practitioner responsible for assessment, treatment, and documentation.

## 2021-03-24 NOTE — PROGRESS NOTES
ID Progress Note  3/24/2021    Subjective:     Denies any discomfort. Feeling tired from anesthesia  Review of Systems:            Symptom Y/N Comments   Symptom Y/N Comments   Fever/Chills  n     Chest Pain n       Poor Appetite       Edema        Cough       Abdominal Pain n      Sputum       Joint Pain        SOB/MCGHEE n      Pruritis/Rash        Nausea/vomit  n  some nausea overnight   Tolerating PT/OT        Diarrhea  n     Tolerating Diet        Constipation  n     Other           Could NOT obtain due to:       Objective:     Vitals:   Visit Vitals  /69   Pulse (!) 101   Temp 98.3 °F (36.8 °C)   Resp 16   Ht 5' 4\" (1.626 m)   Wt (!) 160.3 kg (353 lb 6.4 oz)   SpO2 95%   BMI 60.66 kg/m²        Tmax:  Temp (24hrs), Av.1 °F (36.7 °C), Min:97.8 °F (36.6 °C), Max:98.3 °F (36.8 °C)      PHYSICAL EXAM:  General: morbidly obese, WD, WN. Alert, cooperative, no acute distress    EENT:  EOMI. Anicteric sclerae. MMM  Resp:  CTA bilaterally, no wheezing or rales. No accessory muscle use  CV:  Regular  rhythm,  No edema  GI:  Soft, Non distended, Non tender. +Bowel sounds  Neurologic:  Alert and oriented X 3, normal speech,   Psych:   Good insight. Not anxious nor agitated  Skin:  No rashes.   No jaundice    Labs:   Lab Results   Component Value Date/Time    WBC 9.6 2021 05:41 AM    HGB 7.6 (L) 2021 03:10 AM    HCT 25.0 (L) 2021 03:10 AM    PLATELET 117  05:41 AM    MCV 93.8 2021 05:41 AM     Lab Results   Component Value Date/Time    Sodium 142 2021 03:07 AM    Potassium 4.0 2021 03:07 AM    Chloride 113 (H) 2021 03:07 AM    CO2 23 2021 03:07 AM    Anion gap 6 2021 03:07 AM    Glucose 125 (H) 2021 03:07 AM    BUN 37 (H) 2021 03:07 AM    Creatinine 2.19 (H) 2021 03:07 AM    BUN/Creatinine ratio 17 2021 03:07 AM    GFR est AA 28 (L) 2021 03:07 AM    GFR est non-AA 23 (L) 2021 03:07 AM    Calcium 8.9 2021 03:07 AM    Bilirubin, total 0.1 (L) 03/24/2021 03:07 AM    Alk. phosphatase 66 03/24/2021 03:07 AM    Protein, total 7.1 03/24/2021 03:07 AM    Albumin 2.7 (L) 03/24/2021 03:07 AM    Globulin 4.4 (H) 03/24/2021 03:07 AM    A-G Ratio 0.6 (L) 03/24/2021 03:07 AM    ALT (SGPT) 9 (L) 03/24/2021 03:07 AM       CT of abd/pel: Right ureteral stent is noted in position with the proximal end in the upper pole right kidney and the distal end in the urinary bladder which is filled with high density may represent hemorrhage. There is right hydronephrosis versus a mass in the lower pole of the right kidney. The left kidney is small and there is mild left hydroureteronephrosis, a large abdominal wall hernia in the left containing stomach, small  bowel and colon which is incompletely imaged. Assessment and Plan   UTI with hx of MDR klebsiella pneumoniae; ruled out - urine cx (3/18) no growth  Leukocytosis   Hematuria  Hx of Right ureteral obstruction s/p Right ureteral stent exchange 11/24/20  S/p clot evacuation, right stent exchange (3/23)  - WBC 18.6k->10.5k, afebrile     Blood cx (3/19) no growth      Urology following     D/c IV merrem     Pt is clear to discharge in ID stand point. NO active infection.        Yuriy Miguel NP

## 2021-03-24 NOTE — WOUND CARE
WOCN Note:  
 
New consult for sacrum. Chart shows: 
Admitted for hematuria History of DM and renal stones with stent Assessment:  
A&O x 4 and reports no pain. Able to turn independently left & right. Working with PT to sit on side of bed. Has a Pure Wick. Surface: P500 air mattress Bilateral heel and buttocks skin intact and without erythema. 1. POA gluteal cleft split from MASD 2 x 0.3 x 0.1 cm 
100% mist red Scant bleeding Tx: barrier cream in use 2. POA bilateral ischial tuberosities with stage 2 pressure injuries Left = 2 x 2 x 0.1 cm Right = 1 x 1.5 x 0.1 cm 
100% red bases with flat margins 
no exudate Periwound has hypopigmented tissue indicative of chronicity and healing Wound Recommendations:   
Ischial:  Please maintain hydrocolloid up to one week or change as needed with soiling or rolled edges. PI Prevention: 
Turn/reposition approximately every 2 hours Offload heels with heels hanging off end of pillow at all times while in bed. Z-guard cream to buttocks and gluteal cleft and as needed P500 Air mattress: use only flat sheet and one incontinence pad. Transition of Care: Plan to follow weekly and as needed while admitted to hospital.  
 
ALEX Mays, RN, [facility] Certified Wound, Ostomy, Continence Nurse 
office [phone] Available via [employer]

## 2021-03-24 NOTE — PROGRESS NOTES
Bedside shift change report given to Kadi Hanson RN (oncoming nurse) by Lisseth Phillips RN(offgoing nurse). Report given with SBAR.

## 2021-03-24 NOTE — PROGRESS NOTES
[facility]                                                                                          Hospitalist Progress Note  Ricardo Leahy MD  Answering service: [phone] from in house phone        Date of Service:  3/24/2021  NAME:  India Sharma  :  1960  MRN:  [id number]      Admission Summary:   India Sharma is a 62 y.o. female with PMH of DM, HTN, Morbid obesity, Renal stone with hx of R ureteral stent for stones, came to [facility] for hematuria since morning, about 7 hours at that time. Pt is Jahovah's witness and does not take any blood products. Pt suggested to be doing ok until yesterday when started having vaginal bleeding with sharp, intense, moderate to severe, intermittent pains from R flank to groin, suprapubic and urethral meatus. No fever/ chills. Pt has had similar experience in past 2020. And this feels quite same. Pt is jahovah's witness and does not want blood transfusions.       Interval history / Subjective:   Patient seen and examined doing well status post stent exchange removal yesterday 3/ 23  Patient is now cleared for discharge start PT OT Case management consulted for placement patient want to go to the rehab patient completed antibiotics ulcers no growth to date     Assessment & Plan:     Hematuria with  # R hydronephrosis vs mass in lower pole of R kidney  # Hx of R ureteral stent for renal stone, known to Urology  # UTI- Early SIRS on presenting with ED, with leukocytosis and tachycardia    - cont merrem  F/u cultures ID following she got antibiotics from 3/19-3/23  - plan for Cystoscopy with clot evacuation, right ureteral stent change. 3/23     #NO BLOOD PRODUCTS - Lamount Mask will give iron infusion hgb 7.6    # Continue Methimazole, PPI, Gabapentine, Oxybutynin and Sennakot  # CKD 4, baseline creat around 2.5, Creatinine 2.39 on admission--2.57   # Morbid obesity, BMI 60.66    Code status: Full  DVT prophylaxis: SCD     Care Plan discussed with: Patient/Family and Nurse  Anticipated Disposition: Home w/Family  Anticipated Discharge: 24 hours to 48 hours   Ready for discharge discussed with case management PT OT and SNF placement     Hospital Problems  Date Reviewed: 7/18/2020          Codes Class Noted POA    Hematuria ICD-10-CM: R31.9  ICD-9-CM: 599.70  7/17/2020 Unknown                Review of Systems:   A comprehensive review of systems was negative. Vital Signs:    Last 24hrs VS reviewed since prior progress note. Most recent are:  Visit Vitals  /69   Pulse (!) 101   Temp 98.3 °F (36.8 °C)   Resp 16   Ht 5' 4\" (1.626 m)   Wt (!) 160.3 kg (353 lb 6.4 oz)   SpO2 95%   BMI 60.66 kg/m²         Intake/Output Summary (Last 24 hours) at 3/24/2021 1151  Last data filed at 3/24/2021 0535  Gross per 24 hour   Intake    Output 1000 ml   Net -1000 ml        Physical Examination:     I had a face to face encounter with this patient and independently examined them on 3/24/2021 as outlined below:          Constitutional:  No acute distress, cooperative    ENT:  mmm   Resp:  CTA bilaterally. CV:  Regular rhythm, normal rate    GI:  Soft, non distended, non tender. bs+    Musculoskeletal:  No edema, warm, 2+ pulses throughout    Neurologic:  Moves all extremities. AAOx3, CN II-XII reviewed     Psych:  Good insight, Not anxious nor agitated.        Data Review:    I personally reviewed  Image and labs      Labs:     Recent Labs     03/24/21 0310 03/23/21  0541 03/22/21  0541   WBC  --  9.6 10.5   HGB 7.6* 7.2* 7.6*   HCT 25.0* 24.1* 25.4*   PLT  --  257 243     Recent Labs     03/24/21  0307 03/23/21  0541 03/22/21  0540    144 142   K 4.0 4.1 4.4   * 114* 115*   CO2 23 24 23   BUN 37* 41* 43*   CREA 2.19* 2.16* 2.57*   * 112* 123*   CA 8.9 8.7 8.3*     Recent Labs     03/24/21  0307 03/23/21  0541 03/22/21  0540   ALT 9* 8* 9*   AP 66 60 62   TBILI 0.1* 0.1* 0.1*   TP 7.1 6.8 6.9   ALB 2.7* 2.5* 2.4*   GLOB 4.4* 4.3* 4.5*     No results for input(s): INR, PTP, APTT, INREXT, INREXT in the last 72 hours. No results for input(s): FE, TIBC, PSAT, FERR in the last 72 hours. No results found for: FOL, RBCF   No results for input(s): PH, PCO2, PO2 in the last 72 hours. No results for input(s): CPK, CKNDX, TROIQ in the last 72 hours.     No lab exists for component: CPKMB  No results found for: CHOL, CHOLX, CHLST, CHOLV, HDL, HDLP, LDL, LDLC, DLDLP, TGLX, TRIGL, TRIGP, CHHD, CHHDX  Lab Results   Component Value Date/Time    Glucose (POC) 125 (H) 03/24/2021 11:27 AM    Glucose (POC) 138 (H) 03/24/2021 06:48 AM    Glucose (POC) 146 (H) 03/23/2021 09:47 PM    Glucose (POC) 187 (H) 03/23/2021 04:07 PM    Glucose (POC) 164 (H) 03/23/2021 11:26 AM     Lab Results   Component Value Date/Time    Color RED 03/18/2021 11:54 AM    Appearance CLEAR 03/18/2021 11:54 AM    Specific gravity 1.015 03/18/2021 11:54 AM    Specific gravity 1.008 07/25/2020 02:15 PM    pH (UA) 6.5 03/18/2021 11:54 AM    Protein 100 (A) 03/18/2021 11:54 AM    Glucose Negative 03/18/2021 11:54 AM    Ketone Negative 03/18/2021 11:54 AM    Bilirubin Negative 07/25/2020 02:15 PM    Urobilinogen 0.2 03/18/2021 11:54 AM    Nitrites Negative 03/18/2021 11:54 AM    Leukocyte Esterase LARGE (A) 03/18/2021 11:54 AM    Epithelial cells FEW 03/18/2021 11:54 AM    Bacteria Negative 03/18/2021 11:54 AM    WBC 5-10 03/18/2021 11:54 AM    RBC >100 (H) 03/18/2021 11:54 AM         Medications Reviewed:     Current Facility-Administered Medications   Medication Dose Route Frequency    ondansetron (ZOFRAN) injection 4 mg  4 mg IntraVENous Q4H PRN    benzocaine-menthoL (CEPACOL) lozenge 1 Lozenge  1 Lozenge Mucous Membrane PRN    iron sucrose (VENOFER) 100 mg in 0.9% sodium chloride 100 mL IVPB  100 mg IntraVENous Q24H    traMADoL (ULTRAM) tablet 50 mg  50 mg Oral Q6H PRN    hydrOXYzine HCL (ATARAX) tablet 25 mg  25 mg Oral Q6H PRN    fentaNYL citrate (PF) injection 25 mcg  25 mcg IntraVENous Q4H PRN    opium-belladonna (B&O 15-A) 16.2-30 mg suppository 1 Suppository  1 Suppository Rectal Q8H PRN    sodium chloride (NS) flush 5-40 mL  5-40 mL IntraVENous Q8H    sodium chloride (NS) flush 5-40 mL  5-40 mL IntraVENous PRN    acetaminophen (TYLENOL) tablet 650 mg  650 mg Oral Q6H PRN    Or    acetaminophen (TYLENOL) suppository 650 mg  650 mg Rectal Q6H PRN    polyethylene glycol (MIRALAX) packet 17 g  17 g Oral DAILY PRN    methIMAzole (TAPAZOLE) tablet 10 mg  10 mg Oral QPM    oxybutynin chloride XL (DITROPAN XL) tablet 10 mg  10 mg Oral DAILY    pantoprazole (PROTONIX) tablet 40 mg  40 mg Oral ACB    senna-docusate (PERICOLACE) 8.6-50 mg per tablet 1 Tab  1 Tab Oral DAILY    glucose chewable tablet 16 g  4 Tab Oral PRN    dextrose (D50W) injection syrg 12.5-25 g  25-50 mL IntraVENous PRN    glucagon (GLUCAGEN) injection 1 mg  1 mg IntraMUSCular PRN    insulin lispro (HUMALOG) injection   SubCUTAneous AC&HS    morphine injection 1 mg  1 mg IntraVENous Q2H PRN     ______________________________________________________________________  EXPECTED LENGTH OF STAY: 2d 7h  ACTUAL LENGTH OF STAY:          6                 Rosario Meléndez MD

## 2021-03-24 NOTE — PROGRESS NOTES
Problem: Mobility Impaired (Adult and Pediatric)  Goal: *Acute Goals and Plan of Care (Insert Text)  Description: FUNCTIONAL STATUS PRIOR TO ADMISSION:  pt was primarily bed bound due to LE & buttock wounds as well as bakers cyst. Pt was able to roll self in bed and complete bed mobility to sit EOB, but given location of wounds as well as poor wound healing pt was not sitting up on the EOB as often. Pt was standing with therapy at last admission in 7/2020 and was discharged home with HHPT. Patient was working on standing with therapy at home up until bakers cyst developed resulting in too much pain. HOME SUPPORT PRIOR TO ADMISSION: Patient lives with her sister at this time. Physical Therapy Goals  Initiated 3/24/2021  1. Patient will move from supine to sit and sit to supine  in bed with supervision/set-up within 7 day(s). 2.  Patient will transfer from bed to chair and chair to bed with moderate assistance  using the least restrictive device within 7 day(s). 3.  Patient will perform sit to stand with moderate assistance  within 7 day(s). 4.  Patient will ambulate with moderate assistance  for 5 feet with the least restrictive device within 7 day(s). Outcome: Progressing Towards Goal   PHYSICAL THERAPY EVALUATION  Patient: Giselle Gutierrez (89 y.o. female)  Date: 3/24/2021  Primary Diagnosis: Hematuria [R31.9]  Procedure(s) (LRB):  CYSTOSCOPY CLOT EVACUATION, RIGHT STENT EXCHANGE (URGENT) (Right) 1 Day Post-Op   Precautions:   Fall      ASSESSMENT  Based on the objective data described below, the patient presents with decreased strength, decreased functional mobility, decreased tolerance to activity, buttocks and healing upper thigh wounds s/p admission on 3/18 for hematuria and underwent cystoscopy and R stent exchange on 3/23. Pt received supine in bed and agreeable to therapy. Pt tolerated evaluation fairly well.  Pt completed rolling L and R in bed with use of bed railings with min A to rotate/shift hips. Pt performed supine to sit EOB with min A, use of bed rails and bed controls to raise [facility] for ease of assuming upright position. Pt tolerated seated EOB ~ 10 minutes with supervision with good balance and able to perform LE therex. Pt returned to supine position and was able to pull/scoot herself up in the bed with bed placed in trendelenburg position. Pt is highly motivated and eager to work with therapy and wishes to resume standing and attempt walking. Pt will benefit from inpatient rehab upon discharge to continue therapy efforts. .    Current Level of Function Impacting Discharge (mobility/balance): min A rolling, supine<>sit, scooting in bed    Functional Outcome Measure: The patient scored Total: 30/100 on the Barthel Index which is indicative of 70% impaired ability to care for basic self needs/dependency on others. Other factors to consider for discharge: buttocks/upper posterior thigh wounds, obesity, impaired mobility at baseline     Patient will benefit from skilled therapy intervention to address the above noted impairments. PLAN :  Recommendations and Planned Interventions: bed mobility training, transfer training, gait training, therapeutic exercises, neuromuscular re-education, patient and family training/education, and therapeutic activities      Frequency/Duration: Patient will be followed by physical therapy:  5 times a week to address goals. Recommendation for discharge: (in order for the patient to meet his/her long term goals)  Therapy 3 hours per day 5-7 days per week    This discharge recommendation:  Has been made in collaboration with the attending provider and/or case management    IF patient discharges home will need the following DME: to be determined (TBD)         SUBJECTIVE:   Patient stated I would like to get back to doing things again. I don't want to just give up and lay in the bed all the time.     OBJECTIVE DATA SUMMARY:   HISTORY:    Past Medical History:   Diagnosis Date    Diabetes ([facility] [address]     Hernia, hiatal     Hypertension     Kidney stones     both kidneys    Osteoarthritis     Renal failure     left kidney     Past Surgical History:   Procedure Laterality Date    HX HYSTERECTOMY      uterus not removed    HX OTHER SURGICAL      right kidney stent       Personal factors and/or comorbidities impacting plan of care:     Home Situation  Home Environment: Private residence  # Steps to Enter: 3  One/Two Story Residence: One story(staying with sister at her house)  Living Alone: No  Support Systems: Family member(s)(sister)  Current DME Used/Available at Home: Baylee Matter, [address] bed    EXAMINATION/PRESENTATION/DECISION MAKING:   Critical Behavior:  Neurologic State: Alert  Orientation Level: Oriented X4  Cognition: Appropriate decision making, Appropriate safety awareness, Follows commands  Safety/Judgement: Awareness of environment  Hearing: Auditory  Auditory Impairment: None  Skin:  small wounds posterior thigh, buttocks  Edema:   Range Of Motion:  AROM: Within functional limits           PROM: Within functional limits           Strength:    Strength: Within functional limits                    Tone & Sensation:   Tone: Normal              Sensation: Intact               Coordination:  Coordination: Within functional limits  Vision:   Acuity: Within Defined Limits  Functional Mobility:  Bed Mobility:  Rolling: Minimum assistance;Assist x1  Supine to Sit: Minimum assistance;Assist x1;Bed Modified  Sit to Supine: Minimum assistance;Assist x1  Scooting: Minimum assistance;Assist x1  Transfers:  Sit to Stand: (not attempted)  Stand to Sit: (not attempted)        Bed to Chair: (not attempted)              Balance:   Sitting: Intact  Standing: (not attempted)  Ambulation/Gait Training:                                                         Stairs: Therapeutic Exercises:    Ankle pumps, knee flexion, LAQ    Functional Measure:  Barthel Index:    Bathin  Bladder: 5  Bowels: 5  Groomin  Dressin  Feeding: 10  Mobility: 0  Stairs: 0  Toilet Use: 0  Transfer (Bed to Chair and Back): 0  Total: 30/100       The Barthel ADL Index: Guidelines  1. The index should be used as a record of what a patient does, not as a record of what a patient could do. 2. The main aim is to establish degree of independence from any help, physical or verbal, however minor and for whatever reason. 3. The need for supervision renders the patient not independent. 4. A patient's performance should be established using the best available evidence. Asking the patient, friends/relatives and nurses are the usual sources, but direct observation and common sense are also important. However direct testing is not needed. 5. Usually the patient's performance over the preceding 24-48 hours is important, but occasionally longer periods will be relevant. 6. Middle categories imply that the patient supplies over 50 per cent of the effort. 7. Use of aids to be independent is allowed. Lucía Gallagher., Barthel, D.W. (0430). Functional evaluation: the Barthel Index. [address] [facility] (14)2. ASHLEY Maynard, Nicolle Kothari, Jose Moss., [place], [address] [place] [address] (). Measuring the change indisability after inpatient rehabilitation; comparison of the responsiveness of the Barthel Index and Functional [place] Measure. Journal of Neurology, Neurosurgery, and Psychiatry, 66(4), 043-230. Cherrie Dumont, [place].JEANETTE, ANDREA Gonzalez, & Yoselyn Cantu MANN. (2004.) Assessment of post-stroke quality of life in cost-effectiveness studies: The usefulness of the Barthel Index and the EuroQoL-5D.  Quality of Life Research, 13, 818-14           Based on the above components, the patient evaluation is determined to be of the following complexity level: MEDIUM    Pain Rating:  Pt c/o RLE pain    Activity Tolerance:   Good    After treatment patient left in no apparent distress:   Supine in bed, Call bell within reach, and Side rails x 3    COMMUNICATION/EDUCATION:   The patients plan of care was discussed with: Occupational therapist and Registered nurse. Fall prevention education was provided and the patient/caregiver indicated understanding., Patient/family have participated as able in goal setting and plan of care. , and Patient/family agree to work toward stated goals and plan of care.     Thank you for this referral.  Grayson Bartholomew, PT, DPT   Time Calculation: 41 mins

## 2021-03-25 LAB
ALBUMIN SERPL-MCNC: 2.6 G/DL (ref 3.5–5)
ALBUMIN/GLOB SERPL: 0.7 {RATIO} (ref 1.1–2.2)
ALP SERPL-CCNC: 54 U/L (ref 45–117)
ALT SERPL-CCNC: <6 U/L (ref 12–78)
ANION GAP SERPL CALC-SCNC: 6 MMOL/L (ref 5–15)
AST SERPL-CCNC: 7 U/L (ref 15–37)
BASOPHILS # BLD: 0.1 K/UL (ref 0–0.1)
BASOPHILS NFR BLD: 1 % (ref 0–1)
BILIRUB SERPL-MCNC: 0.1 MG/DL (ref 0.2–1)
BUN SERPL-MCNC: 38 MG/DL (ref 6–20)
BUN/CREAT SERPL: 16 (ref 12–20)
CALCIUM SERPL-MCNC: 8.2 MG/DL (ref 8.5–10.1)
CHLORIDE SERPL-SCNC: 112 MMOL/L (ref 97–108)
CO2 SERPL-SCNC: 25 MMOL/L (ref 21–32)
CREAT SERPL-MCNC: 2.36 MG/DL (ref 0.55–1.02)
DIFFERENTIAL METHOD BLD: ABNORMAL
EOSINOPHIL # BLD: 0.4 K/UL (ref 0–0.4)
EOSINOPHIL NFR BLD: 4 % (ref 0–7)
ERYTHROCYTE [DISTWIDTH] IN BLOOD BY AUTOMATED COUNT: 14.7 % (ref 11.5–14.5)
GLOBULIN SER CALC-MCNC: 3.9 G/DL (ref 2–4)
GLUCOSE BLD STRIP.AUTO-MCNC: 106 MG/DL (ref 65–100)
GLUCOSE BLD STRIP.AUTO-MCNC: 117 MG/DL (ref 65–100)
GLUCOSE BLD STRIP.AUTO-MCNC: 134 MG/DL (ref 65–100)
GLUCOSE BLD STRIP.AUTO-MCNC: 169 MG/DL (ref 65–100)
GLUCOSE SERPL-MCNC: 101 MG/DL (ref 65–100)
HCT VFR BLD AUTO: 23.9 % (ref 35–47)
HGB BLD-MCNC: 7.2 G/DL (ref 11.5–16)
IMM GRANULOCYTES # BLD AUTO: 0.1 K/UL (ref 0–0.04)
IMM GRANULOCYTES NFR BLD AUTO: 1 % (ref 0–0.5)
LYMPHOCYTES # BLD: 1.8 K/UL (ref 0.8–3.5)
LYMPHOCYTES NFR BLD: 17 % (ref 12–49)
MCH RBC QN AUTO: 28.1 PG (ref 26–34)
MCHC RBC AUTO-ENTMCNC: 30.1 G/DL (ref 30–36.5)
MCV RBC AUTO: 93.4 FL (ref 80–99)
MONOCYTES # BLD: 0.9 K/UL (ref 0–1)
MONOCYTES NFR BLD: 9 % (ref 5–13)
NEUTS SEG # BLD: 7 K/UL (ref 1.8–8)
NEUTS SEG NFR BLD: 68 % (ref 32–75)
NRBC # BLD: 0 K/UL (ref 0–0.01)
NRBC BLD-RTO: 0 PER 100 WBC
PLATELET # BLD AUTO: 300 K/UL (ref 150–400)
PMV BLD AUTO: 10 FL (ref 8.9–12.9)
POTASSIUM SERPL-SCNC: 4.3 MMOL/L (ref 3.5–5.1)
PROT SERPL-MCNC: 6.5 G/DL (ref 6.4–8.2)
RBC # BLD AUTO: 2.56 M/UL (ref 3.8–5.2)
RBC MORPH BLD: ABNORMAL
SERVICE CMNT-IMP: ABNORMAL
SODIUM SERPL-SCNC: 143 MMOL/L (ref 136–145)
WBC # BLD AUTO: 10.3 K/UL (ref 3.6–11)

## 2021-03-25 PROCEDURE — 97530 THERAPEUTIC ACTIVITIES: CPT

## 2021-03-25 PROCEDURE — 74011250637 HC RX REV CODE- 250/637: Performed by: UROLOGY

## 2021-03-25 PROCEDURE — 80053 COMPREHEN METABOLIC PANEL: CPT

## 2021-03-25 PROCEDURE — 97110 THERAPEUTIC EXERCISES: CPT

## 2021-03-25 PROCEDURE — 77030038269 HC DRN EXT URIN PURWCK BARD -A

## 2021-03-25 PROCEDURE — 85025 COMPLETE CBC W/AUTO DIFF WBC: CPT

## 2021-03-25 PROCEDURE — 36415 COLL VENOUS BLD VENIPUNCTURE: CPT

## 2021-03-25 PROCEDURE — 97535 SELF CARE MNGMENT TRAINING: CPT

## 2021-03-25 PROCEDURE — 65270000029 HC RM PRIVATE

## 2021-03-25 PROCEDURE — 82962 GLUCOSE BLOOD TEST: CPT

## 2021-03-25 RX ADMIN — PANTOPRAZOLE SODIUM 40 MG: 40 TABLET, DELAYED RELEASE ORAL at 07:20

## 2021-03-25 RX ADMIN — TRAMADOL HYDROCHLORIDE 50 MG: 50 TABLET, COATED ORAL at 16:46

## 2021-03-25 RX ADMIN — Medication 10 ML: at 07:20

## 2021-03-25 RX ADMIN — TRAMADOL HYDROCHLORIDE 50 MG: 50 TABLET, COATED ORAL at 01:55

## 2021-03-25 RX ADMIN — TRAMADOL HYDROCHLORIDE 50 MG: 50 TABLET, COATED ORAL at 23:06

## 2021-03-25 RX ADMIN — Medication 10 ML: at 13:07

## 2021-03-25 RX ADMIN — OXYBUTYNIN CHLORIDE 10 MG: 5 TABLET, EXTENDED RELEASE ORAL at 09:17

## 2021-03-25 RX ADMIN — DOCUSATE SODIUM 50 MG AND SENNOSIDES 8.6 MG 1 TABLET: 8.6; 5 TABLET, FILM COATED ORAL at 09:17

## 2021-03-25 RX ADMIN — METHIMAZOLE 10 MG: 5 TABLET ORAL at 18:10

## 2021-03-25 RX ADMIN — TRAMADOL HYDROCHLORIDE 50 MG: 50 TABLET, COATED ORAL at 10:29

## 2021-03-25 RX ADMIN — Medication 10 ML: at 23:06

## 2021-03-25 NOTE — PROGRESS NOTES
[facility]                                                                                          Hospitalist Progress Note  Delia Ruby MD  Answering service: [phone] from in house phone        Date of Service:  3/25/2021  NAME:  Charbel Marte  :  1960  MRN:  [id number]      Admission Summary:   Charbel Marte is a 62 y.o. female with PMH of DM, HTN, Morbid obesity, Renal stone with hx of R ureteral stent for stones, came to [facility] for hematuria since morning, about 7 hours at that time. Pt is Jahovah's witness and does not take any blood products. Pt suggested to be doing ok until yesterday when started having vaginal bleeding with sharp, intense, moderate to severe, intermittent pains from R flank to groin, suprapubic and urethral meatus. No fever/ chills. Pt has had similar experience in past 2020. And this feels quite same. Pt is jahovah's witness and does not want blood transfusions.       Interval history / Subjective:   Patient seen and examined at bedside this AM. She is doing ok. No overnight events or new complaints. Waiting for placement. If unable to obtain place by tomorrow, pt is agreeable to go home with [facility]. Discussed with nursing      Assessment & Plan:     # Hematuria - resolved   # Hx  R ureteral stent for renal stone  # SIRS on poa  with leukocytosis and tachycardia  UTI ruled out  Hx UTI with MDR klebsiella pneumoniae  - CT abd 3/18: Right ureteral stent is noted in position with the proximal end in the upper pole right kidney and the distal end in the urinary bladder which is filled with high density may represent hemorrhage. There is right hydronephrosis versus a mass in the lower pole of the right kidney.  The left kidney is small and there is mild left hydroureteronephrosis.   -  Leucocytosis - resolved  - urine cx- negative   -  Discontinued merrem  3/24  - Appreciate ID and urology input   -  S/p Cystoscopy with clot evacuation, right ureteral stent change 3/23   - outpatient follow-up with [facility]. # Acute Anemia due to hematuria  #Taoist  - no blood products   - s/p iron infusions.  -  Hb low stable     # DM type 2 - A1c 6.7 c/w ISS  # CKD stage 4- cr at baseline   # Morbid obesity, BMI 60.66 - recommend weight loss     POA bilateral ischial tuberosities with stage 2 pressure injuries  - wound care     Incidental finding: CT showed  there is a large abdominal wall hernia in the left containing stomach, small  bowel and colon which is incompletely imaged. Code status: Full  DVT prophylaxis: SCD     Care Plan discussed with: Patient/Family and Nurse  Anticipated Disposition: IPR vs home with [facility] [place]  Anticipated Discharge: 24 hours to 48 hours   Medically stable to be discharged. CM working on placement      Hospital Problems  Date Reviewed: 7/18/2020          Codes Class Noted POA    Hematuria ICD-10-CM: R31.9  ICD-9-CM: 599.70  7/17/2020 Unknown                Review of Systems:   A comprehensive review of systems was negative. Vital Signs:    Last 24hrs VS reviewed since prior progress note. Most recent are:  Visit Vitals  BP (!) 145/86 (BP Patient Position: Sitting)   Pulse (!) 121   Temp 98.3 °F (36.8 °C)   Resp 16   Ht 5' 4\" (1.626 m)   Wt (!) 160.3 kg (353 lb 6.4 oz)   SpO2 96%   BMI 60.66 kg/m²         Intake/Output Summary (Last 24 hours) at 3/25/2021 1225  Last data filed at 3/25/2021 0530  Gross per 24 hour   Intake 915 ml   Output 1200 ml   Net -285 ml        Physical Examination:     I had a face to face encounter with this patient and independently examined them on 3/25/2021 as outlined below:          Constitutional:  No acute distress, cooperative    ENT:  mmm   Resp:  CTA bilaterally. CV:  Regular rhythm, normal rate    GI:  Soft, non distended, non tender. obese    Musculoskeletal:  No edema, warm, 2+ pulses throughout    Neurologic:  Moves all extremities.   AAOx3, CN II-XII reviewed     Psych:  Good insight, Not anxious nor agitated. Data Review:    I personally reviewed  Image and labs      Labs:     Recent Labs     03/25/21 0215 03/24/21  0310 03/23/21  0541   WBC 10.3  --  9.6   HGB 7.2* 7.6* 7.2*   HCT 23.9* 25.0* 24.1*     --  257     Recent Labs     03/25/21 0201 03/24/21  0307 03/23/21  0541    142 144   K 4.3 4.0 4.1   * 113* 114*   CO2 25 23 24   BUN 38* 37* 41*   CREA 2.36* 2.19* 2.16*   * 125* 112*   CA 8.2* 8.9 8.7     Recent Labs     03/25/21 0201 03/24/21 0307 03/23/21  0541   ALT <6* 9* 8*   AP 54 66 60   TBILI 0.1* 0.1* 0.1*   TP 6.5 7.1 6.8   ALB 2.6* 2.7* 2.5*   GLOB 3.9 4.4* 4.3*     No results for input(s): INR, PTP, APTT, INREXT, INREXT in the last 72 hours. No results for input(s): FE, TIBC, PSAT, FERR in the last 72 hours. No results found for: FOL, RBCF   No results for input(s): PH, PCO2, PO2 in the last 72 hours. No results for input(s): CPK, CKNDX, TROIQ in the last 72 hours.     No lab exists for component: CPKMB  No results found for: CHOL, CHOLX, CHLST, CHOLV, HDL, HDLP, LDL, LDLC, DLDLP, TGLX, TRIGL, TRIGP, CHHD, CHHDX  Lab Results   Component Value Date/Time    Glucose (POC) 106 (H) 03/25/2021 11:23 AM    Glucose (POC) 117 (H) 03/25/2021 06:36 AM    Glucose (POC) 153 (H) 03/24/2021 09:15 PM    Glucose (POC) 156 (H) 03/24/2021 04:32 PM    Glucose (POC) 125 (H) 03/24/2021 11:27 AM     Lab Results   Component Value Date/Time    Color RED 03/18/2021 11:54 AM    Appearance CLEAR 03/18/2021 11:54 AM    Specific gravity 1.015 03/18/2021 11:54 AM    Specific gravity 1.008 07/25/2020 02:15 PM    pH (UA) 6.5 03/18/2021 11:54 AM    Protein 100 (A) 03/18/2021 11:54 AM    Glucose Negative 03/18/2021 11:54 AM    Ketone Negative 03/18/2021 11:54 AM    Bilirubin Negative 07/25/2020 02:15 PM    Urobilinogen 0.2 03/18/2021 11:54 AM    Nitrites Negative 03/18/2021 11:54 AM    Leukocyte Esterase LARGE (A) 03/18/2021 11:54 AM Epithelial cells FEW 03/18/2021 11:54 AM    Bacteria Negative 03/18/2021 11:54 AM    WBC 5-10 03/18/2021 11:54 AM    RBC >100 (H) 03/18/2021 11:54 AM         Medications Reviewed:     Current Facility-Administered Medications   Medication Dose Route Frequency    ondansetron (ZOFRAN) injection 4 mg  4 mg IntraVENous Q4H PRN    benzocaine-menthoL (CEPACOL) lozenge 1 Lozenge  1 Lozenge Mucous Membrane PRN    traMADoL (ULTRAM) tablet 50 mg  50 mg Oral Q6H PRN    hydrOXYzine HCL (ATARAX) tablet 25 mg  25 mg Oral Q6H PRN    fentaNYL citrate (PF) injection 25 mcg  25 mcg IntraVENous Q4H PRN    opium-belladonna (B&O 15-A) 16.2-30 mg suppository 1 Suppository  1 Suppository Rectal Q8H PRN    sodium chloride (NS) flush 5-40 mL  5-40 mL IntraVENous Q8H    sodium chloride (NS) flush 5-40 mL  5-40 mL IntraVENous PRN    acetaminophen (TYLENOL) tablet 650 mg  650 mg Oral Q6H PRN    Or    acetaminophen (TYLENOL) suppository 650 mg  650 mg Rectal Q6H PRN    polyethylene glycol (MIRALAX) packet 17 g  17 g Oral DAILY PRN    methIMAzole (TAPAZOLE) tablet 10 mg  10 mg Oral QPM    oxybutynin chloride XL (DITROPAN XL) tablet 10 mg  10 mg Oral DAILY    pantoprazole (PROTONIX) tablet 40 mg  40 mg Oral ACB    senna-docusate (PERICOLACE) 8.6-50 mg per tablet 1 Tab  1 Tab Oral DAILY    glucose chewable tablet 16 g  4 Tab Oral PRN    dextrose (D50W) injection syrg 12.5-25 g  25-50 mL IntraVENous PRN    glucagon (GLUCAGEN) injection 1 mg  1 mg IntraMUSCular PRN    insulin lispro (HUMALOG) injection   SubCUTAneous AC&HS    morphine injection 1 mg  1 mg IntraVENous Q2H PRN     ______________________________________________________________________  EXPECTED LENGTH OF STAY: 3d 2h  ACTUAL LENGTH OF STAY:          7                 Dangelo Menard MD

## 2021-03-25 NOTE — PROGRESS NOTES
JOESPH:  [facility] has accepted patient and Kristen Fransisco was started today. BLS transport at discharge. Chart reviewed. CM received call from [facility] with [facility]. They do not accept medicare or medicaid. [place] [phone] is the liaison with [facility]. They are reviewing.     JOSE De La O/CRM

## 2021-03-25 NOTE — PROGRESS NOTES
Problem: Self Care Deficits Care Plan (Adult)  Goal: *Acute Goals and Plan of Care (Insert Text)  Description:   FUNCTIONAL STATUS PRIOR TO ADMISSION: Patient was primarily bed bound secondary to LE and buttocks wounds. Patient previously use to roll and sit EOB, but has not done so due to poor wound healing. Patient requires assistance with bathing, dressing, and getting food due to decreased mobility from her sister, with whom she lives. HOME SUPPORT PRIOR TO ADMISSION: Patient lives with her sister at this time to provide assistance with ADL completion    Occupational Therapy Goals  Initiated 3/24/2021  1. Patient will perform grooming with supervision/set-up sitting EOB within 7 day(s). 2.  Patient will perform bathing with minimal assistance/contact guard assist within 7 day(s). 3.  Patient will perform upper body dressing with supervision/set-up within 7 day(s). 4.  Patient will perform toilet transfers to [facility] with moderate assistance  within 7 day(s). 5.  Patient will perform all aspects of toileting with moderate assistance  within 7 day(s). 6.  Patient will participate in upper extremity therapeutic exercise/activities with minimal assistance/contact guard assist for 10 minutes within 7 day(s). 7.  Patient will utilize energy conservation techniques during functional activities with verbal cues within 7 day(s). Outcome: Progressing Towards Goal    OCCUPATIONAL THERAPY TREATMENT  Patient: Era Land (62 y.o. female)  Date: 3/25/2021  Diagnosis: Hematuria [R31.9] <principal problem not specified>  Procedure(s) (LRB):  CYSTOSCOPY CLOT EVACUATION, RIGHT STENT EXCHANGE (URGENT) (Right) 2 Days Post-Op  Precautions: Fall  Chart, occupational therapy assessment, plan of care, and goals were reviewed. ASSESSMENT  Patient continues with skilled OT services and is progressing towards goals.  Patient continues to be limited by decreased endurance, decreased activity tolerance, wounds on her buttocks/upper thighs, impaired standing, decreased strength, and decreased functional mobility. Patient received supine in bed and was agreeable to therapy. Patient demonstrated a positive attitude and is willing to work hard in therapy to achieve gains. SBA with additional time and HOB elevated to complete bed mobility. Min A to scoot to EOB. Patient completed UB and LB bathing while seated EOB. Set up to complete UB bathing. Min A to complete LB bathing to reach the bottom parts of her legs. Set up to don gown and tie the back on this date. Patient attempted to complete a sit to stand transfer, but was unable to complete at this time. Continue education on activity pacing, energy conservation, and transfers. Recommend discharge to acute rehab facility pending medical stability, as appropriate. Current Level of Function Impacting Discharge (ADLs): set up for UB bathing, min A LB bathing, setup for grooming EOB, SBA for bed mobility. Other factors to consider for discharge: assistance, fall, ADL completion, pain, wounds         PLAN :  Patient continues to benefit from skilled intervention to address the above impairments. Continue treatment per established plan of care to address goals. Recommend with staff: modified chair position for all meals and EOB for self care/meals    Recommend next OT session: bathing, dressing, grooming, transfers    Recommendation for discharge: (in order for the patient to meet his/her long term goals)  Therapy 3 hours per day 5-7 days per week    This discharge recommendation:  Has been made in collaboration with the attending provider and/or case management    IF patient discharges home will need the following DME: TBD       SUBJECTIVE:   Patient stated I am going to surprise you today.     OBJECTIVE DATA SUMMARY:   Cognitive/Behavioral Status:  Neurologic State: Alert  Orientation Level: Oriented X4  Cognition: Appropriate decision making; Appropriate for age attention/concentration; Appropriate safety awareness; Follows commands  Perception: Appears intact  Perseveration: No perseveration noted  Safety/Judgement: Awareness of environment    Functional Mobility and Transfers for ADLs:  Bed Mobility:  Supine to Sit: Stand-by assistance; Additional time;Bed Modified  Sit to Supine: Stand-by assistance; Additional time; Other (comment)  Scooting: Minimum assistance; Additional time;Assist x1(to EOB (seated))    Transfers:  Sit to Stand: (inferred Total Ax2)          Balance:  Sitting: Intact  Standing: (unable to stand at this time (unable to clear buttocks ))    ADL Intervention:    Upper Body Bathing  Bathing Assistance: Set-up  Position Performed: Seated edge of bed  Cues: Verbal cues provided    Lower Body Bathing  Bathing Assistance: Minimum assistance  Lower Body : Minimum assistance(reaching lower legs)  Position Performed: Seated edge of bed  Cues: Verbal cues provided    Upper Body [address]: Set-up      Cognitive Retraining  Safety/Judgement: Awareness of environment      Pain:  No pain indicated    Activity Tolerance:   Good    After treatment patient left in no apparent distress:   Supine in bed, Call bell within reach, and Side rails x 3    COMMUNICATION/COLLABORATION:   The patients plan of care was discussed with: Physical therapy assistant and Case management. Sudheer Mcgraw  Time Calculation: 35 mins    Regarding student involvement in patient care:  A student participated in this treatment session. Per CMS Medicare statements and AOTA guidelines I certify that the following was true:  1. I was present and directly observed the entire session. 2. I made all skilled judgments and clinical decisions regarding care. 3. I am the practitioner responsible for assessment, treatment, and documentation.

## 2021-03-25 NOTE — PROGRESS NOTES
ID Progress Note  3/25/2021    Subjective:     Feels so much better this morning. No further nausea. No dysuria, no more hematuria    Review of Systems:            Symptom Y/N Comments   Symptom Y/N Comments   Fever/Chills  n     Chest Pain n       Poor Appetite       Edema        Cough       Abdominal Pain n      Sputum       Joint Pain        SOB/MCGHEE n      Pruritis/Rash        Nausea/vomit  n     Tolerating PT/OT        Diarrhea  n     Tolerating Diet        Constipation  n     Other           Could NOT obtain due to:       Objective:     Vitals:   Visit Vitals  /64 (BP 1 Location: Right lower arm, BP Patient Position: At rest)   Pulse 83   Temp 98.1 °F (36.7 °C)   Resp 18   Ht 5' 4\" (1.626 m)   Wt (!) 160.3 kg (353 lb 6.4 oz)   SpO2 96%   BMI 60.66 kg/m²        Tmax:  Temp (24hrs), Av.6 °F (37 °C), Min:98.1 °F (36.7 °C), Max:99.2 °F (37.3 °C)      PHYSICAL EXAM:  General: Pale, morbidly obese, WD, WN. Alert, cooperative, no acute distress    EENT:  EOMI. Anicteric sclerae. MMM  Resp:  CTA bilaterally, no wheezing or rales. No accessory muscle use  CV:  Regular  rhythm,  No edema  GI:  Soft, Non distended, Non tender. +Bowel sounds  Neurologic:  Alert and oriented X 3, normal speech,   Psych:   Good insight. Not anxious nor agitated  Skin:  No rashes.   No jaundice    Labs:   Lab Results   Component Value Date/Time    WBC 10.3 2021 02:15 AM    HGB 7.2 (L) 2021 02:15 AM    HCT 23.9 (L) 2021 02:15 AM    PLATELET 837  02:15 AM    MCV 93.4 2021 02:15 AM     Lab Results   Component Value Date/Time    Sodium 143 2021 02:01 AM    Potassium 4.3 2021 02:01 AM    Chloride 112 (H) 2021 02:01 AM    CO2 25 2021 02:01 AM    Anion gap 6 2021 02:01 AM    Glucose 101 (H) 2021 02:01 AM    BUN 38 (H) 2021 02:01 AM    Creatinine 2.36 (H) 2021 02:01 AM    BUN/Creatinine ratio 16 2021 02:01 AM    GFR est AA 25 (L) 2021 02:01 AM GFR est non-AA 21 (L) 03/25/2021 02:01 AM    Calcium 8.2 (L) 03/25/2021 02:01 AM    Bilirubin, total 0.1 (L) 03/25/2021 02:01 AM    Alk. phosphatase 54 03/25/2021 02:01 AM    Protein, total 6.5 03/25/2021 02:01 AM    Albumin 2.6 (L) 03/25/2021 02:01 AM    Globulin 3.9 03/25/2021 02:01 AM    A-G Ratio 0.7 (L) 03/25/2021 02:01 AM    ALT (SGPT) <6 (L) 03/25/2021 02:01 AM       CT of abd/pel: Right ureteral stent is noted in position with the proximal end in the upper pole right kidney and the distal end in the urinary bladder which is filled with high density may represent hemorrhage. There is right hydronephrosis versus a mass in the lower pole of the right kidney. The left kidney is small and there is mild left hydroureteronephrosis, a large abdominal wall hernia in the left containing stomach, small  bowel and colon which is incompletely imaged. Assessment and Plan   UTI with hx of MDR klebsiella pneumoniae; ruled out - urine cx (3/18) no growth  Leukocytosis (resolved)  Hematuria  Hx of Right ureteral obstruction s/p Right ureteral stent exchange 11/24/20  S/p clot evacuation, right stent exchange (3/23)  - WBC 18.6k->10.5k, afebrile     Blood cx (3/19) no growth      Urology following     Received IV merrem 3/18 - 3/24     Pt is clear to discharge in ID stand point. NO active infection. ID team signing off. Please contact us with any questions.        Yuriy Griffiths NP

## 2021-03-25 NOTE — PROGRESS NOTES
Bedside and Verbal shift change report given to Chip Blanc RN (oncoming nurse) by Wally Marie RN (offgoing nurse). Report included the following information SBAR, Kardex, MAR and Recent Results.

## 2021-03-25 NOTE — PROGRESS NOTES
Problem: Mobility Impaired (Adult and Pediatric)  Goal: *Acute Goals and Plan of Care (Insert Text)  Description: FUNCTIONAL STATUS PRIOR TO ADMISSION:  pt was primarily bed bound due to LE & buttock wounds as well as bakers cyst. Pt was able to roll self in bed and complete bed mobility to sit EOB, but given location of wounds as well as poor wound healing pt was not sitting up on the EOB as often. Pt was standing with therapy at last admission in 7/2020 and was discharged home with HHPT. Patient was working on standing with therapy at home up until bakers cyst developed resulting in too much pain. HOME SUPPORT PRIOR TO ADMISSION: Patient lives with her sister at this time. Physical Therapy Goals  Initiated 3/24/2021  1. Patient will move from supine to sit and sit to supine  in bed with supervision/set-up within 7 day(s). 2.  Patient will transfer from bed to chair and chair to bed with moderate assistance  using the least restrictive device within 7 day(s). 3.  Patient will perform sit to stand with moderate assistance  within 7 day(s). 4.  Patient will ambulate with moderate assistance  for 5 feet with the least restrictive device within 7 day(s). Outcome: Progressing Towards Goal  PHYSICAL THERAPY TREATMENT  Patient: Malinda Bradshaw (68 y.o. female)  Date: 3/25/2021  Diagnosis: Hematuria [R31.9] <principal problem not specified>  Procedure(s) (LRB):  CYSTOSCOPY CLOT EVACUATION, RIGHT STENT EXCHANGE (URGENT) (Right) 2 Days Post-Op  Precautions: Fall  Chart, physical therapy assessment, plan of care and goals were reviewed. ASSESSMENT  Patient continues with skilled PT services and is progressing towards goals. She was able to progress bed mobility to SBA using bed rail for support w/ HOB moderately elevated. Pt strongly preferred to complete transfer to EOB w/o physical support. She had no c/o pain/discomfort other than discomfort under her Left thigh from bed frame while sitting EOB.  Pt demonstrated ability to scoot to EOB w/ occasional Min A (w/ HHA) for repositioning of hips at EOB; noted pt able to minimally clear buttocks from bed when scooting. Attempted \"half- stand\" however pt unable to clear buttocks off bed w/ Total Ax2. Pt completed seated exercise at EOB, mild discomfort in RLE (Baker cyst). She also completed ADL's w/ OT. Able to transfer back to bed w/ SBA. Noted minimal bridge of hips in supine w/ changing of danna pad. Instructed pt to complete supine bridges at least 3x/day in addition to general strengthening exercises provided. Current Level of Function Impacting Discharge (mobility/balance): SBA for bed mobility; attempted to perform half stand w/ Total Ax2. Other factors to consider for discharge: pt motivated to progress w/ hopes of amb. Bed bound at baseline. PLAN :  Patient continues to benefit from skilled intervention to address the above impairments. Continue treatment per established plan of care. to address goals. Recommendation for discharge: (in order for the patient to meet his/her long term goals)  Therapy 3 hours per day 5-7 days per week    This discharge recommendation:  Has been made in collaboration with the attending provider and/or case management    IF patient discharges home will need the following DME: to be determined (TBD)       SUBJECTIVE:   Patient stated \" I'm just so surprised at allen much I lost!    OBJECTIVE DATA SUMMARY:   Critical Behavior:  Neurologic State: Alert  Orientation Level: Oriented X4  Cognition: Appropriate decision making, Appropriate for age attention/concentration, Appropriate safety awareness, Follows commands  Safety/Judgement: Awareness of environment  Functional Mobility Training:  Bed Mobility:     Supine to Sit: Stand-by assistance; Additional time;Bed Modified  Sit to Supine: Stand-by assistance; Additional time; Other (comment)  Scooting: Minimum assistance; Additional time;Assist x1(to EOB (seated)) Transfers:  Sit to Stand: (inferred Total Ax2)                                Balance:  Sitting: Intact  Standing: (unable to stand at this time (unable to clear buttocks ))          Therapeutic Exercises:   Seated EOB: marches/hip flexion (x10), LAQ's (x10), toe raises (x10), heel raises (x20)  Pain Rating:      Activity Tolerance:   Good    After treatment patient left in no apparent distress:   Supine in bed, Call bell within reach, and Side rails x 3    COMMUNICATION/COLLABORATION:   The patients plan of care was discussed with: Registered nurse.      Laya Reese PTA   Time Calculation: 39 mins

## 2021-03-26 LAB
GLUCOSE BLD STRIP.AUTO-MCNC: 113 MG/DL (ref 65–100)
GLUCOSE BLD STRIP.AUTO-MCNC: 134 MG/DL (ref 65–100)
GLUCOSE BLD STRIP.AUTO-MCNC: 142 MG/DL (ref 65–100)
GLUCOSE BLD STRIP.AUTO-MCNC: 163 MG/DL (ref 65–100)
SERVICE CMNT-IMP: ABNORMAL

## 2021-03-26 PROCEDURE — 97530 THERAPEUTIC ACTIVITIES: CPT

## 2021-03-26 PROCEDURE — 65270000029 HC RM PRIVATE

## 2021-03-26 PROCEDURE — 97535 SELF CARE MNGMENT TRAINING: CPT

## 2021-03-26 PROCEDURE — 82962 GLUCOSE BLOOD TEST: CPT

## 2021-03-26 PROCEDURE — 74011250637 HC RX REV CODE- 250/637: Performed by: UROLOGY

## 2021-03-26 PROCEDURE — 97110 THERAPEUTIC EXERCISES: CPT

## 2021-03-26 PROCEDURE — 77030038269 HC DRN EXT URIN PURWCK BARD -A

## 2021-03-26 RX ADMIN — Medication 10 ML: at 22:00

## 2021-03-26 RX ADMIN — TRAMADOL HYDROCHLORIDE 50 MG: 50 TABLET, COATED ORAL at 05:37

## 2021-03-26 RX ADMIN — OXYBUTYNIN CHLORIDE 10 MG: 5 TABLET, EXTENDED RELEASE ORAL at 14:43

## 2021-03-26 RX ADMIN — DOCUSATE SODIUM 50 MG AND SENNOSIDES 8.6 MG 1 TABLET: 8.6; 5 TABLET, FILM COATED ORAL at 14:43

## 2021-03-26 RX ADMIN — PANTOPRAZOLE SODIUM 40 MG: 40 TABLET, DELAYED RELEASE ORAL at 07:32

## 2021-03-26 RX ADMIN — METHIMAZOLE 10 MG: 5 TABLET ORAL at 18:33

## 2021-03-26 RX ADMIN — TRAMADOL HYDROCHLORIDE 50 MG: 50 TABLET, COATED ORAL at 18:33

## 2021-03-26 NOTE — PROGRESS NOTES
[facility] Adult  Hospitalist Group                                                                                          Hospitalist Progress Note  Tricia Cortez MD  Answering service: [phone] from in house phone        Date of Service:  3/26/2021  NAME:  Alexsander Samuels  :  1960  MRN:  [id number]      Admission Summary:   Alexsander Samuels is a 62 y.o. female with PMH of DM, HTN, Morbid obesity, Renal stone with hx of R ureteral stent for stones, came to [facility] for hematuria since morning, about 7 hours at that time. Pt is Jahovah's witness and does not take any blood products. Pt suggested to be doing ok until yesterday when started having vaginal bleeding with sharp, intense, moderate to severe, intermittent pains from R flank to groin, suprapubic and urethral meatus. No fever/ chills. Pt has had similar experience in past 2020. And this feels quite same. Pt is jahovah's witness and does not want blood transfusions.       Interval history / Subjective:   Patient seen and examined at bedside this AM. She is doing ok. No overnight events or new complaints. Waiting for placement. Discussed with nursing and YULISA- [facility]. [facility] patient and [facility] was started today     Assessment & Plan:     # Hematuria - resolved   # Hx  R ureteral stent for renal stone  # SIRS on poa  with leukocytosis and tachycardia  UTI ruled out  Hx UTI with MDR klebsiella pneumoniae  - CT abd 3/18: Right ureteral stent is noted in position with the proximal end in the upper pole right kidney and the distal end in the urinary bladder which is filled with high density may represent hemorrhage. There is right hydronephrosis versus a mass in the lower pole of the right kidney.  The left kidney is small and there is mild left hydroureteronephrosis.   -  Leucocytosis - resolved  - urine cx- negative   -  Discontinued merrem  3/24  - Appreciate ID and urology input   -  S/p Cystoscopy with clot evacuation, right ureteral stent change 3/23   - outpatient follow-up with [facility]. # Acute Anemia due to hematuria  #Denominational  - no blood products   - s/p iron infusions.  -  Hb low stable     # DM type 2 - A1c 6.7 c/w ISS  # CKD stage 4- cr at baseline   # Morbid obesity, BMI 60.66 - recommend weight loss     POA bilateral ischial tuberosities with stage 2 pressure injuries  - wound care     Incidental finding: CT showed  there is a large abdominal wall hernia in the left containing stomach, small  bowel and colon which is incompletely imaged. Code status: Full  DVT prophylaxis: SCD     Care Plan discussed with: Patient/Family and Nurse  Anticipated Disposition: [facility] [id number]  [facility]. Insurance auth pending   Anticipated Discharge: 24 hours to 48 hours   Medically stable to be discharged. CM working on placement      Hospital Problems  Date Reviewed: 7/18/2020          Codes Class Noted POA    Hematuria ICD-10-CM: R31.9  ICD-9-CM: 599.70  7/17/2020 Unknown                Review of Systems:   A comprehensive review of systems was negative. Vital Signs:    Last 24hrs VS reviewed since prior progress note. Most recent are:  Visit Vitals  /70   Pulse (!) 102   Temp 98 °F (36.7 °C)   Resp 16   Ht 5' 4\" (1.626 m)   Wt (!) 160.3 kg (353 lb 6.4 oz)   SpO2 92%   BMI 60.66 kg/m²       No intake or output data in the 24 hours ending 03/26/21 1530     Physical Examination:     I had a face to face encounter with this patient and independently examined them on 3/26/2021 as outlined below:          Constitutional:  No acute distress, cooperative    ENT:  mmm   Resp:  CTA bilaterally. CV:  Regular rhythm, normal rate    GI:  Soft, non distended, non tender. obese    Musculoskeletal:  No edema, warm, 2+ pulses throughout    Neurologic:  Moves all extremities. AAOx3, CN II-XII reviewed     Psych:  Good insight, Not anxious nor agitated.        Data Review:    I personally reviewed  Image and labs      Labs:     Recent Labs     03/25/21  0215 03/24/21  0310   WBC 10.3  --    HGB 7.2* 7.6*   HCT 23.9* 25.0*     --      Recent Labs     03/25/21  0201 03/24/21  0307    142   K 4.3 4.0   * 113*   CO2 25 23   BUN 38* 37*   CREA 2.36* 2.19*   * 125*   CA 8.2* 8.9     Recent Labs     03/25/21  0201 03/24/21  0307   ALT <6* 9*   AP 54 66   TBILI 0.1* 0.1*   TP 6.5 7.1   ALB 2.6* 2.7*   GLOB 3.9 4.4*     No results for input(s): INR, PTP, APTT, INREXT, INREXT in the last 72 hours. No results for input(s): FE, TIBC, PSAT, FERR in the last 72 hours. No results found for: FOL, RBCF   No results for input(s): PH, PCO2, PO2 in the last 72 hours. No results for input(s): CPK, CKNDX, TROIQ in the last 72 hours.     No lab exists for component: CPKMB  No results found for: CHOL, CHOLX, CHLST, CHOLV, HDL, HDLP, LDL, LDLC, DLDLP, TGLX, TRIGL, TRIGP, CHHD, CHHDX  Lab Results   Component Value Date/Time    Glucose (POC) 163 (H) 03/26/2021 12:16 PM    Glucose (POC) 113 (H) 03/26/2021 07:30 AM    Glucose (POC) 169 (H) 03/25/2021 09:34 PM    Glucose (POC) 134 (H) 03/25/2021 04:37 PM    Glucose (POC) 106 (H) 03/25/2021 11:23 AM     Lab Results   Component Value Date/Time    Color RED 03/18/2021 11:54 AM    Appearance CLEAR 03/18/2021 11:54 AM    Specific gravity 1.015 03/18/2021 11:54 AM    Specific gravity 1.008 07/25/2020 02:15 PM    pH (UA) 6.5 03/18/2021 11:54 AM    Protein 100 (A) 03/18/2021 11:54 AM    Glucose Negative 03/18/2021 11:54 AM    Ketone Negative 03/18/2021 11:54 AM    Bilirubin Negative 07/25/2020 02:15 PM    Urobilinogen 0.2 03/18/2021 11:54 AM    Nitrites Negative 03/18/2021 11:54 AM    Leukocyte Esterase LARGE (A) 03/18/2021 11:54 AM    Epithelial cells FEW 03/18/2021 11:54 AM    Bacteria Negative 03/18/2021 11:54 AM    WBC 5-10 03/18/2021 11:54 AM    RBC >100 (H) 03/18/2021 11:54 AM         Medications Reviewed:     Current Facility-Administered Medications   Medication Dose Route Frequency    ondansetron (ZOFRAN) injection 4 mg  4 mg IntraVENous Q4H PRN    benzocaine-menthoL (CEPACOL) lozenge 1 Lozenge  1 Lozenge Mucous Membrane PRN    traMADoL (ULTRAM) tablet 50 mg  50 mg Oral Q6H PRN    hydrOXYzine HCL (ATARAX) tablet 25 mg  25 mg Oral Q6H PRN    fentaNYL citrate (PF) injection 25 mcg  25 mcg IntraVENous Q4H PRN    opium-belladonna (B&O 15-A) 16.2-30 mg suppository 1 Suppository  1 Suppository Rectal Q8H PRN    sodium chloride (NS) flush 5-40 mL  5-40 mL IntraVENous Q8H    sodium chloride (NS) flush 5-40 mL  5-40 mL IntraVENous PRN    acetaminophen (TYLENOL) tablet 650 mg  650 mg Oral Q6H PRN    Or    acetaminophen (TYLENOL) suppository 650 mg  650 mg Rectal Q6H PRN    polyethylene glycol (MIRALAX) packet 17 g  17 g Oral DAILY PRN    methIMAzole (TAPAZOLE) tablet 10 mg  10 mg Oral QPM    oxybutynin chloride XL (DITROPAN XL) tablet 10 mg  10 mg Oral DAILY    pantoprazole (PROTONIX) tablet 40 mg  40 mg Oral ACB    senna-docusate (PERICOLACE) 8.6-50 mg per tablet 1 Tab  1 Tab Oral DAILY    glucose chewable tablet 16 g  4 Tab Oral PRN    dextrose (D50W) injection syrg 12.5-25 g  25-50 mL IntraVENous PRN    glucagon (GLUCAGEN) injection 1 mg  1 mg IntraMUSCular PRN    insulin lispro (HUMALOG) injection   SubCUTAneous AC&HS    morphine injection 1 mg  1 mg IntraVENous Q2H PRN     ______________________________________________________________________  EXPECTED LENGTH OF STAY: 3d 2h  ACTUAL LENGTH OF STAY:          8                 Hammad Francis MD

## 2021-03-26 NOTE — PROGRESS NOTES
Bedside and Verbal shift change report given to Jorge Hale (oncoming nurse) by Rita Daniel (offgoing nurse). Report included the following information SBAR, Kardex, Intake/Output and MAR.

## 2021-03-26 NOTE — PROGRESS NOTES
Problem: Self Care Deficits Care Plan (Adult)  Goal: *Acute Goals and Plan of Care (Insert Text)  Description:   FUNCTIONAL STATUS PRIOR TO ADMISSION: Patient was primarily bed bound secondary to LE and buttocks wounds. Patient previously use to roll and sit EOB, but has not done so due to poor wound healing. Patient requires assistance with bathing, dressing, and getting food due to decreased mobility from her sister, with whom she lives. HOME SUPPORT PRIOR TO ADMISSION: Patient lives with her sister at this time to provide assistance with ADL completion    Occupational Therapy Goals  Initiated 3/24/2021  1. Patient will perform grooming with supervision/set-up sitting EOB within 7 day(s). 2.  Patient will perform bathing with minimal assistance/contact guard assist within 7 day(s). 3.  Patient will perform upper body dressing with supervision/set-up within 7 day(s). 4.  Patient will perform toilet transfers to [facility] with moderate assistance  within 7 day(s). 5.  Patient will perform all aspects of toileting with moderate assistance  within 7 day(s). 6.  Patient will participate in upper extremity therapeutic exercise/activities with minimal assistance/contact guard assist for 10 minutes within 7 day(s). 7.  Patient will utilize energy conservation techniques during functional activities with verbal cues within 7 day(s). Outcome: Progressing Towards Goal    OCCUPATIONAL THERAPY TREATMENT  Patient: Anselmo Williamson (62 y.o. female)  Date: 3/26/2021  Diagnosis: Hematuria [R31.9] <principal problem not specified>  Procedure(s) (LRB):  CYSTOSCOPY CLOT EVACUATION, RIGHT STENT EXCHANGE (URGENT) (Right) 3 Days Post-Op  Precautions: Fall  Chart, occupational therapy assessment, plan of care, and goals were reviewed. ASSESSMENT  Patient continues with skilled OT services and is progressing towards goals. Pt received in bed and eager to work with therapy. She progressed to EOB with assistance of PT/OT. She worked on weight shifting while EOB, attempting to stand, and mobility in preparation for ADL activities. With use of walker, she is unable to lift her buttocks from the EOB to complete standing activities. She offered a suggestion that did allow her buttocks to clear the bed (placing the chair perpendicular to the bed with the back rest facing her). She was able to clear her buttocks x 2 while shifting forward to weight bear on UE's on the arms of the chair. She does have limited core strength as well significantly impairing her progression and also complicated by large abdominal hernia. She would benefit from in-pt rehab as she is extremely eager to improve her current condition but is limited by deconditioning. Current Level of Function Impacting Discharge (ADLs): max A to total A for ADL activities. Other factors to consider for discharge: debility, limited knee extension; prolonged debility         PLAN :  Patient continues to benefit from skilled intervention to address the above impairments. Continue treatment per established plan of care to address goals. Recommend with staff: bed in modified chair position    Recommend next OT session: grooming, repositioning in the bed    Recommendation for discharge: (in order for the patient to meet his/her long term goals)  Therapy 3 hours per day 5-7 days per week    This discharge recommendation:  Has been made in collaboration with the attending provider and/or case management    IF patient discharges home will need the following DME: none       SUBJECTIVE:   Patient stated I am so grateful for you helping me.     OBJECTIVE DATA SUMMARY:   Cognitive/Behavioral Status:  Neurologic State: Alert  Orientation Level: Oriented X4  Cognition: Appropriate for age attention/concentration  Perception: Appears intact  Perseveration: No perseveration noted  Safety/Judgement: Good awareness of safety precautions    Functional Mobility and Transfers for ADLs:  Bed Mobility:  Rolling: Stand-by assistance; Adaptive equipment  Supine to Sit: Stand-by assistance; Adaptive equipment  Sit to Supine: Stand-by assistance; Adaptive equipment  Scooting: Contact guard assistance;Minimum assistance    Transfers:  Sit to Stand: Moderate assistance;Maximum assistance;Assist x2(with recliner supporting LE knees)  Functional Transfers  Toilet Transfer : (still unable to complete transfer to [facility])       Balance:  Sitting: Intact  Standing: Impaired  Standing - Static: Constant support;Fair;Poor  Standing - Dynamic : Not tested    ADL Intervention:     Pt received in bed and progressed to EOB with supervision. From EOB, she worked on core strengthening, lateral weight shifting, attempting to stand to WB through LE's, and then lateral scoots to the [facility]. She posed to have the chair placed perpendicular to the bed and using the foot rest of the chair to block her knees. She was able to lift her buttocks while weight bearing through he elbows on the arm rests of the chair. She completed progression of standing x 2 times. She returned to bed and positioned with bed in modified chair position. Cognitive Retraining  Safety/Judgement: Good awareness of safety precautions    Pain:  No pain    Activity Tolerance:   Good    After treatment patient left in no apparent distress:   Sitting in chair and Call bell within reach    COMMUNICATION/COLLABORATION:   The patients plan of care was discussed with: Physical therapist and Registered nurse.      Irving Dubin, OT  Time Calculation: 38 mins

## 2021-03-26 NOTE — PROGRESS NOTES
Bedside and Verbal shift change report given to Alber Blake (oncoming nurse) by Jovany Akins (offgoing nurse). Report included the following information SBAR, Kardex and MAR.

## 2021-03-26 NOTE — PROGRESS NOTES
JOESPH: [address] [place] [address] Acute rehab has accepted patient and [facility] was started 3/25. This facility requires a rapid covid test within 24 hours of discharge. Today, patient now stating she does not want to go to [address] [place] [address] rehab, and requested referrals be sent to [address] [place] [address], [facility], and [facility]. MD is aware. BLS transport at discharge. CM spoke with Alicia Newton with [address] [place] [address] acute rehab. The [facility] is still pending. CM updated patient at bedside.      Aleida Veras, BSW/CRM

## 2021-03-26 NOTE — PROGRESS NOTES
Problem: Mobility Impaired (Adult and Pediatric)  Goal: *Acute Goals and Plan of Care (Insert Text)  Description: FUNCTIONAL STATUS PRIOR TO ADMISSION:  pt was primarily bed bound due to LE & buttock wounds as well as bakers cyst. Pt was able to roll self in bed and complete bed mobility to sit EOB, but given location of wounds as well as poor wound healing pt was not sitting up on the EOB as often. Pt was standing with therapy at last admission in 7/2020 and was discharged home with HHPT. Patient was working on standing with therapy at home up until bakers cyst developed resulting in too much pain. HOME SUPPORT PRIOR TO ADMISSION: Patient lives with her sister at this time. Physical Therapy Goals  Initiated 3/24/2021  1. Patient will move from supine to sit and sit to supine  in bed with supervision/set-up within 7 day(s). 2.  Patient will transfer from bed to chair and chair to bed with moderate assistance  using the least restrictive device within 7 day(s). 3.  Patient will perform sit to stand with moderate assistance  within 7 day(s). 4.  Patient will ambulate with moderate assistance  for 5 feet with the least restrictive device within 7 day(s). Outcome: Progressing Towards Goal    PHYSICAL THERAPY TREATMENT  Patient: Janneth Rabago (89 y.o. female)  Date: 3/26/2021  Diagnosis: Hematuria [R31.9] <principal problem not specified>  Procedure(s) (LRB):  CYSTOSCOPY CLOT EVACUATION, RIGHT STENT EXCHANGE (URGENT) (Right) 3 Days Post-Op  Precautions: Fall  Chart, physical therapy assessment, plan of care and goals were reviewed. ASSESSMENT  Patient continues with skilled PT services and is progressing towards goals. Pt received in supine and willing to work with therapy. Pt presenting with LLE with hip ER and states she could not correct without help.  Pt states her LLE is stronger and was able to demonstrate good bed mobility with SBA and using bed rails for assistance with extreme weight shifts to EOB to prevent shearing on sacral. Pt continued with 3 attempts to STS from bedside with RW with not being able to clear her bottom. Pt requested to use a recliner to support her knees while pulling from armrest to stand with forward trunk bend to clear bed. Pt able to tolerate STS from bed x2 with VC for TKE with bilat LE for increase knee extension. Pt able to tolerate EOB LE exercises well while maintaining good dynamic/static balance. Pt continued to sit to supine with SBA with continued supine exercises with VC for form. Pt completed session in modified chair position with call bell within reach and all needs met at the time. Rn notified of session. Current Level of Function Impacting Discharge (mobility/balance): SBA for bed mobility, mod/max x2 STS with  in front of pt. Other factors to consider for discharge: sacral wound, limitied LE flexibility with HS, and limited strength with LE. PLAN :  Patient continues to benefit from skilled intervention to address the above impairments. Continue treatment per established plan of care. to address goals. Recommendation for discharge: (in order for the patient to meet his/her long term goals)  Therapy up to 5 days/week in SNF setting    This discharge recommendation:  Has not yet been discussed the attending provider and/or case management    IF patient discharges home will need the following DME: to be determined (TBD)       SUBJECTIVE:   Patient stated I believe I can do it with the chair.     OBJECTIVE DATA SUMMARY:   Critical Behavior:  Neurologic State: Alert  Orientation Level: Oriented X4  Cognition: Appropriate for age attention/concentration, Appropriate safety awareness, Follows commands  Safety/Judgement: Awareness of environment  Functional Mobility Training:  Bed Mobility:  Rolling: Stand-by assistance; Adaptive equipment  Supine to Sit: Stand-by assistance; Adaptive equipment  Sit to Supine: Stand-by assistance; Adaptive equipment  Scooting: Contact guard assistance;Minimum assistance     Transfers:  Sit to Stand: Moderate assistance;Maximum assistance;Assist x2(with recliner supporting LE knees)  Stand to Sit: Moderate assistance;Maximum assistance;Assist x2     Balance:  Sitting: Intact  Standing: Impaired  Standing - Static: Constant support;Fair;Poor  Standing - Dynamic : Not tested  Therapeutic Exercises:   Seated- LAQ, marches, lateral/fwrd/retro trunk leans with no UE support  Supine- bridges  Pain Rating:  Pain with sacral wound if touched by bed wrong. Activity Tolerance:   Fair and requires rest breaks    After treatment patient left in no apparent distress:   Call bell within reach and Modified chair position in bed    COMMUNICATION/COLLABORATION:   The patients plan of care was discussed with: Registered nurse.      Hesham Evangelista PTA   Time Calculation: 35 mins

## 2021-03-27 LAB
ALBUMIN SERPL-MCNC: 2.7 G/DL (ref 3.5–5)
ALBUMIN/GLOB SERPL: 0.6 {RATIO} (ref 1.1–2.2)
ALP SERPL-CCNC: 61 U/L (ref 45–117)
ALT SERPL-CCNC: 8 U/L (ref 12–78)
ANION GAP SERPL CALC-SCNC: 8 MMOL/L (ref 5–15)
AST SERPL-CCNC: 8 U/L (ref 15–37)
BILIRUB SERPL-MCNC: 0.1 MG/DL (ref 0.2–1)
BUN SERPL-MCNC: 38 MG/DL (ref 6–20)
BUN/CREAT SERPL: 19 (ref 12–20)
CALCIUM SERPL-MCNC: 8.4 MG/DL (ref 8.5–10.1)
CHLORIDE SERPL-SCNC: 109 MMOL/L (ref 97–108)
CO2 SERPL-SCNC: 23 MMOL/L (ref 21–32)
CREAT SERPL-MCNC: 2.03 MG/DL (ref 0.55–1.02)
GLOBULIN SER CALC-MCNC: 4.3 G/DL (ref 2–4)
GLUCOSE BLD STRIP.AUTO-MCNC: 102 MG/DL (ref 65–100)
GLUCOSE BLD STRIP.AUTO-MCNC: 117 MG/DL (ref 65–100)
GLUCOSE BLD STRIP.AUTO-MCNC: 151 MG/DL (ref 65–100)
GLUCOSE BLD STRIP.AUTO-MCNC: 164 MG/DL (ref 65–100)
GLUCOSE SERPL-MCNC: 95 MG/DL (ref 65–100)
POTASSIUM SERPL-SCNC: 4.1 MMOL/L (ref 3.5–5.1)
PROT SERPL-MCNC: 7 G/DL (ref 6.4–8.2)
SERVICE CMNT-IMP: ABNORMAL
SODIUM SERPL-SCNC: 140 MMOL/L (ref 136–145)

## 2021-03-27 PROCEDURE — 74011250637 HC RX REV CODE- 250/637: Performed by: UROLOGY

## 2021-03-27 PROCEDURE — 77030038269 HC DRN EXT URIN PURWCK BARD -A

## 2021-03-27 PROCEDURE — 80053 COMPREHEN METABOLIC PANEL: CPT

## 2021-03-27 PROCEDURE — 82962 GLUCOSE BLOOD TEST: CPT

## 2021-03-27 PROCEDURE — 74011250637 HC RX REV CODE- 250/637: Performed by: INTERNAL MEDICINE

## 2021-03-27 PROCEDURE — 65270000029 HC RM PRIVATE

## 2021-03-27 PROCEDURE — 36415 COLL VENOUS BLD VENIPUNCTURE: CPT

## 2021-03-27 RX ORDER — POLYETHYLENE GLYCOL 3350 17 G/17G
17 POWDER, FOR SOLUTION ORAL DAILY
Status: DISCONTINUED | OUTPATIENT
Start: 2021-03-27 | End: 2021-03-30 | Stop reason: HOSPADM

## 2021-03-27 RX ADMIN — OXYBUTYNIN CHLORIDE 10 MG: 5 TABLET, EXTENDED RELEASE ORAL at 09:38

## 2021-03-27 RX ADMIN — METHIMAZOLE 10 MG: 5 TABLET ORAL at 18:41

## 2021-03-27 RX ADMIN — Medication 10 ML: at 14:00

## 2021-03-27 RX ADMIN — POLYETHYLENE GLYCOL 3350 17 G: 17 POWDER, FOR SOLUTION ORAL at 15:07

## 2021-03-27 RX ADMIN — PANTOPRAZOLE SODIUM 40 MG: 40 TABLET, DELAYED RELEASE ORAL at 07:52

## 2021-03-27 RX ADMIN — Medication 10 ML: at 06:00

## 2021-03-27 RX ADMIN — TRAMADOL HYDROCHLORIDE 50 MG: 50 TABLET, COATED ORAL at 07:52

## 2021-03-27 RX ADMIN — DOCUSATE SODIUM 50 MG AND SENNOSIDES 8.6 MG 1 TABLET: 8.6; 5 TABLET, FILM COATED ORAL at 09:38

## 2021-03-27 NOTE — PROGRESS NOTES
Bedside and Verbal shift change report given to Leonidas Richards RN (oncoming nurse) by Luther Neely RN (offgoing nurse). Report included the following information SBAR, Kardex and MAR. PROVIDER:[TOKEN:[[id number]]]

## 2021-03-27 NOTE — PROGRESS NOTES
[address] [facility]  Hospitalist Group                                                                                          Hospitalist Progress Note  Aron Madera MD  Answering service: [phone] from in house phone        Date of Service:  3/27/2021  NAME:  Giselle Gutierrez  :  1960  MRN:  [id number]      Admission Summary:   Giselle Gutierrez is a 62 y.o. female with PMH of DM, HTN, Morbid obesity, Renal stone with hx of R ureteral stent for stones, came to [facility] for hematuria since morning, about 7 hours at that time. Pt is Jahovah's witness and does not take any blood products. Pt suggested to be doing ok until yesterday when started having vaginal bleeding with sharp, intense, moderate to severe, intermittent pains from R flank to groin, suprapubic and urethral meatus. No fever/ chills. Pt has had similar experience in past 2020. And this feels quite same. Pt is jahovah's witness and does not want blood transfusions.       Interval history / Subjective:   Patient seen and examined at bedside this AM.  C/w constipation ( hard stool) which causea pain at the stent region - reassured her - added miralax   Waiting for insurance auth. Discussed with nursing      Assessment & Plan:     # Hematuria - resolved   # Hx  R ureteral stent for renal stone  # SIRS on poa  with leukocytosis and tachycardia  UTI ruled out  Hx UTI with MDR klebsiella pneumoniae  - CT abd 3/18: Right ureteral stent is noted in position with the proximal end in the upper pole right kidney and the distal end in the urinary bladder which is filled with high density may represent hemorrhage. There is right hydronephrosis versus a mass in the lower pole of the right kidney.  The left kidney is small and there is mild left hydroureteronephrosis.   -  Leucocytosis - resolved  - urine cx- negative   -  Discontinued merrem  3/24  - Appreciate ID and urology input   -  S/p Cystoscopy with clot evacuation, right ureteral stent change 3/23   - outpatient follow-up with [facility]. # Acute Anemia due to hematuria  #Faith  - no blood products   - s/p iron infusions.  -  Hb low stable     # DM type 2 - A1c 6.7 c/w ISS  # CKD stage 4- cr at baseline   # Morbid obesity, BMI 60.66 - recommend weight loss     POA bilateral ischial tuberosities with stage 2 pressure injuries  - wound care     Incidental finding: CT showed  there is a large abdominal wall hernia in the left containing stomach, small  bowel and colon which is incompletely imaged. Code status: Full  DVT prophylaxis: SCD     Care Plan discussed with: Patient/Family and Nurse  Anticipated Disposition: [facility] . [address] Acute rehab. Insurance auth pending   Anticipated Discharge: 24 hours to 48 hours . Likely Monday dc  Medically stable to be discharged. CM working on placement      Hospital Problems  Date Reviewed: 7/18/2020          Codes Class Noted POA    Hematuria ICD-10-CM: R31.9  ICD-9-CM: 599.70  7/17/2020 Unknown                Review of Systems:   A comprehensive review of systems was negative. Vital Signs:    Last 24hrs VS reviewed since prior progress note. Most recent are:  Visit Vitals  /68 (BP 1 Location: Left lower arm, BP Patient Position: At rest)   Pulse 88   Temp 98 °F (36.7 °C)   Resp 18   Ht 5' 4\" (1.626 m)   Wt (!) 160.3 kg (353 lb 6.4 oz)   SpO2 95%   BMI 60.66 kg/m²         Intake/Output Summary (Last 24 hours) at 3/27/2021 1331  Last data filed at 3/27/2021 0636  Gross per 24 hour   Intake 480 ml   Output 900 ml   Net -420 ml        Physical Examination:     I had a face to face encounter with this patient and independently examined them on 3/27/2021 as outlined below:          Constitutional:  No acute distress, cooperative    ENT:  mmm   Resp:  CTA bilaterally. CV:  Regular rhythm, normal rate    GI:  Soft, non distended, non tender. obese    Musculoskeletal:  No edema, warm, 2+ pulses throughout    Neurologic:  Moves all extremities. AAOx3, CN II-XII reviewed     Psych:  Good insight, Not anxious nor agitated. Data Review:    I personally reviewed  Image and labs      Labs:     Recent Labs     03/25/21  0215   WBC 10.3   HGB 7.2*   HCT 23.9*        Recent Labs     03/27/21  0350 03/25/21  0201    143   K 4.1 4.3   * 112*   CO2 23 25   BUN 38* 38*   CREA 2.03* 2.36*   GLU 95 101*   CA 8.4* 8.2*     Recent Labs     03/27/21  0350 03/25/21  0201   ALT 8* <6*   AP 61 54   TBILI 0.1* 0.1*   TP 7.0 6.5   ALB 2.7* 2.6*   GLOB 4.3* 3.9     No results for input(s): INR, PTP, APTT, INREXT, INREXT in the last 72 hours. No results for input(s): FE, TIBC, PSAT, FERR in the last 72 hours. No results found for: FOL, RBCF   No results for input(s): PH, PCO2, PO2 in the last 72 hours. No results for input(s): CPK, CKNDX, TROIQ in the last 72 hours.     No lab exists for component: CPKMB  No results found for: CHOL, CHOLX, CHLST, CHOLV, HDL, HDLP, LDL, LDLC, DLDLP, TGLX, TRIGL, TRIGP, CHHD, CHHDX  Lab Results   Component Value Date/Time    Glucose (POC) 102 (H) 03/27/2021 11:22 AM    Glucose (POC) 117 (H) 03/27/2021 06:12 AM    Glucose (POC) 142 (H) 03/26/2021 08:56 PM    Glucose (POC) 134 (H) 03/26/2021 05:25 PM    Glucose (POC) 163 (H) 03/26/2021 12:16 PM     Lab Results   Component Value Date/Time    Color RED 03/18/2021 11:54 AM    Appearance CLEAR 03/18/2021 11:54 AM    Specific gravity 1.015 03/18/2021 11:54 AM    Specific gravity 1.008 07/25/2020 02:15 PM    pH (UA) 6.5 03/18/2021 11:54 AM    Protein 100 (A) 03/18/2021 11:54 AM    Glucose Negative 03/18/2021 11:54 AM    Ketone Negative 03/18/2021 11:54 AM    Bilirubin Negative 07/25/2020 02:15 PM    Urobilinogen 0.2 03/18/2021 11:54 AM    Nitrites Negative 03/18/2021 11:54 AM    Leukocyte Esterase LARGE (A) 03/18/2021 11:54 AM    Epithelial cells FEW 03/18/2021 11:54 AM    Bacteria Negative 03/18/2021 11:54 AM    WBC 5-10 03/18/2021 11:54 AM    RBC >100 (H) 03/18/2021 11:54 AM         Medications Reviewed:     Current Facility-Administered Medications   Medication Dose Route Frequency    ondansetron (ZOFRAN) injection 4 mg  4 mg IntraVENous Q4H PRN    benzocaine-menthoL (CEPACOL) lozenge 1 Lozenge  1 Lozenge Mucous Membrane PRN    traMADoL (ULTRAM) tablet 50 mg  50 mg Oral Q6H PRN    hydrOXYzine HCL (ATARAX) tablet 25 mg  25 mg Oral Q6H PRN    fentaNYL citrate (PF) injection 25 mcg  25 mcg IntraVENous Q4H PRN    opium-belladonna (B&O 15-A) 16.2-30 mg suppository 1 Suppository  1 Suppository Rectal Q8H PRN    sodium chloride (NS) flush 5-40 mL  5-40 mL IntraVENous Q8H    sodium chloride (NS) flush 5-40 mL  5-40 mL IntraVENous PRN    acetaminophen (TYLENOL) tablet 650 mg  650 mg Oral Q6H PRN    Or    acetaminophen (TYLENOL) suppository 650 mg  650 mg Rectal Q6H PRN    polyethylene glycol (MIRALAX) packet 17 g  17 g Oral DAILY PRN    methIMAzole (TAPAZOLE) tablet 10 mg  10 mg Oral QPM    oxybutynin chloride XL (DITROPAN XL) tablet 10 mg  10 mg Oral DAILY    pantoprazole (PROTONIX) tablet 40 mg  40 mg Oral ACB    senna-docusate (PERICOLACE) 8.6-50 mg per tablet 1 Tab  1 Tab Oral DAILY    glucose chewable tablet 16 g  4 Tab Oral PRN    dextrose (D50W) injection syrg 12.5-25 g  25-50 mL IntraVENous PRN    glucagon (GLUCAGEN) injection 1 mg  1 mg IntraMUSCular PRN    insulin lispro (HUMALOG) injection   SubCUTAneous AC&HS    morphine injection 1 mg  1 mg IntraVENous Q2H PRN     ______________________________________________________________________  EXPECTED LENGTH OF STAY: 3d 2h  ACTUAL LENGTH OF STAY:          9                 Dara Goldsmith MD

## 2021-03-28 ENCOUNTER — APPOINTMENT (OUTPATIENT)
Dept: GENERAL RADIOLOGY | Age: 61
DRG: 466 | End: 2021-03-28
Attending: INTERNAL MEDICINE
Payer: COMMERCIAL

## 2021-03-28 LAB
ANION GAP SERPL CALC-SCNC: 5 MMOL/L (ref 5–15)
BASOPHILS # BLD: 0.1 K/UL (ref 0–0.1)
BASOPHILS NFR BLD: 0 % (ref 0–1)
BUN SERPL-MCNC: 39 MG/DL (ref 6–20)
BUN/CREAT SERPL: 19 (ref 12–20)
CALCIUM SERPL-MCNC: 8.7 MG/DL (ref 8.5–10.1)
CHLORIDE SERPL-SCNC: 110 MMOL/L (ref 97–108)
CO2 SERPL-SCNC: 27 MMOL/L (ref 21–32)
CREAT SERPL-MCNC: 2.05 MG/DL (ref 0.55–1.02)
DIFFERENTIAL METHOD BLD: ABNORMAL
EOSINOPHIL # BLD: 0.5 K/UL (ref 0–0.4)
EOSINOPHIL NFR BLD: 4 % (ref 0–7)
ERYTHROCYTE [DISTWIDTH] IN BLOOD BY AUTOMATED COUNT: 14.7 % (ref 11.5–14.5)
GLUCOSE BLD STRIP.AUTO-MCNC: 124 MG/DL (ref 65–100)
GLUCOSE BLD STRIP.AUTO-MCNC: 129 MG/DL (ref 65–100)
GLUCOSE BLD STRIP.AUTO-MCNC: 138 MG/DL (ref 65–100)
GLUCOSE BLD STRIP.AUTO-MCNC: 150 MG/DL (ref 65–100)
GLUCOSE SERPL-MCNC: 123 MG/DL (ref 65–100)
HCT VFR BLD AUTO: 26.3 % (ref 35–47)
HGB BLD-MCNC: 7.7 G/DL (ref 11.5–16)
IMM GRANULOCYTES # BLD AUTO: 0.2 K/UL (ref 0–0.04)
IMM GRANULOCYTES NFR BLD AUTO: 1 % (ref 0–0.5)
LYMPHOCYTES # BLD: 2.2 K/UL (ref 0.8–3.5)
LYMPHOCYTES NFR BLD: 18 % (ref 12–49)
MCH RBC QN AUTO: 27.5 PG (ref 26–34)
MCHC RBC AUTO-ENTMCNC: 29.3 G/DL (ref 30–36.5)
MCV RBC AUTO: 93.9 FL (ref 80–99)
MONOCYTES # BLD: 0.9 K/UL (ref 0–1)
MONOCYTES NFR BLD: 7 % (ref 5–13)
NEUTS SEG # BLD: 8.4 K/UL (ref 1.8–8)
NEUTS SEG NFR BLD: 70 % (ref 32–75)
NRBC # BLD: 0 K/UL (ref 0–0.01)
NRBC BLD-RTO: 0 PER 100 WBC
PLATELET # BLD AUTO: 388 K/UL (ref 150–400)
PMV BLD AUTO: 9.6 FL (ref 8.9–12.9)
POTASSIUM SERPL-SCNC: 5 MMOL/L (ref 3.5–5.1)
RBC # BLD AUTO: 2.8 M/UL (ref 3.8–5.2)
SERVICE CMNT-IMP: ABNORMAL
SODIUM SERPL-SCNC: 142 MMOL/L (ref 136–145)
WBC # BLD AUTO: 12.4 K/UL (ref 3.6–11)

## 2021-03-28 PROCEDURE — 74011250637 HC RX REV CODE- 250/637: Performed by: UROLOGY

## 2021-03-28 PROCEDURE — 65270000029 HC RM PRIVATE

## 2021-03-28 PROCEDURE — 74018 RADEX ABDOMEN 1 VIEW: CPT

## 2021-03-28 PROCEDURE — 74011250637 HC RX REV CODE- 250/637: Performed by: INTERNAL MEDICINE

## 2021-03-28 PROCEDURE — 97110 THERAPEUTIC EXERCISES: CPT

## 2021-03-28 PROCEDURE — 36415 COLL VENOUS BLD VENIPUNCTURE: CPT

## 2021-03-28 PROCEDURE — 74011250636 HC RX REV CODE- 250/636: Performed by: INTERNAL MEDICINE

## 2021-03-28 PROCEDURE — 82962 GLUCOSE BLOOD TEST: CPT

## 2021-03-28 PROCEDURE — 85025 COMPLETE CBC W/AUTO DIFF WBC: CPT

## 2021-03-28 PROCEDURE — 80048 BASIC METABOLIC PNL TOTAL CA: CPT

## 2021-03-28 RX ORDER — ERYTHROMYCIN 5 MG/G
OINTMENT OPHTHALMIC EVERY 8 HOURS
Status: DISCONTINUED | OUTPATIENT
Start: 2021-03-28 | End: 2021-03-30

## 2021-03-28 RX ORDER — HYDROMORPHONE HYDROCHLORIDE 1 MG/ML
1 INJECTION, SOLUTION INTRAMUSCULAR; INTRAVENOUS; SUBCUTANEOUS ONCE
Status: COMPLETED | OUTPATIENT
Start: 2021-03-28 | End: 2021-03-28

## 2021-03-28 RX ADMIN — ERYTHROMYCIN: 5 OINTMENT OPHTHALMIC at 10:33

## 2021-03-28 RX ADMIN — TRAMADOL HYDROCHLORIDE 50 MG: 50 TABLET, COATED ORAL at 13:10

## 2021-03-28 RX ADMIN — OXYBUTYNIN CHLORIDE 10 MG: 5 TABLET, EXTENDED RELEASE ORAL at 09:17

## 2021-03-28 RX ADMIN — ERYTHROMYCIN 5 G: 5 OINTMENT OPHTHALMIC at 18:29

## 2021-03-28 RX ADMIN — TRAMADOL HYDROCHLORIDE 50 MG: 50 TABLET, COATED ORAL at 07:15

## 2021-03-28 RX ADMIN — HYDROMORPHONE HYDROCHLORIDE 1 MG: 1 INJECTION, SOLUTION INTRAMUSCULAR; INTRAVENOUS; SUBCUTANEOUS at 16:54

## 2021-03-28 RX ADMIN — Medication 10 ML: at 21:13

## 2021-03-28 RX ADMIN — TRAMADOL HYDROCHLORIDE 50 MG: 50 TABLET, COATED ORAL at 21:12

## 2021-03-28 RX ADMIN — Medication 10 ML: at 14:00

## 2021-03-28 RX ADMIN — DOCUSATE SODIUM 50 MG AND SENNOSIDES 8.6 MG 1 TABLET: 8.6; 5 TABLET, FILM COATED ORAL at 09:17

## 2021-03-28 RX ADMIN — PANTOPRAZOLE SODIUM 40 MG: 40 TABLET, DELAYED RELEASE ORAL at 07:13

## 2021-03-28 RX ADMIN — POLYETHYLENE GLYCOL 3350 17 G: 17 POWDER, FOR SOLUTION ORAL at 09:17

## 2021-03-28 RX ADMIN — METHIMAZOLE 10 MG: 5 TABLET ORAL at 18:29

## 2021-03-28 RX ADMIN — ERYTHROMYCIN: 5 OINTMENT OPHTHALMIC at 21:13

## 2021-03-28 RX ADMIN — ATROPA BELLADONNA AND OPIUM 1 SUPPOSITORY: 16.2; 3 SUPPOSITORY RECTAL at 16:38

## 2021-03-28 NOTE — PROGRESS NOTES
Bedside shift change report given to Nevin Winter RN (oncoming nurse) by Marcello Griffin (offgoing nurse). Report included the following information SBAR, Kardex, Procedure Summary, Intake/Output, MAR and Recent Results.

## 2021-03-28 NOTE — PROGRESS NOTES
Problem: Mobility Impaired (Adult and Pediatric)  Goal: *Acute Goals and Plan of Care (Insert Text)  Description: FUNCTIONAL STATUS PRIOR TO ADMISSION:  pt was primarily bed bound due to LE & buttock wounds as well as bakers cyst. Pt was able to roll self in bed and complete bed mobility to sit EOB, but given location of wounds as well as poor wound healing pt was not sitting up on the EOB as often. Pt was standing with therapy at last admission in 7/2020 and was discharged home with HHPT. Patient was working on standing with therapy at home up until bakers cyst developed resulting in too much pain. HOME SUPPORT PRIOR TO ADMISSION: Patient lives with her sister at this time. Physical Therapy Goals  Initiated 3/24/2021  1. Patient will move from supine to sit and sit to supine  in bed with supervision/set-up within 7 day(s). 2.  Patient will transfer from bed to chair and chair to bed with moderate assistance  using the least restrictive device within 7 day(s). 3.  Patient will perform sit to stand with moderate assistance  within 7 day(s). 4.  Patient will ambulate with moderate assistance  for 5 feet with the least restrictive device within 7 day(s). Outcome: Progressing Towards Goal    PHYSICAL THERAPY TREATMENT  Patient: Joselo Purcell (83 y.o. female)  Date: 3/28/2021  Diagnosis: Hematuria [R31.9] <principal problem not specified>  Procedure(s) (LRB):  CYSTOSCOPY CLOT EVACUATION, RIGHT STENT EXCHANGE (URGENT) (Right) 5 Days Post-Op  Precautions: Fall  Chart, physical therapy assessment, plan of care and goals were reviewed. ASSESSMENT  Patient continues with skilled PT services and is progressing towards goals. Pt agreeable to therapy. Pt was able to tolerated seated exercises. Pt eager to improve mobility and is participatory. Pt did express increase pain when adjusting in bed. Pt reports \"stent pain sticking her. \" .      Current Level of Function Impacting Discharge (mobility/balance): SBA sitting EOB    Other factors to consider for discharge: pain decrease mobility          PLAN :  Patient continues to benefit from skilled intervention to address the above impairments. Continue treatment per established plan of care. to address goals. Recommendation for discharge: (in order for the patient to meet his/her long term goals)  Therapy 3 hours per day 5-7 days per week    This discharge recommendation:  Has not yet been discussed the attending provider and/or case management    IF patient discharges home will need the following DME: to be determined (TBD)       SUBJECTIVE:   Patient stated It is the stent .  expressing pain    OBJECTIVE DATA SUMMARY:   Critical Behavior:  Neurologic State: Alert, Appropriate for age  Orientation Level: Oriented X4  Cognition: Appropriate decision making, Appropriate for age attention/concentration, Appropriate safety awareness, Follows commands  Safety/Judgement: Good awareness of safety precautions  Functional Mobility Training:  Bed Mobility:     Supine to Sit: Stand-by assistance  Sit to Supine: Stand-by assistance           Transfers:                                   Balance:  Sitting: Intact  Ambulation/Gait Training:                                                        Stairs: Therapeutic Exercises:     Therapeutic Exercises:       EXERCISE   Sets   Reps   Active Active Assist   Passive Self ROM   Comments   Ankle Pumps   [x] [] [] []    LAQ   [x] [] [] [] Limited by hernia   marches   [x] [] [] [] Limited by hernia   Shoulder flexion   [x] [] [] []    Shoulder abduction   [x] [] [] [] Pain in back    Shoulder shrugs   [x] [] [] []    Trunk rotation   [x] [] [] []    Lateral lean   [x] [] [] [] Pain with left lateral lean discontinued    Scapular retraction    [x] [] [] []       [] [] [] []          Pain Rating:  Right knee, Stent pain?     Activity Tolerance:   Limited     After treatment patient left in no apparent distress:   Supine in bed and Call bell within reach    COMMUNICATION/COLLABORATION:   The patients plan of care was discussed with: Physical therapist and Registered nurse.      Yas Thornton PTA   Time Calculation: 14 mins

## 2021-03-28 NOTE — PROGRESS NOTES
Bedside and Verbal shift change report given to [address] (oncoming nurse) by Carlos Lopez RN (offgoing nurse). Report included the following information SBAR, Kardex and MAR.

## 2021-03-28 NOTE — PROGRESS NOTES
[address] [facility] Adult  Hospitalist Group                                                                                          Hospitalist Progress Note  Jaden Warren MD  Answering service: [phone] from in house phone        Date of Service:  3/28/2021  NAME:  Jeanie Zamudio  :  1960  MRN:  [id number]      Admission Summary:   Jeanie Zamudio is a 62 y.o. female with PMH of DM, HTN, Morbid obesity, Renal stone with hx of R ureteral stent for stones, came to [facility] for hematuria since morning, about 7 hours at that time. Pt is Jahovah's witness and does not take any blood products. Pt suggested to be doing ok until yesterday when started having vaginal bleeding with sharp, intense, moderate to severe, intermittent pains from R flank to groin, suprapubic and urethral meatus. No fever/ chills. Pt has had similar experience in past 2020. And this feels quite same. Pt is jahovah's witness and does not want blood transfusions.       Interval history / Subjective:   Patient seen and examined at bedside this AM.  No overnight events. She has been having red eyes- added erythromycin eye ointment   Waiting for insurance auth. Discussed with nursing      Assessment & Plan:     # Hematuria - resolved   # Hx  R ureteral stent for renal stone  # SIRS on poa  with leukocytosis and tachycardia  UTI ruled out  Hx UTI with MDR klebsiella pneumoniae  - CT abd 3/18: Right ureteral stent is noted in position with the proximal end in the upper pole right kidney and the distal end in the urinary bladder which is filled with high density may represent hemorrhage. There is right hydronephrosis versus a mass in the lower pole of the right kidney.  The left kidney is small and there is mild left hydroureteronephrosis.   -  Leucocytosis - resolved  - urine cx- negative   -  Discontinued merrem  3/24  - Appreciate ID and urology input   -  S/p Cystoscopy with clot evacuation, right ureteral stent change 3/23 - outpatient follow-up with [facility]. # Acute Anemia due to hematuria  #Spiritism  - no blood products   - s/p iron infusions.  -  Hb low stable     # DM type 2 - A1c 6.7 c/w ISS  # CKD stage 4- cr at baseline   # Morbid obesity, BMI 60.66 - recommend weight loss     POA bilateral ischial tuberosities with stage 2 pressure injuries  - wound care     Incidental finding: CT showed  there is a large abdominal wall hernia in the left containing stomach, small bowel and colon which is incompletely imaged. Code status: Full  DVT prophylaxis: SCD     Care Plan discussed with: Patient/Family and Nurse  Anticipated Disposition: IPR . [address] Acute rehab. Insurance auth pending   Anticipated Discharge: 24 hours to 48 hours . Likely Monday dc  Medically stable to be discharged. CM working on placement      Hospital Problems  Date Reviewed: 7/18/2020          Codes Class Noted POA    Hematuria ICD-10-CM: R31.9  ICD-9-CM: 599.70  7/17/2020 Unknown                Review of Systems:   A comprehensive review of systems was negative. Vital Signs:    Last 24hrs VS reviewed since prior progress note. Most recent are:  Visit Vitals  BP (!) 96/59 (BP 1 Location: Left lower arm, BP Patient Position: At rest)   Pulse 81   Temp 97.9 °F (36.6 °C)   Resp 17   Ht 5' 4\" (1.626 m)   Wt (!) 160.3 kg (353 lb 6.4 oz)   SpO2 96%   BMI 60.66 kg/m²         Intake/Output Summary (Last 24 hours) at 3/28/2021 1224  Last data filed at 3/28/2021 0551  Gross per 24 hour   Intake    Output 850 ml   Net -850 ml        Physical Examination:     I had a face to face encounter with this patient and independently examined them on 3/28/2021 as outlined below:          Constitutional:  No acute distress, cooperative    ENT:  mmm   Resp:  CTA bilaterally. CV:  Regular rhythm, normal rate    GI:  Soft, non distended, non tender. obese    Musculoskeletal:  No edema, warm, 2+ pulses throughout    Neurologic:  Moves all extremities. AAOx3, CN II-XII reviewed     Psych:  Good insight, Not anxious nor agitated. Data Review:    I personally reviewed  Image and labs      Labs:     Recent Labs     03/28/21  0309   WBC 12.4*   HGB 7.7*   HCT 26.3*        Recent Labs     03/28/21  0309 03/27/21  0350    140   K 5.0 4.1   * 109*   CO2 27 23   BUN 39* 38*   CREA 2.05* 2.03*   * 95   CA 8.7 8.4*     Recent Labs     03/27/21  0350   ALT 8*   AP 61   TBILI 0.1*   TP 7.0   ALB 2.7*   GLOB 4.3*     No results for input(s): INR, PTP, APTT, INREXT, INREXT in the last 72 hours. No results for input(s): FE, TIBC, PSAT, FERR in the last 72 hours. No results found for: FOL, RBCF   No results for input(s): PH, PCO2, PO2 in the last 72 hours. No results for input(s): CPK, CKNDX, TROIQ in the last 72 hours.     No lab exists for component: CPKMB  No results found for: CHOL, CHOLX, CHLST, CHOLV, HDL, HDLP, LDL, LDLC, DLDLP, TGLX, TRIGL, TRIGP, CHHD, CHHDX  Lab Results   Component Value Date/Time    Glucose (POC) 129 (H) 03/28/2021 11:16 AM    Glucose (POC) 124 (H) 03/28/2021 06:24 AM    Glucose (POC) 151 (H) 03/27/2021 09:03 PM    Glucose (POC) 164 (H) 03/27/2021 04:34 PM    Glucose (POC) 102 (H) 03/27/2021 11:22 AM     Lab Results   Component Value Date/Time    Color RED 03/18/2021 11:54 AM    Appearance CLEAR 03/18/2021 11:54 AM    Specific gravity 1.015 03/18/2021 11:54 AM    Specific gravity 1.008 07/25/2020 02:15 PM    pH (UA) 6.5 03/18/2021 11:54 AM    Protein 100 (A) 03/18/2021 11:54 AM    Glucose Negative 03/18/2021 11:54 AM    Ketone Negative 03/18/2021 11:54 AM    Bilirubin Negative 07/25/2020 02:15 PM    Urobilinogen 0.2 03/18/2021 11:54 AM    Nitrites Negative 03/18/2021 11:54 AM    Leukocyte Esterase LARGE (A) 03/18/2021 11:54 AM    Epithelial cells FEW 03/18/2021 11:54 AM    Bacteria Negative 03/18/2021 11:54 AM    WBC 5-10 03/18/2021 11:54 AM    RBC >100 (H) 03/18/2021 11:54 AM         Medications Reviewed: Current Facility-Administered Medications   Medication Dose Route Frequency    erythromycin (ILOTYCIN) 5 mg/gram (0.5 %) ophthalmic ointment   Both Eyes Q8H    polyethylene glycol (MIRALAX) packet 17 g  17 g Oral DAILY    ondansetron (ZOFRAN) injection 4 mg  4 mg IntraVENous Q4H PRN    benzocaine-menthoL (CEPACOL) lozenge 1 Lozenge  1 Lozenge Mucous Membrane PRN    traMADoL (ULTRAM) tablet 50 mg  50 mg Oral Q6H PRN    hydrOXYzine HCL (ATARAX) tablet 25 mg  25 mg Oral Q6H PRN    fentaNYL citrate (PF) injection 25 mcg  25 mcg IntraVENous Q4H PRN    opium-belladonna (B&O 15-A) 16.2-30 mg suppository 1 Suppository  1 Suppository Rectal Q8H PRN    sodium chloride (NS) flush 5-40 mL  5-40 mL IntraVENous Q8H    sodium chloride (NS) flush 5-40 mL  5-40 mL IntraVENous PRN    acetaminophen (TYLENOL) tablet 650 mg  650 mg Oral Q6H PRN    Or    acetaminophen (TYLENOL) suppository 650 mg  650 mg Rectal Q6H PRN    polyethylene glycol (MIRALAX) packet 17 g  17 g Oral DAILY PRN    methIMAzole (TAPAZOLE) tablet 10 mg  10 mg Oral QPM    oxybutynin chloride XL (DITROPAN XL) tablet 10 mg  10 mg Oral DAILY    pantoprazole (PROTONIX) tablet 40 mg  40 mg Oral ACB    senna-docusate (PERICOLACE) 8.6-50 mg per tablet 1 Tab  1 Tab Oral DAILY    glucose chewable tablet 16 g  4 Tab Oral PRN    dextrose (D50W) injection syrg 12.5-25 g  25-50 mL IntraVENous PRN    glucagon (GLUCAGEN) injection 1 mg  1 mg IntraMUSCular PRN    insulin lispro (HUMALOG) injection   SubCUTAneous AC&HS    morphine injection 1 mg  1 mg IntraVENous Q2H PRN     ______________________________________________________________________  EXPECTED LENGTH OF STAY: 3d 2h  ACTUAL LENGTH OF STAY:          10                 Adore Leyva MD

## 2021-03-29 ENCOUNTER — APPOINTMENT (OUTPATIENT)
Dept: CT IMAGING | Age: 61
DRG: 466 | End: 2021-03-29
Attending: INTERNAL MEDICINE
Payer: COMMERCIAL

## 2021-03-29 LAB
ALBUMIN SERPL-MCNC: 2.7 G/DL (ref 3.5–5)
ALBUMIN/GLOB SERPL: 0.6 {RATIO} (ref 1.1–2.2)
ALP SERPL-CCNC: 71 U/L (ref 45–117)
ALT SERPL-CCNC: 9 U/L (ref 12–78)
ANION GAP SERPL CALC-SCNC: 5 MMOL/L (ref 5–15)
AST SERPL-CCNC: 14 U/L (ref 15–37)
BASOPHILS # BLD: 0.1 K/UL (ref 0–0.1)
BASOPHILS NFR BLD: 1 % (ref 0–1)
BILIRUB SERPL-MCNC: 0.2 MG/DL (ref 0.2–1)
BUN SERPL-MCNC: 38 MG/DL (ref 6–20)
BUN/CREAT SERPL: 16 (ref 12–20)
CALCIUM SERPL-MCNC: 8.3 MG/DL (ref 8.5–10.1)
CHLORIDE SERPL-SCNC: 111 MMOL/L (ref 97–108)
CO2 SERPL-SCNC: 25 MMOL/L (ref 21–32)
CREAT SERPL-MCNC: 2.32 MG/DL (ref 0.55–1.02)
DIFFERENTIAL METHOD BLD: ABNORMAL
EOSINOPHIL # BLD: 0.5 K/UL (ref 0–0.4)
EOSINOPHIL NFR BLD: 4 % (ref 0–7)
ERYTHROCYTE [DISTWIDTH] IN BLOOD BY AUTOMATED COUNT: 14.9 % (ref 11.5–14.5)
GLOBULIN SER CALC-MCNC: 4.5 G/DL (ref 2–4)
GLUCOSE BLD STRIP.AUTO-MCNC: 119 MG/DL (ref 65–100)
GLUCOSE BLD STRIP.AUTO-MCNC: 119 MG/DL (ref 65–100)
GLUCOSE BLD STRIP.AUTO-MCNC: 129 MG/DL (ref 65–100)
GLUCOSE BLD STRIP.AUTO-MCNC: 136 MG/DL (ref 65–100)
GLUCOSE BLD STRIP.AUTO-MCNC: 275 MG/DL (ref 65–100)
GLUCOSE SERPL-MCNC: 126 MG/DL (ref 65–100)
HCT VFR BLD AUTO: 26.4 % (ref 35–47)
HGB BLD-MCNC: 7.8 G/DL (ref 11.5–16)
IMM GRANULOCYTES # BLD AUTO: 0.2 K/UL (ref 0–0.04)
IMM GRANULOCYTES NFR BLD AUTO: 1 % (ref 0–0.5)
LYMPHOCYTES # BLD: 2.1 K/UL (ref 0.8–3.5)
LYMPHOCYTES NFR BLD: 17 % (ref 12–49)
MCH RBC QN AUTO: 28.1 PG (ref 26–34)
MCHC RBC AUTO-ENTMCNC: 29.5 G/DL (ref 30–36.5)
MCV RBC AUTO: 95 FL (ref 80–99)
MONOCYTES # BLD: 0.8 K/UL (ref 0–1)
MONOCYTES NFR BLD: 7 % (ref 5–13)
NEUTS SEG # BLD: 8.8 K/UL (ref 1.8–8)
NEUTS SEG NFR BLD: 70 % (ref 32–75)
NRBC # BLD: 0 K/UL (ref 0–0.01)
NRBC BLD-RTO: 0 PER 100 WBC
PLATELET # BLD AUTO: 366 K/UL (ref 150–400)
PMV BLD AUTO: 9.4 FL (ref 8.9–12.9)
POTASSIUM SERPL-SCNC: 5 MMOL/L (ref 3.5–5.1)
PROT SERPL-MCNC: 7.2 G/DL (ref 6.4–8.2)
RBC # BLD AUTO: 2.78 M/UL (ref 3.8–5.2)
SERVICE CMNT-IMP: ABNORMAL
SODIUM SERPL-SCNC: 141 MMOL/L (ref 136–145)
WBC # BLD AUTO: 12.4 K/UL (ref 3.6–11)

## 2021-03-29 PROCEDURE — 65270000029 HC RM PRIVATE

## 2021-03-29 PROCEDURE — 77030038269 HC DRN EXT URIN PURWCK BARD -A

## 2021-03-29 PROCEDURE — 82962 GLUCOSE BLOOD TEST: CPT

## 2021-03-29 PROCEDURE — 74011250637 HC RX REV CODE- 250/637: Performed by: UROLOGY

## 2021-03-29 PROCEDURE — 85025 COMPLETE CBC W/AUTO DIFF WBC: CPT

## 2021-03-29 PROCEDURE — 36415 COLL VENOUS BLD VENIPUNCTURE: CPT

## 2021-03-29 PROCEDURE — 74011250636 HC RX REV CODE- 250/636: Performed by: UROLOGY

## 2021-03-29 PROCEDURE — 74176 CT ABD & PELVIS W/O CONTRAST: CPT

## 2021-03-29 PROCEDURE — 74011250637 HC RX REV CODE- 250/637: Performed by: INTERNAL MEDICINE

## 2021-03-29 PROCEDURE — 80053 COMPREHEN METABOLIC PANEL: CPT

## 2021-03-29 RX ORDER — ATROPA BELLADONNA AND OPIUM 16.2; 3 MG/1; MG/1
1 SUPPOSITORY RECTAL
Qty: 12 SUPPOSITORY | Refills: 0 | Status: SHIPPED | OUTPATIENT
Start: 2021-03-29 | End: 2021-04-02

## 2021-03-29 RX ORDER — POLYETHYLENE GLYCOL 3350 17 G/17G
17 POWDER, FOR SOLUTION ORAL DAILY
Qty: 10 PACKET | Refills: 0 | Status: SHIPPED | OUTPATIENT
Start: 2021-03-30

## 2021-03-29 RX ADMIN — MORPHINE SULFATE 1 MG: 2 INJECTION, SOLUTION INTRAMUSCULAR; INTRAVENOUS at 02:46

## 2021-03-29 RX ADMIN — PANTOPRAZOLE SODIUM 40 MG: 40 TABLET, DELAYED RELEASE ORAL at 06:48

## 2021-03-29 RX ADMIN — Medication 10 ML: at 06:49

## 2021-03-29 RX ADMIN — TRAMADOL HYDROCHLORIDE 50 MG: 50 TABLET, COATED ORAL at 13:49

## 2021-03-29 RX ADMIN — POLYETHYLENE GLYCOL 3350 17 G: 17 POWDER, FOR SOLUTION ORAL at 09:46

## 2021-03-29 RX ADMIN — OXYBUTYNIN CHLORIDE 10 MG: 5 TABLET, EXTENDED RELEASE ORAL at 09:46

## 2021-03-29 RX ADMIN — Medication 10 ML: at 23:29

## 2021-03-29 RX ADMIN — ERYTHROMYCIN: 5 OINTMENT OPHTHALMIC at 13:50

## 2021-03-29 RX ADMIN — ERYTHROMYCIN: 5 OINTMENT OPHTHALMIC at 06:49

## 2021-03-29 RX ADMIN — ERYTHROMYCIN: 5 OINTMENT OPHTHALMIC at 23:29

## 2021-03-29 RX ADMIN — METHIMAZOLE 10 MG: 5 TABLET ORAL at 17:38

## 2021-03-29 RX ADMIN — ATROPA BELLADONNA AND OPIUM 1 SUPPOSITORY: 16.2; 3 SUPPOSITORY RECTAL at 13:45

## 2021-03-29 RX ADMIN — DOCUSATE SODIUM 50 MG AND SENNOSIDES 8.6 MG 1 TABLET: 8.6; 5 TABLET, FILM COATED ORAL at 09:46

## 2021-03-29 NOTE — PROGRESS NOTES
JOESPH: Insurance has denied IPR. Plan for discharge home with family, patient is temporarily living with her older sister,  Danielle Snowball #[phone] at [address]. [facility]. [facility] phone [phone] transport time on will call for tomorrow. Chart reviewed. CM spoke with Selvin Carlton with [address] who informed CM that insurance denied IPR. CM spoke with MD. Will plan for discharge home. CM will work on arranging home care. Referral sent to [facility], referral accepted. CM updated AVS    CM spoke with the sister, confirmed plan for discharge home today and stretcher transport. CM requested transport with [facility].    3:16: CM called [facility], they can accomodate 7:30 PM.    CM spoke with the sister. She is asking if patient can stay until tomorrow as transport is late and it will be difficult getting patient into the house in the dark. CM sent perfect serve to MD. Per MD will plan for discharge tomorrow.      Miles Pompa, BSW/CRM

## 2021-03-29 NOTE — DISCHARGE INSTRUCTIONS
Please bring this form with you to show your primary care provider at your follow-up appointment. Primary care provider:  Dr. Camille Franco MD    Discharging provider:  Brendan Claude, MD    You have been admitted to the hospital with the following diagnoses:  · Hematuria [R31.9]    FOLLOW-UP CARE RECOMMENDATIONS:    APPOINTMENTS:  · Follow-up with primary care provider, Dr. Camille Franco MD  -  Please call [phone] shortly after discharge to set up an appointment to be seen in  1 week    · Urology in 1-2 weeks     FOLLOW-UP TESTS recommended: cbc, bmp in 1 week     PENDING TEST RESULTS:  At the time of your discharge the following test results are still pending: none   Please make sure you review these results with your outpatient follow-up provider(s). SYMPTOMS to watch for: chest pain, shortness of breath, fever, chills, nausea, vomiting, diarrhea, change in mentation, falling, weakness, bleeding. DIET/what to eat:  Diabetic Diet and Low fat, Low cholesterol    ACTIVITY:  Activity as tolerated    What to do if new or unexpected symptoms occur? If you experience any of the above symptoms (or should other concerns or questions arise after discharge) please call your primary care physician. Return to the emergency room if you cannot get hold of your doctor. · It is very important that you keep your follow-up appointment(s). · Please bring discharge papers, medication list (and/or medication bottles) to your follow-up appointments for review by your outpatient provider(s). · Please check the list of medications and be sure it includes every medication (even non-prescription medications) that your provider wants you to take. · It is important that you take the medication exactly as they are prescribed. · Keep your medication in the bottles provided by the pharmacist and keep a list of the medication names, dosages, and times to be taken in your wallet.    · Do not take other medications without consulting your doctor. · If you have any questions about your medications or other instructions, please talk to your nurse or care provider before you leave the hospital.    I understand that if any problems occur once I am at home I am to contact my physician. These instructions were explained to me and I had the opportunity to ask questions.         Ureteral Stent Placement: What to Expect at [address]     A ureteral (say \"you-REE-ter-ul\") stent is a thin, hollow tube that is placed in the ureter to help urine pass from the kidney into the bladder. Ureters are the tubes that connect the kidneys to the bladder. You may have a small amount of blood in your urine for 1 to 3 days after the procedure. While the stent is in place, you may have to urinate more often, feel a sudden need to urinate, or feel like you can't completely empty your bladder. You may feel some pain when you urinate or do strenuous activity. You also may notice a small amount of blood in your urine after strenuous activities. These side effects usually don't prevent people from doing their normal daily activities. You may have a thin string coming out of your urethra. Your urethra is the tube that carries urine from your bladder to outside your body. This string is attached to the stent. Try not to pull on the string. The doctor will use the string to pull out the stent when you no longer need it. After the procedure, urine may flow better from your kidneys to your bladder. A ureteral stent may be left in place for several days or for as long as several months. Your doctor will take it out when you no longer need it. This care sheet gives you a general idea about how long it will take for you to recover. But each person recovers at a different pace. Follow the steps below to get better as quickly as possible. How can you care for yourself at home? Activity    · Rest when you feel tired.  Getting enough sleep will help you recover.     · Avoid strenuous activities, such as bicycle riding, jogging, weight lifting, or aerobic exercise, until your doctor says it is okay.     · Ask your doctor when you can drive again.     · Most people are able to return to work the day after the procedure. If your work requires intense activity, you may feel pain in your kidney area or get tired easily. If this happens, you may need to do less strenuous activities while the stent is in.     · Ask your doctor when it is okay for you to have sex. Diet    · You can eat your normal diet. If your stomach is upset, try bland, low-fat foods like plain rice, broiled chicken, toast, and yogurt.     · Drink plenty of fluids (unless your doctor tells you not to). Medicines    · Your doctor will tell you if and when you can restart your medicines. You will also get instructions about taking any new medicines.     · If you take aspirin or some other blood thinner, ask your doctor if and when to start taking it again. Make sure that you understand exactly what your doctor wants you to do.     · Be safe with medicines. Take pain medicines exactly as directed. ? If the doctor gave you a prescription medicine for pain, take it as prescribed. ? If you are not taking a prescription pain medicine, ask your doctor if you can take an over-the-counter medicine.     · If you think your pain medicine is making you sick to your stomach:  ? Take your medicine after meals (unless your doctor has told you not to). ? Ask your doctor for a different pain medicine.     · If your doctor prescribed antibiotics, take them as directed. Do not stop taking them just because you feel better. You need to take the full course of antibiotics. Follow-up care is a key part of your treatment and safety. Be sure to make and go to all appointments, and call your doctor if you are having problems.  It's also a good idea to know your test results and keep a list of the medicines you take.  When should you call for help? Call 911 anytime you think you may need emergency care. For example, call if:    · You passed out (lost consciousness).     · You have severe trouble breathing.     · You have sudden chest pain and shortness of breath, or you cough up blood.     · You have severe belly pain. Call your doctor now or seek immediate medical care if:    · Part or all of the stent comes out of your urethra.     · You have pain that does not get better after you take pain medicine.     · You have symptoms of a urinary infection. For example:  ? You have blood or pus in your urine. ? You have pain in your back just below your rib cage. This is called flank pain. ? You have a fever, chills, or body aches. ? It hurts to urinate. ? You have groin or belly pain.     · You cannot control when you urinate, or you leak urine. Watch closely for changes in your health, and be sure to contact your doctor if you have any problems. Where can you learn more? Go to http://www.gray.com/  Enter B869 in the search box to learn more about \"Ureteral Stent Placement: What to Expect at Home. \"  Current as of: June 29, 2020               Content Version: 12.6  © 2006-2020 [employer]. Care instructions adapted under license by [employer] (which disclaims liability or warranty for this information).  If you have questions about a medical condition or this instruction, always ask your healthcare professional. [place] [address] any warranty or liability for your use of this information.

## 2021-03-29 NOTE — PROGRESS NOTES
Patient: Gianna [employer] [facility] MRN: [id number]  SSN: [id number]    YOB: 1960  Age: 61 y.o.  Sex: female        ADMITTED: 3/18/2021 to Madala, Sushma, MD by Brayan Major MD for Hematuria [R31.9]  POD# 6 Days Post-Op Procedure(s):  CYSTOSCOPY CLOT EVACUATION, RIGHT STENT EXCHANGE (URGENT)    Re-paged regarding cc of stent colic. Patient seen and examined in the bed. She reports stent colic when she is lying on her right side, no pain when positioned on the left. She reports relief with B&O suppository, last administered yesterday at 1600. She continues to void without difficulty.  External female catheter in place with clear yellow UA, no clots.      AF. HR 82 BP 90/58.   WBC 12.4  Hgb 7.8  Cr 2.32 (2.45 in 2020)  UA +large leuks, 5-10 WBCs, >100 RBCs, no bacteria.   UCx NG  BCx NGTD  Completed course of meropenem  +oxybutynin and B&O supp.     Vitals: Temp (24hrs), Av.1 °F (36.7 °C), Min:97.9 °F (36.6 °C), Max:98.2 °F (36.8 °C)    Blood pressure (!) 90/58, pulse 82, temperature 98.2 °F (36.8 °C), resp. rate 18, height 5' 4\" (1.626 m), weight (!) 160.3 kg (353 lb 6.4 oz), SpO2 96 %.    Intake and Output:   1901 -  0700  In: -   Out: 1600 [Urine:1600]      Labs:  CBC:   Lab Results   Component Value Date/Time    WBC 12.4 (H) 2021 03:33 AM    HCT 26.4 (L) 2021 03:33 AM    PLATELET 366 2021 03:33 AM     BMP:   Lab Results   Component Value Date/Time    Glucose 126 (H) 2021 03:33 AM    Sodium 141 2021 03:33 AM    Potassium 5.0 2021 03:33 AM    Chloride 111 (H) 2021 03:33 AM    CO2 25 2021 03:33 AM    BUN 38 (H) 2021 03:33 AM    Creatinine 2.32 (H) 2021 03:33 AM    Calcium 8.3 (L) 2021 03:33 AM       Assessment/Plan:   Gross Hematuria, resolved  S/P cysto and clot evac with Right ureteral stent exchange on 3/17  Stent colic     - Hematuria resolved. Hgb stable.   - Recommend body positioning on the left side to shift weight and  pressure off Right stent to alleviate stent pain. Relief with B&O suppository, continue.   - Denied insurance auth for rehab placement, planning to DC home possibly today. - Maintain FU on 4/7/21 with Dr America Muro at 2 PM at the [facility] location. She knows to call sooner with concerns. - Please call with questions or concerns. [phone].      Signed By: Esmer Alvarez NP - March 29, 2021

## 2021-03-29 NOTE — PROGRESS NOTES
Bedside and Verbal shift change report given to Yarely Enrique 45 (oncoming nurse) by Rosalia Sun RN (offgoing nurse). Report included the following information SBAR, Kardex and MAR.

## 2021-03-29 NOTE — PROGRESS NOTES
Bedside and Verbal shift change report given to Paula Dunlap RN (oncoming nurse) by Bharat Mead RN (offgoing nurse). Report included the following information SBAR, Kardex and MAR.

## 2021-03-29 NOTE — PROGRESS NOTES
Problem: Falls - Risk of  Goal: *Absence of Falls  Description: Document Leafy Mcdonald Fall Risk and appropriate interventions in the flowsheet.   Outcome: Progressing Towards Goal  Note: Fall Risk Interventions:  Mobility Interventions: Communicate number of staff needed for ambulation/transfer         Medication Interventions: Patient to call before getting OOB    Elimination Interventions: Call light in reach

## 2021-03-29 NOTE — PROGRESS NOTES
[facility] Adult  Hospitalist Group                                                                                          Hospitalist Progress Note  Shae Wheeler MD  Answering service: [phone] from in house phone        Date of Service:  3/29/2021  NAME:  Gretchen Medrano  :  1960  MRN:  [id number]      Admission Summary:   Gretchen Medrano is a 62 y.o. female with PMH of DM, HTN, Morbid obesity, Renal stone with hx of R ureteral stent for stones, came to [facility] for hematuria since morning, about 7 hours at that time. Pt is Jahovah's witness and does not take any blood products. Pt suggested to be doing ok until yesterday when started having vaginal bleeding with sharp, intense, moderate to severe, intermittent pains from R flank to groin, suprapubic and urethral meatus. No fever/ chills. Pt has had similar experience in past 2020. And this feels quite same. Pt is jahovah's witness and does not want blood transfusions.       Interval history / Subjective:   Patient seen and examined at bedside this AM.  Pt c/w pain at right ureteral stent site, worsening while on right dependent site. Explained CT abd findings - no acute process, stent in place. reasured her. Will wait for urology recs  Discussed with nursing     Urology recommended conservative management and pt agreed. Insurance auth denied and pt agreed to go home with [facility] today  [facility] [place] was too late around 7.30pm[place] tomorrow AM     Assessment & Plan:     # Hematuria - resolved   # Hx  R ureteral stent for renal stone  # SIRS on poa  with leukocytosis and tachycardia  UTI ruled out  Hx UTI with MDR klebsiella pneumoniae  - CT abd 3/18: Right ureteral stent is noted in position with the proximal end in the upper pole right kidney and the distal end in the urinary bladder which is filled with high density may represent hemorrhage.  There is right hydronephrosis versus a mass in the lower pole of the right kidney. The left kidney is small and there is mild left hydroureteronephrosis.   -  Leucocytosis - resolved  - urine cx- negative   -  Discontinued merrem  3/24  - Appreciate ID and urology input   -  S/p Cystoscopy with clot evacuation, right ureteral stent change 3/23   - outpatient follow-up with [facility]. # Acute Anemia due to hematuria  #[employer]  - no blood products   - s/p iron infusions.  -  Hb low stable     # DM type 2 - A1c 6.7 c/w ISS  # CKD stage 4- cr at baseline   # Morbid obesity, BMI 60.66 - recommend weight loss     POA bilateral ischial tuberosities with stage 2 pressure injuries  - wound care     Incidental finding: CT showed  there is a large abdominal wall hernia in the left containing stomach, small bowel and colon which is incompletely imaged. Code status: Full  DVT prophylaxis: SCD     Care Plan discussed with: Patient/Family and Nurse  Anticipated Disposition: IPR . [facility] Acute rehab. Insurance auth denied   Anticipated Discharge: 24 hours to 48 hours . Medically stable to be discharged. Home with [facility] Problems  Date Reviewed: 7/18/2020          Codes Class Noted POA    Hematuria ICD-10-CM: R31.9  ICD-9-CM: 599.70  7/17/2020 Unknown                Review of Systems:   A comprehensive review of systems was negative. Vital Signs:    Last 24hrs VS reviewed since prior progress note.  Most recent are:  Visit Vitals  /69 (BP 1 Location: Left lower arm, BP Patient Position: At rest)   Pulse 93   Temp 98.1 °F (36.7 °C)   Resp 16   Ht 5' 4\" (1.626 m)   Wt (!) 160.3 kg (353 lb 6.4 oz)   SpO2 97%   BMI 60.66 kg/m²         Intake/Output Summary (Last 24 hours) at 3/29/2021 1627  Last data filed at 3/29/2021 0800  Gross per 24 hour   Intake 320 ml   Output 750 ml   Net -430 ml        Physical Examination:     I had a face to face encounter with this patient and independently examined them on 3/29/2021 as outlined below:          Constitutional:  No acute distress, cooperative    ENT:  mmm   Resp:  CTA bilaterally.    CV:  Regular rhythm, normal rate    GI:  Soft, non distended, non tender.obese    Musculoskeletal:  No edema, warm, 2+ pulses throughout    Neurologic:  Moves all extremities.  AAOx3, CN II-XII reviewed     Psych:  Good insight, Not anxious nor agitated.       Data Review:    I personally reviewed  Image and labs      Labs:     Recent Labs     03/29/21 0333 03/28/21  0309   WBC 12.4* 12.4*   HGB 7.8* 7.7*   HCT 26.4* 26.3*    388     Recent Labs     03/29/21 0333 03/28/21  0309 03/27/21  0350    142 140   K 5.0 5.0 4.1   * 110* 109*   CO2 25 27 23   BUN 38* 39* 38*   CREA 2.32* 2.05* 2.03*   * 123* 95   CA 8.3* 8.7 8.4*     Recent Labs     03/29/21 0333 03/27/21  0350   ALT 9* 8*   AP 71 61   TBILI 0.2 0.1*   TP 7.2 7.0   ALB 2.7* 2.7*   GLOB 4.5* 4.3*     No results for input(s): INR, PTP, APTT, INREXT, INREXT in the last 72 hours.   No results for input(s): FE, TIBC, PSAT, FERR in the last 72 hours.   No results found for: FOL, RBCF   No results for input(s): PH, PCO2, PO2 in the last 72 hours.  No results for input(s): CPK, CKNDX, TROIQ in the last 72 hours.    No lab exists for component: CPKMB  No results found for: CHOL, CHOLX, CHLST, CHOLV, HDL, HDLP, LDL, LDLC, DLDLP, TGLX, TRIGL, TRIGP, CHHD, CHHDX  Lab Results   Component Value Date/Time    Glucose (POC) 136 (H) 03/29/2021 04:23 PM    Glucose (POC) 275 (H) 03/29/2021 04:20 PM    Glucose (POC) 119 (H) 03/29/2021 11:35 AM    Glucose (POC) 119 (H) 03/29/2021 06:38 AM    Glucose (POC) 138 (H) 03/28/2021 09:20 PM     Lab Results   Component Value Date/Time    Color RED 03/18/2021 11:54 AM    Appearance CLEAR 03/18/2021 11:54 AM    Specific gravity 1.015 03/18/2021 11:54 AM    Specific gravity 1.008 07/25/2020 02:15 PM    pH (UA) 6.5 03/18/2021 11:54 AM    Protein 100 (A) 03/18/2021 11:54 AM    Glucose Negative 03/18/2021 11:54  AM    Ketone Negative 03/18/2021 11:54 AM    Bilirubin Negative 07/25/2020 02:15 PM    Urobilinogen 0.2 03/18/2021 11:54 AM    Nitrites Negative 03/18/2021 11:54 AM    Leukocyte Esterase LARGE (A) 03/18/2021 11:54 AM    Epithelial cells FEW 03/18/2021 11:54 AM    Bacteria Negative 03/18/2021 11:54 AM    WBC 5-10 03/18/2021 11:54 AM    RBC >100 (H) 03/18/2021 11:54 AM         Medications Reviewed:     Current Facility-Administered Medications   Medication Dose Route Frequency    erythromycin (ILOTYCIN) 5 mg/gram (0.5 %) ophthalmic ointment   Both Eyes Q8H    polyethylene glycol (MIRALAX) packet 17 g  17 g Oral DAILY    ondansetron (ZOFRAN) injection 4 mg  4 mg IntraVENous Q4H PRN    benzocaine-menthoL (CEPACOL) lozenge 1 Lozenge  1 Lozenge Mucous Membrane PRN    traMADoL (ULTRAM) tablet 50 mg  50 mg Oral Q6H PRN    hydrOXYzine HCL (ATARAX) tablet 25 mg  25 mg Oral Q6H PRN    fentaNYL citrate (PF) injection 25 mcg  25 mcg IntraVENous Q4H PRN    opium-belladonna (B&O 15-A) 16.2-30 mg suppository 1 Suppository  1 Suppository Rectal Q8H PRN    sodium chloride (NS) flush 5-40 mL  5-40 mL IntraVENous Q8H    sodium chloride (NS) flush 5-40 mL  5-40 mL IntraVENous PRN    acetaminophen (TYLENOL) tablet 650 mg  650 mg Oral Q6H PRN    Or    acetaminophen (TYLENOL) suppository 650 mg  650 mg Rectal Q6H PRN    polyethylene glycol (MIRALAX) packet 17 g  17 g Oral DAILY PRN    methIMAzole (TAPAZOLE) tablet 10 mg  10 mg Oral QPM    oxybutynin chloride XL (DITROPAN XL) tablet 10 mg  10 mg Oral DAILY    pantoprazole (PROTONIX) tablet 40 mg  40 mg Oral ACB    senna-docusate (PERICOLACE) 8.6-50 mg per tablet 1 Tab  1 Tab Oral DAILY    glucose chewable tablet 16 g  4 Tab Oral PRN    dextrose (D50W) injection syrg 12.5-25 g  25-50 mL IntraVENous PRN    glucagon (GLUCAGEN) injection 1 mg  1 mg IntraMUSCular PRN    insulin lispro (HUMALOG) injection   SubCUTAneous AC&HS    morphine injection 1 mg  1 mg IntraVENous Q2H PRN     ______________________________________________________________________  EXPECTED LENGTH OF STAY: 3d 2h  ACTUAL LENGTH OF STAY:          Phoebe Gongora MD

## 2021-03-29 NOTE — PROGRESS NOTES
Hospital follow-up PCP transitional care appointment has been scheduled with Dr. Rae Mayorga for Monday, 4/5/21 at 12:30 p.m. Pending patient discharge.   Lauro Lucia, Care Management Specialist.

## 2021-03-30 VITALS
DIASTOLIC BLOOD PRESSURE: 74 MMHG | TEMPERATURE: 98 F | WEIGHT: 293 LBS | HEART RATE: 83 BPM | RESPIRATION RATE: 16 BRPM | OXYGEN SATURATION: 94 % | HEIGHT: 64 IN | BODY MASS INDEX: 50.02 KG/M2 | SYSTOLIC BLOOD PRESSURE: 117 MMHG

## 2021-03-30 LAB
ANION GAP SERPL CALC-SCNC: 4 MMOL/L (ref 5–15)
BASOPHILS # BLD: 0.1 K/UL (ref 0–0.1)
BASOPHILS NFR BLD: 1 % (ref 0–1)
BUN SERPL-MCNC: 41 MG/DL (ref 6–20)
BUN/CREAT SERPL: 18 (ref 12–20)
CALCIUM SERPL-MCNC: 8.9 MG/DL (ref 8.5–10.1)
CHLORIDE SERPL-SCNC: 111 MMOL/L (ref 97–108)
CO2 SERPL-SCNC: 26 MMOL/L (ref 21–32)
CREAT SERPL-MCNC: 2.31 MG/DL (ref 0.55–1.02)
DIFFERENTIAL METHOD BLD: ABNORMAL
EOSINOPHIL # BLD: 0.5 K/UL (ref 0–0.4)
EOSINOPHIL NFR BLD: 4 % (ref 0–7)
ERYTHROCYTE [DISTWIDTH] IN BLOOD BY AUTOMATED COUNT: 15 % (ref 11.5–14.5)
GLUCOSE BLD STRIP.AUTO-MCNC: 117 MG/DL (ref 65–100)
GLUCOSE SERPL-MCNC: 126 MG/DL (ref 65–100)
HCT VFR BLD AUTO: 27.7 % (ref 35–47)
HGB BLD-MCNC: 8.1 G/DL (ref 11.5–16)
IMM GRANULOCYTES # BLD AUTO: 0.1 K/UL (ref 0–0.04)
IMM GRANULOCYTES NFR BLD AUTO: 1 % (ref 0–0.5)
LYMPHOCYTES # BLD: 1.8 K/UL (ref 0.8–3.5)
LYMPHOCYTES NFR BLD: 17 % (ref 12–49)
MCH RBC QN AUTO: 27.7 PG (ref 26–34)
MCHC RBC AUTO-ENTMCNC: 29.2 G/DL (ref 30–36.5)
MCV RBC AUTO: 94.9 FL (ref 80–99)
MONOCYTES # BLD: 0.7 K/UL (ref 0–1)
MONOCYTES NFR BLD: 6 % (ref 5–13)
NEUTS SEG # BLD: 7.5 K/UL (ref 1.8–8)
NEUTS SEG NFR BLD: 71 % (ref 32–75)
NRBC # BLD: 0 K/UL (ref 0–0.01)
NRBC BLD-RTO: 0 PER 100 WBC
PLATELET # BLD AUTO: 371 K/UL (ref 150–400)
PMV BLD AUTO: 9.4 FL (ref 8.9–12.9)
POTASSIUM SERPL-SCNC: 4.6 MMOL/L (ref 3.5–5.1)
RBC # BLD AUTO: 2.92 M/UL (ref 3.8–5.2)
SERVICE CMNT-IMP: ABNORMAL
SODIUM SERPL-SCNC: 141 MMOL/L (ref 136–145)
WBC # BLD AUTO: 10.7 K/UL (ref 3.6–11)

## 2021-03-30 PROCEDURE — 74011250637 HC RX REV CODE- 250/637: Performed by: UROLOGY

## 2021-03-30 PROCEDURE — 36415 COLL VENOUS BLD VENIPUNCTURE: CPT

## 2021-03-30 PROCEDURE — 85025 COMPLETE CBC W/AUTO DIFF WBC: CPT

## 2021-03-30 PROCEDURE — 80048 BASIC METABOLIC PNL TOTAL CA: CPT

## 2021-03-30 PROCEDURE — 74011250637 HC RX REV CODE- 250/637: Performed by: INTERNAL MEDICINE

## 2021-03-30 PROCEDURE — 82962 GLUCOSE BLOOD TEST: CPT

## 2021-03-30 RX ORDER — ERYTHROMYCIN 5 MG/G
OINTMENT OPHTHALMIC EVERY EVENING
Status: DISCONTINUED | OUTPATIENT
Start: 2021-03-31 | End: 2021-03-30 | Stop reason: HOSPADM

## 2021-03-30 RX ADMIN — POLYETHYLENE GLYCOL 3350 17 G: 17 POWDER, FOR SOLUTION ORAL at 09:42

## 2021-03-30 RX ADMIN — ERYTHROMYCIN: 5 OINTMENT OPHTHALMIC at 06:03

## 2021-03-30 RX ADMIN — TRAMADOL HYDROCHLORIDE 50 MG: 50 TABLET, COATED ORAL at 06:03

## 2021-03-30 RX ADMIN — Medication 10 ML: at 06:03

## 2021-03-30 RX ADMIN — DOCUSATE SODIUM 50 MG AND SENNOSIDES 8.6 MG 1 TABLET: 8.6; 5 TABLET, FILM COATED ORAL at 09:42

## 2021-03-30 RX ADMIN — OXYBUTYNIN CHLORIDE 10 MG: 5 TABLET, EXTENDED RELEASE ORAL at 09:42

## 2021-03-30 RX ADMIN — PANTOPRAZOLE SODIUM 40 MG: 40 TABLET, DELAYED RELEASE ORAL at 06:03

## 2021-03-30 NOTE — PROGRESS NOTES
JOESPH: Insurance has denied IPR. Plan for discharge home with family, patient is temporarily living with her older sister,  Kamari Posada #[phone] at [address] [facility] [address]. [facility] has accepted patient for home health. [facility] phone [phone] transport set for 11 AM.    CM called [address] #[phone] to get auth for transport. Reference #[id number]. CM spoke with the patient and sister Kamari Posada #[phone] and all are aware of discharge and transport time.     HOLLY JoyaW/DEVYN

## 2021-03-30 NOTE — ROUTINE PROCESS
Bedside shift change report given to Moy Rosales RN (oncoming nurse) by Rajat Bello RN (offgoing nurse).  Report included the following information SBAR

## 2021-03-30 NOTE — DISCHARGE SUMMARY
Discharge Summary     Patient:  Mirlea Abreu       MRN: [id number]       YOB: 1960       Age: 62 y.o. Date of admission:  3/18/2021    Date of discharge:  3/30/2021    Primary care provider: Dr. Foster Carter MD    Admitting provider:  Elizabeth Alonso MD    Discharging provider:  Velma Ruiz [address] [phone]. If unavailable, call [phone] and ask the  to page the triage hospitalist.    Consultations  · IP CONSULT TO UROLOGY  · IP CONSULT TO INFECTIOUS DISEASES  · IP CONSULT TO UROLOGY  · IP CONSULT TO HOSPITALIST    Procedures  · Procedure(s):  · CYSTOSCOPY CLOT EVACUATION, RIGHT STENT EXCHANGE (URGENT)    Discharge destination: home with [facility]. The patient is stable for discharge. Admission diagnosis  · Hematuria [R31.9]    Current Discharge Medication List      START taking these medications    Details   opium-belladonna (B&O 15-A) 16.2-30 mg suppository Insert 1 Suppository into rectum every eight (8) hours as needed for Pain for up to 4 days. Max Daily Amount: 3 Suppositories. Qty: 12 Suppository, Refills: 0    Associated Diagnoses: Gross hematuria      polyethylene glycol (MIRALAX) 17 gram packet Take 1 Packet by mouth daily. Qty: 10 Packet, Refills: 0         CONTINUE these medications which have NOT CHANGED    Details   OXYBUTYNIN CHLORIDE PO Take  by mouth. methIMAzole (TAPAZOLE) 10 mg tablet Take  by mouth daily. meloxicam (MOBIC) 15 mg tablet Take 15 mg by mouth daily. iron/vitamin B complex (GERITOL PO) Take  by mouth. ascorbic acid, vitamin C, (Vitamin C) 500 mg tablet Take 500 mg by mouth two (2) times a day. pantoprazole (PROTONIX) 40 mg tablet Take 40 mg by mouth daily. traMADoL (ULTRAM) 50 mg tablet Take  mg by mouth every eight (8) hours as needed for Pain.       senna-docusate (Senokot-S) 8.6-50 mg per tablet Take 1 Tab by mouth daily. acetaminophen (TYLENOL) 500 mg tablet Take 500 mg by mouth every eight (8) hours as needed for Pain. ondansetron hcl (Zofran) 8 mg tablet Take 8 mg by mouth every eight (8) hours as needed for Nausea or Vomiting. Follow-up Information     Follow up With Specialties Details Why Amarilys Fairbanks MD Internal Medicine On 4/5/2021 Hospital follow up PCP appointment Monday, 4/5/21 at 12:30 p.m. If unable to attend, please call the office to re-schedule. [facility]  [address] [facility]   4/7/21 with Dr Cornelio Diaz at 2 PM at the [facility] [place] location [phone] [address] [place] [address] Dr Antonella Alfaro [place] [address]  for home health [facility] [address]   [address]  [phone]          Final discharge diagnoses and brief hospital course    Saritha Jenkins a 62 y.o. female with PMH of DM, HTN, Morbid obesity, Renal stone with hx of R ureteral stent for stones, came to [facility] for hematuria since morning, about 7 hours at that time. Pt is Jahovah's witness and does not take any blood products. Pt suggested to be doing ok until yesterday when started having vaginal bleeding with sharp, intense, moderate to severe, intermittent pains from R flank to groin, suprapubic and urethral meatus. No fever/ chills. Pt has had similar experience in past July 2020. And this feels quite same. Pt is jahovah's witness and does not want blood transfusions. # Hematuria - resolved   # Hx  R ureteral stent for renal stone  # SIRS on poa  with leukocytosis and tachycardia  UTI ruled out  Hx UTI with MDR klebsiella pneumoniae  - CT abd 3/18: Right ureteral stent is noted in position with the proximal end in the upper pole right kidney and the distal end in the urinary bladder which is filled with high density may represent hemorrhage.  There is right hydronephrosis versus a mass in the lower pole of the right kidney. The left kidney is small and there is mild left hydroureteronephrosis.   -  Leucocytosis - resolved  - urine cx- negative   -  Discontinued merrem  3/24  - Appreciate ID and urology input   -  S/p Cystoscopy with clot evacuation, right ureteral stent change 3/23   - outpatient follow-[facility] with [facility].      # Acute Anemia due to hematuria  #[employer]  - no blood products   - s/p iron infusions.  -  Hb low stable      # DM type 2 - A1c 6.7 c/w ISS  # CKD stage 4- cr at baseline   # Morbid obesity, BMI 60.66 - recommend weight loss      POA bilateral ischial tuberosities with stage 2 pressure injuries  - wound care      Incidental finding: CT showed  there is a large abdominal wall hernia in the left containing stomach, small bowel and colon which is incompletely imaged    Patient has no SNF benefits. Insurance auth denied and pt agreed to go home with [facility]. Discharge recommendations:  PCP f/u in 1 week  Urology f/u in 1 week  Wound care  Cbc, bmp in 1 week    Discharge plan discussed in detail with patient. She understood and agreed    High risk for readmission        Physical examination at discharge  Visit Vitals  /74 (BP 1 Location: Right upper arm, BP Patient Position: At rest)   Pulse 83   Temp 98 °F (36.7 °C)   Resp 16   Ht 5' 4\" (1.626 m)   Wt (!) 160.3 kg (353 lb 6.4 oz)   SpO2 94%   BMI 60.66 kg/m²     Constitutional:  No acute distress, cooperative , morbid obese    ENT:  mmm   Resp:  CTA bilaterally. CV:  Regular rhythm, normal rate    GI:  Soft, non distended, non tender    Musculoskeletal:  No edema, warm, 2+ pulses throughout    Neurologic:  Moves all extremities.   AAOx3, CN II-XII reviewed                         Psych:  Good insight, Not anxious nor agitated.            Pertinent imaging studies:    Per EMR     Recent Labs     03/30/21  0239 03/29/21  0333 03/28/21  0309   WBC 10.7 12.4* 12.4*   HGB 8.1* 7.8* 7.7*   HCT 27.7* 26.4* 26.3*    366 388 Recent Labs     03/30/21  0239 03/29/21  0333 03/28/21  0309    141 142   K 4.6 5.0 5.0   * 111* 110*   CO2 26 25 27   BUN 41* 38* 39*   CREA 2.31* 2.32* 2.05*   * 126* 123*   CA 8.9 8.3* 8.7     Recent Labs     03/29/21  0333   AP 71   TP 7.2   ALB 2.7*   GLOB 4.5*     No results for input(s): INR, PTP, APTT, INREXT in the last 72 hours. No results for input(s): FE, TIBC, PSAT, FERR in the last 72 hours. No results for input(s): PH, PCO2, PO2 in the last 72 hours. No results for input(s): CPK, CKMB in the last 72 hours. No lab exists for component: TROPONINI  No components found for: [place]    Chronic Diagnoses:    Problem List as of 3/30/2021 Date Reviewed: 7/18/2020          Codes Class Noted - Resolved    Hematuria ICD-10-CM: R31.9  ICD-9-CM: 599.70  7/17/2020 - Present              Time spent on discharge related activities today greater than 30 minutes.       Signed:  Jose Alberto Richards MD                 Hospitalist, Internal Medicine      Cc: Mariela Velazquez MD

## 2021-07-14 NOTE — PROGRESS NOTES
Pt states sx is supposed to be @ [facility]. Left message for Maria's staff to let them know posting is incorrect.

## 2021-07-19 NOTE — PROGRESS NOTES
Pt states she cannot get to [facility]. Jacquelyn sx @ [facility]. Called last wk to jacquelyn sx venue changed, will call again today. Pt req that I leave message for nurse also regarding painful 'sticking' of stent & blood noted when wiping after toileting. Confirmed blood was not red, but pink in color. Called Maria's office @ this time. The  has moved case from [facility] to [facility] on their end. Informed her [address] posting has not changed location. Left another message for RN with pt's description of issues.

## 2021-07-23 NOTE — PERIOP NOTES
PT NOTIFIED THAT COVID TEST IS CX AT [facility]/   SURGEON TO ORDER RAPID COVID TEST DAY OF ARRIVAL    COLIN AT DR. Lara Eisenberg OFFICE NOTIFIED PATIENT/     PT MENTIONED SHE CAN HAVE A NURSE VISIT TO OBTAIN THE PRE-OP'S IN HER HOME./ THIS WOULD REQUIRE A PRE-AUTH BY KANDICE.

## 2021-07-27 ENCOUNTER — HOSPITAL ENCOUNTER (OUTPATIENT)
Dept: PREADMISSION TESTING | Age: 61
Discharge: HOME | End: 2021-07-27
Payer: COMMERCIAL

## 2021-07-27 ENCOUNTER — HOSPITAL ENCOUNTER (OUTPATIENT)
Dept: PREADMISSION TESTING | Age: 61
Discharge: HOME | End: 2021-07-27

## 2021-07-27 VITALS
HEART RATE: 83 BPM | TEMPERATURE: 98 F | DIASTOLIC BLOOD PRESSURE: 78 MMHG | SYSTOLIC BLOOD PRESSURE: 151 MMHG | RESPIRATION RATE: 18 BRPM

## 2021-07-27 LAB
ALBUMIN SERPL-MCNC: 3.2 G/DL (ref 3.5–5)
ALBUMIN/GLOB SERPL: 0.8 {RATIO} (ref 1.1–2.2)
ALP SERPL-CCNC: 94 U/L (ref 45–117)
ALT SERPL-CCNC: 12 U/L (ref 12–78)
ANION GAP SERPL CALC-SCNC: 7 MMOL/L (ref 5–15)
APPEARANCE UR: ABNORMAL
AST SERPL-CCNC: 8 U/L (ref 15–37)
ATRIAL RATE: 84 BPM
BACTERIA URNS QL MICRO: NEGATIVE /HPF
BILIRUB SERPL-MCNC: 0.2 MG/DL (ref 0.2–1)
BILIRUB UR QL: NEGATIVE
BUN SERPL-MCNC: 40 MG/DL (ref 6–20)
BUN/CREAT SERPL: 17 (ref 12–20)
CALCIUM SERPL-MCNC: 9.2 MG/DL (ref 8.5–10.1)
CALCULATED P AXIS, ECG09: 85 DEGREES
CALCULATED R AXIS, ECG10: -18 DEGREES
CALCULATED T AXIS, ECG11: 35 DEGREES
CHLORIDE SERPL-SCNC: 113 MMOL/L (ref 97–108)
CO2 SERPL-SCNC: 24 MMOL/L (ref 21–32)
COLOR UR: ABNORMAL
CREAT SERPL-MCNC: 2.4 MG/DL (ref 0.55–1.02)
DIAGNOSIS, 93000: NORMAL
EPITH CASTS URNS QL MICRO: ABNORMAL /LPF
ERYTHROCYTE [DISTWIDTH] IN BLOOD BY AUTOMATED COUNT: 14.6 % (ref 11.5–14.5)
GLOBULIN SER CALC-MCNC: 4.2 G/DL (ref 2–4)
GLUCOSE SERPL-MCNC: 113 MG/DL (ref 65–100)
GLUCOSE UR STRIP.AUTO-MCNC: NEGATIVE MG/DL
HCT VFR BLD AUTO: 33.1 % (ref 35–47)
HGB BLD-MCNC: 9.9 G/DL (ref 11.5–16)
HGB UR QL STRIP: ABNORMAL
HYALINE CASTS URNS QL MICRO: ABNORMAL /LPF (ref 0–5)
KETONES UR QL STRIP.AUTO: NEGATIVE MG/DL
LEUKOCYTE ESTERASE UR QL STRIP.AUTO: ABNORMAL
MCH RBC QN AUTO: 27.3 PG (ref 26–34)
MCHC RBC AUTO-ENTMCNC: 29.9 G/DL (ref 30–36.5)
MCV RBC AUTO: 91.4 FL (ref 80–99)
NITRITE UR QL STRIP.AUTO: NEGATIVE
NRBC # BLD: 0 K/UL (ref 0–0.01)
NRBC BLD-RTO: 0 PER 100 WBC
P-R INTERVAL, ECG05: 156 MS
PH UR STRIP: 5.5 [PH] (ref 5–8)
PLATELET # BLD AUTO: 269 K/UL (ref 150–400)
PMV BLD AUTO: 11.6 FL (ref 8.9–12.9)
POTASSIUM SERPL-SCNC: 4.6 MMOL/L (ref 3.5–5.1)
PROT SERPL-MCNC: 7.4 G/DL (ref 6.4–8.2)
PROT UR STRIP-MCNC: 100 MG/DL
Q-T INTERVAL, ECG07: 368 MS
QRS DURATION, ECG06: 92 MS
QTC CALCULATION (BEZET), ECG08: 434 MS
RBC # BLD AUTO: 3.62 M/UL (ref 3.8–5.2)
RBC #/AREA URNS HPF: >100 /HPF (ref 0–5)
SODIUM SERPL-SCNC: 144 MMOL/L (ref 136–145)
SP GR UR REFRACTOMETRY: 1.01 (ref 1–1.03)
UA: UC IF INDICATED,UAUC: ABNORMAL
UROBILINOGEN UR QL STRIP.AUTO: 0.2 EU/DL (ref 0.2–1)
VENTRICULAR RATE, ECG03: 84 BPM
WBC # BLD AUTO: 13.1 K/UL (ref 3.6–11)
WBC URNS QL MICRO: >100 /HPF (ref 0–4)

## 2021-07-27 PROCEDURE — 36415 COLL VENOUS BLD VENIPUNCTURE: CPT

## 2021-07-27 PROCEDURE — 93005 ELECTROCARDIOGRAM TRACING: CPT

## 2021-07-27 PROCEDURE — 83036 HEMOGLOBIN GLYCOSYLATED A1C: CPT

## 2021-07-27 PROCEDURE — 85027 COMPLETE CBC AUTOMATED: CPT

## 2021-07-27 PROCEDURE — 80053 COMPREHEN METABOLIC PANEL: CPT

## 2021-07-27 PROCEDURE — 87086 URINE CULTURE/COLONY COUNT: CPT

## 2021-07-27 PROCEDURE — 81001 URINALYSIS AUTO W/SCOPE: CPT

## 2021-07-27 RX ORDER — GLIPIZIDE 5 MG/1
5 TABLET ORAL 2 TIMES DAILY
COMMUNITY

## 2021-07-27 NOTE — PERIOP NOTES
PRE-OPERATIVE INSTRUCTIONS REVIEWED WITH PATIENT. GIVEN TIME TO ASK QUESTIONS   PATIENT GIVEN 2-BOTTLE OF CHG SOAP. REVIEWED  PATIENT GIVEN SSI INFECTION FAQ SHEET.        EKG DONE     CONSENT OBTAINED, BLOOD REFUSAL CONSENT OBTAINED AND ON CHART     CALLED OR UPDATED ON [facility]GT

## 2021-07-28 LAB
BACTERIA SPEC CULT: NORMAL
CC UR VC: NORMAL
SERVICE CMNT-IMP: NORMAL

## 2021-07-28 NOTE — PERIOP NOTES
ZACKARY FROM DR BRUNER`SOFFICE CALLED BACK  AND LEFT VOICEMAIL REGARDING ABNORMAL LABS, WBC 13.1, HGB 9.9  SAID DR BRUNER REVIEWED ABNORMAL LABS.

## 2021-07-29 LAB
EST. AVERAGE GLUCOSE BLD GHB EST-MCNC: 160 MG/DL
HBA1C MFR BLD: 7.2 % (ref 4–5.6)

## 2021-08-03 ENCOUNTER — ANESTHESIA (OUTPATIENT)
Dept: SURGERY | Age: 61
End: 2021-08-03
Payer: COMMERCIAL

## 2021-08-03 ENCOUNTER — ANESTHESIA EVENT (OUTPATIENT)
Dept: SURGERY | Age: 61
End: 2021-08-03
Payer: COMMERCIAL

## 2021-08-03 ENCOUNTER — APPOINTMENT (OUTPATIENT)
Dept: GENERAL RADIOLOGY | Age: 61
End: 2021-08-03
Attending: UROLOGY
Payer: COMMERCIAL

## 2021-08-03 ENCOUNTER — HOSPITAL ENCOUNTER (OUTPATIENT)
Age: 61
Setting detail: OUTPATIENT SURGERY
Discharge: HOME | End: 2021-08-03
Attending: UROLOGY | Admitting: UROLOGY
Payer: COMMERCIAL

## 2021-08-03 VITALS
BODY MASS INDEX: 50.02 KG/M2 | RESPIRATION RATE: 13 BRPM | SYSTOLIC BLOOD PRESSURE: 110 MMHG | HEIGHT: 64 IN | WEIGHT: 293 LBS | TEMPERATURE: 97.9 F | OXYGEN SATURATION: 97 % | DIASTOLIC BLOOD PRESSURE: 56 MMHG | HEART RATE: 92 BPM

## 2021-08-03 LAB
GLUCOSE BLD STRIP.AUTO-MCNC: 117 MG/DL (ref 65–117)
SERVICE CMNT-IMP: NORMAL

## 2021-08-03 PROCEDURE — 77030026438 HC STYL ET INTUB CARD -A: Performed by: ANESTHESIOLOGY

## 2021-08-03 PROCEDURE — 2709999900 HC NON-CHARGEABLE SUPPLY: Performed by: UROLOGY

## 2021-08-03 PROCEDURE — 76210000016 HC OR PH I REC 1 TO 1.5 HR: Performed by: UROLOGY

## 2021-08-03 PROCEDURE — 74011250636 HC RX REV CODE- 250/636: Performed by: UROLOGY

## 2021-08-03 PROCEDURE — C2617 STENT, NON-COR, TEM W/O DEL: HCPCS | Performed by: UROLOGY

## 2021-08-03 PROCEDURE — 76210000020 HC REC RM PH II FIRST 0.5 HR: Performed by: UROLOGY

## 2021-08-03 PROCEDURE — 74011250636 HC RX REV CODE- 250/636: Performed by: ANESTHESIOLOGY

## 2021-08-03 PROCEDURE — 74011000250 HC RX REV CODE- 250: Performed by: ANESTHESIOLOGY

## 2021-08-03 PROCEDURE — 77030040922 HC BLNKT HYPOTHRM STRY -A

## 2021-08-03 PROCEDURE — 74011000636 HC RX REV CODE- 636: Performed by: UROLOGY

## 2021-08-03 PROCEDURE — 76010000149 HC OR TIME 1 TO 1.5 HR: Performed by: UROLOGY

## 2021-08-03 PROCEDURE — 74011250637 HC RX REV CODE- 250/637: Performed by: ANESTHESIOLOGY

## 2021-08-03 PROCEDURE — 77030018832 HC SOL IRR H20 ICUM -A: Performed by: UROLOGY

## 2021-08-03 PROCEDURE — C1769 GUIDE WIRE: HCPCS | Performed by: UROLOGY

## 2021-08-03 PROCEDURE — C1758 CATHETER, URETERAL: HCPCS | Performed by: UROLOGY

## 2021-08-03 PROCEDURE — 74420 UROGRAPHY RTRGR +-KUB: CPT

## 2021-08-03 PROCEDURE — 74011000250 HC RX REV CODE- 250: Performed by: UROLOGY

## 2021-08-03 PROCEDURE — 77030008684 HC TU ET CUF COVD -B: Performed by: ANESTHESIOLOGY

## 2021-08-03 PROCEDURE — 77030019927 HC TBNG IRR CYSTO BAXT -A: Performed by: UROLOGY

## 2021-08-03 PROCEDURE — 76060000033 HC ANESTHESIA 1 TO 1.5 HR: Performed by: UROLOGY

## 2021-08-03 PROCEDURE — 82962 GLUCOSE BLOOD TEST: CPT

## 2021-08-03 RX ORDER — PROPOFOL 10 MG/ML
INJECTION, EMULSION INTRAVENOUS AS NEEDED
Status: DISCONTINUED | OUTPATIENT
Start: 2021-08-03 | End: 2021-08-03 | Stop reason: HOSPADM

## 2021-08-03 RX ORDER — PHENYLEPHRINE HCL IN 0.9% NACL 0.4MG/10ML
SYRINGE (ML) INTRAVENOUS AS NEEDED
Status: DISCONTINUED | OUTPATIENT
Start: 2021-08-03 | End: 2021-08-03 | Stop reason: HOSPADM

## 2021-08-03 RX ORDER — SCOLOPAMINE TRANSDERMAL SYSTEM 1 MG/1
PATCH, EXTENDED RELEASE TRANSDERMAL AS NEEDED
Status: DISCONTINUED | OUTPATIENT
Start: 2021-08-03 | End: 2021-08-03 | Stop reason: HOSPADM

## 2021-08-03 RX ORDER — FENTANYL CITRATE 50 UG/ML
INJECTION, SOLUTION INTRAMUSCULAR; INTRAVENOUS AS NEEDED
Status: DISCONTINUED | OUTPATIENT
Start: 2021-08-03 | End: 2021-08-03 | Stop reason: HOSPADM

## 2021-08-03 RX ORDER — SODIUM CHLORIDE, SODIUM LACTATE, POTASSIUM CHLORIDE, CALCIUM CHLORIDE 600; 310; 30; 20 MG/100ML; MG/100ML; MG/100ML; MG/100ML
25 INJECTION, SOLUTION INTRAVENOUS CONTINUOUS
Status: DISCONTINUED | OUTPATIENT
Start: 2021-08-03 | End: 2021-08-03 | Stop reason: HOSPADM

## 2021-08-03 RX ORDER — ONDANSETRON 2 MG/ML
INJECTION INTRAMUSCULAR; INTRAVENOUS AS NEEDED
Status: DISCONTINUED | OUTPATIENT
Start: 2021-08-03 | End: 2021-08-03 | Stop reason: HOSPADM

## 2021-08-03 RX ORDER — LIDOCAINE HYDROCHLORIDE 20 MG/ML
INJECTION, SOLUTION EPIDURAL; INFILTRATION; INTRACAUDAL; PERINEURAL AS NEEDED
Status: DISCONTINUED | OUTPATIENT
Start: 2021-08-03 | End: 2021-08-03 | Stop reason: HOSPADM

## 2021-08-03 RX ORDER — SUCCINYLCHOLINE CHLORIDE 20 MG/ML
INJECTION INTRAMUSCULAR; INTRAVENOUS AS NEEDED
Status: DISCONTINUED | OUTPATIENT
Start: 2021-08-03 | End: 2021-08-03 | Stop reason: HOSPADM

## 2021-08-03 RX ORDER — ROCURONIUM BROMIDE 10 MG/ML
INJECTION, SOLUTION INTRAVENOUS AS NEEDED
Status: DISCONTINUED | OUTPATIENT
Start: 2021-08-03 | End: 2021-08-03 | Stop reason: HOSPADM

## 2021-08-03 RX ORDER — MIDAZOLAM HYDROCHLORIDE 1 MG/ML
INJECTION, SOLUTION INTRAMUSCULAR; INTRAVENOUS AS NEEDED
Status: DISCONTINUED | OUTPATIENT
Start: 2021-08-03 | End: 2021-08-03 | Stop reason: HOSPADM

## 2021-08-03 RX ADMIN — SUCCINYLCHOLINE CHLORIDE 160 MG: 20 INJECTION, SOLUTION INTRAMUSCULAR; INTRAVENOUS at 16:31

## 2021-08-03 RX ADMIN — MEPERIDINE HYDROCHLORIDE 25 MG: 50 INJECTION INTRAMUSCULAR; INTRAVENOUS; SUBCUTANEOUS at 17:19

## 2021-08-03 RX ADMIN — LIDOCAINE HYDROCHLORIDE 100 MG: 20 INJECTION, SOLUTION EPIDURAL; INFILTRATION; INTRACAUDAL; PERINEURAL at 16:31

## 2021-08-03 RX ADMIN — FENTANYL CITRATE 50 MCG: 50 INJECTION, SOLUTION INTRAMUSCULAR; INTRAVENOUS at 16:37

## 2021-08-03 RX ADMIN — MIDAZOLAM 2 MG: 1 INJECTION INTRAMUSCULAR; INTRAVENOUS at 16:12

## 2021-08-03 RX ADMIN — SCOPALAMINE 1 PATCH: 1 PATCH, EXTENDED RELEASE TRANSDERMAL at 16:12

## 2021-08-03 RX ADMIN — MEPERIDINE HYDROCHLORIDE 25 MG: 50 INJECTION INTRAMUSCULAR; INTRAVENOUS; SUBCUTANEOUS at 17:25

## 2021-08-03 RX ADMIN — Medication 40 MCG: at 16:44

## 2021-08-03 RX ADMIN — FENTANYL CITRATE 50 MCG: 50 INJECTION, SOLUTION INTRAMUSCULAR; INTRAVENOUS at 16:31

## 2021-08-03 RX ADMIN — ONDANSETRON HYDROCHLORIDE 4 MG: 2 INJECTION, SOLUTION INTRAMUSCULAR; INTRAVENOUS at 17:10

## 2021-08-03 RX ADMIN — Medication 40 MCG: at 16:42

## 2021-08-03 RX ADMIN — SODIUM CHLORIDE, POTASSIUM CHLORIDE, SODIUM LACTATE AND CALCIUM CHLORIDE 25 ML/HR: 600; 310; 30; 20 INJECTION, SOLUTION INTRAVENOUS at 13:29

## 2021-08-03 RX ADMIN — WATER 3 G: 1 INJECTION INTRAMUSCULAR; INTRAVENOUS; SUBCUTANEOUS at 16:38

## 2021-08-03 RX ADMIN — PROPOFOL 200 MG: 10 INJECTION, EMULSION INTRAVENOUS at 16:31

## 2021-08-03 RX ADMIN — ROCURONIUM BROMIDE 10 MG: 10 SOLUTION INTRAVENOUS at 16:31

## 2021-08-03 NOTE — OP NOTES
[address]   OPERATIVE REPORT    Name:  Tiarra Crockett  MR#:  [id number]  :  1960  ACCOUNT #:  [de-identified]  DATE OF SERVICE:  2021    SERVICE:  Urology. PREOPERATIVE DIAGNOSIS:  Ureteral obstruction     POSTOPERATIVE DIAGNOSIS:  same. PROCEDURES PERFORMED:  1. Cystoscopy. 2.  Right retrograde pyelogram.  3.  Right ureteral stent change. SURGEON:  Steph Herrera MD    ASSISTANT:  none    ANESTHESIA:  General.    COMPLICATIONS:  None. SPECIMENS REMOVED:  None. IMPLANTS:  See below. ESTIMATED BLOOD LOSS:  None. TUBES AND DRAINS:  5-Bhutanese x 24 cm double-J ureteral stent placed on the right side with no string attached. INDICATIONS FOR PROCEDURE:  This is a 81-year-old female with a history of intermittent right renal obstruction. She has been managed with an indwelling ureteral stent which is being changed regularly. Her existing stent is due to be changed and is irritating her, so she presents to have this removed and possibly change with retrograde pyelogram.    OPERATIVE FINDINGS:  The patient was noted to have again a somewhat malrotated kidney with the longitudinal axis closer to horizontal.  The proximal ureter transversed over the midline and back to the kidney. PROCEDURE:  After informed consent was obtained, the patient was taken to the operating room and placed in the dorsal lithotomy position after anesthesia and preoperative antibiotics were administered. The external genitalia was prepped and draped in the standard fashion and a time-out procedure was performed and both the patient and procedure correctly identified. Next, cystoscopy was performed with a 21-Bhutanese rigid cystoscope which was inserted into the urethra and passed to the bladder under direct vision. Her urine was cloudy and we had to irrigate the bladder out a number of times to clear for visualization.   The existing stent appeared to be pretty clear without any significant encrustation, but the proximal curl appeared to be displaced somewhat inferiorly. We went ahead and grasped this and removed this and then cannulated the orifice with a 5-Welsh ureteral catheter and performed a retrograde pyelogram in the standard fashion. 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 It crossed the midline over to the left side before traversing back to the right kidney and the collecting system which was malrotated. The superior calyx was located laterally and the mid and lower pole calices were located almost horizontally or a few degrees up from horizontal.  We could not clearly see any ureteral narrowing or kinking, but again the ureter was crossing the midline and at this point was overlying the spine and this was difficult to see, but she did appear to have some mild hydronephrosis, so we decided to change the stent. The guidewire was in good position in upper pole calyx and we went ahead and advanced a 5-Welsh x 24 cm double-J ureteral stent over the guidewire. When we deployed this, the proximal curl was somewhat curled between an upper pole calyx and the pelvis and there appeared to be excess stent coming out of the orifice and the previous stent appeared to have been irritating her bladder neck, so the stent was grasped and brought to the urethral meatus. We fed the wire through the stent attempting to place this up into the upper pole calyx again, so we could get the stent further up; however, the proximal curl of the wire kept tenting up in the mid pole calyx which was again inferiorly and laterally located due to the malrotation. We did, however, were able to advance the stent into this calyx and then the stent was deployed again with what appeared to be an extra curl within the calyx and there was an adequate curl in the bladder without excess stent within the bladder.   This appeared to be a much more satisfactory positioning and we left it in place.  The bladder was irrigated and drained. The patient was awakened from anesthesia and taken to PACU in stable condition.       MD SUZANNA Cosby/[email or web address]/  D:  08/03/2021 17:40  T:  08/03/2021 19:46  JOB #:  [id number]

## 2021-08-03 NOTE — PERIOP NOTES
DNR order suspended for procedure. Dr. Vinny Nation confirmed suspension with patient at bedside. Patient verbalized understanding to Dr. Vinny Nation. Addendum to surgical consent for patients with a DNR order placed in chart. Patient, provider, and PreOp nurse signed documents.      Saumya Quintero RN

## 2021-08-03 NOTE — PERIOP NOTES
Polaris ultra dual durometer percuflex ureteral stent  Ref# [id number]  Lot# [id number]  Exp 1/19/2023

## 2021-08-03 NOTE — PERIOP NOTES
Patient: Evelyn Musa MRN: [id number]  SSN: [id number]   YOB: 1960  Age: 62 y.o. Sex: female     Patient is status post Procedure(s):  CYSTOSCOPY, RIGHT STENT EXCHANGE.     Surgeon(s) and Role:     Jaqueline Maxwell MD - Primary    Local/Dose/Irrigation:                    Peripheral IV 08/03/21 Left Hand (Active)                           Dressing/Packing:       Splint/Cast:  ]    Other:

## 2021-08-03 NOTE — ANESTHESIA PREPROCEDURE EVALUATION
Relevant Problems   No relevant active problems       Anesthetic History   No history of anesthetic complications            Review of Systems / Medical History  Patient summary reviewed, nursing notes reviewed and pertinent labs reviewed    Pulmonary  Within defined limits                 Neuro/Psych   Within defined limits           Cardiovascular    Hypertension                   GI/Hepatic/Renal         Renal disease: CRI, ARF and stones       Endo/Other    Diabetes    Morbid obesity and arthritis     Other Findings              Physical Exam    Airway  Mallampati: II  TM Distance: > 6 cm  Neck ROM: normal range of motion   Mouth opening: Normal     Cardiovascular  Regular rate and rhythm,  S1 and S2 normal,  no murmur, click, rub, or gallop             Dental  No notable dental hx       Pulmonary  Breath sounds clear to auscultation               Abdominal  GI exam deferred       Other Findings            Anesthetic Plan    ASA: 3  Anesthesia type: general          Induction: Intravenous  Anesthetic plan and risks discussed with: Patient

## 2021-08-03 NOTE — BRIEF OP NOTE
Brief Postoperative Note    Patient: Richard Fall  YOB: 1960  MRN: [id number]    Date of Procedure: 8/3/2021     Pre-Op Diagnosis: HYDRONEPHROSIS    Post-Op Diagnosis: Same as preoperative diagnosis. Procedure(s):  CYSTOSCOPY, RIGHT STENT EXCHANGE    Surgeon(s):  Reynaldo Valle MD    Surgical Assistant: None    Anesthesia: General     Estimated Blood Loss (mL): Minimal    Complications: None    Specimens: * No specimens in log *     Implants: * No implants in log *    Drains: * No LDAs found *    Findings: Existing stent appeared to be displaced slightly inferiorly. New 5Frx 24cm stent placed with proximal curl within a mid calyx. Kidney appeared malrotated with proximal ureter crossing the midline.     Electronically Signed by Vivien Zavala MD on 8/3/2021 at 5:26 PM

## 2021-08-03 NOTE — ANESTHESIA POSTPROCEDURE EVALUATION
Procedure(s):  CYSTOSCOPY, RIGHT STENT EXCHANGE. general    Anesthesia Post Evaluation        Patient location during evaluation: PACU  Patient participation: complete - patient participated  Level of consciousness: awake and alert  Pain management: adequate  Airway patency: patent  Anesthetic complications: no  Cardiovascular status: acceptable  Respiratory status: acceptable  Hydration status: acceptable  Comments: I have seen and evaluated the patient and is ready for discharge. Rocky Bill MD    Post anesthesia nausea and vomiting:  none      INITIAL Post-op Vital signs:   Vitals Value Taken Time   /59 08/03/21 1830   Temp 36.6 °C (97.9 °F) 08/03/21 1735   Pulse 93 08/03/21 1839   Resp 16 08/03/21 1839   SpO2 95 % 08/03/21 1839   Vitals shown include unvalidated device data.

## 2022-03-19 PROBLEM — R31.9 HEMATURIA: Status: ACTIVE | Noted: 2020-07-17

## 2022-05-01 ENCOUNTER — HOSPITAL ENCOUNTER (INPATIENT)
Age: 62
LOS: 10 days | Discharge: HOME HEALTH | DRG: 463 | End: 2022-05-11
Attending: EMERGENCY MEDICINE | Admitting: STUDENT IN AN ORGANIZED HEALTH CARE EDUCATION/TRAINING PROGRAM
Payer: MEDICAID

## 2022-05-01 ENCOUNTER — APPOINTMENT (OUTPATIENT)
Dept: CT IMAGING | Age: 62
DRG: 463 | End: 2022-05-01
Attending: EMERGENCY MEDICINE
Payer: MEDICAID

## 2022-05-01 DIAGNOSIS — N13.39 OTHER HYDRONEPHROSIS: Primary | ICD-10-CM

## 2022-05-01 PROBLEM — N13.30 HYDRONEPHROSIS: Status: ACTIVE | Noted: 2022-05-01

## 2022-05-01 LAB
ALBUMIN SERPL-MCNC: 3.2 G/DL (ref 3.5–5)
ALBUMIN/GLOB SERPL: 0.6 {RATIO} (ref 1.1–2.2)
ALP SERPL-CCNC: 102 U/L (ref 45–117)
ALT SERPL-CCNC: 13 U/L (ref 12–78)
ANION GAP SERPL CALC-SCNC: 7 MMOL/L (ref 5–15)
APPEARANCE UR: CLEAR
AST SERPL-CCNC: 3 U/L (ref 15–37)
BACTERIA URNS QL MICRO: ABNORMAL /HPF
BASOPHILS # BLD: 0 K/UL (ref 0–0.1)
BASOPHILS NFR BLD: 0 % (ref 0–1)
BILIRUB SERPL-MCNC: 0.1 MG/DL (ref 0.2–1)
BILIRUB UR QL: NEGATIVE
BUN SERPL-MCNC: 42 MG/DL (ref 6–20)
BUN/CREAT SERPL: 14 (ref 12–20)
CALCIUM SERPL-MCNC: 8.9 MG/DL (ref 8.5–10.1)
CHLORIDE SERPL-SCNC: 113 MMOL/L (ref 97–108)
CO2 SERPL-SCNC: 23 MMOL/L (ref 21–32)
COLOR UR: ABNORMAL
COMMENT, HOLDF: NORMAL
CREAT SERPL-MCNC: 3 MG/DL (ref 0.55–1.02)
DIFFERENTIAL METHOD BLD: ABNORMAL
EOSINOPHIL # BLD: 0.3 K/UL (ref 0–0.4)
EOSINOPHIL NFR BLD: 2 % (ref 0–7)
EPITH CASTS URNS QL MICRO: ABNORMAL /LPF
ERYTHROCYTE [DISTWIDTH] IN BLOOD BY AUTOMATED COUNT: 15 % (ref 11.5–14.5)
GLOBULIN SER CALC-MCNC: 5 G/DL (ref 2–4)
GLUCOSE SERPL-MCNC: 140 MG/DL (ref 65–100)
GLUCOSE UR STRIP.AUTO-MCNC: NEGATIVE MG/DL
HCT VFR BLD AUTO: 37.9 % (ref 35–47)
HGB BLD-MCNC: 11 G/DL (ref 11.5–16)
HGB UR QL STRIP: ABNORMAL
IMM GRANULOCYTES # BLD AUTO: 0.1 K/UL (ref 0–0.04)
IMM GRANULOCYTES NFR BLD AUTO: 1 % (ref 0–0.5)
KETONES UR QL STRIP.AUTO: NEGATIVE MG/DL
LEUKOCYTE ESTERASE UR QL STRIP.AUTO: ABNORMAL
LIPASE SERPL-CCNC: 148 U/L (ref 73–393)
LYMPHOCYTES # BLD: 2 K/UL (ref 0.8–3.5)
LYMPHOCYTES NFR BLD: 14 % (ref 12–49)
MCH RBC QN AUTO: 27.2 PG (ref 26–34)
MCHC RBC AUTO-ENTMCNC: 29 G/DL (ref 30–36.5)
MCV RBC AUTO: 93.6 FL (ref 80–99)
MONOCYTES # BLD: 0.7 K/UL (ref 0–1)
MONOCYTES NFR BLD: 5 % (ref 5–13)
NEUTS SEG # BLD: 11.3 K/UL (ref 1.8–8)
NEUTS SEG NFR BLD: 78 % (ref 32–75)
NITRITE UR QL STRIP.AUTO: POSITIVE
NRBC # BLD: 0 K/UL (ref 0–0.01)
NRBC BLD-RTO: 0 PER 100 WBC
PH UR STRIP: 8.5 [PH] (ref 5–8)
PLATELET # BLD AUTO: 295 K/UL (ref 150–400)
PMV BLD AUTO: 10.2 FL (ref 8.9–12.9)
POTASSIUM SERPL-SCNC: 4.7 MMOL/L (ref 3.5–5.1)
PROT SERPL-MCNC: 8.2 G/DL (ref 6.4–8.2)
PROT UR STRIP-MCNC: 100 MG/DL
RBC # BLD AUTO: 4.05 M/UL (ref 3.8–5.2)
RBC #/AREA URNS HPF: ABNORMAL /HPF (ref 0–5)
RBC MORPH BLD: ABNORMAL
SAMPLES BEING HELD,HOLD: NORMAL
SODIUM SERPL-SCNC: 143 MMOL/L (ref 136–145)
SP GR UR REFRACTOMETRY: 1.01 (ref 1–1.03)
TRI-PHOS CRY URNS QL MICRO: ABNORMAL
UR CULT HOLD, URHOLD: NORMAL
UROBILINOGEN UR QL STRIP.AUTO: 0.2 EU/DL (ref 0.2–1)
WBC # BLD AUTO: 14.4 K/UL (ref 3.6–11)
WBC URNS QL MICRO: ABNORMAL /HPF (ref 0–4)

## 2022-05-01 PROCEDURE — 65270000029 HC RM PRIVATE

## 2022-05-01 PROCEDURE — 74011250636 HC RX REV CODE- 250/636: Performed by: EMERGENCY MEDICINE

## 2022-05-01 PROCEDURE — 87077 CULTURE AEROBIC IDENTIFY: CPT

## 2022-05-01 PROCEDURE — 81001 URINALYSIS AUTO W/SCOPE: CPT

## 2022-05-01 PROCEDURE — 74176 CT ABD & PELVIS W/O CONTRAST: CPT

## 2022-05-01 PROCEDURE — 36415 COLL VENOUS BLD VENIPUNCTURE: CPT

## 2022-05-01 PROCEDURE — 99285 EMERGENCY DEPT VISIT HI MDM: CPT

## 2022-05-01 PROCEDURE — 80053 COMPREHEN METABOLIC PANEL: CPT

## 2022-05-01 PROCEDURE — 87186 SC STD MICRODIL/AGAR DIL: CPT

## 2022-05-01 PROCEDURE — 87086 URINE CULTURE/COLONY COUNT: CPT

## 2022-05-01 PROCEDURE — 85025 COMPLETE CBC W/AUTO DIFF WBC: CPT

## 2022-05-01 PROCEDURE — 83690 ASSAY OF LIPASE: CPT

## 2022-05-01 RX ADMIN — SODIUM CHLORIDE 500 ML: 9 INJECTION, SOLUTION INTRAVENOUS at 22:48

## 2022-05-01 NOTE — ED TRIAGE NOTES
TRIAGE NOTE:  Patient arrives by  EMS with c/o abdominal pain x1 week, and diarrhea since Wednesday. Patient reports has history of stent to Right Kidney, patient reports it feels like it is moving and scraping the inside. Patient reports a history of stent dislodging.

## 2022-05-02 PROBLEM — E11.9 DM (DIABETES MELLITUS) (HCC): Status: ACTIVE | Noted: 2022-05-02

## 2022-05-02 LAB
ANION GAP SERPL CALC-SCNC: 6 MMOL/L (ref 5–15)
BUN SERPL-MCNC: 40 MG/DL (ref 6–20)
BUN/CREAT SERPL: 14 (ref 12–20)
CALCIUM SERPL-MCNC: 8.4 MG/DL (ref 8.5–10.1)
CHLORIDE SERPL-SCNC: 116 MMOL/L (ref 97–108)
CO2 SERPL-SCNC: 23 MMOL/L (ref 21–32)
CREAT SERPL-MCNC: 2.84 MG/DL (ref 0.55–1.02)
ERYTHROCYTE [DISTWIDTH] IN BLOOD BY AUTOMATED COUNT: 15.1 % (ref 11.5–14.5)
GLUCOSE BLD STRIP.AUTO-MCNC: 122 MG/DL (ref 65–117)
GLUCOSE BLD STRIP.AUTO-MCNC: 122 MG/DL (ref 65–117)
GLUCOSE BLD STRIP.AUTO-MCNC: 170 MG/DL (ref 65–117)
GLUCOSE SERPL-MCNC: 135 MG/DL (ref 65–100)
HCT VFR BLD AUTO: 34.7 % (ref 35–47)
HGB BLD-MCNC: 10.1 G/DL (ref 11.5–16)
LACTATE SERPL-SCNC: 0.7 MMOL/L (ref 0.4–2)
MCH RBC QN AUTO: 27.4 PG (ref 26–34)
MCHC RBC AUTO-ENTMCNC: 29.1 G/DL (ref 30–36.5)
MCV RBC AUTO: 94.3 FL (ref 80–99)
NRBC # BLD: 0 K/UL (ref 0–0.01)
NRBC BLD-RTO: 0 PER 100 WBC
PLATELET # BLD AUTO: 225 K/UL (ref 150–400)
PMV BLD AUTO: 11 FL (ref 8.9–12.9)
POTASSIUM SERPL-SCNC: 4.9 MMOL/L (ref 3.5–5.1)
RBC # BLD AUTO: 3.68 M/UL (ref 3.8–5.2)
SERVICE CMNT-IMP: ABNORMAL
SODIUM SERPL-SCNC: 145 MMOL/L (ref 136–145)
WBC # BLD AUTO: 14.5 K/UL (ref 3.6–11)

## 2022-05-02 PROCEDURE — 74011000250 HC RX REV CODE- 250: Performed by: NURSE PRACTITIONER

## 2022-05-02 PROCEDURE — 80048 BASIC METABOLIC PNL TOTAL CA: CPT

## 2022-05-02 PROCEDURE — 83605 ASSAY OF LACTIC ACID: CPT

## 2022-05-02 PROCEDURE — 82962 GLUCOSE BLOOD TEST: CPT

## 2022-05-02 PROCEDURE — 74011250636 HC RX REV CODE- 250/636: Performed by: STUDENT IN AN ORGANIZED HEALTH CARE EDUCATION/TRAINING PROGRAM

## 2022-05-02 PROCEDURE — C9113 INJ PANTOPRAZOLE SODIUM, VIA: HCPCS | Performed by: STUDENT IN AN ORGANIZED HEALTH CARE EDUCATION/TRAINING PROGRAM

## 2022-05-02 PROCEDURE — 97530 THERAPEUTIC ACTIVITIES: CPT

## 2022-05-02 PROCEDURE — 74011000250 HC RX REV CODE- 250: Performed by: STUDENT IN AN ORGANIZED HEALTH CARE EDUCATION/TRAINING PROGRAM

## 2022-05-02 PROCEDURE — 74011636637 HC RX REV CODE- 636/637: Performed by: INTERNAL MEDICINE

## 2022-05-02 PROCEDURE — 65270000032 HC RM SEMIPRIVATE

## 2022-05-02 PROCEDURE — 74011250636 HC RX REV CODE- 250/636: Performed by: NURSE PRACTITIONER

## 2022-05-02 PROCEDURE — 74011250637 HC RX REV CODE- 250/637: Performed by: STUDENT IN AN ORGANIZED HEALTH CARE EDUCATION/TRAINING PROGRAM

## 2022-05-02 PROCEDURE — 85027 COMPLETE CBC AUTOMATED: CPT

## 2022-05-02 PROCEDURE — 97161 PT EVAL LOW COMPLEX 20 MIN: CPT

## 2022-05-02 PROCEDURE — 97165 OT EVAL LOW COMPLEX 30 MIN: CPT

## 2022-05-02 PROCEDURE — 97535 SELF CARE MNGMENT TRAINING: CPT

## 2022-05-02 PROCEDURE — 36415 COLL VENOUS BLD VENIPUNCTURE: CPT

## 2022-05-02 RX ORDER — OXYBUTYNIN CHLORIDE 5 MG/1
10 TABLET, EXTENDED RELEASE ORAL DAILY
Status: DISCONTINUED | OUTPATIENT
Start: 2022-05-02 | End: 2022-05-11 | Stop reason: HOSPADM

## 2022-05-02 RX ORDER — METHIMAZOLE 5 MG/1
10 TABLET ORAL DAILY
Status: DISCONTINUED | OUTPATIENT
Start: 2022-05-02 | End: 2022-05-11 | Stop reason: HOSPADM

## 2022-05-02 RX ORDER — LIDOCAINE 4 G/100G
2 PATCH TOPICAL EVERY 24 HOURS
Status: DISCONTINUED | OUTPATIENT
Start: 2022-05-02 | End: 2022-05-02

## 2022-05-02 RX ORDER — INSULIN LISPRO 100 [IU]/ML
INJECTION, SOLUTION INTRAVENOUS; SUBCUTANEOUS
Status: DISCONTINUED | OUTPATIENT
Start: 2022-05-02 | End: 2022-05-11 | Stop reason: HOSPADM

## 2022-05-02 RX ORDER — HYDROMORPHONE HYDROCHLORIDE 1 MG/ML
0.5 INJECTION, SOLUTION INTRAMUSCULAR; INTRAVENOUS; SUBCUTANEOUS
Status: DISCONTINUED | OUTPATIENT
Start: 2022-05-02 | End: 2022-05-06

## 2022-05-02 RX ORDER — LIDOCAINE 4 G/100G
1 PATCH TOPICAL EVERY 24 HOURS
Status: DISCONTINUED | OUTPATIENT
Start: 2022-05-03 | End: 2022-05-11 | Stop reason: HOSPADM

## 2022-05-02 RX ORDER — MAGNESIUM SULFATE 100 %
4 CRYSTALS MISCELLANEOUS AS NEEDED
Status: DISCONTINUED | OUTPATIENT
Start: 2022-05-02 | End: 2022-05-11 | Stop reason: HOSPADM

## 2022-05-02 RX ORDER — TRAMADOL HYDROCHLORIDE 50 MG/1
50-100 TABLET ORAL
Status: DISCONTINUED | OUTPATIENT
Start: 2022-05-02 | End: 2022-05-03 | Stop reason: ALTCHOICE

## 2022-05-02 RX ORDER — POLYETHYLENE GLYCOL 3350 17 G/17G
17 POWDER, FOR SOLUTION ORAL
Status: DISCONTINUED | OUTPATIENT
Start: 2022-05-02 | End: 2022-05-11 | Stop reason: HOSPADM

## 2022-05-02 RX ADMIN — Medication 2 UNITS: at 16:57

## 2022-05-02 RX ADMIN — SODIUM CHLORIDE 40 MG: 9 INJECTION, SOLUTION INTRAMUSCULAR; INTRAVENOUS; SUBCUTANEOUS at 09:45

## 2022-05-02 RX ADMIN — OXYBUTYNIN CHLORIDE 10 MG: 5 TABLET, EXTENDED RELEASE ORAL at 12:05

## 2022-05-02 RX ADMIN — CEFTRIAXONE SODIUM 1 G: 1 INJECTION, POWDER, FOR SOLUTION INTRAMUSCULAR; INTRAVENOUS at 13:01

## 2022-05-02 RX ADMIN — METHIMAZOLE 10 MG: 5 TABLET ORAL at 12:05

## 2022-05-02 NOTE — CONSULTS
Requesting Provider: Mine Pagan MD - Reason for Consultation: \"hydronephrosis\"  Pre-existing [facility] Patient:   Yes Boston Wu                Patient: Trenton Patricio MRN: [id number]  SSN: [id number]    YOB: 1960  Age: 65 y.o. Sex: female     Location: [facility]/       Code Status: Prior   PCP: Jonathan Villarreal MD  - [phone]   Emergency Contact:  Primary Emergency Contact: Vladimir Velasco, Home Phone: [phone]   Race/Episcopalian/Language: BLACK/ / [place] / Aiden Phenes: Payor: [address] [place] [address] Road / Plan: [address] / Product Type: Managed Care Medicaid /    Prior Admission Data: 8/3/21 [address] [place] [address] PACU Blaire Patrick   Hospitalized:  Hospital Day: 1 - Admitted 5/1/2022  7:38 PM   POD # * No surgery found *  by * Surgery not found * - Blood Loss: * No surgery found * * Surgery not found *     CONSULTANTS  IP CONSULT TO UROLOGY   ADMISSION DIAGNOSES  No diagnosis found. Assessment/Plan:     · 8 mm left UPJ stone in atrophic kidney - this is the source of her pain. Also LLP stone about 8mm too. · Right ureteral stent in position with encrustation - vaginal pain is coming from stone encrusted distal coil of right stent. The premise of stent change is going to be difficult. Please avoid Lovenox/antiplatelets as may require nephrostomy drainage. · Urine foul smelling - UA/Cxpending. Asked for Abx coverage. No intervention overnight. NPO pending Dr. Sugey Lr f/u in morning for repeat labs and discussion of options. Cysto left stent + right stent next to encrusted current stent vs R PCN. CC: Abdominal Pain   HPI: She is a 65 y.o. female followed by Dr. Sugey Lr h/o longstanding right ureteral stent last changed 8/2021. Has missed scheduled f/u for stent changes recently. Came to ER with left flank pain and vaginal pain, recent diarrhea. She associated with prior stent migration previously. Baseline creat 2.4 up to 3.0 now. NCCT reviewed. Right stent in good position however suspicious for encrustation ball in distal and proximal coil. 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 New left renal stones including 8mm stone left renal pelvis with hydro. The left kidney is highly atrophic and likely does not contribute to signficant function. Temp (24hrs), Av.3 °F (36.8 °C), Min:98.3 °F (36.8 °C), Max:98.3 °F (36.8 °C)       Creatinine   Date/Time Value Ref Range Status   2022 08:22 PM 3.00 (H) 0.55 - 1.02 MG/DL Final   2021 12:04 PM 2.40 (H) 0.55 - 1.02 MG/DL Final   2021 02:39 AM 2.31 (H) 0.55 - 1.02 MG/DL Final   2021 03:33 AM 2.32 (H) 0.55 - 1.02 MG/DL Final   2021 03:09 AM 2.05 (H) 0.55 - 1.02 MG/DL Final     Current Antimicrobial Therapy (168h ago, onward)    None        Key Anti-Platelet Anticoagulant Meds     The patient is on no antiplatelet meds or anticoagulants.         Diet: DIET NPO -       Labs     Lab Results   Component Value Date/Time    Lactic acid 0.7 2021 08:11 AM    Lactic acid 0.6 2020 08:08 PM    WBC 14.4 (H) 2022 08:22 PM    HCT 37.9 2022 08:22 PM    PLATELET 468  08:22 PM    Sodium 143 2022 08:22 PM    Potassium 4.7 2022 08:22 PM    Chloride 113 (H) 2022 08:22 PM    CO2 23 2022 08:22 PM    BUN 42 (H) 2022 08:22 PM    Creatinine 3.00 (H) 2022 08:22 PM    Glucose 140 (H) 2022 08:22 PM    Calcium 8.9 2022 08:22 PM    Magnesium 2.0 2020 04:50 AM     UA:   Lab Results   Component Value Date/Time    Color YELLOW/STRAW 2021 12:04 PM    Appearance TURBID (A) 2021 12:04 PM    Specific gravity 1.012 2021 12:04 PM    Specific gravity 1.015 2021 11:54 AM    pH (UA) 5.5 2021 12:04 PM    Protein 100 (A) 2021 12:04 PM    Glucose Negative 2021 12:04 PM    Ketone Negative 2021 12:04 PM    Bilirubin Negative 07/27/2021 12:04 PM    Urobilinogen 0.2 07/27/2021 12:04 PM    Nitrites Negative 07/27/2021 12:04 PM    Leukocyte Esterase LARGE (A) 07/27/2021 12:04 PM    Epithelial cells FEW 07/27/2021 12:04 PM    Bacteria Negative 07/27/2021 12:04 PM    WBC >100 (H) 07/27/2021 12:04 PM    RBC >100 (H) 07/27/2021 12:04 PM     Imaging     Results for orders placed during the hospital encounter of 05/01/22    CT ABD PELV WO CONT    Narrative  EXAM: CT ABD PELV WO CONT    INDICATION: left sided abd pain eval location ureteral stent    COMPARISON: CT 3/29/2021. IV CONTRAST: None. ORAL CONTRAST: None. TECHNIQUE:  Thin axial images were obtained through the abdomen and pelvis. Coronal and  sagittal reformats were generated. CT dose reduction was achieved through use of  a standardized protocol tailored for this examination and automatic exposure  control for dose modulation. The absence of intravenous contrast material reduces the sensitivity for  evaluation of the vasculature and solid organs. FINDINGS:  GENERAL: Patient is morbidly obese again compromise evaluation. LOWER THORAX: No significant abnormality in the incidentally imaged lower chest.  LIVER: No mass. BILIARY TREE: Gallbladder is within normal limits. CBD is not dilated. SPLEEN: within normal limits. PANCREAS: No focal abnormality. ADRENALS: Unremarkable. KIDNEYS/URETERS: Right double-J ureteral stent, with proximal pigtail uncoiled  and associated with 1.5 cm calcification with a second calculus at the  ureteropelvic junction intimately associated with the catheter measuring 1.2 cm  also seen. There is new right hydronephrosis. No calculi seen along the course  of the catheter within the ureter. Calcification in the upper pole collecting  system redemonstrated again measuring about 8 mm. Lower moiety shows no  hydronephrosis with suspected duplicated collecting system.  Small left kidney  with new calculi evident within the collecting system measuring up to 7 mm and  in the ureteropelvic junction measuring about 10 mm. Additionally, there is  calculus within the proximal left ureter measuring approximately 5 mm with mild  left hydronephrosis and proximal left hydroureter. Eddie Brittle STOMACH: Unremarkable. SMALL BOWEL AND COLON: Large abdominal hernia arising from the left flank,  containing numerous loops of large and small bowel. Hernia sac and bowel is  incompletely evaluated due to patient large size. No dilation or wall  thickening. APPENDIX: Appendix lies within hernia sac with otherwise unremarkable  appearance. PERITONEUM: No ascites or pneumoperitoneum. RETROPERITONEUM: No lymphadenopathy or aortic aneurysm. REPRODUCTIVE ORGANS: Ovaries appear unremarkable. Uterus is surgically absent. URINARY BLADDER: Distal aspect of the lead change ureteral stent lies within  urinary bladder and appears coiled around an approximately 2.5 cm bladder  calculus. BONES: Scoliosis and degenerative spine change. Osteoarthritic changes of the  hips. No acute fracture or aggressive lesion. ABDOMINAL WALL: Large abdominal wall hernia containing portions of the large and  small bowel as described above. ADDITIONAL COMMENTS: N/A    Impression  1. New calculi have formed along uncoiled proximal portion of right  nephroureteral stent and at the ureteropelvic junction along the stent with  worsened right hydronephrosis likely reflective of obstruction at the  ureteropelvic junction stone. There is persistent calculus in the collecting  system of the right kidney and within the urinary bladder associated with the  distal nephroureteral stent pigtail. 2. New left renal and ureteral stones with proximal left hydroureter and left  hydronephrosis, likely due to obstructing proximal left 5 mm ureteral stone. 3. Incidentals as above including large abdominal wall hernia containing large  portions of large and small bowel.       US Results (most recent):  Results from Hospital Encounter encounter on 07/17/20    US RETROPERITONEUM COMP    Narrative  Indication: Worsening acute renal failure status post right stent placement    Realtime sonographic imaging of the retroperitoneum was performed. The right  kidney measures 14.0 cm and the left kidney measures 12.4 cm. Significant right  hydronephrosis and more moderate left hydronephrosis is noted. Right ureteral  stent is visualized and in satisfactory position. The bladder is decompressed. The visualized portions of the IVC and abdominal aorta are unremarkable. The  common iliac bifurcation is not visualized due to bowel gas. Impression  IMPRESSION: Bilateral hydronephrosis right much greater than left. Right  ureteral stent in position. Cultures     All Micro Results     Procedure Component Value Units Date/Time    URINE CULTURE HOLD SAMPLE [[id number]]     Order Status: Sent Specimen: Urine            Past History: (Complete 2+/3 areas)     Allergies   Allergen Reactions    Bacitracin Other (comments)     Syncope and lip swelling. Patient states used Neosporine to treat wound from hair dye and neosporine seeped into her bloodstream.       Current Facility-Administered Medications   Medication Dose Route Frequency    sodium chloride 0.9 % bolus infusion 500 mL  500 mL IntraVENous NOW     Current Outpatient Medications   Medication Sig    glipiZIDE (GLUCOTROL) 5 mg tablet Take 5 mg by mouth two (2) times a day. (Patient not taking: Reported on 8/3/2021)    polyethylene glycol (MIRALAX) 17 gram packet Take 1 Packet by mouth daily. (Patient not taking: Reported on 7/27/2021)    OXYBUTYNIN CHLORIDE PO Take  by mouth.  methIMAzole (TAPAZOLE) 10 mg tablet Take  by mouth daily.  iron/vitamin B complex (GERITOL PO) Take  by mouth. (Patient not taking: Reported on 8/3/2021)    pantoprazole (PROTONIX) 40 mg tablet Take 40 mg by mouth daily.  (Patient not taking: Reported on 7/27/2021)    traMADoL (ULTRAM) 50 mg tablet Take  mg by mouth every eight (8) hours as needed for Pain.  senna-docusate (Senokot-S) 8.6-50 mg per tablet Take 1 Tab by mouth daily. (Patient not taking: Reported on 7/27/2021)    acetaminophen (TYLENOL) 500 mg tablet Take 500 mg by mouth every eight (8) hours as needed for Pain. (Patient not taking: Reported on 8/3/2021)    ondansetron hcl (Zofran) 8 mg tablet Take 8 mg by mouth every eight (8) hours as needed for Nausea or Vomiting. Prior to Admission medications    Medication Sig Start Date End Date Taking? Authorizing Provider   glipiZIDE (GLUCOTROL) 5 mg tablet Take 5 mg by mouth two (2) times a day. Patient not taking: Reported on 8/3/2021    ProviderUsman   polyethylene glycol (MIRALAX) 17 gram packet Take 1 Packet by mouth daily. Patient not taking: Reported on 7/27/2021 3/30/21   Charli Theodore MD   OXYBUTYNIN CHLORIDE PO Take  by mouth. Other, MD Juan Luis   methIMAzole (TAPAZOLE) 10 mg tablet Take  by mouth daily. Other, MD Juan Luis   iron/vitamin B complex (GERITOL PO) Take  by mouth. Patient not taking: Reported on 8/3/2021    Juan Luis Palacio MD   pantoprazole (PROTONIX) 40 mg tablet Take 40 mg by mouth daily. Patient not taking: Reported on 7/27/2021    Juan Luis Palacio MD   traMADoL (ULTRAM) 50 mg tablet Take  mg by mouth every eight (8) hours as needed for Pain. Other, MD Juan Luis   senna-docusate (Senokot-S) 8.6-50 mg per tablet Take 1 Tab by mouth daily. Patient not taking: Reported on 7/27/2021    Juan Luis Palacio MD   acetaminophen (TYLENOL) 500 mg tablet Take 500 mg by mouth every eight (8) hours as needed for Pain. Patient not taking: Reported on 8/3/2021    Juan Luis Paalcio MD   ondansetron hcl (Zofran) 8 mg tablet Take 8 mg by mouth every eight (8) hours as needed for Nausea or Vomiting.     Other, MD Juan Luis        PMHx:  has a past medical history of Chronic kidney disease, Chronic obstructive pulmonary disease ([facility] [address] [facility] Hernia, hiatal, Hypertension, Incontinence of urine, Kidney stones, Morbid obesity ([address] Osteoarthritis, Peripheral neuropathy, Psychiatric disorder, Renal failure, and Thyroid disease. PSurgHx:  has a past surgical history that includes hx other surgical; hx hysterectomy; and hx dilation and curettage (1990). PSocHx:  reports that she has never smoked. She has never used smokeless tobacco. She reports that she does not drink alcohol and does not use drugs.    ROS:  (Complete - 10 systems) -   [] Weight Loss (Constitutional)  [] Dry Mouth (ENMT)  [] Palpitations (CV)                         [] SOB (Respiratory)  [] Flatulance (GI)  [] Major Focal Weakness (MS)  [] Pallor (Skin)                            [] TIA Sx (Neuro)  [] Hallicinations (Psych)                [] Easy Bleeding (Heme)       Physical Exam: (Comprehesive - 8+ 1995 Systems)     (1) Constitutional:  FIO2:   on SpO2: O2 Sat (%): 97 %  O2 Device: None (Room air)    Patient Vitals for the past 24 hrs:   BP Temp Pulse Resp SpO2 Weight   05/01/22 1940 (!) 150/68 98.3 °F (36.8 °C) 97 18 97 % 158.8 kg (350 lb)          (2) ENMT:   moist mucous membranes   (3) Respiratory:  breathing easily   (4) GI:  no abdominal masses, tenderness, no hepatosplenomegaly, no ventral hernia, stool for occult blood not indicated as urologist.   (5) :   normal   (6) Lymphatic:  no adenopathy   (7) Muscloskeletal:  no gross deformity   (8) Skin:  no rash   (9) Neuro:  no focal deficits      Signed By: David Cooper MD  - May 1, 2022

## 2022-05-02 NOTE — PROGRESS NOTES
Patient: Ursula Miller MRN: [id number]  SSN: [id number]    YOB: 1960  Age: 65 y.o. Sex: female        ADMITTED: 2022 to Judith Jensen MD by Usha Berumen DO for Hydronephrosis [N13.30]    F/U for b/l hydro. Followed by Dr. Avery Ruth for Hx of acute-on-chronic renal failure w questionable right ureteral obstruction and hydronephrosis in the past, possibly due to malrotated and ptotic right kidney, managed with a stent. She has missed recent f/u for stent change, stent last changed 2021. CT showed Right stent in good position however suspicious for encrustation ball in distal and proximal coil. 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 New left renal stones including 10mm at the UPJ and 5 mm in the proximal ureter with hydro. The left kidney is highly atrophic. She is AF, VSS. WBC unchanged at 14.5. Cr better at 2.84 from 3.0. UA looks grossly infected. Culture pending. She denies fevers, chills, flank or abd pain. She does complain of pelvic pressure and pinching which she has previously attributed to what she thought was a dislodged stent. Vitals: Temp (24hrs), Av °F (36.7 °C), Min:97.8 °F (36.6 °C), Max:98.3 °F (36.8 °C)    Blood pressure 113/75, pulse 74, temperature 97.8 °F (36.6 °C), resp. rate 18, weight 158.8 kg (350 lb), SpO2 95 %. Intake and Output:   1901 -  0700  In: 500 [I.V.:500]  Out: -   No intake/output data recorded. ROMA Output lats 24 hrs: No data found. ROMA Output last 8 hrs: No data found. BM over last 24 hrs: No data found.     Physical Exam  General: NAD, pleasant  Respiratory: no distress, breathing easily, room air  Abdomen: soft, no distention; non-tender to palpation, obese  : no CVA tenderness, voiding independently, yellow UA in canister  Neuro: Appropriate, no focal neurological deficits  Skin: warm, dry  Extremities: moves all, full ROM    Labs:  CBC: Lab Results   Component Value Date/Time    WBC 14.5 (H) 05/02/2022 02:54 AM    HCT 34.7 (L) 05/02/2022 02:54 AM    PLATELET 270 39/74/4164 02:54 AM     BMP:   Lab Results   Component Value Date/Time    Glucose 135 (H) 05/02/2022 03:11 AM    Sodium 145 05/02/2022 03:11 AM    Potassium 4.9 05/02/2022 03:11 AM    Chloride 116 (H) 05/02/2022 03:11 AM    CO2 23 05/02/2022 03:11 AM    BUN 40 (H) 05/02/2022 03:11 AM    Creatinine 2.84 (H) 05/02/2022 03:11 AM    Calcium 8.4 (L) 05/02/2022 03:11 AM     Assessment/Plan:     · New left renal stones including 10 mm at the UPJ and 5 mm in the proximal ureter with hydro in atrophic kidney  · Chronic right ureteral obstruction with stent - Right ureteral stent in position with encrustation and mild to moderate hydro  · DAPHNE   · UTI      Discussed placement of bilateral stents and the need for possible nephrostomy tube if unable to bypass existing right stent. She is in agreement with this plan. She prefers stents over percs. NPO for add on case today, awaiting the OR. UA grossly infected. UCx ordered and pending. Please cover with broad spectrum abx. AM labs ordered. Will follow. 12:31 PM  Unfortunately there is no availability in the OR until Wednesday, 5/4. She is currently stable w/o signs of sepsis therefore we will wait to proceed on Wednesday. If she clinically declines, please call us for emergent stent placement vs perc tubes in the interim. Empiric abx ordered. Patient updated. Will follow.        Discussed with Dr. Oliva Moses  Signed By: Skylar Sarmiento NP - May 2, 2022

## 2022-05-02 NOTE — PROGRESS NOTES
[facility] Adult  Hospitalist Group                                                                                          Hospitalist Progress Note  Sukhdev Sweeney MD  Answering service: [phone] OR [phone] from in house phone        Date of Service:  2022  NAME:  Paige Rivas  :  1960  MRN:  [id number]      Admission Summary:   Copied form admit: \" This is a recurrent problem. The current episode started more than 1 week ago. The problem occurs hourly. The problem has been gradually worsening. The quality of the pain is sharp. The pain is moderate. Associated symptoms include diarrhea. Pertinent negatives include no anorexia, no fever, no belching, no flatus, no hematochezia, no melena, no nausea, no vomiting, no constipation, no dysuria, no frequency, no hematuria, no headaches, no arthralgias, no myalgias, no trauma, no chest pain and no back pain. The pain is worsened by certain positions. The pain is relieved by nothing. \"    Interval history / Subjective:     2022 :    Pain controled    For urological procedure soon as per directions of urology     Pt in poor physical health, willneed aggressvie pt to get to a better baseline    Else:          Assessment & Plan: Active Problems:    Hydronephrosis (2022)  - for stenting or prcts       DM (diabetes mellitus) ([address] (2022)  - SSI .  Broad coverage as indicated  Lab Results   Component Value Date/Time    Glucose 135 (H) 2022 03:11 AM    Glucose (POC) 117 2021 05:29 PM         Nephrolithiasis  CKD      Code status: full   DVT ppx : [facility] Problems  Date Reviewed: 2022          Codes Class Noted POA    DM (diabetes mellitus) ([facility] [facility] ICD-10-CM: E11.9  ICD-9-CM: 250.00  2022 Unknown        Hydronephrosis ICD-10-CM: N13.30  ICD-9-CM: 420  2022 Unknown                  After personally interviewing pt at bedside the following is noted on 2022 :    Review of Systems   Respiratory: Negative for cough, shortness of breath and wheezing. Cardiovascular: Negative for chest pain. Gastrointestinal: Negative for abdominal pain, nausea and vomiting. Genitourinary: Negative for dysuria and urgency. All other systems reviewed and are negative. I had a face to face encounter with patient on 5/2/2022 at bedside for the following physical exam:     PHYSICAL EXAM:    Visit Vitals  /75 (BP 1 Location: Right arm, BP Patient Position: At rest)   Pulse 74   Temp 97.8 °F (36.6 °C)   Resp 18   Wt 158.8 kg (350 lb)   SpO2 95%   BMI 60.08 kg/m²          Physical Exam  Constitutional:       General: She is not in acute distress. Appearance: She is obese. She is not ill-appearing, toxic-appearing or diaphoretic. HENT:      Head: Normocephalic and atraumatic. Nose: Nose normal.      Mouth/Throat:      Mouth: Mucous membranes are dry. Pharynx: Oropharynx is clear. Eyes:      Extraocular Movements: Extraocular movements intact. Conjunctiva/sclera: Conjunctivae normal.      Pupils: Pupils are equal, round, and reactive to light. Cardiovascular:      Rate and Rhythm: Normal rate and regular rhythm. Pulmonary:      Effort: Pulmonary effort is normal.      Breath sounds: Normal breath sounds. Abdominal:      General: Abdomen is flat. Bowel sounds are normal. There is no distension. Palpations: Abdomen is soft. Tenderness: There is no abdominal tenderness. Musculoskeletal:         General: No swelling or deformity. Cervical back: Normal range of motion and neck supple. Skin:     General: Skin is warm and dry. Neurological:      Mental Status: She is oriented to person, place, and time. Mental status is at baseline.    Psychiatric:         Mood and Affect: Mood normal.         Behavior: Behavior normal.                     Data Review:    Review and/or order of clinical lab test      Labs:     Recent Labs     05/02/22  0254 05/01/22 2022   WBC 14.5* 14.4*   HGB 10.1* 11.0*   HCT 34.7* 37.9    295     Recent Labs     05/02/22  0311 05/01/22 2022    143   K 4.9 4.7   * 113*   CO2 23 23   BUN 40* 42*   CREA 2.84* 3.00*   * 140*   CA 8.4* 8.9     Recent Labs     05/01/22 2022   ALT 13      TBILI 0.1*   TP 8.2   ALB 3.2*   GLOB 5.0*   LPSE 148     No results for input(s): INR, PTP, APTT, INREXT in the last 72 hours. No results for input(s): FE, TIBC, PSAT, FERR in the last 72 hours. No results found for: FOL, RBCF   No results for input(s): PH, PCO2, PO2 in the last 72 hours. No results for input(s): CPK, CKNDX, TROIQ in the last 72 hours.     No lab exists for component: CPKMB  No results found for: CHOL, CHOLX, CHLST, CHOLV, HDL, HDLP, LDL, LDLC, DLDLP, TGLX, TRIGL, TRIGP, CHHD, [facility]  Lab Results   Component Value Date/Time    Glucose (POC) 117 08/03/2021 05:29 PM    Glucose (POC) 117 (H) 03/30/2021 06:02 AM    Glucose (POC) 129 (H) 03/29/2021 10:02 PM    Glucose (POC) 136 (H) 03/29/2021 04:23 PM    Glucose (POC) 275 (H) 03/29/2021 04:20 PM     Lab Results   Component Value Date/Time    Color YELLOW/STRAW 05/01/2022 11:22 PM    Appearance CLEAR 05/01/2022 11:22 PM    Specific gravity 1.015 05/01/2022 11:22 PM    Specific gravity 1.012 07/27/2021 12:04 PM    pH (UA) 8.5 (H) 05/01/2022 11:22 PM    Protein 100 (A) 05/01/2022 11:22 PM    Glucose Negative 05/01/2022 11:22 PM    Ketone Negative 05/01/2022 11:22 PM    Bilirubin Negative 05/01/2022 11:22 PM    Urobilinogen 0.2 05/01/2022 11:22 PM    Nitrites Positive (A) 05/01/2022 11:22 PM    Leukocyte Esterase MODERATE (A) 05/01/2022 11:22 PM    Epithelial cells FEW 05/01/2022 11:22 PM    Bacteria 4+ (A) 05/01/2022 11:22 PM    WBC  05/01/2022 11:22 PM    RBC 20-50 05/01/2022 11:22 PM         Medications Reviewed:     Current Facility-Administered Medications   Medication Dose Route Frequency    pantoprazole (PROTONIX) 40 mg in 0.9% sodium chloride 10 mL injection  40 mg IntraVENous DAILY    HYDROmorphone (DILAUDID) injection 0.5 mg  0.5 mg IntraVENous Q6H PRN    traMADoL (ULTRAM) tablet  mg   mg Oral Q6H PRN    oxybutynin chloride XL (DITROPAN XL) tablet 10 mg  10 mg Oral DAILY    methIMAzole (TAPAZOLE) tablet 10 mg  10 mg Oral DAILY    polyethylene glycol (MIRALAX) packet 17 g  17 g Oral DAILY PRN     ______________________________________________________________________  EXPECTED LENGTH OF STAY: - - -  ACTUAL LENGTH OF STAY:          1                 Daxa Choudhury MD

## 2022-05-02 NOTE — ED PROVIDER NOTES
The history is provided by the patient. Abdominal Pain   This is a recurrent problem. The current episode started more than 1 week ago. The problem occurs hourly. The problem has been gradually worsening. The quality of the pain is sharp. The pain is moderate. Associated symptoms include diarrhea. Pertinent negatives include no anorexia, no fever, no belching, no flatus, no hematochezia, no melena, no nausea, no vomiting, no constipation, no dysuria, no frequency, no hematuria, no headaches, no arthralgias, no myalgias, no trauma, no chest pain and no back pain. The pain is worsened by certain positions. The pain is relieved by nothing.         Past Medical History:   Diagnosis Date    Chronic kidney disease     Chronic obstructive pulmonary disease (HCC)     Diabetes ([address]     Hernia, hiatal     Hypertension     Incontinence of urine     Kidney stones     both kidneys    Morbid obesity ([address]     Osteoarthritis     Peripheral neuropathy     DOES GET SOME NUMBNESS IN HANDS    Psychiatric disorder     ANXIETY    Renal failure     left kidney    Thyroid disease        Past Surgical History:   Procedure Laterality Date    HX DILATION AND CURETTAGE  1990    HX HYSTERECTOMY      OVARIES NOT REMOVED     HX OTHER SURGICAL      right kidney stent         Family History:   Problem Relation Age of Onset    Cancer Mother     Diabetes Mother     Diabetes Father     Heart Disease Father     Heart Attack Father     Diabetes Sister     Hypertension Sister     Diabetes Sister     Hypertension Sister     Diabetes Sister     Heart Disease Brother     Anesth Problems Neg Hx        Social History     Socioeconomic History    Marital status: SINGLE     Spouse name: Not on file    Number of children: Not on file    Years of education: Not on file    Highest education level: Not on file   Occupational History    Not on file   Tobacco Use    Smoking status: Never Smoker    Smokeless tobacco: Never Used Vaping Use    Vaping Use: Never used   Substance and Sexual Activity    Alcohol use: Never    Drug use: Never    Sexual activity: Not on file   Other Topics Concern    Not on file   Social History Narrative    Not on file     Social Determinants of Health     Financial Resource Strain:     Difficulty of Paying Living Expenses: Not on file   Food Insecurity:     Worried About Running Out of Food in the Last Year: Not on file    Neema of Food in the Last Year: Not on file   Transportation Needs:     Lack of Transportation (Medical): Not on file    Lack of Transportation (Non-Medical): Not on file   Physical Activity:     Days of Exercise per Week: Not on file    Minutes of Exercise per Session: Not on file   Stress:     Feeling of Stress : Not on file   Social Connections:     Frequency of Communication with Friends and Family: Not on file    Frequency of Social Gatherings with Friends and Family: Not on file    Attends Hinduism Services: Not on file    Active Member of [address] [place] [address] or Organizations: Not on file    Attends Club or Organization Meetings: Not on file    Marital Status: Not on file   Intimate Partner Violence:     Fear of Current or Ex-Partner: Not on file    Emotionally Abused: Not on file    Physically Abused: Not on file    Sexually Abused: Not on file   Housing Stability:     Unable to Pay for Housing in the Last Year: Not on file    Number of [place] in the Last Year: Not on file    Unstable Housing in the Last Year: Not on file         ALLERGIES: Bacitracin    Review of Systems   Constitutional: Negative for activity change, chills and fever. HENT: Negative for nosebleeds, sore throat, trouble swallowing and voice change. Eyes: Negative for visual disturbance. Respiratory: Negative for shortness of breath. Cardiovascular: Negative for chest pain and palpitations. Gastrointestinal: Positive for abdominal pain and diarrhea.  Negative for anorexia, constipation, flatus, hematochezia, melena, nausea and vomiting. Genitourinary: Positive for flank pain and pelvic pain. Negative for difficulty urinating, dysuria, frequency, hematuria and urgency. Musculoskeletal: Negative for arthralgias, back pain, myalgias, neck pain and neck stiffness. Skin: Negative for color change. Allergic/Immunologic: Negative for immunocompromised state. Neurological: Negative for dizziness, seizures, syncope, weakness, light-headedness, numbness and headaches. Psychiatric/Behavioral: Negative for behavioral problems, confusion, hallucinations, self-injury and suicidal ideas. Vitals:    05/01/22 1940   BP: (!) 150/68   Pulse: 97   Resp: 18   Temp: 98.3 °F (36.8 °C)   SpO2: 97%   Weight: 158.8 kg (350 lb)            Physical Exam  Vitals and nursing note reviewed. Constitutional:       General: She is not in acute distress. Appearance: She is well-developed. She is not diaphoretic. HENT:      Head: Atraumatic. Neck:      Trachea: No tracheal deviation. Cardiovascular:      Comments: Warm and well perfused  Pulmonary:      Effort: Pulmonary effort is normal. No respiratory distress. Abdominal:      Tenderness: There is no abdominal tenderness. Musculoskeletal:         General: Normal range of motion. Skin:     General: Skin is warm and dry. Neurological:      Mental Status: She is alert. Coordination: Coordination normal.   Psychiatric:         Behavior: Behavior normal.         Thought Content: Thought content normal.         Judgment: Judgment normal.          MDM     This is a 71-year-old female with past medical history, review of systems, physical exam as above, presenting with complaints of left flank pain, pelvic pain. Patient describes pain as \"something coming through my clitoris\". Endorses similar pain associated with dislodged ureteral stent.   She states stent was placed several months ago, scheduled to have it removed earlier last month, however missed her surgical appointment due to family problems. She states she is scheduled again to go to the OR for removal of her ureteral stent on 12 May. She endorses subjective fevers, denies nausea, vomiting, hematuria, states she recently had diarrheal illness. She endorses using tramadol for musculoskeletal pain, offering some relief of her pelvic pain. Physical exam is remarkable for morbidly obese elderly female in no acute distress noted to be hypertensive, afebrile without tachycardia, satting well on room air. She has a soft nontender abdomen. Differential includes constipation, diverticular disease, UTI. Plan to obtain CMP, CBC, UA, lipase, noncontrast CT of the abdomen and pelvis. We will reassess, and make a disposition. Procedures    9:50 PM  Patient d/w Dr. Gabbi Jacobs (Urology) who will come to bedside to see the patient. Perfect Serve Consult for Admission  10:46 PM    ED Room Number: ER06/06  Patient Name and age:  Divine Mueller 65 y.o.  female  Working Diagnosis:   1.  Other hydronephrosis        COVID-19 Suspicion:  no  Sepsis present:  no  Reassessment needed: no  Code Status:  Full Code  Readmission: no  Isolation Requirements:  no  Recommended Level of Care:  med/surg  Department:[facility] Adult ED - 21   Other:  D/w Dr. Jessie Freire

## 2022-05-02 NOTE — PROGRESS NOTES
Occupational Therapy    Orders received, chart reviewed and patient evaluated by occupational therapy. Pending progression with skilled acute occupational therapy, recommend:  Therapy 3 hours per day 5-7 days per week. Recommend with nursing ADLs with supervision/setup, OOB to chair 3x/day and toileting via bedpan . Recommend eating meals at EOB with close supervision for supine <> sit if need for assistance with bed mobility. Thank you for completing as able in order to maintain patient strength, endurance and independence. Full evaluation to follow.                Tracy Mcallister, OT

## 2022-05-02 NOTE — PROGRESS NOTES
Orders received, chart reviewed and patient evaluated by physical therapy. Pending progression with skilled acute physical therapy, recommend:  Therapy 3 hours per day 5-7 days per week    Recommend with nursing sitting EOB for meals and as tolerated with  Stand by assist. Thank you for completing as able in order to maintain patient strength, endurance and independence. Full evaluation to follow.

## 2022-05-02 NOTE — H&P
PLEASE NOTE: I HAVE GENERATED THIS NOTE WITH THE ASSISTANCE OF VOICE-RECOGNITION TECHNOLOGY. PLEASE EXCUSE ANY SPELLING, GRAMMATICAL, AND SYNTAX ERRORS YOU MAY FIND. IF YOU NEED CLARIFICATION ON ANYTHING, PLEASE FEEL FREE TO REACH OUT TO ME.  [address] [facility],[address] [facility] Adult  Hospitalist Group  History and Physical - Dr. Frandy Soto    Primary Care Provider: Mag Back MD  Date of Service:  See Date Note Was Originated    Chief Complaint: Abdominal pain    Subjective:     65 y.o. female presents with 1 week duration of gradual onset, gradual worsening, severe, constant, 7 out of 10, sharp, nonradiating, abdominal pain that is not associate with any other symptoms. Patient denies exacerbating features  and denies remitting features. Patient was found to have multiple kidney stones on imaging. Urology is on consult. Review of Systems:  12 point ROS obtained and otherwise negative, except as per HPI and above. Past Medical History:   Diagnosis Date    Chronic kidney disease     Chronic obstructive pulmonary disease (HCC)     Diabetes (HCC)     Hernia, hiatal     Hypertension     Incontinence of urine     Kidney stones     both kidneys    Morbid obesity ([address]     Osteoarthritis     Peripheral neuropathy     DOES GET SOME NUMBNESS IN HANDS    Psychiatric disorder     ANXIETY    Renal failure     left kidney    Thyroid disease       Past Surgical History:   Procedure Laterality Date    HX DILATION AND CURETTAGE  1990    HX HYSTERECTOMY      OVARIES NOT REMOVED     HX OTHER SURGICAL      right kidney stent     Prior to Admission medications    Medication Sig Start Date End Date Taking? Authorizing Provider   OXYBUTYNIN CHLORIDE PO Take 10 mg by mouth. Yes Other, MD Juan Luis   traMADoL (ULTRAM) 50 mg tablet Take  mg by mouth every eight (8) hours as needed for Pain.    Yes Other, MD Juan Luis   glipiZIDE (GLUCOTROL) 5 mg tablet Take 5 mg by mouth two (2) times a day.  Patient not taking: Reported on 8/3/2021    Provider, Usman   polyethylene glycol (MIRALAX) 17 gram packet Take 1 Packet by mouth daily. Patient not taking: Reported on 7/27/2021 3/30/21   Tala Brennan MD   methIMAzole (TAPAZOLE) 10 mg tablet Take  by mouth daily. Other, MD Juan Luis   iron/vitamin B complex (GERITOL PO) Take  by mouth. Patient not taking: Reported on 8/3/2021    Hakeem, MD Juan Luis   pantoprazole (PROTONIX) 40 mg tablet Take 40 mg by mouth daily. Patient not taking: Reported on 7/27/2021    Hakeem, MD Juan Luis   senna-docusate (Senokot-S) 8.6-50 mg per tablet Take 1 Tab by mouth daily. Patient not taking: Reported on 7/27/2021    Hakeem, MD Juan Luis   acetaminophen (TYLENOL) 500 mg tablet Take 500 mg by mouth every eight (8) hours as needed for Pain. Patient not taking: Reported on 8/3/2021    Hakeem, MD Juan Luis   ondansetron hcl (Zofran) 8 mg tablet Take 8 mg by mouth every eight (8) hours as needed for Nausea or Vomiting. Patient not taking: Reported on 5/2/2022    Hakeem, MD Juan Luis     Allergies   Allergen Reactions    Bacitracin Other (comments)     Syncope and lip swelling. Patient states used Neosporine to treat wound from hair dye and neosporine seeped into her bloodstream.       Family History   Problem Relation Age of Onset   Menon Cancer Mother     Diabetes Mother    Menon Diabetes Father     Heart Disease Father     Heart Attack Father     Diabetes Sister     Hypertension Sister     Diabetes Sister     Hypertension Sister     Diabetes Sister     Heart Disease Brother     Anesth Problems Neg Hx    . Social History     Socioeconomic History    Marital status: SINGLE   Tobacco Use    Smoking status: Never Smoker    Smokeless tobacco: Never Used   Vaping Use    Vaping Use: Never used   Substance and Sexual Activity    Alcohol use: Never    Drug use: Never   .     Objective:     Physical Exam:     VS as below    Const'l:          Morbidly obese body habitus, a&o, no acute distress  Head/Neck:       neck supple, no jvd, trachea midline, carotid midline, no cervical/head mass  Eyes:     nancie, nonicteric sclera, eom intact  ENT:      auditory acuity grossly intact, no nasal deformity  Cardio:           Regular rate regular rhythm, no murmurs/rubs/gallops, no carotid bruit, normal s1, s2  Pulm:     no accessory muscle use, equal normal breath sounds bilaterally, clear tab  Abd:       S NT ND; patient states that her pain is much much better  Normal bowel sounds x 4 quadrants, no palpable masses  Derm:     no rashes, no ulcers, no lesions  Extr:      No edema, no cyanosis, no calf tenderness, no varicosities  Neuro:    cn II-XII grossly intact, muscle strength intact, sensation intact  Psych:   mood intact, very pleasant, judgement intact    Data Review: All diagnostic labs and studies have been reviewed. .    Assessment:     Active Problems:    Hydronephrosis (5/1/2022)      DM (diabetes mellitus) ([address] (5/2/2022)    Nephrolithiasis  CKD    Plan:     Acute on chronic hydronephrosis  -Urology is on consult  -Fluid resuscitation    Nephrolithiasis  -Urology is on consult  -Pain control with Dilaudid, patient has severe CKD    Diabetes  -Start patient sign scale insulin  -Hold home oral medications    CKD  -No acute intervention      Code status was discussed with the patient.   Code status entered as per the orders  Signed By: Trina Leal DO

## 2022-05-02 NOTE — PROGRESS NOTES
Problem: Self Care Deficits Care Plan (Adult)  Goal: *Acute Goals and Plan of Care (Insert Text)  Description: FUNCTIONAL STATUS PRIOR TO ADMISSION: Patient was able to complete grooming, UB bathing, and UB dressing at EOB. She has not stood in one month but was able to briefly stand from her hosptal bed. She has been waiting for a wc to come for a couple months. [facility] and it had ended and she is eligible again and has asked them to wait until she returns home. She vaccums around her bed while sitting EOB then switching sides. HOME SUPPORT: The patient lived with sister who assists with toileting tasks at bed level 3x/day, LB bathing and IADL tasks. Occupational Therapy Goals  Initiated 5/2/2022  1. Patient will perform grooming and UB bathing/dressing routine sitting EOB with supervision/set-up within 7 day(s). 2.  Patient will perform sit <> stand with maximal assistance to prepare for toileting transfers and LB care tasks within 7 day(s). 3.  Patient will perform lower body dressing with maximal assistance within 7 day(s). 4.  Patient will perform lateral transfer with toilet transfers to drop arm BSC with maximal assistance within 7 day(s). 5.  Patient will perform all aspects of toileting with maximal assistance within 7 day(s). 6.  Patient will participate in upper extremity therapeutic exercise/activities with supervision/set-up for 5 minutes within 7 day(s).       Outcome: Progressing Towards Goal   OCCUPATIONAL THERAPY EVALUATION  Patient: Cecy Schulte (1 y.o. female)  Date: 5/2/2022  Primary Diagnosis: Hydronephrosis [N13.30]  Procedure(s) (LRB):  CYSTOSCOPY BILATERAL STENT PLACEMENT (Bilateral)     Precautions: Fall, skin       ASSESSMENT  Based on the objective data described below, the patient presents with impaired activity tolerance, mobility, large L hernia, and BLE strength impacting her ability to complete ADL tasks s/p admission for abdominal pain with hydronephrosis. Nursing cleared for therapy. Patient highly motivated to participate and has been attempting to remain as independent as possible while mostly bedbound as she awaits wc. She had been limited with R calf pain with attempts to stand and limited physical assistance to help her. She was received participating in oral care in semi orantes position. Supine > sit with SBA and additional time with bed rails. Good static sitting balance and completed BLE \"warm up\" exercises prior to attempts to stand. Sit <> stand with max Ax2, unable to progress to clear bed with multiple attempts. Returned to supine with mod A secondary to patient at far EOB following attempts to stand. Current Level of Function Impacting Discharge (ADLs/self-care): grooming supine set up, LB care total A, toileting total A in supine    Functional Outcome Measure: The patient scored 30/100 on the Barthel index outcome measure which is indicative of severe impairment with ADL tasks. Other factors to consider for discharge: Patient was living at home with her sister. She has been limited to bed secondary to not having a wc. She is highly motivated to particpate in therapy and return to cane level mobility with ADL tasks. Patient will benefit from skilled therapy intervention to address the above noted impairments. PLAN :  Recommendations and Planned Interventions: self care training, functional mobility training, therapeutic exercise, balance training, therapeutic activities, endurance activities, patient education, home safety training, and family training/education    Frequency/Duration: Patient will be followed by occupational therapy 5 times a week to address goals.     Recommendation for discharge: (in order for the patient to meet his/her long term goals)  Therapy 3 hours per day 5-7 days per week    This discharge recommendation:  Has been made in collaboration with the attending provider and/or case management    IF patient discharges home will need the following DME: wheelchair, sliding/transfer board, and new mattress for hospital bed, drop arm BSC       SUBJECTIVE:   Patient stated I sit on the side of the bed and vacuum then I roll over and get on the other side and vacuum.     OBJECTIVE DATA SUMMARY:   HISTORY:   Past Medical History:   Diagnosis Date    Chronic kidney disease     Chronic obstructive pulmonary disease (HCC)     Diabetes ([address]     Hernia, hiatal     Hypertension     Incontinence of urine     Kidney stones     both kidneys    Morbid obesity (HCC)     Osteoarthritis     Peripheral neuropathy     DOES GET SOME NUMBNESS IN HANDS    Psychiatric disorder     ANXIETY    Renal failure     left kidney    Thyroid disease      Past Surgical History:   Procedure Laterality Date    HX DILATION AND CURETTAGE  1990    HX HYSTERECTOMY      OVARIES NOT REMOVED     HX OTHER SURGICAL      right kidney stent       Expanded or extensive additional review of patient history:     Home Situation  Home Environment: Private residence  One/Two Story Residence: One story  Living Alone: No  Support Systems: Other Family Member(s)  Patient Expects to be Discharged to[de-identified] Rehab hospital/unit acute  Current DME Used/Available at Home: Walker    Hand dominance: Right    EXAMINATION OF PERFORMANCE DEFICITS:  Cognitive/Behavioral Status:  Neurologic State: Alert; Appropriate for age  Orientation Level: Oriented X4              Hearing: Auditory  Auditory Impairment: None    Vision/Perceptual:                                     Range of Motion:  AROM: Generally decreased, functional                         Strength:  Strength: Generally decreased, functional                Coordination:  Coordination: Within functional limits  Fine Motor Skills-Upper: Left Intact; Right Intact    Gross Motor Skills-Upper: Left Intact; Right Intact    Tone & Sensation:  Tone: Normal  Sensation: Impaired- B UE and BLE        Balance:  Sitting: Intact    Functional Mobility and Transfers for ADLs:  Bed Mobility:  Supine to Sit: Additional time;Stand-by assistance  Sit to Supine: Additional time; Moderate assistance  Scooting: Additional time    Transfers:  Sit to Stand:  (unable to get to full stand)    ADL Assessment:  Feeding: Independent  Oral Facial Hygiene/Grooming: Setup  Bathing: Moderate assistance  Upper Body Dressing: Supervision  Lower Body Dressing: Maximum assistance  Toileting: Total assistance (bed level)         ADL Intervention and task modifications:  Patient instructed and indicated understanding the benefits of maintaining activity tolerance, functional mobility, and independence with self care tasks during acute stay  to ensure safe return home and to baseline. Encouraged patient to increase frequency and duration OOB, be out of bed for all meals, perform daily ADLs (as approved by RN/MD regarding bathing etc), and performing functional mobility to/from bathroom. Provided education with patient on fall prevention for hospital and at home. This includes not getting OOB/chair/toilet without staff assistance, good lighting, good footwear, and recommended AD use. Patient with good understanding. Grooming  Brushing Teeth: Set-up (semi orantes)    Lower Body Dressing Assistance  Socks: Total assistance (dependent)     Functional Measure:    Barthel Index:  Bathin  Bladder: 5  Bowels: 5  Groomin  Dressin  Feedin  Mobility: 0  Stairs: 0  Toilet Use: 0  Transfer (Bed to Chair and Back): 5  Total: 30/100      The Barthel ADL Index: Guidelines  1. The index should be used as a record of what a patient does, not as a record of what a patient could do. 2. The main aim is to establish degree of independence from any help, physical or verbal, however minor and for whatever reason. 3. The need for supervision renders the patient not independent.   4. A patient's performance should be established using the best available evidence. Asking the patient, friends/relatives and nurses are the usual sources, but direct observation and common sense are also important. However direct testing is not needed. 5. Usually the patient's performance over the preceding 24-48 hours is important, but occasionally longer periods will be relevant. 6. Middle categories imply that the patient supplies over 50 per cent of the effort. 7. Use of aids to be independent is allowed. Score Interpretation (from [address] [facility])    Independent   60-79 Minimally independent   40-59 Partially dependent   20-39 Very dependent   <20 Totally dependent     -Maryuri Castro., Barthel, D.W. (1965). Functional evaluation: the Barthel Index. [address] ([address] (1997). The Barthel activities of daily living index: self-reporting versus actual performance in the old (> or = 75 years). Journal of [address] [place] [address] 45(7), [facility], ASHLEY, Víctor Ibanez., Viki Lam. (1999). Measuring the change in disability after inpatient rehabilitation; comparison of the responsiveness of the Barthel Index and Functional [place] Measure. Journal of Neurology, Neurosurgery, and Psychiatry, 66(4), 469-900. Stephanie Downs, N.J.A, ANDREA Gonzalez, & Payton No, MMesfinA. (2004) Assessment of post-stroke quality of life in cost-effectiveness studies: The usefulness of the Barthel Index and the EuroQoL-5D.  Quality of Life Research, 15, 351-17         Occupational Therapy Evaluation Charge Determination   History Examination Decision-Making   LOW Complexity : Brief history review  LOW Complexity : 1-3 performance deficits relating to physical, cognitive , or psychosocial skils that result in activity limitations and / or participation restrictions  LOW Complexity : No comorbidities that affect functional and no verbal or physical assistance needed to complete eval tasks       Based on the above components, the patient evaluation is determined to be of the following complexity level: LOW   Activity Tolerance:   Fair    After treatment patient left in no apparent distress:    Supine in bed and Call bell within reach    COMMUNICATION/EDUCATION:   The patients plan of care was discussed with: Physical therapist, Registered nurse, and Case management. Home safety education was provided and the patient/caregiver indicated understanding. and Patient/family agree to work toward stated goals and plan of care. This patients plan of care is appropriate for delegation to [facility].     Thank you for this referral.  Harry Barnes OT  Time Calculation: 38 mins

## 2022-05-02 NOTE — WOUND CARE
WOCN Note:     New consult for gluteal fold, belly button, left 4th toe    Chart shows:  Admitted for abdominal pain and kidney stones  Admitted on 5/1/22  History of DM    Assessment:   A&O x 4 and reports no pain. Able to turn independently onto right side. Pure Wick in use. Surface: pedro gel mattress    Bilateral heels intact and without erythema. Buttocks and sacrum intact without erythema    1. POA partial thickness erosion inside deep navel - she reports aggressive cleaning  Visible base is red  Scant serosanguinous exudate  Dusted with stoma powder     2. POA scar tissue to bilateral ischial's with pinpoint partial thickness to left side  Applied barrier cream    3. POA left 4th toe dry crust  No edema, purulence, or erythema  Left open to air.      Wound Recommendations:    Navel: dust with stoma powder daily and as needed for comfort - powder left in room    Transition of Care: Plan to follow weekly and as needed while admitted to hospital.     HOLLY GundersonN, RN, [employer]  Certified Wound, Ostomy, Continence Nurse  office [phone]  Available via [address] [place] [address]

## 2022-05-02 NOTE — ROUTINE PROCESS
Has a final transfer review been performed? Yes    Reason for Admission: Multiple kidney stones blocking nephro-stents, placement of nephrostomy tubes  Patient comes from: Home  Mental Status: Alert and oriented  ADL:partial assistance  Ambulation:household ambulator only  Pertinent Info/Safety Concerns: Multiple wounds, bleeding umbilicus with undetermined depth, sacral pressure ulcers, uses purewic to void, incontinent of bowel and bladder. Frequently moist.  COVID Status: Negative  MEWS Score: 1  Vitals:    05/01/22 1940   BP: (!) 150/68   Pulse: 97   Resp: 18   Temp: 98.3 °F (36.8 °C)   SpO2: 97%   Weight: 158.8 kg (350 lb)     Transport mode:ED stretcher    TRANSFER - OUT REPORT:    Report given to attempted to call report, floor refused and requested electronic report instead (name) on Johnathan Billingsley  being transferred to [facility](unit) for routine progression of care       Report consisted of patient's Situation, Background, Assessment and   Recommendations(SBAR). Information from the following report(s) ED Summary, MAR, Alarm Parameters  and Quality Measures was reviewed with the receiving nurse. Lines:   Peripheral IV 05/01/22 Right Forearm (Active)   Site Assessment Clean, dry, & intact 05/01/22 2025   Phlebitis Assessment 0 05/01/22 2025   Infiltration Assessment 0 05/01/22 2025   Dressing Status Clean, dry, & intact 05/01/22 2025   Dressing Type Transparent 05/01/22 2025   Hub Color/Line Status Blue 05/01/22 2025        Opportunity for questions and clarification was provided.

## 2022-05-02 NOTE — PROGRESS NOTES
JOESPH:  RUR: 12%    Disposition:  [facility] (per therapy recommendation)    Barrier to d/c:  Patient has managed care medicaid ([address]). This limits rehab options to [facility] or HH. Insurance [facility] is required. Chart reviewed. Patient admitted here on 5/1/22 from home via EMS with complaints of abd pain and diarrhea x 1 week. The patient has a past medical hx of Diabetes Mellitus and Hydronephrosis. The patient lives (temporarily) with her sister Kay Kidd) in [facility] (1 story home) at  [address] [place] [address]. [facility]. The patient has the following DME: Hospital Bed, [facility], [address] [facility] [address] and [facility]. Emergency Contact:  Smith Finders [phone]    The patient had a prior hospitalization here 3/18/21 to 3/30/21 for  Hematuria. [facility] ([facility]) had accepted patient , however the patient's insurance denied the referral. From that hospital stay, the patient was d/c'd home (to [facility]) with home health therapies via [facility]. CM met with the patient to introduce self and role. Demographics verified. The patient had a televisit with her PCP Felicita Polk MD) sometime in Feb or March 2022. The patient uses [facility] [address] ([address]). The patient is interested in rehab and gives choice of [facility], [facility] and [facility]. Dr Peggy Harp is in agreement with therapy recommendation for [facility]. Referrals being sent to [facility], [facility], and [facility]. Care Management Interventions  PCP Verified by CM:  Yes  Last Visit to PCP: 03/04/22  Mode of Transport at Discharge: S  Transition of Care Consult (CM Consult): [address]: Yes  Discharge Durable Medical Equipment: No  Physical Therapy Consult: Yes  Occupational Therapy Consult: Yes  Speech Therapy Consult: No  Support Systems: Other Family Member(s)  The Patient and/or Patient Representative was Provided with a Choice of Provider and Agrees with the Discharge Plan?: Yes  Freedom of Choice List was Provided with Basic Dialogue that Supports the Patient's Individualized Plan of Care/Goals, Treatment Preferences and Shares the Quality Data Associated with the Providers?: Yes  Discharge Location  Patient Expects to be Discharged to[de-identified] Rehab hospital/unit acute      continuing to follow.     Asim Shetty, [address], [address] [place] [address]

## 2022-05-02 NOTE — PROGRESS NOTES
Problem: Mobility Impaired (Adult and Pediatric)  Goal: *Acute Goals and Plan of Care (Insert Text)  Description: FUNCTIONAL STATUS PRIOR TO ADMISSION: overall bed bound; gets self up to EOB and back. Had been getting to standing at bedside up until one month ago to stand briefly. Bathes self    HOME SUPPORT PRIOR TO ADMISSION: currently living with sister who assists as needed with baths; provides meals    Physical Therapy Goals  Initiated 5/2/2022  1. Patient will move from supine to sit and sit to supine  and roll side to side in bed with modified independence within 7 day(s). 2.  Patient will transfer from bed to chair and chair to bed with maximal assistance ,lateral transfer, using the least restrictive device within 7 day(s). 3.  Patient will perform sit to stand with maximal assistance within 7 day(s). Outcome: Progressing Towards Goal   PHYSICAL THERAPY EVALUATION  Patient: Paige Rivas (73 y.o. female)  Date: 5/2/2022  Primary Diagnosis: Hydronephrosis [N13.30]  Procedure(s) (LRB):  CYSTOSCOPY BILATERAL STENT PLACEMENT (Bilateral)     Precautions:       ASSESSMENT  Based on the objective data described below, the patient presents with significantly impaired upright mobility. Per patient she was receiving home health PT up until October 2021 and has continued with exercises and working on sit to stand. She is bed bound and was to start home health again. Reports that with standing, severe pain in right LE calf region limiting progress and hoping to see specialist for testing, injection, something to help with the pain. She is very motivated to get back to independent living and has support systems of family. At this time limited by pain, decreased strength, large hernia on left side;. Current Level of Function Impacting Discharge (mobility/balance): supervision up to  mod assist for bed mobility; unable to get to standing    Functional Outcome Measure:   The patient scored 0 on the Timed Up and Go outcome measure which is indicative of fall risk. Other factors to consider for discharge: morbid obesitiy     Patient will benefit from skilled therapy intervention to address the above noted impairments. PLAN :  Recommendations and Planned Interventions: bed mobility training, transfer training, therapeutic exercises, patient and family training/education, and therapeutic activities      Frequency/Duration: Patient will be followed by physical therapy:  5 times a week to address goals. Recommendation for discharge: (in order for the patient to meet his/her long term goals)  Therapy 3 hours per day 5-7 days per week    This discharge recommendation:  Has been made in collaboration with the attending provider and/or case management    IF patient discharges home will need the following DME: wheelchair and to be determined (TBD)         SUBJECTIVE:   Patient stated I really can do this.  regarding getting to stand    OBJECTIVE DATA SUMMARY:   HISTORY:    Past Medical History:   Diagnosis Date    Chronic kidney disease     Chronic obstructive pulmonary disease (HCC)     Di[address] [address]     Hernia, hiatal     Hypertension     Incontinence of urine     Kidney stones     both kidneys    Morbid obesity (HCC)     Osteoarthritis     Peripheral neuropathy     DOES GET SOME NUMBNESS IN HANDS    Psychiatric disorder     ANXIETY    Renal failure     left kidney    Thyroid disease      Past Surgical History:   Procedure Laterality Date    HX DILATION AND CURETTAGE  1990    HX HYSTERECTOMY      OVARIES NOT REMOVED     HX OTHER SURGICAL      right kidney stent       Personal factors and/or comorbidities impacting plan of care:     Home Situation  Home Environment: Private residence  One/Two Story Residence: One story  Living Alone: No  Support Systems: Other Family Member(s)  Patient Expects to be Discharged to[de-identified] Rehab hospital/unit acute  Current DME Used/Available at Home: Walker    EXAMINATION/PRESENTATION/DECISION MAKING:   Critical Behavior:      Alert; appropriate        Hearing: Auditory  Auditory Impairment: None  Range Of Motion:  AROM: Generally decreased, functional         Strength:    Strength: Generally decreased, functional                    Tone & Sensation:   Tone: Normal              Sensation: Impaired               Coordination:  Coordination: Within functional limits  Vision:      Functional Mobility:  Bed Mobility:     Supine to Sit: Additional time;Stand-by assistance  Sit to Supine: Additional time; Moderate assistance  Scooting: Additional time  Transfers:  Sit to Stand:  (unable to get to full stand)                          Balance:   Sitting: Intact    Therapeutic Exercises:   Demonstrated knee extension, heel lifts, toe lifts    Functional Measure:  Timed up and go:    Timed Get Up And Go Test: 0 (unable to get to standing)       < than 10 seconds=Normal  Greater then 13.5 seconds (in elderly)=Increased fall risk   [place]. Predicting the probability for falls in community dwelling older adults using the Timed Up and Go Test. Phys Ther. 2000;80:896-903. Physical Therapy Evaluation Charge Determination   History Examination Presentation Decision-Making   HIGH Complexity :3+ comorbidities / personal factors will impact the outcome/ POC  LOW Complexity : 1-2 Standardized tests and measures addressing body structure, function, activity limitation and / or participation in recreation  LOW Complexity : Stable, uncomplicated        Based on the above components, the patient evaluation is determined to be of the following complexity level: LOW     Activity Tolerance:   Good    After treatment patient left in no apparent distress:   Supine in bed and Side rails x 3    COMMUNICATION/EDUCATION:   The patients plan of care was discussed with: Occupational therapist and Case management.      Fall prevention education was provided and the patient/caregiver indicated understanding., Patient/family have participated as able in goal setting and plan of care. , and Patient/family agree to work toward stated goals and plan of care.     Thank you for this referral.  Franklin Johnson, PT   Time Calculation: 31 mins

## 2022-05-03 LAB
ALBUMIN SERPL-MCNC: 2.8 G/DL (ref 3.5–5)
ALBUMIN/GLOB SERPL: 0.7 {RATIO} (ref 1.1–2.2)
ALP SERPL-CCNC: 83 U/L (ref 45–117)
ALT SERPL-CCNC: 10 U/L (ref 12–78)
ANION GAP SERPL CALC-SCNC: 6 MMOL/L (ref 5–15)
ANION GAP SERPL CALC-SCNC: NORMAL MMOL/L (ref 5–15)
AST SERPL-CCNC: 4 U/L (ref 15–37)
BASOPHILS # BLD: 0.1 K/UL (ref 0–0.1)
BASOPHILS NFR BLD: 0 % (ref 0–1)
BILIRUB SERPL-MCNC: 0.2 MG/DL (ref 0.2–1)
BUN SERPL-MCNC: 41 MG/DL (ref 6–20)
BUN SERPL-MCNC: NORMAL MG/DL (ref 6–20)
BUN/CREAT SERPL: 14 (ref 12–20)
BUN/CREAT SERPL: NORMAL (ref 12–20)
CALCIUM SERPL-MCNC: 8.2 MG/DL (ref 8.5–10.1)
CALCIUM SERPL-MCNC: NORMAL MG/DL (ref 8.5–10.1)
CHLORIDE SERPL-SCNC: 114 MMOL/L (ref 97–108)
CHLORIDE SERPL-SCNC: NORMAL MMOL/L (ref 97–108)
CO2 SERPL-SCNC: 25 MMOL/L (ref 21–32)
CO2 SERPL-SCNC: NORMAL MMOL/L (ref 21–32)
CREAT SERPL-MCNC: 2.93 MG/DL (ref 0.55–1.02)
CREAT SERPL-MCNC: NORMAL MG/DL (ref 0.55–1.02)
DIFFERENTIAL METHOD BLD: ABNORMAL
EOSINOPHIL # BLD: 0.3 K/UL (ref 0–0.4)
EOSINOPHIL NFR BLD: 3 % (ref 0–7)
ERYTHROCYTE [DISTWIDTH] IN BLOOD BY AUTOMATED COUNT: 15.3 % (ref 11.5–14.5)
GLOBULIN SER CALC-MCNC: 3.9 G/DL (ref 2–4)
GLUCOSE BLD STRIP.AUTO-MCNC: 116 MG/DL (ref 65–117)
GLUCOSE BLD STRIP.AUTO-MCNC: 134 MG/DL (ref 65–117)
GLUCOSE BLD STRIP.AUTO-MCNC: 162 MG/DL (ref 65–117)
GLUCOSE BLD STRIP.AUTO-MCNC: 194 MG/DL (ref 65–117)
GLUCOSE SERPL-MCNC: 148 MG/DL (ref 65–100)
GLUCOSE SERPL-MCNC: NORMAL MG/DL (ref 65–100)
HCT VFR BLD AUTO: 32.1 % (ref 35–47)
HGB BLD-MCNC: 9.3 G/DL (ref 11.5–16)
IMM GRANULOCYTES # BLD AUTO: 0.1 K/UL (ref 0–0.04)
IMM GRANULOCYTES NFR BLD AUTO: 1 % (ref 0–0.5)
LYMPHOCYTES # BLD: 1.4 K/UL (ref 0.8–3.5)
LYMPHOCYTES NFR BLD: 12 % (ref 12–49)
MCH RBC QN AUTO: 27 PG (ref 26–34)
MCHC RBC AUTO-ENTMCNC: 29 G/DL (ref 30–36.5)
MCV RBC AUTO: 93.3 FL (ref 80–99)
MONOCYTES # BLD: 0.7 K/UL (ref 0–1)
MONOCYTES NFR BLD: 6 % (ref 5–13)
NEUTS SEG # BLD: 9 K/UL (ref 1.8–8)
NEUTS SEG NFR BLD: 78 % (ref 32–75)
NRBC # BLD: 0 K/UL (ref 0–0.01)
NRBC BLD-RTO: 0 PER 100 WBC
PLATELET # BLD AUTO: 286 K/UL (ref 150–400)
PMV BLD AUTO: 10 FL (ref 8.9–12.9)
POTASSIUM SERPL-SCNC: 4.8 MMOL/L (ref 3.5–5.1)
POTASSIUM SERPL-SCNC: NORMAL MMOL/L (ref 3.5–5.1)
PROT SERPL-MCNC: 6.7 G/DL (ref 6.4–8.2)
RBC # BLD AUTO: 3.44 M/UL (ref 3.8–5.2)
SERVICE CMNT-IMP: ABNORMAL
SERVICE CMNT-IMP: NORMAL
SODIUM SERPL-SCNC: 145 MMOL/L (ref 136–145)
SODIUM SERPL-SCNC: NORMAL MMOL/L (ref 136–145)
WBC # BLD AUTO: 11.5 K/UL (ref 3.6–11)

## 2022-05-03 PROCEDURE — 36415 COLL VENOUS BLD VENIPUNCTURE: CPT

## 2022-05-03 PROCEDURE — 74011250637 HC RX REV CODE- 250/637: Performed by: STUDENT IN AN ORGANIZED HEALTH CARE EDUCATION/TRAINING PROGRAM

## 2022-05-03 PROCEDURE — 74011000250 HC RX REV CODE- 250: Performed by: NURSE PRACTITIONER

## 2022-05-03 PROCEDURE — 74011250636 HC RX REV CODE- 250/636: Performed by: STUDENT IN AN ORGANIZED HEALTH CARE EDUCATION/TRAINING PROGRAM

## 2022-05-03 PROCEDURE — 82962 GLUCOSE BLOOD TEST: CPT

## 2022-05-03 PROCEDURE — 74011636637 HC RX REV CODE- 636/637: Performed by: INTERNAL MEDICINE

## 2022-05-03 PROCEDURE — 80053 COMPREHEN METABOLIC PANEL: CPT

## 2022-05-03 PROCEDURE — 74011250637 HC RX REV CODE- 250/637: Performed by: NURSE PRACTITIONER

## 2022-05-03 PROCEDURE — 74011000250 HC RX REV CODE- 250: Performed by: STUDENT IN AN ORGANIZED HEALTH CARE EDUCATION/TRAINING PROGRAM

## 2022-05-03 PROCEDURE — 97530 THERAPEUTIC ACTIVITIES: CPT

## 2022-05-03 PROCEDURE — C9113 INJ PANTOPRAZOLE SODIUM, VIA: HCPCS | Performed by: STUDENT IN AN ORGANIZED HEALTH CARE EDUCATION/TRAINING PROGRAM

## 2022-05-03 PROCEDURE — 65270000032 HC RM SEMIPRIVATE

## 2022-05-03 PROCEDURE — 85025 COMPLETE CBC W/AUTO DIFF WBC: CPT

## 2022-05-03 PROCEDURE — 74011250636 HC RX REV CODE- 250/636: Performed by: NURSE PRACTITIONER

## 2022-05-03 RX ORDER — TRAMADOL HYDROCHLORIDE 50 MG/1
50 TABLET ORAL
Status: DISCONTINUED | OUTPATIENT
Start: 2022-05-03 | End: 2022-05-11 | Stop reason: HOSPADM

## 2022-05-03 RX ADMIN — OXYBUTYNIN CHLORIDE 10 MG: 5 TABLET, EXTENDED RELEASE ORAL at 09:40

## 2022-05-03 RX ADMIN — TRAMADOL HYDROCHLORIDE 50 MG: 50 TABLET, COATED ORAL at 21:51

## 2022-05-03 RX ADMIN — Medication 2 UNITS: at 12:31

## 2022-05-03 RX ADMIN — TRAMADOL HYDROCHLORIDE 50 MG: 50 TABLET, COATED ORAL at 09:42

## 2022-05-03 RX ADMIN — CEFTRIAXONE SODIUM 1 G: 1 INJECTION, POWDER, FOR SOLUTION INTRAMUSCULAR; INTRAVENOUS at 12:32

## 2022-05-03 RX ADMIN — METHIMAZOLE 10 MG: 5 TABLET ORAL at 09:40

## 2022-05-03 RX ADMIN — SODIUM CHLORIDE 40 MG: 9 INJECTION, SOLUTION INTRAMUSCULAR; INTRAVENOUS; SUBCUTANEOUS at 09:40

## 2022-05-03 NOTE — PROGRESS NOTES
Problem: Mobility Impaired (Adult and Pediatric)  Goal: *Acute Goals and Plan of Care (Insert Text)  Description: FUNCTIONAL STATUS PRIOR TO ADMISSION: overall bed bound; gets self up to EOB and back. Had been getting to standing at bedside up until one month ago to stand briefly. Bathes self    HOME SUPPORT PRIOR TO ADMISSION: currently living with sister who assists as needed with baths; provides meals    Physical Therapy Goals  Initiated 5/2/2022  1. Patient will move from supine to sit and sit to supine  and roll side to side in bed with modified independence within 7 day(s). 2.  Patient will transfer from bed to chair and chair to bed with maximal assistance ,lateral transfer, using the least restrictive device within 7 day(s). 3.  Patient will perform sit to stand with maximal assistance within 7 day(s). Outcome: Progressing Towards Goal   PHYSICAL THERAPY TREATMENT  Patient: Janee Concepcion (4 y.o. female)  Date: 5/3/2022  Diagnosis: Hydronephrosis [N13.30] <principal problem not specified>  Procedure(s) (LRB):  CYSTOSCOPY BILATERAL STENT PLACEMENT (Bilateral)    Precautions:    Chart, physical therapy assessment, plan of care and goals were reviewed. ASSESSMENT  Patient continues with skilled PT services and is progressing towards goals. Patient received sitting EOB and most agreeable to working with therapy. Remains limited by weakness, limited standing, morbid obesity. Patient is very motivated to progress mobility to return to independent functioning. Plan if for surgery tomorrow so will likely follow up Thursday. Feel she would benefit from consult to address RLE pain, which appears to be nerve pain. Able to  get to semi stand with use of chair in front of patient and blocked by wall. Patient pulls up on arms of chair and got to stand with full forward flexion position on first stand. Second trial able to push up through UE and got closer to full stand.   Did report tightening/hardness in right calf upon weightbearing. .     Current Level of Function Impacting Discharge (mobility/balance): mod assist x 2 to semi stand    Other factors to consider for discharge:          PLAN :  Patient continues to benefit from skilled intervention to address the above impairments. Continue treatment per established plan of care. to address goals. Recommendation for discharge: (in order for the patient to meet his/her long term goals)  Therapy 3 hours per day 5-7 days per week    This discharge recommendation:  Has been made in collaboration with the attending provider and/or case management    IF patient discharges home will need the following DME: to be determined (TBD) wheelchair? SUBJECTIVE:   Patient stated I am so glad you came so I could show you I can stand.     OBJECTIVE DATA SUMMARY:   Critical Behavior:  Neurologic State: Alert  Orientation Level: Oriented X4        Functional Mobility Training:  Bed Mobility:        Sit to Supine: Stand-by assistance  Scooting: Additional time        Transfers:  Sit to Stand: Moderate assistance;Assist x2                                Balance:  Sitting: Intact  Standing: Impaired; With support  Standing - Static: Constant support; Fair    Activity Tolerance:   Good    After treatment patient left in no apparent distress:   Supine in bed, Call bell within reach, and Side rails x 3    COMMUNICATION/COLLABORATION:   The patients plan of care was discussed with: Registered nurse.      Rich Guajardo, PT   Time Calculation: 24 mins

## 2022-05-03 NOTE — PROGRESS NOTES
[facility] Adult  Hospitalist Group                                                                                          Hospitalist Progress Note  Rohan Crawley MD  Answering service: [phone] OR [phone] from in house phone        Date of Service:  5/3/2022  NAME:  Elisa Bass  :  1960  MRN:  [id number]      Admission Summary:   Copied form admit: \" This is a recurrent problem. The current episode started more than 1 week ago. The problem occurs hourly. The problem has been gradually worsening. The quality of the pain is sharp. The pain is moderate. Associated symptoms include diarrhea. Pertinent negatives include no anorexia, no fever, no belching, no flatus, no hematochezia, no melena, no nausea, no vomiting, no constipation, no dysuria, no frequency, no hematuria, no headaches, no arthralgias, no myalgias, no trauma, no chest pain and no back pain. The pain is worsened by certain positions. The pain is relieved by nothing. \"    Interval history / Subjective:     5/3/2022 :   · Over all feels well except for weakness of legs. Deconditioning, pain of acute issue controled  · For stents to ureters   · Else:        Assessment & Plan: Active Problems:    Hydronephrosis (2022)  - for stenting or prcts-         DM (diabetes mellitus) ([address] (2022)  - SSI .  Broad coverage as indicated  Lab Results   Component Value Date/Time    Glucose 148 (H) 2022 04:19 AM    Glucose (POC) 194 (H) 2022 11:11 AM         Nephrolithiasis  CKD  Lab Results   Component Value Date/Time    Creatinine 2.93 (H) 2022 04:19 AM    cont to closely  Monitor ( was 2.3 in  )       Code status: full   DVT ppx : [facility] Problems  Date Reviewed: 2022          Codes Class Noted POA    DM (diabetes mellitus) ([facility] [address] [facility] ICD-10-CM: E11.9  ICD-9-CM: 250.00  2022 Unknown        Hydronephrosis ICD-10-CM: N13.30  ICD-9-CM: 116  2022 Unknown                  After personally interviewing pt at bedside the following is noted on 5/3/2022 :    Review of Systems   Respiratory: Negative for cough, shortness of breath and wheezing. Cardiovascular: Negative for chest pain. Gastrointestinal: Negative for abdominal pain, nausea and vomiting. Genitourinary: Negative for dysuria and urgency. All other systems reviewed and are negative. I had a face to face encounter with patient on 5/3/2022 at bedside for the following physical exam:     PHYSICAL EXAM:    Visit Vitals  /74 (BP 1 Location: Right arm, BP Patient Position: At rest)   Pulse 76   Temp 98.5 °F (36.9 °C)   Resp 18   Wt 158.8 kg (350 lb)   SpO2 96%   BMI 60.08 kg/m²          Physical Exam  Constitutional:       General: She is not in acute distress. Appearance: She is obese. She is not ill-appearing, toxic-appearing or diaphoretic. HENT:      Head: Normocephalic and atraumatic. Nose: Nose normal.      Mouth/Throat:      Mouth: Mucous membranes are dry. Pharynx: Oropharynx is clear. Eyes:      Extraocular Movements: Extraocular movements intact. Conjunctiva/sclera: Conjunctivae normal.      Pupils: Pupils are equal, round, and reactive to light. Cardiovascular:      Rate and Rhythm: Normal rate and regular rhythm. Pulmonary:      Effort: Pulmonary effort is normal.      Breath sounds: Normal breath sounds. Abdominal:      General: Abdomen is flat. Bowel sounds are normal. There is no distension. Palpations: Abdomen is soft. Tenderness: There is no abdominal tenderness. Musculoskeletal:         General: No swelling or deformity. Cervical back: Normal range of motion and neck supple. Skin:     General: Skin is warm and dry. Neurological:      Mental Status: She is oriented to person, place, and time. Mental status is at baseline.    Psychiatric:         Mood and Affect: Mood normal.         Behavior: Behavior normal.                     Data Review:    Review and/or order of clinical lab test      Labs:     Recent Labs     05/03/22  0419 05/02/22  0254   WBC 11.5* 14.5*   HGB 9.3* 10.1*   HCT 32.1* 34.7*    225     Recent Labs     05/03/22  0419 05/03/22  0105 05/02/22  0311    PLEASE DISREGARD RESULTS 145   K 4.8 PLEASE DISREGARD RESULTS 4.9   * PLEASE DISREGARD RESULTS 116*   CO2 25 PLEASE DISREGARD RESULTS 23   BUN 41* PLEASE DISREGARD RESULTS 40*   CREA 2.93* PLEASE DISREGARD RESULTS 2.84*   * PLEASE DISREGARD RESULTS 135*   CA 8.2* PLEASE DISREGARD RESULTS 8.4*     Recent Labs     05/03/22 0419 05/01/22 2022   ALT 10* 13   AP 83 102   TBILI 0.2 0.1*   TP 6.7 8.2   ALB 2.8* 3.2*   GLOB 3.9 5.0*   LPSE  --  148     No results for input(s): INR, PTP, APTT, INREXT, INREXT in the last 72 hours. No results for input(s): FE, TIBC, PSAT, FERR in the last 72 hours. No results found for: FOL, RBCF   No results for input(s): PH, PCO2, PO2 in the last 72 hours. No results for input(s): CPK, CKNDX, TROIQ in the last 72 hours.     No lab exists for component: CPKMB  No results found for: CHOL, CHOLX, CHLST, CHOLV, HDL, HDLP, LDL, LDLC, DLDLP, TGLX, TRIGL, TRIGP, CHHD, CHHDX  Lab Results   Component Value Date/Time    Glucose (POC) 194 (H) 05/03/2022 11:11 AM    Glucose (POC) 134 (H) 05/03/2022 06:45 AM    Glucose (POC) 122 (H) 05/02/2022 09:01 PM    Glucose (POC) 170 (H) 05/02/2022 04:20 PM    Glucose (POC) 122 (H) 05/02/2022 11:59 AM     Lab Results   Component Value Date/Time    Color YELLOW/STRAW 05/01/2022 11:22 PM    Appearance CLEAR 05/01/2022 11:22 PM    Specific gravity 1.015 05/01/2022 11:22 PM    Specific gravity 1.012 07/27/2021 12:04 PM    pH (UA) 8.5 (H) 05/01/2022 11:22 PM    Protein 100 (A) 05/01/2022 11:22 PM    Glucose Negative 05/01/2022 11:22 PM    Ketone Negative 05/01/2022 11:22 PM    Bilirubin Negative 05/01/2022 11:22 PM    Urobilinogen 0.2 05/01/2022 11:22 PM    Nitrites Positive (A) 05/01/2022 11:22 PM    Leukocyte Esterase MODERATE (A) 05/01/2022 11:22 PM    Epithelial cells FEW 05/01/2022 11:22 PM    Bacteria 4+ (A) 05/01/2022 11:22 PM    WBC  05/01/2022 11:22 PM    RBC 20-50 05/01/2022 11:22 PM         Medications Reviewed:     Current Facility-Administered Medications   Medication Dose Route Frequency    traMADoL (ULTRAM) tablet 50 mg  50 mg Oral Q6H PRN    pantoprazole (PROTONIX) 40 mg in 0.9% sodium chloride 10 mL injection  40 mg IntraVENous DAILY    HYDROmorphone (DILAUDID) injection 0.5 mg  0.5 mg IntraVENous Q6H PRN    oxybutynin chloride XL (DITROPAN XL) tablet 10 mg  10 mg Oral DAILY    methIMAzole (TAPAZOLE) tablet 10 mg  10 mg Oral DAILY    polyethylene glycol (MIRALAX) packet 17 g  17 g Oral DAILY PRN    insulin lispro (HUMALOG) injection   SubCUTAneous AC&HS    glucose chewable tablet 16 g  4 Tablet Oral PRN    glucagon (GLUCAGEN) injection 1 mg  1 mg IntraMUSCular PRN    dextrose 10 % infusion 0-250 mL  0-250 mL IntraVENous PRN    cefTRIAXone (ROCEPHIN) 1 g in 0.9% sodium chloride 10 mL IV syringe  1 g IntraVENous Q24H    lidocaine 4 % patch 1 Patch  1 Patch TransDERmal Q24H     ______________________________________________________________________  EXPECTED LENGTH OF STAY: 2d 21h  ACTUAL LENGTH OF STAY:          2                 Hung Varela MD

## 2022-05-03 NOTE — PROGRESS NOTES
Patient: Guy Obrien MRN: [id number]  SSN: [id number]    YOB: 1960  Age: 65 y.o. Sex: female        ADMITTED: 2022 to Romaine Bridges MD by Nimisha Traylor DO for Hydronephrosis [N13.30]  POD# * No surgery date entered * Procedure(s):  [address] is doing fair , . She is afebrile, denies fever , chills or gross hematuria . pt c/o L thigh pain related arthritis , this is chronic in nature . Discussed scheduled procedure tomorrow , pt aware and in agreement . Discussed with pt need for follow up as scheduled for stent changes . Vitals: Temp (24hrs), Av.9 °F (36.6 °C), Min:97.6 °F (36.4 °C), Max:98.1 °F (36.7 °C)    Blood pressure 107/69, pulse 85, temperature 97.6 °F (36.4 °C), resp. rate 18, weight 158.8 kg (350 lb), SpO2 93 %. Intake and Output:   1901 -  0700  In: 740 [P.O.:240; I.V.:500]  Out: 800 [Urine:800]  No intake/output data recorded. ROMA Output lats 24 hrs: No data found. ROMA Output last 8 hrs: No data found. BM over last 24 hrs: No data found.     Exam:   EXAMINATION:     Appearance: well-developed NAD,obese    Conjunctiva/Lids: conjunctivae and lids normal   External Ears/Nose: normal no lesions or deformities   Neck: trachea midline   Respiratory Effort: breathing easily   Lower Extremity: no edema   Abdomen/Flank: soft  Obese non-tender without masses; no CVA tenderness   Liver/Spleen: no organomegaly   Hernia: no ventral hernia    Gait/Station: deferred   Skin Inspection: warm and dry   Mood/Affect: normal         Labs:  CBC:   Lab Results   Component Value Date/Time    WBC 11.5 (H) 2022 04:19 AM    HCT 32.1 (L) 2022 04:19 AM    PLATELET 795  04:19 AM     BMP:   Lab Results   Component Value Date/Time    Glucose 148 (H) 2022 04:19 AM    Sodium 145 2022 04:19 AM    Potassium 4.8 2022 04:19 AM    Chloride 114 (H) 2022 04:19 AM    CO2 25 2022 04:19 AM    BUN 41 (H) 05/03/2022 04:19 AM    Creatinine 2.93 (H) 05/03/2022 04:19 AM    Calcium 8.2 (L) 05/03/2022 04:19 AM     Cultures: Ucx pending     Imaging:   CT: Results for orders placed during the hospital encounter of 05/01/22    CT ABD PELV WO CONT    Narrative  EXAM: CT ABD PELV WO CONT    INDICATION: left sided abd pain eval location ureteral stent    COMPARISON: CT 3/29/2021. IV CONTRAST: None. ORAL CONTRAST: None. TECHNIQUE:  Thin axial images were obtained through the abdomen and pelvis. Coronal and  sagittal reformats were generated. CT dose reduction was achieved through use of  a standardized protocol tailored for this examination and automatic exposure  control for dose modulation. The absence of intravenous contrast material reduces the sensitivity for  evaluation of the vasculature and solid organs. FINDINGS:  GENERAL: Patient is morbidly obese again compromise evaluation. LOWER THORAX: No significant abnormality in the incidentally imaged lower chest.  LIVER: No mass. BILIARY TREE: Gallbladder is within normal limits. CBD is not dilated. SPLEEN: within normal limits. PANCREAS: No focal abnormality. ADRENALS: Unremarkable. KIDNEYS/URETERS: Right double-J ureteral stent, with proximal pigtail uncoiled  and associated with 1.5 cm calcification with a second calculus at the  ureteropelvic junction intimately associated with the catheter measuring 1.2 cm  also seen. There is new right hydronephrosis. No calculi seen along the course  of the catheter within the ureter. Calcification in the upper pole collecting  system redemonstrated again measuring about 8 mm. Lower moiety shows no  hydronephrosis with suspected duplicated collecting system. Small left kidney  with new calculi evident within the collecting system measuring up to 7 mm and  in the ureteropelvic junction measuring about 10 mm.  Additionally, there is  calculus within the proximal left ureter measuring approximately 5 mm with mild  left hydronephrosis and proximal left hydroureter. Nevilletta Favronak STOMACH: Unremarkable. SMALL BOWEL AND COLON: Large abdominal hernia arising from the left flank,  containing numerous loops of large and small bowel. Hernia sac and bowel is  incompletely evaluated due to patient large size. No dilation or wall  thickening. APPENDIX: Appendix lies within hernia sac with otherwise unremarkable  appearance. PERITONEUM: No ascites or pneumoperitoneum. RETROPERITONEUM: No lymphadenopathy or aortic aneurysm. REPRODUCTIVE ORGANS: Ovaries appear unremarkable. Uterus is surgically absent. URINARY BLADDER: Distal aspect of the lead change ureteral stent lies within  urinary bladder and appears coiled around an approximately 2.5 cm bladder  calculus. BONES: Scoliosis and degenerative spine change. Osteoarthritic changes of the  hips. No acute fracture or aggressive lesion. ABDOMINAL WALL: Large abdominal wall hernia containing portions of the large and  small bowel as described above. ADDITIONAL COMMENTS: N/A    Impression  1. New calculi have formed along uncoiled proximal portion of right  nephroureteral stent and at the ureteropelvic junction along the stent with  worsened right hydronephrosis likely reflective of obstruction at the  ureteropelvic junction stone. There is persistent calculus in the collecting  system of the right kidney and within the urinary bladder associated with the  distal nephroureteral stent pigtail. 2. New left renal and ureteral stones with proximal left hydroureter and left  hydronephrosis, likely due to obstructing proximal left 5 mm ureteral stone. 3. Incidentals as above including large abdominal wall hernia containing large  portions of large and small bowel.     Assessment/Plan:     ·  New left renal stones including 10 mm at the UPJ and 5 mm in the proximal ureter with hydro in atrophic kidney  · Chronic right ureteral obstruction with stent - Right ureteral stent in position with encrustation and mild to moderate hydro  · DAPHNE   · UTI      Pt scheduled for Bilateral ureteral stent change  5/4  Pt will need definitive stone treatment OP once free from infection     Signed By: Moris Osei.  Minus NADEEM Orosco - May 3, 2022

## 2022-05-04 ENCOUNTER — APPOINTMENT (OUTPATIENT)
Dept: GENERAL RADIOLOGY | Age: 62
DRG: 463 | End: 2022-05-04
Attending: UROLOGY
Payer: MEDICAID

## 2022-05-04 ENCOUNTER — ANESTHESIA EVENT (OUTPATIENT)
Dept: SURGERY | Age: 62
DRG: 463 | End: 2022-05-04
Payer: MEDICAID

## 2022-05-04 ENCOUNTER — ANESTHESIA (OUTPATIENT)
Dept: SURGERY | Age: 62
DRG: 463 | End: 2022-05-04
Payer: MEDICAID

## 2022-05-04 LAB
ANION GAP SERPL CALC-SCNC: 5 MMOL/L (ref 5–15)
BACTERIA SPEC CULT: ABNORMAL
BUN SERPL-MCNC: 39 MG/DL (ref 6–20)
BUN/CREAT SERPL: 13 (ref 12–20)
CALCIUM SERPL-MCNC: 8.3 MG/DL (ref 8.5–10.1)
CC UR VC: ABNORMAL
CHLORIDE SERPL-SCNC: 112 MMOL/L (ref 97–108)
CO2 SERPL-SCNC: 24 MMOL/L (ref 21–32)
CREAT SERPL-MCNC: 3.09 MG/DL (ref 0.55–1.02)
ERYTHROCYTE [DISTWIDTH] IN BLOOD BY AUTOMATED COUNT: 15.2 % (ref 11.5–14.5)
GLUCOSE BLD STRIP.AUTO-MCNC: 121 MG/DL (ref 65–117)
GLUCOSE BLD STRIP.AUTO-MCNC: 158 MG/DL (ref 65–117)
GLUCOSE BLD STRIP.AUTO-MCNC: 172 MG/DL (ref 65–117)
GLUCOSE BLD STRIP.AUTO-MCNC: 99 MG/DL (ref 65–117)
GLUCOSE SERPL-MCNC: 140 MG/DL (ref 65–100)
HCT VFR BLD AUTO: 33 % (ref 35–47)
HGB BLD-MCNC: 9.5 G/DL (ref 11.5–16)
MCH RBC QN AUTO: 27.3 PG (ref 26–34)
MCHC RBC AUTO-ENTMCNC: 28.8 G/DL (ref 30–36.5)
MCV RBC AUTO: 94.8 FL (ref 80–99)
NRBC # BLD: 0 K/UL (ref 0–0.01)
NRBC BLD-RTO: 0 PER 100 WBC
PLATELET # BLD AUTO: 254 K/UL (ref 150–400)
PMV BLD AUTO: 10.2 FL (ref 8.9–12.9)
POTASSIUM SERPL-SCNC: 4.7 MMOL/L (ref 3.5–5.1)
RBC # BLD AUTO: 3.48 M/UL (ref 3.8–5.2)
SERVICE CMNT-IMP: ABNORMAL
SERVICE CMNT-IMP: NORMAL
SODIUM SERPL-SCNC: 141 MMOL/L (ref 136–145)
WBC # BLD AUTO: 12.1 K/UL (ref 3.6–11)

## 2022-05-04 PROCEDURE — 74011000250 HC RX REV CODE- 250: Performed by: UROLOGY

## 2022-05-04 PROCEDURE — 82962 GLUCOSE BLOOD TEST: CPT

## 2022-05-04 PROCEDURE — 74011636637 HC RX REV CODE- 636/637: Performed by: UROLOGY

## 2022-05-04 PROCEDURE — C9113 INJ PANTOPRAZOLE SODIUM, VIA: HCPCS | Performed by: UROLOGY

## 2022-05-04 PROCEDURE — 85027 COMPLETE CBC AUTOMATED: CPT

## 2022-05-04 PROCEDURE — 74011000636 HC RX REV CODE- 636: Performed by: UROLOGY

## 2022-05-04 PROCEDURE — 76060000033 HC ANESTHESIA 1 TO 1.5 HR: Performed by: UROLOGY

## 2022-05-04 PROCEDURE — 77030040361 HC SLV COMPR DVT MDII -B: Performed by: UROLOGY

## 2022-05-04 PROCEDURE — 74011000258 HC RX REV CODE- 258: Performed by: NURSE ANESTHETIST, CERTIFIED REGISTERED

## 2022-05-04 PROCEDURE — 77030008684 HC TU ET CUF COVD -B: Performed by: ANESTHESIOLOGY

## 2022-05-04 PROCEDURE — 74011250636 HC RX REV CODE- 250/636: Performed by: NURSE ANESTHETIST, CERTIFIED REGISTERED

## 2022-05-04 PROCEDURE — 36415 COLL VENOUS BLD VENIPUNCTURE: CPT

## 2022-05-04 PROCEDURE — C2617 STENT, NON-COR, TEM W/O DEL: HCPCS | Performed by: UROLOGY

## 2022-05-04 PROCEDURE — 74011000250 HC RX REV CODE- 250: Performed by: NURSE ANESTHETIST, CERTIFIED REGISTERED

## 2022-05-04 PROCEDURE — 74011636637 HC RX REV CODE- 636/637: Performed by: INTERNAL MEDICINE

## 2022-05-04 PROCEDURE — 80048 BASIC METABOLIC PNL TOTAL CA: CPT

## 2022-05-04 PROCEDURE — 74011250636 HC RX REV CODE- 250/636: Performed by: ANESTHESIOLOGY

## 2022-05-04 PROCEDURE — 74011250637 HC RX REV CODE- 250/637: Performed by: NURSE ANESTHETIST, CERTIFIED REGISTERED

## 2022-05-04 PROCEDURE — 76010000149 HC OR TIME 1 TO 1.5 HR: Performed by: UROLOGY

## 2022-05-04 PROCEDURE — 74011250636 HC RX REV CODE- 250/636: Performed by: UROLOGY

## 2022-05-04 PROCEDURE — 77030026438 HC STYL ET INTUB CARD -A: Performed by: ANESTHESIOLOGY

## 2022-05-04 PROCEDURE — 0T788DZ DILATION OF BILATERAL URETERS WITH INTRALUMINAL DEVICE, VIA NATURAL OR ARTIFICIAL OPENING ENDOSCOPIC: ICD-10-PCS | Performed by: UROLOGY

## 2022-05-04 PROCEDURE — 74011250637 HC RX REV CODE- 250/637: Performed by: UROLOGY

## 2022-05-04 PROCEDURE — 76210000017 HC OR PH I REC 1.5 TO 2 HR: Performed by: UROLOGY

## 2022-05-04 PROCEDURE — 77030019927 HC TBNG IRR CYSTO BAXT -A: Performed by: UROLOGY

## 2022-05-04 PROCEDURE — BT141ZZ FLUOROSCOPY OF KIDNEYS, URETERS AND BLADDER USING LOW OSMOLAR CONTRAST: ICD-10-PCS | Performed by: UROLOGY

## 2022-05-04 PROCEDURE — 2709999900 HC NON-CHARGEABLE SUPPLY: Performed by: UROLOGY

## 2022-05-04 PROCEDURE — 65270000029 HC RM PRIVATE

## 2022-05-04 PROCEDURE — 0TP98DZ REMOVAL OF INTRALUMINAL DEVICE FROM URETER, VIA NATURAL OR ARTIFICIAL OPENING ENDOSCOPIC: ICD-10-PCS | Performed by: UROLOGY

## 2022-05-04 PROCEDURE — 74420 UROGRAPHY RTRGR +-KUB: CPT

## 2022-05-04 DEVICE — URETERAL STENT
Type: IMPLANTABLE DEVICE | Status: FUNCTIONAL
Brand: PERCUFLEX™ PLUS

## 2022-05-04 RX ORDER — SODIUM CHLORIDE, SODIUM LACTATE, POTASSIUM CHLORIDE, CALCIUM CHLORIDE 600; 310; 30; 20 MG/100ML; MG/100ML; MG/100ML; MG/100ML
75 INJECTION, SOLUTION INTRAVENOUS CONTINUOUS
Status: DISPENSED | OUTPATIENT
Start: 2022-05-04 | End: 2022-05-05

## 2022-05-04 RX ORDER — ROCURONIUM BROMIDE 10 MG/ML
INJECTION, SOLUTION INTRAVENOUS AS NEEDED
Status: DISCONTINUED | OUTPATIENT
Start: 2022-05-04 | End: 2022-05-04 | Stop reason: HOSPADM

## 2022-05-04 RX ORDER — FENTANYL CITRATE 50 UG/ML
25 INJECTION, SOLUTION INTRAMUSCULAR; INTRAVENOUS
Status: DISCONTINUED | OUTPATIENT
Start: 2022-05-04 | End: 2022-05-04 | Stop reason: HOSPADM

## 2022-05-04 RX ORDER — MIDAZOLAM HYDROCHLORIDE 1 MG/ML
1 INJECTION, SOLUTION INTRAMUSCULAR; INTRAVENOUS AS NEEDED
Status: DISCONTINUED | OUTPATIENT
Start: 2022-05-04 | End: 2022-05-06

## 2022-05-04 RX ORDER — HYDROMORPHONE HYDROCHLORIDE 1 MG/ML
0.2 INJECTION, SOLUTION INTRAMUSCULAR; INTRAVENOUS; SUBCUTANEOUS
Status: DISCONTINUED | OUTPATIENT
Start: 2022-05-04 | End: 2022-05-04 | Stop reason: HOSPADM

## 2022-05-04 RX ORDER — DIPHENHYDRAMINE HYDROCHLORIDE 50 MG/ML
12.5 INJECTION, SOLUTION INTRAMUSCULAR; INTRAVENOUS AS NEEDED
Status: DISCONTINUED | OUTPATIENT
Start: 2022-05-04 | End: 2022-05-04 | Stop reason: HOSPADM

## 2022-05-04 RX ORDER — LIDOCAINE HYDROCHLORIDE 20 MG/ML
INJECTION, SOLUTION EPIDURAL; INFILTRATION; INTRACAUDAL; PERINEURAL AS NEEDED
Status: DISCONTINUED | OUTPATIENT
Start: 2022-05-04 | End: 2022-05-04 | Stop reason: HOSPADM

## 2022-05-04 RX ORDER — FENTANYL CITRATE 50 UG/ML
50 INJECTION, SOLUTION INTRAMUSCULAR; INTRAVENOUS AS NEEDED
Status: DISCONTINUED | OUTPATIENT
Start: 2022-05-04 | End: 2022-05-06

## 2022-05-04 RX ORDER — SODIUM CHLORIDE 0.9 % (FLUSH) 0.9 %
5-40 SYRINGE (ML) INJECTION AS NEEDED
Status: DISCONTINUED | OUTPATIENT
Start: 2022-05-04 | End: 2022-05-04 | Stop reason: HOSPADM

## 2022-05-04 RX ORDER — FENTANYL CITRATE 50 UG/ML
INJECTION, SOLUTION INTRAMUSCULAR; INTRAVENOUS AS NEEDED
Status: DISCONTINUED | OUTPATIENT
Start: 2022-05-04 | End: 2022-05-04 | Stop reason: HOSPADM

## 2022-05-04 RX ORDER — ONDANSETRON 2 MG/ML
4 INJECTION INTRAMUSCULAR; INTRAVENOUS AS NEEDED
Status: DISCONTINUED | OUTPATIENT
Start: 2022-05-04 | End: 2022-05-04 | Stop reason: HOSPADM

## 2022-05-04 RX ORDER — OXYCODONE AND ACETAMINOPHEN 5; 325 MG/1; MG/1
1 TABLET ORAL AS NEEDED
Status: DISCONTINUED | OUTPATIENT
Start: 2022-05-04 | End: 2022-05-04 | Stop reason: HOSPADM

## 2022-05-04 RX ORDER — PHENYLEPHRINE HCL IN 0.9% NACL 0.4MG/10ML
SYRINGE (ML) INTRAVENOUS AS NEEDED
Status: DISCONTINUED | OUTPATIENT
Start: 2022-05-04 | End: 2022-05-04 | Stop reason: HOSPADM

## 2022-05-04 RX ORDER — SODIUM CHLORIDE 9 MG/ML
50 INJECTION, SOLUTION INTRAVENOUS CONTINUOUS
Status: DISPENSED | OUTPATIENT
Start: 2022-05-04 | End: 2022-05-05

## 2022-05-04 RX ORDER — MORPHINE SULFATE 2 MG/ML
2 INJECTION, SOLUTION INTRAMUSCULAR; INTRAVENOUS
Status: DISCONTINUED | OUTPATIENT
Start: 2022-05-04 | End: 2022-05-04 | Stop reason: HOSPADM

## 2022-05-04 RX ORDER — MIDAZOLAM HYDROCHLORIDE 1 MG/ML
0.5 INJECTION, SOLUTION INTRAMUSCULAR; INTRAVENOUS
Status: DISCONTINUED | OUTPATIENT
Start: 2022-05-04 | End: 2022-05-04 | Stop reason: HOSPADM

## 2022-05-04 RX ORDER — SUCCINYLCHOLINE CHLORIDE 20 MG/ML
INJECTION INTRAMUSCULAR; INTRAVENOUS AS NEEDED
Status: DISCONTINUED | OUTPATIENT
Start: 2022-05-04 | End: 2022-05-04 | Stop reason: HOSPADM

## 2022-05-04 RX ORDER — PROPOFOL 10 MG/ML
INJECTION, EMULSION INTRAVENOUS AS NEEDED
Status: DISCONTINUED | OUTPATIENT
Start: 2022-05-04 | End: 2022-05-04 | Stop reason: HOSPADM

## 2022-05-04 RX ORDER — SODIUM CHLORIDE 9 MG/ML
50 INJECTION, SOLUTION INTRAVENOUS CONTINUOUS
Status: DISCONTINUED | OUTPATIENT
Start: 2022-05-04 | End: 2022-05-04 | Stop reason: HOSPADM

## 2022-05-04 RX ORDER — MIDAZOLAM HYDROCHLORIDE 1 MG/ML
INJECTION, SOLUTION INTRAMUSCULAR; INTRAVENOUS AS NEEDED
Status: DISCONTINUED | OUTPATIENT
Start: 2022-05-04 | End: 2022-05-04 | Stop reason: HOSPADM

## 2022-05-04 RX ORDER — SODIUM CHLORIDE 0.9 % (FLUSH) 0.9 %
5-40 SYRINGE (ML) INJECTION AS NEEDED
Status: DISCONTINUED | OUTPATIENT
Start: 2022-05-04 | End: 2022-05-11 | Stop reason: HOSPADM

## 2022-05-04 RX ORDER — SODIUM CHLORIDE 0.9 % (FLUSH) 0.9 %
5-40 SYRINGE (ML) INJECTION EVERY 8 HOURS
Status: DISCONTINUED | OUTPATIENT
Start: 2022-05-04 | End: 2022-05-04 | Stop reason: HOSPADM

## 2022-05-04 RX ORDER — SCOLOPAMINE TRANSDERMAL SYSTEM 1 MG/1
PATCH, EXTENDED RELEASE TRANSDERMAL AS NEEDED
Status: DISCONTINUED | OUTPATIENT
Start: 2022-05-04 | End: 2022-05-04 | Stop reason: HOSPADM

## 2022-05-04 RX ORDER — LIDOCAINE HYDROCHLORIDE 10 MG/ML
0.1 INJECTION, SOLUTION EPIDURAL; INFILTRATION; INTRACAUDAL; PERINEURAL AS NEEDED
Status: DISCONTINUED | OUTPATIENT
Start: 2022-05-04 | End: 2022-05-11 | Stop reason: HOSPADM

## 2022-05-04 RX ORDER — ONDANSETRON 2 MG/ML
INJECTION INTRAMUSCULAR; INTRAVENOUS AS NEEDED
Status: DISCONTINUED | OUTPATIENT
Start: 2022-05-04 | End: 2022-05-04 | Stop reason: HOSPADM

## 2022-05-04 RX ORDER — SODIUM CHLORIDE 0.9 % (FLUSH) 0.9 %
5-40 SYRINGE (ML) INJECTION EVERY 8 HOURS
Status: DISCONTINUED | OUTPATIENT
Start: 2022-05-04 | End: 2022-05-11 | Stop reason: HOSPADM

## 2022-05-04 RX ORDER — SODIUM CHLORIDE, SODIUM LACTATE, POTASSIUM CHLORIDE, CALCIUM CHLORIDE 600; 310; 30; 20 MG/100ML; MG/100ML; MG/100ML; MG/100ML
75 INJECTION, SOLUTION INTRAVENOUS CONTINUOUS
Status: DISCONTINUED | OUTPATIENT
Start: 2022-05-04 | End: 2022-05-04 | Stop reason: HOSPADM

## 2022-05-04 RX ORDER — CEFAZOLIN SODIUM 1 G/3ML
INJECTION, POWDER, FOR SOLUTION INTRAMUSCULAR; INTRAVENOUS AS NEEDED
Status: DISCONTINUED | OUTPATIENT
Start: 2022-05-04 | End: 2022-05-04

## 2022-05-04 RX ADMIN — LIDOCAINE HYDROCHLORIDE 100 MG: 20 INJECTION, SOLUTION EPIDURAL; INFILTRATION; INTRACAUDAL; PERINEURAL at 10:20

## 2022-05-04 RX ADMIN — ROCURONIUM BROMIDE 10 MG: 10 SOLUTION INTRAVENOUS at 10:20

## 2022-05-04 RX ADMIN — FENTANYL CITRATE 100 MCG: 50 INJECTION, SOLUTION INTRAMUSCULAR; INTRAVENOUS at 10:20

## 2022-05-04 RX ADMIN — Medication 80 MCG: at 10:26

## 2022-05-04 RX ADMIN — OXYBUTYNIN CHLORIDE 10 MG: 5 TABLET, EXTENDED RELEASE ORAL at 13:30

## 2022-05-04 RX ADMIN — FENTANYL CITRATE 25 MCG: 50 INJECTION, SOLUTION INTRAMUSCULAR; INTRAVENOUS at 12:03

## 2022-05-04 RX ADMIN — Medication 80 MCG: at 10:33

## 2022-05-04 RX ADMIN — SODIUM CHLORIDE, PRESERVATIVE FREE 10 ML: 5 INJECTION INTRAVENOUS at 13:27

## 2022-05-04 RX ADMIN — SODIUM CHLORIDE, POTASSIUM CHLORIDE, SODIUM LACTATE AND CALCIUM CHLORIDE 75 ML/HR: 600; 310; 30; 20 INJECTION, SOLUTION INTRAVENOUS at 08:55

## 2022-05-04 RX ADMIN — Medication 40 MCG: at 10:25

## 2022-05-04 RX ADMIN — SCOPALAMINE 1 PATCH: 1 PATCH, EXTENDED RELEASE TRANSDERMAL at 10:05

## 2022-05-04 RX ADMIN — FENTANYL CITRATE 25 MCG: 50 INJECTION, SOLUTION INTRAMUSCULAR; INTRAVENOUS at 12:14

## 2022-05-04 RX ADMIN — PROPOFOL 200 MG: 10 INJECTION, EMULSION INTRAVENOUS at 10:20

## 2022-05-04 RX ADMIN — SODIUM CHLORIDE 40 MG: 9 INJECTION, SOLUTION INTRAMUSCULAR; INTRAVENOUS; SUBCUTANEOUS at 13:28

## 2022-05-04 RX ADMIN — Medication 100 MCG: at 10:36

## 2022-05-04 RX ADMIN — Medication 2 UNITS: at 16:56

## 2022-05-04 RX ADMIN — SODIUM CHLORIDE 50 ML/HR: 9 INJECTION, SOLUTION INTRAVENOUS at 13:26

## 2022-05-04 RX ADMIN — FENTANYL CITRATE 25 MCG: 50 INJECTION, SOLUTION INTRAMUSCULAR; INTRAVENOUS at 11:35

## 2022-05-04 RX ADMIN — Medication 2 UNITS: at 07:11

## 2022-05-04 RX ADMIN — SUCCINYLCHOLINE CHLORIDE 200 MG: 20 INJECTION, SOLUTION INTRAMUSCULAR; INTRAVENOUS at 10:20

## 2022-05-04 RX ADMIN — CEFTRIAXONE SODIUM 1 G: 1 INJECTION, POWDER, FOR SOLUTION INTRAMUSCULAR; INTRAVENOUS at 13:27

## 2022-05-04 RX ADMIN — MIDAZOLAM 2 MG: 1 INJECTION INTRAMUSCULAR; INTRAVENOUS at 10:05

## 2022-05-04 RX ADMIN — SODIUM CHLORIDE 3 G: 900 INJECTION INTRAVENOUS at 10:27

## 2022-05-04 RX ADMIN — METHIMAZOLE 10 MG: 5 TABLET ORAL at 13:30

## 2022-05-04 RX ADMIN — ONDANSETRON HYDROCHLORIDE 4 MG: 2 INJECTION, SOLUTION INTRAMUSCULAR; INTRAVENOUS at 11:00

## 2022-05-04 NOTE — PROGRESS NOTES
[facility] Adult  Hospitalist Group                                                                                          Hospitalist Progress Note  Meryle Lower, MD  Answering service: [phone] OR [phone] from in house phone        Date of Service:  2022  NAME:  Sophie Telles  :  1960  MRN:  [id number]      Admission Summary:   Copied form admit: \" This is a recurrent problem. The current episode started more than 1 week ago. The problem occurs hourly. The problem has been gradually worsening. The quality of the pain is sharp. The pain is moderate. Associated symptoms include diarrhea. Pertinent negatives include no anorexia, no fever, no belching, no flatus, no hematochezia, no melena, no nausea, no vomiting, no constipation, no dysuria, no frequency, no hematuria, no headaches, no arthralgias, no myalgias, no trauma, no chest pain and no back pain. The pain is worsened by certain positions. The pain is relieved by nothing. \"    Interval history / Subjective:     2022 :   · Sonorous after op ; zulema stenting ureters   · clinically stable bed side exam    · Else:        Assessment & Plan: Active Problems:    Hydronephrosis (2022)  - for stenting or prcts-         DM (diabetes mellitus) ([address] (2022)  - SSI .  Broad coverage as indicated  Lab Results   Component Value Date/Time    Glucose 140 (H) 2022 12:43 AM    Glucose (POC) 99 2022 11:43 AM         Nephrolithiasis  CKD  Lab Results   Component Value Date/Time    Creatinine 3.09 (H) 2022 12:43 AM    cont to closely  Monitor ( was 2.3 in  )   Cont to monitor ; lab in am        Code status: full   DVT ppx : [facility] Problems  Date Reviewed: 2022          Codes Class Noted POA    DM (diabetes mellitus) ([address] ICD-10-CM: E11.9  ICD-9-CM: 250.00  2022 Unknown        Hydronephrosis ICD-10-CM: N13.30  ICD-9-CM: 725  2022 Unknown                  After personally interviewing pt at bedside the following is noted on 5/4/2022 :    Review of Systems   Reason unable to perform ROS: sonorous after op procedure               I had a face to face encounter with patient on 5/4/2022 at bedside for the following physical exam:     PHYSICAL EXAM:    Visit Vitals  /68 (BP 1 Location: Right arm, BP Patient Position: At rest)   Pulse 80   Temp 97.8 °F (36.6 °C)   Resp 17   Wt 158.8 kg (350 lb)   SpO2 98%   BMI 60.08 kg/m²          Physical Exam  Constitutional:       General: She is not in acute distress. Appearance: She is not ill-appearing, toxic-appearing or diaphoretic. Eyes:      General:         Right eye: No discharge. Left eye: No discharge. Cardiovascular:      Rate and Rhythm: Normal rate. Pulmonary:      Effort: Pulmonary effort is normal. No respiratory distress. Abdominal:      General: Abdomen is flat. There is no distension. Musculoskeletal:         General: No swelling. Skin:     Coloration: Skin is not jaundiced or pale. Data Review:    Review and/or order of clinical lab test      Labs:     Recent Labs     05/04/22 0043 05/03/22 0419   WBC 12.1* 11.5*   HGB 9.5* 9.3*   HCT 33.0* 32.1*    286     Recent Labs     05/04/22 0043 05/03/22 0419 05/03/22  0105    145 PLEASE DISREGARD RESULTS   K 4.7 4.8 PLEASE DISREGARD RESULTS   * 114* PLEASE DISREGARD RESULTS   CO2 24 25 PLEASE DISREGARD RESULTS   BUN 39* 41* PLEASE DISREGARD RESULTS   CREA 3.09* 2.93* PLEASE DISREGARD RESULTS   * 148* PLEASE DISREGARD RESULTS   CA 8.3* 8.2* PLEASE DISREGARD RESULTS     Recent Labs     05/03/22 0419 05/01/22 2022   ALT 10* 13   AP 83 102   TBILI 0.2 0.1*   TP 6.7 8.2   ALB 2.8* 3.2*   GLOB 3.9 5.0*   LPSE  --  148     No results for input(s): INR, PTP, APTT, INREXT, INREXT in the last 72 hours. No results for input(s): FE, TIBC, PSAT, FERR in the last 72 hours.    No results found for: FOL, RBCF   No results for input(s): PH, PCO2, PO2 in the last 72 hours. No results for input(s): CPK, CKNDX, TROIQ in the last 72 hours.     No lab exists for component: CPKMB  No results found for: CHOL, CHOLX, CHLST, CHOLV, HDL, HDLP, LDL, LDLC, DLDLP, TGLX, TRIGL, TRIGP, CHHD, [facility]  Lab Results   Component Value Date/Time    Glucose (POC) 99 05/04/2022 11:43 AM    Glucose (POC) 172 (H) 05/04/2022 06:48 AM    Glucose (POC) 162 (H) 05/03/2022 10:05 PM    Glucose (POC) 116 05/03/2022 04:34 PM    Glucose (POC) 194 (H) 05/03/2022 11:11 AM     Lab Results   Component Value Date/Time    Color YELLOW/STRAW 05/01/2022 11:22 PM    Appearance CLEAR 05/01/2022 11:22 PM    Specific gravity 1.015 05/01/2022 11:22 PM    Specific gravity 1.012 07/27/2021 12:04 PM    pH (UA) 8.5 (H) 05/01/2022 11:22 PM    Protein 100 (A) 05/01/2022 11:22 PM    Glucose Negative 05/01/2022 11:22 PM    Ketone Negative 05/01/2022 11:22 PM    Bilirubin Negative 05/01/2022 11:22 PM    Urobilinogen 0.2 05/01/2022 11:22 PM    Nitrites Positive (A) 05/01/2022 11:22 PM    Leukocyte Esterase MODERATE (A) 05/01/2022 11:22 PM    Epithelial cells FEW 05/01/2022 11:22 PM    Bacteria 4+ (A) 05/01/2022 11:22 PM    WBC  05/01/2022 11:22 PM    RBC 20-50 05/01/2022 11:22 PM         Medications Reviewed:     Current Facility-Administered Medications   Medication Dose Route Frequency    lactated Ringers infusion  75 mL/hr IntraVENous CONTINUOUS    0.9% sodium chloride infusion  50 mL/hr IntraVENous CONTINUOUS    sodium chloride (NS) flush 5-40 mL  5-40 mL IntraVENous Q8H    sodium chloride (NS) flush 5-40 mL  5-40 mL IntraVENous PRN    lidocaine (PF) (XYLOCAINE) 10 mg/mL (1 %) injection 0.1 mL  0.1 mL SubCUTAneous PRN    fentaNYL citrate (PF) injection 50 mcg  50 mcg IntraVENous PRN    midazolam (VERSED) injection 1 mg  1 mg IntraVENous PRN    midazolam (VERSED) injection 1 mg  1 mg IntraVENous PRN    traMADoL (ULTRAM) tablet 50 mg  50 mg Oral Q6H PRN    pantoprazole (PROTONIX) 40 mg in 0.9% sodium chloride 10 mL injection  40 mg IntraVENous DAILY    HYDROmorphone (DILAUDID) injection 0.5 mg  0.5 mg IntraVENous Q6H PRN    oxybutynin chloride XL (DITROPAN XL) tablet 10 mg  10 mg Oral DAILY    methIMAzole (TAPAZOLE) tablet 10 mg  10 mg Oral DAILY    polyethylene glycol (MIRALAX) packet 17 g  17 g Oral DAILY PRN    insulin lispro (HUMALOG) injection   SubCUTAneous AC&HS    glucose chewable tablet 16 g  4 Tablet Oral PRN    glucagon (GLUCAGEN) injection 1 mg  1 mg IntraMUSCular PRN    dextrose 10 % infusion 0-250 mL  0-250 mL IntraVENous PRN    cefTRIAXone (ROCEPHIN) 1 g in 0.9% sodium chloride 10 mL IV syringe  1 g IntraVENous Q24H    lidocaine 4 % patch 1 Patch  1 Patch TransDERmal Q24H     ______________________________________________________________________  EXPECTED LENGTH OF STAY: 2d 21h  ACTUAL LENGTH OF STAY:          3                 Zhanna Glass MD

## 2022-05-04 NOTE — PROGRESS NOTES
Occupational Therapy Note:  Chart reviewed and spoke with RN. Pt is currently KAILEE for sx and unavailable. Will follow up on later date.   Maren Colindres, OTR/L, CBIS

## 2022-05-04 NOTE — PROGRESS NOTES
JOESPH:  RUR: 12%     Disposition:  IPR (per therapy recommendation)     Barrier to d/c:  Patient has managed care medicaid ([address] This limits rehab options to IPR or HH. Insurance [employer] is required. CM spoke with [facility]. Facility will not have bed availability until next week. [facility] denied referral.  [facility] will consider patient after therapy updated notes entered. CM continuing to follow.     Violeta Samayoa, [address] [facility]

## 2022-05-04 NOTE — PROGRESS NOTES
TRANSFER - IN REPORT:    Verbal report received from Daniel Jimenez RN(name) on Harrison Varela  being received from [employer]) for routine progression of care      Report consisted of patients Situation, Background, Assessment and   Recommendations(SBAR). Information from the following report(s) SBAR, OR Summary, MAR, Recent Results and Med Rec Status was reviewed with the receiving nurse. Opportunity for questions and clarification was provided. Assessment completed upon patients arrival to unit and care assumed.

## 2022-05-04 NOTE — PROGRESS NOTES
Physician Progress Note      PATIENT:               Felipe Acevedo  CSN #:                  [id number]  :                       1960  ADMIT DATE:       2022 7:38 PM  [address] [place] DATE:  RESPONDING  PROVIDER #:        Aileen Nash MD        QUERY TEXT:    Stage of Chronic Kidney Disease: Please provide further specificity, if known. Clinical indicators include:  Options provided:  -- Chronic kidney disease stage 1  -- Chronic kidney disease stage 2  -- Chronic kidney disease stage 3  -- Chronic kidney disease stage 3a  -- Chronic kidney disease stage 3b  -- Chronic kidney disease stage 4  -- Chronic kidney disease stage 5  -- Chronic kidney disease stage 5, requiring dialysis  -- End stage renal disease  -- Other - I will add my own diagnosis  -- Disagree - Not applicable / Not valid  -- Disagree - Clinically Unable to determine / Unknown        PROVIDER RESPONSE TEXT:    The patient has chronic kidney disease stage 4.       Electronically signed by:  Aileen Nash MD 2022 4:13 PM

## 2022-05-04 NOTE — OP NOTES
UROLOGY OPERATIVE NOTE    Patient: Gigi  MRN: [id number]  SSN: [id number]    YOB: 1960  Age: 65 y.o. Sex: female          Pre-operative Diagnosis: HYDRONEPHROSIS  Post-operative Diagnosis: SAME  Surgeon: Keara Ugarte MD  Assistant: Surgeon(s):  Jett Parson MD  Anesthesia:  General    Procedure Performed: CYSTOSCOPY, BILATERAL RETROGRADE PYELOGRAMS, BILATERAL URETERAL STENT PLACEMENT      Procedure Details: The patient was seen in the pre-operative area. The risks, benefits, complications, alternative treatment options, and expected outcomes were again discussed with the patient. The possibilities of reaction to medication, pain, infection, bleeding, major cardiovascular event, death, damage to surrounding structures were specifically addressed. Informed consent was then obtained. The site of surgery properly noted/marked. Upon arrival to the operative suite, the patient, procedure, and side were confirmed via a pre-operative \"time-out. \" All were in agreement. The patient was positioned on the operating room table, bilateral sequential compression devices were applied, and anesthesia was induced. All pressure points were padded. Perioperative antibiotics were given. The patient was then placed in the dorsal lithotomy position and prepped and draped in usual sterile fashion. A well-lubricated 21 Hungarian 30 degree cystoscope was inserted per urethral into the bladder without difficulty. The urethra was capacious. The urine was cloudy with lots of debris dependently which obscured visualization as did the encrusted distal curl of the retained stent. The bladder was drained of urine. Pancystoscopy was performed. There were no bladder tumors noted. The bilateral ureteral orifices were in orthotopic position. A glidewire was advanced alongside the indwelling stent. This crossed midline given the malpositioned kidney.   A 5 Hungarian open ended ureteral catheter was advanced over the wire but met resistance at the upj and could no advance further. The wire was removed an a retrograde pyelogram was performed with malrotated kidney. A sensor wire was reintroduced and coiled in the kidney. The wire was backloaded and a 5fr x 26 cm double J stent was attempted but met resistance at UPJ and could not be advanced with confidence. This area was overlying the spine so fluoroscopic images were difficult to interpret especially given penetration due to her size. The stent was removed and a 4.8fr x 24 cm stent was advanced. This overcame some mild resistance but appeared to be in appropriate position but again the proximal curl was difficult to visualize give patient habitus and location and orientation of the kidney. This stent placement was very difficult and took about 30-45 min longer than usual. There was a good distal curl on direct visualization. A 5 Israeli open ended ureteral catheter was advanced into the left ureteral orifice. A retrograde pyelogram was performed with normal course and caliber of the left ureter and no significicant dilation proximally. The wire was reintroduced. A 5fr x 26cm double J was advanced over the wire. It was noted to have a good proximal curl in kidney on fluoroscopy and good distal curl on direct visualization. The bladder was drained. Findings:  DEBRIS IN BLADDER, CAPACIOUS URETHRA, DISTAL CURL OF RETAINED STENT SEVERELY ENCRUSTED, MALPOSITIONED RIGHT KIDNEY DIFFICULT RIGHT STENT PLACEMENT, LEFT STENT IN POSITION   Estimated Blood Loss:         Drains: 5FR X 26 CM DOUBLE J STENT ON LEFT, 4.8 FR X 24 CM DOUBLE J STENT ON RIGHT, NO STRINGS  Specimens: none  Complications: none immediate  Implants: * No implants in log *           22modifier for difficult procedure due to patient anatomy/habitus requiring additional time/instrumentation.   Her habitus and large hernia made positioning more difficulty, malpositioned, ptotic right kidney with encrusted stent made stent positioning more difficult, interpretation of fluoroscopy was more difficult due to penetration through tissue and location of kidney.       Hollis Jeong MD

## 2022-05-04 NOTE — PERIOP NOTES
Patient: Evelyn Musa MRN: [id number]  SSN: [id number]   YOB: 1960  Age: 65 y.o. Sex: female     Patient is status post Procedure(s):  CYSTOSCOPY BILATERAL STENT PLACEMENT AND RETROGRADE PYELOGRAMS. Surgeon(s) and Role:     Sharla Eastman MD - Primary                    Peripheral IV 05/02/22 Anterior; Left Forearm (Active)   Site Assessment Clean, dry, & intact 05/03/22 2136   Phlebitis Assessment 0 05/03/22 2136   Infiltration Assessment 0 05/03/22 2136   Dressing Status Clean, dry, & intact 05/03/22 2136   Dressing Type Transparent;Stabilization/securement device 05/03/22 2136   Hub Color/Line Status Pink;Flushed 05/03/22 2136   Action Taken Open ports on tubing capped 05/03/22 2136   Alcohol Cap Used Yes 05/03/22 2136            Airway - Endotracheal Tube 05/04/22 Oral (Active)

## 2022-05-04 NOTE — PERIOP NOTES
TRANSFER - IN REPORT:    Verbal report received from Josette Ott, [facility] (name) on [facility]  being received from [employer] (unit) for routine progression of care      Report consisted of patients Situation, Background, Assessment and   Recommendations(SBAR). Information from the following report(s) SBAR, Kardex, Intake/Output, MAR, Accordion, Recent Results and Med Rec Status was reviewed with the receiving nurse. Opportunity for questions and clarification was provided. Assessment completed upon patients arrival to unit and care assumed.

## 2022-05-04 NOTE — BRIEF OP NOTE
Brief Postoperative Note    Patient: Pablo Bernstein  YOB: 1960  MRN: [id number]    Date of Procedure: 5/4/2022     Pre-Op Diagnosis: HYDRONEHPROSIS    Post-Op Diagnosis: Same as preoperative diagnosis.       Procedure(s):  CYSTOSCOPY BILATERAL STENT PLACEMENT AND RETROGRADE PYELOGRAMS    Surgeon(s):  Marisel Dan MD    Surgical Assistant: None    Anesthesia: General     Estimated Blood Loss (mL): Minimal    Complications: None    Specimens: * No specimens in log *     Implants: * No implants in log *    Drains: * No LDAs found *    Findings: DEBRIS IN BLADDER, CAPACIOUS URETHRA, DISTAL CURL OF STENT ENCRUSTED, DIFFICULT RIGHT STENT PLACEMENT, LEFT STENT IN POSITION     Electronically Signed by Hollis Jeong MD on 5/4/2022 at 11:05 AM

## 2022-05-04 NOTE — PROGRESS NOTES
Problem: Pressure Injury - Risk of  Goal: *Prevention of pressure injury  Description: Document Murray Scale and appropriate interventions in the flowsheet. Outcome: Progressing Towards Goal  Note: Pressure Injury Interventions:  Sensory Interventions: Assess changes in LOC    Moisture Interventions: Absorbent underpads    Activity Interventions: Increase time out of bed,Pressure redistribution bed/mattress(bed type)    Mobility Interventions: Assess need for specialty bed    Nutrition Interventions: Document food/fluid/supplement intake    Friction and Shear Interventions: Apply protective barrier, creams and emollients                Problem: Patient Education: Go to Patient Education Activity  Goal: Patient/Family Education  Outcome: Progressing Towards Goal     Problem: Falls - Risk of  Goal: *Absence of Falls  Description: Document Oh Fall Risk and appropriate interventions in the flowsheet.   Outcome: Progressing Towards Goal  Note: Fall Risk Interventions:  Mobility Interventions: Communicate number of staff needed for ambulation/transfer         Medication Interventions: Patient to call before getting OOB    Elimination Interventions: Call light in reach              Problem: Patient Education: Go to Patient Education Activity  Goal: Patient/Family Education  Outcome: Progressing Towards Goal     Problem: Patient Education: Go to Patient Education Activity  Goal: Patient/Family Education  Outcome: Progressing Towards Goal     Problem: Patient Education: Go to Patient Education Activity  Goal: Patient/Family Education  Outcome: Progressing Towards Goal

## 2022-05-04 NOTE — ANESTHESIA POSTPROCEDURE EVALUATION
Procedure(s):  CYSTOSCOPY BILATERAL STENT PLACEMENT AND RETROGRADE PYELOGRAMS. general    Anesthesia Post Evaluation      Multimodal analgesia: multimodal analgesia used between 6 hours prior to anesthesia start to PACU discharge  Patient location during evaluation: PACU  Patient participation: complete - patient participated  Level of consciousness: awake and alert  Pain management: adequate  Airway patency: patent  Anesthetic complications: no  Cardiovascular status: acceptable  Respiratory status: acceptable  Hydration status: acceptable  Comments: Seen, no complaints   Post anesthesia nausea and vomiting:  none  Final Post Anesthesia Temperature Assessment:  Normothermia (36.0-37.5 degrees C)      INITIAL Post-op Vital signs:   Vitals Value Taken Time   /64 05/04/22 1200   Temp 36.8 °C (98.2 °F) 05/04/22 1128   Pulse 72 05/04/22 1209   Resp 11 05/04/22 1209   SpO2 100 % 05/04/22 1209   Vitals shown include unvalidated device data.

## 2022-05-04 NOTE — PROGRESS NOTES
Progress Note    Patient: Pablo Bernstein MRN: [id number]  SSN: [id number]    YOB: 1960 Age: 65 y.o. Sex: female   Height:   Weight: Weight: 158.8 kg (350 lb) BMI: Body mass index is 60.08 kg/m².    Emergency  Contact:  Primary Emergency Contact: Antelmo Eden, Chris Phone: [phone]   PCP:   Gennette Ammons, [phone] Day: 4 - Admitted 2022  7:38 PM by Franklin Pate MD - Full Code  Active Hospital Problems    Diagnosis Date Noted    DM (diabetes mellitus) ([facility] [address] 2022    Hydronephrosis 2022    Day of Surgery Procedure(s):  CYSTOSCOPY BILATERAL STENT PLACEMENT  Surgeon(s):  Marisel Dan MD         Assessment/Plan:     · B hydro, retained R stent/enrusted  · DAPHNE  · UTI  -cont empiric abx, narrow based on susceptibilities which are pending  -to OR today for cysto, B stent placement  -she is aware she will need stone/retained stent removed on right side after sterilization of urine as outpt       Subjective: No acute events   Imaging: Personally reviewed   Exam: nad  nonlabored breathing   ROS:  [] SOB (Respiratory)  [] Flatulance (GI)         Labs: Recent Labs     22  0043 22  0419 22  0254   WBC 12.1* 11.5* 14.5*   HGB 9.5* 9.3* 10.1*   HCT 33.0* 32.1* 34.7*    286 225     Recent Labs     22  0043 22  0419 22  0105    145 PLEASE DISREGARD RESULTS   K 4.7 4.8 PLEASE DISREGARD RESULTS   * 114* PLEASE DISREGARD RESULTS   CO2 24 25 PLEASE DISREGARD RESULTS   * 148* PLEASE DISREGARD RESULTS   BUN 39* 41* PLEASE DISREGARD RESULTS   CREA 3.09* 2.93* PLEASE DISREGARD RESULTS   CA 8.3* 8.2* PLEASE DISREGARD RESULTS      ID: Temp (24hrs), Av.5 °F (36.9 °C), Min:97.8 °F (36.6 °C), Max:100 °F (37.8 °C)    2022: WBC 14.4 K/uL (H; Ref range: 3.6 - 11.0 K/uL)  2022: WBC 14.5 K/uL (H; Ref range: 3.6 - 11.0 K/uL)  5/3/2022: WBC 11.5 K/uL (H; Ref range: 3.6 - 11.0 K/uL)  5/4/2022: WBC 12.1 K/uL (H; Ref range: 3.6 - 11.0 K/uL)  Current Antimicrobial Therapy (168h ago, onward)     Ordered     Start Stop    05/02/22 1219  cefTRIAXone (ROCEPHIN) 1 g in 0.9% sodium chloride 10 mL IV syringe  1 g,   IntraVENous,   EVERY 24 HOURS        References:    Lexicomp    05/02/22 1300 05/07/22 1259              Cultures: All Micro Results     Procedure Component Value Units Date/Time    CULTURE, URINE [[id number]]  (Abnormal) Collected: 05/01/22 2022    Order Status: Completed Specimen: Urine from Clean catch Updated: 05/03/22 1101     Special Requests: NO SPECIAL REQUESTS        [place] Count --        >100,000  COLONIES/mL       Culture result: GRAM NEGATIVE RODS       URINE CULTURE HOLD SAMPLE [[id number]] Collected: 05/01/22 2322    Order Status: Completed Specimen: Urine from Serum Updated: 05/01/22 2326     Urine culture hold       Urine on hold in Microbiology dept for 2 days. If unpreserved urine is submitted, it cannot be used for addtional testing after 24 hours, recollection will be required. GI: Intake: DIET NPO   Appetite: Good     P.O.: 240 mL I.V.: 0 mL  Abdominal Assessment: Tender  Bowel Sounds: Active    No data found.       Pain: 0/10 Constant - Hip - Left      Current Analgesic Therapy (168h ago, onward)     Ordered     Start Stop    05/04/22 0839  fentaNYL citrate (PF) injection 50 mcg  50 mcg,   IntraVENous,   AS NEEDED        References:    Lexicomp    05/04/22 0839 --    05/03/22 0833  traMADoL (ULTRAM) tablet 50 mg  50 mg,   Oral,   EVERY 6 HOURS AS NEEDED        References:    Lexicomp    05/03/22 6376 --    05/02/22 0228  HYDROmorphone (DILAUDID) injection 0.5 mg  0.5 mg,   IntraVENous,   EVERY 6 HOURS AS NEEDED        References:    Lexicomp    05/02/22 0228 --               :      -      5/1/2022: Creatinine 3.00 MG/DL (H; Ref range: 0.55 - 1.02 MG/DL)  5/2/2022: Creatinine 2.84 MG/DL (H; Ref range: 0.55 - 1.02 MG/DL)  5/3/2022: Creatinine PLEASE DISREGARD RESULTS MG/DL (Ref range: 0.55 - 1.02 MG/DL); Creatinine 2.93 MG/DL (H; Ref range: 0.55 - 1.02 MG/DL)  5/4/2022: Creatinine 3.09 MG/DL (H; Ref range: 0.55 - 1.02 MG/DL)       Vitals: O2 Device: None (Room air) @    Patient Vitals for the past 24 hrs:   BP Temp Pulse Resp SpO2   05/04/22 0844 (!) 146/62 98 °F (36.7 °C) 75 18 97 %   05/04/22 0759 118/77 97.9 °F (36.6 °C) 78 18 96 %   05/04/22 0712  98.5 °F (36.9 °C)      05/04/22 0210 107/72 100 °F (37.8 °C) 80 18 94 %   05/03/22 2135 114/75 98.6 °F (37 °C) 81 20 93 %   05/03/22 1431 115/85 97.8 °F (36.6 °C) 91 18 98 %      I&O's:    Date 05/03/22 0700 - 05/04/22 0659 05/04/22 0700 - 05/05/22 0659   Shift 4690-0643 7979-6113 24 Hour Total 7356-6248 7467-3670 24 Hour Total   INTAKE   P.O. 240  240        P. O. 240  240      I. V.(mL/kg/hr) 0(0)  0(0)        I.V. 0  0      Shift Total(mL/kg) 240(1.5)  240(1.5)      OUTPUT   Urine(mL/kg/hr) 700(0.4) 400(0.2) 1100(0.3) 400  400     Urine Voided  400  400   Shift Total(mL/kg) 700(4.4) 400(2.5) 1100(6.9) 400(2.5)  400(2.5)   NET -460 -400 -860 -400  -400   Weight (kg) 158.8 158.8 158.8 158.8 158.8 158.8       Meds:    Current Facility-Administered Medications:     lactated Ringers infusion, 75 mL/hr, IntraVENous, CONTINUOUS, Anthony Jenkins, DO, Last Rate: 75 mL/hr at 05/04/22 0855, 75 mL/hr at 05/04/22 0855    0.9% sodium chloride infusion, 50 mL/hr, IntraVENous, CONTINUOUS, Anthony Jenkins,     sodium chloride (NS) flush 5-40 mL, 5-40 mL, IntraVENous, Q8H, Anthony Jenkins,     sodium chloride (NS) flush 5-40 mL, 5-40 mL, IntraVENous, PRN, Anthony Jenkins DO    lidocaine (PF) (XYLOCAINE) 10 mg/mL (1 %) injection 0.1 mL, 0.1 mL, SubCUTAneous, PRN, Anthony Jenkins,     fentaNYL citrate (PF) injection 50 mcg, 50 mcg, IntraVENous, PRN, Anthony Jenkins DO    midazolam (VERSED) injection 1 mg, 1 mg, IntraVENous, PRN, Anthony Jenkins,     midazolam (VERSED) injection 1 mg, 1 mg, IntraVENous, PRN, Oly Jenkins Bloodgood, DO    iopamidoL (ISOVUE-370) 76 % injection 50 mL, 50 mL, Other, RAD ONCE, Jett Parson MD    traMADoL Mellisa Sherice) tablet 50 mg, 50 mg, Oral, Q6H PRN, MorganRoseanna Chatterjee NP, 50 mg at 05/03/22 2151    pantoprazole (PROTONIX) 40 mg in 0.9% sodium chloride 10 mL injection, 40 mg, IntraVENous, DAILY, Adam Sky, DO, 40 mg at 05/03/22 0940    HYDROmorphone (DILAUDID) injection 0.5 mg, 0.5 mg, IntraVENous, Q6H PRN, Cayetano Browne Sky, DO    oxybutynin chloride XL (DITROPAN XL) tablet 10 mg, 10 mg, Oral, DAILY, Adam, Sky, DO, 10 mg at 05/03/22 0940    methIMAzole (TAPAZOLE) tablet 10 mg, 10 mg, Oral, DAILY, Adam Sky, DO, 10 mg at 05/03/22 0940    polyethylene glycol (MIRALAX) packet 17 g, 17 g, Oral, DAILY PRN, Cayetano Browne Sky, DO    insulin lispro (HUMALOG) injection, , SubCUTAneous, AC&HS, Trinidad Graf MD, 2 Units at 05/04/22 6411    glucose chewable tablet 16 g, 4 Tablet, Oral, PRN, Trinidad Graf MD    glucagon [facility]) injection 1 mg, 1 mg, IntraMUSCular, PRN, Trinidad Graf MD    dextrose 10 % infusion 0-250 mL, 0-250 mL, IntraVENous, PRN, Trinidad Graf MD    cefTRIAXone (ROCEPHIN) 1 g in 0.9% sodium chloride 10 mL IV syringe, 1 g, IntraVENous, Q24H, Vero Viveros NP, 1 g at 05/03/22 1232    lidocaine 4 % patch 1 Patch, 1 Patch, TransDERmal, Q24H, Ruben Holbrook NP, 1 Patch at 05/03/22 2136          Signed By: Keara Ugarte MD - May 4, 2022

## 2022-05-04 NOTE — PROGRESS NOTES
Physical Therapy  Patient for surgery today. Will hold today and follow up tomorrow.   Gal Moscoso, PT

## 2022-05-04 NOTE — PERIOP NOTES
TRANSFER - OUT REPORT:    Verbal report given to 82 Liz Looney on Jodie Ramp  being returned to room 620 for routine progression of care       Report consisted of patients Situation, Background, Assessment and   Recommendations(SBAR). Time Pre op antibiotic given:1027  Anesthesia Stop time: 5464  Blevins Present on Transfer to floor:no  Order for Blevins on Chart:no  Discharge Prescriptions with Chart:no    Information from the following report(s) Procedure Summary, Intake/Output and MAR was reviewed with the receiving nurse. Opportunity for questions and clarification was provided. Is the patient on 02? YES       L/Min 2       Other     Is the patient on a monitor? NO    Is the nurse transporting with the patient? NO    Surgical Waiting Area notified of patient's transfer from PACU? NO      The following personal items collected during your admission accompanied patient upon transfer:   Dental Appliance: Dental Appliances: None  Vision: Visual Aid: Glasses  Hearing Aid:    Jewelry: Jewelry: None  Clothing: Clothing: None  Other Valuables:  Other Valuables: With patient  Valuables sent to safe:

## 2022-05-04 NOTE — ANESTHESIA PREPROCEDURE EVALUATION
Relevant Problems   No relevant active problems       Anesthetic History   No history of anesthetic complications            Review of Systems / Medical History  Patient summary reviewed, nursing notes reviewed and pertinent labs reviewed    Pulmonary    COPD               Neuro/Psych         Psychiatric history    Comments: Peripheral neuropathy (G62.9) Cardiovascular    Hypertension              Exercise tolerance: <4 METS     GI/Hepatic/Renal         Renal disease: CRI, ARF and stones       Endo/Other    Diabetes  Hypothyroidism  Morbid obesity and arthritis     Other Findings              Physical Exam    Airway  Mallampati: II  TM Distance: 4 - 6 cm  Neck ROM: normal range of motion   Mouth opening: Normal     Cardiovascular  Regular rate and rhythm,  S1 and S2 normal,  no murmur, click, rub, or gallop  Rhythm: regular  Rate: normal         Dental  No notable dental hx       Pulmonary  Breath sounds clear to auscultation               Abdominal  GI exam deferred       Other Findings            Anesthetic Plan    ASA: 3  Anesthesia type: general          Induction: Intravenous  Anesthetic plan and risks discussed with: Patient      Will accept albumin and cell saver

## 2022-05-05 LAB
ANION GAP SERPL CALC-SCNC: 6 MMOL/L (ref 5–15)
BUN SERPL-MCNC: 37 MG/DL (ref 6–20)
BUN/CREAT SERPL: 12 (ref 12–20)
CALCIUM SERPL-MCNC: 7.8 MG/DL (ref 8.5–10.1)
CHLORIDE SERPL-SCNC: 110 MMOL/L (ref 97–108)
CO2 SERPL-SCNC: 26 MMOL/L (ref 21–32)
CREAT SERPL-MCNC: 3.18 MG/DL (ref 0.55–1.02)
ERYTHROCYTE [DISTWIDTH] IN BLOOD BY AUTOMATED COUNT: 15.2 % (ref 11.5–14.5)
GLUCOSE BLD STRIP.AUTO-MCNC: 115 MG/DL (ref 65–117)
GLUCOSE BLD STRIP.AUTO-MCNC: 117 MG/DL (ref 65–117)
GLUCOSE BLD STRIP.AUTO-MCNC: 159 MG/DL (ref 65–117)
GLUCOSE BLD STRIP.AUTO-MCNC: 170 MG/DL (ref 65–117)
GLUCOSE SERPL-MCNC: 121 MG/DL (ref 65–100)
HCT VFR BLD AUTO: 31.1 % (ref 35–47)
HGB BLD-MCNC: 9 G/DL (ref 11.5–16)
MCH RBC QN AUTO: 26.9 PG (ref 26–34)
MCHC RBC AUTO-ENTMCNC: 28.9 G/DL (ref 30–36.5)
MCV RBC AUTO: 93.1 FL (ref 80–99)
NRBC # BLD: 0 K/UL (ref 0–0.01)
NRBC BLD-RTO: 0 PER 100 WBC
PLATELET # BLD AUTO: 259 K/UL (ref 150–400)
PMV BLD AUTO: 10.4 FL (ref 8.9–12.9)
POTASSIUM SERPL-SCNC: 4.9 MMOL/L (ref 3.5–5.1)
RBC # BLD AUTO: 3.34 M/UL (ref 3.8–5.2)
SERVICE CMNT-IMP: ABNORMAL
SERVICE CMNT-IMP: ABNORMAL
SERVICE CMNT-IMP: NORMAL
SERVICE CMNT-IMP: NORMAL
SODIUM SERPL-SCNC: 142 MMOL/L (ref 136–145)
WBC # BLD AUTO: 11 K/UL (ref 3.6–11)

## 2022-05-05 PROCEDURE — 74011250636 HC RX REV CODE- 250/636: Performed by: UROLOGY

## 2022-05-05 PROCEDURE — 74011250637 HC RX REV CODE- 250/637: Performed by: STUDENT IN AN ORGANIZED HEALTH CARE EDUCATION/TRAINING PROGRAM

## 2022-05-05 PROCEDURE — 74011000250 HC RX REV CODE- 250: Performed by: UROLOGY

## 2022-05-05 PROCEDURE — 80048 BASIC METABOLIC PNL TOTAL CA: CPT

## 2022-05-05 PROCEDURE — 65270000029 HC RM PRIVATE

## 2022-05-05 PROCEDURE — 74011250637 HC RX REV CODE- 250/637: Performed by: UROLOGY

## 2022-05-05 PROCEDURE — 85027 COMPLETE CBC AUTOMATED: CPT

## 2022-05-05 PROCEDURE — 74011636637 HC RX REV CODE- 636/637: Performed by: UROLOGY

## 2022-05-05 PROCEDURE — 36415 COLL VENOUS BLD VENIPUNCTURE: CPT

## 2022-05-05 PROCEDURE — 82962 GLUCOSE BLOOD TEST: CPT

## 2022-05-05 PROCEDURE — C9113 INJ PANTOPRAZOLE SODIUM, VIA: HCPCS | Performed by: UROLOGY

## 2022-05-05 PROCEDURE — 77010033678 HC OXYGEN DAILY

## 2022-05-05 PROCEDURE — 74011250636 HC RX REV CODE- 250/636: Performed by: STUDENT IN AN ORGANIZED HEALTH CARE EDUCATION/TRAINING PROGRAM

## 2022-05-05 PROCEDURE — 74011250636 HC RX REV CODE- 250/636: Performed by: NURSE PRACTITIONER

## 2022-05-05 RX ORDER — CALCIUM CARBONATE 200(500)MG
200 TABLET,CHEWABLE ORAL
Status: DISCONTINUED | OUTPATIENT
Start: 2022-05-05 | End: 2022-05-07

## 2022-05-05 RX ORDER — PANTOPRAZOLE SODIUM 40 MG/1
40 TABLET, DELAYED RELEASE ORAL
Status: DISCONTINUED | OUTPATIENT
Start: 2022-05-06 | End: 2022-05-11 | Stop reason: HOSPADM

## 2022-05-05 RX ORDER — POLYETHYLENE GLYCOL 3350 17 G/17G
17 POWDER, FOR SOLUTION ORAL DAILY
Status: DISCONTINUED | OUTPATIENT
Start: 2022-05-06 | End: 2022-05-11 | Stop reason: HOSPADM

## 2022-05-05 RX ORDER — HEPARIN SODIUM 5000 [USP'U]/ML
5000 INJECTION, SOLUTION INTRAVENOUS; SUBCUTANEOUS EVERY 8 HOURS
Status: DISCONTINUED | OUTPATIENT
Start: 2022-05-05 | End: 2022-05-11 | Stop reason: HOSPADM

## 2022-05-05 RX ORDER — ONDANSETRON 2 MG/ML
4 INJECTION INTRAMUSCULAR; INTRAVENOUS
Status: DISCONTINUED | OUTPATIENT
Start: 2022-05-05 | End: 2022-05-11 | Stop reason: HOSPADM

## 2022-05-05 RX ORDER — SENNOSIDES 8.6 MG/1
1 TABLET ORAL DAILY
Status: DISCONTINUED | OUTPATIENT
Start: 2022-05-06 | End: 2022-05-11 | Stop reason: HOSPADM

## 2022-05-05 RX ADMIN — METHIMAZOLE 10 MG: 5 TABLET ORAL at 08:46

## 2022-05-05 RX ADMIN — HEPARIN SODIUM 5000 UNITS: 5000 INJECTION INTRAVENOUS; SUBCUTANEOUS at 16:48

## 2022-05-05 RX ADMIN — SODIUM CHLORIDE, PRESERVATIVE FREE 10 ML: 5 INJECTION INTRAVENOUS at 22:40

## 2022-05-05 RX ADMIN — TRAMADOL HYDROCHLORIDE 50 MG: 50 TABLET, COATED ORAL at 23:10

## 2022-05-05 RX ADMIN — SODIUM CHLORIDE, PRESERVATIVE FREE 10 ML: 5 INJECTION INTRAVENOUS at 14:51

## 2022-05-05 RX ADMIN — OXYBUTYNIN CHLORIDE 10 MG: 5 TABLET, EXTENDED RELEASE ORAL at 08:46

## 2022-05-05 RX ADMIN — CALCIUM CARBONATE 200 MG: 500 TABLET, CHEWABLE ORAL at 12:08

## 2022-05-05 RX ADMIN — ONDANSETRON 4 MG: 2 INJECTION INTRAMUSCULAR; INTRAVENOUS at 15:46

## 2022-05-05 RX ADMIN — CALCIUM CARBONATE 200 MG: 500 TABLET, CHEWABLE ORAL at 16:48

## 2022-05-05 RX ADMIN — CEFTRIAXONE SODIUM 1 G: 1 INJECTION, POWDER, FOR SOLUTION INTRAMUSCULAR; INTRAVENOUS at 12:09

## 2022-05-05 RX ADMIN — ONDANSETRON 4 MG: 2 INJECTION INTRAMUSCULAR; INTRAVENOUS at 04:24

## 2022-05-05 RX ADMIN — SODIUM CHLORIDE, PRESERVATIVE FREE 10 ML: 5 INJECTION INTRAVENOUS at 06:49

## 2022-05-05 RX ADMIN — Medication 2 UNITS: at 06:49

## 2022-05-05 RX ADMIN — HEPARIN SODIUM 5000 UNITS: 5000 INJECTION INTRAVENOUS; SUBCUTANEOUS at 22:39

## 2022-05-05 RX ADMIN — SODIUM CHLORIDE 40 MG: 9 INJECTION, SOLUTION INTRAMUSCULAR; INTRAVENOUS; SUBCUTANEOUS at 08:46

## 2022-05-05 NOTE — PROGRESS NOTES
Physician Progress Note      PATIENT:               Maite Morris  CSN #:                  [id number]  :                       1960  ADMIT DATE:       2022 7:38 PM  [address] [place] DATE:  RESPONDING  PROVIDER #:        Raegan Phillips MD          QUERY TEXT:    Pt admitted with UTI. Pt noted to have Encrusted Ureteral Stent. If possible, please document in the progress notes and discharge summary if you are evaluating and/or treating any of the following: The medical record reflects the following:  Risk Factors: Ureteral Stent, Atrophic malrotated kidney  Clinical Indicators: admitted with worsening abd pain, noted to have WBC 14.4, UC positive for E. Coli. Pt found to have multiple kidney stones on CT along with hydronephrosis and encrusted Ureteral Stent that was last exchanged on 2021. Treatment: Pt underwent Ureteral Stent exchange, Rocephin IV, Urology following. Thank you,  Nazario Cabrera RN  Clinical Documentation  [phone]  Options provided:  -- UTI due to ureteral stent  -- UTI not due to ureteral stent  -- Other - I will add my own diagnosis  -- Disagree - Not applicable / Not valid  -- Disagree - Clinically unable to determine / Unknown  -- Refer to Clinical Documentation Reviewer    PROVIDER RESPONSE TEXT:    UTI is not due to ureteral stent.     Query created by: Faraz Lambert on 2022 1:01 PM      Electronically signed by:  Raegan Phillips MD 2022 6:12 PM

## 2022-05-05 NOTE — PROGRESS NOTES
Physical Therapy  Chart reviewed and attempted to see patient for PT treatment. Patient in bed on arrival and reports continued nausea and \"sour\" stomach. Nurse provided Zofran prior to session attempt and informed of patients current symptoms. Attempted to encourage patient to sit EOB to see if upright positioning assisted symptoms but patient declined. She did agree to attempt elevating the HOB. Will defer and continue to follow.    Katrina Apt, PT, DPT

## 2022-05-05 NOTE — PROGRESS NOTES
Clinical Pharmacy Note: IV to PO Automatic Conversion  Please note: [facility] medication (pantoprazole) has been changed from IV to PO based on the following critiera:    Patient is tolerating oral medications  Patient is tolerating a diet more advanced than clear liquids  Patient is not requiring vasopressors    This IV to PO conversion is based on the [employer] approved automatic conversion policy for eligible patients. Please call with questions.

## 2022-05-05 NOTE — PROGRESS NOTES
[address] [facility] Adult  Hospitalist Group                                                                                          Hospitalist Progress Note  Carol Farias MD  Answering service: [phone] from in house phone        Date of Service:  2022  NAME:  Johnathan Billingsley  :  1960  MRN:  [id number]      Admission Summary:   Copied form admit: \" This is a recurrent problem. The current episode started more than 1 week ago. The problem occurs hourly. The problem has been gradually worsening. The quality of the pain is sharp. The pain is moderate. Associated symptoms include diarrhea. Pertinent negatives include no anorexia, no fever, no belching, no flatus, no hematochezia, no melena, no nausea, no vomiting, no constipation, no dysuria, no frequency, no hematuria, no headaches, no arthralgias, no myalgias, no trauma, no chest pain and no back pain. The pain is worsened by certain positions. The pain is relieved by nothing. \"    Interval history / Subjective:   Status post bilateral ureteral stent placement    Patient seen examined at bedside earlier. States she has slight nausea but no episodes of emesis. No significant abdominal pain     Assessment & Plan: Active Problems:    Hydronephrosis (2022)  - for stenting or prcts-    -urology following, need to follow-up for stone rremoval outpatient       DM (diabetes mellitus) ([facility] [address] (2022)  - SSI . Broad coverage as indicated    Nephrolithiasis  CKD stage IV  Cont to monitor, around baseline 3's    PT OT recommending IPR    Medically ready for discharge.   Pending IPR placement      Code status: full   DVT ppx : Heparin subcu     Hospital Problems  Date Reviewed: 2022          Codes Class Noted POA    DM (diabetes mellitus) ([facility] [address] ICD-10-CM: E11.9  ICD-9-CM: 250.00  2022 Unknown        Hydronephrosis ICD-10-CM: N13.30  ICD-9-CM: 888  2022 Unknown            Exam  General: No acute distress weak resting in bed morbidly obese  CVS: Regular rate and rhythm  Pulm: Clear to auscultation bilaterally  Abdomen: Soft obese nontender positive bowel sounds no flank tenderness  Extremities no peripheral extremity edema no rash or lesion  Neuro: A&O x3 cranial nerves II through XII grossly intact          Data Review:    Review and/or order of clinical lab test      Labs:     Recent Labs     05/05/22 0027 05/04/22  0043   WBC 11.0 12.1*   HGB 9.0* 9.5*   HCT 31.1* 33.0*    254     Recent Labs     05/05/22  0027 05/04/22  0043 05/03/22  0419    141 145   K 4.9 4.7 4.8   * 112* 114*   CO2 26 24 25   BUN 37* 39* 41*   CREA 3.18* 3.09* 2.93*   * 140* 148*   CA 7.8* 8.3* 8.2*     Recent Labs     05/03/22 0419   ALT 10*   AP 83   TBILI 0.2   TP 6.7   ALB 2.8*   GLOB 3.9     No results for input(s): INR, PTP, APTT, INREXT, INREXT in the last 72 hours. No results for input(s): FE, TIBC, PSAT, FERR in the last 72 hours. No results found for: FOL, RBCF   No results for input(s): PH, PCO2, PO2 in the last 72 hours. No results for input(s): CPK, CKNDX, TROIQ in the last 72 hours.     No lab exists for component: CPKMB  No results found for: CHOL, CHOLX, CHLST, CHOLV, HDL, HDLP, LDL, LDLC, DLDLP, TGLX, TRIGL, TRIGP, CHHD, [facility]  Lab Results   Component Value Date/Time    Glucose (POC) 115 05/05/2022 11:28 AM    Glucose (POC) 170 (H) 05/05/2022 06:42 AM    Glucose (POC) 121 (H) 05/04/2022 09:17 PM    Glucose (POC) 158 (H) 05/04/2022 04:47 PM    Glucose (POC) 99 05/04/2022 11:43 AM     Lab Results   Component Value Date/Time    Color YELLOW/STRAW 05/01/2022 11:22 PM    Appearance CLEAR 05/01/2022 11:22 PM    Specific gravity 1.015 05/01/2022 11:22 PM    Specific gravity 1.012 07/27/2021 12:04 PM    pH (UA) 8.5 (H) 05/01/2022 11:22 PM    Protein 100 (A) 05/01/2022 11:22 PM    Glucose Negative 05/01/2022 11:22 PM    Ketone Negative 05/01/2022 11:22 PM    Bilirubin Negative 05/01/2022 11:22 PM    Urobilinogen 0.2 05/01/2022 11:22 PM    Nitrites Positive (A) 05/01/2022 11:22 PM    Leukocyte Esterase MODERATE (A) 05/01/2022 11:22 PM    Epithelial cells FEW 05/01/2022 11:22 PM    Bacteria 4+ (A) 05/01/2022 11:22 PM    WBC  05/01/2022 11:22 PM    RBC 20-50 05/01/2022 11:22 PM         Medications Reviewed:     Current Facility-Administered Medications   Medication Dose Route Frequency    ondansetron (ZOFRAN) injection 4 mg  4 mg IntraVENous Q6H PRN    [START ON 5/6/2022] pantoprazole (PROTONIX) tablet 40 mg  40 mg Oral ACB    calcium carbonate (TUMS) chewable tablet 200 mg [elemental]  200 mg Oral TID WITH MEALS    sodium chloride (NS) flush 5-40 mL  5-40 mL IntraVENous Q8H    sodium chloride (NS) flush 5-40 mL  5-40 mL IntraVENous PRN    lidocaine (PF) (XYLOCAINE) 10 mg/mL (1 %) injection 0.1 mL  0.1 mL SubCUTAneous PRN    fentaNYL citrate (PF) injection 50 mcg  50 mcg IntraVENous PRN    midazolam (VERSED) injection 1 mg  1 mg IntraVENous PRN    midazolam (VERSED) injection 1 mg  1 mg IntraVENous PRN    traMADoL (ULTRAM) tablet 50 mg  50 mg Oral Q6H PRN    HYDROmorphone (DILAUDID) injection 0.5 mg  0.5 mg IntraVENous Q6H PRN    oxybutynin chloride XL (DITROPAN XL) tablet 10 mg  10 mg Oral DAILY    methIMAzole (TAPAZOLE) tablet 10 mg  10 mg Oral DAILY    polyethylene glycol (MIRALAX) packet 17 g  17 g Oral DAILY PRN    insulin lispro (HUMALOG) injection   SubCUTAneous AC&HS    glucose chewable tablet 16 g  4 Tablet Oral PRN    glucagon (GLUCAGEN) injection 1 mg  1 mg IntraMUSCular PRN    dextrose 10 % infusion 0-250 mL  0-250 mL IntraVENous PRN    cefTRIAXone (ROCEPHIN) 1 g in 0.9% sodium chloride 10 mL IV syringe  1 g IntraVENous Q24H    lidocaine 4 % patch 1 Patch  1 Patch TransDERmal Q24H     ______________________________________________________________________  EXPECTED LENGTH OF STAY: 2d 21h  ACTUAL LENGTH OF STAY:          4                 Yelena Lujan MD

## 2022-05-05 NOTE — PROGRESS NOTES
Occupational Therapy    Chart reviewed, cleared by RN, patient with nausea most of the mourning despite zofran and complaint of \"sour\" reflux feeling. This happened after another surgery last year per patient. RN aware, patient declined EOB or any activity until this resolves, asking for therapy to return at the end of the day. Will follow up as able. Agustin Vizcaino, MS OTR/L

## 2022-05-05 NOTE — DISCHARGE INSTRUCTIONS
DISCHARGE INFORMATION    Patient: Ursula Miller MRN: [id number]  SSN: [id number]    YOB: 1960  Age: 65 y.o. Sex: female        Followup Plan:    [address] [facility] [facility] / Dr. Sandra Ing: Aquilla Boast   Nurse: Americo Dee   Office: [facility] [phone]       As a part of your procedure you have a ureteral stent in place which maintains proper drainage of your kidney. It bypasses any blockage from a kidney stone, or swelling within the ureter even if the stone was removed. Most patients still experience some discomfort with the stent, but not to the extent which was seen previously. Pain Medications  · You may have been given oral nacotics (Lortab, Percocet, Dilaudid, etc) for pain control. These should be used sparingly. You may not drive, work on these medications. Narcotics are constipating, and are counterproductive to resuming bowel function. When able to use over the counter Tylenol, this is a better choice. Expected Symptoms from Stent  · The stent rubs, so some blood in the urine will be seen. Note that this amount of blood can appear significant but is in reality very little. · When urinating expect to have increased discomfort in the back on the side of the stent. This comes from urine going from the bladder, up the ureter/stent and causing pressure/pain in the kidney area. · The stent can be quite irritating to the bladder and may cause symptoms of frequent/urgent urination or a sudden severe pain over the bladder with the urge to urinate. This is a bladder spasm, and if they are common another medication can help. · Many times strings are left attatched to the stent and come out though the urethra. This allows your doctor to remove the stent when appropriate without another procedure. In men it is usually taped to the penis, or sometime free floating. In women it is tucked in the vagina.  Leave this string alone. Avoid all sexual activity with the stent/string. If you experience fever > 101, uncontrolled pain/nauea/vomiting, or have continuous leakage of urine please contact our office. DISCHARGE INFORMATION    Patient: Paige Rivas MRN: [id number]  SSN: [id number]    YOB: 1960  Age: 65 y.o. Sex: female                              As a part of your procedure you have a ureteral stent in place which maintains proper drainage of your kidney. It bypasses any blockage from a kidney stone, or swelling within the ureter even if the stone was removed. Most patients still experience some discomfort with the stent, but not to the extent which was seen previously. Pain Medications  You may have been given oral nacotics (Lortab, Percocet, Dilaudid, etc) for pain control. These should be used sparingly. You may not drive, work on these medications. Narcotics are constipating, and are counterproductive to resuming bowel function. When able to use over the counter Tylenol, this is a better choice. Expected Symptoms from Stent  The stent rubs, so some blood in the urine will be seen. Note that this amount of blood can appear significant but is in reality very little. When urinating expect to have increased discomfort in the back on the side of the stent. This comes from urine going from the bladder, up the ureter/stent and causing pressure/pain in the kidney area. The stent can be quite irritating to the bladder and may cause symptoms of frequent/urgent urination or a sudden severe pain over the bladder with the urge to urinate. This is a bladder spasm, and if they are common another medication can help. Many times strings are left attatched to the stent and come out though the urethra. This allows your doctor to remove the stent when appropriate without another procedure. In men it is usually taped to the penis, or sometime free floating. In women it is tucked in the vagina.  Leave this string alone. Avoid all sexual activity with the stent/string. · If you experience fever > 101, uncontrolled pain/nauea/vomiting, or have continuous leakage of urine please contact our office.

## 2022-05-05 NOTE — WOUND CARE
WOCN Note:     Re-consulted for umbilicus    Chart shows:  Admitted for abdominal pain and kidney stones  Admitted on 5/1/22  History of DM     Assessment:   A&O x 4 and reports no pain. Able to turn independently. Pure Wick in use. Surface: pedro gel mattress     1.  POA partial thickness erosion inside deep navel - she reports aggressive cleaning  Visible base is red - no change from initial assessment earlier this week  Scant serosanguinous exudate  Cleaned and dusted with stoma powder       Wound Recommendations:    Navel: dust with stoma powder daily and as needed for comfort - powder left in room     Transition of Care: Plan to follow weekly and as needed while admitted to hospital.     HOLLY OconnorN, RN, [employer]  Certified Wound, Ostomy, Continence Nurse  office [phone]  Available via [facility]

## 2022-05-05 NOTE — PROGRESS NOTES
Patient: Richard Flores MRN: [id number]  SSN: [id number]    YOB: 1960  Age: 65 y.o. Sex: female        ADMITTED: 2022 to Jesus Arellano MD by Antionette Martinez DO for Hydronephrosis [N13.30]  POD# 1 Day Post-Op Procedure(s):  CYSTOSCOPY BILATERAL STENT PLACEMENT AND RETROGRADE PYELOGRAMS    Richard Flores is doing fair . She states no flank or abd discomfort . Discussed procedure and findings with pt . She ia ware she will need definitive stone treatment once urine sterile. Vss afebrile   Uop 1400   Cr  3.18 ( 3.04)   Hgb 9.0    Vitals: Temp (24hrs), Av °F (36.7 °C), Min:97.8 °F (36.6 °C), Max:98.3 °F (36.8 °C)    Blood pressure (!) 157/79, pulse 86, temperature 98.3 °F (36.8 °C), resp. rate 16, weight 158.8 kg (350 lb), SpO2 96 %. Intake and Output:   1901 -  0700  In: 750 [I.V.:750]  Out: 1801 [Urine:1800]  No intake/output data recorded. ROMA Output lats 24 hrs: No data found. ROMA Output last 8 hrs: No data found. BM over last 24 hrs: No data found. Exam:   EXAMINATION:     Appearance: well-developed NAD, obese    Conjunctiva/Lids: conjunctivae and lids normal   External Ears/Nose: normal no lesions or deformities   Neck: trachea midline   Respiratory Effort: breathing easily   Lower Extremity:  trace  edema   Abdomen/Flank: soft non-tender without masses; no CVA tenderness.  Skin tear at umbilicus    Liver/Spleen: no organomegaly   Hernia: no ventral hernia    Gait/Station: normal   Skin Inspection: warm and dry   Mood/Affect: normal           Labs:  CBC:   Lab Results   Component Value Date/Time    WBC 11.0 2022 12:27 AM    HCT 31.1 (L) 2022 12:27 AM    PLATELET 483  12:27 AM     BMP:   Lab Results   Component Value Date/Time    Glucose 121 (H) 2022 12:27 AM    Sodium 142 2022 12:27 AM    Potassium 4.9 2022 12:27 AM    Chloride 110 (H) 2022 12:27 AM    CO2 26 2022 12:27 AM    BUN 37 (H) 2022 12:27 AM Creatinine 3.18 (H) 05/05/2022 12:27 AM    Calcium 7.8 (L) 05/05/2022 12:27 AM     Cultures: Ucx - E.coli    Imaging: N/A  Assessment/Plan:     1. New R hydro with chronic L ureteral stent - s/p zulema stent exchange   Pt has L UPJ  10 mm stone , 7 mm L collecting system , R  1.2 cm UPJ stone . OP follow to be arranged   2. Cr 3.18 cont to monitor   3. UTI - Ucx E.coli - narrow abx based on susceptibilities      Urology will sign off for now   scheduled with Dr Eliezer Leung  6/6 at [phone] at [address] By: Dale Key.  Gerri Fitzgerald NP - May 5, 2022

## 2022-05-06 LAB
ANION GAP SERPL CALC-SCNC: 5 MMOL/L (ref 5–15)
BUN SERPL-MCNC: 32 MG/DL (ref 6–20)
BUN/CREAT SERPL: 12 (ref 12–20)
CALCIUM SERPL-MCNC: 8.4 MG/DL (ref 8.5–10.1)
CHLORIDE SERPL-SCNC: 110 MMOL/L (ref 97–108)
CO2 SERPL-SCNC: 26 MMOL/L (ref 21–32)
CREAT SERPL-MCNC: 2.69 MG/DL (ref 0.55–1.02)
GLUCOSE BLD STRIP.AUTO-MCNC: 126 MG/DL (ref 65–117)
GLUCOSE BLD STRIP.AUTO-MCNC: 145 MG/DL (ref 65–117)
GLUCOSE BLD STRIP.AUTO-MCNC: 169 MG/DL (ref 65–117)
GLUCOSE BLD STRIP.AUTO-MCNC: 174 MG/DL (ref 65–117)
GLUCOSE SERPL-MCNC: 114 MG/DL (ref 65–100)
POTASSIUM SERPL-SCNC: 4.5 MMOL/L (ref 3.5–5.1)
SERVICE CMNT-IMP: ABNORMAL
SODIUM SERPL-SCNC: 141 MMOL/L (ref 136–145)

## 2022-05-06 PROCEDURE — 82962 GLUCOSE BLOOD TEST: CPT

## 2022-05-06 PROCEDURE — 74011000250 HC RX REV CODE- 250: Performed by: STUDENT IN AN ORGANIZED HEALTH CARE EDUCATION/TRAINING PROGRAM

## 2022-05-06 PROCEDURE — 74011636637 HC RX REV CODE- 636/637: Performed by: UROLOGY

## 2022-05-06 PROCEDURE — 74011250636 HC RX REV CODE- 250/636: Performed by: STUDENT IN AN ORGANIZED HEALTH CARE EDUCATION/TRAINING PROGRAM

## 2022-05-06 PROCEDURE — 74011000250 HC RX REV CODE- 250: Performed by: UROLOGY

## 2022-05-06 PROCEDURE — 97530 THERAPEUTIC ACTIVITIES: CPT

## 2022-05-06 PROCEDURE — 36415 COLL VENOUS BLD VENIPUNCTURE: CPT

## 2022-05-06 PROCEDURE — 97110 THERAPEUTIC EXERCISES: CPT

## 2022-05-06 PROCEDURE — 80048 BASIC METABOLIC PNL TOTAL CA: CPT

## 2022-05-06 PROCEDURE — 74011250637 HC RX REV CODE- 250/637: Performed by: STUDENT IN AN ORGANIZED HEALTH CARE EDUCATION/TRAINING PROGRAM

## 2022-05-06 PROCEDURE — 65270000029 HC RM PRIVATE

## 2022-05-06 PROCEDURE — 74011250637 HC RX REV CODE- 250/637: Performed by: UROLOGY

## 2022-05-06 PROCEDURE — 97535 SELF CARE MNGMENT TRAINING: CPT

## 2022-05-06 RX ADMIN — HEPARIN SODIUM 5000 UNITS: 5000 INJECTION INTRAVENOUS; SUBCUTANEOUS at 21:12

## 2022-05-06 RX ADMIN — Medication 2 UNITS: at 17:20

## 2022-05-06 RX ADMIN — POLYETHYLENE GLYCOL 3350 17 G: 17 POWDER, FOR SOLUTION ORAL at 08:34

## 2022-05-06 RX ADMIN — HEPARIN SODIUM 5000 UNITS: 5000 INJECTION INTRAVENOUS; SUBCUTANEOUS at 06:39

## 2022-05-06 RX ADMIN — CALCIUM CARBONATE 200 MG: 500 TABLET, CHEWABLE ORAL at 17:20

## 2022-05-06 RX ADMIN — METHIMAZOLE 10 MG: 5 TABLET ORAL at 08:34

## 2022-05-06 RX ADMIN — HEPARIN SODIUM 5000 UNITS: 5000 INJECTION INTRAVENOUS; SUBCUTANEOUS at 14:20

## 2022-05-06 RX ADMIN — SODIUM CHLORIDE, PRESERVATIVE FREE 10 ML: 5 INJECTION INTRAVENOUS at 14:21

## 2022-05-06 RX ADMIN — PANTOPRAZOLE SODIUM 40 MG: 40 TABLET, DELAYED RELEASE ORAL at 06:39

## 2022-05-06 RX ADMIN — SENNOSIDES 8.6 MG: 8.6 TABLET, COATED ORAL at 08:34

## 2022-05-06 RX ADMIN — SODIUM CHLORIDE, PRESERVATIVE FREE 10 ML: 5 INJECTION INTRAVENOUS at 06:41

## 2022-05-06 RX ADMIN — CALCIUM CARBONATE 200 MG: 500 TABLET, CHEWABLE ORAL at 12:05

## 2022-05-06 RX ADMIN — WATER 1 G: 1 INJECTION INTRAMUSCULAR; INTRAVENOUS; SUBCUTANEOUS at 12:05

## 2022-05-06 RX ADMIN — SODIUM CHLORIDE, PRESERVATIVE FREE 10 ML: 5 INJECTION INTRAVENOUS at 21:12

## 2022-05-06 RX ADMIN — Medication 2 UNITS: at 12:05

## 2022-05-06 RX ADMIN — OXYBUTYNIN CHLORIDE 10 MG: 5 TABLET, EXTENDED RELEASE ORAL at 08:34

## 2022-05-06 RX ADMIN — SODIUM CHLORIDE, PRESERVATIVE FREE 10 ML: 5 INJECTION INTRAVENOUS at 05:04

## 2022-05-06 RX ADMIN — CALCIUM CARBONATE 200 MG: 500 TABLET, CHEWABLE ORAL at 06:40

## 2022-05-06 NOTE — PROGRESS NOTES
[address] [facility] Adult  Hospitalist Group                                                                                          Hospitalist Progress Note  Idania Zazueta MD  Answering service: [phone] from in house phone        Date of Service:  2022  NAME:  Miguel Tate  :  1960  MRN:  [id number]      Admission Summary:   Copied form admit: \" This is a recurrent problem. The current episode started more than 1 week ago. The problem occurs hourly. The problem has been gradually worsening. The quality of the pain is sharp. The pain is moderate. Associated symptoms include diarrhea. Pertinent negatives include no anorexia, no fever, no belching, no flatus, no hematochezia, no melena, no nausea, no vomiting, no constipation, no dysuria, no frequency, no hematuria, no headaches, no arthralgias, no myalgias, no trauma, no chest pain and no back pain. The pain is worsened by certain positions. The pain is relieved by nothing. \"    Interval history / Subjective:   Status post bilateral ureteral stent placement    Patient seen examined at bedside earlier. Nausea resolved today. Massiel Peterson to work with PT. No significant abdominal pain     Assessment & Plan: Active Problems:    Hydronephrosis (2022)  - for stenting or prcts-    -urology following, need to follow-up for stone rremoval outpatient       DM (diabetes mellitus) ([facility] [address] (2022)  - SSI . Broad coverage as indicated    Nephrolithiasis  CKD stage IV  Cont to monitor, around baseline 3's    Nausea   -resolved, might be 2/2 anesthesia after stent placements    PT OT recommending IPR    Medically ready for discharge.   Pending IPR placement CM working on this       Code status: full   DVT ppx : Heparin subcu     Hospital Problems  Date Reviewed: 2022          Codes Class Noted POA    DM (diabetes mellitus) ([facility] [address] ICD-10-CM: E11.9  ICD-9-CM: 250.00  2022 Unknown        Hydronephrosis ICD-10-CM: N13.30  ICD-9-CM: 037 5/1/2022 Unknown            Exam  General: No acute distress weak resting in bed morbidly obese  CVS: Regular rate and rhythm  Pulm: Clear to auscultation bilaterally  Abdomen: Soft obese nontender positive bowel sounds no flank tenderness  Extremities no peripheral extremity edema no rash or lesion  Neuro: A&O x3 cranial nerves II through XII grossly intact          Data Review:    Review and/or order of clinical lab test      Labs:     Recent Labs     05/05/22  0027 05/04/22  0043   WBC 11.0 12.1*   HGB 9.0* 9.5*   HCT 31.1* 33.0*    254     Recent Labs     05/06/22  0600 05/05/22  0027 05/04/22  0043    142 141   K 4.5 4.9 4.7   * 110* 112*   CO2 26 26 24   BUN 32* 37* 39*   CREA 2.69* 3.18* 3.09*   * 121* 140*   CA 8.4* 7.8* 8.3*     No results for input(s): ALT, AP, TBIL, TBILI, TP, ALB, GLOB, GGT, AML, LPSE in the last 72 hours. No lab exists for component: SGOT, GPT, AMYP, HLPSE  No results for input(s): INR, PTP, APTT, INREXT, INREXT in the last 72 hours. No results for input(s): FE, TIBC, PSAT, FERR in the last 72 hours. No results found for: FOL, RBCF   No results for input(s): PH, PCO2, PO2 in the last 72 hours. No results for input(s): CPK, CKNDX, TROIQ in the last 72 hours.     No lab exists for component: CPKMB  No results found for: CHOL, CHOLX, CHLST, CHOLV, HDL, HDLP, LDL, LDLC, DLDLP, TGLX, TRIGL, TRIGP, CHHD, CHHDX  Lab Results   Component Value Date/Time    Glucose (POC) 174 (H) 05/06/2022 11:44 AM    Glucose (POC) 126 (H) 05/06/2022 06:25 AM    Glucose (POC) 159 (H) 05/05/2022 10:15 PM    Glucose (POC) 117 05/05/2022 04:42 PM    Glucose (POC) 115 05/05/2022 11:28 AM     Lab Results   Component Value Date/Time    Color YELLOW/STRAW 05/01/2022 11:22 PM    Appearance CLEAR 05/01/2022 11:22 PM    Specific gravity 1.015 05/01/2022 11:22 PM    Specific gravity 1.012 07/27/2021 12:04 PM    pH (UA) 8.5 (H) 05/01/2022 11:22 PM    Protein 100 (A) 05/01/2022 11:22 PM Glucose Negative 05/01/2022 11:22 PM    Ketone Negative 05/01/2022 11:22 PM    Bilirubin Negative 05/01/2022 11:22 PM    Urobilinogen 0.2 05/01/2022 11:22 PM    Nitrites Positive (A) 05/01/2022 11:22 PM    Leukocyte Esterase MODERATE (A) 05/01/2022 11:22 PM    Epithelial cells FEW 05/01/2022 11:22 PM    Bacteria 4+ (A) 05/01/2022 11:22 PM    WBC  05/01/2022 11:22 PM    RBC 20-50 05/01/2022 11:22 PM         Medications Reviewed:     Current Facility-Administered Medications   Medication Dose Route Frequency    cefTRIAXone (ROCEPHIN) 1 g in sterile water (preservative free) 10 mL IV syringe  1 g IntraVENous Q24H    prochlorperazine (COMPAZINE) with saline injection 10 mg  10 mg IntraVENous Q6H PRN    ondansetron (ZOFRAN) injection 4 mg  4 mg IntraVENous Q6H PRN    pantoprazole (PROTONIX) tablet 40 mg  40 mg Oral ACB    calcium carbonate (TUMS) chewable tablet 200 mg [elemental]  200 mg Oral TID WITH MEALS    polyethylene glycol (MIRALAX) packet 17 g  17 g Oral DAILY    senna (SENOKOT) tablet 8.6 mg  1 Tablet Oral DAILY    heparin (porcine) injection 5,000 Units  5,000 Units SubCUTAneous Q8H    sodium chloride (NS) flush 5-40 mL  5-40 mL IntraVENous Q8H    sodium chloride (NS) flush 5-40 mL  5-40 mL IntraVENous PRN    lidocaine (PF) (XYLOCAINE) 10 mg/mL (1 %) injection 0.1 mL  0.1 mL SubCUTAneous PRN    traMADoL (ULTRAM) tablet 50 mg  50 mg Oral Q6H PRN    oxybutynin chloride XL (DITROPAN XL) tablet 10 mg  10 mg Oral DAILY    methIMAzole (TAPAZOLE) tablet 10 mg  10 mg Oral DAILY    polyethylene glycol (MIRALAX) packet 17 g  17 g Oral DAILY PRN    insulin lispro (HUMALOG) injection   SubCUTAneous AC&HS    glucose chewable tablet 16 g  4 Tablet Oral PRN    glucagon (GLUCAGEN) injection 1 mg  1 mg IntraMUSCular PRN    dextrose 10 % infusion 0-250 mL  0-250 mL IntraVENous PRN    lidocaine 4 % patch 1 Patch  1 Patch TransDERmal Q24H ______________________________________________________________________  EXPECTED LENGTH OF STAY: 2d 21h  ACTUAL LENGTH OF STAY:          Christopher Jones MD

## 2022-05-06 NOTE — PROGRESS NOTES
Problem: Self Care Deficits Care Plan (Adult)  Goal: *Acute Goals and Plan of Care (Insert Text)  Description: FUNCTIONAL STATUS PRIOR TO ADMISSION: Patient was able to complete grooming, UB bathing, and UB dressing at EOB. She has not stood in one month but was able to briefly stand from her hosptal bed. She has been waiting for a wc to come for a couple months. She previously had [facility] services and it had ended and she is eligible again and has asked them to wait until she returns home. She vaccums around her bed while sitting EOB then switching sides. HOME SUPPORT: The patient lived with sister who assists with toileting tasks at bed level 3x/day, LB bathing and IADL tasks. Occupational Therapy Goals  Initiated 5/2/2022  1. Patient will perform grooming and UB bathing/dressing routine sitting EOB with supervision/set-up within 7 day(s). 2.  Patient will perform sit <> stand with maximal assistance to prepare for toileting transfers and LB care tasks within 7 day(s). 3.  Patient will perform lower body dressing with maximal assistance within 7 day(s). 4.  Patient will perform lateral transfer with toilet transfers to drop arm BSC with maximal assistance within 7 day(s). 5.  Patient will perform all aspects of toileting with maximal assistance within 7 day(s). 6.  Patient will participate in upper extremity therapeutic exercise/activities with supervision/set-up for 5 minutes within 7 day(s). Outcome: Progressing Towards Goal     OCCUPATIONAL THERAPY TREATMENT  Patient: Husam Carvalho (7 y.o. female)  Date: 5/6/2022  Diagnosis: Hydronephrosis [N13.30] <principal problem not specified>  Procedure(s) (LRB):  CYSTOSCOPY BILATERAL STENT PLACEMENT AND RETROGRADE PYELOGRAMS (Bilateral) 2 Days Post-Op  Precautions:    Chart, occupational therapy assessment, plan of care, and goals were reviewed.     ASSESSMENT  Patient continues with skilled OT services and is progressing towards goals however remains limited by generalized weakness, body habitus & L sided abdomina hernia, as well as decreased activity tolerance, & distal lower body access with EOB lower body ADLs & UE exercises, bed mobility, and compensatory ADL techniques in supine this session. She continues to be highly motivated to progress, good initiation of compensatory techniques with bed rails however increased difficulty against gravity, limited distal lower body access continued, discussed/reviewed lower body AE. She remains an excellent IPR candidate, able to tolerate 3 hours/day of therapy, motivated to progress to her prior OOB mobility baseline with good potential.     Current Level of Function Impacting Discharge (ADLs): Min-Max A for ADLs, SPV-Min A for bed mobility, increased assist x2 for standing    Other factors to consider for discharge: PMH, PLOF         PLAN :  Patient continues to benefit from skilled intervention to address the above impairments. Continue treatment per established plan of care to address goals. Recommend with staff: Recommend with nursing, ADLs with assist PRN, modified bed in chair position 3x/day, and toileting via bedpan/rolling in supine. Thank you for completing as able in order to maintain patient strength, endurance and independence. Recommendation for discharge: (in order for the patient to meet his/her long term goals)  Therapy 3 hours per day 5-7 days per week    This discharge recommendation:  Has been made in collaboration with the attending provider and/or case management    IF patient discharges home will need the following DME: To be determined (TBD)         SUBJECTIVE:   Patient stated I want to get back to where I was walking with just a cane. It just feels like every time I take a step forward I take 3 back, but I'm motivated! I want to get better! \"    OBJECTIVE DATA SUMMARY:   Cognitive/Behavioral Status:  Neurologic State: Alert  Orientation Level: Oriented X4  Cognition: Appropriate decision making; Appropriate for age attention/concentration; Appropriate safety awareness; Follows commands  Perception: Appears intact  Perseveration: No perseveration noted  Safety/Judgement: Awareness of environment; Fall prevention    Functional Mobility and Transfers for ADLs:  Bed Mobility:  Rolling: Contact guard assistance;Minimum assistance (up to Min A for complete sidelying L>R)  Supine to Sit: Supervision  Sit to Supine: Supervision  Scooting: Supervision; Adaptive equipment (slight trendelenberg with bed rails supine, unable EOB)    Transfers:  Sit to Stand: Minimum assistance;Assist x2          Balance:  Sitting: Impaired  Sitting - Static: Good (unsupported)  Sitting - Dynamic: Good (unsupported); Fair (occasional)  Standing: Impaired; With support  Standing - Static: Constant support; Fair    ADL Intervention:                 Lower Body Bathing  Perineal  : Moderate assistance  Position Performed: Supine  Cues: Tactile cues provided;Verbal cues provided;Visual cues provided  Lower Body : Moderate assistance (A for distal reach to BLEs, improved some w/ LLE prop)  Position Performed: Seated edge of bed  Cues: Physical assistance;Visual cues provided; Tactile cues provided;Verbal cues provided         Lower Body Dressing Assistance  Underpants: Compensatory technique training  Pants With Elastic Waist: Compensatory technique training  Socks: Total assistance (dependent) (discussed sock aid, familiar)  Leg Crossed Method Used: No  Position Performed: Seated edge of bed;Supine  Cues: Tactile cues provided;Physical assistance;Verbal cues provided;Visual cues provided    Toileting  Toileting Assistance: Moderate assistance  Bladder Hygiene: Moderate assistance  Bowel Hygiene: Moderate assistance (incr A when on L side)  Clothing Management: Maximum assistance  Cues: Tactile cues provided;Verbal cues provided;Visual cues provided    Cognitive Retraining  Safety/Judgement: Awareness of environment; Fall prevention    Therapeutic Exercises:   Lateral tricep dips EOB with initial HOB elevation on the L, progressed with improved techniques to flat bed, x5 on RUE & LUE, increased ease with RUE    Pain:  Back pain, not limiting engagement    Activity Tolerance: WNL    After treatment patient left in no apparent distress:   Supine in bed, Call bell within reach, Side rails x 3, and HOB elevated    COMMUNICATION/COLLABORATION:   The patients plan of care was discussed with: Physical therapist and Registered nurse.      SAMANTHA Patricia, OTR/L  Time Calculation: 27 mins

## 2022-05-06 NOTE — PROGRESS NOTES
JOESPH:  RUR: 12 Low    Disposition:  IPR    Referrals sent to local IPRs. [facility]- still reviewing. No bed till next week. Will consider after stents (5/12/22)  [address]- denied  [address]- no beds until Tuesday  [facility]- will review after stents    CM will continue to follow.   Amos April, Angelo[facility]ens, [address]

## 2022-05-06 NOTE — PROGRESS NOTES
Problem: Pressure Injury - Risk of  Goal: *Prevention of pressure injury  Description: Document Murray Scale and appropriate interventions in the flowsheet. Outcome: Progressing Towards Goal  Note: Pressure Injury Interventions:  Sensory Interventions: Assess changes in LOC,Assess need for specialty bed    Moisture Interventions: Apply protective barrier, creams and emollients,Absorbent underpads,Internal/External urinary devices    Activity Interventions: Increase time out of bed    Mobility Interventions: Assess need for specialty bed    Nutrition Interventions: Document food/fluid/supplement intake    Friction and Shear Interventions: Apply protective barrier, creams and emollients                Problem: Patient Education: Go to Patient Education Activity  Goal: Patient/Family Education  Outcome: Progressing Towards Goal     Problem: Falls - Risk of  Goal: *Absence of Falls  Description: Document Chiki Walsh Fall Risk and appropriate interventions in the flowsheet.   Outcome: Progressing Towards Goal  Note: Fall Risk Interventions:  Mobility Interventions: Communicate number of staff needed for ambulation/transfer         Medication Interventions: Patient to call before getting OOB    Elimination Interventions: Call light in reach              Problem: Patient Education: Go to Patient Education Activity  Goal: Patient/Family Education  Outcome: Progressing Towards Goal     Problem: Patient Education: Go to Patient Education Activity  Goal: Patient/Family Education  Outcome: Progressing Towards Goal     Problem: Patient Education: Go to Patient Education Activity  Goal: Patient/Family Education  Outcome: Progressing Towards Goal

## 2022-05-06 NOTE — PROGRESS NOTES
Problem: Mobility Impaired (Adult and Pediatric)  Goal: *Acute Goals and Plan of Care (Insert Text)  Description: FUNCTIONAL STATUS PRIOR TO ADMISSION: overall bed bound; gets self up to EOB and back. Had been getting to standing at bedside up until one month ago to stand briefly. Bathes self    HOME SUPPORT PRIOR TO ADMISSION: currently living with sister who assists as needed with baths; provides meals    Physical Therapy Goals  Initiated 5/2/2022  1. Patient will move from supine to sit and sit to supine  and roll side to side in bed with modified independence within 7 day(s). 2.  Patient will transfer from bed to chair and chair to bed with maximal assistance ,lateral transfer, using the least restrictive device within 7 day(s). 3.  Patient will perform sit to stand with maximal assistance within 7 day(s). Outcome: Progressing Towards Goal   PHYSICAL THERAPY TREATMENT  Patient: Miguel Tate (20 y.o. female)  Date: 5/6/2022  Diagnosis: Hydronephrosis [N13.30] <principal problem not specified>  Procedure(s) (LRB):  CYSTOSCOPY BILATERAL STENT PLACEMENT AND RETROGRADE PYELOGRAMS (Bilateral) 2 Days Post-Op  Precautions:    Chart, physical therapy assessment, plan of care and goals were reviewed. ASSESSMENT  Patient continues with skilled PT services and is progressing towards goals. Patient most appreciative and motivated for therapy. Able to get to standing 4x today with each standing time as follows: 30, 50, 45,40 seconds. Reliant on pulling up vs pushing up as well as support at knees for standing. Still with complaint of right LE's calf pain with standing but less so today but at end up session, low back pain. Anticipate patient would do well in inpatient rehab as she is very motivated to get back to more functional mobility and returning home vs with sister. .     Current Level of Function Impacting Discharge (mobility/balance): min to mod assist 1-2    Other factors to consider for discharge: limited therapy at home which stopped in October. PLAN :  Patient continues to benefit from skilled intervention to address the above impairments. Continue treatment per established plan of care. to address goals. Recommendation for discharge: (in order for the patient to meet his/her long term goals)  Therapy 3 hours per day 5-7 days per week    This discharge recommendation:  Has been made in collaboration with the attending provider and/or case management    IF patient discharges home will need the following DME: wheelchair       SUBJECTIVE:   Patient stated I felt so bad yesterday and threw up 6x. Its from the anesthesia. [facility]    OBJECTIVE DATA SUMMARY:   Critical Behavior:  Neurologic State: Alert  Orientation Level: Oriented X4        Functional Mobility Training:  Bed Mobility:     Supine to Sit: Supervision              Transfers:  Sit to Stand: Minimum assistance;Assist x2                                Balance:  Sitting: Intact  Standing: Impaired; With support  Standing - Static: Constant support; Fair    Activity Tolerance:   Good    After treatment patient left in no apparent distress:   Call bell within reach and sitting EOB    COMMUNICATION/COLLABORATION:   The patients plan of care was discussed with: Occupational therapist and Registered nurse.      Molly Wilson, PT   Time Calculation: 27 mins

## 2022-05-07 LAB
ANION GAP SERPL CALC-SCNC: 6 MMOL/L (ref 5–15)
BUN SERPL-MCNC: 34 MG/DL (ref 6–20)
BUN/CREAT SERPL: 12 (ref 12–20)
CALCIUM SERPL-MCNC: 8.6 MG/DL (ref 8.5–10.1)
CHLORIDE SERPL-SCNC: 110 MMOL/L (ref 97–108)
CO2 SERPL-SCNC: 26 MMOL/L (ref 21–32)
CREAT SERPL-MCNC: 2.93 MG/DL (ref 0.55–1.02)
GLUCOSE BLD STRIP.AUTO-MCNC: 136 MG/DL (ref 65–117)
GLUCOSE BLD STRIP.AUTO-MCNC: 142 MG/DL (ref 65–117)
GLUCOSE BLD STRIP.AUTO-MCNC: 166 MG/DL (ref 65–117)
GLUCOSE SERPL-MCNC: 140 MG/DL (ref 65–100)
POTASSIUM SERPL-SCNC: 4.8 MMOL/L (ref 3.5–5.1)
SERVICE CMNT-IMP: ABNORMAL
SODIUM SERPL-SCNC: 142 MMOL/L (ref 136–145)

## 2022-05-07 PROCEDURE — 74011250636 HC RX REV CODE- 250/636: Performed by: STUDENT IN AN ORGANIZED HEALTH CARE EDUCATION/TRAINING PROGRAM

## 2022-05-07 PROCEDURE — 74011000250 HC RX REV CODE- 250: Performed by: UROLOGY

## 2022-05-07 PROCEDURE — 74011000250 HC RX REV CODE- 250: Performed by: STUDENT IN AN ORGANIZED HEALTH CARE EDUCATION/TRAINING PROGRAM

## 2022-05-07 PROCEDURE — 36415 COLL VENOUS BLD VENIPUNCTURE: CPT

## 2022-05-07 PROCEDURE — 74011250637 HC RX REV CODE- 250/637: Performed by: UROLOGY

## 2022-05-07 PROCEDURE — 74011250637 HC RX REV CODE- 250/637: Performed by: STUDENT IN AN ORGANIZED HEALTH CARE EDUCATION/TRAINING PROGRAM

## 2022-05-07 PROCEDURE — 80048 BASIC METABOLIC PNL TOTAL CA: CPT

## 2022-05-07 PROCEDURE — 82962 GLUCOSE BLOOD TEST: CPT

## 2022-05-07 PROCEDURE — 65270000029 HC RM PRIVATE

## 2022-05-07 RX ORDER — CALCIUM CARBONATE 200(500)MG
200 TABLET,CHEWABLE ORAL
Status: DISCONTINUED | OUTPATIENT
Start: 2022-05-07 | End: 2022-05-11 | Stop reason: HOSPADM

## 2022-05-07 RX ORDER — ACETAMINOPHEN 325 MG/1
650 TABLET ORAL
Status: DISCONTINUED | OUTPATIENT
Start: 2022-05-07 | End: 2022-05-11 | Stop reason: HOSPADM

## 2022-05-07 RX ADMIN — OXYBUTYNIN CHLORIDE 10 MG: 5 TABLET, EXTENDED RELEASE ORAL at 08:57

## 2022-05-07 RX ADMIN — CALCIUM CARBONATE 200 MG: 500 TABLET, CHEWABLE ORAL at 12:04

## 2022-05-07 RX ADMIN — SENNOSIDES 8.6 MG: 8.6 TABLET, COATED ORAL at 08:57

## 2022-05-07 RX ADMIN — HEPARIN SODIUM 5000 UNITS: 5000 INJECTION INTRAVENOUS; SUBCUTANEOUS at 20:36

## 2022-05-07 RX ADMIN — HEPARIN SODIUM 5000 UNITS: 5000 INJECTION INTRAVENOUS; SUBCUTANEOUS at 06:04

## 2022-05-07 RX ADMIN — CALCIUM CARBONATE 200 MG: 500 TABLET, CHEWABLE ORAL at 06:04

## 2022-05-07 RX ADMIN — HEPARIN SODIUM 5000 UNITS: 5000 INJECTION INTRAVENOUS; SUBCUTANEOUS at 13:57

## 2022-05-07 RX ADMIN — SODIUM CHLORIDE, PRESERVATIVE FREE 10 ML: 5 INJECTION INTRAVENOUS at 06:05

## 2022-05-07 RX ADMIN — TRAMADOL HYDROCHLORIDE 50 MG: 50 TABLET, COATED ORAL at 16:37

## 2022-05-07 RX ADMIN — POLYETHYLENE GLYCOL 3350 17 G: 17 POWDER, FOR SOLUTION ORAL at 08:57

## 2022-05-07 RX ADMIN — ACETAMINOPHEN 650 MG: 325 TABLET ORAL at 12:04

## 2022-05-07 RX ADMIN — ACETAMINOPHEN 650 MG: 325 TABLET ORAL at 16:39

## 2022-05-07 RX ADMIN — SODIUM CHLORIDE, PRESERVATIVE FREE 10 ML: 5 INJECTION INTRAVENOUS at 20:36

## 2022-05-07 RX ADMIN — SODIUM CHLORIDE, PRESERVATIVE FREE 10 ML: 5 INJECTION INTRAVENOUS at 12:06

## 2022-05-07 RX ADMIN — WATER 1 G: 1 INJECTION INTRAMUSCULAR; INTRAVENOUS; SUBCUTANEOUS at 10:26

## 2022-05-07 RX ADMIN — TRAMADOL HYDROCHLORIDE 50 MG: 50 TABLET, COATED ORAL at 10:26

## 2022-05-07 RX ADMIN — METHIMAZOLE 10 MG: 5 TABLET ORAL at 08:57

## 2022-05-07 RX ADMIN — PANTOPRAZOLE SODIUM 40 MG: 40 TABLET, DELAYED RELEASE ORAL at 06:05

## 2022-05-07 NOTE — PROGRESS NOTES
Transition of Care Plan: IPR ([facility] following)    RUR: 12% low    PCP F/U:    Disposition:  IPR ([facility] following)    Transportation: BLS    Main Contact: sister Chelsea Noe [phone]    1502: Attending has asked this CM see why IPR cannot accept since patient already has had their stents. Spoke with liaison at [facility], but does not know patient's case and is only covering for the weekend. No beds available. [facility] likely have no beds available either, but have sent them to a message to see who was covering. 1608: Per attending, patient is not going to be restented by urology IP. May have her stone removal OP later. IPR should review for acceptance.      Silva Bryan RN, CRM

## 2022-05-07 NOTE — PROGRESS NOTES
Problem: Pressure Injury - Risk of  Goal: *Prevention of pressure injury  Description: Document Murray Scale and appropriate interventions in the flowsheet. Outcome: Progressing Towards Goal  Note: Pressure Injury Interventions:  Sensory Interventions: Assess changes in LOC    Moisture Interventions: Apply protective barrier, creams and emollients,Assess need for specialty bed    Activity Interventions: Assess need for specialty bed,PT/OT evaluation    Mobility Interventions: Assess need for specialty bed,Float heels    Nutrition Interventions: Document food/fluid/supplement intake    Friction and Shear Interventions: Apply protective barrier, creams and emollients                Problem: Falls - Risk of  Goal: *Absence of Falls  Description: Document Oh Fall Risk and appropriate interventions in the flowsheet.   Outcome: Progressing Towards Goal  Note: Fall Risk Interventions:  Mobility Interventions: Communicate number of staff needed for ambulation/transfer         Medication Interventions: Teach patient to arise slowly    Elimination Interventions: Call light in reach

## 2022-05-07 NOTE — PROGRESS NOTES
[facility] Adult  Hospitalist Group                                                                                          Hospitalist Progress Note  Josselyn Hughes MD  Answering service: [phone] from in house phone        Date of Service:  2022  NAME:  Paige Rivas  :  1960  MRN:  [id number]      Admission Summary:   Copied form admit: \" This is a recurrent problem. The current episode started more than 1 week ago. The problem occurs hourly. The problem has been gradually worsening. The quality of the pain is sharp. The pain is moderate. Associated symptoms include diarrhea. Pertinent negatives include no anorexia, no fever, no belching, no flatus, no hematochezia, no melena, no nausea, no vomiting, no constipation, no dysuria, no frequency, no hematuria, no headaches, no arthralgias, no myalgias, no trauma, no chest pain and no back pain. The pain is worsened by certain positions. The pain is relieved by nothing. \"    Interval history / Subjective:   Status post bilateral ureteral stent placement    Patient seen examined at bedside earlier. Feels well, has yet to have bm,    No significant abdominal pain     Assessment & Plan: Active Problems:    Hydronephrosis (2022)  - s/p b/l stenting     -urology following, will need to follow-up for stone removal outpatient once infection treated  -will complete IV rocephin urine culture E. Coli 7 days therapy         DM (diabetes mellitus) ([facility] (2022)  - SSI . Broad coverage as indicated    Nephrolithiasis  CKD stage IV  Cont to monitor, around baseline 3's    Nausea   -resolved, might be 2/2 anesthesia after stent placements    Constipation   Standing MiraLAX Senokot  -if not effective will consider lactulose     PT OT recommending IPR    Medically ready for discharge.   Pending IPR placement CM working on this       Code status: full   DVT ppx : Heparin subcu     Hospital Problems  Date Reviewed: 2022 Codes Class Noted POA    DM (diabetes mellitus) ([facility] ICD-10-CM: E11.9  ICD-9-CM: 250.00  5/2/2022 Unknown        Hydronephrosis ICD-10-CM: N13.30  ICD-9-CM: 591  5/1/2022 Unknown            Exam  General: No acute distress resting in bed morbidly obese  CVS: Regular rate and rhythm  Pulm: Clear to auscultation bilaterally  Abdomen: Soft obese nontender positive bowel sounds no flank tenderness  Extremities no peripheral extremity edema no rash or lesion  Neuro: A&O x3 cranial nerves II through XII grossly intact          Data Review:    Review and/or order of clinical lab test      Labs:     Recent Labs     05/05/22  0027   WBC 11.0   HGB 9.0*   HCT 31.1*        Recent Labs     05/07/22  0044 05/06/22  0600 05/05/22  0027    141 142   K 4.8 4.5 4.9   * 110* 110*   CO2 26 26 26   BUN 34* 32* 37*   CREA 2.93* 2.69* 3.18*   * 114* 121*   CA 8.6 8.4* 7.8*     No results for input(s): ALT, AP, TBIL, TBILI, TP, ALB, GLOB, GGT, AML, LPSE in the last 72 hours. No lab exists for component: SGOT, GPT, AMYP, HLPSE  No results for input(s): INR, PTP, APTT, INREXT, INREXT in the last 72 hours. No results for input(s): FE, TIBC, PSAT, FERR in the last 72 hours. No results found for: FOL, RBCF   No results for input(s): PH, PCO2, PO2 in the last 72 hours. No results for input(s): CPK, CKNDX, TROIQ in the last 72 hours.     No lab exists for component: CPKMB  No results found for: CHOL, CHOLX, CHLST, CHOLV, HDL, HDLP, LDL, LDLC, DLDLP, TGLX, TRIGL, TRIGP, CHHD, CHHDX  Lab Results   Component Value Date/Time    Glucose (POC) 142 (H) 05/07/2022 11:39 AM    Glucose (POC) 145 (H) 05/06/2022 09:08 PM    Glucose (POC) 169 (H) 05/06/2022 04:41 PM    Glucose (POC) 174 (H) 05/06/2022 11:44 AM    Glucose (POC) 126 (H) 05/06/2022 06:25 AM     Lab Results   Component Value Date/Time    Color YELLOW/STRAW 05/01/2022 11:22 PM    Appearance CLEAR 05/01/2022 11:22 PM    Specific gravity 1.015 05/01/2022 11:22 PM    Specific gravity 1.012 07/27/2021 12:04 PM    pH (UA) 8.5 (H) 05/01/2022 11:22 PM    Protein 100 (A) 05/01/2022 11:22 PM    Glucose Negative 05/01/2022 11:22 PM    Ketone Negative 05/01/2022 11:22 PM    Bilirubin Negative 05/01/2022 11:22 PM    Urobilinogen 0.2 05/01/2022 11:22 PM    Nitrites Positive (A) 05/01/2022 11:22 PM    Leukocyte Esterase MODERATE (A) 05/01/2022 11:22 PM    Epithelial cells FEW 05/01/2022 11:22 PM    Bacteria 4+ (A) 05/01/2022 11:22 PM    WBC  05/01/2022 11:22 PM    RBC 20-50 05/01/2022 11:22 PM         Medications Reviewed:     Current Facility-Administered Medications   Medication Dose Route Frequency    acetaminophen (TYLENOL) tablet 650 mg  650 mg Oral Q4H PRN    cefTRIAXone (ROCEPHIN) 1 g in sterile water (preservative free) 10 mL IV syringe  1 g IntraVENous Q24H    prochlorperazine (COMPAZINE) with saline injection 10 mg  10 mg IntraVENous Q6H PRN    ondansetron (ZOFRAN) injection 4 mg  4 mg IntraVENous Q6H PRN    pantoprazole (PROTONIX) tablet 40 mg  40 mg Oral ACB    calcium carbonate (TUMS) chewable tablet 200 mg [elemental]  200 mg Oral TID WITH MEALS    polyethylene glycol (MIRALAX) packet 17 g  17 g Oral DAILY    senna (SENOKOT) tablet 8.6 mg  1 Tablet Oral DAILY    heparin (porcine) injection 5,000 Units  5,000 Units SubCUTAneous Q8H    sodium chloride (NS) flush 5-40 mL  5-40 mL IntraVENous Q8H    sodium chloride (NS) flush 5-40 mL  5-40 mL IntraVENous PRN    lidocaine (PF) (XYLOCAINE) 10 mg/mL (1 %) injection 0.1 mL  0.1 mL SubCUTAneous PRN    traMADoL (ULTRAM) tablet 50 mg  50 mg Oral Q6H PRN    oxybutynin chloride XL (DITROPAN XL) tablet 10 mg  10 mg Oral DAILY    methIMAzole (TAPAZOLE) tablet 10 mg  10 mg Oral DAILY    polyethylene glycol (MIRALAX) packet 17 g  17 g Oral DAILY PRN    insulin lispro (HUMALOG) injection   SubCUTAneous AC&HS    glucose chewable tablet 16 g  4 Tablet Oral PRN    glucagon (GLUCAGEN) injection 1 mg  1 mg IntraMUSCular PRN    dextrose 10 % infusion 0-250 mL  0-250 mL IntraVENous PRN    lidocaine 4 % patch 1 Patch  1 Patch TransDERmal Q24H     ______________________________________________________________________  EXPECTED LENGTH OF STAY: 2d 21h  ACTUAL LENGTH OF STAY:          Lucía Carmona MD

## 2022-05-08 LAB
ANION GAP SERPL CALC-SCNC: 4 MMOL/L (ref 5–15)
BUN SERPL-MCNC: 30 MG/DL (ref 6–20)
BUN/CREAT SERPL: 11 (ref 12–20)
CALCIUM SERPL-MCNC: 8.6 MG/DL (ref 8.5–10.1)
CHLORIDE SERPL-SCNC: 110 MMOL/L (ref 97–108)
CO2 SERPL-SCNC: 27 MMOL/L (ref 21–32)
CREAT SERPL-MCNC: 2.69 MG/DL (ref 0.55–1.02)
GLUCOSE BLD STRIP.AUTO-MCNC: 123 MG/DL (ref 65–117)
GLUCOSE BLD STRIP.AUTO-MCNC: 138 MG/DL (ref 65–117)
GLUCOSE BLD STRIP.AUTO-MCNC: 162 MG/DL (ref 65–117)
GLUCOSE BLD STRIP.AUTO-MCNC: 192 MG/DL (ref 65–117)
GLUCOSE SERPL-MCNC: 140 MG/DL (ref 65–100)
POTASSIUM SERPL-SCNC: 4.8 MMOL/L (ref 3.5–5.1)
SERVICE CMNT-IMP: ABNORMAL
SODIUM SERPL-SCNC: 141 MMOL/L (ref 136–145)

## 2022-05-08 PROCEDURE — 82962 GLUCOSE BLOOD TEST: CPT

## 2022-05-08 PROCEDURE — 74011250637 HC RX REV CODE- 250/637: Performed by: NURSE PRACTITIONER

## 2022-05-08 PROCEDURE — 65270000029 HC RM PRIVATE

## 2022-05-08 PROCEDURE — 74011000250 HC RX REV CODE- 250: Performed by: STUDENT IN AN ORGANIZED HEALTH CARE EDUCATION/TRAINING PROGRAM

## 2022-05-08 PROCEDURE — 74011250637 HC RX REV CODE- 250/637: Performed by: UROLOGY

## 2022-05-08 PROCEDURE — 36415 COLL VENOUS BLD VENIPUNCTURE: CPT

## 2022-05-08 PROCEDURE — 74011000250 HC RX REV CODE- 250: Performed by: UROLOGY

## 2022-05-08 PROCEDURE — 74011636637 HC RX REV CODE- 636/637: Performed by: UROLOGY

## 2022-05-08 PROCEDURE — 74011250636 HC RX REV CODE- 250/636: Performed by: NURSE PRACTITIONER

## 2022-05-08 PROCEDURE — 74011250637 HC RX REV CODE- 250/637: Performed by: STUDENT IN AN ORGANIZED HEALTH CARE EDUCATION/TRAINING PROGRAM

## 2022-05-08 PROCEDURE — 74011250636 HC RX REV CODE- 250/636: Performed by: STUDENT IN AN ORGANIZED HEALTH CARE EDUCATION/TRAINING PROGRAM

## 2022-05-08 PROCEDURE — 80048 BASIC METABOLIC PNL TOTAL CA: CPT

## 2022-05-08 RX ORDER — FACIAL-BODY WIPES
10 EACH TOPICAL
Status: COMPLETED | OUTPATIENT
Start: 2022-05-08 | End: 2022-05-08

## 2022-05-08 RX ADMIN — WATER 1 G: 1 INJECTION INTRAMUSCULAR; INTRAVENOUS; SUBCUTANEOUS at 14:43

## 2022-05-08 RX ADMIN — Medication 2 UNITS: at 06:40

## 2022-05-08 RX ADMIN — METHIMAZOLE 10 MG: 5 TABLET ORAL at 10:00

## 2022-05-08 RX ADMIN — HEPARIN SODIUM 5000 UNITS: 5000 INJECTION INTRAVENOUS; SUBCUTANEOUS at 13:17

## 2022-05-08 RX ADMIN — SODIUM CHLORIDE, PRESERVATIVE FREE 10 ML: 5 INJECTION INTRAVENOUS at 14:48

## 2022-05-08 RX ADMIN — PANTOPRAZOLE SODIUM 40 MG: 40 TABLET, DELAYED RELEASE ORAL at 06:40

## 2022-05-08 RX ADMIN — HEPARIN SODIUM 5000 UNITS: 5000 INJECTION INTRAVENOUS; SUBCUTANEOUS at 06:08

## 2022-05-08 RX ADMIN — SODIUM CHLORIDE, PRESERVATIVE FREE 10 ML: 5 INJECTION INTRAVENOUS at 06:08

## 2022-05-08 RX ADMIN — ONDANSETRON 4 MG: 2 INJECTION INTRAMUSCULAR; INTRAVENOUS at 16:11

## 2022-05-08 RX ADMIN — SENNOSIDES 8.6 MG: 8.6 TABLET, COATED ORAL at 10:00

## 2022-05-08 RX ADMIN — SODIUM CHLORIDE, PRESERVATIVE FREE 10 ML: 5 INJECTION INTRAVENOUS at 21:04

## 2022-05-08 RX ADMIN — HEPARIN SODIUM 5000 UNITS: 5000 INJECTION INTRAVENOUS; SUBCUTANEOUS at 21:04

## 2022-05-08 RX ADMIN — OXYBUTYNIN CHLORIDE 10 MG: 5 TABLET, EXTENDED RELEASE ORAL at 10:00

## 2022-05-08 RX ADMIN — TRAMADOL HYDROCHLORIDE 50 MG: 50 TABLET, COATED ORAL at 00:14

## 2022-05-08 RX ADMIN — POLYETHYLENE GLYCOL 3350 17 G: 17 POWDER, FOR SOLUTION ORAL at 10:01

## 2022-05-08 RX ADMIN — BISACODYL 10 MG: 10 SUPPOSITORY RECTAL at 21:03

## 2022-05-08 NOTE — PROGRESS NOTES
[address] [facility] Adult  Hospitalist Group                                                                                          Hospitalist Progress Note  Paul Salazar MD  Answering service: [phone] from in house phone        Date of Service:  2022  NAME:  Elisa Bass  :  1960  MRN:  [id number]      Admission Summary:   Copied form admit: \" This is a recurrent problem. The current episode started more than 1 week ago. The problem occurs hourly. The problem has been gradually worsening. The quality of the pain is sharp. The pain is moderate. Associated symptoms include diarrhea. Pertinent negatives include no anorexia, no fever, no belching, no flatus, no hematochezia, no melena, no nausea, no vomiting, no constipation, no dysuria, no frequency, no hematuria, no headaches, no arthralgias, no myalgias, no trauma, no chest pain and no back pain. The pain is worsened by certain positions. The pain is relieved by nothing. \"    Interval history / Subjective:   Status post bilateral ureteral stent placement    Patient seen examined at bedside earlier. No significant abdominal pain     Assessment & Plan: Active Problems:    Hydronephrosis (2022)  - s/p b/l stenting     -urology following, will need to follow-up for stone removal outpatient once infection treated  -will complete IV rocephin urine culture E. Coli 7 days therapy         DM (diabetes mellitus) ([facility] (2022)  - SSI . Broad coverage as indicated    Nephrolithiasis  CKD stage IV  Cont to monitor, around baseline 3's    Nausea   -resolved, might be 2/2 anesthesia after stent placements    Constipation   Standing MiraLAX Senokot  -if not effective will consider lactulose     PT OT recommending IPR    Medically ready for discharge. Pending IPR placement YULISA working on this.  \"Re-stenting\" prior documented on CM note is not  a barrier for dc there is no such plan for this, I discussed with cm  no bed over weekend, will plan for dc tomorrow       Code status: full   DVT ppx : Heparin subcu     Hospital Problems  Date Reviewed: 5/2/2022          Codes Class Noted POA    [facility] ICD-10-CM: E11.9  ICD-9-CM: 250.00  5/2/2022 Unknown        Hydronephrosis ICD-10-CM: N13.30  ICD-9-CM: 591  5/1/2022 Unknown            Exam  General: No acute distress resting in bed morbidly obese  CVS: Regular rate and rhythm  Pulm: Clear to auscultation bilaterally  Abdomen: Soft obese nontender positive bowel sounds no flank tenderness  Extremities no peripheral extremity edema no rash or lesion  Neuro: A&O x3 cranial nerves II through XII grossly intact          Data Review:    Review and/or order of clinical lab test      Labs:     No results for input(s): WBC, HGB, HCT, PLT, HGBEXT, HCTEXT, PLTEXT, HGBEXT, HCTEXT, PLTEXT in the last 72 hours. Recent Labs     05/08/22  0038 05/07/22  0044 05/06/22  0600    142 141   K 4.8 4.8 4.5   * 110* 110*   CO2 27 26 26   BUN 30* 34* 32*   CREA 2.69* 2.93* 2.69*   * 140* 114*   CA 8.6 8.6 8.4*     No results for input(s): ALT, AP, TBIL, TBILI, TP, ALB, GLOB, GGT, AML, LPSE in the last 72 hours. No lab exists for component: SGOT, GPT, AMYP, HLPSE  No results for input(s): INR, PTP, APTT, INREXT, INREXT in the last 72 hours. No results for input(s): FE, TIBC, PSAT, FERR in the last 72 hours. No results found for: FOL, RBCF   No results for input(s): PH, PCO2, PO2 in the last 72 hours. No results for input(s): CPK, CKNDX, TROIQ in the last 72 hours.     No lab exists for component: CPKMB  No results found for: CHOL, CHOLX, CHLST, CHOLV, HDL, HDLP, LDL, LDLC, DLDLP, TGLX, TRIGL, TRIGP, CHHD, CHHDX  Lab Results   Component Value Date/Time    Glucose (POC) 123 (H) 05/08/2022 11:34 AM    Glucose (POC) 192 (H) 05/08/2022 06:07 AM    Glucose (POC) 166 (H) 05/07/2022 08:39 PM    Glucose (POC) 136 (H) 05/07/2022 04:28 PM    Glucose (POC) 142 (H) 05/07/2022 11:39 AM     Lab Results   Component Value Date/Time    Color YELLOW/STRAW 05/01/2022 11:22 PM    Appearance CLEAR 05/01/2022 11:22 PM    Specific gravity 1.015 05/01/2022 11:22 PM    Specific gravity 1.012 07/27/2021 12:04 PM    pH (UA) 8.5 (H) 05/01/2022 11:22 PM    Protein 100 (A) 05/01/2022 11:22 PM    Glucose Negative 05/01/2022 11:22 PM    Ketone Negative 05/01/2022 11:22 PM    Bilirubin Negative 05/01/2022 11:22 PM    Urobilinogen 0.2 05/01/2022 11:22 PM    Nitrites Positive (A) 05/01/2022 11:22 PM    Leukocyte Esterase MODERATE (A) 05/01/2022 11:22 PM    Epithelial cells FEW 05/01/2022 11:22 PM    Bacteria 4+ (A) 05/01/2022 11:22 PM    WBC  05/01/2022 11:22 PM    RBC 20-50 05/01/2022 11:22 PM         Medications Reviewed:     Current Facility-Administered Medications   Medication Dose Route Frequency    acetaminophen (TYLENOL) tablet 650 mg  650 mg Oral Q4H PRN    calcium carbonate (TUMS) chewable tablet 200 mg [elemental]  200 mg Oral TID PRN    cefTRIAXone (ROCEPHIN) 1 g in sterile water (preservative free) 10 mL IV syringe  1 g IntraVENous Q24H    prochlorperazine (COMPAZINE) with saline injection 10 mg  10 mg IntraVENous Q6H PRN    ondansetron (ZOFRAN) injection 4 mg  4 mg IntraVENous Q6H PRN    pantoprazole (PROTONIX) tablet 40 mg  40 mg Oral ACB    polyethylene glycol (MIRALAX) packet 17 g  17 g Oral DAILY    senna (SENOKOT) tablet 8.6 mg  1 Tablet Oral DAILY    heparin (porcine) injection 5,000 Units  5,000 Units SubCUTAneous Q8H    sodium chloride (NS) flush 5-40 mL  5-40 mL IntraVENous Q8H    sodium chloride (NS) flush 5-40 mL  5-40 mL IntraVENous PRN    lidocaine (PF) (XYLOCAINE) 10 mg/mL (1 %) injection 0.1 mL  0.1 mL SubCUTAneous PRN    traMADoL (ULTRAM) tablet 50 mg  50 mg Oral Q6H PRN    oxybutynin chloride XL (DITROPAN XL) tablet 10 mg  10 mg Oral DAILY    methIMAzole (TAPAZOLE) tablet 10 mg  10 mg Oral DAILY    polyethylene glycol (MIRALAX) packet 17 g  17 g Oral DAILY PRN    insulin lispro (HUMALOG) injection   SubCUTAneous AC&HS    glucose chewable tablet 16 g  4 Tablet Oral PRN    glucagon (GLUCAGEN) injection 1 mg  1 mg IntraMUSCular PRN    dextrose 10 % infusion 0-250 mL  0-250 mL IntraVENous PRN    lidocaine 4 % patch 1 Patch  1 Patch TransDERmal Q24H     ______________________________________________________________________  EXPECTED LENGTH OF STAY: 2d 21h  ACTUAL LENGTH OF STAY:          7                 Marc Frankel, MD

## 2022-05-08 NOTE — PROGRESS NOTES
Problem: Pressure Injury - Risk of  Goal: *Prevention of pressure injury  Description: Document Murray Scale and appropriate interventions in the flowsheet. Outcome: Progressing Towards Goal  Note: Pressure Injury Interventions:  Sensory Interventions: Assess changes in LOC    Moisture Interventions: Absorbent underpads,Apply protective barrier, creams and emollients    Activity Interventions: Assess need for specialty bed    Mobility Interventions: Assess need for specialty bed    Nutrition Interventions: Discuss nutritional consult with provider    Friction and Shear Interventions: Apply protective barrier, creams and emollients                Problem: Falls - Risk of  Goal: *Absence of Falls  Description: Document Oh Fall Risk and appropriate interventions in the flowsheet.   Outcome: Progressing Towards Goal  Note: Fall Risk Interventions:  Mobility Interventions: Communicate number of staff needed for ambulation/transfer         Medication Interventions: Teach patient to arise slowly    Elimination Interventions: Call light in reach

## 2022-05-09 LAB
ANION GAP SERPL CALC-SCNC: 2 MMOL/L (ref 5–15)
BUN SERPL-MCNC: 32 MG/DL (ref 6–20)
BUN/CREAT SERPL: 12 (ref 12–20)
CALCIUM SERPL-MCNC: 8.7 MG/DL (ref 8.5–10.1)
CHLORIDE SERPL-SCNC: 109 MMOL/L (ref 97–108)
CO2 SERPL-SCNC: 28 MMOL/L (ref 21–32)
CREAT SERPL-MCNC: 2.64 MG/DL (ref 0.55–1.02)
GLUCOSE BLD STRIP.AUTO-MCNC: 135 MG/DL (ref 65–117)
GLUCOSE BLD STRIP.AUTO-MCNC: 139 MG/DL (ref 65–117)
GLUCOSE BLD STRIP.AUTO-MCNC: 152 MG/DL (ref 65–117)
GLUCOSE BLD STRIP.AUTO-MCNC: 154 MG/DL (ref 65–117)
GLUCOSE SERPL-MCNC: 147 MG/DL (ref 65–100)
POTASSIUM SERPL-SCNC: 5.3 MMOL/L (ref 3.5–5.1)
SERVICE CMNT-IMP: ABNORMAL
SODIUM SERPL-SCNC: 139 MMOL/L (ref 136–145)

## 2022-05-09 PROCEDURE — 74011250637 HC RX REV CODE- 250/637: Performed by: STUDENT IN AN ORGANIZED HEALTH CARE EDUCATION/TRAINING PROGRAM

## 2022-05-09 PROCEDURE — 74011636637 HC RX REV CODE- 636/637: Performed by: UROLOGY

## 2022-05-09 PROCEDURE — 97530 THERAPEUTIC ACTIVITIES: CPT

## 2022-05-09 PROCEDURE — 74011000250 HC RX REV CODE- 250: Performed by: UROLOGY

## 2022-05-09 PROCEDURE — 82962 GLUCOSE BLOOD TEST: CPT

## 2022-05-09 PROCEDURE — 74011250637 HC RX REV CODE- 250/637: Performed by: UROLOGY

## 2022-05-09 PROCEDURE — 36415 COLL VENOUS BLD VENIPUNCTURE: CPT

## 2022-05-09 PROCEDURE — 65270000029 HC RM PRIVATE

## 2022-05-09 PROCEDURE — 80048 BASIC METABOLIC PNL TOTAL CA: CPT

## 2022-05-09 PROCEDURE — 74011250636 HC RX REV CODE- 250/636: Performed by: STUDENT IN AN ORGANIZED HEALTH CARE EDUCATION/TRAINING PROGRAM

## 2022-05-09 RX ADMIN — HEPARIN SODIUM 5000 UNITS: 5000 INJECTION INTRAVENOUS; SUBCUTANEOUS at 22:15

## 2022-05-09 RX ADMIN — SODIUM CHLORIDE, PRESERVATIVE FREE 10 ML: 5 INJECTION INTRAVENOUS at 13:42

## 2022-05-09 RX ADMIN — SODIUM CHLORIDE, PRESERVATIVE FREE 10 ML: 5 INJECTION INTRAVENOUS at 06:25

## 2022-05-09 RX ADMIN — Medication 2 UNITS: at 06:48

## 2022-05-09 RX ADMIN — HEPARIN SODIUM 5000 UNITS: 5000 INJECTION INTRAVENOUS; SUBCUTANEOUS at 06:25

## 2022-05-09 RX ADMIN — HEPARIN SODIUM 5000 UNITS: 5000 INJECTION INTRAVENOUS; SUBCUTANEOUS at 13:42

## 2022-05-09 RX ADMIN — SODIUM ZIRCONIUM CYCLOSILICATE 15 G: 10 POWDER, FOR SUSPENSION ORAL at 13:42

## 2022-05-09 RX ADMIN — OXYBUTYNIN CHLORIDE 10 MG: 5 TABLET, EXTENDED RELEASE ORAL at 08:13

## 2022-05-09 RX ADMIN — PANTOPRAZOLE SODIUM 40 MG: 40 TABLET, DELAYED RELEASE ORAL at 06:26

## 2022-05-09 RX ADMIN — SENNOSIDES 8.6 MG: 8.6 TABLET, COATED ORAL at 08:13

## 2022-05-09 RX ADMIN — METHIMAZOLE 10 MG: 5 TABLET ORAL at 08:13

## 2022-05-09 RX ADMIN — SODIUM CHLORIDE, PRESERVATIVE FREE 10 ML: 5 INJECTION INTRAVENOUS at 22:16

## 2022-05-09 RX ADMIN — POLYETHYLENE GLYCOL 3350 17 G: 17 POWDER, FOR SOLUTION ORAL at 08:13

## 2022-05-09 RX ADMIN — Medication 2 UNITS: at 17:23

## 2022-05-09 NOTE — PROGRESS NOTES
JOESPH:  RUR: 11%    Disposition:  IPR    CM received call from [facility] ([phone]). Facility initiating Anselmo Bowens and will need updated therapy notes today. Perfect serve message sent to therapy team.    CM continuing to follow.     Marbella Egan, [address], [address] [place] [address]

## 2022-05-09 NOTE — PROGRESS NOTES
Problem: Pressure Injury - Risk of  Goal: *Prevention of pressure injury  Description: Document Murray Scale and appropriate interventions in the flowsheet. Outcome: Progressing Towards Goal  Note: Pressure Injury Interventions:  Sensory Interventions: Assess need for specialty bed,Assess changes in LOC    Moisture Interventions: Absorbent underpads    Activity Interventions: Assess need for specialty bed,Chair cushion    Mobility Interventions: Assess need for specialty bed    Nutrition Interventions: Document food/fluid/supplement intake    Friction and Shear Interventions: Apply protective barrier, creams and emollients,Feet elevated on foot rest                Problem: Falls - Risk of  Goal: *Absence of Falls  Description: Document Oh Fall Risk and appropriate interventions in the flowsheet.   Outcome: Progressing Towards Goal  Note: Fall Risk Interventions:  Mobility Interventions: Communicate number of staff needed for ambulation/transfer         Medication Interventions: Patient to call before getting OOB    Elimination Interventions: Call light in reach

## 2022-05-09 NOTE — PROGRESS NOTES
Problem: Mobility Impaired (Adult and Pediatric)  Goal: *Acute Goals and Plan of Care (Insert Text)  Description: FUNCTIONAL STATUS PRIOR TO ADMISSION: overall bed bound; gets self up to EOB and back. Had been getting to standing at bedside up until one month ago to stand briefly. Bathes self    HOME SUPPORT PRIOR TO ADMISSION: currently living with sister who assists as needed with baths; provides meals    Physical Therapy Goals  Reviewed/ revised 5/9/2022  1. Patient will move from supine to sit and sit to supine  in bed with modified independence within 7 day(s). 2.  Patient will transfer from bed to chair and chair to bed with maximal assistance using lateral technique within 7 day(s). 3.  Patient will perform sit to stand with moderate assistance and use of bariatric RW within 7 day(s). Physical Therapy Goals  Initiated 5/2/2022  1. Patient will move from supine to sit and sit to supine  and roll side to side in bed with modified independence within 7 day(s). 2.  Patient will transfer from bed to chair and chair to bed with maximal assistance ,lateral transfer, using the least restrictive device within 7 day(s). 3.  Patient will perform sit to stand with maximal assistance within 7 day(s). Outcome: Progressing Towards Goal   PHYSICAL THERAPY TREATMENT: WEEKLY REASSESSMENT  Patient: Lindy Macario (45 y.o. female)  Date: 5/9/2022  Primary Diagnosis: Hydronephrosis [N13.30]  Procedure(s) (LRB):  CYSTOSCOPY BILATERAL STENT PLACEMENT AND RETROGRADE PYELOGRAMS (Bilateral) 5 Days Post-Op   Precautions: fall         ASSESSMENT  Patient continues with skilled PT services and is progressing towards goals. Barriers to indep with functional mobility include general weakness, body habitus, impaired standing balance, decreased activity tolerance, increased risk for fall. Pt eager to participate in therapy and demo excellent effort with all tasks. Able to reach EOB with supervision/ SBA.  Tolerated sit to stand x 4 trials with min A using B UE support on arms of locked chair positioned in front, bracing knees against chair to maintain standing balance with CGA. Required several min rest between trials. Pt remains below functional baseline with good rehab potential. Recommend follow up IPR at d/c as pt will require intensive therapy environment to enable return to max level of functional independence. Patient's progression toward goals since last assessment: increasing tolerance to activity, increased function with bed mob and sit to stand    Current Level of Function Impacting Discharge (mobility/balance): bed mob SBA, sit to stand with use of locked chair at front min A    Other factors to consider for discharge: good family support         PLAN :  Goals have been updated based on progression since last assessment. Patient continues to benefit from skilled intervention to address the above impairments. Recommendations and Planned Interventions: bed mobility training, transfer training, therapeutic exercises, patient and family training/education and therapeutic activities      Frequency/Duration: Patient will be followed by physical therapy:  5 times a week to address goals. Recommendation for discharge: (in order for the patient to meet his/her long term goals)  Therapy 3 hours per day 5-7 days per week    This discharge recommendation:  Has been made in collaboration with the attending provider and/or case management    IF patient discharges home will need the following DME: to be determined (TBD)         SUBJECTIVE:   Patient stated My back feels better today, like I can get up straighter and it doesn't hurt.     OBJECTIVE DATA SUMMARY:   HISTORY:    Past Medical History:   Diagnosis Date    Chronic kidney disease     Chronic obstructive pulmonary disease (HCC)     Diabetes ([facility]     Hernia, hiatal     Hypertension     Incontinence of urine     Kidney stones     both kidneys    Morbid obesity ([facility]     Osteoarthritis     Peripheral neuropathy     DOES GET SOME NUMBNESS IN HANDS    Psychiatric disorder     ANXIETY    Renal failure     left kidney    Thyroid disease      Past Surgical History:   Procedure Laterality Date    HX DILATION AND CURETTAGE  1990    HX HYSTERECTOMY      OVARIES NOT REMOVED     HX OTHER SURGICAL      right kidney stent       Personal factors and/or comorbidities impacting plan of care: OA, obesity    Home Situation  Home Environment: Private residence  One/Two Story Residence: One story  Living Alone: No  Support Systems: Other Family Member(s)  Patient Expects to be Discharged to[de-identified] Rehab hospital/unit acute  Current DME Used/Available at Home: [facility]    EXAMINATION/PRESENTATION/DECISION MAKING:   Critical Behavior:  Neurologic State: Alert  Orientation Level: Oriented X4  Cognition: Appropriate decision making,Appropriate for age attention/concentration  Safety/Judgement: Awareness of environment,Fall prevention  Hearing: Auditory  Auditory Impairment: None    Range Of Motion:  AROM: Generally decreased, functional                       Strength:    Strength: Generally decreased, functional                    Tone & Sensation:   Tone: Normal                              Coordination:  Coordination: Within functional limits  Vision:      Functional Mobility:  Bed Mobility:  Rolling: Supervision  Supine to Sit: Stand-by assistance; Additional time (L sidelying to sit)  Sit to Supine: Stand-by assistance (for LE mgmt)  Scooting: Supervision (use of trendelenburg position for scooting in supine)  Transfers:  Sit to Stand: Minimum assistance (use of rocking/ momentum, bracing knees against chair)  Stand to Sit: Minimum assistance                       Balance:   Sitting: Intact  Standing: Impaired; With support (UE support on arms of locked chair placed in front)  Standing - Static: Fair;Constant support (heavy reliance on UEs, knees blocked by chair)  Standing - Dynamic : Not tested    Functional Measure:  Timed up and go:    Timed Get Up And Go Test: 0       < than 10 seconds=Normal  Greater then 13.5 seconds (in elderly)=Increased fall risk   Sara Woods [place] M. Predicting the probability for falls in community dwelling older adults using the Timed Up and Go Test. Phys Ther. 2000;80:896-903. Pain Rating:  Pt reports manageable pain this date    Activity Tolerance:   Good    After treatment patient left in no apparent distress:   Supine in bed, Heels elevated for pressure relief, Call bell within reach and Side rails x 3    COMMUNICATION/EDUCATION:   The patients plan of care was discussed with: Registered nurse. Fall prevention education was provided and the patient/caregiver indicated understanding., Patient/family have participated as able in goal setting and plan of care. and Patient/family agree to work toward stated goals and plan of care.     Thank you for this referral.  Thiago Barnett, PT   Time Calculation: 56 mins

## 2022-05-09 NOTE — PROGRESS NOTES
[address] [facility] Adult  Hospitalist Group                                                                                          Hospitalist Progress Note  Paul Salazar MD  Answering service: [phone] from in house phone        Date of Service:  2022  NAME:  Elisa Bass  :  1960  MRN:  [id number]      Admission Summary:   Copied form admit: \" This is a recurrent problem. The current episode started more than 1 week ago. The problem occurs hourly. The problem has been gradually worsening. The quality of the pain is sharp. The pain is moderate. Associated symptoms include diarrhea. Pertinent negatives include no anorexia, no fever, no belching, no flatus, no hematochezia, no melena, no nausea, no vomiting, no constipation, no dysuria, no frequency, no hematuria, no headaches, no arthralgias, no myalgias, no trauma, no chest pain and no back pain. The pain is worsened by certain positions. The pain is relieved by nothing. \"    Interval history / Subjective:   Status post bilateral ureteral stent placement    Patient seen examined at bedside earlier. Had bowel movement yesterday, feels well      Assessment & Plan: Active Problems:    Hydronephrosis (2022)  - s/p b/l stenting     -urology on board, will need to follow-up for stone removal outpatient   - complete IV rocephin urine culture E. Coli 7 days therapy         DM (diabetes mellitus) ([facility] [address] (2022)  - SSI . Broad coverage as indicated    Nephrolithiasis  CKD stage IV  Cont to monitor, around baseline 3's    Hyperkalemia   1 dose of Lokelma today, continue with low K diet  -Continue to trend    Nausea   -resolved, might be 2/2 anesthesia after stent placements    Constipation   Standing MiraLAX Senokot    PT OT recommending IPR    Medically ready for discharge. Pending insurance Auth for IPR.   Case management aware and working on it      Code status: full   DVT ppx : Heparin subcu     Hospital Problems Date Reviewed: 5/2/2022          Codes Class Noted POA    [facility] ICD-10-CM: E11.9  ICD-9-CM: 250.00  5/2/2022 Unknown        Hydronephrosis ICD-10-CM: N13.30  ICD-9-CM: 591  5/1/2022 Unknown            Exam  General: No acute distress resting in bed morbidly obese  CVS: Regular rate and rhythm  Pulm: Clear to auscultation bilaterally  Abdomen: Soft obese nontender positive bowel sounds no flank tenderness  Extremities no peripheral extremity edema no rash or lesion  Neuro: A&O x3 cranial nerves II through XII grossly intact          Data Review:    Review and/or order of clinical lab test      Labs:     No results for input(s): WBC, HGB, HCT, PLT, HGBEXT, HCTEXT, PLTEXT, HGBEXT, HCTEXT, PLTEXT in the last 72 hours. Recent Labs     05/09/22  0047 05/08/22  0038 05/07/22  0044    141 142   K 5.3* 4.8 4.8   * 110* 110*   CO2 28 27 26   BUN 32* 30* 34*   CREA 2.64* 2.69* 2.93*   * 140* 140*   CA 8.7 8.6 8.6     No results for input(s): ALT, AP, TBIL, TBILI, TP, ALB, GLOB, GGT, AML, LPSE in the last 72 hours. No lab exists for component: SGOT, GPT, AMYP, HLPSE  No results for input(s): INR, PTP, APTT, INREXT, INREXT in the last 72 hours. No results for input(s): FE, TIBC, PSAT, FERR in the last 72 hours. No results found for: FOL, RBCF   No results for input(s): PH, PCO2, PO2 in the last 72 hours. No results for input(s): CPK, CKNDX, TROIQ in the last 72 hours.     No lab exists for component: CPKMB  No results found for: CHOL, CHOLX, CHLST, CHOLV, HDL, HDLP, LDL, LDLC, DLDLP, TGLX, TRIGL, TRIGP, CHHD, [facility]  Lab Results   Component Value Date/Time    Glucose (POC) 135 (H) 05/09/2022 11:21 AM    Glucose (POC) 154 (H) 05/09/2022 06:25 AM    Glucose (POC) 162 (H) 05/08/2022 09:03 PM    Glucose (POC) 138 (H) 05/08/2022 04:08 PM    Glucose (POC) 123 (H) 05/08/2022 11:34 AM     Lab Results   Component Value Date/Time    Color YELLOW/STRAW 05/01/2022 11:22 PM    Appearance CLEAR 05/01/2022 11:22 PM    Specific gravity 1.015 05/01/2022 11:22 PM    Specific gravity 1.012 07/27/2021 12:04 PM    pH (UA) 8.5 (H) 05/01/2022 11:22 PM    Protein 100 (A) 05/01/2022 11:22 PM    Glucose Negative 05/01/2022 11:22 PM    Ketone Negative 05/01/2022 11:22 PM    Bilirubin Negative 05/01/2022 11:22 PM    Urobilinogen 0.2 05/01/2022 11:22 PM    Nitrites Positive (A) 05/01/2022 11:22 PM    Leukocyte Esterase MODERATE (A) 05/01/2022 11:22 PM    Epithelial cells FEW 05/01/2022 11:22 PM    Bacteria 4+ (A) 05/01/2022 11:22 PM    WBC  05/01/2022 11:22 PM    RBC 20-50 05/01/2022 11:22 PM         Medications Reviewed:     Current Facility-Administered Medications   Medication Dose Route Frequency    sodium zirconium cyclosilicate (LOKELMA) powder packet 15 g  15 g Oral NOW    acetaminophen (TYLENOL) tablet 650 mg  650 mg Oral Q4H PRN    calcium carbonate (TUMS) chewable tablet 200 mg [elemental]  200 mg Oral TID PRN    prochlorperazine (COMPAZINE) with saline injection 10 mg  10 mg IntraVENous Q6H PRN    ondansetron (ZOFRAN) injection 4 mg  4 mg IntraVENous Q6H PRN    pantoprazole (PROTONIX) tablet 40 mg  40 mg Oral ACB    polyethylene glycol (MIRALAX) packet 17 g  17 g Oral DAILY    senna (SENOKOT) tablet 8.6 mg  1 Tablet Oral DAILY    heparin (porcine) injection 5,000 Units  5,000 Units SubCUTAneous Q8H    sodium chloride (NS) flush 5-40 mL  5-40 mL IntraVENous Q8H    sodium chloride (NS) flush 5-40 mL  5-40 mL IntraVENous PRN    lidocaine (PF) (XYLOCAINE) 10 mg/mL (1 %) injection 0.1 mL  0.1 mL SubCUTAneous PRN    traMADoL (ULTRAM) tablet 50 mg  50 mg Oral Q6H PRN    oxybutynin chloride XL (DITROPAN XL) tablet 10 mg  10 mg Oral DAILY    methIMAzole (TAPAZOLE) tablet 10 mg  10 mg Oral DAILY    polyethylene glycol (MIRALAX) packet 17 g  17 g Oral DAILY PRN    insulin lispro (HUMALOG) injection   SubCUTAneous AC&HS    glucose chewable tablet 16 g  4 Tablet Oral PRN    glucagon (GLUCAGEN) injection 1 mg  1 mg IntraMUSCular PRN    dextrose 10 % infusion 0-250 mL  0-250 mL IntraVENous PRN    lidocaine 4 % patch 1 Patch  1 Patch TransDERmal Q24H     ______________________________________________________________________  EXPECTED LENGTH OF STAY: 2d 21h  ACTUAL LENGTH OF STAY:          Kalpesh Garcia MD

## 2022-05-09 NOTE — PROGRESS NOTES
Problem: Pressure Injury - Risk of  Goal: *Prevention of pressure injury  Description: Document Murray Scale and appropriate interventions in the flowsheet. Outcome: Progressing Towards Goal  Note: Pressure Injury Interventions:  Sensory Interventions: Assess changes in LOC    Moisture Interventions: Absorbent underpads    Activity Interventions: Assess need for specialty bed    Mobility Interventions: Assess need for specialty bed    Nutrition Interventions: Document food/fluid/supplement intake    Friction and Shear Interventions: Apply protective barrier, creams and emollients                Problem: Patient Education: Go to Patient Education Activity  Goal: Patient/Family Education  Outcome: Progressing Towards Goal     Problem: Falls - Risk of  Goal: *Absence of Falls  Description: Document Earnestine Espinal Fall Risk and appropriate interventions in the flowsheet.   Outcome: Progressing Towards Goal  Note: Fall Risk Interventions:  Mobility Interventions: Communicate number of staff needed for ambulation/transfer         Medication Interventions: Patient to call before getting OOB    Elimination Interventions: Call light in reach              Problem: Patient Education: Go to Patient Education Activity  Goal: Patient/Family Education  Outcome: Progressing Towards Goal     Problem: Patient Education: Go to Patient Education Activity  Goal: Patient/Family Education  Outcome: Progressing Towards Goal     Problem: Patient Education: Go to Patient Education Activity  Goal: Patient/Family Education  Outcome: Progressing Towards Goal

## 2022-05-10 LAB
ANION GAP SERPL CALC-SCNC: 5 MMOL/L (ref 5–15)
BUN SERPL-MCNC: 34 MG/DL (ref 6–20)
BUN/CREAT SERPL: 12 (ref 12–20)
CALCIUM SERPL-MCNC: 8.3 MG/DL (ref 8.5–10.1)
CHLORIDE SERPL-SCNC: 108 MMOL/L (ref 97–108)
CO2 SERPL-SCNC: 26 MMOL/L (ref 21–32)
CREAT SERPL-MCNC: 2.78 MG/DL (ref 0.55–1.02)
ERYTHROCYTE [DISTWIDTH] IN BLOOD BY AUTOMATED COUNT: 15.7 % (ref 11.5–14.5)
GLUCOSE BLD STRIP.AUTO-MCNC: 126 MG/DL (ref 65–117)
GLUCOSE BLD STRIP.AUTO-MCNC: 130 MG/DL (ref 65–117)
GLUCOSE BLD STRIP.AUTO-MCNC: 143 MG/DL (ref 65–117)
GLUCOSE BLD STRIP.AUTO-MCNC: 148 MG/DL (ref 65–117)
GLUCOSE SERPL-MCNC: 138 MG/DL (ref 65–100)
HCT VFR BLD AUTO: 32.2 % (ref 35–47)
HGB BLD-MCNC: 9.4 G/DL (ref 11.5–16)
MCH RBC QN AUTO: 27.2 PG (ref 26–34)
MCHC RBC AUTO-ENTMCNC: 29.2 G/DL (ref 30–36.5)
MCV RBC AUTO: 93.3 FL (ref 80–99)
NRBC # BLD: 0 K/UL (ref 0–0.01)
NRBC BLD-RTO: 0 PER 100 WBC
PLATELET # BLD AUTO: 264 K/UL (ref 150–400)
PMV BLD AUTO: 10.4 FL (ref 8.9–12.9)
POTASSIUM SERPL-SCNC: 5 MMOL/L (ref 3.5–5.1)
RBC # BLD AUTO: 3.45 M/UL (ref 3.8–5.2)
SERVICE CMNT-IMP: ABNORMAL
SODIUM SERPL-SCNC: 139 MMOL/L (ref 136–145)
WBC # BLD AUTO: 12.9 K/UL (ref 3.6–11)

## 2022-05-10 PROCEDURE — 74011000250 HC RX REV CODE- 250: Performed by: UROLOGY

## 2022-05-10 PROCEDURE — 97530 THERAPEUTIC ACTIVITIES: CPT

## 2022-05-10 PROCEDURE — 97535 SELF CARE MNGMENT TRAINING: CPT

## 2022-05-10 PROCEDURE — 85027 COMPLETE CBC AUTOMATED: CPT

## 2022-05-10 PROCEDURE — 80048 BASIC METABOLIC PNL TOTAL CA: CPT

## 2022-05-10 PROCEDURE — 74011250637 HC RX REV CODE- 250/637: Performed by: UROLOGY

## 2022-05-10 PROCEDURE — 65270000029 HC RM PRIVATE

## 2022-05-10 PROCEDURE — 74011250637 HC RX REV CODE- 250/637: Performed by: STUDENT IN AN ORGANIZED HEALTH CARE EDUCATION/TRAINING PROGRAM

## 2022-05-10 PROCEDURE — 74011636637 HC RX REV CODE- 636/637: Performed by: UROLOGY

## 2022-05-10 PROCEDURE — 74011250636 HC RX REV CODE- 250/636: Performed by: STUDENT IN AN ORGANIZED HEALTH CARE EDUCATION/TRAINING PROGRAM

## 2022-05-10 PROCEDURE — 82962 GLUCOSE BLOOD TEST: CPT

## 2022-05-10 PROCEDURE — 36415 COLL VENOUS BLD VENIPUNCTURE: CPT

## 2022-05-10 RX ADMIN — PANTOPRAZOLE SODIUM 40 MG: 40 TABLET, DELAYED RELEASE ORAL at 07:01

## 2022-05-10 RX ADMIN — Medication 2 UNITS: at 17:09

## 2022-05-10 RX ADMIN — HEPARIN SODIUM 5000 UNITS: 5000 INJECTION INTRAVENOUS; SUBCUTANEOUS at 13:55

## 2022-05-10 RX ADMIN — SODIUM CHLORIDE, PRESERVATIVE FREE 10 ML: 5 INJECTION INTRAVENOUS at 13:55

## 2022-05-10 RX ADMIN — SENNOSIDES 8.6 MG: 8.6 TABLET, COATED ORAL at 08:57

## 2022-05-10 RX ADMIN — SODIUM CHLORIDE, PRESERVATIVE FREE 10 ML: 5 INJECTION INTRAVENOUS at 07:02

## 2022-05-10 RX ADMIN — HEPARIN SODIUM 5000 UNITS: 5000 INJECTION INTRAVENOUS; SUBCUTANEOUS at 21:43

## 2022-05-10 RX ADMIN — Medication 2 UNITS: at 11:31

## 2022-05-10 RX ADMIN — POLYETHYLENE GLYCOL 3350 17 G: 17 POWDER, FOR SOLUTION ORAL at 08:57

## 2022-05-10 RX ADMIN — OXYBUTYNIN CHLORIDE 10 MG: 5 TABLET, EXTENDED RELEASE ORAL at 08:58

## 2022-05-10 RX ADMIN — HEPARIN SODIUM 5000 UNITS: 5000 INJECTION INTRAVENOUS; SUBCUTANEOUS at 07:01

## 2022-05-10 RX ADMIN — METHIMAZOLE 10 MG: 5 TABLET ORAL at 08:58

## 2022-05-10 NOTE — PROGRESS NOTES
Problem: Self Care Deficits Care Plan (Adult)  Goal: *Acute Goals and Plan of Care (Insert Text)  Description: FUNCTIONAL STATUS PRIOR TO ADMISSION: Patient was able to complete grooming, UB bathing, and UB dressing at EOB. She has not stood in one month but was able to briefly stand from her hosptal bed. She has been waiting for a wc to come for a couple months. [facility] and it had ended and she is eligible again and has asked them to wait until she returns home. She vaccums around her bed while sitting EOB then switching sides. HOME SUPPORT: The patient lived with sister who assists with toileting tasks at bed level 3x/day, LB bathing and IADL tasks. Occupational Therapy Goals  Weekly ReEvaluation 5/10/2022, continue all goals  Initiated 5/2/2022  1. Patient will perform grooming and UB bathing/dressing routine sitting EOB with supervision/set-up within 7 day(s). MET, continue for consistency  2. Patient will perform sit <> stand with maximal assistance to prepare for toileting transfers and LB care tasks within 7 day(s). Continue  3. Patient will perform lower body dressing with maximal assistance within 7 day(s). Continue, with AE PRN  4. Patient will perform lateral transfer with toilet transfers to drop arm BSC with maximal assistance within 7 day(s). Continue  5. Patient will perform all aspects of toileting with maximal assistance within 7 day(s). Continue  6. Patient will participate in upper extremity therapeutic exercise/activities with supervision/set-up for 5 minutes within 7 day(s).   Continue    Outcome: Progressing Towards Goal   OCCUPATIONAL THERAPY TREATMENT/WEEKLY RE-ASSESSMENT  Patient: Husam Carvalho (34 y.o. female)  Date: 5/10/2022  Diagnosis: Hydronephrosis [N13.30] <principal problem not specified>  Procedure(s) (LRB):  CYSTOSCOPY BILATERAL STENT PLACEMENT AND RETROGRADE PYELOGRAMS (Bilateral) 6 Days Post-Op  Precautions:    Chart, occupational therapy assessment, plan of care, and goals were reviewed. ASSESSMENT  Patient continues with skilled OT services and is progressing towards goals. Remains limited by fair endurance, strength, poor standing balance, and L hernia/girth. Progressing well with continued therapy and remains very motivated. Tolerated up to EOB for ADLs and sit to stand trial with RW without LE blocked with recliner, required max A x1 boost with 2nd A for RW management requiring 2 attempts with bed raised. Required toileting hygiene needs with compensatory methods of bed mobility to improve functional reach to myrna area. Tolerating increased length of sessions and increased participation. Continue to recommend IP rehab at discharge to maximize independence with ADls. Current Level of Function Impacting Discharge (ADLs): setup for myrna area toileting hygiene, total A for posterior myrna, S for bed mobility, max A x1-2 for sit to stand with RW, min A with recliner use with pul to stand technique, unable to pivot to [facility]    Other factors to consider for discharge: bed level ADLs ~1 month at home with severe LE weakness         PLAN :  Goals have been updated based on progression since last assessment. Patient continues to benefit from skilled intervention to address the above impairments. Continue to follow patient 5 times a week to address goals.     Recommend with staff: EOB ADLs    Recommend next OT session: introduce lateral sliding board transfer to [facility], sit to  prep for [facility] transfer    Recommendation for discharge: (in order for the patient to meet his/her long term goals)  Therapy 3 hours per day 5-7 days per week    This discharge recommendation:  A follow-up discussion with the attending provider and/or case management is planned    IF patient discharges home will need the following DME: AE: long handled bathing, AE: long handled dressing, bedside commode, wheelchair, and sliding/transfer board       SUBJECTIVE:   Patient stated I feel like I had one more in me.     OBJECTIVE DATA SUMMARY:   Cognitive/Behavioral Status:   alert                   Functional Mobility and Transfers for ADLs:  Bed Mobility:  Rolling: Supervision  Supine to Sit: Stand-by assistance  Sit to Supine: Stand-by assistance  Scooting: Supervision    Transfers:  Sit to Stand: Assist x1;Maximum assistance;Assist x2 (with RW attempt, bed raised, Ax2 for RW mangement)  Functional Transfers  Toilet Transfer : Total assistance (unsafe, bed pan)  Cues: Visual/perceptual training/retraining       Balance:  Sitting: Intact  Standing: Impaired  Standing - Static: Constant support; Fair    ADL Intervention:  Feeding  Feeding Assistance: Independent    Grooming  Position Performed:  (semi supine)  Brushing Teeth: Set-up                   Lower Body Dressing Assistance  Socks: Total assistance (dependent) (without AE)  Cues: Visual/perceptual training/retraining    Toileting  Bladder Hygiene: Set-up  Bowel Hygiene: Total assistance (dependent)  Clothing Management: Maximum assistance  Cues: Visual/perceptual training/retraining         Therapeutic Exercises:       Pain:  With standing    Activity Tolerance:   Fair, requires rest breaks, and observed SOB with activity    After treatment patient left in no apparent distress:   Supine in bed, Call bell within reach, and Side rails x 3    COMMUNICATION/COLLABORATION:   The patients plan of care was discussed with: Physical therapist and Registered nurse.      Rafa Vizcaino OT  Time Calculation: 38 mins

## 2022-05-10 NOTE — PROGRESS NOTES
Problem: Mobility Impaired (Adult and Pediatric)  Goal: *Acute Goals and Plan of Care (Insert Text)  Description: FUNCTIONAL STATUS PRIOR TO ADMISSION: overall bed bound; gets self up to EOB and back. Had been getting to standing at bedside up until one month ago to stand briefly. Bathes self    HOME SUPPORT PRIOR TO ADMISSION: currently living with sister who assists as needed with baths; provides meals    Physical Therapy Goals  Reviewed/ revised 5/9/2022  1. Patient will move from supine to sit and sit to supine  in bed with modified independence within 7 day(s). 2.  Patient will transfer from bed to chair and chair to bed with maximal assistance using lateral technique within 7 day(s). 3.  Patient will perform sit to stand with moderate assistance and use of bariatric RW within 7 day(s). Physical Therapy Goals  Initiated 5/2/2022  1. Patient will move from supine to sit and sit to supine  and roll side to side in bed with modified independence within 7 day(s). 2.  Patient will transfer from bed to chair and chair to bed with maximal assistance ,lateral transfer, using the least restrictive device within 7 day(s). 3.  Patient will perform sit to stand with maximal assistance within 7 day(s). Outcome: Progressing Towards Goal   PHYSICAL THERAPY TREATMENT  Patient: Gisselle Hoskins (15 y.o. female)  Date: 5/10/2022  Diagnosis: Hydronephrosis [N13.30] <principal problem not specified>  Procedure(s) (LRB):  CYSTOSCOPY BILATERAL STENT PLACEMENT AND RETROGRADE PYELOGRAMS (Bilateral) 6 Days Post-Op  Precautions:    Chart, physical therapy assessment, plan of care and goals were reviewed. ASSESSMENT  Patient continues with skilled PT services and is progressing towards goals. Patient received in bed and most agreeable to therapy. Able to get to standing today with RW vs use of chair. With this standing without blocking of knees but requiring increased assistance to get to standing.     Patient remains highly motivated and appropriate for inpatient rehab. Therapy session ended abruptly with arrival of roommate and needing to rearrange room and furniture. .     Current Level of Function Impacting Discharge (mobility/balance): mod to max assist to stand; limited standing tolerance    Other factors to consider for discharge:          PLAN :  Patient continues to benefit from skilled intervention to address the above impairments. Continue treatment per established plan of care. to address goals. Recommendation for discharge: (in order for the patient to meet his/her long term goals)  Therapy 3 hours per day 5-7 days per week    This discharge recommendation:  Has been made in collaboration with the attending provider and/or case management    IF patient discharges home will need the following DME: wheelchair       SUBJECTIVE:   Patient stated I feel like I can do that today.     OBJECTIVE DATA SUMMARY:   Critical Behavior:  Neurologic State: Alert  Orientation Level: Oriented X4  Cognition: Appropriate for age attention/concentration,Appropriate decision making,Appropriate safety awareness,Follows commands  Safety/Judgement: Awareness of environment,Fall prevention  Functional Mobility Training:  Bed Mobility:  Rolling: Supervision  Supine to Sit: Stand-by assistance  Sit to Supine: Stand-by assistance  Scooting: Supervision        Transfers:  Sit to Stand: Assist x1;Maximum assistance;Assist x2 (with RW attempt, bed raised, Ax2 for RW mangement)  Stand to Sit: Contact guard assistance; Additional time;Assist x1                             Balance:  Sitting: Intact  Standing: Impaired  Standing - Static: Constant support; Fair      Activity Tolerance:   Good and requires rest breaks    After treatment patient left in no apparent distress:   Supine in bed, Call bell within reach, and Side rails x 3    COMMUNICATION/COLLABORATION:   The patients plan of care was discussed with: Occupational therapist. Keya Sands, PT   Time Calculation: 13 mins

## 2022-05-10 NOTE — PROGRESS NOTES
[facility] Adult  Hospitalist Group                                                                                          Hospitalist Progress Note  Clementina Modi MD  Answering service: [phone] from in house phone        Date of Service:  5/10/2022  NAME:  Tony Price  :  1960  MRN:  [id number]      Admission Summary:   Copied form admit: \" This is a recurrent problem. The current episode started more than 1 week ago. The problem occurs hourly. The problem has been gradually worsening. The quality of the pain is sharp. The pain is moderate. Associated symptoms include diarrhea. Pertinent negatives include no anorexia, no fever, no belching, no flatus, no hematochezia, no melena, no nausea, no vomiting, no constipation, no dysuria, no frequency, no hematuria, no headaches, no arthralgias, no myalgias, no trauma, no chest pain and no back pain. The pain is worsened by certain positions. The pain is relieved by nothing. \"    Interval history / Subjective:   Status post bilateral ureteral stent placement    Patient seen examined at bedside earlier. Feels somewhat bloated but no other acute complaints      Assessment & Plan: Active Problems:    Hydronephrosis (2022)  - s/p b/l stenting     -urology on board, will need to follow-up for stone removal outpatient   - complete IV rocephin urine culture E. Coli 7 days therapy         DM (diabetes mellitus) ([facility] [address] (2022)  - SSI . Broad coverage as indicated    Nephrolithiasis  CKD stage IV  Cont to monitor, around baseline 3's    Hyperkalemia   1 dose of Lokelma today, continue with low K diet  -Continue to trend    Nausea   -resolved, might be 2/2 anesthesia after stent placements    Constipation   Standing MiraLAX Senokot    PT OT recommending IPR    Medically ready for discharge. Pending insurance Auth for IPR.   Case management  working on this       Code status: full   DVT ppx : Heparin subcu     Hospital Problems  Date Reviewed: 5/2/2022          Codes Class Noted POA    DM (diabetes mellitus) ([facility] ICD-10-CM: E11.9  ICD-9-CM: 250.00  5/2/2022 Unknown        Hydronephrosis ICD-10-CM: N13.30  ICD-9-CM: 591  5/1/2022 Unknown            Exam  General: No acute distress resting in bed morbidly obese  CVS: Regular rate and rhythm  Pulm: Clear to auscultation bilaterally  Abdomen: Soft obese nontender positive bowel sounds no flank tenderness  Extremities no peripheral extremity edema no rash or lesion  Neuro: A&O x3 cranial nerves II through XII grossly intact          Data Review:    Review and/or order of clinical lab test      Labs:     Recent Labs     05/10/22  0112   WBC 12.9*   HGB 9.4*   HCT 32.2*        Recent Labs     05/10/22  0112 05/09/22  0047 05/08/22  0038    139 141   K 5.0 5.3* 4.8    109* 110*   CO2 26 28 27   BUN 34* 32* 30*   CREA 2.78* 2.64* 2.69*   * 147* 140*   CA 8.3* 8.7 8.6     No results for input(s): ALT, AP, TBIL, TBILI, TP, ALB, GLOB, GGT, AML, LPSE in the last 72 hours. No lab exists for component: SGOT, GPT, AMYP, HLPSE  No results for input(s): INR, PTP, APTT, INREXT, INREXT in the last 72 hours. No results for input(s): FE, TIBC, PSAT, FERR in the last 72 hours. No results found for: FOL, RBCF   No results for input(s): PH, PCO2, PO2 in the last 72 hours. No results for input(s): CPK, CKNDX, TROIQ in the last 72 hours.     No lab exists for component: CPKMB  No results found for: CHOL, CHOLX, CHLST, CHOLV, HDL, HDLP, LDL, LDLC, DLDLP, TGLX, TRIGL, TRIGP, CHHD, [facility]  Lab Results   Component Value Date/Time    Glucose (POC) 148 (H) 05/10/2022 11:09 AM    Glucose (POC) 130 (H) 05/10/2022 06:36 AM    Glucose (POC) 139 (H) 05/09/2022 08:55 PM    Glucose (POC) 152 (H) 05/09/2022 05:10 PM    Glucose (POC) 135 (H) 05/09/2022 11:21 AM     Lab Results   Component Value Date/Time    Color YELLOW/STRAW 05/01/2022 11:22 PM    Appearance CLEAR 05/01/2022 11:22 PM Specific gravity 1.015 05/01/2022 11:22 PM    Specific gravity 1.012 07/27/2021 12:04 PM    pH (UA) 8.5 (H) 05/01/2022 11:22 PM    Protein 100 (A) 05/01/2022 11:22 PM    Glucose Negative 05/01/2022 11:22 PM    Ketone Negative 05/01/2022 11:22 PM    Bilirubin Negative 05/01/2022 11:22 PM    Urobilinogen 0.2 05/01/2022 11:22 PM    Nitrites Positive (A) 05/01/2022 11:22 PM    Leukocyte Esterase MODERATE (A) 05/01/2022 11:22 PM    Epithelial cells FEW 05/01/2022 11:22 PM    Bacteria 4+ (A) 05/01/2022 11:22 PM    WBC  05/01/2022 11:22 PM    RBC 20-50 05/01/2022 11:22 PM         Medications Reviewed:     Current Facility-Administered Medications   Medication Dose Route Frequency    acetaminophen (TYLENOL) tablet 650 mg  650 mg Oral Q4H PRN    calcium carbonate (TUMS) chewable tablet 200 mg [elemental]  200 mg Oral TID PRN    prochlorperazine (COMPAZINE) with saline injection 10 mg  10 mg IntraVENous Q6H PRN    ondansetron (ZOFRAN) injection 4 mg  4 mg IntraVENous Q6H PRN    pantoprazole (PROTONIX) tablet 40 mg  40 mg Oral ACB    polyethylene glycol (MIRALAX) packet 17 g  17 g Oral DAILY    senna (SENOKOT) tablet 8.6 mg  1 Tablet Oral DAILY    heparin (porcine) injection 5,000 Units  5,000 Units SubCUTAneous Q8H    sodium chloride (NS) flush 5-40 mL  5-40 mL IntraVENous Q8H    sodium chloride (NS) flush 5-40 mL  5-40 mL IntraVENous PRN    lidocaine (PF) (XYLOCAINE) 10 mg/mL (1 %) injection 0.1 mL  0.1 mL SubCUTAneous PRN    traMADoL (ULTRAM) tablet 50 mg  50 mg Oral Q6H PRN    oxybutynin chloride XL (DITROPAN XL) tablet 10 mg  10 mg Oral DAILY    methIMAzole (TAPAZOLE) tablet 10 mg  10 mg Oral DAILY    polyethylene glycol (MIRALAX) packet 17 g  17 g Oral DAILY PRN    insulin lispro (HUMALOG) injection   SubCUTAneous AC&HS    glucose chewable tablet 16 g  4 Tablet Oral PRN    glucagon (GLUCAGEN) injection 1 mg  1 mg IntraMUSCular PRN    dextrose 10 % infusion 0-250 mL  0-250 mL IntraVENous PRN    lidocaine 4 % patch 1 Patch  1 Patch TransDERmal Q24H     ______________________________________________________________________  EXPECTED LENGTH OF STAY: 2d 21h  ACTUAL LENGTH OF STAY:          Opal Hull MD

## 2022-05-11 VITALS
RESPIRATION RATE: 18 BRPM | SYSTOLIC BLOOD PRESSURE: 100 MMHG | HEART RATE: 98 BPM | OXYGEN SATURATION: 92 % | DIASTOLIC BLOOD PRESSURE: 61 MMHG | HEIGHT: 64 IN | WEIGHT: 293 LBS | TEMPERATURE: 98.4 F | BODY MASS INDEX: 50.02 KG/M2

## 2022-05-11 LAB
ANION GAP SERPL CALC-SCNC: 3 MMOL/L (ref 5–15)
BUN SERPL-MCNC: 31 MG/DL (ref 6–20)
BUN/CREAT SERPL: 11 (ref 12–20)
CALCIUM SERPL-MCNC: 8.7 MG/DL (ref 8.5–10.1)
CHLORIDE SERPL-SCNC: 109 MMOL/L (ref 97–108)
CO2 SERPL-SCNC: 29 MMOL/L (ref 21–32)
CREAT SERPL-MCNC: 2.88 MG/DL (ref 0.55–1.02)
GLUCOSE BLD STRIP.AUTO-MCNC: 131 MG/DL (ref 65–117)
GLUCOSE BLD STRIP.AUTO-MCNC: 133 MG/DL (ref 65–117)
GLUCOSE BLD STRIP.AUTO-MCNC: 217 MG/DL (ref 65–117)
GLUCOSE SERPL-MCNC: 144 MG/DL (ref 65–100)
POTASSIUM SERPL-SCNC: 5 MMOL/L (ref 3.5–5.1)
SERVICE CMNT-IMP: ABNORMAL
SODIUM SERPL-SCNC: 141 MMOL/L (ref 136–145)

## 2022-05-11 PROCEDURE — 74011250636 HC RX REV CODE- 250/636: Performed by: STUDENT IN AN ORGANIZED HEALTH CARE EDUCATION/TRAINING PROGRAM

## 2022-05-11 PROCEDURE — 36415 COLL VENOUS BLD VENIPUNCTURE: CPT

## 2022-05-11 PROCEDURE — 74011250637 HC RX REV CODE- 250/637: Performed by: STUDENT IN AN ORGANIZED HEALTH CARE EDUCATION/TRAINING PROGRAM

## 2022-05-11 PROCEDURE — 82962 GLUCOSE BLOOD TEST: CPT

## 2022-05-11 PROCEDURE — 97530 THERAPEUTIC ACTIVITIES: CPT

## 2022-05-11 PROCEDURE — 74011250637 HC RX REV CODE- 250/637: Performed by: UROLOGY

## 2022-05-11 PROCEDURE — 74011636637 HC RX REV CODE- 636/637: Performed by: UROLOGY

## 2022-05-11 PROCEDURE — 74011000250 HC RX REV CODE- 250: Performed by: UROLOGY

## 2022-05-11 PROCEDURE — 80048 BASIC METABOLIC PNL TOTAL CA: CPT

## 2022-05-11 RX ADMIN — METHIMAZOLE 10 MG: 5 TABLET ORAL at 08:36

## 2022-05-11 RX ADMIN — SODIUM CHLORIDE, PRESERVATIVE FREE 10 ML: 5 INJECTION INTRAVENOUS at 17:31

## 2022-05-11 RX ADMIN — POLYETHYLENE GLYCOL 3350 17 G: 17 POWDER, FOR SOLUTION ORAL at 08:37

## 2022-05-11 RX ADMIN — SENNOSIDES 8.6 MG: 8.6 TABLET, COATED ORAL at 08:36

## 2022-05-11 RX ADMIN — OXYBUTYNIN CHLORIDE 10 MG: 5 TABLET, EXTENDED RELEASE ORAL at 08:36

## 2022-05-11 RX ADMIN — SODIUM CHLORIDE, PRESERVATIVE FREE 10 ML: 5 INJECTION INTRAVENOUS at 06:39

## 2022-05-11 RX ADMIN — HEPARIN SODIUM 5000 UNITS: 5000 INJECTION INTRAVENOUS; SUBCUTANEOUS at 06:39

## 2022-05-11 RX ADMIN — HEPARIN SODIUM 5000 UNITS: 5000 INJECTION INTRAVENOUS; SUBCUTANEOUS at 17:31

## 2022-05-11 RX ADMIN — PANTOPRAZOLE SODIUM 40 MG: 40 TABLET, DELAYED RELEASE ORAL at 06:39

## 2022-05-11 RX ADMIN — Medication 3 UNITS: at 12:02

## 2022-05-11 NOTE — DISCHARGE SUMMARY
Discharge Summary       PATIENT ID: Guy Obrien  MRN: [id number]   YOB: 1960    DATE OF ADMISSION: 5/1/2022  7:38 PM    DATE OF DISCHARGE: 05/11/22   PRIMARY CARE PROVIDER: Misa Richardson MD     ATTENDING PHYSICIAN: Marc Frankel, MD  DISCHARGING PROVIDER: Marc Frankel, MD    To contact this individual call [phone] and ask the  to page. If unavailable ask to be transferred the Adult Hospitalist Department. CONSULTATIONS: IP CONSULT TO UROLOGY  IP CONSULT TO HOSPITALIST    PROCEDURES/SURGERIES: Procedure(s):  CYSTOSCOPY BILATERAL STENT PLACEMENT AND RETROGRADE PYELOGRAMS    ADMITTING DIAGNOSES & HOSPITAL COURSE:   Copied form admit: \" This is a recurrent problem. The current episode started more than 1 week ago. The problem occurs hourly. The problem has been gradually worsening. The quality of the pain is sharp. The pain is moderate. Associated symptoms include diarrhea. Pertinent negatives include no anorexia, no fever, no belching, no flatus, no hematochezia, no melena, no nausea, no vomiting, no constipation, no dysuria, no frequency, no hematuria, no headaches, no arthralgias, no myalgias, no trauma, no chest pain and no back pain. The pain is worsened by certain positions. The pain is relieved by nothing.  \"    Patient was managed for hydronephrosis status post bilateral ureteral stent placement on 5/4. Urology was consulted and following. Per them will need to follow-up outpatient for future stone removal.  Patient completed 7 days IV Rocephin therapy 5/8 urine cultures grew E. coli sensitive to this. Noted to have hypokalemia which was controlled with low potassium diet 1 dose of Lokelma given. Creatinine stayed stable at patient's baseline. Instructed patient follow-up with outpatient PCP, nephrology as well as urology outpatient. Directed to patient to adhere to low potassium diet.   Lilo Hunter PT OT evaluated patient recommended IPR which was not approved for insurance authorization. Discussed the option of doing peer to peer review with patient she stated she does not need this done and she was willing to go home with home health and preferred that. D/c with HH today. DISCHARGE DIAGNOSES / PLAN:      Active Problems:    Hydronephrosis (5/1/2022)  - s/p b/l stenting  5/4   -urology on board, will need to follow-up for stone removal outpatient   - complete IV rocephin urine culture E. Coli 7 days therapy 5/8        DM (diabetes mellitus) ([address] (5/2/2022)  - SSI . Broad coverage as indicated     Nephrolithiasis  CKD stage IV  Cont to monitor, around baseline 3's     Hyperkalemia    continue with low K diet  -Continue to trend     Nausea   -resolved, might be 2/2 anesthesia after stent placements     Constipation   Standing MiraLAX Senokot     PT OT recommending IPR     Medically ready for discharge. insurance auth denied, patient would like  instead.   Case management setting it up.       Code status: full   DVT ppx : Heparin subcu         FOLLOW UP APPOINTMENTS:    Follow-up Information     Follow up With Specialties Details Why Contact Info    Aiyana Estrada MD Urology Go on 6/6/2022 stone management [place] [id number]  [phone]      Jaqueline Dickinson MD Internal Medicine Physician On 5/18/2022 Hosital follow up primary care appointment Wednesday, 5/18/22 at 10:00 a.m.  [address] [address] [facility]      Don Bello MD Nephrology In 1 week  [facility]  [address]  [address] PT/OT/Nursing [facility] [place] [address] [address] [phone]           ADDITIONAL CARE RECOMMENDATIONS: Repeat CBC, BMP 3 to 5 days for potassium level check     DIET: Resume previous diet however please adhere to low K diet     ACTIVITY: Activity as tolerated    DISCHARGE MEDICATIONS:  Current Discharge Medication List      CONTINUE these medications which have NOT CHANGED    Details   OXYBUTYNIN CHLORIDE PO Take 10 mg by mouth. traMADoL (ULTRAM) 50 mg tablet Take  mg by mouth every eight (8) hours as needed for Pain.      glipiZIDE (GLUCOTROL) 5 mg tablet Take 5 mg by mouth two (2) times a day. polyethylene glycol (MIRALAX) 17 gram packet Take 1 Packet by mouth daily. Qty: 10 Packet, Refills: 0      methIMAzole (TAPAZOLE) 10 mg tablet Take  by mouth daily. iron/vitamin B complex (GERITOL PO) Take  by mouth.      pantoprazole (PROTONIX) 40 mg tablet Take 40 mg by mouth daily. senna-docusate (Senokot-S) 8.6-50 mg per tablet Take 1 Tab by mouth daily. acetaminophen (TYLENOL) 500 mg tablet Take 500 mg by mouth every eight (8) hours as needed for Pain. ondansetron hcl (Zofran) 8 mg tablet Take 8 mg by mouth every eight (8) hours as needed for Nausea or Vomiting. NOTIFY YOUR PHYSICIAN FOR ANY OF THE FOLLOWING:   Fever over 101 degrees for 24 hours. Chest pain, shortness of breath, fever, chills, nausea, vomiting, diarrhea, change in mentation, falling, weakness, bleeding. Severe pain or pain not relieved by medications. Or, any other signs or symptoms that you may have questions about.     DISPOSITION:    Home With:   OT  PT x HH  RN       Long term SNF/Inpatient Rehab    Independent/assisted living    Hospice    Other:       PATIENT CONDITION AT DISCHARGE:     Functional status    Poor    x Deconditioned     Independent      Cognition    x Lucid     Forgetful     Dementia        Code status    x Full code     DNR      PHYSICAL EXAMINATION AT DISCHARGE:     Exam  General: No acute distress resting in bed morbidly obese  CVS: Regular rate and rhythm  Pulm: Clear to auscultation bilaterally  Abdomen: Soft obese nontender positive bowel sounds no flank tenderness  Extremities no peripheral extremity edema no rash or lesion  Neuro: A&O x3 cranial nerves II through XII grossly intact      CHRONIC MEDICAL DIAGNOSES:  Problem List as of 5/11/2022 Date Reviewed: 5/2/2022          Codes Class Noted - Resolved    DM [facility]diabetes mellitus) ([facility] ICD-10-CM: E11.9  ICD-9-CM: 250.00  5/2/2022 - Present        Hydronephrosis ICD-10-CM: N13.30  ICD-9-CM: 892  5/1/2022 - Present        Hematuria ICD-10-CM: R31.9  ICD-9-CM: 599.70  7/17/2020 - Present              Greater than 30 minutes were spent with the patient on counseling and coordination of care    Signed:   Idania Zazueta MD  5/11/2022  1:53 PM

## 2022-05-11 NOTE — PROGRESS NOTES
I have reviewed discharge instructions with the patient. The patient verbalized understanding. IV was removed. Patient was discharged home via [address] transport.

## 2022-05-11 NOTE — PROGRESS NOTES
Hospital follow-up PCP transitional care appointment has been scheduled with Dr. Tatum Cedeno for Wednesday, 5/18/22 at 10:00 a.m. Pending patient discharge. Natan Egan, Care Management Assistant.

## 2022-05-11 NOTE — PROGRESS NOTES
Problem: Pressure Injury - Risk of  Goal: *Prevention of pressure injury  Description: Document Murray Scale and appropriate interventions in the flowsheet. Outcome: Progressing Towards Goal  Note: Pressure Injury Interventions:  Sensory Interventions: Assess changes in LOC    Moisture Interventions: Apply protective barrier, creams and emollients,Assess need for specialty bed    Activity Interventions: PT/OT evaluation    Mobility Interventions: Chair cushion    Nutrition Interventions: Document food/fluid/supplement intake    Friction and Shear Interventions: Apply protective barrier, creams and emollients                Problem: Falls - Risk of  Goal: *Absence of Falls  Description: Document Oh Fall Risk and appropriate interventions in the flowsheet.   Outcome: Progressing Towards Goal  Note: Fall Risk Interventions:  Mobility Interventions: Communicate number of staff needed for ambulation/transfer         Medication Interventions: Patient to call before getting OOB    Elimination Interventions: Call light in reach

## 2022-05-11 NOTE — PROGRESS NOTES
JOESPH:    RUR 12%    Disposition: Home with sister and [facility]. Referral sent and accepted via [employer]. Address: [address] [place] [address] [address]    Transportation: [facility] [address] Medicaid transport([phone]) reference# [id number] Scheduled between 12 and 2pm. CM spoke to [facility].     Follow up: PCP/Specialist    Primary contact: Janice Harman(Sister) [phone]    Jaime Huerta RN/CRM  [phone]

## 2022-05-11 NOTE — PROGRESS NOTES
Problem: Pressure Injury - Risk of  Goal: *Prevention of pressure injury  Description: Document Murray Scale and appropriate interventions in the flowsheet. Outcome: Resolved/Met  Note: Pressure Injury Interventions:  Sensory Interventions: Float heels,Pad between skin to skin,Pressure redistribution bed/mattress (bed type),Turn and reposition approx. every two hours (pillows and wedges if needed)    Moisture Interventions: Absorbent underpads,Apply protective barrier, creams and emollients,Check for incontinence Q2 hours and as needed,Internal/External urinary devices,Offer toileting Q_hr    Activity Interventions: Pressure redistribution bed/mattress(bed type)    Mobility Interventions: Float heels,HOB 30 degrees or less,Pressure redistribution bed/mattress (bed type),Turn and reposition approx. every two hours(pillow and wedges)    Nutrition Interventions: Document food/fluid/supplement intake    Friction and Shear Interventions: Feet elevated on foot rest,HOB 30 degrees or less,Transferring/repositioning devices                Problem: Patient Education: Go to Patient Education Activity  Goal: Patient/Family Education  Outcome: Resolved/Met     Problem: Falls - Risk of  Goal: *Absence of Falls  Description: Document Oh Fall Risk and appropriate interventions in the flowsheet.   Outcome: Resolved/Met  Note: Fall Risk Interventions:  Mobility Interventions: Communicate number of staff needed for ambulation/transfer         Medication Interventions: Assess postural VS orthostatic hypotension,Evaluate medications/consider consulting pharmacy,Patient to call before getting OOB,Teach patient to arise slowly    Elimination Interventions: Call light in reach,Patient to call for help with toileting needs,Toileting schedule/hourly rounds              Problem: Patient Education: Go to Patient Education Activity  Goal: Patient/Family Education  Outcome: Resolved/Met     Problem: Patient Education: Go to Patient Education Activity  Goal: Patient/Family Education  Outcome: Resolved/Met     Problem: Patient Education: Go to Patient Education Activity  Goal: Patient/Family Education  Outcome: Resolved/Met

## 2022-05-11 NOTE — PROGRESS NOTES
5/11/2022  To Whom It May Concern:   Sofya William is currently a patient at [facility]. She was admitted on 5/1/22 due to Hydronephrosis [N13.30], Procedure(s) (LRB):  CYSTOSCOPY BILATERAL STENT PLACEMENT AND RETROGRADE PYELOGRAMS (Bilateral) 7 Days Post-Op and with   precautions. Patient  has not progressed to a functional level where the patient can safely ambulate using a SPC, rolling walker, crutches, or standard walker. Patient has long history of weakness and unable to ambulate and primarily bed bound for extended time. Therefore, she is a HIGH fall risk. She has progressed to standing as well as able to scoot and will benefit from wheelchair to allow for mobility in and out of home for appointments, especially to be able to attend outpatient when appropriate. The patient requires a 30\" standard manual; adult wheelchair  a standard cushion for adequate pressure relief, for patient to safely shift weight, and reposition in sitting 2* patient is at increased risk for skin breakdown, removable/swing away leg-rests for safety with transfers; and removable arm-rests for safety with transfers. A seat belt and anti-tippers are needed for fall prevention within the home, on access ramp into home, community outings, and appointments.            Patient dimensions are as follows:  Hip to bend of knee: 23\"  Bend of knee to bottom of foot: 16\"  Hip to hip width: 27\"  Shoulder to shoulder width 24\"  Shoulder to elbow: 14\"  Elbow to wrist: 12\"    Thank you from the treating therapist and physician,   Josep Vidales, PT

## 2022-05-11 NOTE — PROGRESS NOTES
Comprehensive Nutrition Assessment    Type and Reason for Visit: Initial,RD nutrition re-screen/LOS    Nutrition Recommendations/Plan:   1. Continue Low K+ diet. Consider No concentrated sweets. 2. Obtain new weight- no new weight since admission. 3. Recommend outpt RD appointment for wt loss. Malnutrition Assessment:  Malnutrition Status:  No malnutrition (05/11/22 5444)      Nutrition Assessment:     Pt admitted with Hydronephrosis [N13.30]    Past Medical History:   Diagnosis Date    Chronic kidney disease     Chronic obstructive pulmonary disease (HCC)     Diabetes ([address]     Hernia, hiatal     Hypertension     Incontinence of urine     Kidney stones     both kidneys    [facility]     Osteoarthritis     Peripheral neuropathy     DOES GET SOME NUMBNESS IN HANDS    Psychiatric disorder     ANXIETY    Renal failure     left kidney    Thyroid disease        Current wt 158.8 kg (~350#). Wt hx indicates stable wt. Claims appetite, \"good and lost ~80# bringing wt down to 330# from over 400#\". No recent PO intakes documented. Achieved wt loss by, \"watching carbs, drinking water, and watching sugars\". Pt screened for LOS. Pt had bilateral stents placed for hydronephrosis on 5/4 by Urology. Pt feeling better. Awaiting SNF placement. Pt with hx of soft BMs and sister has to clean her up so started giving her more carbs to firm the stool which in turn she believes is causing her to gain wt back. She is also known to have lactose-intolerance. Adjusted diet for low-lactose. Pt with A1c 7.2, an increase from 6.7 about 1 yr ago. Consider no concentrated sweets. K+ currently WNL. Consider removing K+ restriction. Was previously on a self imposed low carb/low sugar diet. BG most recently ~140's. No chew/swallow difficulties. Pt had nausea previously but that has improved. No reported BM in 3 days.       Last BM: 05/08/22, Soft    PO intake:   Patient Vitals for the past 168 hrs:   % Diet Eaten 05/05/22 0845 51 - 75%       Wt Readings from Last 30 Encounters:   05/01/22 158.8 kg (350 lb)   08/03/21 (!) 160.1 kg (353 lb)   03/18/21 (!) 160.3 kg (353 lb 6.4 oz)   08/07/20 156.5 kg (345 lb)           Nutrition Related Findings:      Wound Type: None    Current Nutrition Intake & Therapies:  Average Meal Intake: %  Average Supplement Intake: None ordered  ADULT DIET Regular; Low Potassium (Less than 3000 mg/day); Lactose-Controlled    Anthropometric Measures:  Height: 5' 4.02\" (162.6 cm)  Ideal Body Weight (IBW): 120 lbs (55 kg)  Admission Body Weight: 350 lb 1.5 oz  Current Body Wt:  158.8 kg (350 lb 1.5 oz), 291.7 % IBW.  Bed scale  Current BMI (kg/m2): 60.1  Usual Body Weight: 149.7 kg (330 lb)  % Weight Change (Calculated): 6.1  Weight Adjustment: No adjustment                 BMI Category: Obese class 3 (BMI 40.0 or greater)    Estimated Daily Nutrient Needs:  Energy Requirements Based On: Formula  Weight Used for Energy Requirements: Current (158.8 kg)  Energy (kcal/day): 1700  Weight Used for Protein Requirements: Ideal  Protein (g/day): 110 (2gm/kg IBW)  Method Used for Fluid Requirements: 1 ml/kcal  Fluid (ml/day): 1800    Nutrition Diagnosis:   · Overweight/obesity related to excessive energy intake as evidenced by BMI      Nutrition Interventions:   Food and/or Nutrient Delivery: Continue current diet  Nutrition Education/Counseling: No recommendations at this time  Coordination of Nutrition Care: Continue to monitor while inpatient,Interdisciplinary rounds       Goals:     Goals: PO intake 50% or greater,within 7 days       Nutrition Monitoring and Evaluation:   Behavioral-Environmental Outcomes: None identified  Food/Nutrient Intake Outcomes: Food and nutrient intake  Physical Signs/Symptoms Outcomes: Biochemical data,Weight    Discharge Planning:    Recommend pursue outpatient nutrition counseling,Continue current diet    Mario Valderrama, [address]: [phone]

## 2022-05-11 NOTE — PROGRESS NOTES
Problem: Mobility Impaired (Adult and Pediatric)  Goal: *Acute Goals and Plan of Care (Insert Text)  Description: FUNCTIONAL STATUS PRIOR TO ADMISSION: overall bed bound; gets self up to EOB and back. Had been getting to standing at bedside up until one month ago to stand briefly. Bathes self    HOME SUPPORT PRIOR TO ADMISSION: currently living with sister who assists as needed with baths; provides meals    Physical Therapy Goals  Reviewed/ revised 5/9/2022  1. Patient will move from supine to sit and sit to supine  in bed with modified independence within 7 day(s). 2.  Patient will transfer from bed to chair and chair to bed with maximal assistance using lateral technique within 7 day(s). 3.  Patient will perform sit to stand with moderate assistance and use of bariatric RW within 7 day(s). Physical Therapy Goals  Initiated 5/2/2022  1. Patient will move from supine to sit and sit to supine  and roll side to side in bed with modified independence within 7 day(s). 2.  Patient will transfer from bed to chair and chair to bed with maximal assistance ,lateral transfer, using the least restrictive device within 7 day(s). 3.  Patient will perform sit to stand with maximal assistance within 7 day(s). Outcome: Progressing Towards Goal   PHYSICAL THERAPY TREATMENT  Patient: Rebecca Galvez (25 y.o. female)  Date: 5/11/2022  Diagnosis: Hydronephrosis [N13.30] <principal problem not specified>  Procedure(s) (LRB):  CYSTOSCOPY BILATERAL STENT PLACEMENT AND RETROGRADE PYELOGRAMS (Bilateral) 7 Days Post-Op  Precautions:    Chart, physical therapy assessment, plan of care and goals were reviewed. ASSESSMENT  Patient continues with skilled PT services and is progressing towards goals. Patient received in bed and agreeable to therapy. Discussed equipment for home and measured for wheelchair as patient was denied by insurance for rehab.   She was able to stand again today using RW vs pulling up on chair but still very limited time. Anticipates home later today. Recommend home health PT will the possibility of progressing to outpatient. .     Current Level of Function Impacting Discharge (mobility/balance): mod to max assist sit to stand; but unable to safely transfer via stand pivot    Other factors to consider for discharge: supportive family          PLAN :  Patient continues to benefit from skilled intervention to address the above impairments. Continue treatment per established plan of care. to address goals. Recommendation for discharge: (in order for the patient to meet his/her long term goals)  Therapy 3 hours per day 5-7 days per week denied by insurance therefore home health with family support    This discharge recommendation:  Has been made in collaboration with the attending provider and/or case management    IF patient discharges home will need the following DME: wheelchair       SUBJECTIVE:   Patient stated I will work with home health. I am determined.     OBJECTIVE DATA SUMMARY:   Critical Behavior:  Neurologic State: Alert  Orientation Level: Oriented X4  Cognition: Appropriate decision making  Safety/Judgement: Awareness of environment,Fall prevention  Functional Mobility Training:  Bed Mobility:     Supine to Sit: Supervision  Sit to Supine: Supervision  Scooting: Supervision        Transfers:  Sit to Stand: Maximum assistance;Minimum assistance  Stand to Sit: Contact guard assistance                             Balance:  Sitting: Intact  Standing: Impaired; With support  Standing - Static: Constant support    Therapeutic Exercises:   Emphasis on gluteal isometrics and bridges as able  Pain Ratin    Activity Tolerance:   Good    After treatment patient left in no apparent distress:   Supine in bed, Call bell within reach, and Side rails x 3    COMMUNICATION/COLLABORATION:   The patients plan of care was discussed with: Case management.      Yoni Byrd, PT   Time Calculation: 27 mins

## 2022-09-06 ENCOUNTER — VIRTUAL VISIT (OUTPATIENT)
Dept: INTERNAL MEDICINE CLINIC | Age: 62
End: 2022-09-06
Payer: MEDICAID

## 2022-09-06 ENCOUNTER — NURSE TRIAGE (OUTPATIENT)
Dept: OTHER | Facility: CLINIC | Age: 62
End: 2022-09-06

## 2022-09-06 DIAGNOSIS — U07.1 COVID-19: Primary | ICD-10-CM

## 2022-09-06 PROBLEM — N32.81 OVERACTIVE BLADDER: Status: ACTIVE | Noted: 2022-09-06

## 2022-09-06 PROBLEM — E05.90 HYPERTHYROIDISM: Status: ACTIVE | Noted: 2022-09-06

## 2022-09-06 PROBLEM — G89.29 CHRONIC PAIN: Status: ACTIVE | Noted: 2022-09-06

## 2022-09-06 PROBLEM — N20.0 KIDNEY STONES: Status: ACTIVE | Noted: 2022-09-06

## 2022-09-06 PROCEDURE — 99442 PR PHYS/QHP TELEPHONE EVALUATION 11-20 MIN: CPT | Performed by: INTERNAL MEDICINE

## 2022-09-06 NOTE — PROGRESS NOTES
9/6/2022    [employer] 1960 is a 65y.o. year old female new patient,   here for video visit to evaluate the following chief complaint(s):  Chief Complaint   Patient presents with    Positive For Covid-19           ASSESSMENT/PLAN:  Below is the assessment and plan developed based on review of pertinent history, physical exam, labs, studies, and medications. 1. COVID-19   Patient with positive home COVID test and mild-moderate URI symptoms  Discussed pros and cons of specific therapy such as Paxlovid, infusions, but I am not able to evaluate her via video today and we have do not have complete records available as she is a new patient to our practice. She has recent kidney dysfunction and no recent labs on file therefore I explained I am not able to prescribe anything for her. I recommend she be evaluated in person, since she is homebound currently and feels symptoms do not warrant EMS services, I suggested [facility]. Discussed signs to monitor for that should prompt urgent medical attention          SUBJECTIVE/OBJECTIVE:  Patient has never been seen at our practice but was scheduled through triage- plans to establish with Dr Stanislav Nayak as her prior PCP retired. She reports she is currently bedbound after multiple hospitalizations due to kidney stones, hydronephrosis. [facility]  Staying with her sister  Unable to connect camera for video visit    She reports symptoms started Sunday with some congestion, post nasal drip, feeling feverish. Progressed to some sore throat and uncomfortable with swallowing. She is coughing a lot but does not feel short of breath. She took some tylenol for her symptoms, as well as nyquil and coracidin with some relief         Review of Systems   Constitutional:  Positive for fever and malaise/fatigue. Negative for chills. Respiratory:  Positive for cough. Negative for shortness of breath and wheezing.     Cardiovascular:  Negative for chest pain, palpitations and leg swelling. Gastrointestinal:  Negative for abdominal pain. Musculoskeletal:  Positive for myalgias. Skin:  Negative for rash. Phone only, she is speaking in clear and complete sentences and does not sound in distress    No flowsheet data found. The following sections were reviewed & updated as appropriate: Problem List, Allergies, PMH, PSH, FH, and SH. Patient Active Problem List   Diagnosis Code    Hematuria R31.9    Hydronephrosis N13.30    DM (diabetes mellitus) ([facility] E11.9    Hyperthyroidism E05.90    Overactive bladder N32.81    Kidney stones N20.0    Chronic pain G89.29        Current Outpatient Medications   Medication Sig Dispense Refill    OXYBUTYNIN CHLORIDE PO Take 10 mg by mouth. methIMAzole (TAPAZOLE) 10 mg tablet Take  by mouth daily. traMADoL (ULTRAM) 50 mg tablet Take  mg by mouth every eight (8) hours as needed for Pain.      glipiZIDE (GLUCOTROL) 5 mg tablet Take 5 mg by mouth two (2) times a day. (Patient not taking: Reported on 8/3/2021)      polyethylene glycol (MIRALAX) 17 gram packet Take 1 Packet by mouth daily. (Patient not taking: Reported on 7/27/2021) 10 Packet 0    iron/vitamin B complex (GERITOL PO) Take  by mouth. (Patient not taking: Reported on 8/3/2021)      pantoprazole (PROTONIX) 40 mg tablet Take 40 mg by mouth daily. (Patient not taking: Reported on 7/27/2021)      senna-docusate (Senokot-S) 8.6-50 mg per tablet Take 1 Tab by mouth daily. (Patient not taking: Reported on 7/27/2021)      acetaminophen (TYLENOL) 500 mg tablet Take 500 mg by mouth every eight (8) hours as needed for Pain. (Patient not taking: Reported on 8/3/2021)      ondansetron hcl (Zofran) 8 mg tablet Take 8 mg by mouth every eight (8) hours as needed for Nausea or Vomiting.  (Patient not taking: Reported on 5/2/2022)         Bacitracin    Social History     Occupational History    Not on file   Tobacco Use    Smoking status: Never Smokeless tobacco: Never   Vaping Use    Vaping Use: Never used   Substance and Sexual Activity    Alcohol use: Never    Drug use: Never    Sexual activity: Not on file            On this date 09/06/2022 I have spent 25 minutes reviewing previous notes, test results and face to face (virtual) with the patient discussing the diagnosis and importance of compliance with the treatment plan as well as documenting on the day of the visit. Time spent on telemed visit: 18 min    The patient was evaluated through a synchronous (real-time) audio-video encounter. The patient (or guardian if applicable) is aware that this is a billable service, which includes applicable co-pays. Verbal consent to proceed has been obtained. The visit was conducted pursuant to the emergency declaration under the [address] [place] [address], [address] [place] [address] authority and the [employer] Act. Patient identification was verified, and a caregiver was present when appropriate. The patient was located at home in a state where the provider was licensed to provide care. Disclaimer:  Aspects of this note may have been generated using Dragon voice recognition software. Despite editing, there may be some syntax errors   We discussed the expected course, resolution and complications of the diagnosis(es) in detail. I have discussed any significant medication side effects and warnings with the patient when indicated. I advised them to contact the office if their condition worsens, changes or fails to improve as anticipated. Patient expressed understanding of the diagnosis and plan. An electronic signature was used to authenticate this note.   -- Benedicto Grewal MD

## 2022-09-06 NOTE — TELEPHONE ENCOUNTER
Received call from Dwayne Plaza at [facility] with Red Flag Complaint. Subjective: Caller states \"home test today positive for covid\"     Current Symptoms: Has DM. Has a cough with some shortness of breath. Has a sore throat. Not sure who she may have been exposed to but was discharged from hospital 2 weeks ago. Fully vaccinated in 2021, no booster. No wheezing but has rattling. Cough is productive-clear. Has eye pain and fatigue. Speaking in full clear sentences. Onset: 2 days ago; gradual    Associated Symptoms: NA    Pain Severity: 0/10; ;     Temperature: feels feverish, no thermometer    What has been tried: cough drops, nyquil, coricidin HBP, tylenol    LMP: NA Pregnant: NA    Recommended disposition: Original Disposition was call back in 1 hour by PCP, but she doesn't have one. See in Office Today no PCP, advised [facility] if No new patient VV appt for today, she is \"bed bound\"    Care advice provided, patient verbalizes understanding; denies any other questions or concerns; instructed to call back for any new or worsening symptoms. Patient/Caller agrees with recommended disposition; writer provided warm transfer to Marylene Spice at [facility] for appointment scheduling    Attention Provider: Thank you for allowing me to participate in the care of your patient. The patient was connected to triage in response to information provided to the [facility]. Please do not respond through this encounter as the response is not directed to a shared pool.         Reason for Disposition   MILD difficulty breathing (e.g., minimal/no SOB at rest, SOB with walking, pulse <100)    Protocols used: Coronavirus (COVID-19) Diagnosed or Suspected-ADULT-[place]

## 2022-10-04 ENCOUNTER — TELEPHONE (OUTPATIENT)
Dept: INTERNAL MEDICINE CLINIC | Age: 62
End: 2022-10-04

## 2022-10-04 NOTE — TELEPHONE ENCOUNTER
Reason for call:  Spoke with pt, she would like to know if we can accommodate her coming on a stretcher.    Pt requested a call back at  [phone]     Is this a new problem: yes     Date of last appointment:  9/6/2022     Can we respond via "[employer]"t: no    Best call back number: Darlene , [address]

## 2022-10-05 ENCOUNTER — TELEPHONE (OUTPATIENT)
Dept: INTERNAL MEDICINE CLINIC | Age: 62
End: 2022-10-05

## 2022-10-05 NOTE — TELEPHONE ENCOUNTER
Reason for call:   Pt returning Ramila phone called. Spoke with pt, she would like to know if we can accommodate her coming on a stretcher.    Pt requested a call back at  [phone]     Is this a new problem: yes     Date of last appointment:  9/6/2022     Can we respond via [employer]: no    Best call back number: [phone]

## 2022-10-06 ENCOUNTER — PATIENT MESSAGE (OUTPATIENT)
Dept: INTERNAL MEDICINE CLINIC | Age: 62
End: 2022-10-06

## 2022-10-06 NOTE — TELEPHONE ENCOUNTER
Spoke with patient and advised home based PCP might be better suited for her needs, will send list of home based PCP via [facility], will keep appt with Dr. Heredia Lias for now.

## 2023-04-19 NOTE — PROGRESS NOTES
JOESPH:  RUR: 12%     Disposition:  [facility]    CM called  [facility] ([phone]). Message left on voicemail requesting update on insurance auth and noting that updated therapy notes were available for review today and yesterday. CM continuing to follow.     [facility], [address], [address]          4:30pm  Call received from [facility] liaison with [facility] ([phone]). Ins co Medical Director has denied ins auth for [facility] rehab request.  Medical Director states patient's needs can be met at a SNF. If MD desires to pursue a [facility]; that would need to be set up within 7 days by calling [phone] option 2 and   ref # [id number]. CM informed Dr. Fly Rosales of ins co response. CM continuing to follow.     [facility], [address], [address] Yes

## 2023-05-11 ENCOUNTER — HOSPITAL ENCOUNTER (INPATIENT)
Facility: HOSPITAL | Age: 63
LOS: 5 days | Discharge: HOME | DRG: 871 | End: 2023-05-16
Attending: EMERGENCY MEDICINE | Admitting: FAMILY MEDICINE
Payer: MEDICARE

## 2023-05-11 ENCOUNTER — APPOINTMENT (OUTPATIENT)
Facility: HOSPITAL | Age: 63
DRG: 871 | End: 2023-05-11
Payer: MEDICARE

## 2023-05-11 DIAGNOSIS — R19.7 NAUSEA, VOMITING, AND DIARRHEA: ICD-10-CM

## 2023-05-11 DIAGNOSIS — E86.0 DEHYDRATION: ICD-10-CM

## 2023-05-11 DIAGNOSIS — R11.2 NAUSEA, VOMITING, AND DIARRHEA: ICD-10-CM

## 2023-05-11 DIAGNOSIS — R09.02 HYPOXIA: Primary | ICD-10-CM

## 2023-05-11 PROBLEM — K46.9 ABDOMINAL HERNIA: Status: ACTIVE | Noted: 2023-05-11

## 2023-05-11 PROBLEM — L89.329 DECUBITUS ULCER OF LEFT BUTTOCK: Status: ACTIVE | Noted: 2023-05-11

## 2023-05-11 PROBLEM — J96.01 ACUTE RESPIRATORY FAILURE WITH HYPOXIA (HCC): Status: ACTIVE | Noted: 2023-05-11

## 2023-05-11 LAB
ALBUMIN SERPL-MCNC: 3.2 G/DL (ref 3.5–5)
ALBUMIN/GLOB SERPL: 0.7 (ref 1.1–2.2)
ALP SERPL-CCNC: 109 U/L (ref 45–117)
ALT SERPL-CCNC: 12 U/L (ref 12–78)
ANION GAP SERPL CALC-SCNC: 2 MMOL/L (ref 5–15)
APTT PPP: 26.2 SEC (ref 22.1–31)
ARTERIAL PATENCY WRIST A: POSITIVE
ARTERIAL PATENCY WRIST A: POSITIVE
AST SERPL-CCNC: 14 U/L (ref 15–37)
BASE EXCESS BLD CALC-SCNC: 0.6 MMOL/L
BASE EXCESS BLD CALC-SCNC: 1.9 MMOL/L
BASOPHILS # BLD: 0 K/UL (ref 0–0.1)
BASOPHILS NFR BLD: 0 % (ref 0–1)
BDY SITE: ABNORMAL
BDY SITE: ABNORMAL
BILIRUB SERPL-MCNC: 0.2 MG/DL (ref 0.2–1)
BUN SERPL-MCNC: 43 MG/DL (ref 6–20)
BUN/CREAT SERPL: 17 (ref 12–20)
CALCIUM SERPL-MCNC: 8.8 MG/DL (ref 8.5–10.1)
CHLORIDE SERPL-SCNC: 112 MMOL/L (ref 97–108)
CO2 SERPL-SCNC: 32 MMOL/L (ref 21–32)
COMMENT:: NORMAL
COMMENT:: NORMAL
CREAT SERPL-MCNC: 2.54 MG/DL (ref 0.55–1.02)
D DIMER PPP FEU-MCNC: 1.5 MG/L FEU (ref 0–0.65)
DIFFERENTIAL METHOD BLD: ABNORMAL
EOSINOPHIL # BLD: 0 K/UL (ref 0–0.4)
EOSINOPHIL NFR BLD: 0 % (ref 0–7)
ERYTHROCYTE [DISTWIDTH] IN BLOOD BY AUTOMATED COUNT: 15.4 % (ref 11.5–14.5)
EST. AVERAGE GLUCOSE BLD GHB EST-MCNC: 166 MG/DL
GAS FLOW.O2 O2 DELIVERY SYS: ABNORMAL
GAS FLOW.O2 O2 DELIVERY SYS: ABNORMAL
GLOBULIN SER CALC-MCNC: 4.3 G/DL (ref 2–4)
GLUCOSE BLD STRIP.AUTO-MCNC: 151 MG/DL (ref 65–117)
GLUCOSE SERPL-MCNC: 185 MG/DL (ref 65–100)
HBA1C MFR BLD: 7.4 % (ref 4–5.6)
HCO3 BLD-SCNC: 27.6 MMOL/L (ref 22–26)
HCO3 BLD-SCNC: 28.5 MMOL/L (ref 22–26)
HCT VFR BLD AUTO: 38.3 % (ref 35–47)
HGB BLD-MCNC: 10.9 G/DL (ref 11.5–16)
IMM GRANULOCYTES # BLD AUTO: 0.2 K/UL (ref 0–0.04)
IMM GRANULOCYTES NFR BLD AUTO: 1 % (ref 0–0.5)
INR PPP: 1.1 (ref 0.9–1.1)
LACTATE SERPL-SCNC: 0.8 MMOL/L (ref 0.4–2)
LIPASE SERPL-CCNC: 201 U/L (ref 73–393)
LYMPHOCYTES # BLD: 1.2 K/UL (ref 0.8–3.5)
LYMPHOCYTES NFR BLD: 7 % (ref 12–49)
MCH RBC QN AUTO: 26.8 PG (ref 26–34)
MCHC RBC AUTO-ENTMCNC: 28.5 G/DL (ref 30–36.5)
MCV RBC AUTO: 94.1 FL (ref 80–99)
MONOCYTES # BLD: 0.5 K/UL (ref 0–1)
MONOCYTES NFR BLD: 3 % (ref 5–13)
NEUTS SEG # BLD: 15.7 K/UL (ref 1.8–8)
NEUTS SEG NFR BLD: 89 % (ref 32–75)
NRBC # BLD: 0 K/UL (ref 0–0.01)
NRBC BLD-RTO: 0 PER 100 WBC
O2/TOTAL GAS SETTING VFR VENT: 1.5 %
O2/TOTAL GAS SETTING VFR VENT: 25 %
PCO2 BLD: 52.6 MMHG (ref 35–45)
PCO2 BLD: 54.7 MMHG (ref 35–45)
PEEP RESPIRATORY: 7 CMH2O
PH BLD: 7.31 (ref 7.35–7.45)
PH BLD: 7.34 (ref 7.35–7.45)
PLATELET # BLD AUTO: 339 K/UL (ref 150–400)
PMV BLD AUTO: 10.5 FL (ref 8.9–12.9)
PO2 BLD: 100 MMHG (ref 80–100)
PO2 BLD: 84 MMHG (ref 80–100)
POTASSIUM SERPL-SCNC: 4.7 MMOL/L (ref 3.5–5.1)
PRESSURE SUPPORT SETTING VENT: 7 CMH2O
PROT SERPL-MCNC: 7.5 G/DL (ref 6.4–8.2)
PROTHROMBIN TIME: 11 SEC (ref 9–11.1)
RBC # BLD AUTO: 4.07 M/UL (ref 3.8–5.2)
RBC MORPH BLD: ABNORMAL
SAO2 % BLD: 95 % (ref 92–97)
SAO2 % BLD: 97.2 % (ref 92–97)
SERVICE CMNT-IMP: ABNORMAL
SODIUM SERPL-SCNC: 146 MMOL/L (ref 136–145)
SPECIMEN HOLD: NORMAL
SPECIMEN HOLD: NORMAL
SPECIMEN TYPE: ABNORMAL
SPECIMEN TYPE: ABNORMAL
THERAPEUTIC RANGE: NORMAL SECS (ref 58–77)
TROPONIN I SERPL HS-MCNC: 8 NG/L (ref 0–37)
TROPONIN I SERPL HS-MCNC: 9 NG/L (ref 0–37)
VENTILATION MODE VENT: ABNORMAL
WBC # BLD AUTO: 17.6 K/UL (ref 3.6–11)

## 2023-05-11 PROCEDURE — 99285 EMERGENCY DEPT VISIT HI MDM: CPT

## 2023-05-11 PROCEDURE — 85610 PROTHROMBIN TIME: CPT

## 2023-05-11 PROCEDURE — 93005 ELECTROCARDIOGRAM TRACING: CPT | Performed by: EMERGENCY MEDICINE

## 2023-05-11 PROCEDURE — 36415 COLL VENOUS BLD VENIPUNCTURE: CPT

## 2023-05-11 PROCEDURE — 83036 HEMOGLOBIN GLYCOSYLATED A1C: CPT

## 2023-05-11 PROCEDURE — 82803 BLOOD GASES ANY COMBINATION: CPT

## 2023-05-11 PROCEDURE — 80053 COMPREHEN METABOLIC PANEL: CPT

## 2023-05-11 PROCEDURE — 2580000003 HC RX 258: Performed by: EMERGENCY MEDICINE

## 2023-05-11 PROCEDURE — 2060000000 HC ICU INTERMEDIATE R&B

## 2023-05-11 PROCEDURE — 74176 CT ABD & PELVIS W/O CONTRAST: CPT

## 2023-05-11 PROCEDURE — 96374 THER/PROPH/DIAG INJ IV PUSH: CPT

## 2023-05-11 PROCEDURE — 84484 ASSAY OF TROPONIN QUANT: CPT

## 2023-05-11 PROCEDURE — 6360000002 HC RX W HCPCS: Performed by: EMERGENCY MEDICINE

## 2023-05-11 PROCEDURE — 2700000000 HC OXYGEN THERAPY PER DAY

## 2023-05-11 PROCEDURE — 94660 CPAP INITIATION&MGMT: CPT

## 2023-05-11 PROCEDURE — 6360000002 HC RX W HCPCS: Performed by: FAMILY MEDICINE

## 2023-05-11 PROCEDURE — 85025 COMPLETE CBC W/AUTO DIFF WBC: CPT

## 2023-05-11 PROCEDURE — 85379 FIBRIN DEGRADATION QUANT: CPT

## 2023-05-11 PROCEDURE — 87040 BLOOD CULTURE FOR BACTERIA: CPT

## 2023-05-11 PROCEDURE — 71045 X-RAY EXAM CHEST 1 VIEW: CPT

## 2023-05-11 PROCEDURE — 36600 WITHDRAWAL OF ARTERIAL BLOOD: CPT

## 2023-05-11 PROCEDURE — 83605 ASSAY OF LACTIC ACID: CPT

## 2023-05-11 PROCEDURE — 85730 THROMBOPLASTIN TIME PARTIAL: CPT

## 2023-05-11 PROCEDURE — 82962 GLUCOSE BLOOD TEST: CPT

## 2023-05-11 PROCEDURE — 6370000000 HC RX 637 (ALT 250 FOR IP): Performed by: FAMILY MEDICINE

## 2023-05-11 PROCEDURE — 2580000003 HC RX 258: Performed by: FAMILY MEDICINE

## 2023-05-11 PROCEDURE — 83690 ASSAY OF LIPASE: CPT

## 2023-05-11 RX ORDER — METHIMAZOLE 5 MG/1
10 TABLET ORAL DAILY
Status: DISCONTINUED | OUTPATIENT
Start: 2023-05-12 | End: 2023-05-16 | Stop reason: HOSPADM

## 2023-05-11 RX ORDER — OXYBUTYNIN CHLORIDE 5 MG/1
10 TABLET, EXTENDED RELEASE ORAL NIGHTLY
Status: DISCONTINUED | OUTPATIENT
Start: 2023-05-11 | End: 2023-05-16 | Stop reason: HOSPADM

## 2023-05-11 RX ORDER — ACETAMINOPHEN 325 MG/1
650 TABLET ORAL EVERY 6 HOURS PRN
Status: DISCONTINUED | OUTPATIENT
Start: 2023-05-11 | End: 2023-05-16 | Stop reason: HOSPADM

## 2023-05-11 RX ORDER — INSULIN LISPRO 100 [IU]/ML
0-8 INJECTION, SOLUTION INTRAVENOUS; SUBCUTANEOUS
Status: DISCONTINUED | OUTPATIENT
Start: 2023-05-12 | End: 2023-05-16 | Stop reason: HOSPADM

## 2023-05-11 RX ORDER — SODIUM CHLORIDE 0.9 % (FLUSH) 0.9 %
5-40 SYRINGE (ML) INJECTION PRN
Status: DISCONTINUED | OUTPATIENT
Start: 2023-05-11 | End: 2023-05-16 | Stop reason: HOSPADM

## 2023-05-11 RX ORDER — DEXTROSE MONOHYDRATE 100 MG/ML
INJECTION, SOLUTION INTRAVENOUS CONTINUOUS PRN
Status: DISCONTINUED | OUTPATIENT
Start: 2023-05-11 | End: 2023-05-16 | Stop reason: HOSPADM

## 2023-05-11 RX ORDER — INSULIN LISPRO 100 [IU]/ML
0-4 INJECTION, SOLUTION INTRAVENOUS; SUBCUTANEOUS NIGHTLY
Status: DISCONTINUED | OUTPATIENT
Start: 2023-05-11 | End: 2023-05-16 | Stop reason: HOSPADM

## 2023-05-11 RX ORDER — ONDANSETRON 2 MG/ML
4 INJECTION INTRAMUSCULAR; INTRAVENOUS ONCE
Status: COMPLETED | OUTPATIENT
Start: 2023-05-11 | End: 2023-05-11

## 2023-05-11 RX ORDER — ENOXAPARIN SODIUM 100 MG/ML
40 INJECTION SUBCUTANEOUS ONCE
Status: DISCONTINUED | OUTPATIENT
Start: 2023-05-11 | End: 2023-05-11

## 2023-05-11 RX ORDER — 0.9 % SODIUM CHLORIDE 0.9 %
1000 INTRAVENOUS SOLUTION INTRAVENOUS ONCE
Status: COMPLETED | OUTPATIENT
Start: 2023-05-11 | End: 2023-05-11

## 2023-05-11 RX ORDER — TRAMADOL HYDROCHLORIDE 50 MG/1
50 TABLET ORAL EVERY 8 HOURS PRN
Status: DISCONTINUED | OUTPATIENT
Start: 2023-05-11 | End: 2023-05-16 | Stop reason: HOSPADM

## 2023-05-11 RX ORDER — SENNA AND DOCUSATE SODIUM 50; 8.6 MG/1; MG/1
1 TABLET, FILM COATED ORAL DAILY
Status: DISCONTINUED | OUTPATIENT
Start: 2023-05-12 | End: 2023-05-16 | Stop reason: HOSPADM

## 2023-05-11 RX ORDER — ENOXAPARIN SODIUM 100 MG/ML
40 INJECTION SUBCUTANEOUS DAILY
Status: DISCONTINUED | OUTPATIENT
Start: 2023-05-11 | End: 2023-05-11 | Stop reason: SDUPTHER

## 2023-05-11 RX ORDER — SODIUM CHLORIDE 9 MG/ML
INJECTION, SOLUTION INTRAVENOUS PRN
Status: DISCONTINUED | OUTPATIENT
Start: 2023-05-11 | End: 2023-05-16 | Stop reason: HOSPADM

## 2023-05-11 RX ORDER — PANTOPRAZOLE SODIUM 40 MG/1
40 TABLET, DELAYED RELEASE ORAL DAILY
Status: DISCONTINUED | OUTPATIENT
Start: 2023-05-11 | End: 2023-05-16 | Stop reason: HOSPADM

## 2023-05-11 RX ORDER — SODIUM CHLORIDE 0.9 % (FLUSH) 0.9 %
5-40 SYRINGE (ML) INJECTION EVERY 12 HOURS SCHEDULED
Status: DISCONTINUED | OUTPATIENT
Start: 2023-05-11 | End: 2023-05-16 | Stop reason: HOSPADM

## 2023-05-11 RX ORDER — ACETAMINOPHEN 650 MG/1
650 SUPPOSITORY RECTAL EVERY 6 HOURS PRN
Status: DISCONTINUED | OUTPATIENT
Start: 2023-05-11 | End: 2023-05-16 | Stop reason: HOSPADM

## 2023-05-11 RX ORDER — HEPARIN SODIUM 5000 [USP'U]/ML
5000 INJECTION, SOLUTION INTRAVENOUS; SUBCUTANEOUS EVERY 8 HOURS SCHEDULED
Status: DISCONTINUED | OUTPATIENT
Start: 2023-05-11 | End: 2023-05-16 | Stop reason: HOSPADM

## 2023-05-11 RX ADMIN — HEPARIN SODIUM 5000 UNITS: 5000 INJECTION INTRAVENOUS; SUBCUTANEOUS at 22:15

## 2023-05-11 RX ADMIN — ONDANSETRON 4 MG: 2 INJECTION INTRAMUSCULAR; INTRAVENOUS at 14:00

## 2023-05-11 RX ADMIN — SODIUM CHLORIDE 1000 ML: 9 INJECTION, SOLUTION INTRAVENOUS at 14:00

## 2023-05-11 RX ADMIN — PANTOPRAZOLE SODIUM 40 MG: 40 TABLET, DELAYED RELEASE ORAL at 22:15

## 2023-05-11 RX ADMIN — CEFEPIME 2000 MG: 2 INJECTION, POWDER, FOR SOLUTION INTRAVENOUS at 22:42

## 2023-05-11 RX ADMIN — OXYBUTYNIN CHLORIDE 10 MG: 5 TABLET, EXTENDED RELEASE ORAL at 22:15

## 2023-05-11 ASSESSMENT — PAIN SCALES - GENERAL: PAINLEVEL_OUTOF10: 5

## 2023-05-11 ASSESSMENT — ENCOUNTER SYMPTOMS
ABDOMINAL PAIN: 1
SHORTNESS OF BREATH: 0
CONSTIPATION: 0
VOMITING: 1
DIARRHEA: 1
NAUSEA: 1
FLATUS: 1

## 2023-05-11 ASSESSMENT — PAIN DESCRIPTION - LOCATION: LOCATION: ABDOMEN

## 2023-05-12 ENCOUNTER — APPOINTMENT (OUTPATIENT)
Facility: HOSPITAL | Age: 63
DRG: 871 | End: 2023-05-12
Payer: MEDICARE

## 2023-05-12 LAB
ALBUMIN SERPL-MCNC: 3 G/DL (ref 3.5–5)
ALBUMIN/GLOB SERPL: 0.6 (ref 1.1–2.2)
ALP SERPL-CCNC: 87 U/L (ref 45–117)
ALT SERPL-CCNC: 10 U/L (ref 12–78)
ANION GAP SERPL CALC-SCNC: 2 MMOL/L (ref 5–15)
ANION GAP SERPL CALC-SCNC: 5 MMOL/L (ref 5–15)
APPEARANCE UR: ABNORMAL
APTT PPP: <20 SEC (ref 22.1–31)
ARTERIAL PATENCY WRIST A: POSITIVE
AST SERPL-CCNC: 6 U/L (ref 15–37)
BACTERIA URNS QL MICRO: ABNORMAL /HPF
BASE EXCESS BLD CALC-SCNC: 0.5 MMOL/L
BASOPHILS # BLD: 0 K/UL (ref 0–0.1)
BASOPHILS NFR BLD: 0 % (ref 0–1)
BDY SITE: ABNORMAL
BILIRUB SERPL-MCNC: 0.2 MG/DL (ref 0.2–1)
BILIRUB UR QL: NEGATIVE
BUN SERPL-MCNC: 44 MG/DL (ref 6–20)
BUN SERPL-MCNC: 45 MG/DL (ref 6–20)
BUN/CREAT SERPL: 16 (ref 12–20)
BUN/CREAT SERPL: 16 (ref 12–20)
CALCIUM SERPL-MCNC: 8.5 MG/DL (ref 8.5–10.1)
CALCIUM SERPL-MCNC: 8.7 MG/DL (ref 8.5–10.1)
CHLORIDE SERPL-SCNC: 114 MMOL/L (ref 97–108)
CHLORIDE SERPL-SCNC: 115 MMOL/L (ref 97–108)
CO2 SERPL-SCNC: 24 MMOL/L (ref 21–32)
CO2 SERPL-SCNC: 28 MMOL/L (ref 21–32)
COLOR UR: ABNORMAL
CREAT SERPL-MCNC: 2.71 MG/DL (ref 0.55–1.02)
CREAT SERPL-MCNC: 2.88 MG/DL (ref 0.55–1.02)
DIFFERENTIAL METHOD BLD: ABNORMAL
EKG ATRIAL RATE: 105 BPM
EKG DIAGNOSIS: NORMAL
EKG P AXIS: 78 DEGREES
EKG P-R INTERVAL: 164 MS
EKG Q-T INTERVAL: 342 MS
EKG QRS DURATION: 90 MS
EKG QTC CALCULATION (BAZETT): 452 MS
EKG R AXIS: -31 DEGREES
EKG T AXIS: 65 DEGREES
EKG VENTRICULAR RATE: 105 BPM
EOSINOPHIL # BLD: 0.3 K/UL (ref 0–0.4)
EOSINOPHIL NFR BLD: 2 % (ref 0–7)
EPITH CASTS URNS QL MICRO: ABNORMAL /LPF
ERYTHROCYTE [DISTWIDTH] IN BLOOD BY AUTOMATED COUNT: 15.8 % (ref 11.5–14.5)
GAS FLOW.O2 O2 DELIVERY SYS: ABNORMAL
GLOBULIN SER CALC-MCNC: 4.8 G/DL (ref 2–4)
GLUCOSE BLD STRIP.AUTO-MCNC: 118 MG/DL (ref 65–117)
GLUCOSE BLD STRIP.AUTO-MCNC: 124 MG/DL (ref 65–117)
GLUCOSE BLD STRIP.AUTO-MCNC: 165 MG/DL (ref 65–117)
GLUCOSE BLD STRIP.AUTO-MCNC: 169 MG/DL (ref 65–117)
GLUCOSE SERPL-MCNC: 120 MG/DL (ref 65–100)
GLUCOSE SERPL-MCNC: 133 MG/DL (ref 65–100)
GLUCOSE UR STRIP.AUTO-MCNC: NEGATIVE MG/DL
HCO3 BLD-SCNC: 26.6 MMOL/L (ref 22–26)
HCT VFR BLD AUTO: 40.2 % (ref 35–47)
HGB BLD-MCNC: 11 G/DL (ref 11.5–16)
HGB UR QL STRIP: ABNORMAL
IMM GRANULOCYTES # BLD AUTO: 0.1 K/UL (ref 0–0.04)
IMM GRANULOCYTES NFR BLD AUTO: 1 % (ref 0–0.5)
INR PPP: 1 (ref 0.9–1.1)
IPAP/PIP/HIGH PEEP: 14
KETONES UR QL STRIP.AUTO: NEGATIVE MG/DL
LEUKOCYTE ESTERASE UR QL STRIP.AUTO: ABNORMAL
LYMPHOCYTES # BLD: 2.4 K/UL (ref 0.8–3.5)
LYMPHOCYTES NFR BLD: 17 % (ref 12–49)
MCH RBC QN AUTO: 27.3 PG (ref 26–34)
MCHC RBC AUTO-ENTMCNC: 27.4 G/DL (ref 30–36.5)
MCV RBC AUTO: 99.8 FL (ref 80–99)
MONOCYTES # BLD: 0.7 K/UL (ref 0–1)
MONOCYTES NFR BLD: 5 % (ref 5–13)
MUCOUS THREADS URNS QL MICRO: ABNORMAL /LPF
NEUTS SEG # BLD: 10.6 K/UL (ref 1.8–8)
NEUTS SEG NFR BLD: 75 % (ref 32–75)
NITRITE UR QL STRIP.AUTO: NEGATIVE
NRBC # BLD: 0 K/UL (ref 0–0.01)
NRBC BLD-RTO: 0 PER 100 WBC
NT PRO BNP: 270 PG/ML
O2/TOTAL GAS SETTING VFR VENT: 25 %
PCO2 BLD: 48.5 MMHG (ref 35–45)
PEEP RESPIRATORY: 7 CMH2O
PH BLD: 7.35 (ref 7.35–7.45)
PH UR STRIP: >8.5 [PH] (ref 5–8)
PLATELET # BLD AUTO: 288 K/UL (ref 150–400)
PMV BLD AUTO: 10.9 FL (ref 8.9–12.9)
PO2 BLD: 107 MMHG (ref 80–100)
POTASSIUM SERPL-SCNC: 4.2 MMOL/L (ref 3.5–5.1)
POTASSIUM SERPL-SCNC: 4.6 MMOL/L (ref 3.5–5.1)
PRESSURE SUPPORT SETTING VENT: 7 CMH2O
PROT SERPL-MCNC: 7.8 G/DL (ref 6.4–8.2)
PROT UR STRIP-MCNC: 100 MG/DL
PROTHROMBIN TIME: 10.9 SEC (ref 9–11.1)
RBC # BLD AUTO: 4.03 M/UL (ref 3.8–5.2)
RBC #/AREA URNS HPF: ABNORMAL /HPF (ref 0–5)
RBC MORPH BLD: ABNORMAL
SAO2 % BLD: 97.8 % (ref 92–97)
SERVICE CMNT-IMP: ABNORMAL
SODIUM SERPL-SCNC: 144 MMOL/L (ref 136–145)
SODIUM SERPL-SCNC: 144 MMOL/L (ref 136–145)
SP GR UR REFRACTOMETRY: 1.01 (ref 1–1.03)
SPECIMEN HOLD: NORMAL
SPECIMEN TYPE: ABNORMAL
THERAPEUTIC RANGE: ABNORMAL SECS (ref 58–77)
TRI-PHOS CRY URNS QL MICRO: ABNORMAL
UROBILINOGEN UR QL STRIP.AUTO: 0.2 EU/DL (ref 0.2–1)
VENTILATION MODE VENT: ABNORMAL
WBC # BLD AUTO: 14.1 K/UL (ref 3.6–11)
WBC URNS QL MICRO: ABNORMAL /HPF (ref 0–4)

## 2023-05-12 PROCEDURE — 6370000000 HC RX 637 (ALT 250 FOR IP): Performed by: FAMILY MEDICINE

## 2023-05-12 PROCEDURE — 2060000000 HC ICU INTERMEDIATE R&B

## 2023-05-12 PROCEDURE — 82803 BLOOD GASES ANY COMBINATION: CPT

## 2023-05-12 PROCEDURE — 83880 ASSAY OF NATRIURETIC PEPTIDE: CPT

## 2023-05-12 PROCEDURE — 81001 URINALYSIS AUTO W/SCOPE: CPT

## 2023-05-12 PROCEDURE — 2580000003 HC RX 258: Performed by: FAMILY MEDICINE

## 2023-05-12 PROCEDURE — 36600 WITHDRAWAL OF ARTERIAL BLOOD: CPT

## 2023-05-12 PROCEDURE — 85610 PROTHROMBIN TIME: CPT

## 2023-05-12 PROCEDURE — 87186 SC STD MICRODIL/AGAR DIL: CPT

## 2023-05-12 PROCEDURE — 6360000002 HC RX W HCPCS: Performed by: HOSPITALIST

## 2023-05-12 PROCEDURE — 6370000000 HC RX 637 (ALT 250 FOR IP): Performed by: HOSPITALIST

## 2023-05-12 PROCEDURE — A9540 TC99M MAA: HCPCS | Performed by: FAMILY MEDICINE

## 2023-05-12 PROCEDURE — 87086 URINE CULTURE/COLONY COUNT: CPT

## 2023-05-12 PROCEDURE — 85730 THROMBOPLASTIN TIME PARTIAL: CPT

## 2023-05-12 PROCEDURE — 87077 CULTURE AEROBIC IDENTIFY: CPT

## 2023-05-12 PROCEDURE — 6360000002 HC RX W HCPCS: Performed by: FAMILY MEDICINE

## 2023-05-12 PROCEDURE — 36415 COLL VENOUS BLD VENIPUNCTURE: CPT

## 2023-05-12 PROCEDURE — 82962 GLUCOSE BLOOD TEST: CPT

## 2023-05-12 PROCEDURE — 2580000003 HC RX 258: Performed by: INTERNAL MEDICINE

## 2023-05-12 PROCEDURE — 85025 COMPLETE CBC W/AUTO DIFF WBC: CPT

## 2023-05-12 PROCEDURE — 3430000000 HC RX DIAGNOSTIC RADIOPHARMACEUTICAL: Performed by: FAMILY MEDICINE

## 2023-05-12 PROCEDURE — 80053 COMPREHEN METABOLIC PANEL: CPT

## 2023-05-12 PROCEDURE — 78580 LUNG PERFUSION IMAGING: CPT

## 2023-05-12 PROCEDURE — 93010 ELECTROCARDIOGRAM REPORT: CPT | Performed by: INTERNAL MEDICINE

## 2023-05-12 RX ORDER — SODIUM CHLORIDE 9 MG/ML
INJECTION, SOLUTION INTRAVENOUS CONTINUOUS
Status: DISCONTINUED | OUTPATIENT
Start: 2023-05-12 | End: 2023-05-16 | Stop reason: HOSPADM

## 2023-05-12 RX ORDER — AMMONIUM LACTATE 12 G/100G
LOTION TOPICAL EVERY 12 HOURS
Status: DISCONTINUED | OUTPATIENT
Start: 2023-05-12 | End: 2023-05-16 | Stop reason: HOSPADM

## 2023-05-12 RX ORDER — DIPHENHYDRAMINE HYDROCHLORIDE 50 MG/ML
25 INJECTION INTRAMUSCULAR; INTRAVENOUS ONCE
Status: COMPLETED | OUTPATIENT
Start: 2023-05-12 | End: 2023-05-12

## 2023-05-12 RX ADMIN — SODIUM CHLORIDE: 9 INJECTION, SOLUTION INTRAVENOUS at 17:13

## 2023-05-12 RX ADMIN — PANTOPRAZOLE SODIUM 40 MG: 40 TABLET, DELAYED RELEASE ORAL at 10:10

## 2023-05-12 RX ADMIN — CEFEPIME 2000 MG: 2 INJECTION, POWDER, FOR SOLUTION INTRAVENOUS at 10:09

## 2023-05-12 RX ADMIN — HEPARIN SODIUM 5000 UNITS: 5000 INJECTION INTRAVENOUS; SUBCUTANEOUS at 13:44

## 2023-05-12 RX ADMIN — Medication 10 ML: at 10:30

## 2023-05-12 RX ADMIN — Medication: at 22:08

## 2023-05-12 RX ADMIN — HEPARIN SODIUM 5000 UNITS: 5000 INJECTION INTRAVENOUS; SUBCUTANEOUS at 22:07

## 2023-05-12 RX ADMIN — Medication: at 13:45

## 2023-05-12 RX ADMIN — Medication 2500 MG: at 09:19

## 2023-05-12 RX ADMIN — KIT FOR THE PREPARATION OF TECHNETIUM TC 99M ALBUMIN AGGREGATED 3.9 MILLICURIE: 2.5 INJECTION, POWDER, FOR SOLUTION INTRAVENOUS at 02:20

## 2023-05-12 RX ADMIN — METHIMAZOLE 10 MG: 5 TABLET ORAL at 10:10

## 2023-05-12 RX ADMIN — OXYBUTYNIN CHLORIDE 10 MG: 5 TABLET, EXTENDED RELEASE ORAL at 22:08

## 2023-05-12 RX ADMIN — Medication 10 ML: at 22:13

## 2023-05-12 RX ADMIN — CEFEPIME 2000 MG: 2 INJECTION, POWDER, FOR SOLUTION INTRAVENOUS at 22:07

## 2023-05-12 RX ADMIN — SENNOSIDES AND DOCUSATE SODIUM 1 TABLET: 8.6; 5 TABLET ORAL at 10:10

## 2023-05-12 RX ADMIN — HEPARIN SODIUM 5000 UNITS: 5000 INJECTION INTRAVENOUS; SUBCUTANEOUS at 06:04

## 2023-05-12 RX ADMIN — DIPHENHYDRAMINE HYDROCHLORIDE 25 MG: 50 INJECTION, SOLUTION INTRAMUSCULAR; INTRAVENOUS at 09:13

## 2023-05-12 ASSESSMENT — PAIN SCALES - GENERAL
PAINLEVEL_OUTOF10: 0

## 2023-05-13 LAB
ANION GAP SERPL CALC-SCNC: 1 MMOL/L (ref 5–15)
BACTERIA SPEC CULT: ABNORMAL
BACTERIA SPEC CULT: ABNORMAL
BASOPHILS # BLD: 0 K/UL (ref 0–0.1)
BASOPHILS NFR BLD: 0 % (ref 0–1)
BUN SERPL-MCNC: 47 MG/DL (ref 6–20)
BUN/CREAT SERPL: 15 (ref 12–20)
CALCIUM SERPL-MCNC: 7.9 MG/DL (ref 8.5–10.1)
CHLORIDE SERPL-SCNC: 116 MMOL/L (ref 97–108)
CO2 SERPL-SCNC: 26 MMOL/L (ref 21–32)
CREAT SERPL-MCNC: 3.11 MG/DL (ref 0.55–1.02)
DIFFERENTIAL METHOD BLD: ABNORMAL
EOSINOPHIL # BLD: 0.7 K/UL (ref 0–0.4)
EOSINOPHIL NFR BLD: 6 % (ref 0–7)
ERYTHROCYTE [DISTWIDTH] IN BLOOD BY AUTOMATED COUNT: 15.3 % (ref 11.5–14.5)
GLUCOSE BLD STRIP.AUTO-MCNC: 112 MG/DL (ref 65–117)
GLUCOSE BLD STRIP.AUTO-MCNC: 126 MG/DL (ref 65–117)
GLUCOSE BLD STRIP.AUTO-MCNC: 132 MG/DL (ref 65–117)
GLUCOSE BLD STRIP.AUTO-MCNC: 162 MG/DL (ref 65–117)
GLUCOSE SERPL-MCNC: 145 MG/DL (ref 65–100)
HCT VFR BLD AUTO: 31.9 % (ref 35–47)
HGB BLD-MCNC: 8.8 G/DL (ref 11.5–16)
IMM GRANULOCYTES # BLD AUTO: 0.1 K/UL (ref 0–0.04)
IMM GRANULOCYTES NFR BLD AUTO: 1 % (ref 0–0.5)
LYMPHOCYTES # BLD: 1.3 K/UL (ref 0.8–3.5)
LYMPHOCYTES NFR BLD: 11 % (ref 12–49)
MCH RBC QN AUTO: 26.7 PG (ref 26–34)
MCHC RBC AUTO-ENTMCNC: 27.6 G/DL (ref 30–36.5)
MCV RBC AUTO: 97 FL (ref 80–99)
MONOCYTES # BLD: 0.5 K/UL (ref 0–1)
MONOCYTES NFR BLD: 4 % (ref 5–13)
NEUTS SEG # BLD: 9.1 K/UL (ref 1.8–8)
NEUTS SEG NFR BLD: 78 % (ref 32–75)
NRBC # BLD: 0 K/UL (ref 0–0.01)
NRBC BLD-RTO: 0 PER 100 WBC
PLATELET # BLD AUTO: 242 K/UL (ref 150–400)
PMV BLD AUTO: 10.4 FL (ref 8.9–12.9)
POTASSIUM SERPL-SCNC: 4.6 MMOL/L (ref 3.5–5.1)
RBC # BLD AUTO: 3.29 M/UL (ref 3.8–5.2)
RBC MORPH BLD: ABNORMAL
RBC MORPH BLD: ABNORMAL
SERVICE CMNT-IMP: ABNORMAL
SERVICE CMNT-IMP: NORMAL
SODIUM SERPL-SCNC: 143 MMOL/L (ref 136–145)
WBC # BLD AUTO: 11.7 K/UL (ref 3.6–11)

## 2023-05-13 PROCEDURE — 2060000000 HC ICU INTERMEDIATE R&B

## 2023-05-13 PROCEDURE — 6370000000 HC RX 637 (ALT 250 FOR IP): Performed by: FAMILY MEDICINE

## 2023-05-13 PROCEDURE — 6360000002 HC RX W HCPCS: Performed by: FAMILY MEDICINE

## 2023-05-13 PROCEDURE — 2580000003 HC RX 258: Performed by: FAMILY MEDICINE

## 2023-05-13 PROCEDURE — 2700000000 HC OXYGEN THERAPY PER DAY

## 2023-05-13 PROCEDURE — 80048 BASIC METABOLIC PNL TOTAL CA: CPT

## 2023-05-13 PROCEDURE — 82962 GLUCOSE BLOOD TEST: CPT

## 2023-05-13 PROCEDURE — 85025 COMPLETE CBC W/AUTO DIFF WBC: CPT

## 2023-05-13 PROCEDURE — 36415 COLL VENOUS BLD VENIPUNCTURE: CPT

## 2023-05-13 RX ADMIN — Medication: at 12:36

## 2023-05-13 RX ADMIN — METHIMAZOLE 10 MG: 5 TABLET ORAL at 09:03

## 2023-05-13 RX ADMIN — OXYBUTYNIN CHLORIDE 10 MG: 5 TABLET, EXTENDED RELEASE ORAL at 22:05

## 2023-05-13 RX ADMIN — HEPARIN SODIUM 5000 UNITS: 5000 INJECTION INTRAVENOUS; SUBCUTANEOUS at 05:33

## 2023-05-13 RX ADMIN — CEFEPIME 1000 MG: 1 INJECTION, POWDER, FOR SOLUTION INTRAMUSCULAR; INTRAVENOUS at 21:46

## 2023-05-13 RX ADMIN — Medication 10 ML: at 21:47

## 2023-05-13 RX ADMIN — HEPARIN SODIUM 5000 UNITS: 5000 INJECTION INTRAVENOUS; SUBCUTANEOUS at 13:07

## 2023-05-13 RX ADMIN — HEPARIN SODIUM 5000 UNITS: 5000 INJECTION INTRAVENOUS; SUBCUTANEOUS at 21:46

## 2023-05-13 RX ADMIN — CEFEPIME 2000 MG: 2 INJECTION, POWDER, FOR SOLUTION INTRAVENOUS at 10:43

## 2023-05-13 RX ADMIN — Medication: at 21:48

## 2023-05-13 RX ADMIN — PANTOPRAZOLE SODIUM 40 MG: 40 TABLET, DELAYED RELEASE ORAL at 09:03

## 2023-05-13 ASSESSMENT — PAIN SCALES - GENERAL: PAINLEVEL_OUTOF10: 0

## 2023-05-14 LAB
ANION GAP SERPL CALC-SCNC: 0 MMOL/L (ref 5–15)
BACTERIA SPEC CULT: ABNORMAL
BASOPHILS # BLD: 0 K/UL (ref 0–0.1)
BASOPHILS NFR BLD: 0 % (ref 0–1)
BUN SERPL-MCNC: 38 MG/DL (ref 6–20)
BUN/CREAT SERPL: 14 (ref 12–20)
CALCIUM SERPL-MCNC: 8.3 MG/DL (ref 8.5–10.1)
CC UR VC: ABNORMAL
CHLORIDE SERPL-SCNC: 116 MMOL/L (ref 97–108)
CO2 SERPL-SCNC: 26 MMOL/L (ref 21–32)
CREAT SERPL-MCNC: 2.81 MG/DL (ref 0.55–1.02)
DIFFERENTIAL METHOD BLD: ABNORMAL
EOSINOPHIL # BLD: 0.7 K/UL (ref 0–0.4)
EOSINOPHIL NFR BLD: 6 % (ref 0–7)
ERYTHROCYTE [DISTWIDTH] IN BLOOD BY AUTOMATED COUNT: 15 % (ref 11.5–14.5)
GLUCOSE BLD STRIP.AUTO-MCNC: 140 MG/DL (ref 65–117)
GLUCOSE BLD STRIP.AUTO-MCNC: 176 MG/DL (ref 65–117)
GLUCOSE BLD STRIP.AUTO-MCNC: 190 MG/DL (ref 65–117)
GLUCOSE BLD STRIP.AUTO-MCNC: 99 MG/DL (ref 65–117)
GLUCOSE SERPL-MCNC: 135 MG/DL (ref 65–100)
HCT VFR BLD AUTO: 34.7 % (ref 35–47)
HGB BLD-MCNC: 9.7 G/DL (ref 11.5–16)
IMM GRANULOCYTES # BLD AUTO: 0.1 K/UL (ref 0–0.04)
IMM GRANULOCYTES NFR BLD AUTO: 1 % (ref 0–0.5)
LYMPHOCYTES # BLD: 1 K/UL (ref 0.8–3.5)
LYMPHOCYTES NFR BLD: 9 % (ref 12–49)
MCH RBC QN AUTO: 27 PG (ref 26–34)
MCHC RBC AUTO-ENTMCNC: 28 G/DL (ref 30–36.5)
MCV RBC AUTO: 96.7 FL (ref 80–99)
MONOCYTES # BLD: 0.5 K/UL (ref 0–1)
MONOCYTES NFR BLD: 4 % (ref 5–13)
NEUTS SEG # BLD: 9 K/UL (ref 1.8–8)
NEUTS SEG NFR BLD: 80 % (ref 32–75)
NRBC # BLD: 0 K/UL (ref 0–0.01)
NRBC BLD-RTO: 0 PER 100 WBC
PLATELET # BLD AUTO: 240 K/UL (ref 150–400)
PLATELET COMMENT: ABNORMAL
PMV BLD AUTO: 10.3 FL (ref 8.9–12.9)
POTASSIUM SERPL-SCNC: 4.6 MMOL/L (ref 3.5–5.1)
RBC # BLD AUTO: 3.59 M/UL (ref 3.8–5.2)
RBC MORPH BLD: ABNORMAL
SERVICE CMNT-IMP: ABNORMAL
SERVICE CMNT-IMP: NORMAL
SODIUM SERPL-SCNC: 142 MMOL/L (ref 136–145)
WBC # BLD AUTO: 11.3 K/UL (ref 3.6–11)

## 2023-05-14 PROCEDURE — 80048 BASIC METABOLIC PNL TOTAL CA: CPT

## 2023-05-14 PROCEDURE — 6370000000 HC RX 637 (ALT 250 FOR IP): Performed by: NURSE PRACTITIONER

## 2023-05-14 PROCEDURE — 6360000002 HC RX W HCPCS: Performed by: FAMILY MEDICINE

## 2023-05-14 PROCEDURE — 2580000003 HC RX 258: Performed by: FAMILY MEDICINE

## 2023-05-14 PROCEDURE — 2060000000 HC ICU INTERMEDIATE R&B

## 2023-05-14 PROCEDURE — 82962 GLUCOSE BLOOD TEST: CPT

## 2023-05-14 PROCEDURE — 2580000003 HC RX 258: Performed by: INTERNAL MEDICINE

## 2023-05-14 PROCEDURE — 85025 COMPLETE CBC W/AUTO DIFF WBC: CPT

## 2023-05-14 PROCEDURE — 6370000000 HC RX 637 (ALT 250 FOR IP): Performed by: FAMILY MEDICINE

## 2023-05-14 PROCEDURE — 36415 COLL VENOUS BLD VENIPUNCTURE: CPT

## 2023-05-14 RX ORDER — DIPHENHYDRAMINE HCL 25 MG
25 CAPSULE ORAL EVERY 6 HOURS PRN
Status: DISCONTINUED | OUTPATIENT
Start: 2023-05-14 | End: 2023-05-16 | Stop reason: HOSPADM

## 2023-05-14 RX ADMIN — HEPARIN SODIUM 5000 UNITS: 5000 INJECTION INTRAVENOUS; SUBCUTANEOUS at 06:37

## 2023-05-14 RX ADMIN — Medication: at 11:04

## 2023-05-14 RX ADMIN — HEPARIN SODIUM 5000 UNITS: 5000 INJECTION INTRAVENOUS; SUBCUTANEOUS at 21:30

## 2023-05-14 RX ADMIN — Medication: at 21:29

## 2023-05-14 RX ADMIN — METHIMAZOLE 10 MG: 5 TABLET ORAL at 08:21

## 2023-05-14 RX ADMIN — SODIUM CHLORIDE: 9 INJECTION, SOLUTION INTRAVENOUS at 08:21

## 2023-05-14 RX ADMIN — CEFEPIME 1000 MG: 1 INJECTION, POWDER, FOR SOLUTION INTRAMUSCULAR; INTRAVENOUS at 22:54

## 2023-05-14 RX ADMIN — HEPARIN SODIUM 5000 UNITS: 5000 INJECTION INTRAVENOUS; SUBCUTANEOUS at 13:28

## 2023-05-14 RX ADMIN — PANTOPRAZOLE SODIUM 40 MG: 40 TABLET, DELAYED RELEASE ORAL at 08:21

## 2023-05-14 RX ADMIN — Medication 10 ML: at 21:31

## 2023-05-14 RX ADMIN — OXYBUTYNIN CHLORIDE 10 MG: 5 TABLET, EXTENDED RELEASE ORAL at 21:45

## 2023-05-14 RX ADMIN — DIPHENHYDRAMINE HYDROCHLORIDE 25 MG: 25 CAPSULE ORAL at 22:51

## 2023-05-14 RX ADMIN — CEFEPIME 1000 MG: 1 INJECTION, POWDER, FOR SOLUTION INTRAMUSCULAR; INTRAVENOUS at 11:03

## 2023-05-14 ASSESSMENT — PAIN SCALES - GENERAL
PAINLEVEL_OUTOF10: 0

## 2023-05-15 PROBLEM — K46.9 ABDOMINAL HERNIA: Status: RESOLVED | Noted: 2023-05-11 | Resolved: 2023-05-15

## 2023-05-15 PROBLEM — L89.329 DECUBITUS ULCER OF LEFT BUTTOCK: Status: RESOLVED | Noted: 2023-05-11 | Resolved: 2023-05-15

## 2023-05-15 PROBLEM — J96.01 ACUTE RESPIRATORY FAILURE WITH HYPOXIA (HCC): Status: RESOLVED | Noted: 2023-05-11 | Resolved: 2023-05-15

## 2023-05-15 LAB
ANION GAP SERPL CALC-SCNC: 2 MMOL/L (ref 5–15)
BASOPHILS # BLD: 0 K/UL (ref 0–0.1)
BASOPHILS NFR BLD: 0 % (ref 0–1)
BUN SERPL-MCNC: 33 MG/DL (ref 6–20)
BUN/CREAT SERPL: 13 (ref 12–20)
CALCIUM SERPL-MCNC: 8.2 MG/DL (ref 8.5–10.1)
CHLORIDE SERPL-SCNC: 116 MMOL/L (ref 97–108)
CO2 SERPL-SCNC: 25 MMOL/L (ref 21–32)
CREAT SERPL-MCNC: 2.5 MG/DL (ref 0.55–1.02)
DIFFERENTIAL METHOD BLD: ABNORMAL
EOSINOPHIL # BLD: 0.7 K/UL (ref 0–0.4)
EOSINOPHIL NFR BLD: 7 % (ref 0–7)
ERYTHROCYTE [DISTWIDTH] IN BLOOD BY AUTOMATED COUNT: 14.9 % (ref 11.5–14.5)
GLUCOSE BLD STRIP.AUTO-MCNC: 135 MG/DL (ref 65–117)
GLUCOSE BLD STRIP.AUTO-MCNC: 153 MG/DL (ref 65–117)
GLUCOSE BLD STRIP.AUTO-MCNC: 154 MG/DL (ref 65–117)
GLUCOSE BLD STRIP.AUTO-MCNC: 165 MG/DL (ref 65–117)
GLUCOSE SERPL-MCNC: 133 MG/DL (ref 65–100)
HCT VFR BLD AUTO: 34.9 % (ref 35–47)
HGB BLD-MCNC: 9.7 G/DL (ref 11.5–16)
IMM GRANULOCYTES # BLD AUTO: 0.1 K/UL (ref 0–0.04)
IMM GRANULOCYTES NFR BLD AUTO: 1 % (ref 0–0.5)
LYMPHOCYTES # BLD: 1.2 K/UL (ref 0.8–3.5)
LYMPHOCYTES NFR BLD: 11 % (ref 12–49)
MCH RBC QN AUTO: 26.8 PG (ref 26–34)
MCHC RBC AUTO-ENTMCNC: 27.8 G/DL (ref 30–36.5)
MCV RBC AUTO: 96.4 FL (ref 80–99)
MONOCYTES # BLD: 0.5 K/UL (ref 0–1)
MONOCYTES NFR BLD: 5 % (ref 5–13)
NEUTS SEG # BLD: 8.1 K/UL (ref 1.8–8)
NEUTS SEG NFR BLD: 76 % (ref 32–75)
NRBC # BLD: 0 K/UL (ref 0–0.01)
NRBC BLD-RTO: 0 PER 100 WBC
PLATELET # BLD AUTO: 235 K/UL (ref 150–400)
PMV BLD AUTO: 10.1 FL (ref 8.9–12.9)
POTASSIUM SERPL-SCNC: 4.6 MMOL/L (ref 3.5–5.1)
RBC # BLD AUTO: 3.62 M/UL (ref 3.8–5.2)
RBC MORPH BLD: ABNORMAL
SERVICE CMNT-IMP: ABNORMAL
SODIUM SERPL-SCNC: 143 MMOL/L (ref 136–145)
WBC # BLD AUTO: 10.6 K/UL (ref 3.6–11)

## 2023-05-15 PROCEDURE — 80048 BASIC METABOLIC PNL TOTAL CA: CPT

## 2023-05-15 PROCEDURE — 94660 CPAP INITIATION&MGMT: CPT

## 2023-05-15 PROCEDURE — 2580000003 HC RX 258: Performed by: FAMILY MEDICINE

## 2023-05-15 PROCEDURE — 2060000000 HC ICU INTERMEDIATE R&B

## 2023-05-15 PROCEDURE — 82962 GLUCOSE BLOOD TEST: CPT

## 2023-05-15 PROCEDURE — 6370000000 HC RX 637 (ALT 250 FOR IP): Performed by: FAMILY MEDICINE

## 2023-05-15 PROCEDURE — 2700000000 HC OXYGEN THERAPY PER DAY

## 2023-05-15 PROCEDURE — 6360000002 HC RX W HCPCS: Performed by: FAMILY MEDICINE

## 2023-05-15 PROCEDURE — 85025 COMPLETE CBC W/AUTO DIFF WBC: CPT

## 2023-05-15 PROCEDURE — 36415 COLL VENOUS BLD VENIPUNCTURE: CPT

## 2023-05-15 RX ORDER — CEFUROXIME AXETIL 500 MG/1
500 TABLET ORAL 2 TIMES DAILY
Qty: 14 TABLET | Refills: 0 | Status: SHIPPED | OUTPATIENT
Start: 2023-05-15 | End: 2023-05-22

## 2023-05-15 RX ADMIN — Medication 10 ML: at 09:42

## 2023-05-15 RX ADMIN — HEPARIN SODIUM 5000 UNITS: 5000 INJECTION INTRAVENOUS; SUBCUTANEOUS at 21:42

## 2023-05-15 RX ADMIN — PANTOPRAZOLE SODIUM 40 MG: 40 TABLET, DELAYED RELEASE ORAL at 09:41

## 2023-05-15 RX ADMIN — METHIMAZOLE 10 MG: 5 TABLET ORAL at 09:41

## 2023-05-15 RX ADMIN — Medication: at 12:06

## 2023-05-15 RX ADMIN — SENNOSIDES AND DOCUSATE SODIUM 1 TABLET: 8.6; 5 TABLET ORAL at 09:41

## 2023-05-15 RX ADMIN — HEPARIN SODIUM 5000 UNITS: 5000 INJECTION INTRAVENOUS; SUBCUTANEOUS at 14:53

## 2023-05-15 RX ADMIN — CEFEPIME 1000 MG: 1 INJECTION, POWDER, FOR SOLUTION INTRAMUSCULAR; INTRAVENOUS at 12:07

## 2023-05-15 RX ADMIN — OXYBUTYNIN CHLORIDE 10 MG: 5 TABLET, EXTENDED RELEASE ORAL at 21:42

## 2023-05-15 RX ADMIN — HEPARIN SODIUM 5000 UNITS: 5000 INJECTION INTRAVENOUS; SUBCUTANEOUS at 06:27

## 2023-05-15 ASSESSMENT — PAIN SCALES - GENERAL
PAINLEVEL_OUTOF10: 0

## 2023-05-15 NOTE — PLAN OF CARE
Problem: Discharge Planning  Goal: Discharge to home or other facility with appropriate resources  Outcome: Progressing  Flowsheets (Taken 5/15/2023 0800)  Discharge to home or other facility with appropriate resources: Arrange for needed discharge resources and transportation as appropriate     Problem: Pain  Goal: Verbalizes/displays adequate comfort level or baseline comfort level  Outcome: Progressing     Problem: Skin/Tissue Integrity  Goal: Absence of new skin breakdown  Description: 1. Monitor for areas of redness and/or skin breakdown  2. Assess vascular access sites hourly  3. Every 4-6 hours minimum:  Change oxygen saturation probe site  4. Every 4-6 hours:  If on nasal continuous positive airway pressure, respiratory therapy assess nares and determine need for appliance change or resting period.   Outcome: Progressing     Problem: Safety - Adult  Goal: Free from fall injury  Outcome: Progressing     Problem: Chronic Conditions and Co-morbidities  Goal: Patient's chronic conditions and co-morbidity symptoms are monitored and maintained or improved  Outcome: Progressing  Flowsheets (Taken 5/15/2023 0800)  Care Plan - Patient's Chronic Conditions and Co-Morbidity Symptoms are Monitored and Maintained or Improved: Monitor and assess patient's chronic conditions and comorbid symptoms for stability, deterioration, or improvement

## 2023-05-15 NOTE — DISCHARGE SUMMARY
Discharge Summary       PATIENT ID: Shivam Pham  MRN: [id number]   YOB: 1960    DATE OF ADMISSION: 5/11/2023  1:22 PM    DATE OF DISCHARGE: 5/15/23   PRIMARY CARE PROVIDER: Antoinette Houston MD     ATTENDING PHYSICIAN: Zack Sliva  DISCHARGING PROVIDER: Anabel Guan MD    To contact this individual call [phone] and ask the  to page. If unavailable ask to be transferred the Adult Hospitalist Department. CONSULTATIONS: IP WOUND CARE NURSE CONSULT TO EVAL  IP CONSULT TO PULMONOLOGY  IP CONSULT TO NEPHROLOGY  IP CONSULT TO PHARMACY    PROCEDURES/SURGERIES: * No surgery found *    ADMITTING [facility] COURSE: Sepsis with respiratory failure and UTI    Shivam Pham is a 59 y.o. female past medical history of COPD, CKD, type 2 diabetes mellitus, hypertension, osteoarthritis, anxiety, pelvic wall hernia, class III obesity, prior fall, general debility, inability to walk, kidney stones, status post bilateral ureteral stents presented to the ED via EMS from home with chief complaints of nausea, vomiting, diarrhea. She notes onset of symptoms starting last night with nausea and vomiting with nonbloody, nonbilious emesis (too numerous to count), and diarrhea with loose watery, nonbloody stool. She also complains of abdominal pain located at site of large chronic left lower quadrant abdominal hernia (present since 2019), with pain, aching, rated \"11\" out of a 10 scale currently decreased to 4-5 out of 10, without specific alleviating factors exacerbated with movement. She complained of acid reflux feeling like \"stomach acid coming up and burning my throat\". Patient has a history of chronic mobilization since 2019 after a fall leading to severe generalized weakness and deconditioning.   Currently she is unable to walk since 2019 and is bed bound    Acute respiratory failure  -Acute hypoxic respiratory failure, probably DERRICK, likely OHS due to body habitus, likely aspiration

## 2023-05-15 NOTE — DISCHARGE INSTRUCTIONS
Discharge Instructions       PATIENT ID: Nereida Mondragon  MRN: [id number]   YOB: 1960    DATE OF ADMISSION: 5/11/2023   DATE OF DISCHARGE: 5/15/2023    PRIMARY CARE PROVIDER: Shani Lopez     ATTENDING PHYSICIAN: Alvina Hastings MD   DISCHARGING PROVIDER: Alvina Hastings MD    To contact this individual call [phone] and ask the  to page. If unavailable ask to be transferred the Adult Hospitalist Department. DISCHARGE DIAGNOSES sepsis with UTI    CONSULTATIONS: [unfilled]    PROCEDURES/SURGERIES: * No surgery found *    PENDING TEST RESULTS:   At the time of discharge the following test results are still pending:     [facility]   [facility]     ADDITIONAL CARE RECOMMENDATIONS: Keep your follow-up appointment with [facility]      DIET: diabetic diet    ACTIVITY: activity as tolerated    WOUND CARE:   as instructed    EQUIPMENT needed:       DISCHARGE MEDICATIONS:   See Medication Reconciliation Form    It is important that you take the medication exactly as they are prescribed. Keep your medication in the bottles provided by the pharmacist and keep a list of the medication names, dosages, and times to be taken in your wallet. Do not take other medications without consulting your doctor. NOTIFY YOUR PHYSICIAN FOR ANY OF THE FOLLOWING:   Fever over 101 degrees for 24 hours. Chest pain, shortness of breath, fever, chills, nausea, vomiting, diarrhea, change in mentation, falling, weakness, bleeding. Severe pain or pain not relieved by medications. Or, any other signs or symptoms that you may have questions about.       DISPOSITION:  x  Home With:   OT x PT x HH  RN       SNF/Inpatient Rehab/LTAC    Independent/assisted living    Hospice    Other:     CDMP Checked:   Yes x     PROBLEM LIST Updated:  Yes x       Signed:   Alvina Hastings MD  5/15/2023  1:26 PM

## 2023-05-15 NOTE — CARE COORDINATION
Transition of Care Plan:  Home to sister's home at [facility], [place], [address]  Lavonna Skiff, Sister    RUR: 15% Moderate Risk  Prior Level of Functioning: Dependent  Disposition: Home to sister's home  If SNF or IPR: Date FOC offered: N/A  Date FOC received:  Accepting facility:  Date authorization started with reference number:  Date authorization received and expires: Follow up appointments:  DME needed: None  Transportation at discharge: Stretcher transport set up with [facility]. IM/IMM Medicare/ letter given: 5/15/2023  Is patient a [place] and connected with VA? If yes, was [employer] transfer form completed and VA notified? Caregiver Contact: Kenny Skiff  Discharge Caregiver contacted prior to discharge? Yes  Care Conference needed? No  Barriers to discharge: None    Ms. Pierre was seen. She was informed that she could be discharged today. She requested a non-emergent stretcher transport. She will be going to her sister's home in [facility]. The address was verified. [facility] [employer] [facility] was called. [phone] Opt [phone]. A stretcher transport was arranged. The [address] [place] [address] Rd number is [phone]. They have form 30 mins to 3 hours to respond. They were given the unit number to call. Ms. Lars Alpers was informed. Nursing was informed. Will continue to follow for discharge planning.   Genaro Low LCSW

## 2023-05-16 VITALS
SYSTOLIC BLOOD PRESSURE: 136 MMHG | TEMPERATURE: 98.6 F | HEART RATE: 80 BPM | WEIGHT: 293 LBS | OXYGEN SATURATION: 99 % | HEIGHT: 64 IN | DIASTOLIC BLOOD PRESSURE: 85 MMHG | RESPIRATION RATE: 22 BRPM | BODY MASS INDEX: 50.02 KG/M2

## 2023-05-16 LAB
BACTERIA SPEC CULT: NORMAL
BACTERIA SPEC CULT: NORMAL
SERVICE CMNT-IMP: NORMAL
SERVICE CMNT-IMP: NORMAL

## 2023-05-16 PROCEDURE — 6360000002 HC RX W HCPCS: Performed by: FAMILY MEDICINE

## 2023-05-16 RX ADMIN — HEPARIN SODIUM 5000 UNITS: 5000 INJECTION INTRAVENOUS; SUBCUTANEOUS at 05:14

## 2023-05-16 ASSESSMENT — PAIN SCALES - GENERAL
PAINLEVEL_OUTOF10: 0
PAINLEVEL_OUTOF10: 0

## 2023-05-16 NOTE — PLAN OF CARE
Problem: Discharge Planning  Goal: Discharge to home or other facility with appropriate resources  Outcome: Completed     Problem: Pain  Goal: Verbalizes/displays adequate comfort level or baseline comfort level  Outcome: Completed     Problem: Skin/Tissue Integrity  Goal: Absence of new skin breakdown  Description: 1. Monitor for areas of redness and/or skin breakdown  2. Assess vascular access sites hourly  3. Every 4-6 hours minimum:  Change oxygen saturation probe site  4. Every 4-6 hours:  If on nasal continuous positive airway pressure, respiratory therapy assess nares and determine need for appliance change or resting period.   Outcome: Completed     Problem: Safety - Adult  Goal: Free from fall injury  Outcome: Completed     Problem: Chronic Conditions and Co-morbidities  Goal: Patient's chronic conditions and co-morbidity symptoms are monitored and maintained or improved  Outcome: Completed

## 2023-05-24 NOTE — PROGRESS NOTES
[facility] at bedside for pt transport home. All belongings secured and given to pt. IV removed prior to their arrival. Purewick removed. Pt had no questions on plan of care at home or education.
NAME: Robin Artnisa        :  1960        MRN:  [id number]         ASSESSMENT:  CECI: perhaps septic ATN. CT of A/P does not show any hydronephrosis. UA with moderate blood and 100 protein. Cr slowly improving to 2.5mg/dl today. CKD 3B: Secondary to diabetic nephropathy and recurrent kidney stones. Baseline creatinine has been variable. Nephrolithiasis-history of bilateral ureteral stents. Noted on CT of A/P and are patent with decreased stone burden    DM-2, fair control; A1c of 7.4    Nausea/vomiting/diarrhea    UTI/sepsis    Morbid obesity/sleep apnea    Bedbound status     PLAN:  Planned discharge this evening  Daily labs  Avoid nephrotoxins  Should follow up with Dr. Shawn Damon in our office whom she saw many years back     D/W patient      Subjective:     Chief Complaint:  No complaints. Less itching        Review of Systems:    Symptom Y/N Comments  Symptom Y/N Comments   Fever/Chills    Chest Pain     Poor Appetite    Edema     Cough    Abdominal Pain     Sputum    Joint Pain     SOB/GUZMAN    Pruritis/Rash     Nausea/vomit    Tolerating PT/OT     Diarrhea    Tolerating Diet     Constipation    Other       Could not obtain due to:      Objective:     VITALS:   Last 24hrs VS reviewed since prior progress note. Most recent are:  Vitals:    05/15/23 1645   BP: (!) 141/92   Pulse: 81   Resp: 20   Temp: 98.6 °F (37 °C)   SpO2:        Intake/Output Summary (Last 24 hours) at 5/15/2023 1842  Last data filed at 5/15/2023 1455  Gross per 24 hour   Intake 510 ml   Output 3300 ml   Net -2790 ml        Telemetry Reviewed:     PHYSICAL EXAM:  General: NAD. Morbid obesity.       Lab Data Reviewed: (see below)    Medications Reviewed: (see below)    PMH/SH reviewed - no change compared to H&P  ________________________________________________________________________  Care Plan discussed with:  Patient Y    Family      RN
Physician Progress Note      PATIENT:               Herbert Wakefield  [facility] #:                  [id number]  :                       1960  ADMIT DATE:       2023 1:22 PM  [address] [place] DATE:        2023 8:27 AM  RESPONDING  PROVIDER #:        Dar León MD          QUERY TEXT:    Noted documentation of Acute Kidney Injury in multiple notes since admission. Patient's baseline SCr is not mentioned, and patient did not have a 1.5x   increase or decrease in SCr. In order to support the diagnosis of CECI, please   include additional clinical indicators in your documentation. ? Or please   document if the diagnosis of CECI has been ruled out after further study. The medical record reflects the following:  Risk Factors: 64yo with CKD 3b    Clinical Indicators:  SCr: 2.54 -- 2.71 -- 2.88 -- 3.11 -- 2.81 -- 2.50  GFR: 21 -- 19 -- 18 -- 16 -- 18 -- 21    ED  Patient has chronic kidney disease and Cr is >2 at baseline    DCS  CECI  -CECI on CKD stage III, likely prerenal from GI loss, likely ATN from sepsis  -CT abdomen and pelvis does not show any obstructive process  -Creatinine climbing, on IV fluid, nephrology following follow-up as an   outpatient    Treatment: daily labs, IV fluids, I&O q8hrs    Defined by Kidney Disease Improving Global Outcomes (KDIGO) clinical practice   guideline for acute kidney injury:  -Increase in SCr by greater than or equal to 0.3 mg/dl within 48 hours; or  -Increase or decrease in SCr to greater than or equal to 1.5 times baseline,   which is known or presumed to have occurred within the prior 7 days; or  -Urine volume < 0.5ml/kg/h for 6 hours. Thank you,  Nataliya Licona  [phone]  I am also available via [employer]. Options provided:  -- Acute kidney injury evidenced by, Please document evidence as well as a   numerical baseline creatinine, if known.   -- Acute kidney injury ruled out after study  -- Other - I will add my own diagnosis  -- Disagree - Not applicable / Not
Physician Progress Note      PATIENT:               Janice Glaser  CSN #:                  [id number]  :                       1960  ADMIT DATE:       2023 1:22 PM  [address] [place] DATE:        2023 8:27 AM  RESPONDING  PROVIDER #:        Rob Vieira MD          QUERY TEXT:    Patient admitted with sepsis. Noted documentation of aspiration PNA due to   vomitingin Consult, PNs, and [facility] on -5/15. In order to support the   diagnosis of aspiration PNA, please include additional clinical indicators in   your documentation. Swallow study, impaired gag reflex, and dysphagia are not   seen on patient's assessments. Or please document if the diagnosis of   aspiration PNA has been ruled out after further study. The medical record reflects the following:  Risk Factors: 64yo    Clinical Indicators:   Chest X-ray  Cardiomediastinal silhouette is within normal limits. Pulmonary vasculature is   not engorged. There are no focal parenchymal opacities, effusions, or   pneumothorax.  CT abd pelvis  LOWER THORAX: Bibasilar atelectasis left greater than right.     Lung Scan- Perfusion only  Pulmonary perfusion is uniform. There are no well-defined wedge shaped segmental or subsegmental sized   defects. H&P  No slurred speech     & 5/15 Pulmonary  Suspect secondary to mild aspiration pneumonitis, untreated DERRICK/OHS    - Hospitalist PNs and DCS  Acute hypoxic respiratory failure, probably DERRICK, likely OHS due to body   habitus, likely aspiration pneumonitis from vomiting    Treatment: Pulmonary following, chest x-ray, CT abd, Lung scan, NPO    Thank you,  Neftali Dejesus  [phone]  I am also available via [employer]. Options provided:  -- Aspiration PNA present as evidenced by, Please document evidence.   -- Aspiration PNA was ruled out  -- Other - I will add my own diagnosis  -- Disagree - Not applicable / Not valid  -- Disagree - Clinically unable to determine / Unknown  -- Refer
History     Tobacco Use    Smoking status: Never    Smokeless tobacco: Never   Substance Use Topics    Alcohol use: Never      Family History   Problem Relation Age of Onset    Hypertension Sister     Diabetes Sister     Heart Attack Father     Heart Disease Father     Diabetes Father     Diabetes Mother     Cancer Mother     Anesth Problems Neg Hx     Heart Disease Brother     Diabetes Sister     Hypertension Sister     Diabetes Sister      OBJECTIVE:     Vital Signs:     /80   Pulse 78   Temp 98.6 °F (37 °C) (Oral)   Resp 16   Ht 1.626 m (5' 4\")   Wt (!) 157 kg (346 lb 2 oz)   SpO2 96%   BMI 59.41 kg/m²    Temp (24hrs), Av.3 °F (36.8 °C), Min:98 °F (36.7 °C), Max:98.6 °F (37 °C)     Intake/Output:     Last shift: No intake/output data recorded.     Last 3 shifts:  1901 - 05/15 0700  In: 20 [I.V.:20]  Out: 4000 [Urine:4000]        Intake/Output Summary (Last 24 hours) at 5/15/2023 1222  Last data filed at 5/15/2023 7789  Gross per 24 hour   Intake 10 ml   Output 2400 ml   Net -2390 ml         Physical Exam:                                        Exam Findings Other   General: No resp distress noted, appears stated age    [de-identified]:  No ulcers, JVD not elevated, no cervical LAD    Chest: No pectus deformity, normal chest rise b/l    HEART:  RRR, no murmurs/rubs/gallops    Lungs:  CTA b/l, no rhonchi/crackles/wheeze, diminished BS at bases    ABD: Soft/NT, non rigid mildly distended    EXT: No cyanosis/clubbing/edema, normal peripheral pulses    Skin: No rashes or ulcers, no mottling    Neuro: A/O x 3        Medications:  Current Facility-Administered Medications   Medication Dose Route Frequency    diphenhydrAMINE (BENADRYL) capsule 25 mg  25 mg Oral Q6H PRN    cefepime (MAXIPIME) 1,000 mg in sodium chloride 0.9 % 50 mL IVPB (mini-bag)  1,000 mg IntraVENous Q12H    ammonium lactate (LAC-HYDRIN) 12 % lotion   Topical Q12H    0.9 % sodium chloride infusion   IntraVENous Continuous    sodium chloride

## 2023-09-21 ENCOUNTER — APPOINTMENT (OUTPATIENT)
Facility: HOSPITAL | Age: 63
End: 2023-09-21
Payer: MEDICARE

## 2023-09-21 ENCOUNTER — HOSPITAL ENCOUNTER (EMERGENCY)
Facility: HOSPITAL | Age: 63
Discharge: HOME | End: 2023-09-22
Attending: EMERGENCY MEDICINE
Payer: MEDICARE

## 2023-09-21 DIAGNOSIS — R07.89 CHEST WALL PAIN: Primary | ICD-10-CM

## 2023-09-21 LAB
ANION GAP SERPL CALC-SCNC: 5 MMOL/L (ref 5–15)
BASOPHILS # BLD: 0.1 K/UL (ref 0–0.1)
BASOPHILS NFR BLD: 0 % (ref 0–1)
BUN SERPL-MCNC: 50 MG/DL (ref 6–20)
BUN/CREAT SERPL: 18 (ref 12–20)
CALCIUM SERPL-MCNC: 8.9 MG/DL (ref 8.5–10.1)
CHLORIDE SERPL-SCNC: 112 MMOL/L (ref 97–108)
CO2 SERPL-SCNC: 26 MMOL/L (ref 21–32)
CREAT SERPL-MCNC: 2.82 MG/DL (ref 0.55–1.02)
DIFFERENTIAL METHOD BLD: ABNORMAL
EOSINOPHIL # BLD: 0.1 K/UL (ref 0–0.4)
EOSINOPHIL NFR BLD: 1 % (ref 0–7)
ERYTHROCYTE [DISTWIDTH] IN BLOOD BY AUTOMATED COUNT: 15.5 % (ref 11.5–14.5)
GLUCOSE SERPL-MCNC: 182 MG/DL (ref 65–100)
HCT VFR BLD AUTO: 36.8 % (ref 35–47)
HGB BLD-MCNC: 10.7 G/DL (ref 11.5–16)
IMM GRANULOCYTES # BLD AUTO: 0.1 K/UL (ref 0–0.04)
IMM GRANULOCYTES NFR BLD AUTO: 1 % (ref 0–0.5)
LYMPHOCYTES # BLD: 0.9 K/UL (ref 0.8–3.5)
LYMPHOCYTES NFR BLD: 5 % (ref 12–49)
MCH RBC QN AUTO: 27.3 PG (ref 26–34)
MCHC RBC AUTO-ENTMCNC: 29.1 G/DL (ref 30–36.5)
MCV RBC AUTO: 93.9 FL (ref 80–99)
MONOCYTES # BLD: 0.6 K/UL (ref 0–1)
MONOCYTES NFR BLD: 4 % (ref 5–13)
NEUTS SEG # BLD: 14.8 K/UL (ref 1.8–8)
NEUTS SEG NFR BLD: 89 % (ref 32–75)
NRBC # BLD: 0 K/UL (ref 0–0.01)
NRBC BLD-RTO: 0 PER 100 WBC
PLATELET # BLD AUTO: 276 K/UL (ref 150–400)
PMV BLD AUTO: 10.7 FL (ref 8.9–12.9)
POTASSIUM SERPL-SCNC: 4.4 MMOL/L (ref 3.5–5.1)
RBC # BLD AUTO: 3.92 M/UL (ref 3.8–5.2)
SODIUM SERPL-SCNC: 143 MMOL/L (ref 136–145)
TROPONIN I SERPL HS-MCNC: 13 NG/L (ref 0–51)
TROPONIN I SERPL HS-MCNC: 13 NG/L (ref 0–51)
WBC # BLD AUTO: 16.5 K/UL (ref 3.6–11)

## 2023-09-21 PROCEDURE — 71046 X-RAY EXAM CHEST 2 VIEWS: CPT

## 2023-09-21 PROCEDURE — 99285 EMERGENCY DEPT VISIT HI MDM: CPT

## 2023-09-21 PROCEDURE — 84484 ASSAY OF TROPONIN QUANT: CPT

## 2023-09-21 PROCEDURE — 80048 BASIC METABOLIC PNL TOTAL CA: CPT

## 2023-09-21 PROCEDURE — 36415 COLL VENOUS BLD VENIPUNCTURE: CPT

## 2023-09-21 PROCEDURE — 85025 COMPLETE CBC W/AUTO DIFF WBC: CPT

## 2023-09-21 PROCEDURE — 93005 ELECTROCARDIOGRAM TRACING: CPT | Performed by: EMERGENCY MEDICINE

## 2023-09-21 ASSESSMENT — HEART SCORE: ECG: 0

## 2023-09-22 VITALS
DIASTOLIC BLOOD PRESSURE: 97 MMHG | RESPIRATION RATE: 20 BRPM | HEART RATE: 93 BPM | TEMPERATURE: 97.5 F | SYSTOLIC BLOOD PRESSURE: 167 MMHG | OXYGEN SATURATION: 94 %

## 2023-09-22 NOTE — ED NOTES
[facility] arrived for pt transport. Discharge papers given to pt. Pt states understanding.       Natalie Melendez RN  09/22/23 0543

## 2023-09-23 LAB
EKG ATRIAL RATE: 91 BPM
EKG DIAGNOSIS: NORMAL
EKG P AXIS: 76 DEGREES
EKG P-R INTERVAL: 160 MS
EKG Q-T INTERVAL: 362 MS
EKG QRS DURATION: 86 MS
EKG QTC CALCULATION (BAZETT): 445 MS
EKG R AXIS: -23 DEGREES
EKG T AXIS: 17 DEGREES
EKG VENTRICULAR RATE: 91 BPM

## 2023-12-20 ENCOUNTER — APPOINTMENT (OUTPATIENT)
Facility: HOSPITAL | Age: 63
End: 2023-12-20
Payer: MEDICARE

## 2023-12-20 ENCOUNTER — HOSPITAL ENCOUNTER (INPATIENT)
Facility: HOSPITAL | Age: 63
LOS: 9 days | Discharge: NURSING, REHAB OR LONG-TERM CARE | End: 2023-12-29
Attending: STUDENT IN AN ORGANIZED HEALTH CARE EDUCATION/TRAINING PROGRAM | Admitting: HOSPITALIST
Payer: MEDICARE

## 2023-12-20 DIAGNOSIS — J96.02 SEPSIS WITH ACUTE HYPERCAPNIC RESPIRATORY FAILURE, DUE TO UNSPECIFIED ORGANISM, UNSPECIFIED WHETHER SEPTIC SHOCK PRESENT (HCC): ICD-10-CM

## 2023-12-20 DIAGNOSIS — R65.20 SEPSIS WITH ACUTE HYPERCAPNIC RESPIRATORY FAILURE, DUE TO UNSPECIFIED ORGANISM, UNSPECIFIED WHETHER SEPTIC SHOCK PRESENT (HCC): ICD-10-CM

## 2023-12-20 DIAGNOSIS — A41.9 SEPTICEMIA (HCC): ICD-10-CM

## 2023-12-20 DIAGNOSIS — R79.89 ELEVATED TROPONIN: Primary | ICD-10-CM

## 2023-12-20 DIAGNOSIS — S72.402A CLOSED FRACTURE OF DISTAL END OF LEFT FEMUR, UNSPECIFIED FRACTURE MORPHOLOGY, INITIAL ENCOUNTER (HCC): ICD-10-CM

## 2023-12-20 DIAGNOSIS — A41.9 SEPSIS WITH ACUTE HYPERCAPNIC RESPIRATORY FAILURE, DUE TO UNSPECIFIED ORGANISM, UNSPECIFIED WHETHER SEPTIC SHOCK PRESENT (HCC): ICD-10-CM

## 2023-12-20 DIAGNOSIS — R09.02 HYPOXIA: ICD-10-CM

## 2023-12-20 LAB
ALBUMIN SERPL-MCNC: 2.7 G/DL (ref 3.5–5)
ALBUMIN/GLOB SERPL: 0.5 (ref 1.1–2.2)
ALP SERPL-CCNC: 79 U/L (ref 45–117)
ALT SERPL-CCNC: 11 U/L (ref 12–78)
ANION GAP SERPL CALC-SCNC: 4 MMOL/L (ref 5–15)
ARTERIAL PATENCY WRIST A: POSITIVE
AST SERPL-CCNC: 11 U/L (ref 15–37)
B PERT DNA SPEC QL NAA+PROBE: NOT DETECTED
BASE DEFICIT BLD-SCNC: 4.2 MMOL/L
BASE DEFICIT BLD-SCNC: 4.4 MMOL/L
BASE DEFICIT BLD-SCNC: 4.9 MMOL/L
BASOPHILS # BLD: 0 K/UL (ref 0–0.1)
BASOPHILS NFR BLD: 0 % (ref 0–1)
BDY SITE: ABNORMAL
BILIRUB SERPL-MCNC: 0.2 MG/DL (ref 0.2–1)
BORDETELLA PARAPERTUSSIS BY PCR: NOT DETECTED
BUN SERPL-MCNC: 47 MG/DL (ref 6–20)
BUN/CREAT SERPL: 14 (ref 12–20)
C PNEUM DNA SPEC QL NAA+PROBE: NOT DETECTED
CALCIUM SERPL-MCNC: 7.8 MG/DL (ref 8.5–10.1)
CHLORIDE SERPL-SCNC: 113 MMOL/L (ref 97–108)
CO2 SERPL-SCNC: 25 MMOL/L (ref 21–32)
COMMENT:: NORMAL
CREAT SERPL-MCNC: 3.36 MG/DL (ref 0.55–1.02)
DIFFERENTIAL METHOD BLD: ABNORMAL
EKG ATRIAL RATE: 124 BPM
EKG DIAGNOSIS: NORMAL
EKG P AXIS: 77 DEGREES
EKG P-R INTERVAL: 154 MS
EKG Q-T INTERVAL: 318 MS
EKG QRS DURATION: 78 MS
EKG QTC CALCULATION (BAZETT): 456 MS
EKG R AXIS: -30 DEGREES
EKG T AXIS: 41 DEGREES
EKG VENTRICULAR RATE: 124 BPM
EOSINOPHIL # BLD: 0 K/UL (ref 0–0.4)
EOSINOPHIL NFR BLD: 0 % (ref 0–7)
ERYTHROCYTE [DISTWIDTH] IN BLOOD BY AUTOMATED COUNT: 15.3 % (ref 11.5–14.5)
FLUAV SUBTYP SPEC NAA+PROBE: NOT DETECTED
FLUBV RNA SPEC QL NAA+PROBE: NOT DETECTED
GAS FLOW.O2 O2 DELIVERY SYS: ABNORMAL
GAS FLOW.O2 SETTING OXYMISER: 20 BPM
GLOBULIN SER CALC-MCNC: 5.3 G/DL (ref 2–4)
GLUCOSE BLD STRIP.AUTO-MCNC: 183 MG/DL (ref 65–117)
GLUCOSE SERPL-MCNC: 207 MG/DL (ref 65–100)
HADV DNA SPEC QL NAA+PROBE: NOT DETECTED
HCO3 BLD-SCNC: 24.5 MMOL/L (ref 22–26)
HCO3 BLD-SCNC: 24.8 MMOL/L (ref 22–26)
HCO3 BLD-SCNC: 24.8 MMOL/L (ref 22–26)
HCOV 229E RNA SPEC QL NAA+PROBE: NOT DETECTED
HCOV HKU1 RNA SPEC QL NAA+PROBE: NOT DETECTED
HCOV NL63 RNA SPEC QL NAA+PROBE: NOT DETECTED
HCOV OC43 RNA SPEC QL NAA+PROBE: DETECTED
HCT VFR BLD AUTO: 34.7 % (ref 35–47)
HGB BLD-MCNC: 9.7 G/DL (ref 11.5–16)
HMPV RNA SPEC QL NAA+PROBE: NOT DETECTED
HPIV1 RNA SPEC QL NAA+PROBE: NOT DETECTED
HPIV2 RNA SPEC QL NAA+PROBE: NOT DETECTED
HPIV3 RNA SPEC QL NAA+PROBE: NOT DETECTED
HPIV4 RNA SPEC QL NAA+PROBE: NOT DETECTED
IMM GRANULOCYTES # BLD AUTO: 0.4 K/UL (ref 0–0.04)
IMM GRANULOCYTES NFR BLD AUTO: 2 % (ref 0–0.5)
LACTATE SERPL-SCNC: 0.7 MMOL/L (ref 0.4–2)
LACTATE SERPL-SCNC: 0.7 MMOL/L (ref 0.4–2)
LYMPHOCYTES # BLD: 1.3 K/UL (ref 0.8–3.5)
LYMPHOCYTES NFR BLD: 7 % (ref 12–49)
M PNEUMO DNA SPEC QL NAA+PROBE: NOT DETECTED
MAGNESIUM SERPL-MCNC: 1.9 MG/DL (ref 1.6–2.4)
MCH RBC QN AUTO: 26.6 PG (ref 26–34)
MCHC RBC AUTO-ENTMCNC: 28 G/DL (ref 30–36.5)
MCV RBC AUTO: 95.3 FL (ref 80–99)
MONOCYTES # BLD: 0.7 K/UL (ref 0–1)
MONOCYTES NFR BLD: 4 % (ref 5–13)
NEUTS SEG # BLD: 15.6 K/UL (ref 1.8–8)
NEUTS SEG NFR BLD: 87 % (ref 32–75)
NRBC # BLD: 0.02 K/UL (ref 0–0.01)
NRBC BLD-RTO: 0.1 PER 100 WBC
O2/TOTAL GAS SETTING VFR VENT: 4 %
O2/TOTAL GAS SETTING VFR VENT: 40 %
O2/TOTAL GAS SETTING VFR VENT: 40 %
PCO2 BLD: 65.3 MMHG (ref 35–45)
PCO2 BLD: 66.8 MMHG (ref 35–45)
PCO2 BLD: 67.7 MMHG (ref 35–45)
PEEP RESPIRATORY: 6 CMH2O
PEEP RESPIRATORY: 6 CMH2O
PH BLD: 7.17 (ref 7.35–7.45)
PH BLD: 7.18 (ref 7.35–7.45)
PH BLD: 7.19 (ref 7.35–7.45)
PHOSPHATE SERPL-MCNC: 4 MG/DL (ref 2.6–4.7)
PLATELET # BLD AUTO: 278 K/UL (ref 150–400)
PMV BLD AUTO: 10.3 FL (ref 8.9–12.9)
PO2 BLD: 103 MMHG (ref 80–100)
PO2 BLD: 135 MMHG (ref 80–100)
PO2 BLD: 70 MMHG (ref 80–100)
POTASSIUM SERPL-SCNC: 5.4 MMOL/L (ref 3.5–5.1)
PRESSURE SUPPORT SETTING VENT: 16 CMH2O
PRESSURE SUPPORT SETTING VENT: 24 CMH2O
PROCALCITONIN SERPL-MCNC: 0.38 NG/ML
PROT SERPL-MCNC: 8 G/DL (ref 6.4–8.2)
RBC # BLD AUTO: 3.64 M/UL (ref 3.8–5.2)
RBC MORPH BLD: ABNORMAL
RBC MORPH BLD: ABNORMAL
RSV RNA SPEC QL NAA+PROBE: NOT DETECTED
RV+EV RNA SPEC QL NAA+PROBE: NOT DETECTED
SAO2 % BLD: 88.4 % (ref 92–97)
SAO2 % BLD: 95.7 % (ref 92–97)
SAO2 % BLD: 98.1 % (ref 92–97)
SARS-COV-2 RNA RESP QL NAA+PROBE: NOT DETECTED
SERVICE CMNT-IMP: ABNORMAL
SODIUM SERPL-SCNC: 142 MMOL/L (ref 136–145)
SPECIMEN HOLD: NORMAL
SPECIMEN TYPE: ABNORMAL
TROPONIN I SERPL HS-MCNC: 242 NG/L (ref 0–51)
TROPONIN I SERPL HS-MCNC: 251 NG/L (ref 0–51)
TSH SERPL DL<=0.05 MIU/L-ACNC: 0.14 UIU/ML (ref 0.36–3.74)
VENTILATION MODE VENT: ABNORMAL
WBC # BLD AUTO: 18 K/UL (ref 3.6–11)

## 2023-12-20 PROCEDURE — 5A09457 ASSISTANCE WITH RESPIRATORY VENTILATION, 24-96 CONSECUTIVE HOURS, CONTINUOUS POSITIVE AIRWAY PRESSURE: ICD-10-PCS | Performed by: HOSPITALIST

## 2023-12-20 PROCEDURE — 80053 COMPREHEN METABOLIC PANEL: CPT

## 2023-12-20 PROCEDURE — 6360000002 HC RX W HCPCS: Performed by: HOSPITALIST

## 2023-12-20 PROCEDURE — 36415 COLL VENOUS BLD VENIPUNCTURE: CPT

## 2023-12-20 PROCEDURE — 6360000002 HC RX W HCPCS: Performed by: STUDENT IN AN ORGANIZED HEALTH CARE EDUCATION/TRAINING PROGRAM

## 2023-12-20 PROCEDURE — 2000000000 HC ICU R&B

## 2023-12-20 PROCEDURE — 84145 PROCALCITONIN (PCT): CPT

## 2023-12-20 PROCEDURE — 6370000000 HC RX 637 (ALT 250 FOR IP): Performed by: STUDENT IN AN ORGANIZED HEALTH CARE EDUCATION/TRAINING PROGRAM

## 2023-12-20 PROCEDURE — 99285 EMERGENCY DEPT VISIT HI MDM: CPT

## 2023-12-20 PROCEDURE — 2580000003 HC RX 258: Performed by: STUDENT IN AN ORGANIZED HEALTH CARE EDUCATION/TRAINING PROGRAM

## 2023-12-20 PROCEDURE — 84484 ASSAY OF TROPONIN QUANT: CPT

## 2023-12-20 PROCEDURE — 36600 WITHDRAWAL OF ARTERIAL BLOOD: CPT

## 2023-12-20 PROCEDURE — 93010 ELECTROCARDIOGRAM REPORT: CPT | Performed by: SPECIALIST

## 2023-12-20 PROCEDURE — 82803 BLOOD GASES ANY COMBINATION: CPT

## 2023-12-20 PROCEDURE — 6360000002 HC RX W HCPCS: Performed by: NURSE PRACTITIONER

## 2023-12-20 PROCEDURE — 71045 X-RAY EXAM CHEST 1 VIEW: CPT

## 2023-12-20 PROCEDURE — 93005 ELECTROCARDIOGRAM TRACING: CPT | Performed by: STUDENT IN AN ORGANIZED HEALTH CARE EDUCATION/TRAINING PROGRAM

## 2023-12-20 PROCEDURE — 83605 ASSAY OF LACTIC ACID: CPT

## 2023-12-20 PROCEDURE — 2580000003 HC RX 258: Performed by: HOSPITALIST

## 2023-12-20 PROCEDURE — 96374 THER/PROPH/DIAG INJ IV PUSH: CPT

## 2023-12-20 PROCEDURE — 94640 AIRWAY INHALATION TREATMENT: CPT

## 2023-12-20 PROCEDURE — 84443 ASSAY THYROID STIM HORMONE: CPT

## 2023-12-20 PROCEDURE — 83735 ASSAY OF MAGNESIUM: CPT

## 2023-12-20 PROCEDURE — 96375 TX/PRO/DX INJ NEW DRUG ADDON: CPT

## 2023-12-20 PROCEDURE — 2700000000 HC OXYGEN THERAPY PER DAY

## 2023-12-20 PROCEDURE — 0202U NFCT DS 22 TRGT SARS-COV-2: CPT

## 2023-12-20 PROCEDURE — 85025 COMPLETE CBC W/AUTO DIFF WBC: CPT

## 2023-12-20 PROCEDURE — 87040 BLOOD CULTURE FOR BACTERIA: CPT

## 2023-12-20 PROCEDURE — 87154 CUL TYP ID BLD PTHGN 6+ TRGT: CPT

## 2023-12-20 PROCEDURE — 84100 ASSAY OF PHOSPHORUS: CPT

## 2023-12-20 PROCEDURE — 2500000003 HC RX 250 WO HCPCS: Performed by: NURSE PRACTITIONER

## 2023-12-20 PROCEDURE — 6370000000 HC RX 637 (ALT 250 FOR IP): Performed by: HOSPITALIST

## 2023-12-20 PROCEDURE — 82962 GLUCOSE BLOOD TEST: CPT

## 2023-12-20 PROCEDURE — 94660 CPAP INITIATION&MGMT: CPT

## 2023-12-20 RX ORDER — INSULIN LISPRO 100 [IU]/ML
0-8 INJECTION, SOLUTION INTRAVENOUS; SUBCUTANEOUS EVERY 4 HOURS
Status: DISCONTINUED | OUTPATIENT
Start: 2023-12-21 | End: 2023-12-22

## 2023-12-20 RX ORDER — METHIMAZOLE 5 MG/1
10 TABLET ORAL DAILY
Status: DISCONTINUED | OUTPATIENT
Start: 2023-12-21 | End: 2023-12-29 | Stop reason: HOSPADM

## 2023-12-20 RX ORDER — ACETAMINOPHEN 325 MG/1
650 TABLET ORAL EVERY 6 HOURS PRN
Status: DISCONTINUED | OUTPATIENT
Start: 2023-12-20 | End: 2023-12-29 | Stop reason: HOSPADM

## 2023-12-20 RX ORDER — DEXMEDETOMIDINE HYDROCHLORIDE 4 UG/ML
.1-1.5 INJECTION, SOLUTION INTRAVENOUS CONTINUOUS
Status: DISCONTINUED | OUTPATIENT
Start: 2023-12-20 | End: 2023-12-21

## 2023-12-20 RX ORDER — ENOXAPARIN SODIUM 100 MG/ML
30 INJECTION SUBCUTANEOUS DAILY
Status: DISCONTINUED | OUTPATIENT
Start: 2023-12-20 | End: 2023-12-20 | Stop reason: SDUPTHER

## 2023-12-20 RX ORDER — SODIUM CHLORIDE, SODIUM LACTATE, POTASSIUM CHLORIDE, AND CALCIUM CHLORIDE .6; .31; .03; .02 G/100ML; G/100ML; G/100ML; G/100ML
30 INJECTION, SOLUTION INTRAVENOUS ONCE
Status: COMPLETED | OUTPATIENT
Start: 2023-12-20 | End: 2023-12-20

## 2023-12-20 RX ORDER — IPRATROPIUM BROMIDE AND ALBUTEROL SULFATE 2.5; .5 MG/3ML; MG/3ML
1 SOLUTION RESPIRATORY (INHALATION)
Status: DISCONTINUED | OUTPATIENT
Start: 2023-12-20 | End: 2023-12-20

## 2023-12-20 RX ORDER — PANTOPRAZOLE SODIUM 40 MG/1
40 TABLET, DELAYED RELEASE ORAL DAILY
Status: DISCONTINUED | OUTPATIENT
Start: 2023-12-20 | End: 2023-12-29 | Stop reason: HOSPADM

## 2023-12-20 RX ORDER — TRAMADOL HYDROCHLORIDE 50 MG/1
50 TABLET ORAL EVERY 8 HOURS PRN
Status: DISCONTINUED | OUTPATIENT
Start: 2023-12-20 | End: 2023-12-29 | Stop reason: HOSPADM

## 2023-12-20 RX ORDER — DIAZEPAM 5 MG/ML
2.5 INJECTION, SOLUTION INTRAMUSCULAR; INTRAVENOUS ONCE
Status: COMPLETED | OUTPATIENT
Start: 2023-12-20 | End: 2023-12-20

## 2023-12-20 RX ORDER — GLIPIZIDE 5 MG/1
5 TABLET ORAL 2 TIMES DAILY
Status: CANCELLED | OUTPATIENT
Start: 2023-12-20

## 2023-12-20 RX ORDER — ACETAMINOPHEN 650 MG/1
650 SUPPOSITORY RECTAL EVERY 6 HOURS PRN
Status: DISCONTINUED | OUTPATIENT
Start: 2023-12-20 | End: 2023-12-29 | Stop reason: HOSPADM

## 2023-12-20 RX ORDER — SODIUM CHLORIDE 0.9 % (FLUSH) 0.9 %
5-40 SYRINGE (ML) INJECTION PRN
Status: DISCONTINUED | OUTPATIENT
Start: 2023-12-20 | End: 2023-12-29 | Stop reason: HOSPADM

## 2023-12-20 RX ORDER — SODIUM CHLORIDE 0.9 % (FLUSH) 0.9 %
5-40 SYRINGE (ML) INJECTION EVERY 12 HOURS SCHEDULED
Status: DISCONTINUED | OUTPATIENT
Start: 2023-12-20 | End: 2023-12-29 | Stop reason: HOSPADM

## 2023-12-20 RX ORDER — ACETAMINOPHEN 500 MG
1000 TABLET ORAL
Status: COMPLETED | OUTPATIENT
Start: 2023-12-20 | End: 2023-12-20

## 2023-12-20 RX ORDER — INSULIN LISPRO 100 [IU]/ML
0-8 INJECTION, SOLUTION INTRAVENOUS; SUBCUTANEOUS
Status: DISCONTINUED | OUTPATIENT
Start: 2023-12-20 | End: 2023-12-20

## 2023-12-20 RX ORDER — IPRATROPIUM BROMIDE AND ALBUTEROL SULFATE 2.5; .5 MG/3ML; MG/3ML
1 SOLUTION RESPIRATORY (INHALATION)
Status: DISCONTINUED | OUTPATIENT
Start: 2023-12-21 | End: 2023-12-24

## 2023-12-20 RX ORDER — HEPARIN SODIUM 5000 [USP'U]/ML
5000 INJECTION, SOLUTION INTRAVENOUS; SUBCUTANEOUS EVERY 8 HOURS SCHEDULED
Status: DISCONTINUED | OUTPATIENT
Start: 2023-12-20 | End: 2023-12-29 | Stop reason: HOSPADM

## 2023-12-20 RX ORDER — SODIUM CHLORIDE 9 MG/ML
INJECTION, SOLUTION INTRAVENOUS PRN
Status: DISCONTINUED | OUTPATIENT
Start: 2023-12-20 | End: 2023-12-29 | Stop reason: HOSPADM

## 2023-12-20 RX ORDER — INSULIN LISPRO 100 [IU]/ML
0-4 INJECTION, SOLUTION INTRAVENOUS; SUBCUTANEOUS NIGHTLY
Status: DISCONTINUED | OUTPATIENT
Start: 2023-12-20 | End: 2023-12-20

## 2023-12-20 RX ORDER — LORAZEPAM 2 MG/ML
0.5 INJECTION INTRAMUSCULAR ONCE
Status: DISCONTINUED | OUTPATIENT
Start: 2023-12-20 | End: 2023-12-20

## 2023-12-20 RX ORDER — OXYBUTYNIN CHLORIDE 5 MG/1
10 TABLET ORAL 2 TIMES DAILY
Status: DISCONTINUED | OUTPATIENT
Start: 2023-12-20 | End: 2023-12-29 | Stop reason: HOSPADM

## 2023-12-20 RX ORDER — HYDROMORPHONE HYDROCHLORIDE 1 MG/ML
0.25 INJECTION, SOLUTION INTRAMUSCULAR; INTRAVENOUS; SUBCUTANEOUS EVERY 4 HOURS PRN
Status: DISCONTINUED | OUTPATIENT
Start: 2023-12-20 | End: 2023-12-29 | Stop reason: HOSPADM

## 2023-12-20 RX ORDER — ACETAMINOPHEN 500 MG
500 TABLET ORAL EVERY 8 HOURS PRN
Status: DISCONTINUED | OUTPATIENT
Start: 2023-12-20 | End: 2023-12-21 | Stop reason: SDUPTHER

## 2023-12-20 RX ORDER — SODIUM CHLORIDE, SODIUM LACTATE, POTASSIUM CHLORIDE, AND CALCIUM CHLORIDE .6; .31; .03; .02 G/100ML; G/100ML; G/100ML; G/100ML
30 INJECTION, SOLUTION INTRAVENOUS ONCE
Status: DISCONTINUED | OUTPATIENT
Start: 2023-12-20 | End: 2023-12-29 | Stop reason: HOSPADM

## 2023-12-20 RX ORDER — DEXTROSE MONOHYDRATE 100 MG/ML
INJECTION, SOLUTION INTRAVENOUS CONTINUOUS PRN
Status: DISCONTINUED | OUTPATIENT
Start: 2023-12-20 | End: 2023-12-21 | Stop reason: SDUPTHER

## 2023-12-20 RX ADMIN — DEXMEDETOMIDINE HYDROCHLORIDE 0.2 MCG/KG/HR: 400 INJECTION, SOLUTION INTRAVENOUS at 22:50

## 2023-12-20 RX ADMIN — HYDROMORPHONE HYDROCHLORIDE 0.25 MG: 1 INJECTION, SOLUTION INTRAMUSCULAR; INTRAVENOUS; SUBCUTANEOUS at 22:45

## 2023-12-20 RX ADMIN — HEPARIN SODIUM 5000 UNITS: 5000 INJECTION INTRAVENOUS; SUBCUTANEOUS at 21:26

## 2023-12-20 RX ADMIN — DIAZEPAM 2.5 MG: 5 INJECTION, SOLUTION INTRAMUSCULAR; INTRAVENOUS at 20:05

## 2023-12-20 RX ADMIN — SODIUM CHLORIDE, POTASSIUM CHLORIDE, SODIUM LACTATE AND CALCIUM CHLORIDE 1641 ML: 600; 310; 30; 20 INJECTION, SOLUTION INTRAVENOUS at 13:43

## 2023-12-20 RX ADMIN — WATER 1000 MG: 1 INJECTION INTRAMUSCULAR; INTRAVENOUS; SUBCUTANEOUS at 16:22

## 2023-12-20 RX ADMIN — Medication 10 ML: at 22:50

## 2023-12-20 RX ADMIN — AZITHROMYCIN MONOHYDRATE 500 MG: 500 INJECTION, POWDER, LYOPHILIZED, FOR SOLUTION INTRAVENOUS at 15:50

## 2023-12-20 RX ADMIN — IPRATROPIUM BROMIDE AND ALBUTEROL SULFATE 1 DOSE: 2.5; .5 SOLUTION RESPIRATORY (INHALATION) at 21:32

## 2023-12-20 RX ADMIN — ACETAMINOPHEN 1000 MG: 500 TABLET ORAL at 13:48

## 2023-12-20 ASSESSMENT — PAIN DESCRIPTION - FREQUENCY: FREQUENCY: OTHER (COMMENT)

## 2023-12-20 ASSESSMENT — PAIN DESCRIPTION - ORIENTATION
ORIENTATION: LEFT
ORIENTATION: RIGHT;LEFT
ORIENTATION: RIGHT;LEFT

## 2023-12-20 ASSESSMENT — PAIN SCALES - GENERAL
PAINLEVEL_OUTOF10: 10
PAINLEVEL_OUTOF10: 10
PAINLEVEL_OUTOF10: 0
PAINLEVEL_OUTOF10: 2

## 2023-12-20 ASSESSMENT — PAIN DESCRIPTION - DESCRIPTORS
DESCRIPTORS: THROBBING
DESCRIPTORS: SORE

## 2023-12-20 ASSESSMENT — PAIN - FUNCTIONAL ASSESSMENT
PAIN_FUNCTIONAL_ASSESSMENT: NONE - DENIES PAIN
PAIN_FUNCTIONAL_ASSESSMENT: PREVENTS OR INTERFERES WITH ALL ACTIVE AND SOME PASSIVE ACTIVITIES

## 2023-12-20 ASSESSMENT — PAIN DESCRIPTION - LOCATION
LOCATION: HIP
LOCATION: KNEE
LOCATION: KNEE

## 2023-12-20 ASSESSMENT — HEART SCORE: ECG: 1

## 2023-12-20 ASSESSMENT — PAIN DESCRIPTION - PAIN TYPE
TYPE: ACUTE PAIN
TYPE: ACUTE PAIN

## 2023-12-20 NOTE — ED TRIAGE NOTES
Pt arrives via EMS w/ c/o worsening SOB, cough fever and congestion over last couple of days. Per EMS, pt had temperature of 101, was 70% on RA on arrival and HR of 130s. Pt on NRB on arrival, states she has been \"too weak\" to get out of bed today. Denies chest pain, n/v/d or exposure to covid/flu.

## 2023-12-20 NOTE — H&P
Not on file   Depression: Not on file   Housing Stability: Not on file   Interpersonal Safety: Not on file   Utilities: Not on file        Medications were reconciled to the best of my ability given all available resources at the time of admission. Route is PO if not otherwise noted.     Family and social history were personally reviewed, all pertinent and relevant details are outlined as above.    Objective:   BP 93/65   Pulse (!) 106   Temp 98.2 °F (36.8 °C) (Axillary) Comment: also oral which was the same  Resp 26   Ht 1.626 m (5' 4\")   Wt (!) 150 kg (330 lb 11 oz)   SpO2 100%   BMI 56.76 kg/m²         PHYSICAL EXAM:   General: Alert x oriented x 3, awake, no acute distress,   HEENT: PEERL, EOMI, moist mucus membranes  Neck: Supple, no JVD, no meningeal signs  Chest: Clear to auscultation bilaterally   CVS: RRR, S1 S2 heard, no murmurs/rubs/gallops  Abd: Soft, non-tender, non-distended, +bowel sounds   Ext: No clubbing, no cyanosis, no edema  Neuro/Psych: Pleasant mood and affect, CN 2-12 grossly intact, sensory grossly within normal limit, Strength 5/5 in all extremities, DTR 1+ x 4  Cap refill: Brisk, less than 3 seconds  Pulses: 2+, symmetric in all extremities  Skin: Warm, dry, without rashes or lesions    Data Review:   I have independently reviewed and interpreted patient's lab and all other diagnostic data    Abnormal Labs Reviewed   RESPIRATORY PANEL, MOLECULAR, WITH COVID-19 - Abnormal; Notable for the following components:       Result Value    Coronavirus OC43 by PCR Detected (*)     All other components within normal limits   CBC WITH AUTO DIFFERENTIAL - Abnormal; Notable for the following components:    WBC 18.0 (*)     RBC 3.64 (*)     Hemoglobin 9.7 (*)     Hematocrit 34.7 (*)     MCHC 28.0 (*)     RDW 15.3 (*)     Nucleated RBCs 0.1 (*)     nRBC 0.02 (*)     Neutrophils % 87 (*)     Lymphocytes % 7 (*)     Monocytes % 4 (*)     Immature Granulocytes 2 (*)     Neutrophils Absolute 15.6 (*)   personally managed or directed the management of the following life and organ supporting interventions that required my frequent assessment to treat or prevent imminent deterioration. I personally spent 44 minutes of critical care time. This is time spent at this critically ill patient's bedside actively involved in patient care as well as the coordination of care and discussions with the patient's family. This does not include any procedural time which has been billed separately. Signed By: Evi Croft MD     December 20, 2023         Please note that this dictation may have been completed with Dragon, the Smart[employer]ch voice recognition software. Quite often unanticipated grammatical, syntax, homophones, and other interpretive errors are inadvertently transcribed by the computer software. Please disregard these errors. Please excuse any errors that have escaped final proofreading.

## 2023-12-20 NOTE — ED PROVIDER NOTES
[facility] EMERGENCY DEP  EMERGENCY DEPARTMENT ENCOUNTER      Pt Name: Marcus Hathaway  MRN: [id number]  [address] [facility], [place] 1960  Date of evaluation: 12/20/2023  Provider: Matthew Cabrales MD    CHIEF COMPLAINT       Chief Complaint   Patient presents with    Shortness of Breath         HISTORY OF PRESENT ILLNESS   (Location/Symptom, Timing/Onset, Context/Setting, Quality, Duration, Modifying Factors, Severity)  Note limiting factors. Patient is a 23-year-old female present emergency department shortness of breath, hypoxia. Patient states that she had been feeling ill for the last several days with cough, congestion with progressively worsening shortness of breath starting today. Patient called 911 when EMS arrived patient's SpO2 on room air was 61% they placed her on a nonrebreather mask gave her a DuoNeb with improvement of SpO2 to 100%. Patient is not in any acute respiratory distress on arrival EMS had a axillary temp of 101.2 and patient was tachycardic into the 130s on arrival.            Review of External Medical Records:     Nursing Notes were reviewed. REVIEW OF SYSTEMS    (2-9 systems for level 4, 10 or more for level 5)     Review of Systems    Except as noted above the remainder of the review of systems was reviewed and negative.        PAST MEDICAL HISTORY     Past Medical History:   Diagnosis Date    Chronic kidney disease     Chronic obstructive pulmonary disease (HCC)     Diabetes ([address])     Hernia, hiatal     Hypertension     Incontinence of urine     Kidney stones     both kidneys    Morbid obesity (HCC)     Osteoarthritis     Peripheral neuropathy     DOES GET SOME NUMBNESS IN HANDS    Psychiatric disorder     ANXIETY    Renal failure     left kidney    Thyroid disease          SURGICAL HISTORY       Past Surgical History:   Procedure Laterality Date    DILATION AND CURETTAGE OF UTERUS  1990    HYSTERECTOMY (CERVIX STATUS UNKNOWN)      OVARIES NOT REMOVED     OTHER SURGICAL HISTORY      right kidney stent         CURRENT MEDICATIONS       Previous Medications    ACETAMINOPHEN (TYLENOL) 500 MG TABLET    Take 1 tablet by mouth every 8 hours as needed    GLIPIZIDE (GLUCOTROL) 5 MG TABLET    Take 1 tablet by mouth 2 times daily    METHIMAZOLE (TAPAZOLE) 10 MG TABLET    Take by mouth daily    ONDANSETRON (ZOFRAN) 8 MG TABLET    Take 8 mg by mouth every 8 hours as needed    OXYBUTYNIN CHLORIDE PO    Take 10 mg by mouth    PANTOPRAZOLE (PROTONIX) 40 MG TABLET    Take 1 tablet by mouth daily    TRAMADOL (ULTRAM) 50 MG TABLET    Take 1-2 tablets by mouth every 8 hours as needed.       ALLERGIES     Neosporin [bacitracin-polymyxin b], Bacitracin, Amoxicillin, and Ciprofloxacin    FAMILY HISTORY       Family History   Problem Relation Age of Onset    Hypertension Sister     Diabetes Sister     Heart Attack Father     Heart Disease Father     Diabetes Father     Diabetes Mother     Cancer Mother     Anesth Problems Neg Hx     Heart Disease Brother     Diabetes Sister     Hypertension Sister     Diabetes Sister           SOCIAL HISTORY       Social History     Socioeconomic History    Marital status: Single   Tobacco Use    Smoking status: Never    Smokeless tobacco: Never   Substance and Sexual Activity    Alcohol use: Never    Drug use: Never           PHYSICAL EXAM    (up to 7 for level 4, 8 or more for level 5)     ED Triage Vitals   BP Temp Temp Source Pulse Respirations SpO2 Height Weight - Scale   12/20/23 1208 12/20/23 1208 12/20/23 1251 12/20/23 1208 12/20/23 1208 12/20/23 1208 12/20/23 1251 12/20/23 1251   129/63 99.5 °F (37.5 °C) Rectal (!) 133 27 96 % 1.626 m (5' 4\") (!) 150 kg (330 lb 11 oz)       Body mass index is 56.76 kg/m².    Physical Exam  Vitals and nursing note reviewed.   Constitutional:       Appearance: Normal appearance. She is morbidly obese.   HENT:      Head: Normocephalic and atraumatic.      Nose: Nose normal.   Eyes:      Extraocular Movements: Extraocular movements intact.

## 2023-12-21 ENCOUNTER — APPOINTMENT (OUTPATIENT)
Facility: HOSPITAL | Age: 63
End: 2023-12-21
Attending: HOSPITALIST
Payer: MEDICARE

## 2023-12-21 ENCOUNTER — APPOINTMENT (OUTPATIENT)
Facility: HOSPITAL | Age: 63
End: 2023-12-21
Payer: MEDICARE

## 2023-12-21 LAB
ACCESSION NUMBER, LLC1M: ABNORMAL
ACINETOBACTER CALCOAC BAUMANNII COMPLEX BY PCR: NOT DETECTED
ALBUMIN SERPL-MCNC: 2.4 G/DL (ref 3.5–5)
ALBUMIN SERPL-MCNC: 2.5 G/DL (ref 3.5–5)
ALBUMIN/GLOB SERPL: 0.5 (ref 1.1–2.2)
ALBUMIN/GLOB SERPL: 0.6 (ref 1.1–2.2)
ALP SERPL-CCNC: 64 U/L (ref 45–117)
ALP SERPL-CCNC: 64 U/L (ref 45–117)
ALT SERPL-CCNC: 12 U/L (ref 12–78)
ALT SERPL-CCNC: 14 U/L (ref 12–78)
ANION GAP SERPL CALC-SCNC: 2 MMOL/L (ref 5–15)
ANION GAP SERPL CALC-SCNC: 3 MMOL/L (ref 5–15)
ANION GAP SERPL CALC-SCNC: 3 MMOL/L (ref 5–15)
ANION GAP SERPL CALC-SCNC: 6 MMOL/L (ref 5–15)
ANION GAP SERPL CALC-SCNC: 7 MMOL/L (ref 5–15)
APPEARANCE UR: ABNORMAL
ARTERIAL PATENCY WRIST A: POSITIVE
ARTERIAL PATENCY WRIST A: POSITIVE
AST SERPL-CCNC: 8 U/L (ref 15–37)
AST SERPL-CCNC: ABNORMAL U/L (ref 15–37)
B FRAGILIS DNA BLD POS QL NAA+NON-PROBE: NOT DETECTED
BACTERIA URNS QL MICRO: ABNORMAL /HPF
BASE DEFICIT BLD-SCNC: 3.4 MMOL/L
BASE DEFICIT BLD-SCNC: 6.3 MMOL/L
BASOPHILS # BLD: 0 K/UL (ref 0–0.1)
BASOPHILS NFR BLD: 0 % (ref 0–1)
BDY SITE: ABNORMAL
BDY SITE: ABNORMAL
BILIRUB SERPL-MCNC: 0.2 MG/DL (ref 0.2–1)
BILIRUB SERPL-MCNC: 0.2 MG/DL (ref 0.2–1)
BILIRUB UR QL: NEGATIVE
BIOFIRE TEST COMMENT: ABNORMAL
BUN SERPL-MCNC: 50 MG/DL (ref 6–20)
BUN SERPL-MCNC: 51 MG/DL (ref 6–20)
BUN SERPL-MCNC: 53 MG/DL (ref 6–20)
BUN SERPL-MCNC: 54 MG/DL (ref 6–20)
BUN SERPL-MCNC: 58 MG/DL (ref 6–20)
BUN/CREAT SERPL: 14 (ref 12–20)
BUN/CREAT SERPL: 15 (ref 12–20)
C ALBICANS DNA BLD POS QL NAA+NON-PROBE: NOT DETECTED
C AURIS DNA BLD POS QL NAA+NON-PROBE: NOT DETECTED
C GATTII+NEOFOR DNA BLD POS QL NAA+N-PRB: NOT DETECTED
C GLABRATA DNA BLD POS QL NAA+NON-PROBE: NOT DETECTED
C KRUSEI DNA BLD POS QL NAA+NON-PROBE: NOT DETECTED
C PARAP DNA BLD POS QL NAA+NON-PROBE: NOT DETECTED
C TROPICLS DNA BLD POS QL NAA+NON-PROBE: NOT DETECTED
CA-I BLD-SCNC: 1.14 MMOL/L (ref 1.12–1.32)
CALCIUM SERPL-MCNC: 7.7 MG/DL (ref 8.5–10.1)
CALCIUM SERPL-MCNC: 7.8 MG/DL (ref 8.5–10.1)
CALCIUM SERPL-MCNC: 7.9 MG/DL (ref 8.5–10.1)
CALCIUM SERPL-MCNC: 8 MG/DL (ref 8.5–10.1)
CALCIUM SERPL-MCNC: 8.4 MG/DL (ref 8.5–10.1)
CHLORIDE SERPL-SCNC: 112 MMOL/L (ref 97–108)
CHLORIDE SERPL-SCNC: 115 MMOL/L (ref 97–108)
CK SERPL-CCNC: 107 U/L (ref 26–192)
CK SERPL-CCNC: NORMAL U/L (ref 26–192)
CO2 SERPL-SCNC: 23 MMOL/L (ref 21–32)
CO2 SERPL-SCNC: 23 MMOL/L (ref 21–32)
CO2 SERPL-SCNC: 25 MMOL/L (ref 21–32)
CO2 SERPL-SCNC: 25 MMOL/L (ref 21–32)
CO2 SERPL-SCNC: 26 MMOL/L (ref 21–32)
COLOR UR: ABNORMAL
CREAT SERPL-MCNC: 3.65 MG/DL (ref 0.55–1.02)
CREAT SERPL-MCNC: 3.69 MG/DL (ref 0.55–1.02)
CREAT SERPL-MCNC: 3.74 MG/DL (ref 0.55–1.02)
CREAT SERPL-MCNC: 3.84 MG/DL (ref 0.55–1.02)
CREAT SERPL-MCNC: 3.96 MG/DL (ref 0.55–1.02)
DIFFERENTIAL METHOD BLD: ABNORMAL
E CLOAC COMP DNA BLD POS NAA+NON-PROBE: NOT DETECTED
E COLI DNA BLD POS QL NAA+NON-PROBE: NOT DETECTED
E FAECALIS DNA BLD POS QL NAA+NON-PROBE: NOT DETECTED
E FAECIUM DNA BLD POS QL NAA+NON-PROBE: NOT DETECTED
ECHO AO ASC DIAM: 3.1 CM
ECHO AO ASCENDING AORTA INDEX: 1.27 CM/M2
ECHO AO ROOT DIAM: 3 CM
ECHO AO ROOT INDEX: 1.22 CM/M2
ECHO AV AREA PEAK VELOCITY: 3.4 CM2
ECHO AV AREA VTI: 3.5 CM2
ECHO AV AREA/BSA PEAK VELOCITY: 1.4 CM2/M2
ECHO AV AREA/BSA VTI: 1.4 CM2/M2
ECHO AV MEAN GRADIENT: 6 MMHG
ECHO AV MEAN VELOCITY: 1.2 M/S
ECHO AV PEAK GRADIENT: 9 MMHG
ECHO AV PEAK VELOCITY: 1.5 M/S
ECHO AV VELOCITY RATIO: 0.87
ECHO AV VTI: 26.6 CM
ECHO BSA: 2.6 M2
ECHO BSA: 2.64 M2
ECHO LA DIAMETER INDEX: 1.14 CM/M2
ECHO LA DIAMETER: 2.8 CM
ECHO LA TO AORTIC ROOT RATIO: 0.93
ECHO LA VOL A-L A2C: 58 ML (ref 22–52)
ECHO LA VOL MOD A2C: 51 ML (ref 22–52)
ECHO LA VOLUME INDEX A-L A2C: 24 ML/M2 (ref 16–34)
ECHO LA VOLUME INDEX MOD A2C: 21 ML/M2 (ref 16–34)
ECHO LV E' LATERAL VELOCITY: 9 CM/S
ECHO LV E' SEPTAL VELOCITY: 6 CM/S
ECHO LV EDV A2C: 79 ML
ECHO LV EDV A4C: 92 ML
ECHO LV EDV BP: 86 ML (ref 56–104)
ECHO LV EDV INDEX A4C: 38 ML/M2
ECHO LV EDV INDEX BP: 35 ML/M2
ECHO LV EDV NDEX A2C: 32 ML/M2
ECHO LV EJECTION FRACTION A2C: 68 %
ECHO LV EJECTION FRACTION A4C: 77 %
ECHO LV EJECTION FRACTION BIPLANE: 73 % (ref 55–100)
ECHO LV ESV A2C: 25 ML
ECHO LV ESV A4C: 21 ML
ECHO LV ESV BP: 23 ML (ref 19–49)
ECHO LV ESV INDEX A2C: 10 ML/M2
ECHO LV ESV INDEX A4C: 9 ML/M2
ECHO LV ESV INDEX BP: 9 ML/M2
ECHO LV FRACTIONAL SHORTENING: 37 % (ref 28–44)
ECHO LV INTERNAL DIMENSION DIASTOLE INDEX: 1.88 CM/M2
ECHO LV INTERNAL DIMENSION DIASTOLIC: 4.6 CM (ref 3.9–5.3)
ECHO LV INTERNAL DIMENSION SYSTOLIC INDEX: 1.18 CM/M2
ECHO LV INTERNAL DIMENSION SYSTOLIC: 2.9 CM
ECHO LV IVSD: 1 CM (ref 0.6–0.9)
ECHO LV MASS 2D: 181.2 G (ref 67–162)
ECHO LV MASS INDEX 2D: 74 G/M2 (ref 43–95)
ECHO LV POSTERIOR WALL DIASTOLIC: 1.2 CM (ref 0.6–0.9)
ECHO LV RELATIVE WALL THICKNESS RATIO: 0.52
ECHO LVOT AREA: 3.8 CM2
ECHO LVOT AV VTI INDEX: 0.91
ECHO LVOT DIAM: 2.2 CM
ECHO LVOT MEAN GRADIENT: 3 MMHG
ECHO LVOT PEAK GRADIENT: 7 MMHG
ECHO LVOT PEAK VELOCITY: 1.3 M/S
ECHO LVOT STROKE VOLUME INDEX: 37.7 ML/M2
ECHO LVOT SV: 92.3 ML
ECHO LVOT VTI: 24.3 CM
ECHO MV A VELOCITY: 0.75 M/S
ECHO MV AREA PHT: 5.3 CM2
ECHO MV E DECELERATION TIME (DT): 143.2 MS
ECHO MV E VELOCITY: 0.71 M/S
ECHO MV E/A RATIO: 0.95
ECHO MV E/E' LATERAL: 7.89
ECHO MV E/E' RATIO (AVERAGED): 9.86
ECHO MV PRESSURE HALF TIME (PHT): 41.5 MS
ECHO PV MAX VELOCITY: 0.7 M/S
ECHO PV PEAK GRADIENT: 2 MMHG
ECHO RV FREE WALL PEAK S': 16 CM/S
ECHO RV INTERNAL DIMENSION: 3.6 CM
ECHO RV LONGITUDINAL DIMENSION: 7.2 CM
ECHO RV MID DIMENSION: 2.6 CM
ECHO RV TAPSE: 2.1 CM (ref 1.7–?)
ENTEROBACTERALES DNA BLD POS NAA+N-PRB: NOT DETECTED
EOSINOPHIL # BLD: 0 K/UL (ref 0–0.4)
EOSINOPHIL NFR BLD: 0 % (ref 0–7)
EPITH CASTS URNS QL MICRO: ABNORMAL /LPF
ERYTHROCYTE [DISTWIDTH] IN BLOOD BY AUTOMATED COUNT: 15.5 % (ref 11.5–14.5)
GAS FLOW.O2 O2 DELIVERY SYS: ABNORMAL
GAS FLOW.O2 O2 DELIVERY SYS: ABNORMAL
GAS FLOW.O2 SETTING OXYMISER: 12 BPM
GAS FLOW.O2 SETTING OXYMISER: 16 BPM
GLOBULIN SER CALC-MCNC: 4.5 G/DL (ref 2–4)
GLOBULIN SER CALC-MCNC: 4.5 G/DL (ref 2–4)
GLUCOSE BLD STRIP.AUTO-MCNC: 159 MG/DL (ref 65–117)
GLUCOSE BLD STRIP.AUTO-MCNC: 159 MG/DL (ref 65–117)
GLUCOSE BLD STRIP.AUTO-MCNC: 164 MG/DL (ref 65–117)
GLUCOSE BLD STRIP.AUTO-MCNC: 172 MG/DL (ref 65–117)
GLUCOSE BLD STRIP.AUTO-MCNC: 173 MG/DL (ref 65–117)
GLUCOSE BLD STRIP.AUTO-MCNC: 175 MG/DL (ref 65–117)
GLUCOSE BLD STRIP.AUTO-MCNC: 178 MG/DL (ref 65–117)
GLUCOSE BLD STRIP.AUTO-MCNC: 179 MG/DL (ref 65–117)
GLUCOSE BLD STRIP.AUTO-MCNC: 210 MG/DL (ref 65–117)
GLUCOSE BLD STRIP.AUTO-MCNC: 210 MG/DL (ref 65–117)
GLUCOSE BLD STRIP.AUTO-MCNC: 237 MG/DL (ref 65–117)
GLUCOSE BLD STRIP.AUTO-MCNC: 260 MG/DL (ref 65–117)
GLUCOSE SERPL-MCNC: 168 MG/DL (ref 65–100)
GLUCOSE SERPL-MCNC: 173 MG/DL (ref 65–100)
GLUCOSE SERPL-MCNC: 179 MG/DL (ref 65–100)
GLUCOSE SERPL-MCNC: 179 MG/DL (ref 65–100)
GLUCOSE SERPL-MCNC: 195 MG/DL (ref 65–100)
GLUCOSE UR STRIP.AUTO-MCNC: NEGATIVE MG/DL
GP B STREP DNA BLD POS QL NAA+NON-PROBE: NOT DETECTED
HAEM INFLU DNA BLD POS QL NAA+NON-PROBE: NOT DETECTED
HCO3 BLD-SCNC: 21.1 MMOL/L (ref 22–26)
HCO3 BLD-SCNC: 23.5 MMOL/L (ref 22–26)
HCT VFR BLD AUTO: 30.3 % (ref 35–47)
HGB BLD-MCNC: 8.4 G/DL (ref 11.5–16)
HGB UR QL STRIP: ABNORMAL
IMM GRANULOCYTES # BLD AUTO: 0.1 K/UL (ref 0–0.04)
IMM GRANULOCYTES NFR BLD AUTO: 1 % (ref 0–0.5)
INSPIRATION.DURATION SETTING TIME VENT: 10 SEC
K OXYTOCA DNA BLD POS QL NAA+NON-PROBE: NOT DETECTED
KETONES UR QL STRIP.AUTO: NEGATIVE MG/DL
KLEBSIELLA SP DNA BLD POS QL NAA+NON-PRB: NOT DETECTED
KLEBSIELLA SP DNA BLD POS QL NAA+NON-PRB: NOT DETECTED
L MONOCYTOG DNA BLD POS QL NAA+NON-PROBE: NOT DETECTED
LEUKOCYTE ESTERASE UR QL STRIP.AUTO: ABNORMAL
LYMPHOCYTES # BLD: 1.5 K/UL (ref 0.8–3.5)
LYMPHOCYTES NFR BLD: 10 % (ref 12–49)
MAGNESIUM SERPL-MCNC: 1.9 MG/DL (ref 1.6–2.4)
MCH RBC QN AUTO: 26.8 PG (ref 26–34)
MCHC RBC AUTO-ENTMCNC: 27.7 G/DL (ref 30–36.5)
MCV RBC AUTO: 96.8 FL (ref 80–99)
MECA+MECC ISLT/SPM QL: DETECTED
MONOCYTES # BLD: 0.7 K/UL (ref 0–1)
MONOCYTES NFR BLD: 5 % (ref 5–13)
N MEN DNA BLD POS QL NAA+NON-PROBE: NOT DETECTED
NEUTS SEG # BLD: 12.3 K/UL (ref 1.8–8)
NEUTS SEG NFR BLD: 84 % (ref 32–75)
NITRITE UR QL STRIP.AUTO: NEGATIVE
NRBC # BLD: 0 K/UL (ref 0–0.01)
NRBC BLD-RTO: 0 PER 100 WBC
O2/TOTAL GAS SETTING VFR VENT: 30 %
O2/TOTAL GAS SETTING VFR VENT: 40 %
P AERUGINOSA DNA BLD POS NAA+NON-PROBE: NOT DETECTED
PCO2 BLD: 49.9 MMHG (ref 35–45)
PCO2 BLD: 50 MMHG (ref 35–45)
PEEP RESPIRATORY: 10 CMH2O
PEEP RESPIRATORY: 10 CMH2O
PH BLD: 7.24 (ref 7.35–7.45)
PH BLD: 7.28 (ref 7.35–7.45)
PH UR STRIP: 7.5 (ref 5–8)
PHOSPHATE SERPL-MCNC: 4.9 MG/DL (ref 2.6–4.7)
PLATELET # BLD AUTO: 208 K/UL (ref 150–400)
PMV BLD AUTO: 10.1 FL (ref 8.9–12.9)
PO2 BLD: 162 MMHG (ref 80–100)
PO2 BLD: 80 MMHG (ref 80–100)
POTASSIUM SERPL-SCNC: 5.1 MMOL/L (ref 3.5–5.1)
POTASSIUM SERPL-SCNC: 5.2 MMOL/L (ref 3.5–5.1)
POTASSIUM SERPL-SCNC: 5.4 MMOL/L (ref 3.5–5.1)
POTASSIUM SERPL-SCNC: 5.5 MMOL/L (ref 3.5–5.1)
POTASSIUM SERPL-SCNC: ABNORMAL MMOL/L (ref 3.5–5.1)
PROCALCITONIN SERPL-MCNC: 0.62 NG/ML
PROT SERPL-MCNC: 6.9 G/DL (ref 6.4–8.2)
PROT SERPL-MCNC: 7 G/DL (ref 6.4–8.2)
PROT UR STRIP-MCNC: 100 MG/DL
PROTEUS SP DNA BLD POS QL NAA+NON-PROBE: NOT DETECTED
RBC # BLD AUTO: 3.13 M/UL (ref 3.8–5.2)
RBC #/AREA URNS HPF: ABNORMAL /HPF (ref 0–5)
RBC MORPH BLD: ABNORMAL
RBC MORPH BLD: ABNORMAL
RESISTANT GENE TARGETS: ABNORMAL
S AUREUS DNA BLD POS QL NAA+NON-PROBE: NOT DETECTED
S AUREUS+CONS DNA BLD POS NAA+NON-PROBE: DETECTED
S EPIDERMIDIS DNA BLD POS QL NAA+NON-PRB: DETECTED
S LUGDUNENSIS DNA BLD POS QL NAA+NON-PRB: NOT DETECTED
S MALTOPHILIA DNA BLD POS QL NAA+NON-PRB: NOT DETECTED
S MARCESCENS DNA BLD POS NAA+NON-PROBE: NOT DETECTED
S PNEUM DNA BLD POS QL NAA+NON-PROBE: NOT DETECTED
S PYO DNA BLD POS QL NAA+NON-PROBE: NOT DETECTED
SALMONELLA DNA BLD POS QL NAA+NON-PROBE: NOT DETECTED
SAO2 % BLD: 94 % (ref 92–97)
SAO2 % BLD: 99.1 % (ref 92–97)
SERVICE CMNT-IMP: ABNORMAL
SODIUM SERPL-SCNC: 140 MMOL/L (ref 136–145)
SODIUM SERPL-SCNC: 140 MMOL/L (ref 136–145)
SODIUM SERPL-SCNC: 141 MMOL/L (ref 136–145)
SODIUM SERPL-SCNC: 142 MMOL/L (ref 136–145)
SODIUM SERPL-SCNC: 143 MMOL/L (ref 136–145)
SP GR UR REFRACTOMETRY: 1.01 (ref 1–1.03)
SPECIMEN TYPE: ABNORMAL
SPECIMEN TYPE: ABNORMAL
STREPTOCOCCUS DNA BLD POS NAA+NON-PROBE: NOT DETECTED
T3FREE SERPL-MCNC: 1.1 PG/ML (ref 2.2–4)
T4 FREE SERPL-MCNC: 1.1 NG/DL (ref 0.8–1.5)
TROPONIN I SERPL HS-MCNC: 135 NG/L (ref 0–51)
TSH SERPL DL<=0.05 MIU/L-ACNC: 0.11 UIU/ML (ref 0.36–3.74)
UROBILINOGEN UR QL STRIP.AUTO: 0.2 EU/DL (ref 0.2–1)
VENTILATION MODE VENT: ABNORMAL
VENTILATION MODE VENT: ABNORMAL
VT SETTING VENT: 450 ML
VT SETTING VENT: 500 ML
WBC # BLD AUTO: 14.6 K/UL (ref 3.6–11)
WBC URNS QL MICRO: >100 /HPF (ref 0–4)

## 2023-12-21 PROCEDURE — 82550 ASSAY OF CK (CPK): CPT

## 2023-12-21 PROCEDURE — 84481 FREE ASSAY (FT-3): CPT

## 2023-12-21 PROCEDURE — 6370000000 HC RX 637 (ALT 250 FOR IP): Performed by: HOSPITALIST

## 2023-12-21 PROCEDURE — 6360000002 HC RX W HCPCS: Performed by: NURSE PRACTITIONER

## 2023-12-21 PROCEDURE — 6370000000 HC RX 637 (ALT 250 FOR IP): Performed by: NURSE PRACTITIONER

## 2023-12-21 PROCEDURE — 2580000003 HC RX 258: Performed by: HOSPITALIST

## 2023-12-21 PROCEDURE — 2580000003 HC RX 258: Performed by: NURSE PRACTITIONER

## 2023-12-21 PROCEDURE — A9540 TC99M MAA: HCPCS | Performed by: HOSPITALIST

## 2023-12-21 PROCEDURE — 36415 COLL VENOUS BLD VENIPUNCTURE: CPT

## 2023-12-21 PROCEDURE — 84484 ASSAY OF TROPONIN QUANT: CPT

## 2023-12-21 PROCEDURE — 93306 TTE W/DOPPLER COMPLETE: CPT | Performed by: SPECIALIST

## 2023-12-21 PROCEDURE — 85025 COMPLETE CBC W/AUTO DIFF WBC: CPT

## 2023-12-21 PROCEDURE — 2700000000 HC OXYGEN THERAPY PER DAY

## 2023-12-21 PROCEDURE — 82803 BLOOD GASES ANY COMBINATION: CPT

## 2023-12-21 PROCEDURE — 82962 GLUCOSE BLOOD TEST: CPT

## 2023-12-21 PROCEDURE — 93970 EXTREMITY STUDY: CPT

## 2023-12-21 PROCEDURE — 73560 X-RAY EXAM OF KNEE 1 OR 2: CPT

## 2023-12-21 PROCEDURE — 83735 ASSAY OF MAGNESIUM: CPT

## 2023-12-21 PROCEDURE — 93306 TTE W/DOPPLER COMPLETE: CPT

## 2023-12-21 PROCEDURE — 80053 COMPREHEN METABOLIC PANEL: CPT

## 2023-12-21 PROCEDURE — 94640 AIRWAY INHALATION TREATMENT: CPT

## 2023-12-21 PROCEDURE — 80048 BASIC METABOLIC PNL TOTAL CA: CPT

## 2023-12-21 PROCEDURE — 78580 LUNG PERFUSION IMAGING: CPT

## 2023-12-21 PROCEDURE — 3430000000 HC RX DIAGNOSTIC RADIOPHARMACEUTICAL: Performed by: HOSPITALIST

## 2023-12-21 PROCEDURE — 2000000000 HC ICU R&B

## 2023-12-21 PROCEDURE — 94660 CPAP INITIATION&MGMT: CPT

## 2023-12-21 PROCEDURE — 76770 US EXAM ABDO BACK WALL COMP: CPT

## 2023-12-21 PROCEDURE — 81001 URINALYSIS AUTO W/SCOPE: CPT

## 2023-12-21 PROCEDURE — 84100 ASSAY OF PHOSPHORUS: CPT

## 2023-12-21 PROCEDURE — 36600 WITHDRAWAL OF ARTERIAL BLOOD: CPT

## 2023-12-21 PROCEDURE — 84443 ASSAY THYROID STIM HORMONE: CPT

## 2023-12-21 PROCEDURE — 2500000003 HC RX 250 WO HCPCS: Performed by: NURSE PRACTITIONER

## 2023-12-21 PROCEDURE — 6360000002 HC RX W HCPCS: Performed by: HOSPITALIST

## 2023-12-21 PROCEDURE — 84439 ASSAY OF FREE THYROXINE: CPT

## 2023-12-21 PROCEDURE — 84145 PROCALCITONIN (PCT): CPT

## 2023-12-21 RX ORDER — 0.9 % SODIUM CHLORIDE 0.9 %
500 INTRAVENOUS SOLUTION INTRAVENOUS ONCE
Status: COMPLETED | OUTPATIENT
Start: 2023-12-21 | End: 2023-12-21

## 2023-12-21 RX ORDER — DEXTROSE MONOHYDRATE 100 MG/ML
INJECTION, SOLUTION INTRAVENOUS CONTINUOUS PRN
Status: DISCONTINUED | OUTPATIENT
Start: 2023-12-21 | End: 2023-12-29 | Stop reason: HOSPADM

## 2023-12-21 RX ORDER — CALCIUM GLUCONATE 94 MG/ML
1000 INJECTION, SOLUTION INTRAVENOUS ONCE
Status: COMPLETED | OUTPATIENT
Start: 2023-12-21 | End: 2023-12-21

## 2023-12-21 RX ORDER — DEXTROSE MONOHYDRATE 100 MG/ML
INJECTION, SOLUTION INTRAVENOUS CONTINUOUS PRN
Status: DISCONTINUED | OUTPATIENT
Start: 2023-12-21 | End: 2023-12-21 | Stop reason: SDUPTHER

## 2023-12-21 RX ORDER — ALBUTEROL SULFATE 2.5 MG/3ML
10 SOLUTION RESPIRATORY (INHALATION) ONCE
Status: COMPLETED | OUTPATIENT
Start: 2023-12-21 | End: 2023-12-21

## 2023-12-21 RX ORDER — NOREPINEPHRINE BITARTRATE 0.06 MG/ML
.5-2 INJECTION, SOLUTION INTRAVENOUS CONTINUOUS
Status: DISCONTINUED | OUTPATIENT
Start: 2023-12-21 | End: 2023-12-21

## 2023-12-21 RX ADMIN — SODIUM ZIRCONIUM CYCLOSILICATE 10 G: 10 POWDER, FOR SUSPENSION ORAL at 13:56

## 2023-12-21 RX ADMIN — KIT FOR THE PREPARATION OF TECHNETIUM TC 99M ALBUMIN AGGREGATED 4.4 MILLICURIE: 2.5 INJECTION, POWDER, FOR SOLUTION INTRAVENOUS at 10:26

## 2023-12-21 RX ADMIN — SODIUM BICARBONATE 50 MEQ: 84 INJECTION, SOLUTION INTRAVENOUS at 02:36

## 2023-12-21 RX ADMIN — INSULIN HUMAN 10 UNITS: 100 INJECTION, SOLUTION PARENTERAL at 06:11

## 2023-12-21 RX ADMIN — INSULIN HUMAN 10 UNITS: 100 INJECTION, SOLUTION PARENTERAL at 02:34

## 2023-12-21 RX ADMIN — HEPARIN SODIUM 5000 UNITS: 5000 INJECTION INTRAVENOUS; SUBCUTANEOUS at 22:09

## 2023-12-21 RX ADMIN — HYDROMORPHONE HYDROCHLORIDE 0.25 MG: 1 INJECTION, SOLUTION INTRAMUSCULAR; INTRAVENOUS; SUBCUTANEOUS at 03:53

## 2023-12-21 RX ADMIN — METHYLPREDNISOLONE SODIUM SUCCINATE 40 MG: 40 INJECTION, POWDER, FOR SOLUTION INTRAMUSCULAR; INTRAVENOUS at 08:10

## 2023-12-21 RX ADMIN — PANTOPRAZOLE SODIUM 40 MG: 40 TABLET, DELAYED RELEASE ORAL at 08:09

## 2023-12-21 RX ADMIN — METHYLPREDNISOLONE SODIUM SUCCINATE 40 MG: 40 INJECTION, POWDER, FOR SOLUTION INTRAMUSCULAR; INTRAVENOUS at 15:32

## 2023-12-21 RX ADMIN — Medication 10 ML: at 08:10

## 2023-12-21 RX ADMIN — ALBUTEROL SULFATE 10 MG: 2.5 SOLUTION RESPIRATORY (INHALATION) at 06:04

## 2023-12-21 RX ADMIN — SODIUM ZIRCONIUM CYCLOSILICATE 10 G: 10 POWDER, FOR SUSPENSION ORAL at 22:49

## 2023-12-21 RX ADMIN — SODIUM CHLORIDE 500 ML: 9 INJECTION, SOLUTION INTRAVENOUS at 00:24

## 2023-12-21 RX ADMIN — AZITHROMYCIN MONOHYDRATE 500 MG: 500 INJECTION, POWDER, LYOPHILIZED, FOR SOLUTION INTRAVENOUS at 15:33

## 2023-12-21 RX ADMIN — HEPARIN SODIUM 5000 UNITS: 5000 INJECTION INTRAVENOUS; SUBCUTANEOUS at 06:18

## 2023-12-21 RX ADMIN — SODIUM ZIRCONIUM CYCLOSILICATE 10 G: 10 POWDER, FOR SUSPENSION ORAL at 10:49

## 2023-12-21 RX ADMIN — IPRATROPIUM BROMIDE AND ALBUTEROL SULFATE 1 DOSE: 2.5; .5 SOLUTION RESPIRATORY (INHALATION) at 08:01

## 2023-12-21 RX ADMIN — TRAMADOL HYDROCHLORIDE 50 MG: 50 TABLET, COATED ORAL at 10:49

## 2023-12-21 RX ADMIN — IPRATROPIUM BROMIDE AND ALBUTEROL SULFATE 1 DOSE: 2.5; .5 SOLUTION RESPIRATORY (INHALATION) at 15:57

## 2023-12-21 RX ADMIN — METHYLPREDNISOLONE SODIUM SUCCINATE 40 MG: 40 INJECTION, POWDER, FOR SOLUTION INTRAMUSCULAR; INTRAVENOUS at 00:13

## 2023-12-21 RX ADMIN — CALCIUM GLUCONATE 1000 MG: 98 INJECTION, SOLUTION INTRAVENOUS at 06:26

## 2023-12-21 RX ADMIN — IPRATROPIUM BROMIDE AND ALBUTEROL SULFATE 1 DOSE: 2.5; .5 SOLUTION RESPIRATORY (INHALATION) at 02:46

## 2023-12-21 RX ADMIN — DEXTROSE MONOHYDRATE 250 ML: 100 INJECTION, SOLUTION INTRAVENOUS at 02:10

## 2023-12-21 RX ADMIN — SODIUM ZIRCONIUM CYCLOSILICATE 10 G: 10 POWDER, FOR SUSPENSION ORAL at 02:39

## 2023-12-21 RX ADMIN — WATER 1000 MG: 1 INJECTION INTRAMUSCULAR; INTRAVENOUS; SUBCUTANEOUS at 15:32

## 2023-12-21 RX ADMIN — HYDROMORPHONE HYDROCHLORIDE 0.25 MG: 1 INJECTION, SOLUTION INTRAMUSCULAR; INTRAVENOUS; SUBCUTANEOUS at 13:56

## 2023-12-21 RX ADMIN — IPRATROPIUM BROMIDE AND ALBUTEROL SULFATE 1 DOSE: 2.5; .5 SOLUTION RESPIRATORY (INHALATION) at 19:46

## 2023-12-21 RX ADMIN — Medication 10 ML: at 22:12

## 2023-12-21 RX ADMIN — HEPARIN SODIUM 5000 UNITS: 5000 INJECTION INTRAVENOUS; SUBCUTANEOUS at 13:56

## 2023-12-21 RX ADMIN — DEXTROSE MONOHYDRATE 250 ML: 100 INJECTION, SOLUTION INTRAVENOUS at 06:10

## 2023-12-21 RX ADMIN — HYDROMORPHONE HYDROCHLORIDE 0.25 MG: 1 INJECTION, SOLUTION INTRAMUSCULAR; INTRAVENOUS; SUBCUTANEOUS at 08:10

## 2023-12-21 RX ADMIN — INSULIN LISPRO 2 UNITS: 100 INJECTION, SOLUTION INTRAVENOUS; SUBCUTANEOUS at 08:10

## 2023-12-21 RX ADMIN — Medication 2500 MG: at 04:43

## 2023-12-21 RX ADMIN — SODIUM BICARBONATE 50 MEQ: 84 INJECTION, SOLUTION INTRAVENOUS at 06:12

## 2023-12-21 RX ADMIN — NOREPINEPHRINE BITARTRATE 4 MCG/MIN: 1 INJECTION, SOLUTION, CONCENTRATE INTRAVENOUS at 05:15

## 2023-12-21 RX ADMIN — METHIMAZOLE 10 MG: 5 TABLET ORAL at 08:10

## 2023-12-21 ASSESSMENT — PAIN DESCRIPTION - ORIENTATION
ORIENTATION: LEFT
ORIENTATION: RIGHT;LEFT

## 2023-12-21 ASSESSMENT — PAIN SCALES - GENERAL
PAINLEVEL_OUTOF10: 7
PAINLEVEL_OUTOF10: 2
PAINLEVEL_OUTOF10: 4
PAINLEVEL_OUTOF10: 0
PAINLEVEL_OUTOF10: 3

## 2023-12-21 ASSESSMENT — PAIN DESCRIPTION - LOCATION
LOCATION: KNEE
LOCATION: KNEE
LOCATION: KNEE;HIP

## 2023-12-21 ASSESSMENT — PAIN DESCRIPTION - DESCRIPTORS
DESCRIPTORS: ACHING;SORE
DESCRIPTORS: ACHING;SORE

## 2023-12-21 NOTE — CONSULTS
Name: Hector Lyons   : 1960   MRN: [id number]   Date: 2023      REASON FOR ICU ADMISSION:  Hypercapnic respiratory failure     PRINCIPLE ICU DIAGNOSIS   Acute hypercapnic and hypoxic respiratory failure on NIPPV  Hypotension 2' to sepsis   Sepsis: + Coronavirus OC43 by PCR va +/- bacterial pneumonia  CECI on CKD3b  Hyperkalemia  Diabetic neuropathy  DM2  Thyroid disease  Morbid obesity     NO BLOOD REQUESTED ON CHART. PATIENT SUMMARY   Hector Lyons is a 59 y.o. female with a history of DM2, CKD, HTN, Morbid obesity, peripheral neuropathy, and thyroid disease who presented to the ED via EMS with complains of short ness of breath, fever, and cough that began a few day ago and has gotten progressively worse. On EMS arrival sats were 71 on room air. She was placed on bi-pap after arrival to ED. ABG on NC indicated hypercapnic and hypoxic respiratory failure. Code sepsis called. Blood cultures were drawn and respiratory viral panel was sent. She was found to be positive or Coronavirus OC43 by PCR     COMPREHENSIVE ASSESSMENT & PLAN:SYSTEM BASED     24 HOUR EVENTS: As above    NEUROLOGICAL:   Anxiety   Analgesia: Tylenol / PRN Dilaudid   Sedation: Precedex PRN  Neuro check Frequency: per unit protocol      PULMONOLOGY:   Acute hypercapnic and hypoxic respiratory failure on NIPPV  Respiratory Goals: Continue on NIPPV wean FiO2 as tolerated  Repeat ABG and may need to adjust settings  High risk for intubation   VQ scan was ordered. Will get PVL's and Echo. Oxygenation has improved on NIPPV  On Solumedrol   Duo-nebs q 6 hrs    CARDIOVASCULAR: Hx HTN   Recent NSTEMI 2 wks ago   Hypotension   SBP Goal of: greater than 90 mmHg  MAP Goal of: greater than 65 mmHg  None - For above SBP/MAP goals  IVF: None  Echo    GASTROINTESTINAL   Diet/Feeding: NPO x meds with sips of clears while on Bi-pap.    SUP : Protonix    RENAL/ELECTROLYTE/FLUIDS:   CECI on CKD  Keep K>4; Mg>2    Trend renal indices/ UOP  Trend to treat or prevent imminent deterioration.    I personally spent 40 minutes of critical care time.  This is time spent at this critically ill patient's bedside actively involved in patient care as well as the coordination of care.  This does not include any procedural time which has been billed separately.    Deniz Harper, NP-C    Critical Care Medicine  [facility]

## 2023-12-21 NOTE — PROGRESS NOTES
4 Eyes Skin Assessment     NAME:  Fifi Bliss  YOB: 1960  MEDICAL RECORD NUMBER:  [id number]    The patient is being assessed for  Admission    I agree that at least one RN has performed a thorough Head to Toe Skin Assessment on the patient. ALL assessment sites listed below have been assessed. Areas assessed by both nurses:    Head, Face, Ears, Shoulders, Back, Chest, Arms, Elbows, Hands, Sacrum. Buttock, Coccyx, Ischium, and Legs. Feet and Heels        Does the Patient have a Wound? Yes wound(s) were present on assessment.  LDA wound assessment was Initiated and completed by RN       Timmy Prevention initiated by RN: Yes  Wound Care Orders initiated by RN: Yes    Pressure Injury (Stage 3,4, Unstageable, DTI, NWPT, and Complex wounds) if present, place Wound referral order by RN under : No    New Ostomies, if present place, Ostomy referral order under : No     Nurse 1 eSignature: Electronically signed by Manuel Starr RN on 12/21/23 at 1:05 AM EST    **SHARE this note so that the co-signing nurse can place an eSignature**    Nurse 2 eSignature: Electronically signed by Luis Zhou RN on 12/21/23 at 3:09 AM EST

## 2023-12-21 NOTE — WOUND CARE
WOCN Note:     New consult placed for assessment of sacrum. Chart reviewed. Assessed in 7102/01. Garret Escobedo is a 59 y.o. y/o female who presented for Septicemia   Hypoxia   Elevated troponin   Sepsis   Admitted on 12/20/2023    Past Medical History:   Diagnosis Date    Chronic kidney disease     Chronic obstructive pulmonary disease (HCC)     Diabetes ([address])     Hernia, hiatal     Hypertension     Incontinence of urine     Kidney stones     both kidneys    Morbid obesity (HCC)     Osteoarthritis     Peripheral neuropathy     DOES GET SOME NUMBNESS IN HANDS    Psychiatric disorder     ANXIETY    Renal failure     left kidney    Thyroid disease      Lab Results   Component Value Date/Time    WBC 14.6 (H) 12/21/2023 12:30 AM    LABA1C 7.4 (H) 05/11/2023 10:35 PM    POCGLU 210 (H) 12/21/2023 07:59 AM    POCGLU 237 (H) 12/21/2023 07:04 AM    HGB 8.4 (L) 12/21/2023 12:30 AM    HCT 30.3 (L) 12/21/2023 12:30 AM     12/21/2023 12:30 AM       Diet NPO Exceptions are: Sips of Water with Meds, Sips of Clear Liquids     Admitted from home    Assessment:   Patient is A&O x 3, communicative and requires assist with repositioning. Bed: low air loss; bariatric low air loss ordered confirm # [id number]  GI/: merida  Patient reports leg pain. RN aware. Patient repositioned on left side with pillow. Heels offloaded with pillows. Heels intact without erythema. Wound Assessment    POA Buttocks, MASD:  Scattered 100% open red wound beds; no drainage or erythema. TX:  Cleansed and applied Zinc cream.    Wound, Pressure Prevention & Skin Care Recommendations:    Minimize layers of linen/pads under patient to optimize support surface. 2.  Turn/reposition approximately every 2 hours and offload heels. 3.  Manage moisture/ Keep skin folds clean and dry/minimize brief usage. 4.  Specialty bed: Bariatric low air loss.  Use only flat sheet and one incontinence pad.  5.  Sacrum and buttocks:  Every 12 hours and prn

## 2023-12-21 NOTE — CONSULTS
Seen and examined  Thanks for the consult  A/P:  CKD-4  CECI,so far suspect septic ATN but need to ro obstruction  Chronic B/L ureteral stents  hyperkalemia  UTI  Recent COVID infection    K better  Avoid LR  Renal U/S  Yeung  AB  Hope to avoid dialysis  Discussed with her and ICU RN

## 2023-12-21 NOTE — FLOWSHEET NOTE
2217: TRANSFER - IN REPORT:    Verbal report received from 1008 Emily Alvarez RN on Brian Ceja  being received from ED for urgent transfer      Report consisted of patient's Situation, Background, Assessment and   Recommendations(SBAR). Information from the following report(s) Nurse Handoff Report, ED SBAR, Adult Overview, and Cardiac Rhythm sinus rhythm  was reviewed with the receiving nurse. Opportunity for questions and clarification was provided. Assessment completed upon patient's arrival to unit and care assumed. 0005: Pt BP 80/44, precedex stopped. CHRISTOPHER Guaman notified; order received to administer 500cc bolus of normal saline.

## 2023-12-21 NOTE — PROGRESS NOTES
Name: Dolores Pierre   : 1960   MRN: [id number]   Date: 2023      REASON FOR ICU ADMISSION:  Hypercapnic respiratory failure     PRINCIPLE ICU DIAGNOSIS   Acute hypercapnic and hypoxic respiratory failure on NIPPV  Hypotension 2' to sepsis   Sepsis: + Coronavirus OC43 by PCR va +/- bacterial pneumonia  CECI on CKD3b  Hyperkalemia  Diabetic neuropathy  DM2  Thyroid disease  Morbid obesity     NO BLOOD REQUESTED ON CHART.    PATIENT SUMMARY   Dolores Pierre is a 63 y.o. female with a history of DM2, CKD, HTN, Morbid obesity, peripheral neuropathy, and thyroid disease who presented to the ED via EMS with complains of short ness of breath, fever, and cough that began a few day ago and has gotten progressively worse. On EMS arrival sats were 71 on room air. She was placed on bi-pap after arrival to ED. ABG on NC indicated hypercapnic and hypoxic respiratory failure. Code sepsis called. Blood cultures were drawn and respiratory viral panel was sent. She was found to be positive or Coronavirus OC43 by PCR    23-  There were no acute changes overnight per intensivist and nursing staff. Patient remains on BiPAP AVAPS setting with reasonable tolerance. Nephrology is consulted and will welcome their recommendations.      COMPREHENSIVE ASSESSMENT & PLAN:SYSTEM BASED     24 HOUR EVENTS: As above    NEUROLOGICAL:   Anxiety   Analgesia: Tylenol / PRN Dilaudid   Sedation: Precedex PRN  Neuro check Frequency: per unit protocol      PULMONOLOGY:   Acute hypercapnic and hypoxic respiratory failure on NIPPV  Respiratory Goals: Continue on NIPPV wean FiO2 as tolerated  Repeat ABG and may need to adjust settings  High risk for intubation   VQ scan was ordered. Will get PVL's and Echo.  Oxygenation has improved on NIPPV  On Solumedrol   Duo-nebs q 6 hrs    CARDIOVASCULAR: Hx HTN   Recent NSTEMI 2 wks ago   Hypotension   SBP Goal of: greater than 90 mmHg  MAP Goal of: greater than 65 mmHg  None - For above SBP/MAP  goals  IVF: None  Echo    GASTROINTESTINAL   Diet/Feeding: NPO x meds with sips of clears while on Bi-pap. SUP : Protonix    RENAL/ELECTROLYTE/FLUIDS:   CECI on CKD  Keep K>4; Mg>2    Lokelma  Trend renal indices/ UOP  Trend Chemistry  Repeat BMP now- may need  hyperkalemia treatment vs dialysis if worsens. Consult nephrology; welcome recommendations    ENDOCRINE:   DM2  Thyroid disease  Glycemic Control: Goal 120-180: SSI PRN  Prevent hypoglycemia  Check TSH, T4, FT3  Continue methimazole    HEMATOLOGY/ONCOLOGY:   Transfusion Trigger (Hgb): 7.0  Trend CBC    INFECTIOUS DISEASE:   Sepsis: + Coronavirus OC43 by PCR va +/- bacterial pneumonia  Check procalcitonin  UA - may straight cath if needed. ANTIBIOTICS TO DATE:   12/20 - Ceftriaxone and Azithromycin for CAP coverage. CULTURES TO DATE:   12/20 - Blood cx paired.    12/20 - Send UA     ICU DAILY CHECKLIST   Code Status:Full code  DVT Prophylaxis:Heparin SC  T/L/D: Tubes: None  Lines: PIVs  Drains: None  SUP: Protonix  Diet: NPOx meds/sips of clears  Activity Level:Bedrest  ABCDEF Bundle/Checklist Completed: Yes  Disposition: Stay in ICU   Multidisciplinary Rounds Completed: pending  Patient/Family Updated: Patient updated  Goals of Care Discussion/Palliative: Ksenia Alvarez   12/20 - admit to ICU for sepsis/+ Coronavirus OC43    SUBJECTIVE   ROS:  A comprehensive review of systems was negative except for: Constitutional: positive for fevers  Respiratory: positive for cough and shortness of breath  Musculoskeletal: positive for myalgias    OBJECTIVE   Labs and Data: Reviewed 12/21/23  Medications: Reviewed 12/21/23  Imaging: Reviewed 12/21/23    Physical Exam:  General appearance - oriented to person, place, and time and in mild to moderate distress  Mental status - anxious, agitated  Eyes - pupils equal and reactive, extraocular eye movements intact, sclera anicteric  Mouth - mucous membranes moist, pharynx normal without

## 2023-12-21 NOTE — CONSULTS
[id number] [facility]    Name:  Shanti Gonzalez  MR#:  [id number]  :  1960  ACCOUNT #:  [de-identified]  DATE OF SERVICE:  2023    REASON FOR CONSULTATION:  Seen for CECI. HISTORY OF PRESENT ILLNESS:  The patient has chronic kidney disease, fairly noncompliant, stage IV; history of bilateral ureteral stents; had upper airway infection. Came to the emergency room feeling weak, sick, short of breath. Got some DuoNeb, saturating better, and baseline creatinine, mid to high 2s at baseline, and we called to see her for CECI, hyperkalemia, potassium already better. She has a PureWick. She has been incontinent before, not sure exactly what her urine output is. PAST MEDICAL HISTORY:  1.  CKD stage IV. 2.  Hypertension. 3.  Dyslipidemia. 4.  Hypothyroidism. 5.  Recurrent ureteral stents. 6.  Anemia. 7.  Weakness. 8.  Shortness of breath. 9.  Recent COVID infection. FAMILY HISTORY:  Reviewed. SOCIAL HISTORY:  Reviewed. REVIEW OF SYSTEMS:  Look at the HPI, rest is negative. MEDICATIONS:  As inpatient:  1. Calcium gluconate. 2.  Rocephin. 3.  Zithromax. 4.  Valium. 5.  Got LR. 6.  Levophed. ALLERGIES:  NEOSPORIN, VASOPRESSIN, AMOXICILLIN, CIPROFLOXACIN. PHYSICAL EXAMINATION:  VITAL SIGNS:  BP as low as 72/61, now it is 113/55. Urine output not recorded. On BiPAP. ABDOMEN:  Soft. EXTREMITIES:  Trace edema. LABORATORY DATA:  Sodium 140, potassium 5.1, BUN 53, creatinine 3.6, calcium 7.9, phos 4.9, anion gap is 2, albumin 2.7. Hemoglobin 8.4, WBC 14.6, platelets 828. IMPRESSION:  1. Chronic kidney disease stage IV. 2.  Acute kidney injury in the setting of hypotension, sepsis, suspect septic acute tubular necrosis, but need to rule out obstruction. 3.  Chronic bilateral ureteral stents. 4.  Recent COVID infection and suspicion of pneumonia now. 5.  Hyperkalemia, better. 6.  Anemia. RECOMMENDATIONS:  1. Avoid LR.   2.  Monitor electrolytes

## 2023-12-22 LAB
ANION GAP SERPL CALC-SCNC: 2 MMOL/L (ref 5–15)
BACTERIA SPEC CULT: ABNORMAL
BASOPHILS # BLD: 0 K/UL (ref 0–0.1)
BASOPHILS NFR BLD: 0 % (ref 0–1)
BUN SERPL-MCNC: 59 MG/DL (ref 6–20)
BUN/CREAT SERPL: 15 (ref 12–20)
CALCIUM SERPL-MCNC: 8.3 MG/DL (ref 8.5–10.1)
CHLORIDE SERPL-SCNC: 112 MMOL/L (ref 97–108)
CO2 SERPL-SCNC: 26 MMOL/L (ref 21–32)
CREAT SERPL-MCNC: 4.02 MG/DL (ref 0.55–1.02)
DIFFERENTIAL METHOD BLD: ABNORMAL
EOSINOPHIL # BLD: 0 K/UL (ref 0–0.4)
EOSINOPHIL NFR BLD: 0 % (ref 0–7)
ERYTHROCYTE [DISTWIDTH] IN BLOOD BY AUTOMATED COUNT: 15.1 % (ref 11.5–14.5)
GLUCOSE BLD STRIP.AUTO-MCNC: 159 MG/DL (ref 65–117)
GLUCOSE BLD STRIP.AUTO-MCNC: 164 MG/DL (ref 65–117)
GLUCOSE BLD STRIP.AUTO-MCNC: 167 MG/DL (ref 65–117)
GLUCOSE BLD STRIP.AUTO-MCNC: 201 MG/DL (ref 65–117)
GLUCOSE BLD STRIP.AUTO-MCNC: 234 MG/DL (ref 65–117)
GLUCOSE BLD STRIP.AUTO-MCNC: 274 MG/DL (ref 65–117)
GLUCOSE SERPL-MCNC: 174 MG/DL (ref 65–100)
HCT VFR BLD AUTO: 28.4 % (ref 35–47)
HGB BLD-MCNC: 8.1 G/DL (ref 11.5–16)
IMM GRANULOCYTES # BLD AUTO: 0.2 K/UL (ref 0–0.04)
IMM GRANULOCYTES NFR BLD AUTO: 1 % (ref 0–0.5)
LYMPHOCYTES # BLD: 0.8 K/UL (ref 0.8–3.5)
LYMPHOCYTES NFR BLD: 5 % (ref 12–49)
MAGNESIUM SERPL-MCNC: 2 MG/DL (ref 1.6–2.4)
MCH RBC QN AUTO: 26.3 PG (ref 26–34)
MCHC RBC AUTO-ENTMCNC: 28.5 G/DL (ref 30–36.5)
MCV RBC AUTO: 92.2 FL (ref 80–99)
MONOCYTES # BLD: 0.3 K/UL (ref 0–1)
MONOCYTES NFR BLD: 2 % (ref 5–13)
NEUTS SEG # BLD: 15.1 K/UL (ref 1.8–8)
NEUTS SEG NFR BLD: 92 % (ref 32–75)
NRBC # BLD: 0.03 K/UL (ref 0–0.01)
NRBC BLD-RTO: 0.2 PER 100 WBC
PHOSPHATE SERPL-MCNC: 4.7 MG/DL (ref 2.6–4.7)
PLATELET # BLD AUTO: 219 K/UL (ref 150–400)
PMV BLD AUTO: 10.3 FL (ref 8.9–12.9)
POTASSIUM SERPL-SCNC: 5 MMOL/L (ref 3.5–5.1)
RBC # BLD AUTO: 3.08 M/UL (ref 3.8–5.2)
RBC MORPH BLD: ABNORMAL
SERVICE CMNT-IMP: ABNORMAL
SODIUM SERPL-SCNC: 140 MMOL/L (ref 136–145)
VANCOMYCIN SERPL-MCNC: 21.2 UG/ML
WBC # BLD AUTO: 16.4 K/UL (ref 3.6–11)

## 2023-12-22 PROCEDURE — 6370000000 HC RX 637 (ALT 250 FOR IP): Performed by: HOSPITALIST

## 2023-12-22 PROCEDURE — 6360000002 HC RX W HCPCS: Performed by: NURSE PRACTITIONER

## 2023-12-22 PROCEDURE — 36415 COLL VENOUS BLD VENIPUNCTURE: CPT

## 2023-12-22 PROCEDURE — 6370000000 HC RX 637 (ALT 250 FOR IP): Performed by: NURSE PRACTITIONER

## 2023-12-22 PROCEDURE — 99221 1ST HOSP IP/OBS SF/LOW 40: CPT | Performed by: PHYSICIAN ASSISTANT

## 2023-12-22 PROCEDURE — 94660 CPAP INITIATION&MGMT: CPT

## 2023-12-22 PROCEDURE — 83735 ASSAY OF MAGNESIUM: CPT

## 2023-12-22 PROCEDURE — 94640 AIRWAY INHALATION TREATMENT: CPT

## 2023-12-22 PROCEDURE — 2060000000 HC ICU INTERMEDIATE R&B

## 2023-12-22 PROCEDURE — 2580000003 HC RX 258: Performed by: HOSPITALIST

## 2023-12-22 PROCEDURE — 80048 BASIC METABOLIC PNL TOTAL CA: CPT

## 2023-12-22 PROCEDURE — 84100 ASSAY OF PHOSPHORUS: CPT

## 2023-12-22 PROCEDURE — 87040 BLOOD CULTURE FOR BACTERIA: CPT

## 2023-12-22 PROCEDURE — 80202 ASSAY OF VANCOMYCIN: CPT

## 2023-12-22 PROCEDURE — 6360000002 HC RX W HCPCS: Performed by: HOSPITALIST

## 2023-12-22 PROCEDURE — 27503 TREATMENT OF THIGH FRACTURE: CPT | Performed by: STUDENT IN AN ORGANIZED HEALTH CARE EDUCATION/TRAINING PROGRAM

## 2023-12-22 PROCEDURE — 82962 GLUCOSE BLOOD TEST: CPT

## 2023-12-22 PROCEDURE — 85025 COMPLETE CBC W/AUTO DIFF WBC: CPT

## 2023-12-22 PROCEDURE — 2700000000 HC OXYGEN THERAPY PER DAY

## 2023-12-22 RX ORDER — INSULIN GLARGINE 100 [IU]/ML
20 INJECTION, SOLUTION SUBCUTANEOUS NIGHTLY
Status: DISCONTINUED | OUTPATIENT
Start: 2023-12-22 | End: 2023-12-29 | Stop reason: HOSPADM

## 2023-12-22 RX ORDER — LIDOCAINE 4 G/G
1 PATCH TOPICAL DAILY
Status: DISCONTINUED | OUTPATIENT
Start: 2023-12-22 | End: 2023-12-29 | Stop reason: HOSPADM

## 2023-12-22 RX ORDER — SODIUM POLYSTYRENE SULFONATE 15 G/60ML
30 SUSPENSION ORAL; RECTAL ONCE
Status: COMPLETED | OUTPATIENT
Start: 2023-12-22 | End: 2023-12-22

## 2023-12-22 RX ORDER — INSULIN LISPRO 100 [IU]/ML
0-8 INJECTION, SOLUTION INTRAVENOUS; SUBCUTANEOUS
Status: DISCONTINUED | OUTPATIENT
Start: 2023-12-22 | End: 2023-12-29 | Stop reason: HOSPADM

## 2023-12-22 RX ORDER — PREDNISONE 20 MG/1
20 TABLET ORAL DAILY
Status: COMPLETED | OUTPATIENT
Start: 2023-12-22 | End: 2023-12-25

## 2023-12-22 RX ORDER — INSULIN LISPRO 100 [IU]/ML
0-4 INJECTION, SOLUTION INTRAVENOUS; SUBCUTANEOUS NIGHTLY
Status: DISCONTINUED | OUTPATIENT
Start: 2023-12-22 | End: 2023-12-29 | Stop reason: HOSPADM

## 2023-12-22 RX ADMIN — HEPARIN SODIUM 5000 UNITS: 5000 INJECTION INTRAVENOUS; SUBCUTANEOUS at 21:30

## 2023-12-22 RX ADMIN — INSULIN GLARGINE 20 UNITS: 100 INJECTION, SOLUTION SUBCUTANEOUS at 08:49

## 2023-12-22 RX ADMIN — AZITHROMYCIN MONOHYDRATE 500 MG: 500 INJECTION, POWDER, LYOPHILIZED, FOR SOLUTION INTRAVENOUS at 16:19

## 2023-12-22 RX ADMIN — IPRATROPIUM BROMIDE AND ALBUTEROL SULFATE 1 DOSE: 2.5; .5 SOLUTION RESPIRATORY (INHALATION) at 15:19

## 2023-12-22 RX ADMIN — PANTOPRAZOLE SODIUM 40 MG: 40 TABLET, DELAYED RELEASE ORAL at 08:00

## 2023-12-22 RX ADMIN — SODIUM ZIRCONIUM CYCLOSILICATE 10 G: 10 POWDER, FOR SUSPENSION ORAL at 08:01

## 2023-12-22 RX ADMIN — WATER 1000 MG: 1 INJECTION INTRAMUSCULAR; INTRAVENOUS; SUBCUTANEOUS at 15:24

## 2023-12-22 RX ADMIN — IPRATROPIUM BROMIDE AND ALBUTEROL SULFATE 1 DOSE: 2.5; .5 SOLUTION RESPIRATORY (INHALATION) at 02:25

## 2023-12-22 RX ADMIN — PREDNISONE 20 MG: 20 TABLET ORAL at 12:31

## 2023-12-22 RX ADMIN — IPRATROPIUM BROMIDE AND ALBUTEROL SULFATE 1 DOSE: 2.5; .5 SOLUTION RESPIRATORY (INHALATION) at 07:45

## 2023-12-22 RX ADMIN — HEPARIN SODIUM 5000 UNITS: 5000 INJECTION INTRAVENOUS; SUBCUTANEOUS at 05:31

## 2023-12-22 RX ADMIN — Medication 10 ML: at 21:03

## 2023-12-22 RX ADMIN — METHIMAZOLE 10 MG: 5 TABLET ORAL at 08:00

## 2023-12-22 RX ADMIN — METHYLPREDNISOLONE SODIUM SUCCINATE 40 MG: 40 INJECTION, POWDER, FOR SOLUTION INTRAMUSCULAR; INTRAVENOUS at 00:11

## 2023-12-22 RX ADMIN — HYDROMORPHONE HYDROCHLORIDE 0.25 MG: 1 INJECTION, SOLUTION INTRAMUSCULAR; INTRAVENOUS; SUBCUTANEOUS at 20:56

## 2023-12-22 RX ADMIN — SODIUM ZIRCONIUM CYCLOSILICATE 10 G: 10 POWDER, FOR SUSPENSION ORAL at 14:19

## 2023-12-22 RX ADMIN — TRAMADOL HYDROCHLORIDE 50 MG: 50 TABLET, COATED ORAL at 15:24

## 2023-12-22 RX ADMIN — SODIUM POLYSTYRENE SULFONATE 30 G: 15 SUSPENSION ORAL; RECTAL at 03:36

## 2023-12-22 RX ADMIN — INSULIN LISPRO 2 UNITS: 100 INJECTION, SOLUTION INTRAVENOUS; SUBCUTANEOUS at 08:01

## 2023-12-22 RX ADMIN — HEPARIN SODIUM 5000 UNITS: 5000 INJECTION INTRAVENOUS; SUBCUTANEOUS at 14:19

## 2023-12-22 RX ADMIN — METHYLPREDNISOLONE SODIUM SUCCINATE 40 MG: 40 INJECTION, POWDER, FOR SOLUTION INTRAMUSCULAR; INTRAVENOUS at 08:00

## 2023-12-22 RX ADMIN — IPRATROPIUM BROMIDE AND ALBUTEROL SULFATE 1 DOSE: 2.5; .5 SOLUTION RESPIRATORY (INHALATION) at 19:09

## 2023-12-22 RX ADMIN — Medication 10 ML: at 08:01

## 2023-12-22 RX ADMIN — INSULIN LISPRO 2 UNITS: 100 INJECTION, SOLUTION INTRAVENOUS; SUBCUTANEOUS at 16:19

## 2023-12-22 ASSESSMENT — PAIN DESCRIPTION - LOCATION: LOCATION: LEG

## 2023-12-22 ASSESSMENT — PAIN SCALES - GENERAL
PAINLEVEL_OUTOF10: 4
PAINLEVEL_OUTOF10: 4
PAINLEVEL_OUTOF10: 5
PAINLEVEL_OUTOF10: 3

## 2023-12-22 ASSESSMENT — PAIN DESCRIPTION - ORIENTATION: ORIENTATION: LEFT

## 2023-12-22 NOTE — PROGRESS NOTES
Attempted to get patient transferred into different bed. Patient refused stating it would cause too much pain, pain medication offered to assist with transfer. Patient requested to not change beds unless it was absolutely necessary and that the bed would be more comfortable. Patient states she is comfortable now. Will continue to reposition when able and encourage patient to transfer beds.

## 2023-12-22 NOTE — CONSULTS
ORTHOPEDIC SURGERY CONSULT    Subjective:     Date of Consultation:  December 22, 2023    Tereza Peralta is a 59 y.o. female who is being seen for left knee pain. She had pain in both knees but the right has improved. She believes she initially hurt the knee earlier this year during a transfer from bed to chair during a previous hospitalization in April. The knee was then re-injured, again during a transfer, when she was admitted 12/20/23. She feels tightness and swelling from the left mid-thigh to the knee. She has pain when she tries to move the left leg. She has not been ambulatory since about 2019. She was admitted 2 days ago for hypoxia and sepsis. Initial blood cultures 12/20/23 positive for staph. Nasal swab positive for Coronavirus OC43. Xrays of each knee were done at the time of admission. She has advanced DJD bilaterally and \"age-indeterminate extra-articular transverse fracture of the distal femoral diaphysis\" on the left.  Orthopedic surgery has been consulted to evaluate the left knee     Patient Active Problem List    Diagnosis Date Noted    Sepsis ([address]) 12/20/2023    Hyperthyroidism 09/06/2022    Overactive bladder 09/06/2022    Kidney stones 09/06/2022    Chronic pain 09/06/2022    DM (diabetes mellitus) ([address]) 05/02/2022    Hydronephrosis 05/01/2022    Hematuria 07/17/2020     Family History   Problem Relation Age of Onset    Hypertension Sister     Diabetes Sister     Heart Attack Father     Heart Disease Father     Diabetes Father     Diabetes Mother     Cancer Mother     Anesth Problems Neg Hx     Heart Disease Brother     Diabetes Sister     Hypertension Sister     Diabetes Sister       Social History     Tobacco Use    Smoking status: Never    Smokeless tobacco: Never   Substance Use Topics    Alcohol use: Never     Past Medical History:   Diagnosis Date    Chronic kidney disease     Chronic obstructive pulmonary disease (HCC)     Diabetes ([address])     Hernia, hiatal     Hypertension Glucose 175 (H) 65 - 117 mg/dL    Performed by: KING Lauren    POCT Glucose    Collection Time: 12/22/23 12:10 AM   Result Value Ref Range    POC Glucose 159 (H) 65 - 117 mg/dL    Performed by: KING Lauren    POCT Glucose    Collection Time: 12/22/23  5:26 AM   Result Value Ref Range    POC Glucose 167 (H) 65 - 117 mg/dL    Performed by: KING Lauren    Basic Metabolic Panel    Collection Time: 12/22/23  5:28 AM   Result Value Ref Range    Sodium 140 136 - 145 mmol/L    Potassium 5.0 3.5 - 5.1 mmol/L    Chloride 112 (H) 97 - 108 mmol/L    CO2 26 21 - 32 mmol/L    Anion Gap 2 (L) 5 - 15 mmol/L    Glucose 174 (H) 65 - 100 mg/dL    BUN 59 (H) 6 - 20 MG/DL    Creatinine 4.02 (H) 0.55 - 1.02 MG/DL    Bun/Cre Ratio 15 12 - 20      Est, Glom Filt Rate 12 (L) >60 ml/min/1.73m2    Calcium 8.3 (L) 8.5 - 10.1 MG/DL   Magnesium    Collection Time: 12/22/23  5:28 AM   Result Value Ref Range    Magnesium 2.0 1.6 - 2.4 mg/dL   CBC with Auto Differential    Collection Time: 12/22/23  5:28 AM   Result Value Ref Range    WBC 16.4 (H) 3.6 - 11.0 K/uL    RBC 3.08 (L) 3.80 - 5.20 M/uL    Hemoglobin 8.1 (L) 11.5 - 16.0 g/dL    Hematocrit 28.4 (L) 35.0 - 47.0 %    MCV 92.2 80.0 - 99.0 FL    MCH 26.3 26.0 - 34.0 PG    MCHC 28.5 (L) 30.0 - 36.5 g/dL    RDW 15.1 (H) 11.5 - 14.5 %    Platelets 219 150 - 400 K/uL    MPV 10.3 8.9 - 12.9 FL    Nucleated RBCs 0.2 (H) 0  WBC    nRBC 0.03 (H) 0.00 - 0.01 K/uL    Neutrophils % 92 (H) 32 - 75 %    Lymphocytes % 5 (L) 12 - 49 %    Monocytes % 2 (L) 5 - 13 %    Eosinophils % 0 0 - 7 %    Basophils % 0 0 - 1 %    Immature Granulocytes 1 (H) 0.0 - 0.5 %    Neutrophils Absolute 15.1 (H) 1.8 - 8.0 K/UL    Lymphocytes Absolute 0.8 0.8 - 3.5 K/UL    Monocytes Absolute 0.3 0.0 - 1.0 K/UL    Eosinophils Absolute 0.0 0.0 - 0.4 K/UL    Basophils Absolute 0.0 0.0 - 0.1 K/UL    Absolute Immature Granulocyte 0.2 (H) 0.00 - 0.04 K/UL    Differential Type SMEAR SCANNED      RBC Comment ANISOCYTOSIS  1+

## 2023-12-22 NOTE — PROGRESS NOTES
Name: Tiffanie Adams   : 1960   MRN: [id number]   Date: 2023      REASON FOR ICU ADMISSION:  Hypercapnic respiratory failure     PRINCIPLE ICU DIAGNOSIS   Acute hypercapnic and hypoxic respiratory failure on NIPPV  Hypotension 2' to sepsis   Sepsis: + Coronavirus OC43 by PCR va +/- bacterial pneumonia  CECI on CKD3b  Hyperkalemia  Diabetic neuropathy  DM2  Thyroid disease  Morbid obesity     NO BLOOD REQUESTED ON CHART. PATIENT SUMMARY   Tiffanie Adams is a 59 y.o. female with a history of DM2, CKD, HTN, Morbid obesity, peripheral neuropathy, and thyroid disease who presented to the ED via EMS with complains of short ness of breath, fever, and cough that began a few day ago and has gotten progressively worse. On EMS arrival sats were 71 on room air. She was placed on bi-pap after arrival to ED. ABG on NC indicated hypercapnic and hypoxic respiratory failure. Code sepsis called. Blood cultures were drawn and respiratory viral panel was sent. She was found to be positive or Coronavirus OC43 by PCR    23-  There were no acute changes overnight per intensivist and nursing staff. Patient remains on BiPAP AVAPS setting with reasonable tolerance. Nephrology is consulted and will welcome their recommendations. 23-  There were no acute changes overnight per intensivist and nursing staff. BiPAP overnight and transition to nasal cannula. Orthopedic service consulted reference continued right knee pain with distal femur abnormality on plain film. Kidney function remains grossly stable. Patient may transition to hospitalist service.       COMPREHENSIVE ASSESSMENT & PLAN:SYSTEM BASED     24 HOUR EVENTS: As above    NEUROLOGICAL:   Anxiety   Analgesia: Tylenol / PRN Dilaudid   Sedation: Precedex PRN  Neuro check Frequency: per unit protocol      PULMONOLOGY:   Acute hypercapnic and hypoxic respiratory failure on NIPPV  Respiratory Goals: Continue on NIPPV QHS, NC during the day  High risk for participated in the decision-making and personally managed or directed the management of the following life and organ supporting interventions that required my frequent assessment to treat or prevent imminent deterioration.      Aureliano Orozco, APRN - NP    Critical Care Medicine  [facility]

## 2023-12-22 NOTE — CONSULTS
Pulmonary Medicine    Patient is currently in ICU being managed by Critical care team. Please reconsult as needed    Sara Marino MD

## 2023-12-22 NOTE — CARE COORDINATION
Care Management Initial Assessment       RUR: 17%  Readmission? No  1st IM letter given? Yes - 12/22/23  1st  letter given: No  Transport: BLS (Ramped entrance to home. Hallway to bedroom is narrow and stretcher does not fit. Patient typically transfer to wheelchair before going down [place]. If she is unable to transfer at discharge she would need assistance with this.)  Disposition:   Return to sister's home at [address] [place] [address], [place], [address]   Will need order for outpatient PT or other if recommended by therapy. PMH: DB Type 2, CKD, HTN, morbid obesity, thyroid disease,     Per chart review, patient admitted with sepsis. Patient with pneumonia, Coronavirus )C 437. Patient had sats of 71/% on arrival to ED. Patient placed on bi-pap. Patient now on nasal canula. Patient with right knee pain. Imaging showed distal femur abnormality. Consult to orthopedics pending. Patient busy with staff in room. Spoke with patient's sister on phone and then later met with patient in room. Patient lives with her sister Giovanni Durand ([phone]) and Bing's  in sister's 1 level home with a ramped entrance. Patient owns a hospital bed, wheelchair, and walker. Patient has been working with physical therapy at [facility] in [facility]. Patient said she had been progressing really well there. She had been able to stand, pivot transfer by herself with a walker as recent as 2 weeks ago. She was able to change her own adult brief even right before she was admitted. She had missed 2 appointments at PT because Medicaid transportation did not come to pick her up. Niece sets her up for bathing. Patient is able to bath herself on side of the bed. Niece changes her sheets for her and helps her dress. Patient confirmed her PCP is Dr. Cameron Viera. She last saw him in October 2023 and her  next appointment is 1-24-24.      Sister has had a recent hospitalization and stay in SNF at Home Care;Outpatient PT/OT   Potential Assistance Purchasing Medications No   Type of Home Care Services PT   Patient expects to be discharged to: House   Services At/After Discharge    Resource Information Provided? No   Mode of Transport at Discharge JUANPABLO HASSAN

## 2023-12-22 NOTE — CONSULTS
History and Physical    Date of Service:  12/22/2023  Primary Care Provider: Gregorio Koenig III, MD  Source of information: The patient and Chart review    Chief Complaint: Shortness of Breath      History of Presenting Illness:   Dolores Pierre is a 63 y.o. female w/ PMH of DM2, CKD4, HTN, morbid obesity, neuropathy, thyroid disease who presented to the ED on 12/20 w/ progressively worsening SOB, fevers cough times a few days.  Hypoxic with EMS, placed on BiPAP in the ED. ABG taken on nasal cannula indicated hypercapnic and hypoxic respiratory failure.  Code sepsis called.  Coronavirus OC43 by PCR positive.     Patient transitioned from BiPAP to nasal cannula overnight, O2 saturations are in the high 90s. Hospitalist service consulted for downgrade out of the ICU.  Noted pt had elevated troponins of 251 on admission, no acute ST changes.  Pt DENIES she had a recent STEMI 2 weeks ago as previously documented. She did have some mild chest pain on admission but none further. Troponin levels are trending down. Pt has mutiple risk factors, will consult with cardiology.     The patient denies any headache, blurry vision, sore throat, trouble swallowing, trouble with speech, fever, chills, N/V/D, abd pain, urinary symptoms, constipation, recent travels, sick contacts, focal or generalized neurological symptoms, falls, injuries, rashes, contact with COVID-19 diagnosed patients, hematemesis, melena, hemoptysis, hematuria, rashes, denies starting any new medications and denies any other concerns or problems besides as mentioned above.         REVIEW OF SYSTEMS:  A comprehensive review of systems was negative except for that written in the History of Present Illness.     Past Medical History:   Diagnosis Date    Chronic kidney disease     Chronic obstructive pulmonary disease (HCC)     Diabetes (HCC)     Hernia, hiatal     Hypertension     Incontinence of urine     Kidney stones     both kidneys    Morbid obesity  Creatinine 3.84 (*)     Est, Glom Filt Rate 13 (*)     Calcium 8.4 (*)     Albumin 2.4 (*)     Globulin 4.5 (*)     Albumin/Globulin Ratio 0.5 (*)     All other components within normal limits   URINALYSIS WITH MICROSCOPIC - Abnormal; Notable for the following components:    Appearance TURBID (*)     Protein,  (*)     Blood, Urine MODERATE (*)     Leukocyte Esterase, Urine LARGE (*)     WBC, UA >100 (*)     Epithelial Cells UA MODERATE (*)     BACTERIA, URINE 4+ (*)     All other components within normal limits   COMPREHENSIVE METABOLIC PANEL W/ REFLEX TO MG FOR LOW K - Abnormal; Notable for the following components:    Potassium 5.2 (*)     Chloride 112 (*)     Glucose 173 (*)     BUN 58 (*)     Creatinine 3.96 (*)     Est, Glom Filt Rate 12 (*)     Calcium 8.0 (*)     AST 8 (*)     Albumin 2.5 (*)     Globulin 4.5 (*)     Albumin/Globulin Ratio 0.6 (*)     All other components within normal limits   BASIC METABOLIC PANEL - Abnormal; Notable for the following components:    Chloride 112 (*)     Anion Gap 2 (*)     Glucose 174 (*)     BUN 59 (*)     Creatinine 4.02 (*)     Est, Glom Filt Rate 12 (*)     Calcium 8.3 (*)     All other components within normal limits   CBC WITH AUTO DIFFERENTIAL - Abnormal; Notable for the following components:    WBC 16.4 (*)     RBC 3.08 (*)     Hemoglobin 8.1 (*)     Hematocrit 28.4 (*)     MCHC 28.5 (*)     RDW 15.1 (*)     Nucleated RBCs 0.2 (*)     nRBC 0.03 (*)     Neutrophils % 92 (*)     Lymphocytes % 5 (*)     Monocytes % 2 (*)     Immature Granulocytes 1 (*)     Neutrophils Absolute 15.1 (*)     Absolute Immature Granulocyte 0.2 (*)     All other components within normal limits   POCT GLUCOSE - Abnormal; Notable for the following components:    POC Glucose 183 (*)     All other components within normal limits   ARTERIAL BLOOD GAS, POC - Abnormal; Notable for the following components:    POC pH 7.19 (*)     POC pCO2 65.3 (*)     POC PO2 70 (*)     POC O2 SAT 88.4 (*)

## 2023-12-22 NOTE — CONSULTS
oxybutynin (DITROPAN) tablet 10 mg  10 mg Oral BID    sodium chloride flush 0.9 % injection 5-40 mL  5-40 mL IntraVENous 2 times per day    sodium chloride flush 0.9 % injection 5-40 mL  5-40 mL IntraVENous PRN    0.9 % sodium chloride infusion   IntraVENous PRN    acetaminophen (TYLENOL) tablet 650 mg  650 mg Oral Q6H PRN    Or    acetaminophen (TYLENOL) suppository 650 mg  650 mg Rectal Q6H PRN    lactated ringers bolus bolus 4,500 mL  30 mL/kg IntraVENous Once    cefTRIAXone (ROCEPHIN) 1,000 mg in sterile water 10 mL IV syringe  1,000 mg IntraVENous Q24H    And    azithromycin (ZITHROMAX) 500 mg in sodium chloride 0.9 % 250 mL IVPB ([id number])  500 mg IntraVENous Q24H    heparin (porcine) injection 5,000 Units  5,000 Units SubCUTAneous 3 times per day    HYDROmorphone HCl PF (DILAUDID) injection 0.25 mg  0.25 mg IntraVENous Q4H PRN    ipratropium 0.5 mg-albuterol 2.5 mg (DUONEB) nebulizer solution 1 Dose  1 Dose Inhalation Q6H RT       Family History   Problem Relation Age of Onset    Hypertension Sister     Diabetes Sister     Heart Attack Father     Heart Disease Father     Diabetes Father     Diabetes Mother     Cancer Mother     Anesth Problems Neg Hx     Heart Disease Brother     Diabetes Sister     Hypertension Sister     Diabetes Sister      No family h/o SCD or premature CAD     Social History     Socioeconomic History    Marital status: Single     Spouse name: Not on file    Number of children: Not on file    Years of education: Not on file    Highest education level: Not on file   Occupational History    Not on file   Tobacco Use    Smoking status: Never    Smokeless tobacco: Never   Substance and Sexual Activity    Alcohol use: Never    Drug use: Never    Sexual activity: Not on file   Other Topics Concern    Not on file   Social History Narrative    Not on file     Social Determinants of Health     Financial Resource Strain: Not on file   Food Insecurity: Not on file   Transportation Needs: Not on file

## 2023-12-23 ENCOUNTER — APPOINTMENT (OUTPATIENT)
Facility: HOSPITAL | Age: 63
End: 2023-12-23
Payer: MEDICARE

## 2023-12-23 LAB
ANION GAP SERPL CALC-SCNC: 7 MMOL/L (ref 5–15)
BASOPHILS # BLD: 0 K/UL (ref 0–0.1)
BASOPHILS NFR BLD: 0 % (ref 0–1)
BUN SERPL-MCNC: 64 MG/DL (ref 6–20)
BUN/CREAT SERPL: 15 (ref 12–20)
CALCIUM SERPL-MCNC: 7.1 MG/DL (ref 8.5–10.1)
CHLORIDE SERPL-SCNC: 111 MMOL/L (ref 97–108)
CO2 SERPL-SCNC: 26 MMOL/L (ref 21–32)
CREAT SERPL-MCNC: 4.15 MG/DL (ref 0.55–1.02)
DIFFERENTIAL METHOD BLD: ABNORMAL
EOSINOPHIL # BLD: 0 K/UL (ref 0–0.4)
EOSINOPHIL NFR BLD: 0 % (ref 0–7)
ERYTHROCYTE [DISTWIDTH] IN BLOOD BY AUTOMATED COUNT: 15.4 % (ref 11.5–14.5)
GLUCOSE BLD STRIP.AUTO-MCNC: 187 MG/DL (ref 65–117)
GLUCOSE BLD STRIP.AUTO-MCNC: 205 MG/DL (ref 65–117)
GLUCOSE BLD STRIP.AUTO-MCNC: 226 MG/DL (ref 65–117)
GLUCOSE BLD STRIP.AUTO-MCNC: 232 MG/DL (ref 65–117)
GLUCOSE BLD STRIP.AUTO-MCNC: 308 MG/DL (ref 65–117)
GLUCOSE SERPL-MCNC: 223 MG/DL (ref 65–100)
HCT VFR BLD AUTO: 25.3 % (ref 35–47)
HGB BLD-MCNC: 7.4 G/DL (ref 11.5–16)
IMM GRANULOCYTES # BLD AUTO: 0.3 K/UL (ref 0–0.04)
IMM GRANULOCYTES NFR BLD AUTO: 2 % (ref 0–0.5)
LYMPHOCYTES # BLD: 0.8 K/UL (ref 0.8–3.5)
LYMPHOCYTES NFR BLD: 6 % (ref 12–49)
MAGNESIUM SERPL-MCNC: 2 MG/DL (ref 1.6–2.4)
MCH RBC QN AUTO: 26.5 PG (ref 26–34)
MCHC RBC AUTO-ENTMCNC: 29.2 G/DL (ref 30–36.5)
MCV RBC AUTO: 90.7 FL (ref 80–99)
MONOCYTES # BLD: 0.7 K/UL (ref 0–1)
MONOCYTES NFR BLD: 5 % (ref 5–13)
NEUTS SEG # BLD: 12.8 K/UL (ref 1.8–8)
NEUTS SEG NFR BLD: 87 % (ref 32–75)
NRBC # BLD: 0.03 K/UL (ref 0–0.01)
NRBC BLD-RTO: 0.2 PER 100 WBC
PHOSPHATE SERPL-MCNC: 4.4 MG/DL (ref 2.6–4.7)
PLATELET # BLD AUTO: 221 K/UL (ref 150–400)
PMV BLD AUTO: 10.8 FL (ref 8.9–12.9)
POTASSIUM SERPL-SCNC: 3.9 MMOL/L (ref 3.5–5.1)
RBC # BLD AUTO: 2.79 M/UL (ref 3.8–5.2)
SERVICE CMNT-IMP: ABNORMAL
SODIUM SERPL-SCNC: 144 MMOL/L (ref 136–145)
VANCOMYCIN SERPL-MCNC: 16.1 UG/ML
WBC # BLD AUTO: 14.6 K/UL (ref 3.6–11)

## 2023-12-23 PROCEDURE — 2580000003 HC RX 258: Performed by: NURSE PRACTITIONER

## 2023-12-23 PROCEDURE — 2060000000 HC ICU INTERMEDIATE R&B

## 2023-12-23 PROCEDURE — 6360000002 HC RX W HCPCS: Performed by: HOSPITALIST

## 2023-12-23 PROCEDURE — 84100 ASSAY OF PHOSPHORUS: CPT

## 2023-12-23 PROCEDURE — 2580000003 HC RX 258: Performed by: HOSPITALIST

## 2023-12-23 PROCEDURE — 82962 GLUCOSE BLOOD TEST: CPT

## 2023-12-23 PROCEDURE — 6360000002 HC RX W HCPCS: Performed by: NURSE PRACTITIONER

## 2023-12-23 PROCEDURE — 80048 BASIC METABOLIC PNL TOTAL CA: CPT

## 2023-12-23 PROCEDURE — 94640 AIRWAY INHALATION TREATMENT: CPT

## 2023-12-23 PROCEDURE — 83735 ASSAY OF MAGNESIUM: CPT

## 2023-12-23 PROCEDURE — 36415 COLL VENOUS BLD VENIPUNCTURE: CPT

## 2023-12-23 PROCEDURE — 6370000000 HC RX 637 (ALT 250 FOR IP): Performed by: NURSE PRACTITIONER

## 2023-12-23 PROCEDURE — 73551 X-RAY EXAM OF FEMUR 1: CPT

## 2023-12-23 PROCEDURE — 6370000000 HC RX 637 (ALT 250 FOR IP): Performed by: HOSPITALIST

## 2023-12-23 PROCEDURE — 80202 ASSAY OF VANCOMYCIN: CPT

## 2023-12-23 PROCEDURE — 85025 COMPLETE CBC W/AUTO DIFF WBC: CPT

## 2023-12-23 PROCEDURE — 73501 X-RAY EXAM HIP UNI 1 VIEW: CPT

## 2023-12-23 RX ADMIN — Medication 10 ML: at 21:31

## 2023-12-23 RX ADMIN — Medication 10 ML: at 09:47

## 2023-12-23 RX ADMIN — PANTOPRAZOLE SODIUM 40 MG: 40 TABLET, DELAYED RELEASE ORAL at 09:47

## 2023-12-23 RX ADMIN — METHIMAZOLE 10 MG: 5 TABLET ORAL at 09:47

## 2023-12-23 RX ADMIN — INSULIN LISPRO 2 UNITS: 100 INJECTION, SOLUTION INTRAVENOUS; SUBCUTANEOUS at 17:41

## 2023-12-23 RX ADMIN — HEPARIN SODIUM 5000 UNITS: 5000 INJECTION INTRAVENOUS; SUBCUTANEOUS at 21:30

## 2023-12-23 RX ADMIN — IPRATROPIUM BROMIDE AND ALBUTEROL SULFATE 1 DOSE: 2.5; .5 SOLUTION RESPIRATORY (INHALATION) at 08:35

## 2023-12-23 RX ADMIN — HEPARIN SODIUM 5000 UNITS: 5000 INJECTION INTRAVENOUS; SUBCUTANEOUS at 06:08

## 2023-12-23 RX ADMIN — PREDNISONE 20 MG: 20 TABLET ORAL at 09:47

## 2023-12-23 RX ADMIN — IPRATROPIUM BROMIDE AND ALBUTEROL SULFATE 1 DOSE: 2.5; .5 SOLUTION RESPIRATORY (INHALATION) at 01:07

## 2023-12-23 RX ADMIN — VANCOMYCIN HYDROCHLORIDE 1000 MG: 1 INJECTION, POWDER, LYOPHILIZED, FOR SOLUTION INTRAVENOUS at 11:36

## 2023-12-23 RX ADMIN — HEPARIN SODIUM 5000 UNITS: 5000 INJECTION INTRAVENOUS; SUBCUTANEOUS at 15:11

## 2023-12-23 RX ADMIN — INSULIN GLARGINE 20 UNITS: 100 INJECTION, SOLUTION SUBCUTANEOUS at 21:30

## 2023-12-23 RX ADMIN — WATER 1000 MG: 1 INJECTION INTRAMUSCULAR; INTRAVENOUS; SUBCUTANEOUS at 15:17

## 2023-12-23 RX ADMIN — IPRATROPIUM BROMIDE AND ALBUTEROL SULFATE 1 DOSE: 2.5; .5 SOLUTION RESPIRATORY (INHALATION) at 14:27

## 2023-12-23 RX ADMIN — INSULIN LISPRO 4 UNITS: 100 INJECTION, SOLUTION INTRAVENOUS; SUBCUTANEOUS at 21:31

## 2023-12-23 RX ADMIN — IPRATROPIUM BROMIDE AND ALBUTEROL SULFATE 1 DOSE: 2.5; .5 SOLUTION RESPIRATORY (INHALATION) at 19:10

## 2023-12-23 ASSESSMENT — PAIN SCALES - GENERAL
PAINLEVEL_OUTOF10: 0
PAINLEVEL_OUTOF10: 2

## 2023-12-23 NOTE — PROGRESS NOTES
[address] [facility] Adult  Hospitalist Group                                                                                          Hospitalist Progress Note  Akil Paniagua MD  Office Phone: [phone]        Date of Service:  2023  NAME:  Dora Thomas  :  1960  MRN:  [id number]       Admission Summary:      oDra Thomas is a 59 y.o. female w/ PMH of DM2, CKD4, HTN, morbid obesity, neuropathy, thyroid disease who presented to the ED on  w/ progressively worsening SOB, fevers cough times a few days. Hypoxic with EMS, placed on BiPAP in the ED. ABG taken on nasal cannula indicated hypercapnic and hypoxic respiratory failure. Code sepsis called. Coronavirus OC43 by PCR positive. Patient transitioned from BiPAP to nasal cannula overnight, O2 saturations are in the high 90s. Hospitalist service consulted for downgrade out of the ICU. Noted pt had elevated troponins of 251 on admission, no acute ST changes. Pt DENIES she had a recent STEMI 2 weeks ago as previously documented. She did have some mild chest pain on admission but none further. Troponin levels are trending down. Pt has mutiple risk factors, will consult with cardiology. Interval history / Subjective:   Off BiPAP, on oxygen by nasal cannula. Positive for COVID-19. Hemodynamically more stable will transfer to telemetry unit.   Patient reports moderate to severe pain discomfort left thigh left hip     Assessment & Plan:      Severe sepsis - improved  Pneumonia, likely viral   COVID + on   - contact precautions  - Poor UA sample with moderate epithelial, ucx never sent  - VQ scan with normal perfusion  - has transitioned from solumedrol to prednisone  - BC from  with 2 bottle + CoNS, will redraw blood cultures today  -Continue azithromycin, ceftriaxone     Acute hypoxic and hypercapnic respiratory failure - improved, off Bipap  -sec to above  -CXR unremarkable   - c/w supplemental Oxygen     Elevated   Transportation Needs: Not on file   Physical Activity: Not on file   Stress: Not on file   Social Connections: Not on file   Intimate Partner Violence: Not on file   Depression: Not on file   Housing Stability: Not on file   Interpersonal Safety: Not on file   Utilities: Not on file       Review of Systems:   Pertinent items are noted in HPI.       Vital Signs:    Last 24hrs VS reviewed since prior progress note. Most recent are:  Vitals:    12/23/23 1200   BP:    Pulse: (!) 101   Resp:    Temp:    SpO2:          Intake/Output Summary (Last 24 hours) at 12/23/2023 1306  Last data filed at 12/23/2023 0951  Gross per 24 hour   Intake 1947.5 ml   Output 1525 ml   Net 422.5 ml        Physical Examination:     I had a face to face encounter with this patient and independently examined them on 12/23/2023 as outlined below:          General : alert x 3, awake, no acute distress,   HEENT: PEERL, EOMI, moist mucus membrane, TM clear  Neck: supple, no JVD, no meningeal signs  Chest: Clear to auscultation bilaterally   CVS: S1 S2 heard, Capillary refill less than 2 seconds  Abd: soft/ non tender, non distended, BS physiological,   Ext: no clubbing, no cyanosis, no edema, brisk 2+ DP pulses  Neuro/Psych: pleasant mood and affect, CN 2-12 grossly intact, sensory grossly within normal limit, Strength 5/5 in all extremities, DTR 1+ x 4  Skin: warm     Data Review:    Review and/or order of clinical lab test      I have personally and independently reviewed all pertinent labs, diagnostic studies, imaging, and have provided independent interpretation of the same.     Labs:     Recent Labs     12/22/23 0528 12/23/23 0413   WBC 16.4* 14.6*   HGB 8.1* 7.4*   HCT 28.4* 25.3*    221     Recent Labs     12/21/23  0030 12/21/23  0449 12/21/23 2006 12/22/23  0528 12/23/23  0413      < > 143 140 144   K 5.4*   < > 5.2* 5.0 3.9   *   < > 112* 112* 111*   CO2 23   < > 25 26 26   BUN 50*   < > 58* 59* 64*   MG 1.9  --

## 2023-12-23 NOTE — PROGRESS NOTES
Pharmacist Note - Vancomycin Dosing  Therapy day 3  Indication: possible bacteremia  Current regimen: Dosing by Levels, last dose 12/21 0443    Recent Labs     12/21/23  0030 12/21/23  0449 12/21/23 2006 12/22/23  0528 12/23/23  0413   WBC 14.6*  --   --  16.4* 14.6*   CREATININE 3.65*   < > 3.96* 4.02* 4.15*   BUN 50*   < > 58* 59* 64*    < > = values in this interval not displayed. A random vancomycin level of 16.1 mcg/mL was obtained     Goal target range Trough 10-15 mcg/mL      Plan: Continue current regimen with dosing by levels, plan for Vancomycin 1000mg x1, timed at 1200 when level is expected to be <15 mcg/mL. Pharmacy will continue to monitor this patient daily for changes in clinical status and renal function.

## 2023-12-24 PROBLEM — S72.402A CLOSED FRACTURE OF LEFT DISTAL FEMUR (HCC): Status: ACTIVE | Noted: 2023-12-24

## 2023-12-24 LAB
ANION GAP SERPL CALC-SCNC: 6 MMOL/L (ref 5–15)
BASOPHILS # BLD: 0 K/UL (ref 0–0.1)
BASOPHILS NFR BLD: 0 % (ref 0–1)
BUN SERPL-MCNC: 67 MG/DL (ref 6–20)
BUN/CREAT SERPL: 17 (ref 12–20)
CALCIUM SERPL-MCNC: 6.6 MG/DL (ref 8.5–10.1)
CHLORIDE SERPL-SCNC: 113 MMOL/L (ref 97–108)
CO2 SERPL-SCNC: 26 MMOL/L (ref 21–32)
CREAT SERPL-MCNC: 3.89 MG/DL (ref 0.55–1.02)
DIFFERENTIAL METHOD BLD: ABNORMAL
EOSINOPHIL # BLD: 0 K/UL (ref 0–0.4)
EOSINOPHIL NFR BLD: 0 % (ref 0–7)
ERYTHROCYTE [DISTWIDTH] IN BLOOD BY AUTOMATED COUNT: 15.5 % (ref 11.5–14.5)
GLUCOSE BLD STRIP.AUTO-MCNC: 127 MG/DL (ref 65–117)
GLUCOSE BLD STRIP.AUTO-MCNC: 174 MG/DL (ref 65–117)
GLUCOSE BLD STRIP.AUTO-MCNC: 263 MG/DL (ref 65–117)
GLUCOSE BLD STRIP.AUTO-MCNC: 297 MG/DL (ref 65–117)
GLUCOSE SERPL-MCNC: 236 MG/DL (ref 65–100)
HCT VFR BLD AUTO: 24.7 % (ref 35–47)
HGB BLD-MCNC: 7.2 G/DL (ref 11.5–16)
IMM GRANULOCYTES # BLD AUTO: 0.3 K/UL (ref 0–0.04)
IMM GRANULOCYTES NFR BLD AUTO: 2 % (ref 0–0.5)
LYMPHOCYTES # BLD: 1 K/UL (ref 0.8–3.5)
LYMPHOCYTES NFR BLD: 7 % (ref 12–49)
MAGNESIUM SERPL-MCNC: 1.9 MG/DL (ref 1.6–2.4)
MCH RBC QN AUTO: 26.8 PG (ref 26–34)
MCHC RBC AUTO-ENTMCNC: 29.1 G/DL (ref 30–36.5)
MCV RBC AUTO: 91.8 FL (ref 80–99)
MONOCYTES # BLD: 0.6 K/UL (ref 0–1)
MONOCYTES NFR BLD: 4 % (ref 5–13)
NEUTS SEG # BLD: 13.3 K/UL (ref 1.8–8)
NEUTS SEG NFR BLD: 87 % (ref 32–75)
NRBC # BLD: 0.04 K/UL (ref 0–0.01)
NRBC BLD-RTO: 0.3 PER 100 WBC
PHOSPHATE SERPL-MCNC: 4.4 MG/DL (ref 2.6–4.7)
PLATELET # BLD AUTO: 209 K/UL (ref 150–400)
PMV BLD AUTO: 10.4 FL (ref 8.9–12.9)
POTASSIUM SERPL-SCNC: 4 MMOL/L (ref 3.5–5.1)
RBC # BLD AUTO: 2.69 M/UL (ref 3.8–5.2)
SERVICE CMNT-IMP: ABNORMAL
SODIUM SERPL-SCNC: 145 MMOL/L (ref 136–145)
VANCOMYCIN SERPL-MCNC: 19.7 UG/ML
WBC # BLD AUTO: 15.2 K/UL (ref 3.6–11)

## 2023-12-24 PROCEDURE — 6370000000 HC RX 637 (ALT 250 FOR IP): Performed by: HOSPITALIST

## 2023-12-24 PROCEDURE — 84100 ASSAY OF PHOSPHORUS: CPT

## 2023-12-24 PROCEDURE — 6360000002 HC RX W HCPCS: Performed by: HOSPITALIST

## 2023-12-24 PROCEDURE — 80048 BASIC METABOLIC PNL TOTAL CA: CPT

## 2023-12-24 PROCEDURE — 2580000003 HC RX 258: Performed by: HOSPITALIST

## 2023-12-24 PROCEDURE — 82962 GLUCOSE BLOOD TEST: CPT

## 2023-12-24 PROCEDURE — 2060000000 HC ICU INTERMEDIATE R&B

## 2023-12-24 PROCEDURE — 94760 N-INVAS EAR/PLS OXIMETRY 1: CPT

## 2023-12-24 PROCEDURE — 2500000003 HC RX 250 WO HCPCS: Performed by: INTERNAL MEDICINE

## 2023-12-24 PROCEDURE — 85025 COMPLETE CBC W/AUTO DIFF WBC: CPT

## 2023-12-24 PROCEDURE — 94640 AIRWAY INHALATION TREATMENT: CPT

## 2023-12-24 PROCEDURE — 6370000000 HC RX 637 (ALT 250 FOR IP): Performed by: INTERNAL MEDICINE

## 2023-12-24 PROCEDURE — 6370000000 HC RX 637 (ALT 250 FOR IP): Performed by: NURSE PRACTITIONER

## 2023-12-24 PROCEDURE — 2700000000 HC OXYGEN THERAPY PER DAY

## 2023-12-24 PROCEDURE — 80202 ASSAY OF VANCOMYCIN: CPT

## 2023-12-24 PROCEDURE — 36415 COLL VENOUS BLD VENIPUNCTURE: CPT

## 2023-12-24 PROCEDURE — 83735 ASSAY OF MAGNESIUM: CPT

## 2023-12-24 PROCEDURE — 99222 1ST HOSP IP/OBS MODERATE 55: CPT | Performed by: INTERNAL MEDICINE

## 2023-12-24 RX ORDER — ANTACID TABLETS 648 MG/1
2 TABLET, CHEWABLE ORAL 2 TIMES DAILY
Status: DISCONTINUED | OUTPATIENT
Start: 2023-12-24 | End: 2023-12-29 | Stop reason: HOSPADM

## 2023-12-24 RX ORDER — IPRATROPIUM BROMIDE AND ALBUTEROL SULFATE 2.5; .5 MG/3ML; MG/3ML
1 SOLUTION RESPIRATORY (INHALATION) EVERY 6 HOURS PRN
Status: DISCONTINUED | OUTPATIENT
Start: 2023-12-24 | End: 2023-12-29 | Stop reason: HOSPADM

## 2023-12-24 RX ORDER — CALCITRIOL 0.25 UG/1
0.25 CAPSULE, LIQUID FILLED ORAL DAILY
Status: DISCONTINUED | OUTPATIENT
Start: 2023-12-24 | End: 2023-12-29 | Stop reason: HOSPADM

## 2023-12-24 RX ADMIN — METHIMAZOLE 10 MG: 5 TABLET ORAL at 08:52

## 2023-12-24 RX ADMIN — IPRATROPIUM BROMIDE AND ALBUTEROL SULFATE 1 DOSE: 2.5; .5 SOLUTION RESPIRATORY (INHALATION) at 14:13

## 2023-12-24 RX ADMIN — Medication 10 ML: at 08:59

## 2023-12-24 RX ADMIN — IPRATROPIUM BROMIDE AND ALBUTEROL SULFATE 1 DOSE: 2.5; .5 SOLUTION RESPIRATORY (INHALATION) at 01:30

## 2023-12-24 RX ADMIN — WATER 1000 MG: 1 INJECTION INTRAMUSCULAR; INTRAVENOUS; SUBCUTANEOUS at 15:46

## 2023-12-24 RX ADMIN — CALCITRIOL CAPSULES 0.25 MCG 0.25 MCG: 0.25 CAPSULE ORAL at 11:24

## 2023-12-24 RX ADMIN — INSULIN LISPRO 4 UNITS: 100 INJECTION, SOLUTION INTRAVENOUS; SUBCUTANEOUS at 17:41

## 2023-12-24 RX ADMIN — HEPARIN SODIUM 5000 UNITS: 5000 INJECTION INTRAVENOUS; SUBCUTANEOUS at 21:40

## 2023-12-24 RX ADMIN — HEPARIN SODIUM 5000 UNITS: 5000 INJECTION INTRAVENOUS; SUBCUTANEOUS at 06:18

## 2023-12-24 RX ADMIN — CALCIUM CARBONATE 10GR (648 MG) 2 TABLET: 648 TABLET ORAL at 21:41

## 2023-12-24 RX ADMIN — PANTOPRAZOLE SODIUM 40 MG: 40 TABLET, DELAYED RELEASE ORAL at 08:51

## 2023-12-24 RX ADMIN — PREDNISONE 20 MG: 20 TABLET ORAL at 08:51

## 2023-12-24 RX ADMIN — HEPARIN SODIUM 5000 UNITS: 5000 INJECTION INTRAVENOUS; SUBCUTANEOUS at 13:35

## 2023-12-24 RX ADMIN — IPRATROPIUM BROMIDE AND ALBUTEROL SULFATE 1 DOSE: 2.5; .5 SOLUTION RESPIRATORY (INHALATION) at 08:11

## 2023-12-24 RX ADMIN — IPRATROPIUM BROMIDE AND ALBUTEROL SULFATE 1 DOSE: 2.5; .5 SOLUTION RESPIRATORY (INHALATION) at 19:42

## 2023-12-24 RX ADMIN — CALCIUM CARBONATE 10GR (648 MG) 2 TABLET: 648 TABLET ORAL at 13:32

## 2023-12-24 RX ADMIN — Medication 10 ML: at 21:41

## 2023-12-24 RX ADMIN — INSULIN GLARGINE 20 UNITS: 100 INJECTION, SOLUTION SUBCUTANEOUS at 21:40

## 2023-12-24 ASSESSMENT — PAIN SCALES - GENERAL: PAINLEVEL_OUTOF10: 2

## 2023-12-24 NOTE — PROGRESS NOTES
Pharmacist Note - Vancomycin Dosing  Therapy day 4  Indication: Possible bacteremia  Current regimen: dosing by levels    Recent Labs     12/22/23  0528 12/23/23  0413 12/24/23  0110   WBC 16.4* 14.6* 15.2*   CREATININE 4.02* 4.15* 3.89*   BUN 59* 64* 67*       A random vancomycin level of 19.7 mcg/mL was obtained (24 hours post dose on 12/23 @1136)    Goal target range Trough 10-15 mcg/mL      Plan: Continue current regimen with dosing by levels, no dose due today 12/24. [facility] predicts  with Vancomycin 1000mg q48h. Plan for next level on 12/25 @1000. Pharmacy will continue to monitor this patient daily for changes in clinical status and renal function.

## 2023-12-24 NOTE — PROGRESS NOTES
[address] [facility]  Hospitalist Group                                                                                          Hospitalist Progress Note  Lucero Abel MD  Office Phone: [phone]        Date of Service:  2023  NAME:  Anais Chaves  :  1960  MRN:  [id number]       Admission Summary:      Anais Chaves is a 59 y.o. female w/ PMH of DM2, CKD4, HTN, morbid obesity, neuropathy, thyroid disease who presented to the ED on  w/ progressively worsening SOB, fevers cough times a few days. Hypoxic with EMS, placed on BiPAP in the ED. ABG taken on nasal cannula indicated hypercapnic and hypoxic respiratory failure. Code sepsis called. Coronavirus OC43 by PCR positive. Patient transitioned from BiPAP to nasal cannula overnight, O2 saturations are in the high 90s. Hospitalist service consulted for downgrade out of the ICU. Noted pt had elevated troponins of 251 on admission, no acute ST changes. Pt DENIES she had a recent STEMI 2 weeks ago as previously documented. She did have some mild chest pain on admission but none further. Troponin levels are trending down. Pt has mutiple risk factors, will consult with cardiology. Interval history / Subjective:      Remains afebrile. Continue current antibiotic. ID to see. Some improvement in renal function. Pain improved, tightness is still mostly below knee. Overall feeling better  Assessment & Plan:      Severe sepsis: Case discussed with ID regarding blood cultures. Advised continue vancomycin, repeat cultures negative, ID will evaluate regarding continuation of vancomycin.   Pneumonia, likely viral   COVID + on   - contact precautions  - Poor UA sample with moderate epithelial, ucx never sent  - VQ scan with normal perfusion  - has transitioned from solumedrol to prednisone  - BC from  with 2 bottle + CoNS, will redraw blood cultures today  -Continue azithromycin, ceftriaxone     Acute hypoxic and

## 2023-12-24 NOTE — PLAN OF CARE
Problem: Safety - Adult  Goal: Free from fall injury  Outcome: Progressing  Flowsheets (Taken 12/24/2023 0038)  Free From Fall Injury:   Instruct family/caregiver on patient safety   Based on caregiver fall risk screen, instruct family/caregiver to ask for assistance with transferring infant if caregiver noted to have fall risk factors

## 2023-12-25 ENCOUNTER — APPOINTMENT (OUTPATIENT)
Facility: HOSPITAL | Age: 63
End: 2023-12-25
Payer: MEDICARE

## 2023-12-25 LAB
ANION GAP SERPL CALC-SCNC: 8 MMOL/L (ref 5–15)
ARTERIAL PATENCY WRIST A: POSITIVE
BACTERIA SPEC CULT: NORMAL
BASE DEFICIT BLD-SCNC: 2.5 MMOL/L
BASOPHILS # BLD: 0 K/UL (ref 0–0.1)
BASOPHILS NFR BLD: 0 % (ref 0–1)
BDY SITE: ABNORMAL
BUN SERPL-MCNC: 66 MG/DL (ref 6–20)
BUN/CREAT SERPL: 17 (ref 12–20)
CALCIUM SERPL-MCNC: 6.5 MG/DL (ref 8.5–10.1)
CHLORIDE SERPL-SCNC: 114 MMOL/L (ref 97–108)
CO2 SERPL-SCNC: 24 MMOL/L (ref 21–32)
CREAT SERPL-MCNC: 3.81 MG/DL (ref 0.55–1.02)
DIFFERENTIAL METHOD BLD: ABNORMAL
EOSINOPHIL # BLD: 0.1 K/UL (ref 0–0.4)
EOSINOPHIL NFR BLD: 1 % (ref 0–7)
ERYTHROCYTE [DISTWIDTH] IN BLOOD BY AUTOMATED COUNT: 15.5 % (ref 11.5–14.5)
GAS FLOW.O2 O2 DELIVERY SYS: ABNORMAL
GLUCOSE BLD STRIP.AUTO-MCNC: 119 MG/DL (ref 65–117)
GLUCOSE BLD STRIP.AUTO-MCNC: 153 MG/DL (ref 65–117)
GLUCOSE BLD STRIP.AUTO-MCNC: 205 MG/DL (ref 65–117)
GLUCOSE BLD STRIP.AUTO-MCNC: 225 MG/DL (ref 65–117)
GLUCOSE SERPL-MCNC: 152 MG/DL (ref 65–100)
HCO3 BLD-SCNC: 24.5 MMOL/L (ref 22–26)
HCT VFR BLD AUTO: 25 % (ref 35–47)
HGB BLD-MCNC: 7.3 G/DL (ref 11.5–16)
IMM GRANULOCYTES # BLD AUTO: 0 K/UL
IMM GRANULOCYTES NFR BLD AUTO: 0 %
LYMPHOCYTES # BLD: 2.1 K/UL (ref 0.8–3.5)
LYMPHOCYTES NFR BLD: 16 % (ref 12–49)
MAGNESIUM SERPL-MCNC: 1.9 MG/DL (ref 1.6–2.4)
MCH RBC QN AUTO: 26.8 PG (ref 26–34)
MCHC RBC AUTO-ENTMCNC: 29.2 G/DL (ref 30–36.5)
MCV RBC AUTO: 91.9 FL (ref 80–99)
MONOCYTES # BLD: 0.4 K/UL (ref 0–1)
MONOCYTES NFR BLD: 3 % (ref 5–13)
MYELOCYTES NFR BLD MANUAL: 1 %
NEUTS SEG # BLD: 10.3 K/UL (ref 1.8–8)
NEUTS SEG NFR BLD: 79 % (ref 32–75)
NRBC # BLD: 0.04 K/UL (ref 0–0.01)
NRBC BLD-RTO: 0.3 PER 100 WBC
PCO2 BLD: 50.7 MMHG (ref 35–45)
PH BLD: 7.29 (ref 7.35–7.45)
PHOSPHATE SERPL-MCNC: 4.2 MG/DL (ref 2.6–4.7)
PLATELET # BLD AUTO: 217 K/UL (ref 150–400)
PMV BLD AUTO: 10.3 FL (ref 8.9–12.9)
PO2 BLD: 100 MMHG (ref 80–100)
POTASSIUM SERPL-SCNC: 3.3 MMOL/L (ref 3.5–5.1)
RBC # BLD AUTO: 2.72 M/UL (ref 3.8–5.2)
RBC MORPH BLD: ABNORMAL
SAO2 % BLD: 96.9 % (ref 92–97)
SERVICE CMNT-IMP: ABNORMAL
SERVICE CMNT-IMP: NORMAL
SODIUM SERPL-SCNC: 146 MMOL/L (ref 136–145)
SPECIMEN TYPE: ABNORMAL
VANCOMYCIN SERPL-MCNC: 15.7 UG/ML
WBC # BLD AUTO: 13.1 K/UL (ref 3.6–11)

## 2023-12-25 PROCEDURE — 2500000003 HC RX 250 WO HCPCS: Performed by: INTERNAL MEDICINE

## 2023-12-25 PROCEDURE — 80048 BASIC METABOLIC PNL TOTAL CA: CPT

## 2023-12-25 PROCEDURE — 2580000003 HC RX 258: Performed by: HOSPITALIST

## 2023-12-25 PROCEDURE — 36415 COLL VENOUS BLD VENIPUNCTURE: CPT

## 2023-12-25 PROCEDURE — 6370000000 HC RX 637 (ALT 250 FOR IP): Performed by: INTERNAL MEDICINE

## 2023-12-25 PROCEDURE — 6370000000 HC RX 637 (ALT 250 FOR IP): Performed by: HOSPITALIST

## 2023-12-25 PROCEDURE — 6370000000 HC RX 637 (ALT 250 FOR IP): Performed by: NURSE PRACTITIONER

## 2023-12-25 PROCEDURE — 84100 ASSAY OF PHOSPHORUS: CPT

## 2023-12-25 PROCEDURE — 82962 GLUCOSE BLOOD TEST: CPT

## 2023-12-25 PROCEDURE — 80202 ASSAY OF VANCOMYCIN: CPT

## 2023-12-25 PROCEDURE — 83735 ASSAY OF MAGNESIUM: CPT

## 2023-12-25 PROCEDURE — 85025 COMPLETE CBC W/AUTO DIFF WBC: CPT

## 2023-12-25 PROCEDURE — 82803 BLOOD GASES ANY COMBINATION: CPT

## 2023-12-25 PROCEDURE — 6360000002 HC RX W HCPCS: Performed by: HOSPITALIST

## 2023-12-25 PROCEDURE — 36600 WITHDRAWAL OF ARTERIAL BLOOD: CPT

## 2023-12-25 PROCEDURE — 2060000000 HC ICU INTERMEDIATE R&B

## 2023-12-25 PROCEDURE — 71045 X-RAY EXAM CHEST 1 VIEW: CPT

## 2023-12-25 RX ADMIN — TRAMADOL HYDROCHLORIDE 50 MG: 50 TABLET, COATED ORAL at 00:07

## 2023-12-25 RX ADMIN — WATER 1000 MG: 1 INJECTION INTRAMUSCULAR; INTRAVENOUS; SUBCUTANEOUS at 15:49

## 2023-12-25 RX ADMIN — HEPARIN SODIUM 5000 UNITS: 5000 INJECTION INTRAVENOUS; SUBCUTANEOUS at 21:40

## 2023-12-25 RX ADMIN — VANCOMYCIN HYDROCHLORIDE 1000 MG: 1 INJECTION, POWDER, LYOPHILIZED, FOR SOLUTION INTRAVENOUS at 12:13

## 2023-12-25 RX ADMIN — PANTOPRAZOLE SODIUM 40 MG: 40 TABLET, DELAYED RELEASE ORAL at 09:28

## 2023-12-25 RX ADMIN — INSULIN LISPRO 2 UNITS: 100 INJECTION, SOLUTION INTRAVENOUS; SUBCUTANEOUS at 17:36

## 2023-12-25 RX ADMIN — INSULIN GLARGINE 20 UNITS: 100 INJECTION, SOLUTION SUBCUTANEOUS at 21:36

## 2023-12-25 RX ADMIN — Medication 10 ML: at 09:29

## 2023-12-25 RX ADMIN — METHIMAZOLE 10 MG: 5 TABLET ORAL at 09:28

## 2023-12-25 RX ADMIN — Medication 10 ML: at 21:44

## 2023-12-25 RX ADMIN — HEPARIN SODIUM 5000 UNITS: 5000 INJECTION INTRAVENOUS; SUBCUTANEOUS at 14:13

## 2023-12-25 RX ADMIN — CALCIUM CARBONATE 10GR (648 MG) 2 TABLET: 648 TABLET ORAL at 09:28

## 2023-12-25 RX ADMIN — PREDNISONE 20 MG: 20 TABLET ORAL at 09:28

## 2023-12-25 RX ADMIN — CALCIUM CARBONATE 10GR (648 MG) 2 TABLET: 648 TABLET ORAL at 21:37

## 2023-12-25 RX ADMIN — HEPARIN SODIUM 5000 UNITS: 5000 INJECTION INTRAVENOUS; SUBCUTANEOUS at 06:12

## 2023-12-25 RX ADMIN — CALCITRIOL CAPSULES 0.25 MCG 0.25 MCG: 0.25 CAPSULE ORAL at 09:28

## 2023-12-25 ASSESSMENT — PAIN DESCRIPTION - LOCATION
LOCATION: KNEE
LOCATION: KNEE

## 2023-12-25 ASSESSMENT — PAIN DESCRIPTION - DESCRIPTORS: DESCRIPTORS: SORE;TIGHTNESS

## 2023-12-25 ASSESSMENT — PAIN SCALES - GENERAL
PAINLEVEL_OUTOF10: 2
PAINLEVEL_OUTOF10: 5
PAINLEVEL_OUTOF10: 2

## 2023-12-25 ASSESSMENT — PAIN DESCRIPTION - ORIENTATION: ORIENTATION: LEFT

## 2023-12-25 NOTE — PROGRESS NOTES
Pharmacist Note - Vancomycin Dosing  Therapy day 5  Indication: bacteremia? Current regimen: dosing per levels    Recent Labs     12/23/23  0413 12/24/23  0110 12/25/23  0611   WBC 14.6* 15.2* 13.1*   CREATININE 4.15* 3.89* 3.81*   BUN 64* 67* 66*       A random vancomycin level of 15.7 mcg/mL was obtained ~47h post dose. Goal target range Trough 10-15 mcg/mL      Plan: Change to vanc 1g q48h (already done) . Pharmacy will continue to monitor this patient daily for changes in clinical status and renal function.

## 2023-12-25 NOTE — PROGRESS NOTES
[address] [facility] Adult  Hospitalist Group                                                                                          Hospitalist Progress Note  Carlos Obrien MD  Office Phone: [phone]        Date of Service:  2023  NAME:  Tiffanie Adams  :  1960  MRN:  [id number]       Admission Summary:      Tiffanie Adams is a 59 y.o. female w/ PMH of DM2, CKD4, HTN, morbid obesity, neuropathy, thyroid disease who presented to the ED on  w/ progressively worsening SOB, fevers cough times a few days. Hypoxic with EMS, placed on BiPAP in the ED. ABG taken on nasal cannula indicated hypercapnic and hypoxic respiratory failure. Code sepsis called. Coronavirus OC43 by PCR positive. Patient transitioned from BiPAP to nasal cannula overnight, O2 saturations are in the high 90s. Hospitalist service consulted for downgrade out of the ICU. Noted pt had elevated troponins of 251 on admission, no acute ST changes. Pt DENIES she had a recent STEMI 2 weeks ago as previously documented. She did have some mild chest pain on admission but none further. Troponin levels are trending down. Pt has mutiple risk factors, will consult with cardiology.         Interval history / Subjective:      Breathing better  Left knee pain and difficulty to move left knee   Assessment & Plan:      Severe sepsis:   Source unclear:   Pneumonia, likely viral , may have bacterial infeciton   Procal  0.62   COVID + on   - contact precautions  - Poor UA sample with moderate epithelial, ucx never sent  - VQ scan with normal perfusion  - has transitioned from solumedrol to prednisone  - BC from  with 2 bottle + CoNS, likely contamination   -nasal MRSA screen +   -Continue azithromycin, ceftriaxone, vanco   -will de-escalate abx in am      Acute hypoxic and hypercapnic respiratory failure - improved, off Bipap  Due to OHS, may have copd, + covid infection   Pulmonary consult : may need trilogy   -CXR unremarkable

## 2023-12-25 NOTE — CONSULTS
Sister     Heart Attack Father     Heart Disease Father     Diabetes Father     Diabetes Mother     Cancer Mother     Anesth Problems Neg Hx     Heart Disease Brother     Diabetes Sister     Hypertension Sister     Diabetes Sister      OBJECTIVE:     Vital Signs:     /73   Pulse 92   Temp 97.9 °F (36.6 °C) (Oral)   Resp 24   Ht 1.626 m (5' 4.02\")   Wt (!) 163 kg (359 lb 5.6 oz)   SpO2 96%   BMI 61.65 kg/m²    Temp (24hrs), Av.1 °F (36.7 °C), Min:97.9 °F (36.6 °C), Max:98.3 °F (36.8 °C)     Intake/Output:     Last shift:  07 - 1900  In: 480 [P.O.:480]  Out: 650 [Urine:650]    Last 3 shifts:  190 -  0700  In: -   Out: 4100 [Urine:4100]        Intake/Output Summary (Last 24 hours) at 2023 1658  Last data filed at 2023 1212  Gross per 24 hour   Intake 480 ml   Output 2850 ml   Net -2370 ml       Physical Exam:                                        Exam Findings Other   General: No resp distress noted, appears stated age    HEENT:  No ulcers, JVD not elevated, no cervical LAD    Chest: No pectus deformity, normal chest rise b/l    HEART:  RRR, no murmurs/rubs/gallops    Lungs:  CTA b/l, no rhonchi/crackles/wheeze, diminished BS at bases    ABD: Soft/NT, non rigid mildly distended    EXT: No cyanosis/clubbing/edema, normal peripheral pulses    Skin: No rashes or ulcers, no mottling    Neuro: A/O x 3        Medications:  Current Facility-Administered Medications   Medication Dose Route Frequency    vancomycin (VANCOCIN) 1,000 mg in sodium chloride 0.9 % 250 mL IVPB ([id number])  1,000 mg IntraVENous Q48H    calcium carbonate 648 MG per tablet 2 tablet  2 tablet Oral BID    calcitRIOL (ROCALTROL) capsule 0.25 mcg  0.25 mcg Oral Daily    ipratropium 0.5 mg-albuterol 2.5 mg (DUONEB) nebulizer solution 1 Dose  1 Dose Inhalation Q6H PRN    lidocaine 4 % external patch 1 patch  1 patch TransDERmal Daily    insulin glargine (LANTUS) injection vial 20 Units  20 Units SubCUTAneous  Nightly    insulin lispro (HUMALOG) injection vial 0-8 Units  0-8 Units SubCUTAneous TID WC    insulin lispro (HUMALOG) injection vial 0-4 Units  0-4 Units SubCUTAneous Nightly    vancomycin (VANCOCIN) intermittent dosing (placeholder)   Other RX Placeholder    glucose chewable tablet 16 g  4 tablet Oral PRN    dextrose bolus 10% 125 mL  125 mL IntraVENous PRN    Or    dextrose bolus 10% 250 mL  250 mL IntraVENous PRN    glucagon injection 1 mg  1 mg SubCUTAneous PRN    dextrose 10 % infusion   IntraVENous Continuous PRN    methIMAzole (TAPAZOLE) tablet 10 mg  10 mg Oral Daily    pantoprazole (PROTONIX) tablet 40 mg  40 mg Oral Daily    traMADol (ULTRAM) tablet 50 mg  50 mg Oral Q8H PRN    [Held by provider] oxybutynin (DITROPAN) tablet 10 mg  10 mg Oral BID    sodium chloride flush 0.9 % injection 5-40 mL  5-40 mL IntraVENous 2 times per day    sodium chloride flush 0.9 % injection 5-40 mL  5-40 mL IntraVENous PRN    0.9 % sodium chloride infusion   IntraVENous PRN    acetaminophen (TYLENOL) tablet 650 mg  650 mg Oral Q6H PRN    Or    acetaminophen (TYLENOL) suppository 650 mg  650 mg Rectal Q6H PRN    lactated ringers bolus bolus 4,500 mL  30 mL/kg IntraVENous Once    cefTRIAXone (ROCEPHIN) 1,000 mg in sterile water 10 mL IV syringe  1,000 mg IntraVENous Q24H    heparin (porcine) injection 5,000 Units  5,000 Units SubCUTAneous 3 times per day    HYDROmorphone HCl PF (DILAUDID) injection 0.25 mg  0.25 mg IntraVENous Q4H PRN       Labs:  ABG Invalid input(s):  \"ABG\"     CBC Recent Labs     12/23/23 0413 12/24/23  0110 12/25/23  0611   WBC 14.6* 15.2* 13.1*   HGB 7.4* 7.2* 7.3*   HCT 25.3* 24.7* 25.0*    209 217   MCV 90.7 91.8 91.9   MCH 26.5 26.8 55.9        Metabolic  Panel Recent Labs     12/23/23  0413 12/24/23  0110 12/25/23  0611    145 146*   K 3.9 4.0 3.3*   * 113* 114*   CO2 26 26 24   BUN 64* 67* 66*   MG 2.0 1.9 1.9   PHOS 4.4 4.4 4.2        Pertinent Labs                Gerhardt Lovings,

## 2023-12-26 ENCOUNTER — APPOINTMENT (OUTPATIENT)
Facility: HOSPITAL | Age: 63
End: 2023-12-26
Payer: MEDICARE

## 2023-12-26 LAB
ANION GAP SERPL CALC-SCNC: 3 MMOL/L (ref 5–15)
BASOPHILS # BLD: 0.2 K/UL (ref 0–0.1)
BASOPHILS NFR BLD: 1 % (ref 0–1)
BUN SERPL-MCNC: 68 MG/DL (ref 6–20)
BUN/CREAT SERPL: 17 (ref 12–20)
CALCIUM SERPL-MCNC: 6.7 MG/DL (ref 8.5–10.1)
CHLORIDE SERPL-SCNC: 114 MMOL/L (ref 97–108)
CO2 SERPL-SCNC: 29 MMOL/L (ref 21–32)
CREAT SERPL-MCNC: 4.1 MG/DL (ref 0.55–1.02)
CRP SERPL-MCNC: 1.83 MG/DL (ref 0–0.6)
DIFFERENTIAL METHOD BLD: ABNORMAL
EOSINOPHIL # BLD: 0.3 K/UL (ref 0–0.4)
EOSINOPHIL NFR BLD: 2 % (ref 0–7)
ERYTHROCYTE [DISTWIDTH] IN BLOOD BY AUTOMATED COUNT: 15.5 % (ref 11.5–14.5)
GLUCOSE BLD STRIP.AUTO-MCNC: 128 MG/DL (ref 65–117)
GLUCOSE BLD STRIP.AUTO-MCNC: 167 MG/DL (ref 65–117)
GLUCOSE BLD STRIP.AUTO-MCNC: 177 MG/DL (ref 65–117)
GLUCOSE BLD STRIP.AUTO-MCNC: 90 MG/DL (ref 65–117)
GLUCOSE SERPL-MCNC: 106 MG/DL (ref 65–100)
HCT VFR BLD AUTO: 27.6 % (ref 35–47)
HGB BLD-MCNC: 7.9 G/DL (ref 11.5–16)
IMM GRANULOCYTES # BLD AUTO: 0 K/UL
IMM GRANULOCYTES NFR BLD AUTO: 0 %
LYMPHOCYTES # BLD: 2.6 K/UL (ref 0.8–3.5)
LYMPHOCYTES NFR BLD: 17 % (ref 12–49)
MAGNESIUM SERPL-MCNC: 1.9 MG/DL (ref 1.6–2.4)
MCH RBC QN AUTO: 27.6 PG (ref 26–34)
MCHC RBC AUTO-ENTMCNC: 28.6 G/DL (ref 30–36.5)
MCV RBC AUTO: 96.5 FL (ref 80–99)
MONOCYTES # BLD: 0.8 K/UL (ref 0–1)
MONOCYTES NFR BLD: 5 % (ref 5–13)
NEUTS BAND NFR BLD MANUAL: 2 % (ref 0–6)
NEUTS SEG # BLD: 11.1 K/UL (ref 1.8–8)
NEUTS SEG NFR BLD: 73 % (ref 32–75)
NRBC # BLD: 0.04 K/UL (ref 0–0.01)
NRBC BLD-RTO: 0.3 PER 100 WBC
PHOSPHATE SERPL-MCNC: 5 MG/DL (ref 2.6–4.7)
PLATELET # BLD AUTO: 236 K/UL (ref 150–400)
PMV BLD AUTO: 10.3 FL (ref 8.9–12.9)
POTASSIUM SERPL-SCNC: 4.1 MMOL/L (ref 3.5–5.1)
RBC # BLD AUTO: 2.86 M/UL (ref 3.8–5.2)
RBC MORPH BLD: ABNORMAL
SERVICE CMNT-IMP: ABNORMAL
SERVICE CMNT-IMP: NORMAL
SODIUM SERPL-SCNC: 146 MMOL/L (ref 136–145)
WBC # BLD AUTO: 15 K/UL (ref 3.6–11)

## 2023-12-26 PROCEDURE — 6370000000 HC RX 637 (ALT 250 FOR IP): Performed by: INTERNAL MEDICINE

## 2023-12-26 PROCEDURE — 6360000002 HC RX W HCPCS: Performed by: HOSPITALIST

## 2023-12-26 PROCEDURE — 2700000000 HC OXYGEN THERAPY PER DAY

## 2023-12-26 PROCEDURE — 2580000003 HC RX 258: Performed by: HOSPITALIST

## 2023-12-26 PROCEDURE — 2500000003 HC RX 250 WO HCPCS: Performed by: INTERNAL MEDICINE

## 2023-12-26 PROCEDURE — 2060000000 HC ICU INTERMEDIATE R&B

## 2023-12-26 PROCEDURE — 97165 OT EVAL LOW COMPLEX 30 MIN: CPT

## 2023-12-26 PROCEDURE — 94760 N-INVAS EAR/PLS OXIMETRY 1: CPT

## 2023-12-26 PROCEDURE — 84100 ASSAY OF PHOSPHORUS: CPT

## 2023-12-26 PROCEDURE — 6370000000 HC RX 637 (ALT 250 FOR IP): Performed by: NURSE PRACTITIONER

## 2023-12-26 PROCEDURE — 82962 GLUCOSE BLOOD TEST: CPT

## 2023-12-26 PROCEDURE — 97162 PT EVAL MOD COMPLEX 30 MIN: CPT

## 2023-12-26 PROCEDURE — 97530 THERAPEUTIC ACTIVITIES: CPT

## 2023-12-26 PROCEDURE — 86140 C-REACTIVE PROTEIN: CPT

## 2023-12-26 PROCEDURE — 6370000000 HC RX 637 (ALT 250 FOR IP): Performed by: HOSPITALIST

## 2023-12-26 PROCEDURE — 97535 SELF CARE MNGMENT TRAINING: CPT

## 2023-12-26 PROCEDURE — 85025 COMPLETE CBC W/AUTO DIFF WBC: CPT

## 2023-12-26 PROCEDURE — 6360000002 HC RX W HCPCS: Performed by: NURSE PRACTITIONER

## 2023-12-26 PROCEDURE — 36415 COLL VENOUS BLD VENIPUNCTURE: CPT

## 2023-12-26 PROCEDURE — 80048 BASIC METABOLIC PNL TOTAL CA: CPT

## 2023-12-26 PROCEDURE — 83735 ASSAY OF MAGNESIUM: CPT

## 2023-12-26 PROCEDURE — 76770 US EXAM ABDO BACK WALL COMP: CPT

## 2023-12-26 RX ORDER — ONDANSETRON 2 MG/ML
4 INJECTION INTRAMUSCULAR; INTRAVENOUS EVERY 6 HOURS PRN
Status: DISCONTINUED | OUTPATIENT
Start: 2023-12-26 | End: 2023-12-29 | Stop reason: HOSPADM

## 2023-12-26 RX ORDER — DOXYCYCLINE HYCLATE 100 MG
100 TABLET ORAL EVERY 12 HOURS SCHEDULED
Status: DISCONTINUED | OUTPATIENT
Start: 2023-12-26 | End: 2023-12-29 | Stop reason: HOSPADM

## 2023-12-26 RX ADMIN — DOXYCYCLINE HYCLATE 100 MG: 100 TABLET, COATED ORAL at 22:22

## 2023-12-26 RX ADMIN — METHIMAZOLE 10 MG: 5 TABLET ORAL at 10:04

## 2023-12-26 RX ADMIN — Medication 10 ML: at 22:25

## 2023-12-26 RX ADMIN — HEPARIN SODIUM 5000 UNITS: 5000 INJECTION INTRAVENOUS; SUBCUTANEOUS at 14:01

## 2023-12-26 RX ADMIN — INSULIN GLARGINE 20 UNITS: 100 INJECTION, SOLUTION SUBCUTANEOUS at 22:22

## 2023-12-26 RX ADMIN — HEPARIN SODIUM 5000 UNITS: 5000 INJECTION INTRAVENOUS; SUBCUTANEOUS at 22:22

## 2023-12-26 RX ADMIN — Medication 10 ML: at 10:06

## 2023-12-26 RX ADMIN — ONDANSETRON 4 MG: 2 INJECTION INTRAMUSCULAR; INTRAVENOUS at 04:01

## 2023-12-26 RX ADMIN — WATER 1000 MG: 1 INJECTION INTRAMUSCULAR; INTRAVENOUS; SUBCUTANEOUS at 16:46

## 2023-12-26 RX ADMIN — CALCITRIOL CAPSULES 0.25 MCG 0.25 MCG: 0.25 CAPSULE ORAL at 10:04

## 2023-12-26 RX ADMIN — HEPARIN SODIUM 5000 UNITS: 5000 INJECTION INTRAVENOUS; SUBCUTANEOUS at 06:30

## 2023-12-26 RX ADMIN — TRAMADOL HYDROCHLORIDE 50 MG: 50 TABLET, COATED ORAL at 14:01

## 2023-12-26 RX ADMIN — PANTOPRAZOLE SODIUM 40 MG: 40 TABLET, DELAYED RELEASE ORAL at 10:04

## 2023-12-26 RX ADMIN — CALCIUM CARBONATE 10GR (648 MG) 2 TABLET: 648 TABLET ORAL at 22:21

## 2023-12-26 ASSESSMENT — PAIN DESCRIPTION - ORIENTATION
ORIENTATION: LEFT
ORIENTATION: LEFT

## 2023-12-26 ASSESSMENT — PAIN SCALES - GENERAL
PAINLEVEL_OUTOF10: 4
PAINLEVEL_OUTOF10: 5

## 2023-12-26 ASSESSMENT — PAIN DESCRIPTION - DESCRIPTORS
DESCRIPTORS: ACHING
DESCRIPTORS: ACHING

## 2023-12-26 ASSESSMENT — PAIN DESCRIPTION - LOCATION
LOCATION: KNEE
LOCATION: KNEE

## 2023-12-26 NOTE — PLAN OF CARE
Hospital Discharge Destination, Physical Therapy, Volume 94, Issue 9, 2014, Pages 0615-1652, [email or web address]    Pain Ratin/10   Pain Intervention(s):   rest, elevation, repositioning, and pain is at a level acceptable to the patient    Activity Tolerance:   Fair     After treatment:   Patient left in no apparent distress in bed, Call bell within reach, Side rails x3, and Heels elevated for pressure relief    COMMUNICATION/EDUCATION:   The patient's plan of care was discussed with: physical therapist and registered nurse    Patient Education  Education Given To: Patient  Education Provided: Role of Therapy;Plan of Care; Fall Prevention Strategies; Energy Conservation; ADL Adaptive Strategies  Education Method: Verbal;Demonstration  Barriers to Learning: None  Education Outcome: Verbalized understanding;Demonstrated understanding    Thank you for this referral.  Shreyas Cifuentes OT  Minutes: 41    Occupational Therapy Evaluation Charge Determination   History Examination Decision-Making   MEDIUM Complexity : Expanded review of history including physical, cognitive and psychocial history  MEDIUM Complexity: 3-5 Performance deficits relating to physical, cognitive, or psychosocial skills that result in activity limitations and/or participation restrictions HIGH Complexity: Patient presents with comorbidities that affect occupational performance.   Significant modifications of tasks or assistance (eg. physical or verbal) with assessment (s) is necessary to enable pt to complete evaluation   Based on the above components, the patient evaluation is determined to be of the following complexity level: Medium

## 2023-12-26 NOTE — PROGRESS NOTES
[address] [facility] Adult  Hospitalist Group                                                                                          Hospitalist Progress Note  Vinod Grey MD  Office Phone: [phone]        Date of Service:  2023  NAME:  Katia Parham  :  1960  MRN:  [id number]       Admission Summary:      Katia Parham is a 59 y.o. female w/ PMH of DM2, CKD4, HTN, morbid obesity, neuropathy, thyroid disease who presented to the ED on  w/ progressively worsening SOB, fevers cough times a few days. Hypoxic with EMS, placed on BiPAP in the ED. ABG taken on nasal cannula indicated hypercapnic and hypoxic respiratory failure. Code sepsis called. Coronavirus OC43 by PCR positive. Patient transitioned from BiPAP to nasal cannula overnight, O2 saturations are in the high 90s. Hospitalist service consulted for downgrade out of the ICU. Noted pt had elevated troponins of 251 on admission, no acute ST changes. Pt DENIES she had a recent STEMI 2 weeks ago as previously documented. She did have some mild chest pain on admission but none further. Troponin levels are trending down. Pt has mutiple risk factors, will consult with cardiology.         Interval history / Subjective:      Breathing better  Left knee pain and difficulty to move left knee   Assessment & Plan:      Severe sepsis:   Source unclear:   Pneumonia, likely viral , may have bacterial infeciton   Procal  0.62   COVID + on   - contact precautions  - Poor UA sample with moderate epithelial, ucx never sent  - VQ scan with normal perfusion  - has transitioned from solumedrol to prednisone  - BC from  with 2 bottle + CoNS, likely contamination   -nasal MRSA screen +   -azithromycin, ceftriaxone x 6 days , last dose ceftriaxone    - will dc iv vanco , change to po doxy to complete 10 days tx   -trending CRP, low at 1.83      Acute hypoxic and hypercapnic respiratory failure - improved   Due to OHS, may have copd, +

## 2023-12-26 NOTE — CARE COORDINATION
Transition of Care Plan:    RUR: 17%  Prior Level of Functioning: Patient owns a hospital bed, wheelchair, and walker. Patient has been working with physical therapy at [address] [facility] [address] [address] in [facility]. Disposition: Needs SNF choice  If SNF or IPR: Date FOC offered:   Date FOC received:   Accepting facility:   Date authorization started with reference number: Will need auth  Date authorization received and expires: Follow up appointments: TBD  DME needed: NA if SNF  Transportation at discharge: Hospitals in [place] w [facility] or [facility]  IM/IMM Medicare/ letter given:   Is patient a [place] and connected with [facility]? If yes, was [employer] transfer form completed and [facility] notified? Caregiver Contact: Sister Jostin Wade ([phone])   Discharge Caregiver contacted prior to discharge? Care Conference needed? Barriers to discharge:  Medical stability, SNF acceptance and auth    Patient discussed in IDRs this date and team is recommending SNF at d/c. She also needs a bipap QHS. CM attempted to meet w patient to discuss KIANNA needs but she was sleeping. CM will follow up again later.      JUANPABLO Fraser CCM  Care Management

## 2023-12-26 NOTE — PLAN OF CARE
Problem: Physical Therapy - Adult  Goal: By Discharge: Performs mobility at highest level of function for planned discharge setting. See evaluation for individualized goals. Description: FUNCTIONAL STATUS PRIOR TO ADMISSION:  Pt reports being nonambulatory and using a wheelchair for locomotion. She describes a recent bed bound situation staying at her sister's home and voiding and defecating in the bed and having to be cleaned up. She reports that until recently she was participating in outpatient PT (using medical transport). HOME SUPPORT PRIOR TO ASMISSION: Patient lived with her sister and brother in law due to recent decline in health, however, she noted her sister is in poor health, being set up for [facility] services now. Physical Therapy Goals  Initiated 12/26/2023  1. Patient will move from supine to sit and sit to supine, scoot up and down, and roll side to side in bed with maximal assistance x 2 within 7 day(s). 2.  Patient will participate in bed ex with mod assist within 7 day(s). 3.  Patient will demonstrate the ability to direct repositioning in the bed and state principles of repositioning within 7 day(s). Outcome: Progressing   PHYSICAL THERAPY EVALUATION    Patient: Audrey Ayala (84 y.o. female)  Date: 12/26/2023  Primary Diagnosis: Septicemia ([address]) [A41.9]  Hypoxia [R09.02]  Elevated troponin [R79.89]  Sepsis ([address]) [A41.9]       Precautions: Fall Risk, Contact Precautions (NWB LLE in knee immobilizer, bariatiric equipment needs (weight 335 pounds and BMI of 57.49))                    ASSESSMENT :   DEFICITS/IMPAIRMENTS:   The patient is limited by decreased functional mobility, ROM, strength, increased pain levels. Pt was admitted with septicemia, hypoxia, elevated troponin, and sepsis. Additionally pt has left knee pain and per ortho note: pt \"believes she initially hurt the knee earlier this year during a transfer from bed to chair during a previous hospitalization in April. relief, and Patient offloaded in partial left side lying for pressure relief    COMMUNICATION/EDUCATION:   The patient's plan of care was discussed with: occupational therapist and registered nurse    Patient Education  Education Given To: Patient  Education Provided: Role of Therapy;Fall Prevention Strategies  Education Provided Comments: use of RLE for assisting in bed mobility, importance of positioning in bed with heels floated and repositioning every two hours for skin integrity  Education Method: Verbal  Barriers to Learning: None  Education Outcome: Verbalized understanding;Continued education needed    Thank you for this referral.  TAY MCCABE, PT  Minutes: 40      Physical Therapy Evaluation Charge Determination   History Examination Presentation Decision-Making   HIGH Complexity :3+ comorbidities / personal factors will impact the outcome/ POC  MEDIUM Complexity : 3 Standardized tests and measures addressin body structure, function, activity limitation and / or participation in recreation  LOW Complexity : Stable, uncomplicated  AM-PAC  LOW    Based on the above components, the patient evaluation is determined to be of the following complexity level: Low

## 2023-12-26 NOTE — PROGRESS NOTES
Bedside and Verbal shift change report given to [address] (oncoming nurse) by Basia Carey (offgoing nurse).  Report included the following information Nurse Handoff Report, Intake/Output, MAR, Recent Results, and Cardiac Rhythm

## 2023-12-26 NOTE — PROGRESS NOTES
Orders received, chart reviewed and patient evaluated by physical therapy. Pending progression with skilled acute physical therapy, recommend:  Therapy up to 5 days/week in [address] with nursing bed  to chair position 3x/day as tolerated. Thank you for completing as able in order to maintain patient strength, endurance and independence. Full evaluation to follow.   Deri Mcburney, PT

## 2023-12-27 ENCOUNTER — APPOINTMENT (OUTPATIENT)
Facility: HOSPITAL | Age: 63
End: 2023-12-27
Payer: MEDICARE

## 2023-12-27 LAB
ANION GAP SERPL CALC-SCNC: 5 MMOL/L (ref 5–15)
BACTERIA SPEC CULT: NORMAL
BACTERIA SPEC CULT: NORMAL
BASOPHILS # BLD: 0 K/UL (ref 0–0.1)
BASOPHILS NFR BLD: 0 % (ref 0–1)
BUN SERPL-MCNC: 72 MG/DL (ref 6–20)
BUN/CREAT SERPL: 19 (ref 12–20)
CALCIUM SERPL-MCNC: 7.1 MG/DL (ref 8.5–10.1)
CHLORIDE SERPL-SCNC: 112 MMOL/L (ref 97–108)
CO2 SERPL-SCNC: 28 MMOL/L (ref 21–32)
CREAT SERPL-MCNC: 3.79 MG/DL (ref 0.55–1.02)
DIFFERENTIAL METHOD BLD: ABNORMAL
EOSINOPHIL # BLD: 0.7 K/UL (ref 0–0.4)
EOSINOPHIL NFR BLD: 5 % (ref 0–7)
ERYTHROCYTE [DISTWIDTH] IN BLOOD BY AUTOMATED COUNT: 15.9 % (ref 11.5–14.5)
GLUCOSE BLD STRIP.AUTO-MCNC: 127 MG/DL (ref 65–117)
GLUCOSE BLD STRIP.AUTO-MCNC: 185 MG/DL (ref 65–117)
GLUCOSE BLD STRIP.AUTO-MCNC: 229 MG/DL (ref 65–117)
GLUCOSE SERPL-MCNC: 122 MG/DL (ref 65–100)
HCT VFR BLD AUTO: 27.7 % (ref 35–47)
HGB BLD-MCNC: 7.9 G/DL (ref 11.5–16)
IMM GRANULOCYTES # BLD AUTO: 0.3 K/UL (ref 0–0.04)
IMM GRANULOCYTES NFR BLD AUTO: 2 % (ref 0–0.5)
LYMPHOCYTES # BLD: 1.8 K/UL (ref 0.8–3.5)
LYMPHOCYTES NFR BLD: 12 % (ref 12–49)
MAGNESIUM SERPL-MCNC: 1.8 MG/DL (ref 1.6–2.4)
MCH RBC QN AUTO: 27 PG (ref 26–34)
MCHC RBC AUTO-ENTMCNC: 28.5 G/DL (ref 30–36.5)
MCV RBC AUTO: 94.5 FL (ref 80–99)
MONOCYTES # BLD: 0.7 K/UL (ref 0–1)
MONOCYTES NFR BLD: 5 % (ref 5–13)
NEUTS SEG # BLD: 11.1 K/UL (ref 1.8–8)
NEUTS SEG NFR BLD: 76 % (ref 32–75)
NRBC # BLD: 0 K/UL (ref 0–0.01)
NRBC BLD-RTO: 0 PER 100 WBC
PHOSPHATE SERPL-MCNC: 5.1 MG/DL (ref 2.6–4.7)
PLATELET # BLD AUTO: 253 K/UL (ref 150–400)
PMV BLD AUTO: 10.1 FL (ref 8.9–12.9)
POTASSIUM SERPL-SCNC: 4.3 MMOL/L (ref 3.5–5.1)
RBC # BLD AUTO: 2.93 M/UL (ref 3.8–5.2)
RBC MORPH BLD: ABNORMAL
RBC MORPH BLD: ABNORMAL
SERVICE CMNT-IMP: ABNORMAL
SERVICE CMNT-IMP: NORMAL
SERVICE CMNT-IMP: NORMAL
SODIUM SERPL-SCNC: 145 MMOL/L (ref 136–145)
WBC # BLD AUTO: 14.6 K/UL (ref 3.6–11)

## 2023-12-27 PROCEDURE — 2700000000 HC OXYGEN THERAPY PER DAY

## 2023-12-27 PROCEDURE — 94660 CPAP INITIATION&MGMT: CPT

## 2023-12-27 PROCEDURE — 84100 ASSAY OF PHOSPHORUS: CPT

## 2023-12-27 PROCEDURE — 2060000000 HC ICU INTERMEDIATE R&B

## 2023-12-27 PROCEDURE — 6360000002 HC RX W HCPCS: Performed by: HOSPITALIST

## 2023-12-27 PROCEDURE — 74176 CT ABD & PELVIS W/O CONTRAST: CPT

## 2023-12-27 PROCEDURE — 36415 COLL VENOUS BLD VENIPUNCTURE: CPT

## 2023-12-27 PROCEDURE — 80048 BASIC METABOLIC PNL TOTAL CA: CPT

## 2023-12-27 PROCEDURE — 2500000003 HC RX 250 WO HCPCS: Performed by: INTERNAL MEDICINE

## 2023-12-27 PROCEDURE — 6370000000 HC RX 637 (ALT 250 FOR IP): Performed by: HOSPITALIST

## 2023-12-27 PROCEDURE — 82962 GLUCOSE BLOOD TEST: CPT

## 2023-12-27 PROCEDURE — 2580000003 HC RX 258: Performed by: HOSPITALIST

## 2023-12-27 PROCEDURE — 6370000000 HC RX 637 (ALT 250 FOR IP): Performed by: NURSE PRACTITIONER

## 2023-12-27 PROCEDURE — 6370000000 HC RX 637 (ALT 250 FOR IP): Performed by: INTERNAL MEDICINE

## 2023-12-27 PROCEDURE — 83735 ASSAY OF MAGNESIUM: CPT

## 2023-12-27 PROCEDURE — 85025 COMPLETE CBC W/AUTO DIFF WBC: CPT

## 2023-12-27 RX ADMIN — METHIMAZOLE 10 MG: 5 TABLET ORAL at 09:15

## 2023-12-27 RX ADMIN — TRAMADOL HYDROCHLORIDE 50 MG: 50 TABLET, COATED ORAL at 17:26

## 2023-12-27 RX ADMIN — DOXYCYCLINE HYCLATE 100 MG: 100 TABLET, COATED ORAL at 20:58

## 2023-12-27 RX ADMIN — CALCIUM CARBONATE 10GR (648 MG) 2 TABLET: 648 TABLET ORAL at 23:23

## 2023-12-27 RX ADMIN — PANTOPRAZOLE SODIUM 40 MG: 40 TABLET, DELAYED RELEASE ORAL at 09:15

## 2023-12-27 RX ADMIN — HEPARIN SODIUM 5000 UNITS: 5000 INJECTION INTRAVENOUS; SUBCUTANEOUS at 14:10

## 2023-12-27 RX ADMIN — CALCITRIOL CAPSULES 0.25 MCG 0.25 MCG: 0.25 CAPSULE ORAL at 09:15

## 2023-12-27 RX ADMIN — Medication 10 ML: at 09:15

## 2023-12-27 RX ADMIN — HEPARIN SODIUM 5000 UNITS: 5000 INJECTION INTRAVENOUS; SUBCUTANEOUS at 23:23

## 2023-12-27 RX ADMIN — HEPARIN SODIUM 5000 UNITS: 5000 INJECTION INTRAVENOUS; SUBCUTANEOUS at 06:07

## 2023-12-27 RX ADMIN — DOXYCYCLINE HYCLATE 100 MG: 100 TABLET, COATED ORAL at 09:15

## 2023-12-27 RX ADMIN — INSULIN GLARGINE 20 UNITS: 100 INJECTION, SOLUTION SUBCUTANEOUS at 20:58

## 2023-12-27 RX ADMIN — Medication 10 ML: at 20:59

## 2023-12-27 ASSESSMENT — PAIN DESCRIPTION - LOCATION: LOCATION: LEG

## 2023-12-27 ASSESSMENT — PAIN DESCRIPTION - ORIENTATION: ORIENTATION: LEFT;RIGHT

## 2023-12-27 ASSESSMENT — PAIN SCALES - GENERAL
PAINLEVEL_OUTOF10: 4
PAINLEVEL_OUTOF10: 6

## 2023-12-27 ASSESSMENT — PAIN DESCRIPTION - PAIN TYPE: TYPE: CHRONIC PAIN

## 2023-12-27 ASSESSMENT — PAIN DESCRIPTION - DESCRIPTORS: DESCRIPTORS: ACHING

## 2023-12-27 ASSESSMENT — PAIN - FUNCTIONAL ASSESSMENT: PAIN_FUNCTIONAL_ASSESSMENT: PREVENTS OR INTERFERES WITH MANY ACTIVE NOT PASSIVE ACTIVITIES

## 2023-12-27 NOTE — CONSULTS
Requesting Provider: Adamaris Montana MD - Reason for Consultation: \"hydronephrosis\"  Pre-existing [facility] Patient:   yes                Patient: Dolores Pierre MRN: [id number]  SSN: [id number]    YOB: 1960  Age: 63 y.o.  Sex: female     Location: [facility] [place]       Code Status: Full Code   PCP: Gregorio Koenig III, MD  - [phone]   Emergency Contact:  Primary Emergency Contact: Bing Shoemaker, Home Phone: [phone]   Race/Denominational/Language: Black /  / Catholic / Speaks English   Payor: Payor: [employer] MEDICARE / Plan: [facility] [facility] / Product Type: *No Product type* /    Prior Admission Data: 9/22/23 [facility] EMERGENCY DEPT     Hospitalized:  Hospital Day: 8 - Admitted 12/20/2023 12:20 PM     CONSULTANTS  IP CONSULT TO NEPHROLOGY  IP CONSULT TO PULMONOLOGY  IP WOUND CARE NURSE CONSULT TO EVAL  IP CONSULT TO ORTHOPEDIC SURGERY  IP CONSULT TO CARDIOLOGY  IP CONSULT TO INFECTIOUS DISEASES  IP CONSULT TO PULMONOLOGY  IP CONSULT TO UROLOGY   ADMISSION DIAGNOSES  [unfilled]      Assessment/Plan:       64 yo F admitted for sepsis 2/2 pneumonia, COVID with CECI on CKD, hx of chronic hydronephrosis managed with bilateral stents (last changed 9/8/23) with mild to moderate hydronephrosis on Renal US 12/26/23     - CT A/P ordered to better evaluate hydro and position of stent. Continue to trend creatinine which is improving today. Following.     Supervising Dr. Santo FOLEY      CC: [unfilled]   HPI: She is a 63 y.o. female with PMH of COPD, DM, HTN, morbid obesity, kidney stones, chronic kidney disease, chronic bilateral hydronephrosis managed with b/l stents who presented with SOB, fever and was admitted for sepsis 2/2 pneumonia, Covid + 12/20, acute hypoxic respiratory failure, elevated troponins, CECI on CKD.   Urology is consulted for hydronephrosis in setting of CECI. Followed by Dr. Moulton at . Bilateral stents last exchanged 9/8/23.   She admits some Right flank pain a few  bowel small bowel and stomach without obstruction. 2. Nephroureteral stents bilaterally without associated urinary obstruction and  with decreased intrarenal calculi bilaterally. 3. Other incidental and postoperative changes described above. US Result (most recent):  US RETROPERITONEAL COMPLETE 12/26/2023    Narrative  EXAM: US RETROPERITONEAL COMPLETE    INDICATION: hydronephrosis;at bedside    COMPARISON: Renal ultrasound 12/21/2023, CT abdomen and pelvis 5/11/2023    TECHNIQUE: Ultrasound of the kidneys and urinary bladder. FINDINGS: The right and left kidneys measure 12.1 and 11.4 cm, respectively. Right-sided nephroureteral stent. Slightly worsening mild to moderate right  hydronephrosis. No renal mass. Several subcentimeter nonobstructing left renal  calculi. Kidneys are echogenic. Evette Ishmael The bladder is decompressive Yeung catheter. Visualized aorta and IVC are within  normal limits. Impression  1. Evidence of medical renal disease. 2.  Slightly worsening mild to moderate right hydronephrosis, despite  nephroureteral stent in place. 3.  Nonobstructing left nephrolithiasis. Cultures   [unfilled]     Past History: (Complete 2+/3 areas)     Allergies   Allergen Reactions    Neosporin [Bacitracin-Polymyxin B] Hives    Bacitracin Other (See Comments)     Syncope and lip swelling.  Patient states used Neosporine to treat wound from hair dye and neosporine seeped into her bloodstream.     Amoxicillin Nausea And Vomiting    Ciprofloxacin Nausea And Vomiting      Current Facility-Administered Medications   Medication Dose Route Frequency    ondansetron (ZOFRAN) injection 4 mg  4 mg IntraVENous Q6H PRN    doxycycline hyclate (VIBRA-TABS) tablet 100 mg  100 mg Oral 2 times per day    calcium carbonate 648 MG per tablet 2 tablet  2 tablet Oral BID    calcitRIOL (ROCALTROL) capsule 0.25 mcg  0.25 mcg Oral Daily    ipratropium 0.5 mg-albuterol 2.5 mg (DUONEB) nebulizer solution 1 Dose  1 Dose Inhalation

## 2023-12-27 NOTE — PROGRESS NOTES
ID Progress Note          Admission Summary: \"The patient was admitted by way of the emergency room with a chief complaint of shortness of breath. The patient states that she has been ill for several days prior to admission with cough, congestion, and worsening shortness of breath. The patient called 911 and when EMS arrived the patient's O2 saturation was 61% on room air. They gave her a nonrebreather mask and DuoNeb treatment with return of oxygenation to 100%. Patient had an axillary temperature of 101.2 and was tachycardic to 130 pm.     The patient has a history of COPD, diabetes, and morbid obesity with a BMI of 61.65. CXR on admission showed no acute process. Coronavirus was positive. WBC on admission is 18.0 with 87% PMNs. Blood cultures were drawn and antibiotic antibiotic was initiated. Patient was started on ceftriaxone and azithromycin for CAP. \"       Interval history    Consult date (12/24) bacteremia    Yeung  12/21/2023  Multiple wound; ischium, coccyx  Antimicrobial therapy history      IV vancomycin 12/21-12/26  IV rocephin 12/20-12/26  PO doxycycline 12/26-12/31  Assessment and Plan   Bacteremia; contaminant  Leukocytosis; reactive process from COVID 19  Right hydronephrosis  - afebrile, wbc 14.6    Blood cx (12/20) CoNS multiple colony types 2/4 bottles, (12/22) no growth so far    MRSA nare screen (+)    U/A (12/21) wbc >100 with 4+ bacteria, no urine cx processed    Procal 0.38->0.62    CRP 1.83    Resp panel (12/20) COVID 19 +    CXR (12/20) no acute pulmonary process, (12/25) Mild pulmonary interstitial edema     Renal US (12/21) Evidence of medical renal disease. Mild right hydronephrosis. Fever with Leukocytosis could be due to UTI in presence of right hydronephrosis and/or COVID 19. Unfortunately, urine cx was never processed. COVID 19 could resulted fever with reactive leukocytosis. CoNS bacteremia is due to contaminant sample.  At this point, we agree with completing abx

## 2023-12-27 NOTE — CARE COORDINATION
Bedside and Verbal shift change report to be given to Samuel Cervantes RN (oncoming nurse) by  Rafael Zhao nurse). Report included the following information SBAR, Kardex, Intake/Output, MAR and Recent Results. RN to assume care of patient. Transition of Care Plan:     RUR: 17%  Prior Level of Functioning: Patient owns a hospital bed, wheelchair, and walker. Patient has been working with physical therapy at [address] in [facility]. Disposition: Pending acceptance  1- [employer] in [address] [place]  If SNF or IPR: Date FOC offered: 12/27  Date FOC received: 12/27  Accepting facility: [facility]  Date authorization started with reference number: 12/27  Date authorization received and expires: Follow up appointments: TBD  DME needed: NA if SNF  Transportation at discharge: BLS w [facility] or [facility]  IM/IMM Medicare/ letter given:   Is patient a  and connected with [facility]? If yes, was [employer] transfer form completed and [facility] notified? Caregiver Contact: Sister Jostin Wade ([phone])   Discharge Caregiver contacted prior to discharge? Care Conference needed? Barriers to discharge:  Medical stability, SNF acceptance and auth    CM met w patient at bedside to discuss the d/c plan. Patient agrees w SNF and reviewed the choice list.  She request referrals be sent to [facility] (close to family), [facility] and [facility]. She inquired about [facility] and CM provided education of IPR vs SNF levels of care. Patient is aware [facility] will need to auth this LOC. CM completed referrals in [facility] Update 1:22pm: [facility] has accepted and will start auth today.      JUANPABLO Fraser CCM  Care Management

## 2023-12-27 NOTE — PROGRESS NOTES
[facility]  Hospitalist Group                                                                                          Hospitalist Progress Note  Armani Winslow MD  Office Phone: [phone]        Date of Service:  2023  NAME:  Marcus Hathaway  :  1960  MRN:  [id number]       Admission Summary:      Marcus Hathaway is a 59 y.o. female w/ PMH of DM2, CKD4, HTN, morbid obesity, neuropathy, thyroid disease who presented to the ED on  w/ progressively worsening SOB, fevers cough times a few days. Hypoxic with EMS, placed on BiPAP in the ED. ABG taken on nasal cannula indicated hypercapnic and hypoxic respiratory failure. Code sepsis called. Coronavirus OC43 by PCR positive. Patient transitioned from BiPAP to nasal cannula overnight, O2 saturations are in the high 90s. Hospitalist service consulted for downgrade out of the ICU. Noted pt had elevated troponins of 251 on admission, no acute ST changes. Pt DENIES she had a recent STEMI 2 weeks ago as previously documented. She did have some mild chest pain on admission but none further. Troponin levels are trending down. Pt has mutiple risk factors, will consult with cardiology.         Interval history / Subjective:      Breathing better  Left knee pain and difficulty to move left knee   Assessment & Plan:      Severe sepsis:   Source unclear:   Pneumonia, likely viral , may have bacterial infeciton   Procal  0.62   COVID + on   - contact precautions  - Poor UA sample with moderate epithelial, ucx never sent  - VQ scan with normal perfusion  - has transitioned from solumedrol to prednisone  - BC from  with 2 bottle + CoNS, likely contamination   -nasal MRSA screen +   -azithromycin, ceftriaxone x 6 days , last dose ceftriaxone    - dc iv vanco , change to po doxy to complete 10 days tx   -trending CRP, low at 1.83        Acute hypoxic and hypercapnic respiratory failure -  Acute respiratory acidosis   Due to OHS,   Inpatient  Cardiac monitoring: Telemetry  Central Line:            Social Determinants of Health     Tobacco Use: Low Risk  (5/12/2023)    Patient History     Smoking Tobacco Use: Never     Smokeless Tobacco Use: Never     Passive Exposure: Not on file   Alcohol Use: Not on file   Financial Resource Strain: Not on file   Food Insecurity: Not on file   Transportation Needs: Not on file   Physical Activity: Not on file   Stress: Not on file   Social Connections: Not on file   Intimate Partner Violence: Not on file   Depression: Not on file   Housing Stability: Not on file   Interpersonal Safety: Not on file   Utilities: Not on file       Review of Systems:   Pertinent items are noted in HPI.       Vital Signs:    Last 24hrs VS reviewed since prior progress note. Most recent are:  Vitals:    12/27/23 1150   BP:    Pulse: 77   Resp:    Temp:    SpO2:          Intake/Output Summary (Last 24 hours) at 12/27/2023 1251  Last data filed at 12/27/2023 0605  Gross per 24 hour   Intake --   Output 1900 ml   Net -1900 ml          Physical Examination:     I had a face to face encounter with this patient and independently examined them on 12/27/2023 as outlined below:          General : alert x 3, awake, no acute distress,   HEENT: PEERL, EOMI, moist mucus membrane, TM clear  Neck: supple, no JVD, no meningeal signs  Chest:AE ok   CVS: S1 S2 heard, Capillary refill less than 2 seconds  Abd: severe obese. BS +   Ext: left knee rom decreased, bilat ankle trace edema   Neuro/Psych: pleasant mood and affect, CN 2-12 grossly intact, sensory grossly within normal limit, Strength 5/5 in all extremities, DTR 1+ x 4  Skin: warm     Data Review:    Review and/or order of clinical lab test      I have personally and independently reviewed all pertinent labs, diagnostic studies, imaging, and have provided independent interpretation of the same.     Labs:     Recent Labs     12/26/23  0459 12/27/23  0322   WBC 15.0* 14.6*   HGB 7.9* 7.9*

## 2023-12-28 ENCOUNTER — APPOINTMENT (OUTPATIENT)
Facility: HOSPITAL | Age: 63
End: 2023-12-28
Payer: MEDICARE

## 2023-12-28 LAB
ALBUMIN SERPL-MCNC: 2.5 G/DL (ref 3.5–5)
ALBUMIN/GLOB SERPL: 0.6 (ref 1.1–2.2)
ALP SERPL-CCNC: 57 U/L (ref 45–117)
ALT SERPL-CCNC: 11 U/L (ref 12–78)
ANION GAP SERPL CALC-SCNC: 5 MMOL/L (ref 5–15)
AST SERPL-CCNC: 10 U/L (ref 15–37)
BILIRUB SERPL-MCNC: 0.3 MG/DL (ref 0.2–1)
BUN SERPL-MCNC: 65 MG/DL (ref 6–20)
BUN/CREAT SERPL: 19 (ref 12–20)
CALCIUM SERPL-MCNC: 7.8 MG/DL (ref 8.5–10.1)
CHLORIDE SERPL-SCNC: 112 MMOL/L (ref 97–108)
CO2 SERPL-SCNC: 26 MMOL/L (ref 21–32)
CREAT SERPL-MCNC: 3.48 MG/DL (ref 0.55–1.02)
ERYTHROCYTE [DISTWIDTH] IN BLOOD BY AUTOMATED COUNT: 16.3 % (ref 11.5–14.5)
GLOBULIN SER CALC-MCNC: 4.2 G/DL (ref 2–4)
GLUCOSE BLD STRIP.AUTO-MCNC: 117 MG/DL (ref 65–117)
GLUCOSE BLD STRIP.AUTO-MCNC: 161 MG/DL (ref 65–117)
GLUCOSE BLD STRIP.AUTO-MCNC: 188 MG/DL (ref 65–117)
GLUCOSE BLD STRIP.AUTO-MCNC: 193 MG/DL (ref 65–117)
GLUCOSE SERPL-MCNC: 114 MG/DL (ref 65–100)
HCT VFR BLD AUTO: 29.4 % (ref 35–47)
HGB BLD-MCNC: 8.3 G/DL (ref 11.5–16)
MCH RBC QN AUTO: 27.6 PG (ref 26–34)
MCHC RBC AUTO-ENTMCNC: 28.2 G/DL (ref 30–36.5)
MCV RBC AUTO: 97.7 FL (ref 80–99)
NRBC # BLD: 0 K/UL (ref 0–0.01)
NRBC BLD-RTO: 0 PER 100 WBC
PLATELET # BLD AUTO: 267 K/UL (ref 150–400)
PMV BLD AUTO: 10.6 FL (ref 8.9–12.9)
POTASSIUM SERPL-SCNC: 4.4 MMOL/L (ref 3.5–5.1)
PROT SERPL-MCNC: 6.7 G/DL (ref 6.4–8.2)
RBC # BLD AUTO: 3.01 M/UL (ref 3.8–5.2)
SERVICE CMNT-IMP: ABNORMAL
SERVICE CMNT-IMP: NORMAL
SODIUM SERPL-SCNC: 143 MMOL/L (ref 136–145)
WBC # BLD AUTO: 15.8 K/UL (ref 3.6–11)

## 2023-12-28 PROCEDURE — 6360000002 HC RX W HCPCS: Performed by: HOSPITALIST

## 2023-12-28 PROCEDURE — 6370000000 HC RX 637 (ALT 250 FOR IP): Performed by: HOSPITALIST

## 2023-12-28 PROCEDURE — 6370000000 HC RX 637 (ALT 250 FOR IP): Performed by: NURSE PRACTITIONER

## 2023-12-28 PROCEDURE — 97110 THERAPEUTIC EXERCISES: CPT

## 2023-12-28 PROCEDURE — 6370000000 HC RX 637 (ALT 250 FOR IP): Performed by: INTERNAL MEDICINE

## 2023-12-28 PROCEDURE — 36415 COLL VENOUS BLD VENIPUNCTURE: CPT

## 2023-12-28 PROCEDURE — 2580000003 HC RX 258: Performed by: HOSPITALIST

## 2023-12-28 PROCEDURE — 2700000000 HC OXYGEN THERAPY PER DAY

## 2023-12-28 PROCEDURE — 80053 COMPREHEN METABOLIC PANEL: CPT

## 2023-12-28 PROCEDURE — 2060000000 HC ICU INTERMEDIATE R&B

## 2023-12-28 PROCEDURE — 82962 GLUCOSE BLOOD TEST: CPT

## 2023-12-28 PROCEDURE — 71045 X-RAY EXAM CHEST 1 VIEW: CPT

## 2023-12-28 PROCEDURE — 94761 N-INVAS EAR/PLS OXIMETRY MLT: CPT

## 2023-12-28 PROCEDURE — 94760 N-INVAS EAR/PLS OXIMETRY 1: CPT

## 2023-12-28 PROCEDURE — 2500000003 HC RX 250 WO HCPCS: Performed by: INTERNAL MEDICINE

## 2023-12-28 PROCEDURE — 85027 COMPLETE CBC AUTOMATED: CPT

## 2023-12-28 RX ADMIN — HEPARIN SODIUM 5000 UNITS: 5000 INJECTION INTRAVENOUS; SUBCUTANEOUS at 21:37

## 2023-12-28 RX ADMIN — INSULIN GLARGINE 20 UNITS: 100 INJECTION, SOLUTION SUBCUTANEOUS at 21:37

## 2023-12-28 RX ADMIN — HEPARIN SODIUM 5000 UNITS: 5000 INJECTION INTRAVENOUS; SUBCUTANEOUS at 14:00

## 2023-12-28 RX ADMIN — CALCIUM CARBONATE 10GR (648 MG) 2 TABLET: 648 TABLET ORAL at 09:55

## 2023-12-28 RX ADMIN — METHIMAZOLE 10 MG: 5 TABLET ORAL at 09:20

## 2023-12-28 RX ADMIN — CALCITRIOL CAPSULES 0.25 MCG 0.25 MCG: 0.25 CAPSULE ORAL at 09:20

## 2023-12-28 RX ADMIN — PANTOPRAZOLE SODIUM 40 MG: 40 TABLET, DELAYED RELEASE ORAL at 09:20

## 2023-12-28 RX ADMIN — Medication 5 ML: at 21:37

## 2023-12-28 RX ADMIN — TRAMADOL HYDROCHLORIDE 50 MG: 50 TABLET, COATED ORAL at 01:09

## 2023-12-28 RX ADMIN — DOXYCYCLINE HYCLATE 100 MG: 100 TABLET, COATED ORAL at 09:20

## 2023-12-28 RX ADMIN — MICONAZOLE NITRATE: 2 OINTMENT TOPICAL at 14:00

## 2023-12-28 RX ADMIN — DOXYCYCLINE HYCLATE 100 MG: 100 TABLET, COATED ORAL at 21:38

## 2023-12-28 RX ADMIN — Medication 10 ML: at 09:20

## 2023-12-28 RX ADMIN — CALCIUM CARBONATE 10GR (648 MG) 2 TABLET: 648 TABLET ORAL at 22:15

## 2023-12-28 RX ADMIN — HEPARIN SODIUM 5000 UNITS: 5000 INJECTION INTRAVENOUS; SUBCUTANEOUS at 07:32

## 2023-12-28 ASSESSMENT — PAIN SCALES - GENERAL
PAINLEVEL_OUTOF10: 4
PAINLEVEL_OUTOF10: 4
PAINLEVEL_OUTOF10: 5
PAINLEVEL_OUTOF10: 4

## 2023-12-28 ASSESSMENT — PAIN DESCRIPTION - LOCATION
LOCATION: KNEE;LEG
LOCATION: KNEE;LEG

## 2023-12-28 ASSESSMENT — PAIN DESCRIPTION - DESCRIPTORS
DESCRIPTORS: ACHING
DESCRIPTORS: ACHING

## 2023-12-28 ASSESSMENT — PAIN DESCRIPTION - PAIN TYPE
TYPE: CHRONIC PAIN
TYPE: CHRONIC PAIN

## 2023-12-28 ASSESSMENT — PAIN - FUNCTIONAL ASSESSMENT: PAIN_FUNCTIONAL_ASSESSMENT: PREVENTS OR INTERFERES SOME ACTIVE ACTIVITIES AND ADLS

## 2023-12-28 ASSESSMENT — PAIN DESCRIPTION - ORIENTATION
ORIENTATION: RIGHT;LEFT
ORIENTATION: RIGHT;LEFT

## 2023-12-28 NOTE — PLAN OF CARE
Problem: Physical Therapy - Adult  Goal: By Discharge: Performs mobility at highest level of function for planned discharge setting. See evaluation for individualized goals. Description: FUNCTIONAL STATUS PRIOR TO ADMISSION:  Pt reports being nonambulatory and using a wheelchair for locomotion. She describes a recent bed bound situation staying at her sister's home and voiding and defecating in the bed and having to be cleaned up. She reports that until recently she was participating in outpatient PT (using medical transport). HOME SUPPORT PRIOR TO ASMISSION: Patient lived with her sister and brother in law due to recent decline in health, however, she noted her sister is in poor health, being set up for [address] [place] [address],[address] services now. Physical Therapy Goals  Initiated 12/26/2023  1. Patient will move from supine to sit and sit to supine, scoot up and down, and roll side to side in bed with maximal assistance x 2 within 7 day(s). 2.  Patient will participate in bed ex with mod assist within 7 day(s). 3.  Patient will demonstrate the ability to direct repositioning in the bed and state principles of repositioning within 7 day(s). Outcome: Progressing    PHYSICAL THERAPY TREATMENT    Patient: Flora Simmons (9 y.o. female)  Date: 12/28/2023  Diagnosis: Septicemia ([address]) [A41.9]  Hypoxia [R09.02]  Elevated troponin [R79.89]  Sepsis ([address]) [A41.9] Sepsis ([address])      Precautions: Fall Risk, Contact Precautions (NWB LLE in knee immobilizer, bariatiric equipment needs (weight 335 pounds and BMI of 57.49))                    ASSESSMENT:  Patient continues to benefit from skilled PT services and is slowly progressing towards goals. Pt limited by left Knee pain only tolerating minimal movement. Pt with decrease knee extension and IR. Pt did participate in minimal LE exercises. Pt has better movement in right LE. Pt does not have knee immobilizer and reports the ortho dr says that she does not need it.  Did not

## 2023-12-28 NOTE — WOUND CARE
WOCN Note:     New consult for buttocks     Garret Escobedo is a 59 y.o. y/o female who presented for Septicemia ([address]) [A41.9]  Hypoxia [R09.02]  Elevated troponin [R79.89]  Sepsis ([address]) [A41.9]  Admitted on 12/20/2023    Past Medical History:   Diagnosis Date    Chronic kidney disease     Chronic obstructive pulmonary disease ([address])     Diabetes ([address])     Hernia, hiatal     Hypertension     Incontinence of urine     Kidney stones     both kidneys    Morbid obesity ([address])     Osteoarthritis     Peripheral neuropathy     DOES GET SOME NUMBNESS IN HANDS    Psychiatric disorder     ANXIETY    Renal failure     left kidney    Thyroid disease        Lab Results   Component Value Date/Time    WBC 15.8 (H) 12/28/2023 04:00 AM    LABA1C 7.4 (H) 05/11/2023 10:35 PM    POCGLU 117 12/28/2023 07:48 AM    POCGLU 229 (H) 12/27/2023 08:37 PM    HGB 8.3 (L) 12/28/2023 04:00 AM    HCT 29.4 (L) 12/28/2023 04:00 AM     12/28/2023 04:00 AM        Tobacco Use      Smoking status: Never      Smokeless tobacco: Never     ADULT DIET; Regular; 4 carb choices (60 gm/meal)     Infectious Disease following    Assessment:   Seen today in room 350/01   Alert and appropriately communicative. Reports tolerable pain in left leg with movement. Mobile and repositions independently for visual assessment of sacrum  Turned onto right side. Continent. Surface: bariatric SELAM mattress  Bilateral heels intact and without erythema. Heels offloaded with pillows. Sacrum intact without erythema. Buttocks with scattered desquamation, otherwise skin intact. Some scattered pink re-epithelialized tissue. No erythema noted. Patient reports itching. Wound Recommendations:    Buttocks: Twice Daily - apply miconazole ointment    PI Prevention:  Turn/reposition approximately every 2 hours  Offload heels with heels hanging off end of pillow at all times while in bed.   Sacral Foam dressing: lift to assess regularly; change as needed. Discontinue if incontinence is frequently soiling dressing.     Low Air Loss mattress  in place. Use only flat sheet and one incontinence pad.     Discussed assessment and plan with patient  Obtained topical treatment orders from Dr. Posada     Transition of Care: Plan to follow weekly and as needed while admitted to hospital.      Anne Sturgeon  MSN, RN  [facility]  Office [phone]   Available through [employer]

## 2023-12-28 NOTE — PROGRESS NOTES
PCCM:    VSS    WBC 15.8    Plan:  PNA   Chest film today  On doxy    Declines NIV      Amor Shah PA-C

## 2023-12-28 NOTE — PLAN OF CARE
Problem: Discharge Planning  Goal: Discharge to home or other facility with appropriate resources  Outcome: Progressing     Problem: Pain  Goal: Verbalizes/displays adequate comfort level or baseline comfort level  Outcome: Progressing     Problem: Physical Therapy - Adult  Goal: By Discharge: Performs mobility at highest level of function for planned discharge setting. See evaluation for individualized goals. Description: FUNCTIONAL STATUS PRIOR TO ADMISSION:  Pt reports being nonambulatory and using a wheelchair for locomotion. She describes a recent bed bound situation staying at her sister's home and voiding and defecating in the bed and having to be cleaned up. She reports that until recently she was participating in outpatient PT (using medical transport). HOME SUPPORT PRIOR TO ASMISSION: Patient lived with her sister and brother in law due to recent decline in health, however, she noted her sister is in poor health, being set up for [facility] services now. Physical Therapy Goals  Initiated 12/26/2023  1. Patient will move from supine to sit and sit to supine, scoot up and down, and roll side to side in bed with maximal assistance x 2 within 7 day(s). 2.  Patient will participate in bed ex with mod assist within 7 day(s). 3.  Patient will demonstrate the ability to direct repositioning in the bed and state principles of repositioning within 7 day(s).       12/28/2023 1343 by Shameka Pierce PTA  Outcome: Progressing

## 2023-12-28 NOTE — PROGRESS NOTES
signs  Chest:AE ok   CVS: S1 S2 heard, Capillary refill less than 2 seconds  Abd: severe obese. BS +   Ext: left knee rom decreased, bilat ankle trace edema   Neuro/Psych: pleasant mood and affect, CN 2-12 grossly intact, sensory grossly within normal limit, Strength 5/5 in all extremities, DTR 1+ x 4  Skin: warm     Data Review:    Review and/or order of clinical lab test      I have personally and independently reviewed all pertinent labs, diagnostic studies, imaging, and have provided independent interpretation of the same. Labs:     Recent Labs     12/27/23 0322 12/28/23  0400   WBC 14.6* 15.8*   HGB 7.9* 8.3*   HCT 27.7* 29.4*    267       Recent Labs     12/26/23  0459 12/27/23 0322 12/28/23  0400   * 145 143   K 4.1 4.3 4.4   * 112* 112*   CO2 29 28 26   BUN 68* 72* 65*   MG 1.9 1.8  --    PHOS 5.0* 5.1*  --        Recent Labs     12/28/23  0400   ALT 11*   GLOB 4.2*       No results for input(s): \"INR\", \"APTT\" in the last 72 hours. Invalid input(s): \"PTP\"   No results for input(s): \"TIBC\", \"FERR\" in the last 72 hours. Invalid input(s): \"FE\", \"PSAT\"   No results found for: \"FOL\", \"RBCF\"   No results for input(s): \"PH\", \"PCO2\", \"PO2\" in the last 72 hours. No results for input(s): \"CPK\" in the last 72 hours. Invalid input(s): \"CPKMB\", \"CKNDX\", \"TROIQ\"  No results found for: \"CHOL\", \"CHOLX\", \"CHLST\", \"CHOLV\", \"HDL\", \"HDLC\", \"LDL\", \"LDLC\", \"TGLX\", \"TRIGL\"  No results found for: \"GLUCPOC\"  [unfilled]    Notes reviewed from all clinical/nonclinical/nursing services involved in patient's clinical care. Care coordination discussions were held with appropriate clinical/nonclinical/ nursing providers based on care coordination needs. Patients current active Medications were reviewed, considered, added and adjusted based on the clinical condition today. Home Medications were reconciled to the best of my ability given all available resources at the time of admission.  Route is IntraVENous Q4H PRN     ______________________________________________________________________  EXPECTED LENGTH OF STAY: 2  ACTUAL LENGTH OF STAY:          8                 Joon Posada MD

## 2023-12-29 VITALS
WEIGHT: 293 LBS | RESPIRATION RATE: 19 BRPM | SYSTOLIC BLOOD PRESSURE: 152 MMHG | TEMPERATURE: 98.7 F | BODY MASS INDEX: 50.02 KG/M2 | HEART RATE: 116 BPM | OXYGEN SATURATION: 100 % | DIASTOLIC BLOOD PRESSURE: 85 MMHG | HEIGHT: 64 IN

## 2023-12-29 LAB
ALBUMIN SERPL-MCNC: 2.6 G/DL (ref 3.5–5)
ALBUMIN/GLOB SERPL: 0.6 (ref 1.1–2.2)
ALP SERPL-CCNC: 63 U/L (ref 45–117)
ALT SERPL-CCNC: 10 U/L (ref 12–78)
ANION GAP SERPL CALC-SCNC: 3 MMOL/L (ref 5–15)
AST SERPL-CCNC: 8 U/L (ref 15–37)
BILIRUB SERPL-MCNC: 0.2 MG/DL (ref 0.2–1)
BUN SERPL-MCNC: 67 MG/DL (ref 6–20)
BUN/CREAT SERPL: 19 (ref 12–20)
CALCIUM SERPL-MCNC: 8 MG/DL (ref 8.5–10.1)
CHLORIDE SERPL-SCNC: 112 MMOL/L (ref 97–108)
CO2 SERPL-SCNC: 30 MMOL/L (ref 21–32)
CREAT SERPL-MCNC: 3.48 MG/DL (ref 0.55–1.02)
ERYTHROCYTE [DISTWIDTH] IN BLOOD BY AUTOMATED COUNT: 16.2 % (ref 11.5–14.5)
GLOBULIN SER CALC-MCNC: 4.1 G/DL (ref 2–4)
GLUCOSE BLD STRIP.AUTO-MCNC: 149 MG/DL (ref 65–117)
GLUCOSE BLD STRIP.AUTO-MCNC: 153 MG/DL (ref 65–117)
GLUCOSE SERPL-MCNC: 147 MG/DL (ref 65–100)
HCT VFR BLD AUTO: 27.4 % (ref 35–47)
HGB BLD-MCNC: 7.9 G/DL (ref 11.5–16)
MCH RBC QN AUTO: 27.5 PG (ref 26–34)
MCHC RBC AUTO-ENTMCNC: 28.8 G/DL (ref 30–36.5)
MCV RBC AUTO: 95.5 FL (ref 80–99)
NRBC # BLD: 0 K/UL (ref 0–0.01)
NRBC BLD-RTO: 0 PER 100 WBC
PLATELET # BLD AUTO: 277 K/UL (ref 150–400)
PMV BLD AUTO: 10 FL (ref 8.9–12.9)
POTASSIUM SERPL-SCNC: 4.5 MMOL/L (ref 3.5–5.1)
PROT SERPL-MCNC: 6.7 G/DL (ref 6.4–8.2)
RBC # BLD AUTO: 2.87 M/UL (ref 3.8–5.2)
SERVICE CMNT-IMP: ABNORMAL
SERVICE CMNT-IMP: ABNORMAL
SODIUM SERPL-SCNC: 145 MMOL/L (ref 136–145)
WBC # BLD AUTO: 14.1 K/UL (ref 3.6–11)

## 2023-12-29 PROCEDURE — 6360000002 HC RX W HCPCS: Performed by: HOSPITALIST

## 2023-12-29 PROCEDURE — 6370000000 HC RX 637 (ALT 250 FOR IP): Performed by: HOSPITALIST

## 2023-12-29 PROCEDURE — 80053 COMPREHEN METABOLIC PANEL: CPT

## 2023-12-29 PROCEDURE — 2580000003 HC RX 258: Performed by: HOSPITALIST

## 2023-12-29 PROCEDURE — 2500000003 HC RX 250 WO HCPCS: Performed by: INTERNAL MEDICINE

## 2023-12-29 PROCEDURE — 6370000000 HC RX 637 (ALT 250 FOR IP): Performed by: INTERNAL MEDICINE

## 2023-12-29 PROCEDURE — 85027 COMPLETE CBC AUTOMATED: CPT

## 2023-12-29 PROCEDURE — 36415 COLL VENOUS BLD VENIPUNCTURE: CPT

## 2023-12-29 PROCEDURE — 94761 N-INVAS EAR/PLS OXIMETRY MLT: CPT

## 2023-12-29 PROCEDURE — 82962 GLUCOSE BLOOD TEST: CPT

## 2023-12-29 PROCEDURE — 6370000000 HC RX 637 (ALT 250 FOR IP): Performed by: NURSE PRACTITIONER

## 2023-12-29 PROCEDURE — 6360000002 HC RX W HCPCS: Performed by: NURSE PRACTITIONER

## 2023-12-29 RX ORDER — LIDOCAINE 4 G/G
1 PATCH TOPICAL DAILY
Qty: 30 EACH | Refills: 0 | Status: SHIPPED
Start: 2023-12-30

## 2023-12-29 RX ORDER — TRAMADOL HYDROCHLORIDE 50 MG/1
50-100 TABLET ORAL EVERY 8 HOURS PRN
Qty: 12 TABLET | Refills: 0 | Status: SHIPPED | OUTPATIENT
Start: 2023-12-29 | End: 2024-01-01

## 2023-12-29 RX ORDER — DOXYCYCLINE HYCLATE 100 MG
100 TABLET ORAL EVERY 12 HOURS SCHEDULED
Qty: 4 TABLET | Refills: 0 | Status: SHIPPED
Start: 2023-12-29 | End: 2023-12-31

## 2023-12-29 RX ORDER — INSULIN GLARGINE 100 [IU]/ML
20 INJECTION, SOLUTION SUBCUTANEOUS NIGHTLY
Qty: 10 ML | Refills: 3 | Status: SHIPPED
Start: 2023-12-29

## 2023-12-29 RX ORDER — CALCITRIOL 0.25 UG/1
0.25 CAPSULE, LIQUID FILLED ORAL DAILY
Qty: 30 CAPSULE | Refills: 3 | Status: SHIPPED
Start: 2023-12-30

## 2023-12-29 RX ORDER — ANTACID TABLETS 648 MG/1
2 TABLET, CHEWABLE ORAL 2 TIMES DAILY
Qty: 60 TABLET | Refills: 0 | Status: SHIPPED
Start: 2023-12-29

## 2023-12-29 RX ORDER — IPRATROPIUM BROMIDE AND ALBUTEROL SULFATE 2.5; .5 MG/3ML; MG/3ML
3 SOLUTION RESPIRATORY (INHALATION) EVERY 6 HOURS PRN
Qty: 30 EACH | Refills: 0 | Status: SHIPPED
Start: 2023-12-29

## 2023-12-29 RX ADMIN — CALCIUM CARBONATE 10GR (648 MG) 2 TABLET: 648 TABLET ORAL at 15:40

## 2023-12-29 RX ADMIN — METHIMAZOLE 10 MG: 5 TABLET ORAL at 10:30

## 2023-12-29 RX ADMIN — HYDROMORPHONE HYDROCHLORIDE 0.25 MG: 1 INJECTION, SOLUTION INTRAMUSCULAR; INTRAVENOUS; SUBCUTANEOUS at 06:41

## 2023-12-29 RX ADMIN — HEPARIN SODIUM 5000 UNITS: 5000 INJECTION INTRAVENOUS; SUBCUTANEOUS at 06:35

## 2023-12-29 RX ADMIN — CALCITRIOL CAPSULES 0.25 MCG 0.25 MCG: 0.25 CAPSULE ORAL at 10:30

## 2023-12-29 RX ADMIN — DOXYCYCLINE HYCLATE 100 MG: 100 TABLET, COATED ORAL at 10:30

## 2023-12-29 RX ADMIN — PANTOPRAZOLE SODIUM 40 MG: 40 TABLET, DELAYED RELEASE ORAL at 10:30

## 2023-12-29 RX ADMIN — MICONAZOLE NITRATE: 2 OINTMENT TOPICAL at 10:30

## 2023-12-29 RX ADMIN — HEPARIN SODIUM 5000 UNITS: 5000 INJECTION INTRAVENOUS; SUBCUTANEOUS at 15:38

## 2023-12-29 RX ADMIN — Medication 10 ML: at 10:32

## 2023-12-29 ASSESSMENT — PAIN DESCRIPTION - LOCATION: LOCATION: KNEE

## 2023-12-29 ASSESSMENT — PAIN SCALES - GENERAL
PAINLEVEL_OUTOF10: 6
PAINLEVEL_OUTOF10: 0

## 2023-12-29 ASSESSMENT — PAIN DESCRIPTION - DESCRIPTORS: DESCRIPTORS: ACHING

## 2023-12-29 ASSESSMENT — PAIN DESCRIPTION - ORIENTATION: ORIENTATION: LEFT

## 2023-12-29 NOTE — DISCHARGE INSTRUCTIONS
extra-articular transverse fracture of the distal femoral diaphysis.  Moderate to severe tricompartmental joint space narrowing.  No joint effusion.  -ortho consulted: given pt's multiple comorbidities recommended nonoperative management for her fracture.  Will plan to treat her fracture nonoperatively in a knee immobilizer.  She she should be immobilized for 4 to 6 weeks to allow for fracture healing for pain control.   -pt baseline is able to stand with a Walker then pivot a few step to transfer herself from bed to .   -PT/OT   T2DM with neuropathy  - A1c 7.4  - continue lantus 20 units,sliding scale and monitor finger stick glucose   Hyperthyroid disease   - c/w methimazole  Morbid obesity  -BMI 61 kg/m2  -counseled lifestyle changes, healthy diet and exercise         Code status: Full code  Prophylaxis: Heparin    DISCHARGE DIAGNOSES / PLAN:      Severe sepsis possible due to COVID 19 pneumonia    May have bacterial pneumonia  -improved, SpO2 on nasal canula 3l/m, monitor pulse ox  -afebrile, no leukocytosis, SpO2 % on RA  - monitor pulse ox   Acute hypoxic and hypercapnic respiratory failure -  Acute respiratory acidosis   Due to OHS, may have copd, and COVID 19 infection   -continue supplemental Oxygen  -decline to use BiPAP because of claustrophobia   -continue duo neb, monitor pulse ox  -outpatient follow up with pulmonologist  CECI on CKD stage IV   Chronic bilateral ureteral stents  -s/p bilateral nephroureteral stents in satisfactory position.   -repeat renal function    -outpatient follow up with urologist and nephrologist   Elevated troponins due to demand ischemia  - no left side chest pain  Hyperkalemia   -resolved  Left distal femur fracture   Bilat knee pain right knee severe tricompartmental degenerative changes   -ortho consulted: given pt's multiple comorbidities recommended nonoperative management for her fracture.  Will plan to treat her fracture nonoperatively in a knee immobilizer.   She she should be immobilized for 4 to 6 weeks to allow for fracture healing for pain control.   -pt baseline is able to stand with a Walker then pivot a few step to transfer herself from bed to [facility].   -PT/OT   T2DM with neuropathy  - continue lantus 20 units,sliding scale and monitor finger stick glucose   Hyperthyroid disease   -continue methimazole  Morbid obesity  -counseled lifestyle changes, healthy diet and exercise      PENDING TEST RESULTS:   At the time of discharge the following test results are still pending: None     FOLLOW UP APPOINTMENTS:    Junior Wes MD  Follow up with Sarahy Cade MD, Pulmonologist, patient need follow up chest image in 4-6 wks   Orlando Jose, [address], Urologist in 2 weeks  Keya Perez MD, Nephrology in 2 weeks  Kuldeep Dejesus [facility], [facility] in 4 weeks  Outpatient follow up with Orthopedic Surgeon in 4 weeks    ADDITIONAL CARE RECOMMENDATIONS:      DIET: diabetic diet    TUBE FEEDING INSTRUCTIONS: None    OXYGEN / BiPAP SETTINGS: Nasal Canula 3  l/m    ACTIVITY:  Left leg should be immobilized for 4 to 6 weeks to allow for fracture healing for pain control. WOUND CARE:    Sacrum and buttocks:  Every 12 hours and as needed cleanse and apply Zinc cream.    EQUIPMENT needed: None      DISCHARGE MEDICATIONS:   See Medication Reconciliation Form      NOTIFY YOUR PHYSICIAN FOR ANY OF THE FOLLOWING:   Fever over 101 degrees for 24 hours. Chest pain, shortness of breath, fever, chills, nausea, vomiting, diarrhea, change in mentation, falling, weakness, bleeding. Severe pain or pain not relieved by medications. Or, any other signs or symptoms that you may have questions about.     DISPOSITION:    Home With:   OT  PT  HH  RN      x SNF/Inpatient Rehab/LTAC    Independent/assisted living    Hospice    Other:       PATIENT CONDITION AT DISCHARGE:     Functional status    Poor    x Deconditioned     Independent

## 2023-12-29 NOTE — PROGRESS NOTES
AVS printed and sent with transport team, [facility], via stretcher to SNF, report given to Anjana Harris remains in at this time, saline locks removed.

## 2023-12-29 NOTE — DISCHARGE SUMMARY
Discharge Summary       PATIENT ID: Dolores Pierre  MRN: [id number]   YOB: 1960    DATE OF ADMISSION: 12/20/2023 12:20 PM    DATE OF DISCHARGE: 12/29/2023   PRIMARY CARE PROVIDER: Gregorio Koenig III, MD     ATTENDING PHYSICIAN:  Joon Posada MD  DISCHARGING PROVIDER: Joon Posada MD    To contact this individual call [phone] and ask the  to page.  If unavailable ask to be transferred the Adult Hospitalist Department.    CONSULTATIONS:   IP CONSULT TO NEPHROLOGY  IP CONSULT TO PULMONOLOGY  IP WOUND CARE NURSE CONSULT TO EVAL  IP CONSULT TO ORTHOPEDIC SURGERY  IP CONSULT TO CARDIOLOGY  IP CONSULT TO INFECTIOUS DISEASES  IP CONSULT TO PULMONOLOGY  IP CONSULT TO UROLOGY  IP WOUND CARE NURSE CONSULT TO EVAL    PROCEDURES/SURGERIES: * No surgery found *    ADMITTING DIAGNOSES & HOSPITAL COURSE:     Dolores Pierre is a 63 y.o. female with PMH of DM2, CKD4, HTN, morbid obesity, neuropathy, thyroid disease who presented to the ED on 12/20 w/ progressively worsening SOB, fevers cough times a few days.  Hypoxic with EMS, placed on BiPAP in the ED. ABG taken on nasal cannula indicated hypercapnic and hypoxic respiratory failure.  Code sepsis called.  Coronavirus OC43 by PCR positive.      Patient transitioned from BiPAP to nasal cannula overnight, O2 saturations are in the high 90s. Hospitalist service consulted for downgrade out of the ICU.  Noted pt had elevated troponins of 251 on admission, no acute ST changes.  Pt DENIES she had a recent STEMI 2 weeks ago as previously documented. She did have some mild chest pain on admission but none further. Troponin levels are trending down. Pt has mutiple risk factors, will consult with cardiology.     Severe sepsis possible due to COVID 19 pneumonia    may have bacterial infeciton   -Procal  0.62   -COVID + on 12/20  - contact precautions  - Poor UA sample with moderate epithelial, ucx never sent  - VQ scan with normal perfusion  - has  daily  Start taking on: December 30, 2023     miconazole nitrate 2 % Oint  Apply topically 2 times daily            CHANGE how you take these medications      traMADol 50 MG tablet  Commonly known as: ULTRAM  Take 1-2 tablets by mouth every 8 hours as needed for Pain for up to 3 days. Max Daily Amount: 300 mg  What changed: reasons to take this            CONTINUE taking these medications      acetaminophen 500 MG tablet  Commonly known as: TYLENOL     methIMAzole 10 MG tablet  Commonly known as: TAPAZOLE     pantoprazole 40 MG tablet  Commonly known as: PROTONIX            STOP taking these medications      glipiZIDE 5 MG tablet  Commonly known as: GLUCOTROL     OXYBUTYNIN CHLORIDE PO            ASK your doctor about these medications      ondansetron 8 MG tablet  Commonly known as: Elder Sport               Where to Get Your Medications        These medications were sent to [facility] [id number] [facility] [phone] [facility] [phone]  [address] [place] [address], [address] [place] [address]      Phone: [phone]   miconazole nitrate 2 % Oint       Information about where to get these medications is not yet available    Ask your nurse or doctor about these medications  calcitRIOL 0.25 MCG capsule  calcium carbonate 648 MG Tabs  doxycycline hyclate 100 MG tablet  insulin glargine 100 UNIT/ML injection vial  ipratropium 0.5 mg-albuterol 2.5 mg 0.5-2.5 (3) MG/3ML Soln nebulizer solution  lidocaine 4 % external patch  traMADol 50 MG tablet           NOTIFY YOUR PHYSICIAN FOR ANY OF THE FOLLOWING:   Fever over 101 degrees for 24 hours. Chest pain, shortness of breath, fever, chills, nausea, vomiting, diarrhea, change in mentation, falling, weakness, bleeding. Severe pain or pain not relieved by medications. Or, any other signs or symptoms that you may have questions about.     DISPOSITION:    Home With:   OT  PT  HH  RN      x Long term SNF/Inpatient Rehab    Independent/assisted living    Hospice    Other:

## 2023-12-29 NOTE — CARE COORDINATION
Treatment:  [x]Isolation (for MRSA, VRE, etc.)  []Surgical Drain Management  []Tracheostomy Care  []Dressing Changes  []Dialysis with transportation  []PEG Care  [x]Oxygen  []Daily Weights for Heart Failure    Dietary:  []Any diet limitations  []Tube Feedings   []Total Parenteral Management (TPN)    Financial Resources:  []Medicaid Application Completed    []UAI Completed and copy given to pt/family  and copy given to pt/family  []A screening has previously been completed. []Level II Completed    [] Private pay individual who will not become   financially eligible for Medicaid within 6 months from admission to a [address] [place] [address] facility. [] Individual refused to have screening conducted. []Medicaid Application Completed    []The screening denied because it was determined individual did not need/did not qualify for nursing facility level of care. [] Out of state residents seeking direct admission to a [facility]. [] Individuals who are inpatients of an out of state hospital, or in state or out of state veterans/ hospital and seek direct admission to a [facility]  [] Individuals who are pateints or residents of a state owned/operated facility that is licensed by [employer] of [employer] ([employer]) and seek direct admission to [address] [place] [address]  [] A screening not required for enrollment in [address] [place] [address] as set out in [address] [place] [address] 30-  [] [facility]) staff shall perform screenings of the [facility] clients. Advanced Care Plan:  []Surrogate Decision Maker of Care  []POA  []Communicated Code Status and copy sent. Other:   CM met w patient and visitor at bedside to discuss the d/c plan and review an important message from Medicare. Patient agrees w plan and all questions have been answered.      JUANPABLO Quiros CCM  Care Management

## 2023-12-29 NOTE — PROGRESS NOTES
Pulmonary, Critical Care, and Sleep Medicine~Progress Note    Name: Kristofer Montero MRN: [id number]   : 1960 Hospital: [address]   Date: 2023 11:41 AM Admission: 2023     Impression Plan   Severe sepsis suspected secondary to viral pneumonia from coronavirus OC 43. Nasal MRSA screen was also positive. Blood cultures  with 2 out of 2 bottles coag negative staph suspected to be contamination. Thyroid mass   Acute hypoxemic and hypercarbic respiratory failure. Currently on 2 L of oxygen. Recent ABG does show evidence of acute respiratory acidosis. Rule out obesity hypoventilation syndrome, obstructive sleep apnea. Elevated troponin secondary to demand ischemia. CECI on CKD; improving. Morbid obesity O2 titration above 90%   Patient will benefit from a trial of diuretics. We discussed setting up NIV at discharge. We discussed the importance and she declined. She is agreeable to discussing it again with her PCP  Defer thyroid mass to primary team   Will need follow up chest image in 4-6 wks      Daily Progress:      Feels better   Chest xray looked a little        Denies shortness of breath at rest,wheezing. Chest tightness. Pulmonary Consultation:    71-year-old -American female with past medical history significant for diabetes mellitus, hypertension, CKD stage IV, dyslipidemia and hypothyroidism who presented to [facility] with worsening shortness of breath and hypoxemia. Patient was admitted to ICU for hypotension/sepsis secondary to coronavirus OC 43 respiratory infection. She remained in the ICU for few days and was transferred out yesterday. Patient have cough with thick sputum. She denies fever or chills. She denies chest pain or chest tightness. Data reviewed:  Creatinine 3.81  WBC 13.1, hemoglobin 7.3, platelet 945. ABG done today pH 7.29, pCO2 50.7, pO2 100.   This ABG was done on 2 L/min by nasal

## 2023-12-29 NOTE — PLAN OF CARE
Problem: Discharge Planning  Goal: Discharge to home or other facility with appropriate resources  12/29/2023 0124 by Elliot Swain RN  Outcome: Progressing  Flowsheets (Taken 12/28/2023 2130)  Discharge to home or other facility with appropriate resources:   Identify barriers to discharge with patient and caregiver   Arrange for needed discharge resources and transportation as appropriate   Identify discharge learning needs (meds, wound care, etc)   Refer to discharge planning if patient needs post-hospital services based on physician order or complex needs related to functional status, cognitive ability or social support system  12/28/2023 1820 by Chivo Lora LPN  Outcome: Progressing     Problem: Pain  Goal: Verbalizes/displays adequate comfort level or baseline comfort level  12/29/2023 0124 by Elliot Swain RN  Outcome: Progressing  12/28/2023 1820 by Chivo Lora LPN  Outcome: Progressing     Problem: Skin/Tissue Integrity  Goal: Absence of new skin breakdown  Description: 1. Monitor for areas of redness and/or skin breakdown  2. Assess vascular access sites hourly  3. Every 4-6 hours minimum:  Change oxygen saturation probe site  4. Every 4-6 hours:  If on nasal continuous positive airway pressure, respiratory therapy assess nares and determine need for appliance change or resting period.   Outcome: Progressing     Problem: Safety - Adult  Goal: Free from fall injury  Outcome: Progressing     Problem: Chronic Conditions and Co-morbidities  Goal: Patient's chronic conditions and co-morbidity symptoms are monitored and maintained or improved  Outcome: Progressing  Flowsheets (Taken 12/28/2023 2130)  Care Plan - Patient's Chronic Conditions and Co-Morbidity Symptoms are Monitored and Maintained or Improved:   Monitor and assess patient's chronic conditions and comorbid symptoms for stability, deterioration, or improvement   Collaborate with multidisciplinary team to address chronic and comorbid

## 2024-01-01 ENCOUNTER — HOSPITAL ENCOUNTER (INPATIENT)
Facility: HOSPITAL | Age: 64
LOS: 7 days | Discharge: HOSPICE | DRG: 189 | End: 2024-06-25
Attending: EMERGENCY MEDICINE | Admitting: STUDENT IN AN ORGANIZED HEALTH CARE EDUCATION/TRAINING PROGRAM
Payer: MEDICARE

## 2024-01-01 ENCOUNTER — APPOINTMENT (OUTPATIENT)
Facility: HOSPITAL | Age: 64
DRG: 189 | End: 2024-01-01
Payer: MEDICARE

## 2024-01-01 ENCOUNTER — HOSPITAL ENCOUNTER (INPATIENT)
Facility: HOSPITAL | Age: 64
LOS: 1 days | DRG: 951 | End: 2024-06-26
Attending: FAMILY MEDICINE | Admitting: FAMILY MEDICINE
Payer: COMMERCIAL

## 2024-01-01 ENCOUNTER — HOSPICE ADMISSION (OUTPATIENT)
Age: 64
End: 2024-01-01
Payer: MEDICARE

## 2024-01-01 ENCOUNTER — APPOINTMENT (OUTPATIENT)
Facility: HOSPITAL | Age: 64
DRG: 189 | End: 2024-01-01
Attending: INTERNAL MEDICINE
Payer: MEDICARE

## 2024-01-01 VITALS
SYSTOLIC BLOOD PRESSURE: 177 MMHG | OXYGEN SATURATION: 94 % | HEART RATE: 103 BPM | TEMPERATURE: 99.1 F | RESPIRATION RATE: 20 BRPM | DIASTOLIC BLOOD PRESSURE: 74 MMHG

## 2024-01-01 VITALS
HEART RATE: 104 BPM | BODY MASS INDEX: 50.02 KG/M2 | WEIGHT: 293 LBS | RESPIRATION RATE: 16 BRPM | SYSTOLIC BLOOD PRESSURE: 191 MMHG | HEIGHT: 64 IN | DIASTOLIC BLOOD PRESSURE: 75 MMHG | TEMPERATURE: 98.8 F | OXYGEN SATURATION: 96 %

## 2024-01-01 DIAGNOSIS — R09.02 HYPOXIA: Primary | ICD-10-CM

## 2024-01-01 DIAGNOSIS — N17.9 AKI (ACUTE KIDNEY INJURY) (HCC): ICD-10-CM

## 2024-01-01 DIAGNOSIS — I50.810 RIGHT-SIDED CONGESTIVE HEART FAILURE, UNSPECIFIED HF CHRONICITY (HCC): ICD-10-CM

## 2024-01-01 DIAGNOSIS — J81.0 ACUTE PULMONARY EDEMA (HCC): ICD-10-CM

## 2024-01-01 DIAGNOSIS — D64.9 ANEMIA, UNSPECIFIED TYPE: ICD-10-CM

## 2024-01-01 DIAGNOSIS — E87.0 HYPERNATREMIA: ICD-10-CM

## 2024-01-01 LAB
ALBUMIN SERPL-MCNC: 2.8 G/DL (ref 3.5–5)
ALBUMIN/GLOB SERPL: 0.7 (ref 1.1–2.2)
ALP SERPL-CCNC: 197 U/L (ref 45–117)
ALT SERPL-CCNC: 42 U/L (ref 12–78)
ANION GAP BLD CALC-SCNC: 1 (ref 10–20)
ANION GAP SERPL CALC-SCNC: 10 MMOL/L (ref 5–15)
ANION GAP SERPL CALC-SCNC: 11 MMOL/L (ref 5–15)
ANION GAP SERPL CALC-SCNC: 13 MMOL/L (ref 5–15)
ANION GAP SERPL CALC-SCNC: 4 MMOL/L (ref 5–15)
ANION GAP SERPL CALC-SCNC: 5 MMOL/L (ref 5–15)
ANION GAP SERPL CALC-SCNC: 7 MMOL/L (ref 5–15)
ANION GAP SERPL CALC-SCNC: 7 MMOL/L (ref 5–15)
APPEARANCE UR: ABNORMAL
ARTERIAL PATENCY WRIST A: POSITIVE
ARTERIAL PATENCY WRIST A: YES
AST SERPL-CCNC: 62 U/L (ref 15–37)
B PERT DNA SPEC QL NAA+PROBE: NOT DETECTED
BACTERIA SPEC CULT: NORMAL
BACTERIA URNS QL MICRO: ABNORMAL /HPF
BASE DEFICIT BLD-SCNC: 1.6 MMOL/L
BASE DEFICIT BLD-SCNC: 2.2 MMOL/L
BASE DEFICIT BLD-SCNC: 2.7 MMOL/L
BASE DEFICIT BLD-SCNC: 3 MMOL/L
BASE DEFICIT BLD-SCNC: 3.7 MMOL/L
BASE DEFICIT BLD-SCNC: 4.1 MMOL/L
BASE DEFICIT BLDA-SCNC: 6.7 MMOL/L
BASE DEFICIT BLDA-SCNC: 7 MMOL/L
BASE DEFICIT BLDA-SCNC: 7.2 MMOL/L
BASE DEFICIT BLDA-SCNC: 8.3 MMOL/L
BASOPHILS # BLD: 0 K/UL (ref 0–0.1)
BASOPHILS # BLD: 0 K/UL (ref 0–0.1)
BASOPHILS # BLD: 0.1 K/UL (ref 0–0.1)
BASOPHILS NFR BLD: 0 % (ref 0–1)
BASOPHILS NFR BLD: 0 % (ref 0–1)
BASOPHILS NFR BLD: 1 % (ref 0–1)
BDY SITE: ABNORMAL
BILIRUB SERPL-MCNC: 0.4 MG/DL (ref 0.2–1)
BILIRUB UR QL: NEGATIVE
BORDETELLA PARAPERTUSSIS BY PCR: NOT DETECTED
BUN SERPL-MCNC: 62 MG/DL (ref 6–20)
BUN SERPL-MCNC: 64 MG/DL (ref 6–20)
BUN SERPL-MCNC: 67 MG/DL (ref 6–20)
BUN SERPL-MCNC: 67 MG/DL (ref 6–20)
BUN SERPL-MCNC: 71 MG/DL (ref 6–20)
BUN SERPL-MCNC: 75 MG/DL (ref 6–20)
BUN SERPL-MCNC: 82 MG/DL (ref 6–20)
BUN SERPL-MCNC: 91 MG/DL (ref 6–20)
BUN SERPL-MCNC: 92 MG/DL (ref 6–20)
BUN/CREAT SERPL: 13 (ref 12–20)
BUN/CREAT SERPL: 14 (ref 12–20)
BUN/CREAT SERPL: 15 (ref 12–20)
C PNEUM DNA SPEC QL NAA+PROBE: NOT DETECTED
CA-I BLD-MCNC: 1.1 MMOL/L (ref 1.12–1.32)
CALCIUM SERPL-MCNC: 7.3 MG/DL (ref 8.5–10.1)
CALCIUM SERPL-MCNC: 7.4 MG/DL (ref 8.5–10.1)
CALCIUM SERPL-MCNC: 7.5 MG/DL (ref 8.5–10.1)
CALCIUM SERPL-MCNC: 7.6 MG/DL (ref 8.5–10.1)
CALCIUM SERPL-MCNC: 7.7 MG/DL (ref 8.5–10.1)
CALCIUM SERPL-MCNC: 7.7 MG/DL (ref 8.5–10.1)
CC UR VC: NORMAL
CHLORIDE BLD-SCNC: 115 MMOL/L (ref 100–108)
CHLORIDE SERPL-SCNC: 114 MMOL/L (ref 97–108)
CHLORIDE SERPL-SCNC: 114 MMOL/L (ref 97–108)
CHLORIDE SERPL-SCNC: 115 MMOL/L (ref 97–108)
CHLORIDE SERPL-SCNC: 116 MMOL/L (ref 97–108)
CHLORIDE SERPL-SCNC: 116 MMOL/L (ref 97–108)
CHLORIDE SERPL-SCNC: 118 MMOL/L (ref 97–108)
CO2 BLD-SCNC: 30 MMOL/L (ref 19–24)
CO2 SERPL-SCNC: 18 MMOL/L (ref 21–32)
CO2 SERPL-SCNC: 21 MMOL/L (ref 21–32)
CO2 SERPL-SCNC: 22 MMOL/L (ref 21–32)
CO2 SERPL-SCNC: 23 MMOL/L (ref 21–32)
CO2 SERPL-SCNC: 24 MMOL/L (ref 21–32)
CO2 SERPL-SCNC: 25 MMOL/L (ref 21–32)
CO2 SERPL-SCNC: 26 MMOL/L (ref 21–32)
CO2 SERPL-SCNC: 28 MMOL/L (ref 21–32)
CO2 SERPL-SCNC: 28 MMOL/L (ref 21–32)
COLOR UR: ABNORMAL
COMMENT:: NORMAL
COMMENT:: NORMAL
CREAT SERPL-MCNC: 4.47 MG/DL (ref 0.55–1.02)
CREAT SERPL-MCNC: 4.64 MG/DL (ref 0.55–1.02)
CREAT SERPL-MCNC: 4.68 MG/DL (ref 0.55–1.02)
CREAT SERPL-MCNC: 4.71 MG/DL (ref 0.55–1.02)
CREAT SERPL-MCNC: 4.94 MG/DL (ref 0.55–1.02)
CREAT SERPL-MCNC: 5.63 MG/DL (ref 0.55–1.02)
CREAT SERPL-MCNC: 6.26 MG/DL (ref 0.55–1.02)
CREAT SERPL-MCNC: 6.57 MG/DL (ref 0.55–1.02)
CREAT SERPL-MCNC: 7.18 MG/DL (ref 0.55–1.02)
CREAT UR-MCNC: 4.5 MG/DL (ref 0.6–1.3)
DIFFERENTIAL METHOD BLD: ABNORMAL
ECHO BSA: 2.73 M2
ECHO LV FRACTIONAL SHORTENING: 30 % (ref 28–44)
ECHO LV INTERNAL DIMENSION DIASTOLE INDEX: 2.1 CM/M2
ECHO LV INTERNAL DIMENSION DIASTOLIC: 5.3 CM (ref 3.9–5.3)
ECHO LV INTERNAL DIMENSION SYSTOLIC INDEX: 1.47 CM/M2
ECHO LV INTERNAL DIMENSION SYSTOLIC: 3.7 CM
ECHO RV INTERNAL DIMENSION: 4.6 CM
EOSINOPHIL # BLD: 0 K/UL (ref 0–0.4)
EOSINOPHIL # BLD: 0.3 K/UL (ref 0–0.4)
EOSINOPHIL # BLD: 0.4 K/UL (ref 0–0.4)
EOSINOPHIL # BLD: 0.4 K/UL (ref 0–0.4)
EOSINOPHIL # BLD: 0.6 K/UL (ref 0–0.4)
EOSINOPHIL NFR BLD: 0 % (ref 0–7)
EOSINOPHIL NFR BLD: 2 % (ref 0–7)
EOSINOPHIL NFR BLD: 3 % (ref 0–7)
EOSINOPHIL NFR BLD: 3 % (ref 0–7)
EOSINOPHIL NFR BLD: 5 % (ref 0–7)
EPITH CASTS URNS QL MICRO: ABNORMAL /LPF
ERYTHROCYTE [DISTWIDTH] IN BLOOD BY AUTOMATED COUNT: 15.9 % (ref 11.5–14.5)
ERYTHROCYTE [DISTWIDTH] IN BLOOD BY AUTOMATED COUNT: 16 % (ref 11.5–14.5)
ERYTHROCYTE [DISTWIDTH] IN BLOOD BY AUTOMATED COUNT: 16.1 % (ref 11.5–14.5)
ERYTHROCYTE [DISTWIDTH] IN BLOOD BY AUTOMATED COUNT: 16.3 % (ref 11.5–14.5)
ERYTHROCYTE [DISTWIDTH] IN BLOOD BY AUTOMATED COUNT: 16.8 % (ref 11.5–14.5)
ERYTHROCYTE [DISTWIDTH] IN BLOOD BY AUTOMATED COUNT: 16.8 % (ref 11.5–14.5)
FERRITIN SERPL-MCNC: 126 NG/ML (ref 26–388)
FIO2 ON VENT: 30 %
FLUAV SUBTYP SPEC NAA+PROBE: NOT DETECTED
FLUBV RNA SPEC QL NAA+PROBE: NOT DETECTED
FOLATE SERPL-MCNC: 6.6 NG/ML (ref 5–21)
GAS FLOW.O2 O2 DELIVERY SYS: ABNORMAL
GAS FLOW.O2 SETTING OXYMISER: 18
GAS FLOW.O2 SETTING OXYMISER: 22
GLOBULIN SER CALC-MCNC: 4.1 G/DL (ref 2–4)
GLUCOSE BLD STRIP.AUTO-MCNC: 139 MG/DL (ref 65–117)
GLUCOSE BLD STRIP.AUTO-MCNC: 143 MG/DL (ref 65–117)
GLUCOSE BLD STRIP.AUTO-MCNC: 145 MG/DL (ref 65–117)
GLUCOSE BLD STRIP.AUTO-MCNC: 147 MG/DL (ref 65–117)
GLUCOSE BLD STRIP.AUTO-MCNC: 148 MG/DL (ref 65–117)
GLUCOSE BLD STRIP.AUTO-MCNC: 151 MG/DL (ref 65–117)
GLUCOSE BLD STRIP.AUTO-MCNC: 152 MG/DL (ref 65–117)
GLUCOSE BLD STRIP.AUTO-MCNC: 153 MG/DL (ref 65–117)
GLUCOSE BLD STRIP.AUTO-MCNC: 153 MG/DL (ref 74–99)
GLUCOSE BLD STRIP.AUTO-MCNC: 155 MG/DL (ref 65–117)
GLUCOSE BLD STRIP.AUTO-MCNC: 155 MG/DL (ref 65–117)
GLUCOSE BLD STRIP.AUTO-MCNC: 158 MG/DL (ref 65–117)
GLUCOSE BLD STRIP.AUTO-MCNC: 161 MG/DL (ref 65–117)
GLUCOSE BLD STRIP.AUTO-MCNC: 166 MG/DL (ref 65–117)
GLUCOSE BLD STRIP.AUTO-MCNC: 166 MG/DL (ref 65–117)
GLUCOSE BLD STRIP.AUTO-MCNC: 168 MG/DL (ref 65–117)
GLUCOSE BLD STRIP.AUTO-MCNC: 190 MG/DL (ref 65–117)
GLUCOSE SERPL-MCNC: 114 MG/DL (ref 65–100)
GLUCOSE SERPL-MCNC: 124 MG/DL (ref 65–100)
GLUCOSE SERPL-MCNC: 125 MG/DL (ref 65–100)
GLUCOSE SERPL-MCNC: 131 MG/DL (ref 65–100)
GLUCOSE SERPL-MCNC: 149 MG/DL (ref 65–100)
GLUCOSE SERPL-MCNC: 160 MG/DL (ref 65–100)
GLUCOSE SERPL-MCNC: 163 MG/DL (ref 65–100)
GLUCOSE SERPL-MCNC: 170 MG/DL (ref 65–100)
GLUCOSE SERPL-MCNC: 172 MG/DL (ref 65–100)
GLUCOSE UR STRIP.AUTO-MCNC: NEGATIVE MG/DL
HADV DNA SPEC QL NAA+PROBE: NOT DETECTED
HAPTOGLOB SERPL-MCNC: 249 MG/DL (ref 30–200)
HCO3 BLD-SCNC: 23 MMOL/L (ref 22–26)
HCO3 BLD-SCNC: 25.4 MMOL/L (ref 22–26)
HCO3 BLD-SCNC: 26.3 MMOL/L (ref 22–26)
HCO3 BLD-SCNC: 27.9 MMOL/L (ref 22–26)
HCO3 BLD-SCNC: 28.6 MMOL/L (ref 22–26)
HCO3 BLD-SCNC: 30.6 MMOL/L (ref 22–26)
HCO3 BLDA-SCNC: 19 MMOL/L (ref 22–26)
HCO3 BLDA-SCNC: 23 MMOL/L (ref 22–26)
HCO3 BLDA-SCNC: 23 MMOL/L (ref 22–26)
HCO3 BLDA-SCNC: 24 MMOL/L (ref 22–26)
HCOV 229E RNA SPEC QL NAA+PROBE: NOT DETECTED
HCOV HKU1 RNA SPEC QL NAA+PROBE: NOT DETECTED
HCOV NL63 RNA SPEC QL NAA+PROBE: NOT DETECTED
HCOV OC43 RNA SPEC QL NAA+PROBE: NOT DETECTED
HCT VFR BLD AUTO: 23.2 % (ref 35–47)
HCT VFR BLD AUTO: 23.9 % (ref 35–47)
HCT VFR BLD AUTO: 24.7 % (ref 35–47)
HCT VFR BLD AUTO: 25.3 % (ref 35–47)
HCT VFR BLD AUTO: 25.8 % (ref 35–47)
HCT VFR BLD AUTO: 26.3 % (ref 35–47)
HCT VFR BLD AUTO: 27.1 % (ref 35–47)
HCT VFR BLD AUTO: 31.2 % (ref 35–47)
HGB BLD-MCNC: 6.3 G/DL (ref 11.5–16)
HGB BLD-MCNC: 6.3 G/DL (ref 11.5–16)
HGB BLD-MCNC: 6.7 G/DL (ref 11.5–16)
HGB BLD-MCNC: 7 G/DL (ref 11.5–16)
HGB BLD-MCNC: 7.1 G/DL (ref 11.5–16)
HGB BLD-MCNC: 7.2 G/DL (ref 11.5–16)
HGB BLD-MCNC: 7.5 G/DL (ref 11.5–16)
HGB BLD-MCNC: 8 G/DL (ref 11.5–16)
HGB UR QL STRIP: ABNORMAL
HMPV RNA SPEC QL NAA+PROBE: NOT DETECTED
HPIV1 RNA SPEC QL NAA+PROBE: NOT DETECTED
HPIV2 RNA SPEC QL NAA+PROBE: NOT DETECTED
HPIV3 RNA SPEC QL NAA+PROBE: NOT DETECTED
HPIV4 RNA SPEC QL NAA+PROBE: NOT DETECTED
IMM GRANULOCYTES # BLD AUTO: 0 K/UL (ref 0–0.04)
IMM GRANULOCYTES # BLD AUTO: 0 K/UL (ref 0–0.04)
IMM GRANULOCYTES # BLD AUTO: 0.1 K/UL (ref 0–0.04)
IMM GRANULOCYTES # BLD AUTO: 0.2 K/UL (ref 0–0.04)
IMM GRANULOCYTES # BLD AUTO: 0.2 K/UL (ref 0–0.04)
IMM GRANULOCYTES NFR BLD AUTO: 0 % (ref 0–0.5)
IMM GRANULOCYTES NFR BLD AUTO: 0 % (ref 0–0.5)
IMM GRANULOCYTES NFR BLD AUTO: 1 % (ref 0–0.5)
IRON SATN MFR SERPL: 20 % (ref 20–50)
IRON SERPL-MCNC: 40 UG/DL (ref 35–150)
KETONES UR QL STRIP.AUTO: NEGATIVE MG/DL
LACTATE BLD-SCNC: 0.62 MMOL/L (ref 0.4–2)
LACTATE SERPL-SCNC: 0.5 MMOL/L (ref 0.4–2)
LDH SERPL L TO P-CCNC: 344 U/L (ref 81–246)
LEUKOCYTE ESTERASE UR QL STRIP.AUTO: ABNORMAL
LYMPHOCYTES # BLD: 0.4 K/UL (ref 0.8–3.5)
LYMPHOCYTES # BLD: 0.6 K/UL (ref 0.8–3.5)
LYMPHOCYTES # BLD: 1.1 K/UL (ref 0.8–3.5)
LYMPHOCYTES # BLD: 1.2 K/UL (ref 0.8–3.5)
LYMPHOCYTES # BLD: 1.4 K/UL (ref 0.8–3.5)
LYMPHOCYTES NFR BLD: 10 % (ref 12–49)
LYMPHOCYTES NFR BLD: 10 % (ref 12–49)
LYMPHOCYTES NFR BLD: 2 % (ref 12–49)
LYMPHOCYTES NFR BLD: 4 % (ref 12–49)
LYMPHOCYTES NFR BLD: 9 % (ref 12–49)
M PNEUMO DNA SPEC QL NAA+PROBE: NOT DETECTED
MAGNESIUM SERPL-MCNC: 1.9 MG/DL (ref 1.6–2.4)
MAGNESIUM SERPL-MCNC: 2 MG/DL (ref 1.6–2.4)
MAGNESIUM SERPL-MCNC: 2.2 MG/DL (ref 1.6–2.4)
MCH RBC QN AUTO: 28.8 PG (ref 26–34)
MCH RBC QN AUTO: 28.9 PG (ref 26–34)
MCH RBC QN AUTO: 29 PG (ref 26–34)
MCH RBC QN AUTO: 29.1 PG (ref 26–34)
MCH RBC QN AUTO: 29.3 PG (ref 26–34)
MCH RBC QN AUTO: 29.6 PG (ref 26–34)
MCHC RBC AUTO-ENTMCNC: 25.6 G/DL (ref 30–36.5)
MCHC RBC AUTO-ENTMCNC: 26.4 G/DL (ref 30–36.5)
MCHC RBC AUTO-ENTMCNC: 27.1 G/DL (ref 30–36.5)
MCHC RBC AUTO-ENTMCNC: 27.1 G/DL (ref 30–36.5)
MCHC RBC AUTO-ENTMCNC: 27.2 G/DL (ref 30–36.5)
MCHC RBC AUTO-ENTMCNC: 27.4 G/DL (ref 30–36.5)
MCHC RBC AUTO-ENTMCNC: 27.7 G/DL (ref 30–36.5)
MCHC RBC AUTO-ENTMCNC: 28.1 G/DL (ref 30–36.5)
MCV RBC AUTO: 103.7 FL (ref 80–99)
MCV RBC AUTO: 105.9 FL (ref 80–99)
MCV RBC AUTO: 105.9 FL (ref 80–99)
MCV RBC AUTO: 106 FL (ref 80–99)
MCV RBC AUTO: 106.6 FL (ref 80–99)
MCV RBC AUTO: 109.3 FL (ref 80–99)
MCV RBC AUTO: 110.1 FL (ref 80–99)
MCV RBC AUTO: 113 FL (ref 80–99)
MONOCYTES # BLD: 0.2 K/UL (ref 0–1)
MONOCYTES # BLD: 0.3 K/UL (ref 0–1)
MONOCYTES # BLD: 0.6 K/UL (ref 0–1)
MONOCYTES # BLD: 0.7 K/UL (ref 0–1)
MONOCYTES # BLD: 0.7 K/UL (ref 0–1)
MONOCYTES NFR BLD: 1 % (ref 5–13)
MONOCYTES NFR BLD: 2 % (ref 5–13)
MONOCYTES NFR BLD: 5 % (ref 5–13)
MONOCYTES NFR BLD: 5 % (ref 5–13)
MONOCYTES NFR BLD: 6 % (ref 5–13)
NEUTS SEG # BLD: 10.1 K/UL (ref 1.8–8)
NEUTS SEG # BLD: 10.9 K/UL (ref 1.8–8)
NEUTS SEG # BLD: 13.9 K/UL (ref 1.8–8)
NEUTS SEG # BLD: 18.6 K/UL (ref 1.8–8)
NEUTS SEG # BLD: 9.4 K/UL (ref 1.8–8)
NEUTS SEG NFR BLD: 77 % (ref 32–75)
NEUTS SEG NFR BLD: 81 % (ref 32–75)
NEUTS SEG NFR BLD: 82 % (ref 32–75)
NEUTS SEG NFR BLD: 91 % (ref 32–75)
NEUTS SEG NFR BLD: 96 % (ref 32–75)
NITRITE UR QL STRIP.AUTO: NEGATIVE
NRBC # BLD: 0 K/UL (ref 0–0.01)
NRBC # BLD: 0.05 K/UL (ref 0–0.01)
NRBC # BLD: 0.08 K/UL (ref 0–0.01)
NRBC BLD-RTO: 0 PER 100 WBC
NRBC BLD-RTO: 0.5 PER 100 WBC
NRBC BLD-RTO: 1 PER 100 WBC
NT PRO BNP: ABNORMAL PG/ML
NT PRO BNP: ABNORMAL PG/ML
O2/TOTAL GAS SETTING VFR VENT: 30 %
O2/TOTAL GAS SETTING VFR VENT: 60 %
O2/TOTAL GAS SETTING VFR VENT: 60 %
PCO2 BLD: 102.7 MMHG (ref 35–45)
PCO2 BLD: 117.1 MMHG (ref 35–45)
PCO2 BLD: 50.6 MMHG (ref 35–45)
PCO2 BLD: 67.5 MMHG (ref 35–45)
PCO2 BLD: 73.2 MMHG (ref 35–45)
PCO2 BLD: 96.5 MMHG (ref 35–45)
PCO2 BLDA: 41 MMHG (ref 35–45)
PCO2 BLDA: 63 MMHG (ref 35–45)
PCO2 BLDA: 71 MMHG (ref 35–45)
PCO2 BLDA: 78 MMHG (ref 35–45)
PEEP RESPIRATORY: 6
PEEP RESPIRATORY: 6
PEEP RESPIRATORY: 6 CMH2O
PEEP RESPIRATORY: 8
PERIPHERAL SMEAR, MD REVIEW: NORMAL
PH BLD: 7.03 (ref 7.35–7.45)
PH BLD: 7.04 (ref 7.35–7.45)
PH BLD: 7.08 (ref 7.35–7.45)
PH BLD: 7.16 (ref 7.35–7.45)
PH BLD: 7.18 (ref 7.35–7.45)
PH BLD: 7.27 (ref 7.35–7.45)
PH BLDA: 7.09 (ref 7.35–7.45)
PH BLDA: 7.14 (ref 7.35–7.45)
PH BLDA: 7.18 (ref 7.35–7.45)
PH BLDA: 7.29 (ref 7.35–7.45)
PH UR STRIP: 6 (ref 5–8)
PHOSPHATE SERPL-MCNC: 7.1 MG/DL (ref 2.6–4.7)
PHOSPHATE SERPL-MCNC: 7.7 MG/DL (ref 2.6–4.7)
PHOSPHATE SERPL-MCNC: 7.8 MG/DL (ref 2.6–4.7)
PLATELET # BLD AUTO: 196 K/UL (ref 150–400)
PLATELET # BLD AUTO: 206 K/UL (ref 150–400)
PLATELET # BLD AUTO: 226 K/UL (ref 150–400)
PLATELET # BLD AUTO: 234 K/UL (ref 150–400)
PLATELET # BLD AUTO: 239 K/UL (ref 150–400)
PLATELET # BLD AUTO: 265 K/UL (ref 150–400)
PLATELET # BLD AUTO: 268 K/UL (ref 150–400)
PLATELET # BLD AUTO: 269 K/UL (ref 150–400)
PMV BLD AUTO: 10 FL (ref 8.9–12.9)
PMV BLD AUTO: 10.4 FL (ref 8.9–12.9)
PMV BLD AUTO: 10.5 FL (ref 8.9–12.9)
PMV BLD AUTO: 10.7 FL (ref 8.9–12.9)
PMV BLD AUTO: 11 FL (ref 8.9–12.9)
PMV BLD AUTO: 9.6 FL (ref 8.9–12.9)
PMV BLD AUTO: 9.7 FL (ref 8.9–12.9)
PMV BLD AUTO: 9.8 FL (ref 8.9–12.9)
PO2 BLD: 115 MMHG (ref 80–100)
PO2 BLD: 133 MMHG (ref 80–100)
PO2 BLD: 142 MMHG (ref 80–100)
PO2 BLD: 265 MMHG (ref 80–100)
PO2 BLD: 81 MMHG (ref 80–100)
PO2 BLD: 83 MMHG (ref 80–100)
PO2 BLDA: 114 MMHG (ref 80–100)
PO2 BLDA: 114 MMHG (ref 80–100)
PO2 BLDA: 121 MMHG (ref 80–100)
PO2 BLDA: 235 MMHG (ref 80–100)
POTASSIUM BLD-SCNC: 5.5 MMOL/L (ref 3.5–5.5)
POTASSIUM SERPL-SCNC: 4.6 MMOL/L (ref 3.5–5.1)
POTASSIUM SERPL-SCNC: 4.6 MMOL/L (ref 3.5–5.1)
POTASSIUM SERPL-SCNC: 4.7 MMOL/L (ref 3.5–5.1)
POTASSIUM SERPL-SCNC: 4.9 MMOL/L (ref 3.5–5.1)
POTASSIUM SERPL-SCNC: 5.1 MMOL/L (ref 3.5–5.1)
POTASSIUM SERPL-SCNC: 5.4 MMOL/L (ref 3.5–5.1)
POTASSIUM SERPL-SCNC: 5.6 MMOL/L (ref 3.5–5.1)
POTASSIUM SERPL-SCNC: 5.6 MMOL/L (ref 3.5–5.1)
POTASSIUM SERPL-SCNC: 5.9 MMOL/L (ref 3.5–5.1)
PRESSURE SUPPORT SETTING VENT: 20
PRESSURE SUPPORT SETTING VENT: 20 CMH2O
PROCALCITONIN SERPL-MCNC: 0.23 NG/ML
PROCALCITONIN SERPL-MCNC: 0.74 NG/ML
PROT SERPL-MCNC: 6.9 G/DL (ref 6.4–8.2)
PROT UR STRIP-MCNC: 300 MG/DL
RBC # BLD AUTO: 2.17 M/UL (ref 3.8–5.2)
RBC # BLD AUTO: 2.19 M/UL (ref 3.8–5.2)
RBC # BLD AUTO: 2.26 M/UL (ref 3.8–5.2)
RBC # BLD AUTO: 2.42 M/UL (ref 3.8–5.2)
RBC # BLD AUTO: 2.44 M/UL (ref 3.8–5.2)
RBC # BLD AUTO: 2.48 M/UL (ref 3.8–5.2)
RBC # BLD AUTO: 2.56 M/UL (ref 3.8–5.2)
RBC # BLD AUTO: 2.76 M/UL (ref 3.8–5.2)
RBC #/AREA URNS HPF: ABNORMAL /HPF (ref 0–5)
RBC MORPH BLD: ABNORMAL
RETICS # AUTO: 0.06 M/UL (ref 0.02–0.08)
RETICS/RBC NFR AUTO: 2.6 % (ref 0.7–2.1)
RSV RNA SPEC QL NAA+PROBE: NOT DETECTED
RV+EV RNA SPEC QL NAA+PROBE: NOT DETECTED
SAO2 % BLD: 87.6 % (ref 92–97)
SAO2 % BLD: 88.8 % (ref 92–97)
SAO2 % BLD: 96 % (ref 92–97)
SAO2 % BLD: 96.7 % (ref 92–97)
SAO2 % BLD: 97 % (ref 92–97)
SAO2 % BLD: 97 % (ref 92–97)
SAO2 % BLD: 98 % (ref 92–97)
SAO2 % BLD: 98.8 % (ref 92–97)
SAO2 % BLD: 99 % (ref 92–97)
SAO2 % BLD: 99.6 % (ref 92–97)
SAO2% DEVICE SAO2% SENSOR NAME: ABNORMAL
SARS-COV-2 RNA RESP QL NAA+PROBE: NOT DETECTED
SERVICE CMNT-IMP: ABNORMAL
SERVICE CMNT-IMP: NORMAL
SODIUM BLD-SCNC: 146 MMOL/L (ref 136–145)
SODIUM SERPL-SCNC: 144 MMOL/L (ref 136–145)
SODIUM SERPL-SCNC: 145 MMOL/L (ref 136–145)
SODIUM SERPL-SCNC: 146 MMOL/L (ref 136–145)
SODIUM SERPL-SCNC: 146 MMOL/L (ref 136–145)
SODIUM SERPL-SCNC: 147 MMOL/L (ref 136–145)
SODIUM SERPL-SCNC: 150 MMOL/L (ref 136–145)
SODIUM SERPL-SCNC: 150 MMOL/L (ref 136–145)
SP GR UR REFRACTOMETRY: 1.01 (ref 1–1.03)
SPECIMEN HOLD: NORMAL
SPECIMEN SITE: ABNORMAL
SPECIMEN TYPE: ABNORMAL
T4 FREE SERPL-MCNC: 1.4 NG/DL (ref 0.8–1.5)
TIBC SERPL-MCNC: 198 UG/DL (ref 250–450)
TROPONIN I SERPL HS-MCNC: 37 NG/L (ref 0–51)
TSH SERPL DL<=0.05 MIU/L-ACNC: 0.98 UIU/ML (ref 0.36–3.74)
TSH SERPL DL<=0.05 MIU/L-ACNC: 1.01 UIU/ML (ref 0.36–3.74)
UROBILINOGEN UR QL STRIP.AUTO: 1 EU/DL (ref 0.2–1)
VENTILATION MODE VENT: ABNORMAL
VIT B12 SERPL-MCNC: 1028 PG/ML (ref 193–986)
WBC # BLD AUTO: 10.7 K/UL (ref 3.6–11)
WBC # BLD AUTO: 12.1 K/UL (ref 3.6–11)
WBC # BLD AUTO: 12.3 K/UL (ref 3.6–11)
WBC # BLD AUTO: 13.4 K/UL (ref 3.6–11)
WBC # BLD AUTO: 13.5 K/UL (ref 3.6–11)
WBC # BLD AUTO: 15.3 K/UL (ref 3.6–11)
WBC # BLD AUTO: 19.4 K/UL (ref 3.6–11)
WBC # BLD AUTO: 7.9 K/UL (ref 3.6–11)
WBC URNS QL MICRO: ABNORMAL /HPF (ref 0–4)

## 2024-01-01 PROCEDURE — 82803 BLOOD GASES ANY COMBINATION: CPT

## 2024-01-01 PROCEDURE — 71045 X-RAY EXAM CHEST 1 VIEW: CPT

## 2024-01-01 PROCEDURE — 80048 BASIC METABOLIC PNL TOTAL CA: CPT

## 2024-01-01 PROCEDURE — 94640 AIRWAY INHALATION TREATMENT: CPT

## 2024-01-01 PROCEDURE — 85027 COMPLETE CBC AUTOMATED: CPT

## 2024-01-01 PROCEDURE — 6360000002 HC RX W HCPCS: Performed by: NURSE PRACTITIONER

## 2024-01-01 PROCEDURE — 0656 HSPC GENERAL INPATIENT

## 2024-01-01 PROCEDURE — 2700000000 HC OXYGEN THERAPY PER DAY

## 2024-01-01 PROCEDURE — 6360000002 HC RX W HCPCS: Performed by: INTERNAL MEDICINE

## 2024-01-01 PROCEDURE — 85025 COMPLETE CBC W/AUTO DIFF WBC: CPT

## 2024-01-01 PROCEDURE — 6370000000 HC RX 637 (ALT 250 FOR IP): Performed by: INTERNAL MEDICINE

## 2024-01-01 PROCEDURE — 6360000002 HC RX W HCPCS: Performed by: HOSPITALIST

## 2024-01-01 PROCEDURE — 2580000003 HC RX 258: Performed by: INTERNAL MEDICINE

## 2024-01-01 PROCEDURE — 84439 ASSAY OF FREE THYROXINE: CPT

## 2024-01-01 PROCEDURE — 6360000002 HC RX W HCPCS

## 2024-01-01 PROCEDURE — 76770 US EXAM ABDO BACK WALL COMP: CPT

## 2024-01-01 PROCEDURE — 2580000003 HC RX 258: Performed by: STUDENT IN AN ORGANIZED HEALTH CARE EDUCATION/TRAINING PROGRAM

## 2024-01-01 PROCEDURE — 36415 COLL VENOUS BLD VENIPUNCTURE: CPT

## 2024-01-01 PROCEDURE — 2060000000 HC ICU INTERMEDIATE R&B

## 2024-01-01 PROCEDURE — 82962 GLUCOSE BLOOD TEST: CPT

## 2024-01-01 PROCEDURE — 6370000000 HC RX 637 (ALT 250 FOR IP): Performed by: HOSPITALIST

## 2024-01-01 PROCEDURE — 87086 URINE CULTURE/COLONY COUNT: CPT

## 2024-01-01 PROCEDURE — 82728 ASSAY OF FERRITIN: CPT

## 2024-01-01 PROCEDURE — 6360000002 HC RX W HCPCS: Performed by: FAMILY MEDICINE

## 2024-01-01 PROCEDURE — 93308 TTE F-UP OR LMTD: CPT

## 2024-01-01 PROCEDURE — 51702 INSERT TEMP BLADDER CATH: CPT

## 2024-01-01 PROCEDURE — 6360000002 HC RX W HCPCS: Performed by: STUDENT IN AN ORGANIZED HEALTH CARE EDUCATION/TRAINING PROGRAM

## 2024-01-01 PROCEDURE — 94660 CPAP INITIATION&MGMT: CPT

## 2024-01-01 PROCEDURE — 5A09457 ASSISTANCE WITH RESPIRATORY VENTILATION, 24-96 CONSECUTIVE HOURS, CONTINUOUS POSITIVE AIRWAY PRESSURE: ICD-10-PCS | Performed by: STUDENT IN AN ORGANIZED HEALTH CARE EDUCATION/TRAINING PROGRAM

## 2024-01-01 PROCEDURE — 2000000000 HC ICU R&B

## 2024-01-01 PROCEDURE — 0202U NFCT DS 22 TRGT SARS-COV-2: CPT

## 2024-01-01 PROCEDURE — 1100000000 HC RM PRIVATE

## 2024-01-01 PROCEDURE — 83880 ASSAY OF NATRIURETIC PEPTIDE: CPT

## 2024-01-01 PROCEDURE — 2709999900 HC NON-CHARGEABLE SUPPLY

## 2024-01-01 PROCEDURE — 6370000000 HC RX 637 (ALT 250 FOR IP): Performed by: STUDENT IN AN ORGANIZED HEALTH CARE EDUCATION/TRAINING PROGRAM

## 2024-01-01 PROCEDURE — 83735 ASSAY OF MAGNESIUM: CPT

## 2024-01-01 PROCEDURE — P9047 ALBUMIN (HUMAN), 25%, 50ML: HCPCS | Performed by: HOSPITALIST

## 2024-01-01 PROCEDURE — 76937 US GUIDE VASCULAR ACCESS: CPT

## 2024-01-01 PROCEDURE — 6370000000 HC RX 637 (ALT 250 FOR IP): Performed by: NURSE PRACTITIONER

## 2024-01-01 PROCEDURE — 83010 ASSAY OF HAPTOGLOBIN QUANT: CPT

## 2024-01-01 PROCEDURE — 84443 ASSAY THYROID STIM HORMONE: CPT

## 2024-01-01 PROCEDURE — 82746 ASSAY OF FOLIC ACID SERUM: CPT

## 2024-01-01 PROCEDURE — 94760 N-INVAS EAR/PLS OXIMETRY 1: CPT

## 2024-01-01 PROCEDURE — 81001 URINALYSIS AUTO W/SCOPE: CPT

## 2024-01-01 PROCEDURE — 2580000003 HC RX 258: Performed by: NURSE PRACTITIONER

## 2024-01-01 PROCEDURE — 83605 ASSAY OF LACTIC ACID: CPT

## 2024-01-01 PROCEDURE — 84100 ASSAY OF PHOSPHORUS: CPT

## 2024-01-01 PROCEDURE — 36600 WITHDRAWAL OF ARTERIAL BLOOD: CPT

## 2024-01-01 PROCEDURE — 97530 THERAPEUTIC ACTIVITIES: CPT

## 2024-01-01 PROCEDURE — 83615 LACTATE (LD) (LDH) ENZYME: CPT

## 2024-01-01 PROCEDURE — 97161 PT EVAL LOW COMPLEX 20 MIN: CPT

## 2024-01-01 PROCEDURE — 82607 VITAMIN B-12: CPT

## 2024-01-01 PROCEDURE — 80047 BASIC METABLC PNL IONIZED CA: CPT

## 2024-01-01 PROCEDURE — 2500000003 HC RX 250 WO HCPCS: Performed by: NURSE PRACTITIONER

## 2024-01-01 PROCEDURE — 2500000003 HC RX 250 WO HCPCS: Performed by: INTERNAL MEDICINE

## 2024-01-01 PROCEDURE — 2580000003 HC RX 258: Performed by: HOSPITALIST

## 2024-01-01 PROCEDURE — 80053 COMPREHEN METABOLIC PANEL: CPT

## 2024-01-01 PROCEDURE — 71250 CT THORAX DX C-: CPT

## 2024-01-01 PROCEDURE — 85045 AUTOMATED RETICULOCYTE COUNT: CPT

## 2024-01-01 PROCEDURE — 1200000000 HC SEMI PRIVATE

## 2024-01-01 PROCEDURE — 83550 IRON BINDING TEST: CPT

## 2024-01-01 PROCEDURE — 97166 OT EVAL MOD COMPLEX 45 MIN: CPT

## 2024-01-01 PROCEDURE — 97110 THERAPEUTIC EXERCISES: CPT

## 2024-01-01 PROCEDURE — 99285 EMERGENCY DEPT VISIT HI MDM: CPT

## 2024-01-01 PROCEDURE — P9047 ALBUMIN (HUMAN), 25%, 50ML: HCPCS | Performed by: INTERNAL MEDICINE

## 2024-01-01 PROCEDURE — 84145 PROCALCITONIN (PCT): CPT

## 2024-01-01 PROCEDURE — 83540 ASSAY OF IRON: CPT

## 2024-01-01 PROCEDURE — 84484 ASSAY OF TROPONIN QUANT: CPT

## 2024-01-01 PROCEDURE — 99223 1ST HOSP IP/OBS HIGH 75: CPT | Performed by: PHYSICAL MEDICINE & REHABILITATION

## 2024-01-01 PROCEDURE — 51798 US URINE CAPACITY MEASURE: CPT

## 2024-01-01 RX ORDER — POLYETHYLENE GLYCOL 3350 17 G/17G
17 POWDER, FOR SOLUTION ORAL DAILY PRN
Status: DISCONTINUED | OUTPATIENT
Start: 2024-01-01 | End: 2024-01-01 | Stop reason: HOSPADM

## 2024-01-01 RX ORDER — ENOXAPARIN SODIUM 100 MG/ML
30 INJECTION SUBCUTANEOUS DAILY
Status: DISCONTINUED | OUTPATIENT
Start: 2024-01-01 | End: 2024-01-01

## 2024-01-01 RX ORDER — ONDANSETRON 2 MG/ML
4 INJECTION INTRAMUSCULAR; INTRAVENOUS EVERY 6 HOURS PRN
Status: DISCONTINUED | OUTPATIENT
Start: 2024-01-01 | End: 2024-01-01 | Stop reason: HOSPADM

## 2024-01-01 RX ORDER — IPRATROPIUM BROMIDE AND ALBUTEROL SULFATE 2.5; .5 MG/3ML; MG/3ML
1 SOLUTION RESPIRATORY (INHALATION) ONCE
Status: DISCONTINUED | OUTPATIENT
Start: 2024-01-01 | End: 2024-01-01 | Stop reason: HOSPADM

## 2024-01-01 RX ORDER — OXYBUTYNIN CHLORIDE 5 MG/1
10 TABLET, EXTENDED RELEASE ORAL NIGHTLY
Status: DISCONTINUED | OUTPATIENT
Start: 2024-01-01 | End: 2024-01-01 | Stop reason: HOSPADM

## 2024-01-01 RX ORDER — BUSPIRONE HYDROCHLORIDE 10 MG/1
10 TABLET ORAL 3 TIMES DAILY
Status: DISCONTINUED | OUTPATIENT
Start: 2024-01-01 | End: 2024-01-01 | Stop reason: HOSPADM

## 2024-01-01 RX ORDER — 0.9 % SODIUM CHLORIDE 0.9 %
1000 INTRAVENOUS SOLUTION INTRAVENOUS ONCE
Status: COMPLETED | OUTPATIENT
Start: 2024-01-01 | End: 2024-01-01

## 2024-01-01 RX ORDER — OXYBUTYNIN CHLORIDE 10 MG/1
10 TABLET, EXTENDED RELEASE ORAL DAILY
COMMUNITY

## 2024-01-01 RX ORDER — SODIUM CHLORIDE 0.9 % (FLUSH) 0.9 %
5-40 SYRINGE (ML) INJECTION PRN
Status: DISCONTINUED | OUTPATIENT
Start: 2024-01-01 | End: 2024-01-01 | Stop reason: HOSPADM

## 2024-01-01 RX ORDER — KETOROLAC TROMETHAMINE 30 MG/ML
30 INJECTION, SOLUTION INTRAMUSCULAR; INTRAVENOUS EVERY 6 HOURS PRN
Status: DISCONTINUED | OUTPATIENT
Start: 2024-01-01 | End: 2024-01-01 | Stop reason: HOSPADM

## 2024-01-01 RX ORDER — AMMONIUM LACTATE 12 G/100G
LOTION TOPICAL EVERY 12 HOURS
Status: DISCONTINUED | OUTPATIENT
Start: 2024-01-01 | End: 2024-01-01 | Stop reason: HOSPADM

## 2024-01-01 RX ORDER — FOLIC ACID 1 MG/1
1 TABLET ORAL DAILY
Status: DISCONTINUED | OUTPATIENT
Start: 2024-01-01 | End: 2024-01-01 | Stop reason: HOSPADM

## 2024-01-01 RX ORDER — BUMETANIDE 0.25 MG/ML
4 INJECTION INTRAMUSCULAR; INTRAVENOUS ONCE
Status: DISCONTINUED | OUTPATIENT
Start: 2024-01-01 | End: 2024-01-01

## 2024-01-01 RX ORDER — IPRATROPIUM BROMIDE AND ALBUTEROL SULFATE 2.5; .5 MG/3ML; MG/3ML
1 SOLUTION RESPIRATORY (INHALATION)
Status: DISCONTINUED | OUTPATIENT
Start: 2024-01-01 | End: 2024-01-01 | Stop reason: HOSPADM

## 2024-01-01 RX ORDER — POTASSIUM CHLORIDE 7.45 MG/ML
10 INJECTION INTRAVENOUS PRN
Status: DISCONTINUED | OUTPATIENT
Start: 2024-01-01 | End: 2024-01-01 | Stop reason: HOSPADM

## 2024-01-01 RX ORDER — ALBUMIN (HUMAN) 12.5 G/50ML
25 SOLUTION INTRAVENOUS ONCE
Status: COMPLETED | OUTPATIENT
Start: 2024-01-01 | End: 2024-01-01

## 2024-01-01 RX ORDER — TRAMADOL HYDROCHLORIDE 50 MG/1
50 TABLET ORAL EVERY 12 HOURS PRN
Status: DISCONTINUED | OUTPATIENT
Start: 2024-01-01 | End: 2024-01-01 | Stop reason: HOSPADM

## 2024-01-01 RX ORDER — FUROSEMIDE 10 MG/ML
60 INJECTION INTRAMUSCULAR; INTRAVENOUS ONCE
Status: COMPLETED | OUTPATIENT
Start: 2024-01-01 | End: 2024-01-01

## 2024-01-01 RX ORDER — DEXTROSE MONOHYDRATE 50 MG/ML
INJECTION, SOLUTION INTRAVENOUS CONTINUOUS
Status: DISCONTINUED | OUTPATIENT
Start: 2024-01-01 | End: 2024-01-01

## 2024-01-01 RX ORDER — ENOXAPARIN SODIUM 100 MG/ML
40 INJECTION SUBCUTANEOUS DAILY
Status: DISCONTINUED | OUTPATIENT
Start: 2024-01-01 | End: 2024-01-01 | Stop reason: HOSPADM

## 2024-01-01 RX ORDER — INSULIN LISPRO 100 [IU]/ML
0-4 INJECTION, SOLUTION INTRAVENOUS; SUBCUTANEOUS NIGHTLY
Status: DISCONTINUED | OUTPATIENT
Start: 2024-01-01 | End: 2024-01-01 | Stop reason: HOSPADM

## 2024-01-01 RX ORDER — ACETAMINOPHEN 325 MG/1
650 TABLET ORAL EVERY 6 HOURS PRN
Status: DISCONTINUED | OUTPATIENT
Start: 2024-01-01 | End: 2024-01-01 | Stop reason: HOSPADM

## 2024-01-01 RX ORDER — SODIUM CHLORIDE, SODIUM LACTATE, POTASSIUM CHLORIDE, AND CALCIUM CHLORIDE .6; .31; .03; .02 G/100ML; G/100ML; G/100ML; G/100ML
500 INJECTION, SOLUTION INTRAVENOUS ONCE
Status: DISCONTINUED | OUTPATIENT
Start: 2024-01-01 | End: 2024-01-01 | Stop reason: HOSPADM

## 2024-01-01 RX ORDER — HYDROMORPHONE HYDROCHLORIDE 1 MG/ML
1 INJECTION, SOLUTION INTRAMUSCULAR; INTRAVENOUS; SUBCUTANEOUS
Status: DISCONTINUED | OUTPATIENT
Start: 2024-01-01 | End: 2024-01-01

## 2024-01-01 RX ORDER — PREDNISONE 20 MG/1
20 TABLET ORAL DAILY
Status: DISCONTINUED | OUTPATIENT
Start: 2024-01-01 | End: 2024-01-01 | Stop reason: HOSPADM

## 2024-01-01 RX ORDER — ONDANSETRON 4 MG/1
4 TABLET, ORALLY DISINTEGRATING ORAL EVERY 8 HOURS PRN
Status: DISCONTINUED | OUTPATIENT
Start: 2024-01-01 | End: 2024-01-01 | Stop reason: HOSPADM

## 2024-01-01 RX ORDER — LORAZEPAM 2 MG/ML
1 INJECTION INTRAMUSCULAR
Status: DISCONTINUED | OUTPATIENT
Start: 2024-01-01 | End: 2024-01-01 | Stop reason: HOSPADM

## 2024-01-01 RX ORDER — HYDROMORPHONE HYDROCHLORIDE 1 MG/ML
1 INJECTION, SOLUTION INTRAMUSCULAR; INTRAVENOUS; SUBCUTANEOUS
Status: DISCONTINUED | OUTPATIENT
Start: 2024-01-01 | End: 2024-01-01 | Stop reason: HOSPADM

## 2024-01-01 RX ORDER — MAGNESIUM SULFATE IN WATER 40 MG/ML
2000 INJECTION, SOLUTION INTRAVENOUS PRN
Status: DISCONTINUED | OUTPATIENT
Start: 2024-01-01 | End: 2024-01-01 | Stop reason: HOSPADM

## 2024-01-01 RX ORDER — FUROSEMIDE 10 MG/ML
40 INJECTION INTRAMUSCULAR; INTRAVENOUS ONCE
Status: COMPLETED | OUTPATIENT
Start: 2024-01-01 | End: 2024-01-01

## 2024-01-01 RX ORDER — GLYCOPYRROLATE 0.2 MG/ML
0.2 INJECTION INTRAMUSCULAR; INTRAVENOUS EVERY 4 HOURS PRN
Status: DISCONTINUED | OUTPATIENT
Start: 2024-01-01 | End: 2024-01-01 | Stop reason: HOSPADM

## 2024-01-01 RX ORDER — LORAZEPAM 2 MG/ML
1 INJECTION INTRAMUSCULAR EVERY 4 HOURS
Status: DISCONTINUED | OUTPATIENT
Start: 2024-01-01 | End: 2024-01-01 | Stop reason: HOSPADM

## 2024-01-01 RX ORDER — LEVOFLOXACIN 250 MG/1
250 TABLET, FILM COATED ORAL DAILY
Status: DISCONTINUED | OUTPATIENT
Start: 2024-01-01 | End: 2024-01-01 | Stop reason: DRUGHIGH

## 2024-01-01 RX ORDER — SODIUM BICARBONATE 650 MG/1
650 TABLET ORAL 3 TIMES DAILY
Status: DISCONTINUED | OUTPATIENT
Start: 2024-01-01 | End: 2024-01-01 | Stop reason: HOSPADM

## 2024-01-01 RX ORDER — ACETAMINOPHEN 650 MG/1
650 SUPPOSITORY RECTAL EVERY 6 HOURS PRN
Status: DISCONTINUED | OUTPATIENT
Start: 2024-01-01 | End: 2024-01-01 | Stop reason: HOSPADM

## 2024-01-01 RX ORDER — DEXMEDETOMIDINE HYDROCHLORIDE 4 UG/ML
.1-1.5 INJECTION, SOLUTION INTRAVENOUS CONTINUOUS
Status: DISCONTINUED | OUTPATIENT
Start: 2024-01-01 | End: 2024-01-01

## 2024-01-01 RX ORDER — SODIUM CHLORIDE 9 MG/ML
INJECTION, SOLUTION INTRAVENOUS PRN
Status: DISCONTINUED | OUTPATIENT
Start: 2024-01-01 | End: 2024-01-01 | Stop reason: HOSPADM

## 2024-01-01 RX ORDER — DEXTROSE MONOHYDRATE 100 MG/ML
INJECTION, SOLUTION INTRAVENOUS CONTINUOUS PRN
Status: DISCONTINUED | OUTPATIENT
Start: 2024-01-01 | End: 2024-01-01 | Stop reason: HOSPADM

## 2024-01-01 RX ORDER — METHIMAZOLE 5 MG/1
10 TABLET ORAL DAILY
Status: DISCONTINUED | OUTPATIENT
Start: 2024-01-01 | End: 2024-01-01 | Stop reason: HOSPADM

## 2024-01-01 RX ORDER — HYDROMORPHONE HYDROCHLORIDE 2 MG/ML
2 INJECTION, SOLUTION INTRAMUSCULAR; INTRAVENOUS; SUBCUTANEOUS
Status: DISCONTINUED | OUTPATIENT
Start: 2024-01-01 | End: 2024-01-01 | Stop reason: HOSPADM

## 2024-01-01 RX ORDER — TRAMADOL HYDROCHLORIDE 50 MG/1
50 TABLET ORAL EVERY 6 HOURS PRN
Status: DISCONTINUED | OUTPATIENT
Start: 2024-01-01 | End: 2024-01-01

## 2024-01-01 RX ORDER — POTASSIUM CHLORIDE 750 MG/1
40 TABLET, FILM COATED, EXTENDED RELEASE ORAL PRN
Status: DISCONTINUED | OUTPATIENT
Start: 2024-01-01 | End: 2024-01-01 | Stop reason: HOSPADM

## 2024-01-01 RX ORDER — BISACODYL 10 MG
10 SUPPOSITORY, RECTAL RECTAL DAILY PRN
Status: DISCONTINUED | OUTPATIENT
Start: 2024-01-01 | End: 2024-01-01 | Stop reason: HOSPADM

## 2024-01-01 RX ORDER — HYDROMORPHONE HYDROCHLORIDE 2 MG/ML
2 INJECTION, SOLUTION INTRAMUSCULAR; INTRAVENOUS; SUBCUTANEOUS EVERY 4 HOURS
Status: DISCONTINUED | OUTPATIENT
Start: 2024-01-01 | End: 2024-01-01 | Stop reason: HOSPADM

## 2024-01-01 RX ORDER — HYDROMORPHONE HYDROCHLORIDE 1 MG/ML
1 INJECTION, SOLUTION INTRAMUSCULAR; INTRAVENOUS; SUBCUTANEOUS EVERY 4 HOURS
Status: DISCONTINUED | OUTPATIENT
Start: 2024-01-01 | End: 2024-01-01

## 2024-01-01 RX ORDER — IPRATROPIUM BROMIDE AND ALBUTEROL SULFATE 2.5; .5 MG/3ML; MG/3ML
1 SOLUTION RESPIRATORY (INHALATION)
Status: DISCONTINUED | OUTPATIENT
Start: 2024-01-01 | End: 2024-01-01

## 2024-01-01 RX ORDER — SODIUM CHLORIDE 0.9 % (FLUSH) 0.9 %
5-40 SYRINGE (ML) INJECTION EVERY 12 HOURS SCHEDULED
Status: DISCONTINUED | OUTPATIENT
Start: 2024-01-01 | End: 2024-01-01 | Stop reason: HOSPADM

## 2024-01-01 RX ORDER — OXYBUTYNIN CHLORIDE 5 MG/1
5 TABLET ORAL 3 TIMES DAILY
Status: DISCONTINUED | OUTPATIENT
Start: 2024-01-01 | End: 2024-01-01 | Stop reason: SDUPTHER

## 2024-01-01 RX ORDER — BUMETANIDE 0.25 MG/ML
6 INJECTION INTRAMUSCULAR; INTRAVENOUS ONCE
Status: COMPLETED | OUTPATIENT
Start: 2024-01-01 | End: 2024-01-01

## 2024-01-01 RX ORDER — INSULIN LISPRO 100 [IU]/ML
0-8 INJECTION, SOLUTION INTRAVENOUS; SUBCUTANEOUS
Status: DISCONTINUED | OUTPATIENT
Start: 2024-01-01 | End: 2024-01-01 | Stop reason: HOSPADM

## 2024-01-01 RX ORDER — LEVOFLOXACIN 250 MG/1
250 TABLET, FILM COATED ORAL EVERY OTHER DAY
Status: DISCONTINUED | OUTPATIENT
Start: 2024-01-01 | End: 2024-01-01

## 2024-01-01 RX ORDER — BUMETANIDE 0.25 MG/ML
2 INJECTION INTRAMUSCULAR; INTRAVENOUS ONCE
Status: COMPLETED | OUTPATIENT
Start: 2024-01-01 | End: 2024-01-01

## 2024-01-01 RX ORDER — LEVOFLOXACIN 5 MG/ML
250 INJECTION, SOLUTION INTRAVENOUS
Status: DISCONTINUED | OUTPATIENT
Start: 2024-01-01 | End: 2024-01-01

## 2024-01-01 RX ORDER — SEVELAMER CARBONATE 800 MG/1
800 TABLET, FILM COATED ORAL
Status: DISCONTINUED | OUTPATIENT
Start: 2024-01-01 | End: 2024-01-01 | Stop reason: HOSPADM

## 2024-01-01 RX ADMIN — IPRATROPIUM BROMIDE AND ALBUTEROL SULFATE 1 DOSE: 2.5; .5 SOLUTION RESPIRATORY (INHALATION) at 04:45

## 2024-01-01 RX ADMIN — SODIUM CHLORIDE, PRESERVATIVE FREE 10 ML: 5 INJECTION INTRAVENOUS at 09:16

## 2024-01-01 RX ADMIN — BUMETANIDE 6 MG: 0.25 INJECTION INTRAMUSCULAR; INTRAVENOUS at 14:46

## 2024-01-01 RX ADMIN — IPRATROPIUM BROMIDE AND ALBUTEROL SULFATE 1 DOSE: 2.5; .5 SOLUTION RESPIRATORY (INHALATION) at 19:56

## 2024-01-01 RX ADMIN — IPRATROPIUM BROMIDE AND ALBUTEROL SULFATE 1 DOSE: 2.5; .5 SOLUTION RESPIRATORY (INHALATION) at 23:47

## 2024-01-01 RX ADMIN — WATER 40 MG: 1 INJECTION INTRAMUSCULAR; INTRAVENOUS; SUBCUTANEOUS at 05:09

## 2024-01-01 RX ADMIN — WATER 40 MG: 1 INJECTION INTRAMUSCULAR; INTRAVENOUS; SUBCUTANEOUS at 13:35

## 2024-01-01 RX ADMIN — Medication: at 15:23

## 2024-01-01 RX ADMIN — BUSPIRONE HYDROCHLORIDE 10 MG: 10 TABLET ORAL at 21:09

## 2024-01-01 RX ADMIN — IPRATROPIUM BROMIDE AND ALBUTEROL SULFATE 1 DOSE: 2.5; .5 SOLUTION RESPIRATORY (INHALATION) at 16:15

## 2024-01-01 RX ADMIN — Medication 1 MG: at 08:29

## 2024-01-01 RX ADMIN — WATER 40 MG: 1 INJECTION INTRAMUSCULAR; INTRAVENOUS; SUBCUTANEOUS at 16:13

## 2024-01-01 RX ADMIN — WATER 40 MG: 1 INJECTION INTRAMUSCULAR; INTRAVENOUS; SUBCUTANEOUS at 14:45

## 2024-01-01 RX ADMIN — BUSPIRONE HYDROCHLORIDE 10 MG: 10 TABLET ORAL at 08:29

## 2024-01-01 RX ADMIN — SERTRALINE HYDROCHLORIDE 50 MG: 50 TABLET ORAL at 08:29

## 2024-01-01 RX ADMIN — SEVELAMER CARBONATE 800 MG: 800 TABLET, FILM COATED ORAL at 16:13

## 2024-01-01 RX ADMIN — BUSPIRONE HYDROCHLORIDE 10 MG: 10 TABLET ORAL at 20:55

## 2024-01-01 RX ADMIN — METHIMAZOLE 10 MG: 5 TABLET ORAL at 08:29

## 2024-01-01 RX ADMIN — BUMETANIDE 1 MG/HR: 0.25 INJECTION INTRAMUSCULAR; INTRAVENOUS at 17:10

## 2024-01-01 RX ADMIN — IPRATROPIUM BROMIDE AND ALBUTEROL SULFATE 1 DOSE: 2.5; .5 SOLUTION RESPIRATORY (INHALATION) at 00:56

## 2024-01-01 RX ADMIN — Medication: at 05:10

## 2024-01-01 RX ADMIN — SODIUM CHLORIDE, PRESERVATIVE FREE 10 ML: 5 INJECTION INTRAVENOUS at 21:09

## 2024-01-01 RX ADMIN — SERTRALINE HYDROCHLORIDE 50 MG: 50 TABLET ORAL at 09:18

## 2024-01-01 RX ADMIN — SODIUM CHLORIDE, PRESERVATIVE FREE 10 ML: 5 INJECTION INTRAVENOUS at 08:29

## 2024-01-01 RX ADMIN — ALBUMIN (HUMAN) 25 G: 0.25 INJECTION, SOLUTION INTRAVENOUS at 18:23

## 2024-01-01 RX ADMIN — DEXTROSE MONOHYDRATE 250 ML: 100 INJECTION, SOLUTION INTRAVENOUS at 06:23

## 2024-01-01 RX ADMIN — WATER 1000 MG: 1 INJECTION INTRAMUSCULAR; INTRAVENOUS; SUBCUTANEOUS at 17:38

## 2024-01-01 RX ADMIN — ENOXAPARIN SODIUM 30 MG: 100 INJECTION SUBCUTANEOUS at 09:26

## 2024-01-01 RX ADMIN — IPRATROPIUM BROMIDE AND ALBUTEROL SULFATE 1 DOSE: 2.5; .5 SOLUTION RESPIRATORY (INHALATION) at 13:00

## 2024-01-01 RX ADMIN — INSULIN HUMAN 10 UNITS: 100 INJECTION, SOLUTION PARENTERAL at 06:21

## 2024-01-01 RX ADMIN — ENOXAPARIN SODIUM 40 MG: 100 INJECTION SUBCUTANEOUS at 09:02

## 2024-01-01 RX ADMIN — WATER 125 MG: 1 INJECTION INTRAMUSCULAR; INTRAVENOUS; SUBCUTANEOUS at 20:44

## 2024-01-01 RX ADMIN — OXYBUTYNIN CHLORIDE 10 MG: 5 TABLET, EXTENDED RELEASE ORAL at 01:58

## 2024-01-01 RX ADMIN — Medication: at 16:12

## 2024-01-01 RX ADMIN — Medication: at 03:04

## 2024-01-01 RX ADMIN — WATER 2000 MG: 1 INJECTION INTRAMUSCULAR; INTRAVENOUS; SUBCUTANEOUS at 18:47

## 2024-01-01 RX ADMIN — IPRATROPIUM BROMIDE AND ALBUTEROL SULFATE 1 DOSE: 2.5; .5 SOLUTION RESPIRATORY (INHALATION) at 11:15

## 2024-01-01 RX ADMIN — Medication 1 MG: at 10:18

## 2024-01-01 RX ADMIN — SODIUM BICARBONATE 650 MG: 650 TABLET ORAL at 16:13

## 2024-01-01 RX ADMIN — SODIUM BICARBONATE 650 MG: 650 TABLET ORAL at 21:09

## 2024-01-01 RX ADMIN — SODIUM BICARBONATE 50 MEQ: 84 INJECTION, SOLUTION INTRAVENOUS at 09:15

## 2024-01-01 RX ADMIN — BUSPIRONE HYDROCHLORIDE 10 MG: 10 TABLET ORAL at 13:45

## 2024-01-01 RX ADMIN — Medication: at 16:14

## 2024-01-01 RX ADMIN — LORAZEPAM 1 MG: 2 INJECTION INTRAMUSCULAR; INTRAVENOUS at 21:02

## 2024-01-01 RX ADMIN — BUMETANIDE 2 MG: 0.25 INJECTION INTRAMUSCULAR; INTRAVENOUS at 17:20

## 2024-01-01 RX ADMIN — WATER 40 MG: 1 INJECTION INTRAMUSCULAR; INTRAVENOUS; SUBCUTANEOUS at 05:00

## 2024-01-01 RX ADMIN — METHIMAZOLE 10 MG: 5 TABLET ORAL at 09:18

## 2024-01-01 RX ADMIN — IPRATROPIUM BROMIDE AND ALBUTEROL SULFATE 1 DOSE: 2.5; .5 SOLUTION RESPIRATORY (INHALATION) at 07:34

## 2024-01-01 RX ADMIN — WATER 40 MG: 1 INJECTION INTRAMUSCULAR; INTRAVENOUS; SUBCUTANEOUS at 05:49

## 2024-01-01 RX ADMIN — LORAZEPAM 1 MG: 2 INJECTION INTRAMUSCULAR; INTRAVENOUS at 16:13

## 2024-01-01 RX ADMIN — HYDROMORPHONE HYDROCHLORIDE 1 MG: 1 INJECTION, SOLUTION INTRAMUSCULAR; INTRAVENOUS; SUBCUTANEOUS at 12:54

## 2024-01-01 RX ADMIN — LORAZEPAM 1 MG: 2 INJECTION INTRAMUSCULAR; INTRAVENOUS at 22:07

## 2024-01-01 RX ADMIN — SEVELAMER CARBONATE 800 MG: 800 TABLET, FILM COATED ORAL at 12:14

## 2024-01-01 RX ADMIN — SODIUM BICARBONATE 650 MG: 650 TABLET ORAL at 09:26

## 2024-01-01 RX ADMIN — DEXTROSE MONOHYDRATE: 50 INJECTION, SOLUTION INTRAVENOUS at 13:55

## 2024-01-01 RX ADMIN — OXYBUTYNIN CHLORIDE 10 MG: 5 TABLET, EXTENDED RELEASE ORAL at 21:09

## 2024-01-01 RX ADMIN — IPRATROPIUM BROMIDE AND ALBUTEROL SULFATE 1 DOSE: 2.5; .5 SOLUTION RESPIRATORY (INHALATION) at 16:23

## 2024-01-01 RX ADMIN — WATER 2000 MG: 1 INJECTION INTRAMUSCULAR; INTRAVENOUS; SUBCUTANEOUS at 21:50

## 2024-01-01 RX ADMIN — IPRATROPIUM BROMIDE AND ALBUTEROL SULFATE 1 DOSE: 2.5; .5 SOLUTION RESPIRATORY (INHALATION) at 04:17

## 2024-01-01 RX ADMIN — WATER 2000 MG: 1 INJECTION INTRAMUSCULAR; INTRAVENOUS; SUBCUTANEOUS at 18:38

## 2024-01-01 RX ADMIN — WATER 2000 MG: 1 INJECTION INTRAMUSCULAR; INTRAVENOUS; SUBCUTANEOUS at 18:48

## 2024-01-01 RX ADMIN — SODIUM BICARBONATE 50 MEQ: 84 INJECTION, SOLUTION INTRAVENOUS at 06:15

## 2024-01-01 RX ADMIN — OXYBUTYNIN CHLORIDE 10 MG: 5 TABLET, EXTENDED RELEASE ORAL at 20:55

## 2024-01-01 RX ADMIN — ONDANSETRON 4 MG: 2 INJECTION INTRAMUSCULAR; INTRAVENOUS at 11:45

## 2024-01-01 RX ADMIN — IPRATROPIUM BROMIDE AND ALBUTEROL SULFATE 1 DOSE: 2.5; .5 SOLUTION RESPIRATORY (INHALATION) at 16:24

## 2024-01-01 RX ADMIN — Medication: at 13:36

## 2024-01-01 RX ADMIN — Medication: at 02:00

## 2024-01-01 RX ADMIN — WATER 40 MG: 1 INJECTION INTRAMUSCULAR; INTRAVENOUS; SUBCUTANEOUS at 20:31

## 2024-01-01 RX ADMIN — ALBUMIN (HUMAN) 25 G: 0.25 INJECTION, SOLUTION INTRAVENOUS at 09:26

## 2024-01-01 RX ADMIN — DEXTROSE MONOHYDRATE: 50 INJECTION, SOLUTION INTRAVENOUS at 21:18

## 2024-01-01 RX ADMIN — BUSPIRONE HYDROCHLORIDE 10 MG: 10 TABLET ORAL at 14:17

## 2024-01-01 RX ADMIN — Medication: at 14:17

## 2024-01-01 RX ADMIN — SODIUM CHLORIDE, PRESERVATIVE FREE 10 ML: 5 INJECTION INTRAVENOUS at 20:16

## 2024-01-01 RX ADMIN — Medication: at 02:16

## 2024-01-01 RX ADMIN — Medication: at 14:46

## 2024-01-01 RX ADMIN — IPRATROPIUM BROMIDE AND ALBUTEROL SULFATE 1 DOSE: 2.5; .5 SOLUTION RESPIRATORY (INHALATION) at 11:06

## 2024-01-01 RX ADMIN — IPRATROPIUM BROMIDE AND ALBUTEROL SULFATE 1 DOSE: 2.5; .5 SOLUTION RESPIRATORY (INHALATION) at 23:41

## 2024-01-01 RX ADMIN — LORAZEPAM 1 MG: 2 INJECTION INTRAMUSCULAR; INTRAVENOUS at 04:04

## 2024-01-01 RX ADMIN — IPRATROPIUM BROMIDE AND ALBUTEROL SULFATE 1 DOSE: 2.5; .5 SOLUTION RESPIRATORY (INHALATION) at 08:30

## 2024-01-01 RX ADMIN — FUROSEMIDE 60 MG: 10 INJECTION, SOLUTION INTRAMUSCULAR; INTRAVENOUS at 09:26

## 2024-01-01 RX ADMIN — METHIMAZOLE 10 MG: 5 TABLET ORAL at 10:18

## 2024-01-01 RX ADMIN — SODIUM CHLORIDE, PRESERVATIVE FREE 10 ML: 5 INJECTION INTRAVENOUS at 21:11

## 2024-01-01 RX ADMIN — LORAZEPAM 1 MG: 2 INJECTION INTRAMUSCULAR; INTRAVENOUS at 00:51

## 2024-01-01 RX ADMIN — SODIUM CHLORIDE, PRESERVATIVE FREE 7 ML: 5 INJECTION INTRAVENOUS at 20:54

## 2024-01-01 RX ADMIN — LORAZEPAM 1 MG: 2 INJECTION INTRAMUSCULAR; INTRAVENOUS at 16:33

## 2024-01-01 RX ADMIN — Medication: at 02:08

## 2024-01-01 RX ADMIN — HYDROMORPHONE HYDROCHLORIDE 2 MG: 2 INJECTION, SOLUTION INTRAMUSCULAR; INTRAVENOUS; SUBCUTANEOUS at 16:32

## 2024-01-01 RX ADMIN — SODIUM CHLORIDE, PRESERVATIVE FREE 10 ML: 5 INJECTION INTRAVENOUS at 02:00

## 2024-01-01 RX ADMIN — BUMETANIDE 1 MG/HR: 0.25 INJECTION INTRAMUSCULAR; INTRAVENOUS at 06:27

## 2024-01-01 RX ADMIN — WATER 40 MG: 1 INJECTION INTRAMUSCULAR; INTRAVENOUS; SUBCUTANEOUS at 21:09

## 2024-01-01 RX ADMIN — BUMETANIDE 1 MG/HR: 0.25 INJECTION INTRAMUSCULAR; INTRAVENOUS at 09:21

## 2024-01-01 RX ADMIN — SODIUM CHLORIDE, PRESERVATIVE FREE 10 ML: 5 INJECTION INTRAVENOUS at 20:55

## 2024-01-01 RX ADMIN — IPRATROPIUM BROMIDE AND ALBUTEROL SULFATE 1 DOSE: 2.5; .5 SOLUTION RESPIRATORY (INHALATION) at 07:50

## 2024-01-01 RX ADMIN — HYDROMORPHONE HYDROCHLORIDE 2 MG: 2 INJECTION, SOLUTION INTRAMUSCULAR; INTRAVENOUS; SUBCUTANEOUS at 04:03

## 2024-01-01 RX ADMIN — SODIUM CHLORIDE, PRESERVATIVE FREE 10 ML: 5 INJECTION INTRAVENOUS at 08:47

## 2024-01-01 RX ADMIN — BUSPIRONE HYDROCHLORIDE 10 MG: 10 TABLET ORAL at 16:11

## 2024-01-01 RX ADMIN — TRAMADOL HYDROCHLORIDE 50 MG: 50 TABLET ORAL at 01:59

## 2024-01-01 RX ADMIN — WATER 40 MG: 1 INJECTION INTRAMUSCULAR; INTRAVENOUS; SUBCUTANEOUS at 21:18

## 2024-01-01 RX ADMIN — HYDROMORPHONE HYDROCHLORIDE 2 MG: 2 INJECTION, SOLUTION INTRAMUSCULAR; INTRAVENOUS; SUBCUTANEOUS at 21:01

## 2024-01-01 RX ADMIN — IPRATROPIUM BROMIDE AND ALBUTEROL SULFATE 1 DOSE: 2.5; .5 SOLUTION RESPIRATORY (INHALATION) at 20:04

## 2024-01-01 RX ADMIN — LORAZEPAM 1 MG: 2 INJECTION INTRAMUSCULAR; INTRAVENOUS at 12:55

## 2024-01-01 RX ADMIN — IPRATROPIUM BROMIDE AND ALBUTEROL SULFATE 1 DOSE: 2.5; .5 SOLUTION RESPIRATORY (INHALATION) at 04:55

## 2024-01-01 RX ADMIN — FUROSEMIDE 40 MG: 10 INJECTION, SOLUTION INTRAMUSCULAR; INTRAVENOUS at 20:46

## 2024-01-01 RX ADMIN — SERTRALINE HYDROCHLORIDE 50 MG: 50 TABLET ORAL at 10:18

## 2024-01-01 RX ADMIN — IPRATROPIUM BROMIDE AND ALBUTEROL SULFATE 1 DOSE: 2.5; .5 SOLUTION RESPIRATORY (INHALATION) at 00:52

## 2024-01-01 RX ADMIN — BUSPIRONE HYDROCHLORIDE 10 MG: 10 TABLET ORAL at 09:18

## 2024-01-01 RX ADMIN — EPOETIN ALFA-EPBX 4200 UNITS: 10000 INJECTION, SOLUTION INTRAVENOUS; SUBCUTANEOUS at 12:06

## 2024-01-01 RX ADMIN — BUSPIRONE HYDROCHLORIDE 10 MG: 10 TABLET ORAL at 10:18

## 2024-01-01 RX ADMIN — IPRATROPIUM BROMIDE AND ALBUTEROL SULFATE 1 DOSE: 2.5; .5 SOLUTION RESPIRATORY (INHALATION) at 07:28

## 2024-01-01 RX ADMIN — IPRATROPIUM BROMIDE AND ALBUTEROL SULFATE 1 DOSE: 2.5; .5 SOLUTION RESPIRATORY (INHALATION) at 20:52

## 2024-01-01 RX ADMIN — SODIUM CHLORIDE 1000 ML: 9 INJECTION, SOLUTION INTRAVENOUS at 18:32

## 2024-01-01 RX ADMIN — IPRATROPIUM BROMIDE AND ALBUTEROL SULFATE 1 DOSE: 2.5; .5 SOLUTION RESPIRATORY (INHALATION) at 03:34

## 2024-01-01 RX ADMIN — HYDROMORPHONE HYDROCHLORIDE 2 MG: 2 INJECTION, SOLUTION INTRAMUSCULAR; INTRAVENOUS; SUBCUTANEOUS at 00:51

## 2024-01-01 RX ADMIN — SODIUM CHLORIDE, PRESERVATIVE FREE 10 ML: 5 INJECTION INTRAVENOUS at 21:13

## 2024-01-01 RX ADMIN — Medication: at 02:19

## 2024-01-01 RX ADMIN — WATER 40 MG: 1 INJECTION INTRAMUSCULAR; INTRAVENOUS; SUBCUTANEOUS at 07:01

## 2024-01-01 RX ADMIN — SODIUM CHLORIDE, PRESERVATIVE FREE 10 ML: 5 INJECTION INTRAVENOUS at 09:26

## 2024-01-01 RX ADMIN — SODIUM CHLORIDE, PRESERVATIVE FREE 10 ML: 5 INJECTION INTRAVENOUS at 09:19

## 2024-01-01 RX ADMIN — SODIUM CHLORIDE, PRESERVATIVE FREE 10 ML: 5 INJECTION INTRAVENOUS at 09:02

## 2024-01-01 ASSESSMENT — PAIN SCALES - GENERAL
PAINLEVEL_OUTOF10: 0
PAINLEVEL_OUTOF10: 3
PAINLEVEL_OUTOF10: 0
PAINLEVEL_OUTOF10: 3
PAINLEVEL_OUTOF10: 0

## 2024-01-01 ASSESSMENT — PAIN SCALES - WONG BAKER: WONGBAKER_NUMERICALRESPONSE: NO HURT

## 2024-01-01 ASSESSMENT — PAIN DESCRIPTION - LOCATION
LOCATION: OTHER (COMMENT)
LOCATION: GENERALIZED

## 2024-01-01 ASSESSMENT — PAIN - FUNCTIONAL ASSESSMENT
PAIN_FUNCTIONAL_ASSESSMENT: ACTIVITIES ARE NOT PREVENTED
PAIN_FUNCTIONAL_ASSESSMENT: 0-10

## 2024-01-01 ASSESSMENT — PAIN DESCRIPTION - DESCRIPTORS: DESCRIPTORS: SORE

## 2024-01-01 ASSESSMENT — PAIN DESCRIPTION - ORIENTATION: ORIENTATION: OTHER (COMMENT)

## 2024-06-18 PROBLEM — J96.01 ACUTE HYPOXIC RESPIRATORY FAILURE (HCC): Status: ACTIVE | Noted: 2024-01-01

## 2024-06-18 NOTE — ED TRIAGE NOTES
Pt comes in from home by EMS with CC of SOB. Pt describes them as panic attacks. Pt has a hernia and hx kidney stents. Pt denies pain in triages.

## 2024-06-19 NOTE — H&P
11.5 - 16.0 g/dL    Hematocrit 25.3 (L) 35.0 - 47.0 %    .7 (H) 80.0 - 99.0 FL    MCH 29.1 26.0 - 34.0 PG    MCHC 28.1 (L) 30.0 - 36.5 g/dL    RDW 16.8 (H) 11.5 - 14.5 %    Platelets 269 150 - 400 K/uL    MPV 9.6 8.9 - 12.9 FL    Nucleated RBCs 0.0 0  WBC    nRBC 0.00 0.00 - 0.01 K/uL    Neutrophils % 81 (H) 32 - 75 %    Lymphocytes % 10 (L) 12 - 49 %    Monocytes % 5 5 - 13 %    Eosinophils % 3 0 - 7 %    Basophils % 1 0 - 1 %    Immature Granulocytes % 0 0.0 - 0.5 %    Neutrophils Absolute 10.9 (H) 1.8 - 8.0 K/UL    Lymphocytes Absolute 1.4 0.8 - 3.5 K/UL    Monocytes Absolute 0.7 0.0 - 1.0 K/UL    Eosinophils Absolute 0.4 0.0 - 0.4 K/UL    Basophils Absolute 0.1 0.0 - 0.1 K/UL    Immature Granulocytes Absolute 0.0 0.00 - 0.04 K/UL    Differential Type SMEAR SCANNED      RBC Comment HYPOCHROMIA  1+        RBC Comment MACROCYTOSIS  1+        RBC Comment ANISOCYTOSIS  1+       Comprehensive Metabolic Panel    Collection Time: 06/18/24  7:56 PM   Result Value Ref Range    Sodium 150 (H) 136 - 145 mmol/L    Potassium 4.6 3.5 - 5.1 mmol/L    Chloride 115 (H) 97 - 108 mmol/L    CO2 28 21 - 32 mmol/L    Anion Gap 7 5 - 15 mmol/L    Glucose 124 (H) 65 - 100 mg/dL    BUN 71 (H) 6 - 20 MG/DL    Creatinine 4.71 (H) 0.55 - 1.02 MG/DL    BUN/Creatinine Ratio 15 12 - 20      Est, Glom Filt Rate 10 (L) >60 ml/min/1.73m2    Calcium 7.6 (L) 8.5 - 10.1 MG/DL    Total Bilirubin 0.4 0.2 - 1.0 MG/DL    ALT 42 12 - 78 U/L    AST 62 (H) 15 - 37 U/L    Alk Phosphatase 197 (H) 45 - 117 U/L    Total Protein 6.9 6.4 - 8.2 g/dL    Albumin 2.8 (L) 3.5 - 5.0 g/dL    Globulin 4.1 (H) 2.0 - 4.0 g/dL    Albumin/Globulin Ratio 0.7 (L) 1.1 - 2.2     Extra Tubes Hold    Collection Time: 06/18/24  7:56 PM   Result Value Ref Range    Specimen HOld 2BLUE, 1RED     Comment:        Add-on orders for these samples will be processed based on acceptable specimen integrity and analyte stability, which may vary by analyte.   Brain Natriuretic

## 2024-06-19 NOTE — ED NOTES
Procedure note: Ultrasound Guided Peripheral IV  Ultrasound guided peripheral  angiocath IV placement performed by me. Indications: Nursing unable to place IV. Details: The antecubital fossa and upper arm were evaluated with a multifrequency linear probe. Patent brachial veins were noted. 1 attempt was made to cannulate a vein under realtime US guidance with successful cannulation of the vein and catheter placement. There is return of non-pulsatile dark red blood. The patient tolerated the procedure well without complications.    8 cm catheter was placed in the right basilic vein and taped down.     Mathew Cassidy MD  06/19/24 0055

## 2024-06-19 NOTE — ED NOTES
TRANSFER - OUT REPORT:    Verbal report given to que Hayes  on [facility]  being transferred to  204/01 for routine progression of patient care       Report consisted of patient's Situation, Background, Assessment and   Recommendations(SBAR).     Information from the following report(s) Nurse Handoff Report, ED Encounter Summary, ED SBAR, Adult Overview, Intake/Output, MAR, Recent Results, Cardiac Rhythm Sinus tachy, and Neuro Assessment was reviewed with the receiving nurse.    Paicines Fall Assessment:    Presents to emergency department  because of falls (Syncope, seizure, or loss of consciousness): No  Age > 70: No  Altered Mental Status, Intoxication with alcohol or substance confusion (Disorientation, impaired judgment, poor safety awaremess, or inability to follow instructions): No  Impaired Mobility: Ambulates or transfers with assistive devices or assistance; Unable to ambulate or transer.: No  Nursing Judgement: Yes          Lines:       Opportunity for questions and clarification was provided.      Patient transported with:  Monitor and Registered Nurse

## 2024-06-19 NOTE — ED NOTES
Pt had urinary and bowel incontinents. Rn and tech cleaned pt and provided new bedding. Pt is now connected to [facility].

## 2024-06-19 NOTE — ED PROVIDER NOTES
is alert.             EMERGENCY DEPARTMENT COURSE and DIFFERENTIAL DIAGNOSIS/MDM:   Vitals:    Vitals:    06/18/24 1928 06/18/24 1945 06/18/24 2030   BP: (!) 162/88 (!) 164/88 (!) 152/102   Pulse: 96 95 94   Resp: 18 21 17   Temp: 98.3 °F (36.8 °C)     TempSrc: Oral     SpO2: (!) 84% 96% 94%         Medical Decision Making  64-year-old female presents to the emergency department above with chief complaint shortness of breath.  She arrives hypoxic.  She feels better on oxygen.  Her chest x-ray is consistent with pulm edema pattern, I suspect potentially from CECI on CKD.  She is complicated medical history including chronic ureteral stents, CKD, diabetes, hypertension, morbid obesity.  She has multiple recent hospitalizations at this facility as well as at [facility].  Given her hypoxia with new O2 requirement will admit for further management.  She is also hypernatremic.    Amount and/or Complexity of Data Reviewed  External Data Reviewed: notes.  Labs: ordered.  Radiology: ordered and independent interpretation performed. Decision-making details documented in ED Course.  ECG/medicine tests: ordered and independent interpretation performed. Decision-making details documented in ED Course.            REASSESSMENT     ED Course as of 06/18/24 2217 Tue Jun 18, 2024 2150 I have independently viewed the obtained radiographic images and note chest x-ray with findings consistent with pulmonary edema. Will await radiology read. [JM]   2217 ED EKG interpretation:  Rhythm: sinus rhythm. Rate (approx.): 100.  Axis: normal.  ST segment: Nonspecific ST segment changes. This EKG was interpreted by Mathew Cassidy MD,ED Physician. [JM]      ED Course User Index  [JM] Mathew Cassidy MD         CONSULTS:  None    PROCEDURES:     Procedures      Perfect Serve Consult for Admission  10:04 PM    ED Room Number: ER26/26  Patient Name and age:  Dolores Erazolor 64 y.o.  female  Working Diagnosis:   1. Hypoxia    2. Acute pulmonary

## 2024-06-19 NOTE — WOUND CARE
Wound Care Note:     New consult placed by nurse request for right buttock wound    Chart shows:  Admitted for acute hypoxic respiratory failure  Past Medical History:   Diagnosis Date    Chronic kidney disease     Chronic obstructive pulmonary disease (HCC)     Diabetes (HCC)     Hernia, hiatal     Hypertension     Incontinence of urine     Kidney stones     both kidneys    Morbid obesity (HCC)     Osteoarthritis     Peripheral neuropathy     DOES GET SOME NUMBNESS IN HANDS    Psychiatric disorder     ANXIETY    Renal failure     left kidney    Thyroid disease      WBC = 13.5 on 6/18/24  Admitted from     Assessment:   Patient is A&O x 4, communicative, incontinent with moderate assistance needed in repositioning.    Bed: [facility]  Patient has a Pure Wick in place.    Diet: Adult diet regular; 3 carb choices; low fat/low chol/high fiber/JESSIKA/low sodium  Patient reports no pain.      Bilateral heels, buttocks, and sacral skin intact and without erythema.    1. POA patient with dry, flaky skin from a reaction to hydrocortisone.  Right buttock with pink epithealiazed skin, no open area noted, lower back with small excoriations from patient scratching.  Will try using Lac-Hydrin, using a test spot to make sure she tolerates the Lac-Hydrin.  Once she tolerates, will apply to all dry skin.    Spoke with Dr. Le, wound care orders obtained.    Patient repositioned supine.       Recommendations:    Dry skin- Every 12 hours liberally apply Lac-Hydrin lotion.  Please only apply in one area until patient determines if she can tolerate Lac-Hydrin.    Specialty bed: [employer] [facility] ordered via [facility].  Use only flat sheet and one incontinence pad. Please call [employer] at [phone] if not delivered and when patient discharged. Confirmation #[id number]    Skin Care & Pressure Prevention:  Minimize layers of linen/pads under patient to optimize support

## 2024-06-20 NOTE — FLOWSHEET NOTE
Right after administering IV Rocephin pt. Began complaining of extreme burning on the back of her legs & buttocks. MD notified. A set of VS were taken.    06/20/24 1747   Vital Signs   Temp 98.1 °F (36.7 °C)   Temp Source Oral   Pulse (!) 114   Respirations 17   BP (!) 145/77   MAP (Calculated) 100   Oxygen Therapy   SpO2 94 %       She appeared to have welts on the back of her right thigh. MD made aware & said he would take a look.

## 2024-06-21 NOTE — SIGNIFICANT EVENT
Rapid Response Note     06/21/24 at 1824    A rapid response was called on this patient for Hypotension.    Response    Rapid Response Team at the bedside for evaluation.    Dr. Coreas responding at the bedside.    Janee FROST is the primary nurse during this episode.    Situation    1824- The patient was waiting to move to [facility] when she became hypotensive shortly after starting Bipap therapy. SBP reported to be in the 70's at this time. Albumin is already running.  Dr. Coreas at the bedside orders a fluid bolus and he will call the Intensivist.  Orders for Lactic acid level as well.   Patient is a difficult access for IV and phlebotomy.    1832- IV bolus started.    1900- Patient transferred to ICU 14.    The patient was left in the care of her primary nursing team.    Rapid Response end time approximately 1915        Sepsis Screening  Are two or more SIRS criteria present? No    Is the patient's history suggestive of a new infection? No    Communication with provider:    Was a Code Sepsis called at this encounter? No

## 2024-06-24 PROBLEM — Z51.5 PALLIATIVE CARE ENCOUNTER: Status: ACTIVE | Noted: 2024-01-01

## 2024-06-24 PROBLEM — R06.02 SHORTNESS OF BREATH: Status: ACTIVE | Noted: 2024-01-01

## 2024-06-24 PROBLEM — R53.81 DEBILITY: Status: ACTIVE | Noted: 2024-01-01

## 2024-06-24 NOTE — CONSULTS
her that this is accurate if her condition cont to worsen. Medically would support her decision to not choose dialysis given other co-morbidities.   Sister Remi seems supportive of any decision that pt will make- and in close communication w/ other siblings. Discuss that we may be talking about Hospice quite soon, may be inpatient.   For now continue current/full restorative measures- but if cont to decline, will talk about comfort measures.   Code status:  DNR status, pt signed DDNR.  Following closely w/ you.   Please call with any palliative questions or concerns.  Palliative Care Team is available via perfect serve or via phone.    Referrals to:   [] Outpatient Palliative Care  [] Home Based Palliative Care  [] Home Based Primary Care  [] Hospice       ADVANCE CARE PLANNING:   [] The [facility] Interdisciplinary Team has updated the ACP Navigator with Health Care Decision Maker and Patient Capacity      Primary Decision Maker: Bing Shoemaker - Brother/Sister - [phone]    Secondary Decision Maker: Florecita Barbosa - Brother/Sister - [phone]  Confirm Advance Directive: Yes, on file    Current Code Status: DNR     Goals of Care: Goals of Care and Interventions  Patient/Health Care Proxy Stated Goals: Recovery from acute illness       Please refer to Palliative Medicine ACP notes for further details.    PALLIATIVE ASSESSMENT:      Palliative Performance Scale (PPS):  PPS: 40    Pt alert , denies pain. Breathing improved. Sister Remi at bedside.     Modified ESAS:  Modified-Metz Symptom Assessment Scale (ESAS)  Tiredness Score: 3  Drowsiness Score: Not drowsy  Pain Score: No pain  Dyspnea Score: 2    Clinical Pain Assessment (nonverbal scale for severity on nonverbal patients):   Clinical Pain Assessment  Severity: 0             Vital Signs: Blood pressure (!) 169/72, pulse (!) 112, temperature 99.1 °F (37.3 °C), temperature source Axillary, resp. rate 30, height 1.626 m (5' 4\"), weight (!) 165.1 kg 
   potassium bicarb-citric acid (EFFER-K) effervescent tablet 40 mEq  40 mEq Oral PRN Samantha Stubbs MD        Or    potassium chloride 10 mEq/100 mL IVPB (Peripheral Line)  10 mEq IntraVENous PRN Samantha Stubbs MD        magnesium sulfate 2000 mg in 50 mL IVPB premix  2,000 mg IntraVENous PRN Samantah Stubbs MD        ondansetron (ZOFRAN-ODT) disintegrating tablet 4 mg  4 mg Oral Q8H PRN Samantha Stubbs MD        Or    ondansetron (ZOFRAN) injection 4 mg  4 mg IntraVENous Q6H PRN Samantha Stubbs MD        polyethylene glycol (GLYCOLAX) packet 17 g  17 g Oral Daily PRN Samantha Stubbs MD        acetaminophen (TYLENOL) tablet 650 mg  650 mg Oral Q6H PRN Samantha Stubbs MD        Or    acetaminophen (TYLENOL) suppository 650 mg  650 mg Rectal Q6H PRN Samantha Stubbs MD            ROS (besides HPI):    General: No fever. No weight changes  ENT: No hearing loss or visual changes  Cardiovascular: No CP, No GUZMAN. No edema  Pulmonary: +SOB  GI: No abdominal pain. + nausea. No vomiting. No blood in stool  : No blood in urine. No foamy or cloudy urine  Musculoskeletal: No joint swelling or redeness. No morning stiffness  Endocrine: no cold or heat intolerance  Psych: denies anxiety or depression  Neuro: no light headedness or dizziness    Objective   BP (!) 147/81   Pulse 90   Temp 97.7 °F (36.5 °C) (Oral)   Resp 15   Ht 1.626 m (5' 4.02\")   Wt (!) 163.6 kg (360 lb 9.6 oz)   SpO2 100%   BMI 61.87 kg/m²     Physical Exam:    Gen: NAD/Morbidly obese    HEENT: AT/NC, EOMI, dry mucous membrane, no scleral icterus    Neck: supple    Lungs/Chest wall: Breath sounds normal. Symmetrical chest wall expansion. No accessory muscle use. Clear to auscultation    Cardiovascular: Normal S1/S2, normal rate, regular rhythm.     Abdomen: soft, Large pannus    Ext: no clubbing or cyanosis. No edema    Skin: warm and dry. No rashes    : no merida    CNS: alert awake. Answers appropriately. 
Samantha Stubbs MD   Procedure:     ATT Provider: Keith Coreas MD   REF Provider:        Admission DX: Hypernatremia, Acute pulmonary edema (HCC), Hypoxia, CECI (acute kidney injury) (HCC), Anemia, unspecified type, Acute hypoxic respiratory failure (HCC) and DX codes: E87.0, J81.0, R09.02, N17.9, D64.9, J96.01      PATIENT                 Name: Maciel Villalobos : 1960 (64 yrs)   Address: [address] [place] [address] Sex: Female   City: [facility] [address]         Marital Status: Single   Employer: NOT EMPLOYED         Scientologist: Jain   Primary Care Provider: Gregorio Koenig III, MD         Primary Phone: [phone]   EMERGENCY CONTACT   Contact Name Legal Guardian? Relationship to Patient Home Phone Work Phone   1. Bing Shoemaker  2. Florecita Barbosa No  No Other  Other [phone]              GUARANTOR            Guarantor: Maciel Erazolor     : 1960   Address: [address] [place] [address] Sex: [place] [address]     Relation to Patient: Self       Home Phone: [phone]   Guarantor ID: [id number]       Work Phone: [phone]   Guarantor Employer: NOT EMPLOYED         Status: NOT EMPLO*      COVERAGE        PRIMARY INSURANCE   Payor: [employer] MEDICARE Plan: [employer] BETTER HEALTH OF *   Payor Address: [facility] [id number],  [place] [address]       Group Number:   Insurance Type: INDEMNITY   Subscriber Name: MACIEL VILLALOBOS Subscriber : 1960   Subscriber ID: [id number] Pat. Rel. to Sub: Self   SECONDARY INSURANCE   Payor:   Plan:     Payor Address:  ,           Group Number:   Insurance Type:     Subscriber Name:   Subscriber :     Subscriber ID:   Pat. Rel. to Sub:                
Influenza B PCR Not detected        Parainfluenza 1 PCR Not detected        Parainfluenza 2 PCR Not detected        Parainfluenza 3 PCR Not detected        Parainfluenza 4 PCR Not detected        Respiratory Syncytial Virus by PCR Not detected        Bordetella parapertussis by PCR Not detected        Bordetella pertussis by PCR Not detected        Chlamydophila Pneumonia PCR Not detected        Mycoplasma pneumo by PCR Not detected       Urine Culture Hold Sample [[id number]] Collected: 06/19/24 0121    Order Status: Completed Specimen: Urine Updated: 06/19/24 0149     Specimen HOld       Urine on hold in Microbiology dept for 2 days.  If unpreserved urine is submitted, it cannot be used for addtional testing after 24 hours, recollection will be required.          Occult Blood Stool Immunoassay [[id number]]     Order Status: Sent Specimen: Stool                IMAGING:    CT:   US: US Result (most recent):  US RETROPERITONEAL COMPLETE 06/20/2024    Narrative  EXAM: US RETROPERITONEAL COMPLETE    INDICATION: Acute kidney injury.    COMPARISON: None.    TECHNIQUE:  Real-time sonography of the kidneys, retroperitoneum and bladder was performed  with multiple static images obtained.    FINDINGS:  RIGHT KIDNEY:  The right kidney has increased echogenicity with a 1.3 cm stone in the upper  pole. With moderate right-sided hydronephrosis. 12.2 cm the right kidney  measures cm in length.    LEFT KIDNEY:  Left kidney was not visualized.  RETROPERITONEUM:  The aorta is normal proximally. The mid distal aorta is not well visualized  secondary to overlying bowel gas and patient body habitus  The aortic bifurcation not visualized secondary to patient body habitus  The IVC is normal.  No retroperitoneal mass is identified.    BLADDER:  The urinary bladder is normal.    Impression  1. The left kidney was not visualized.    2. Moderate right-sided hydronephrosis. 1.3 cm stone in the upper pole of the  right kidney    Electronically

## 2024-06-24 NOTE — CARE COORDINATION
Transition of Care Plan:    RUR: 20% High   Prior Level of Functioning: Needed assistance with ADL's   Disposition:   Transportation at discharge: Family vs BLS   IM/IMM Medicare 6/19/24:   Is patient a  and connected with [facility]? No  Caregiver Contact: Sister Bing Shoemaker [phone]  Discharge Caregiver contacted prior to discharge? No  Care Conference needed? No  Barriers to discharge:    Consult for Hospice noted, referral made to [facility] through [facility].  Gloria Gama RN,Care Management  
At/After Discharge Home Health   Condition of Participation: Discharge Planning   The Plan for Transition of Care is related to the following treatment goals: home health   The Patient and/or Patient Representative was provided with a Choice of Provider? Patient   The Patient and/Or Patient Representative agree with the Discharge Plan? Yes   Freedom of Choice list was provided with basic dialogue that supports the patient's individualized plan of care/goals, treatment preferences, and shares the quality data associated with the providers?  Yes     Danii Keith RN/CRM

## 2024-06-25 NOTE — PROCEDURES
PROCEDURE NOTE  Date: 6/25/2024   Name: Dolores [facility]  YOB: 1960    Procedures  Ultrasound guided peripheral IV placed on right mid arm. See LDA.

## 2024-06-25 NOTE — CARE COORDINATION
Transition of Care Plan:    [facility] has accepted patient for GIP.   Gloria Gama RN,Care Management

## 2024-06-25 NOTE — PROGRESS NOTES
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 Serum Cr trend 4.6-> 4.4-> 4.9-> 5.6mg/dl. Significant DM nephropathy + recurrent nephrolithiasis. Last serum Cr on file here was 3.4mg/dl (23)     Hypernatremia: Na 150-> 145      Recurrent nephrolithiasis: b/l stents. Repeat renal ultrasound shows right hydronephrosis.    Acute on chronic resp failure: Hypercarbic resp failure/+Pulm edema-> requiring BIPAP     DM2     HTN: fair-. Borderline low now     Morbid Obesity    Tracheal deviation: needs thyroidectomy per ENT    Plan/Recommendations:  AGAIN discussed need for RRT->  She again reported she would not be interested in RRT even it were life threatening. Relayed to critical care team  IV Bumex 6mg x1 dose  BIPAP  Urine Cx  Strict I/Os  Need to start considering comfort measures  Avoid nephrotoxins  Am labs    Subjective:  Remains on BIPAP. Poor UOP despite high dose IV Bumex  Awake.     ROS:   No nausea, no vomiting  No chest pain    Exam:  BP (!) 141/59   Pulse 98   Temp 98.6 °F (37 °C) (Oral)   Resp 23   Ht 1.626 m (5' 4.02\")   Wt (!) 166.5 kg (367 lb)   SpO2 100%   BMI 62.96 kg/m²     Gen: NAD/Morbid obesity  HEENT: BIPAP  Lungs/Chest wall: Clear. Diminished b/l bases  Cardiovascular: Regular rate, normal rhythm.   Abdomen/: Soft, NT,  BS+  Ext: +peripheral edema  CNS: awake    Current Facility-Administered Medications   Medication Dose Route Frequency Provider Last Rate Last Admin    [Held by provider] enoxaparin (LOVENOX) injection 40 mg  40 mg SubCUTAneous Daily Jay Mc MD   40 mg at 24 0902    traMADol (ULTRAM) tablet 50 mg  50 mg Oral Q12H PRN Jay Mc MD        lactated ringers bolus 500 mL  500 mL IntraVENous Once Lyudmila, 
                                                                                                                                              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 Repeat Cr 4.6-> 4.4-> 4.9mg/dl. Significant DM nephropathy + recurrent nephrolithiasis. Last serum Cr on file here was 3.4mg/dl (23)     Hypernatremia: Na 150-> 145 to 144      Recurrent nephrolithiasis: b/l stents. Repeat renal ultrasound shows right hydronephrosis.     DM2     HTN: fair-. Borderline low now     Acute on chronic resp failure: Hypercarbic resp failure-> requiring BIPAP     Morbid Obesity    Tracheal deviation: needs thyroidectomy per ENT    Plan/Recommendations:  No emergent indication for RRT-> discussed possibility of needing it with patient. She reported she would not be interested in RRT even it were life threatening.   IV Bumex 2mg x1 dose  BIPAP  Urine Cx  Strict I/Os  Avoid nephrotoxins  Am labs    Subjective:  Transferred to ICU -> acute hypercarbic resp failure. Placed on BIPAP.  Awake. Remains no BIPAP.    ROS:   No nausea, no vomiting  No chest pain    Exam:  BP (!) 103/57   Pulse 93   Temp 98.7 °F (37.1 °C) (Axillary)   Resp 24   Ht 1.626 m (5' 4.02\")   Wt (!) 166.6 kg (367 lb 3.2 oz)   SpO2 93%   BMI 63.00 kg/m²     Gen: NAD/Morbid obesity  HEENT: BIPAP  Lungs/Chest wall: Clear. Diminished b/l bases  Cardiovascular: Regular rate, normal rhythm.   Abdomen/: Soft, NT,  BS+  Ext: No peripheral edema  CNS: alert and awake.     Current Facility-Administered Medications   Medication Dose Route Frequency Provider Last Rate Last Admin    [START ON 2024] enoxaparin (LOVENOX) injection 40 mg  40 mg SubCUTAneous Daily Jay Mc MD        traMADol (ULTRAM) tablet 50 mg  50 mg Oral Q12H PRN Jay Mc MD        lactated ringers bolus 500 mL  500 mL IntraVENous Once Lyudmila 
                                                                                                                                              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 Repeat Cr 4.6-> 4.4mg/dl. Significant DM nephropathy + recurrent nephrolithiasis. Last serum Cr on file here was 3.4mg/dl (23)     Hypernatremia: Na 150-> 145 and holding     Recurrent nephrolithiasis: b/l stents. Repeat renal ultrasound shows right hydronephrosis.     DM2     HTN: fair     Acute on chronic resp failure     Morbid Obesity    Plan/Recommendations:  No emergent indication for RRT  Holding IV D5W  Appreciate Urology involvement  Urine Cx  Strict I/Os  Avoid nephrotoxins  Am labs    Subjective:  States IV D5W gave her \" a reaction\". Now stopped. Intake is improving    ROS:   No nausea, no vomiting  No chest pain    Exam:  BP (!) 103/56   Pulse 99   Temp 98.5 °F (36.9 °C) (Oral)   Resp 18   Ht 1.626 m (5' 4.02\")   Wt (!) 163.6 kg (360 lb 9.6 oz)   SpO2 98%   BMI 61.87 kg/m²     Gen: NAD/Morbid obesity  HEENT: AT/NC  Lungs/Chest wall: Clear. Equal BS. No respiratory distress  Cardiovascular: Regular rate, normal rhythm.   Abdomen/: Soft, NT,  BS+  Ext: No peripheral edema  CNS: alert and awake.     Current Facility-Administered Medications   Medication Dose Route Frequency Provider Last Rate Last Admin    folic acid (FOLVITE) tablet 1 mg  1 mg Oral Daily Keith Coreas MD   1 mg at 24 0829    [Held by provider] cefTRIAXone (ROCEPHIN) 1,000 mg in sterile water 10 mL IV syringe  1,000 mg IntraVENous Q24H Keith Coreas MD   1,000 mg at 24 173    oxyBUTYnin (DITROPAN-XL) extended release tablet 10 mg  10 mg Oral Nightly Samantha Stubbs MD   10 mg at 24    busPIRone (BUSPAR) tablet 10 mg  10 mg Oral TID Samantha Stubbs MD   10 mg at 24 0829    sertraline (ZOLOFT) tablet 
                                                                                                                                              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 Repeat Cr 4.6mg/dl. Significant DM nephropathy + recurrent nephrolithiasis. Last serum Cr on file here was 3.4mg/dl (23)     Hypernatremia: Na 150-> 145     Recurrent nephrolithiasis: b/l stents. Repeat renal ultrasound shows right hydronephrosis.     DM2     HTN: fair     Acute on chronic resp failure     Morbid Obesity    Plan/Recommendations:  No emergent indication for RRT  IV D5W -> decrease to 50cc/hr  Consult Urology -> they were seeing patient at [facility] during her most recent admission  IV Abx  Urine Cx  Strict I/Os  Avoid nephrotoxins  Am labs    Subjective:  No events overnight per patient    ROS:   No nausea, no vomiting  No chest pain    Exam:  /63   Pulse 88   Temp 97.7 °F (36.5 °C) (Oral)   Resp 14   Ht 1.626 m (5' 4.02\")   Wt (!) 163.6 kg (360 lb 9.6 oz)   SpO2 98%   BMI 61.87 kg/m²     Gen: NAD/Morbid obesity  HEENT: AT/NC  Lungs/Chest wall: Clear. Equal BS. No respiratory distress  Cardiovascular: Regular rate, normal rhythm.   Abdomen/: Soft, NT,  BS+  Ext: No peripheral edema  CNS: alert and awake.     Current Facility-Administered Medications   Medication Dose Route Frequency Provider Last Rate Last Admin    folic acid (FOLVITE) tablet 1 mg  1 mg Oral Daily Keith Coreas MD   1 mg at 24 1018    oxyBUTYnin (DITROPAN-XL) extended release tablet 10 mg  10 mg Oral Nightly Samantha Stubbs MD   10 mg at 24    busPIRone (BUSPAR) tablet 10 mg  10 mg Oral TID Samantha Stubbs MD   10 mg at 24 1018    sertraline (ZOLOFT) tablet 50 mg  50 mg Oral Daily Samantha Stubbs MD   50 mg at 24 1018    dextrose 5 % solution   IntraVENous Continuous Hung Caceres MD 75 mL/hr 
                                                                                                Hospitalist Progress Note  Keith Coreas MD  Answering service: [phone] OR [phone] from in house phone        Date of Service:  2024  NAME:  Dolores Pierre  :  1960  MRN:  [id number]      Admission Summary:   HPI: \"Dolores Pierre is a 64 y.o. female with history of CVA morbid obesity, COPD, type 2 diabetes, CKD, hypertension, depression and anxiety, chronic indwelling Yeung, hypothyroidism who presented to the ED with complaints of shortness of breath.  Seen and examined at bedside. She states she has been having panic attacks described as feeling overwhelmed and sob with subsequent difficulty catching her breath. States she [resented to ed to seek help with her anxiety.  The patient denies any fever, chills, chest or abdominal pain, nausea, vomiting, cough, congestion, recent illness, palpitations, or dysuria.     Remarkable vitals on ER Presentation: BP 80 165/88, SpO2 84% on room air  Labs Remarkable for: BNP greater than 35,000, sodium 150, cr 4.71, WBC 13.5, hgb 7.1  ER Images: cxr: Moderate to severe edema pattern.   ER Rx: none\"       Interval history / Subjective:   No new complaints, states she is breathing better but not at baseline, denies pain, n/v     Assessment & Plan:      Acute on Chronic Hypoxic Respiratory Failure  -Likely multifactorial in setting of ohs, anemia, EARNESTINE  -Chest x-ray with severe pulmonary vascular congestion  -Echo : Normal left ventricular systolic function with EF of 55 - 60%.   -Obtain CT chest - showed atelectasis and enlarged R thyroid lobe w/ tracheal narrowing, procal, respiratory viral panel  - consult ENT     EARNESTINE  -started buspar tid and zoloft     Anemia  -Check FOBT, iron studies b12 folate (borderline low), TSH and free t4 ordered  -monitor counts, transfuse hgb < 7   -of note patient does not accept blood products     CKD stage IV   Hypernatremia  Chronic 
                                                                                                Hospitalist Progress Note  Keith Coreas MD  Answering service: [phone] OR [phone] from in house phone        Date of Service:  2024  NAME:  Dolores Pierre  :  1960  MRN:  [id number]      Admission Summary:   HPI: \"Dolores Pierre is a 64 y.o. female with history of CVA morbid obesity, COPD, type 2 diabetes, CKD, hypertension, depression and anxiety, chronic indwelling Yeung, hypothyroidism who presented to the ED with complaints of shortness of breath.  Seen and examined at bedside. She states she has been having panic attacks described as feeling overwhelmed and sob with subsequent difficulty catching her breath. States she [resented to ed to seek help with her anxiety.  The patient denies any fever, chills, chest or abdominal pain, nausea, vomiting, cough, congestion, recent illness, palpitations, or dysuria.     Remarkable vitals on ER Presentation: BP 80 165/88, SpO2 84% on room air  Labs Remarkable for: BNP greater than 35,000, sodium 150, cr 4.71, WBC 13.5, hgb 7.1  ER Images: cxr: Moderate to severe edema pattern.   ER Rx: none\"       Interval history / Subjective:   No new complaints. Received IV ceftriaxone yesterday and immediately after states she had burning in her legs.     Assessment & Plan:      Acute on Chronic Hypoxic Respiratory Failure  -Likely multifactorial in setting of ohs, anemia, EARNESTINE  -Chest x-ray with severe pulmonary vascular congestion  -Echo : Normal left ventricular systolic function with EF of 55 - 60%.   -Obtain CT chest - showed atelectasis and enlarged R thyroid lobe w/ tracheal narrowing, procal, respiratory viral panel  - consult ENT     EARNESTINE  -started buspar tid and zoloft     Anemia  -Check FOBT, iron studies b12 folate (borderline low), TSH and free t4 ordered  -monitor counts, transfuse hgb < 7   -of note patient does not accept blood products     CKD stage IV 
                       [facility]     ICU TEAM Progress Note           Name: Dolores Pierre   : 1960   MRN: [id number]   Date: 2024          CU PROBLEM LIST   -Acute hypercarbic respiratory failure  -Hypotension  -Acute renal failure      HPI:   \"Dolores Pierre is a 64 y.o. female with history of CVA morbid obesity, COPD, type 2 diabetes, CKD, hypertension, depression and anxiety, chronic indwelling Yeung, hypothyroidism who presented to the ED with complaints of shortness of breath.  Seen and examined at bedside. She states she has been having panic attacks described as feeling overwhelmed and sob with subsequent difficulty catching her breath. States she [resented to ed to seek help with her anxiety.  The patient denies any fever, chills, chest or abdominal pain, nausea, vomiting, cough, congestion, recent illness, palpitations, or dysuria.     Remarkable vitals on ER Presentation: BP 80 165/88, SpO2 84% on room air  Labs Remarkable for: BNP greater than 35,000, sodium 150, cr 4.71, WBC 13.5, hgb 7.1  ER Images: cxr: Moderate to severe edema pattern.   ER Rx: none\" (Dr. Evelyne MD ).      Hospital Course:   AoCHHRF  Admitted for management iso DERRICK, HFpEF and tracheal narrowing iso enlarge r thryoid lobe. Treated with levaquin. Patient has not been utilizing cpap or bipap qhs. ENT consulted but referred to the tertiary center for thyroidectomy with c/f for airway distortion favored over actual narrowing.      CKD stage IV   Hypernatremia  Chronic bilateral ureteral stents  Bun/Cr stable. Urology consulted without need for urgent intervention. Plan to continue with scheduled lithotripsy in July outpatient.      EARNESTINE   Continued on buspar and zoloft     Events leading up to transfer:   Patient with increased lethargy -> ABG 7.08///29. Transferred to [facility] for bipap 40 / which was generating a VT ~ 260 and then subsequently become hypotensive. RR initiated. ICU consulted with transfer 
    Patient: Dolores Erazolor MRN: [id number]  SSN: [id number]    YOB: 1960  Age: 64 y.o.  Sex: female        ADMITTED: 2024 to Jay Mc MD by Samantha Stubbs MD for Hypernatremia [E87.0]  Acute pulmonary edema (HCC) [J81.0]  Hypoxia [R09.02]  CECI (acute kidney injury) (HCC) [N17.9]  Anemia, unspecified type [D64.9]  Acute hypoxic respiratory failure (HCC) [J96.01]    Events noted since patient seen on . She was showing improvement in her renal function at that point. In the interim she developed hypercarbic respiratory failure, tracheal deviation necessitating thyroidectomy. Has had rise in serum cr up to 6.57, oliguric. Nephrology has recommended RRT which the patient has declined.     Vitals: Temp (24hrs), Av.1 °F (37.3 °C), Min:98.5 °F (36.9 °C), Max:99.8 °F (37.7 °C)    Blood pressure (!) 187/86, pulse (!) 114, temperature 98.6 °F (37 °C), temperature source Oral, resp. rate 19, height 1.626 m (5' 4\"), weight (!) 165.1 kg (364 lb), SpO2 100 %.    Intake and Output:   190 -  0700  In: -   Out: 354 [Urine:354]   0701 -  1900  In: -   Out: 115 [Urine:115]  NOVA Output lats 24 hrs: No data found.   NOVA Output last 8 hrs: No data found.  BM over last 24 hrs: No data found.    Physical Exam  General: NAD, pleasant  Respiratory: no distress, breathing tx underway  Abdomen: soft, obese    : no CVA tenderness, voiding independently, clear yellow UA  Neuro: Appropriate, alert   Skin: warm, dry  Extremities: moves all, full ROM    Labs:  CBC:   Lab Results   Component Value Date/Time    WBC 10.7 2024 12:25 AM    HCT 24.7 2024 12:25 AM     2024 12:25 AM     BMP:   Lab Results   Component Value Date/Time     2024 12:30 PM    K 5.4 2024 12:30 PM     2024 12:30 PM    CO2 22 2024 12:30 PM    BUN 92 2024 12:30 PM      Assessment/Plan:   65 yo F with B/L ureteral strictures with chronic b/l ureteral stents, 
    Patient: Dolores Pierre MRN: [id number]  SSN: [id number]    YOB: 1960  Age: 64 y.o.  Sex: female        ADMITTED: 2024 to Keith Coreas MD by Samantha Stubbs MD for Hypernatremia [E87.0]  Acute pulmonary edema (HCC) [J81.0]  Hypoxia [R09.02]  CECI (acute kidney injury) (HCC) [N17.9]  Anemia, unspecified type [D64.9]  Acute hypoxic respiratory failure (HCC) [J96.01]    Dolores Pierre is doing fair. Complains of nausea that has been ongoing for weeks. No specific abd pain or flank pain. Reports voiding without issues, has been managed with merida in the past, awaiting PVR    Cr as high as 6.5 during recent admission to [facility], improved to 5.63 at the time of discharge. Now Cr 4.47 (4.64).     She is AF, WBC up to 15.3 (12.1), was on Rocephin which has been held. UA 5-10 WBC, 2+ bacteria, few skin cells; Ucx pending (Recent Ucx at OSH mixed keo, patient was treated with merrem and transitioned to fosfomycin once weekly for continuous prophylaxis)    Vitals: Temp (24hrs), Av °F (36.7 °C), Min:97.5 °F (36.4 °C), Max:98.2 °F (36.8 °C)    Blood pressure (!) 104/55, pulse 94, temperature 98.1 °F (36.7 °C), temperature source Oral, resp. rate 16, height 1.626 m (5' 4.02\"), weight (!) 163.6 kg (360 lb 9.6 oz), SpO2 98 %.    Intake and Output:   1901 -  0700  In: 360 [P.O.:360]  Out: 600 [Urine:600]  No intake/output data recorded.  NOVA Output lats 24 hrs: No data found.   NOVA Output last 8 hrs: No data found.  BM over last 24 hrs: No data found.    Physical Exam  General: NAD, pleasant  Respiratory: no distress, breathing easily, room air  Abdomen: soft, obese, suprapubic tenderness to palpation   : no CVA tenderness, voiding independently, clear yellow UA  Neuro: Appropriate, no focal neurological deficits  Skin: warm, dry  Extremities: moves all, full ROM    Labs:  CBC:   Lab Results   Component Value Date/Time    WBC 15.3 2024 02:10 AM    HCT 27.1 2024 02:10 AM     
    Pharmacy Note - Renal dose adjustment made per P/T protocol    Original order:  Levofloxacin 250 mg po daily    Estimated Creatinine Clearance: 20 mL/min (A) (based on SCr of 4.47 mg/dL (H)).    Recent Labs     06/19/24  0707 06/20/24  0532 06/21/24  0210   BUN 67* 67* 62*   CREATININE 4.68* 4.64* 4.47*       Renally adjusted order:  Levofloxacin 250 mg Q48H    Please call pharmacy with any questions.    Thank you,  Kristie Adames RPH  6/21/2024 3:52 PM   
  Palliative Medicine   [place] [phone] (COPE)        [facility] [phone] (COPE)      Palliative Medicine spoke with hospice- plan is for transition to comfort focused care and GIP admission. Hospice meeting with family today-     Please call/page our team if we can be further support. Goals clear, discussed w/ hospice Liaison- plans to admit GIP.     Thank you for including Palliative Medicine in the care of [facility]       Willa Alvarado LCSW     
Attempted to call for specialty bed since it was not delivered, was given another number . RN called number, they said they will reach back out tomorrow morning at 7am. Confirmation number [id number]  
[facility]  Good Help to Those in Need  [phone]     Patient Name: Dolores Pierre  YOB: 1960  Age: 64 y.o.    [facility] RN Note:  Hospice consult received, reviewing chart. Will follow up with Unit Nurse and Care Manager to discuss plan of care, patient status and discharge disposition.    Updated  Hospice is available to visit with patient this afternoon.  Plan to review with Dr. Gay, Hospice medical director, for Hospice diagnosis and Hospice plan of care.    Thank you for the opportunity to be of service to this patient.    Diya Bailey RN, [facility]  Hospice Nurse Liaison  [phone] Mobile  [phone] Office  Available on Perfect Serve    
Clinical Pharmacy Note: Ceftriaxone Dosing    Please note that the ceftriaxone dose for [facility] has been changed to 1000 mg IV q24h per [facility]-approved protocol.  Ceftriaxone has excellent penetration into the   lungs and multiple studies have demonstrated that 1 gram and 2 gram doses of ceftriaxone are equally efficacious for the treatment   of pneumonia. The long half-life of ceftriaxone also supports once daily dosing for most indications, excluding central nervous   system infections.    Please contact the pharmacy with any questions.    Ana Rosa Coleman RPH    
Hospice Nurse Practitioner Note:     Patient reviewed with hospice liaison and hospice medical director. Patient with multiple comorbidities, admitted 6/18 from home with acute on chronic hypoxic respiratory failure with severe pulmonary edema. Hospital course further complicated by hydronephrosis, increasing lethargy and altered mental status, BIPAP, worsening renal function. Of note, patient also with tracheal narrowing due to enlarged right thyroid lobe, found to be compressing on airway on CT from 6/21/24. Ongoing goals of care discussions with palliative as patient had verbalized several times she does not want any form of dialysis, and no longer wishes to pursue any aggressive or invasive measures. Met with patient, patient's sister Remi, and patient's sister Bing ([facility]) via phone with hospice liaison and hospice SW. Patient confused but able to verbalize several times she \"does not want to survive\" and \"does not want dialysis\". Both sisters in agreement with transitioning to comfort focused care with hospice support. Due to risk of airway compromise, high risk for rapid decompensation, and increasing somnolence, patient not appropriate to go home with hospice, and is appropriate for GIP. Patient's sister Bing agreeable to comfort medications on an as needed basis for now until she is able to come to the hospital in person tomorrow to further discuss with plans to admit GIP.     Candido Aviles, APRN - CNP    
[facility]  Good Help to Those in Need  [phone]    Patient Name: Dolores Pierre  YOB: 1960  Age: 64 y.o.    [facility] RN Note:  Hospice consult noted. Chart reviewed. Plan of care discussed with patients nurse & care manager.   In to meet with sister, Caty.    Discussed Hospice philosophy, general plan of care, levels of care, services and on call procedures.  Family information packet provided & reviewed with Caty.  Patient is very short of breath, weak and lethargic. Required Bi-pap last night for hypoxia. She is unable to make complex medical decisions. Caty agrees with [facility] hospice admission with a focus on comfort.   Dr Gay has reviewed the chart and agrees with [facility] hospice admission with CTI of hypoxic respiratory failure with secondary dx of renal failure  Dr Mc aware of plan and discharge order placed  Hospice orders from Roselia Kennedy NP    Thank you for the opportunity to be of service to this patient.       Pamela Pizarro RN  Clinical Nurse Liaison  [facility]  [phone] Mobile  [phone] Office   Available on Perfect Serve    
Lovenox Monitoring / Dosing Update  Indication: DVT Prophylaxis  Recent Labs     06/20/24  0532 06/21/24  0210 06/22/24  0042   HGB 7.0* 7.5* 8.0*    234 265     Current Weight: 166.6 kg  Est. CrCl = ~15-20 ml/min  Current Dose: 30 mg subcutaneously every 24 hours.  Plan: Change to 40 mg every 24 hours per [facility] P&T Committee Protocol with respect to patient's weight and renal function.  Pharmacy will continue to monitor patient daily and will make dosage adjustments based upon changing renal function.    
Notified pt is somnolent.  She is minimally responsive.  ABG done shows severe acute hypercapnic respiratory failure.  STAT CXR ordered.  Pt placed on BiPAP.  Transfer to IMCU and repeat ABG in 1 hr. Consult ICU if no improvement then.    Addendum: pt's BP dropped to the 70s/40s after initiating BiPAP. IV albumin and 500 cc IVLR bolus ordered. D/w ICU given rapid respiratory and hemodynamic decompensation pt has been accepted to ICU.    CRITICAL CARE ATTESTATION:  I had a face to face encounter with the patient, reviewed and interpreted patient data including clinical events, labs, images, vital signs, I/O's, and examined patient.  I have discussed the case and the plan and management of the patient's care with the consulting services, the bedside nurses and necessary ancillary providers.       NOTE OF PERSONAL INVOLVEMENT IN CARE   This patient has a high probability of imminent, clinically significant deterioration, which requires the highest level of preparedness to intervene urgently. I participated in the decision-making and personally managed or directed the management of the following life and organ supporting interventions that required my frequent assessment to treat or prevent imminent deterioration.     I personally spent 35 minutes of critical care time.  This is time spent at this critically ill patient's bedside actively involved in patient care as well as the coordination of care and discussions with the patient's family.  This does not include any procedural time which has been billed separately.   
Occupational Therapy  6/21/2024    Visited pt for OT. Pt reports continued nausea and declines activity at this time. Encouraged pt to sit EOB with staff assistance over the weekend.     Thank you,   Katy Roque, OTR/L    
Occupational Therapy:     Chart reviewed in prep for OT re-evaluation. Noted, pt with plans to transition to comfort care and d/c with hospice services. Will complete orders at this time.     Thank you for the opportunity to be involved in Ms. Pierre's care.   Anna Colin, OTD, OTR/L    
Occupational Therapy:   06/24/2024    Chart reviewed and recent events noted; pt transferred to ICU 2* hypotension and hypercarbic respiratory failure. RN requesting for therapy services to hold at this time 2/2 medical instability. Will continue to follow as able and appropriate.     Thank you,  TORI Chacon, OTR/L    
Palliative Medicine      Code Status: DNR    Advance Care Planning:    Has AMD- not scanned in, but on hard chart. Primary MPOA is Bing, secondary is daughter Florecita     Patient / Family Encounter Documentation    Participants (names): Dr. Ricci, patient, patient's sister Remi at bedside     Narrative: The Palliative Medicine SW and Dr. Ricci spoke with bedside RN, met with patient and her sister Remi at bedside to introduce the role of Palliative Medicine and provide support.     The patient is confused, however, able to provide input to her care- she tells us initially that she feels like, \"I don't belong here.. and I don't want to be here..\" but unable to elaborate. We discuss her complex medical situation- that we are watching her kidneys closely and concerned about her kidney function. We explore with Dolores her thoughts on dialysis, she is consistent and clear- \"I do not want dialysis\" and when SW asked her what would happen if she didn't do dialysis, she clearly shares \"I will pass away.\" We discuss together while she is not needing RRT emergently currently, we do tell her this is accurate if her kidneys worsen- we would need to talk about hospice care in that case if she does not want to pursue dialysis, and we share we support her in her decision to not choose dialysis given her overall status and co-morbidities.     Remi seems supportive of any decision patient will make- she is in close communication w/ Caty and Florecita. Family at bedside is also not surprised to hear this information. We talk together that we may be talking about hospice soon- maybe even inpatient hospice.     Patient has her own DDNR, plan to continue current care- if she worsens, would discuss hospice and comfort focused measures.     The patient does tell us that what is important to her right now is \"family\" - we encourage Remi to call family members while patient is awake/alert for visits.     Psychosocial Issues 
Physical Therapy  Attempted to work with patient but she is politely declining therapy today stating she had a bad night with itching and  nausea.  Encouraged her to continue working with staff with turning and assisting cleaning self.  Would be good to sit EOB as well.  Will follow up Monday.  JOHN VIDALES, PT    
Physical Therapy 6/25/2024    Chart reviewed in prep for PT re-evaluation. Noted, pt with plans to transition to comfort care and d/c with hospice services. Will complete orders at this time.      Thank you for the opportunity to be involved in Ms. Pierre's care.     Danielle Lipscomb, PT, DPT, NCS    
Physical Therapy Note  06/24/2024    Chart reviewed and recent events noted; pt transferred to ICU 2* hypotension and hypercarbic respiratory failure. Spoke with RN who requests to defer at this time pending improved medical stability. Will continue to follow up as appropriate.    Thank you,  Jacinda Alcantara, PT, DPT  
Plan with comfort care  Will sign off  
RENAL  PROGRESS NOTE        Subjective:    Feels ok            Objective:   VITALS SIGNS:    BP (!) 173/64   Pulse (!) 112   Temp 98.6 °F (37 °C) (Oral)   Resp 25   Ht 1.626 m (5' 4\")   Wt (!) 165.1 kg (364 lb)   SpO2 97%   BMI 62.48 kg/m²             Temp (24hrs), Av.1 °F (37.3 °C), Min:98.5 °F (36.9 °C), Max:99.8 °F (37.7 °C)         PHYSICAL EXAM:  Alert oriented  Recognize me from previous hospitalisation    DATA REVIEW:     INTAKE / OUTPUT:   Last shift:       07 - 1900  In: -   Out: 82 [Urine:82]  Last 3 shifts: 1901 -  07  In: -   Out: 354 [Urine:354]    Intake/Output Summary (Last 24 hours) at 2024 1232  Last data filed at 2024 1100  Gross per 24 hour   Intake --   Output 331 ml   Net -331 ml         LABS:   LABS:  Recent Labs     24  0025 24  0440 24  0042   * 145 144   K 5.6* 5.6* 5.1   * 115* 116*   CO2    BUN 82* 75* 64*   CREATININE 6.26* 5.63* 4.94*   CALCIUM 7.5* 7.7* 7.6*   PHOS 7.1* 7.7*  --    MG 1.9 2.0  --      Recent Labs     24  0025 24  0440 24  0042   WBC 10.7 13.4* 19.4*   HGB 6.7* 6.3* 8.0*   HCT 24.7* 23.9* 31.2*    226 265     No results for input(s): \"KU\", \"CLU\", \"CREAU\" in the last 720 hours.    Invalid input(s): \"VERNON\", \"PROU\"      Assessment:   ssessment:  CECI on CKD- 4: Serum Cr 4.7mg/dl on admission. Serum Cr trend 4.6-> 4.4-> 4.9-> 6.2 mg/dl. Significant DM nephropathy + recurrent nephrolithiasis. Last serum Cr on file here was 3.4mg/dl (23)     Hypernatremia:    hyperkalemia     Recurrent nephrolithiasis: b/l stents. Repeat renal ultrasound shows right hydronephrosis.     Acute on chronic resp failure: Hypercarbic resp failure/+Pulm edema-> requiring BIPAP     DM2     HTN: fair-. Borderline low now     Morbid Obesity     Tracheal deviation: needs thyroidectomy per ENT     Plan/Recommendations:  AGAIN discussed need for RRT->  She again reported she would DOES NOT WANT 
Renal Dosing/Monitoring  Medication: Tramadol   Current regimen:  50 mg IV every 6 hr PRN  Recent Labs     06/20/24  0532 06/21/24  0210 06/22/24  0042   CREATININE 4.64* 4.47* 4.94*   BUN 67* 62* 64*     Estimated CrCl:  ~15-20 ml/min  Plan: Change to 50 mg IV every 12 hours PRN per [facility] P&T Committee Protocol with respect to renal function.  Pharmacy will continue to monitor patient daily and will make dosage adjustments based upon changing renal function.  
Spiritual Care Assessment/Progress Note  [facility]    Name: Dolores Pierre MRN: [id number]    Age: 64 y.o.     Sex: female   Language: English     Date: 6/21/2024            Total Time Calculated: 12 min              Spiritual Assessment begun in [facility] MED SURG  Service Provided For: Patient  Referral/Consult From: Rounding  Encounter Overview/Reason: Follow-up    Spiritual beliefs:      [] Involved in a anton tradition/spiritual practice:      [] Supported by a anton community:      [] Claims no spiritual orientation:      [] Seeking spiritual identity:           [] Adheres to an individual form of spirituality:      [] Not able to assess:                Identified resources for coping and support system:   Support System: Family members       [] Prayer                  [] Devotional reading               [] Music                  [] Guided Imagery     [] Pet visits                                        [] Other: (COMMENT)     Specific area/focus of visit   Encounter:    Crisis:    Spiritual/Emotional needs: Type: Spiritual Support  Ritual, Rites and Sacraments:    Grief, Loss, and Adjustments:    Ethics/Mediation:    Behavioral Health:    Palliative Care:    Advance Care Planning:           Narrative:     The  visited the Pt. The Pt was calm, coping, and hopeful. The Pt discussed about her illness. The  assured a prayer. The Pt appreciated the offer prayer.    Andreia Mosqueda M.Div., Th.M., M.A.C.E.   Intern  Spiritual Health Services  Paging Service [phone] (ALYSE)    
Spiritual Care Assessment/Progress Note  [facility]    Name: Dolores Pierre MRN: [id number]    Age: 64 y.o.     Sex: female   Language: English     Date: 6/20/2024            Total Time Calculated: 52 min              Spiritual Assessment begun in [facility] MED SURG  Service Provided For: Patient  Referral/Consult From: Multi-disciplinary team  Encounter Overview/Reason: Initial Encounter, Spiritual/Emotional Needs    Spiritual beliefs:      [x] Involved in a anton tradition/spiritual practice:      [x] Supported by a anton community:      [] Claims no spiritual orientation:      [] Seeking spiritual identity:           [] Adheres to an individual form of spirituality:      [] Not able to assess:                Identified resources for coping and support system:   Support System: Mu-ism/anton community, Friends/neighbors, Family members       [] Prayer                  [] Devotional reading               [] Music                  [] Guided Imagery     [] Pet visits                                        [] Other: (COMMENT)     Specific area/focus of visit   Encounter:    Crisis:    Spiritual/Emotional needs: Type: Spiritual Support, Emotional Distress  Ritual, Rites and Sacraments:    Grief, Loss, and Adjustments:    Ethics/Mediation:    Behavioral Health:    Palliative Care:    Advance Care Planning:           Narrative:   visited patient who was referred by team. Patient was in bed resting. Patient shared that she is a Jehovah Witness and has a community of support. She discussed her anxiety regarding her fear of not being able to breathe. She also discussed her stress regarding her living situation as she is currently living with her sister and not able to go back to her home. She appears to feel a loss of control and powerlessness.  discussed deep breathing techniques and other stress management exercises but also told patient that the doctors would be the ones to rely on when it comes 
IVPB premix  2,000 mg IntraVENous PRN    ondansetron (ZOFRAN-ODT) disintegrating tablet 4 mg  4 mg Oral Q8H PRN    Or    ondansetron (ZOFRAN) injection 4 mg  4 mg IntraVENous Q6H PRN    polyethylene glycol (GLYCOLAX) packet 17 g  17 g Oral Daily PRN    acetaminophen (TYLENOL) tablet 650 mg  650 mg Oral Q6H PRN    Or    acetaminophen (TYLENOL) suppository 650 mg  650 mg Rectal Q6H PRN     ______________________________________________________________________  EXPECTED LENGTH OF STAY: Unable to retrieve estimated LOS  ACTUAL LENGTH OF STAY:          1                 Sobeida Le MD     
kidney disease, Chronic obstructive pulmonary disease (HCC), Diabetes ([facility]), Hernia, hiatal, Hypertension, Incontinence of urine, Kidney stones, Morbid obesity ([facility]), Osteoarthritis, Peripheral neuropathy, Psychiatric disorder, Renal failure, and Thyroid disease.    Past Surgical History:      has a past surgical history that includes other surgical history; Dilation and curettage of uterus (1990); and Hysterectomy.    Home Medications:     Prior to Admission medications    Medication Sig Start Date End Date Taking? Authorizing Provider   oxyBUTYnin (DITROPAN-XL) 10 MG extended release tablet Take 1 tablet by mouth daily   Yes Joellen Fritz MD   traMADol (ULTRAM) 50 MG tablet Take 1 tablet by mouth every 6 hours as needed for Pain. Max Daily Amount: 200 mg    Joellen Fritz MD   calcium carbonate 648 MG TABS Take 2 tablets by mouth 2 times daily 12/29/23   Joon Posada MD   ipratropium 0.5 mg-albuterol 2.5 mg (DUONEB) 0.5-2.5 (3) MG/3ML SOLN nebulizer solution Inhale 3 mLs into the lungs every 6 hours as needed for Shortness of Breath  Patient not taking: Reported on 4/22/2024 12/29/23   Joon Posada MD   lidocaine 4 % external patch Place 1 patch onto the skin daily  Patient not taking: Reported on 4/22/2024 12/30/23   Joon Posada MD   miconazole nitrate 2 % OINT Apply topically 2 times daily 12/29/23   Joon Posada MD   calcitRIOL (ROCALTROL) 0.25 MCG capsule Take 1 capsule by mouth daily  Patient not taking: Reported on 4/22/2024 12/30/23   Joon Posada MD   insulin glargine (LANTUS) 100 UNIT/ML injection vial Inject 20 Units into the skin nightly  Patient not taking: Reported on 4/22/2024 12/29/23   Joon Posada MD   acetaminophen (TYLENOL) 500 MG tablet Take 1 tablet by mouth every 8 hours as needed    Automatic Reconciliation, Ar   methIMAzole (TAPAZOLE) 10 MG tablet Take by mouth daily    Automatic Reconciliation, Ar   ondansetron (ZOFRAN) 
hours.    Invalid input(s): \"TROIQ\", \"BNPP\"    Ventilator Settings:  Mode Rate Tidal Volume Pressure FiO2 PEEP                    Peak airway pressure:      Minute ventilation:          MEDS: Reviewed    Chest X-Ray:  Reviewed      CRITICAL CARE CONSULTANT NOTE  I had a face to face encounter with the patient, reviewed and interpreted patient data including clinical events, labs, images, vital signs, I/O's, and examined patient.  I have discussed the case and the plan and management of the patient's care with the consulting services, the bedside nurses and the respiratory therapist.      NOTE OF PERSONAL INVOLVEMENT IN CARE   This patient has a high probability of imminent, clinically significant deterioration, which requires the highest level of preparedness to intervene urgently. I participated in the decision-making and personally managed or directed the management of the following life and organ supporting interventions that required my frequent assessment to treat or prevent imminent deterioration.    I personally spent 55 minutes of critical care time.  This is time spent at this critically ill patient's bedside actively involved in patient care as well as the coordination of care.  This does not include any procedural time which has been billed separately.    Jay Mc   Staff Intensivist/Infectious Disease  [facility]  6/23/2024       
the following life and organ supporting interventions that required my frequent assessment to treat or prevent imminent deterioration.    I personally spent 45 minutes of critical care time.  This is time spent at this critically ill patient's bedside actively involved in patient care as well as the coordination of care and discussions with the patient's family.  This does not include any procedural time which has been billed separately.    Alicia Duarte NP  [facility] [place] Critical Care  6/21/2024

## 2024-06-25 NOTE — PROGRESS NOTES
[facility]  Good Help to Those in Need  [phone]    Inpatient Nursing Admission   Patient Name: Dolores Pierre  YOB: 1960  Age: 64 y.o.       Date of Hospice Admission: 6/25/2024  Hospice Attending Elected by Patient: Kush Gay MD  Primary Care Physician: Gregorio Koenig III, MD  Admitting RN: Pamela Pizarro RN  : Ciera Duran LMSW     Level of Care (GIP/Routine/Respite): [facility]  Facility of Care: [facility]  Patient Room: [address] [place] [address]     HOSPICE SUMMARY   ER Visits/ Hospitalizations in past year: 2  Hospice Diagnosis: Acute hypoxic respiratory failure (HCC) [J96.01]  Onset Date of Hospice Diagnosis: 6/18/2024  Summary of Disease Progression Leading to Hospice Diagnosis:   Per Candido Aviles NP Hospice:     Patient reviewed with hospice liaison and hospice medical director. Patient with multiple comorbidities, admitted 6/18 from home with acute on chronic hypoxic respiratory failure with severe pulmonary edema. Hospital course further complicated by hydronephrosis, increasing lethargy and altered mental status, BIPAP, worsening renal function. Of note, patient also with tracheal narrowing due to enlarged right thyroid lobe, found to be compressing on airway on CT from 6/21/24. Ongoing goals of care discussions with palliative as patient had verbalized several times she does not want any form of dialysis, and no longer wishes to pursue any aggressive or invasive measures. Met with patient, patient's sister Remi, and [facility] via phone with hospice liaison and hospice SW. Patient confused but able to verbalize several times she \"does not want to survive\" and \"does not want dialysis\". Both sisters in agreement with transitioning to comfort focused care with hospice support. Due to risk of airway compromise, high risk for rapid decompensation, and increasing somnolence, patient not appropriate to go home with hospice, and is appropriate for GIP.

## 2024-06-25 NOTE — DISCHARGE SUMMARY
risk.    Acute kidney injury, patient is noted to have steady uptrend in her creatinine with oliguria.  She received high doses of diuretics without improvement, she was trialed on Bumex drip with minimal diuresis but continued uptrend in her creatinine, she was noted to be acidotic, hyperphosphatemia, and slow uptrending her potassium.  Case was discussed in detail with nephrology and patient and family, she did not want to be dialyzed.    Anemia, unclear source, differential included anemia related to renal failure, anemia of chronic disease.  Patient has general weakness and did not want any transfusion.  Patient did receive erythropoietin, iron vitamin B12 and folate levels were checked.    Following extensive discussion with patient and family, patient and family are in agreement with her wishes to proceed with comfort focused care.  The hospice team is consulted.  Patient transferred to/admitted to general inpatient hospice.  Discussed in detail with patient's family, all questions were answered.    Physical examination at discharge  Vitals:    06/25/24 1000   BP: (!) 191/75   Pulse: (!) 104   Resp: 16   Temp:    SpO2: 96%          Recent Labs     06/23/24  0440 06/24/24  0025 06/25/24  0209   WBC 13.4* 10.7 7.9   HGB 6.3* 6.7* 6.3*   HCT 23.9* 24.7* 23.2*    196 206     Recent Labs     06/23/24  0440 06/24/24  0025 06/24/24  1230 06/25/24  0209    146* 147* 146*   K 5.6* 5.6* 5.4* 5.9*   * 115* 114* 115*   CO2 23 21 22 18*   BUN 75* 82* 92* 91*   MG 2.0 1.9  --  2.2   PHOS 7.7* 7.1*  --  7.8*     No results for input(s): \"TP\", \"GLOB\", \"GGT\" in the last 72 hours.    Invalid input(s): \"SGOT\", \"GPT\", \"AP\", \"TBIL\", \"ALB\", \"AML\", \"AMYP\", \"LPSE\", \"HLPSE\"  No results for input(s): \"INR\", \"APTT\" in the last 72 hours.    Invalid input(s): \"PTP\"   No results for input(s): \"TIBC\" in the last 72 hours.    Invalid input(s): \"FE\", \"PSAT\", \"FERR\"   No results for input(s): \"PH\", \"PCO2\", \"PO2\" in the last

## 2024-06-25 NOTE — PROGRESS NOTES
[facility]  Good Help to Those in Need  [phone]    Social Work Admission Note  Patient Name: Dolores Pierre  YOB: 1960  Age: 64 y.o.    Date of Visit: 06/25/24  Facility of Care: [facility]  Patient Room: [address] [place] [address]     Hospice Attending: Kush Gay MD  Hospice Diagnosis: Acute hypoxic respiratory failure (HCC) [J96.01]    Level of Care:    [x]  GIP    []  Respite   []  Routine    Consents/NCD Documentation:     Consents Reviewed:   [x]  Yes  []  No, completed by other hospice staff member.    Person Reviewed/Signed with:  [x]  Patient   [x]  Pts NOK/MPOA  Name: Caty Shoemaker, sister/MPOA    Right to NCD Reviewed:   [x]  Yes  []  No, completed by other hospice staff member.    NCD Requested:   []  Yes  [x]  No    Admission Nurse/Intake Notified NCD was requested:  []  Yes  [x]  No  [x]  Not requested    Planned Start of Care Date: 6/25/2024    Hospice Witness Representative: Ciera Duran LMSW    NARRATIVE   Pt is a 64 year old female admitted to inpatient hospice on 6/25/2024 with a hospice diagnosis of Acute Hypoxic Respiratory Failure.  Pt was admitted to the hospital on 6/18/2024 for shortness of breath.  Prior to hospitalization, pt lived with her sister Remi at home.  Pt has three sisters - Caty, Florecita, and Remi.  Caty and Florecita were appointed MPOAs and Remi was appointed to financial POA.  At time of visit, pt was awake and able to interact with LMSW and family, however pt was slow to respond due to shortness of breath.  Pt's sisters Caty and Remi at bedside.  Pt's family shared stories about pt's childhood.  Preeti is important to pt and family.  No other needs at this time.  LMSW provided supportive counseling in processing pt's prognosis.  Pt is DNR code status.  FH is TBD.  LMSW will continue to provide support.    ADVANCE CARE PLANNING    Advance Directive:  [x]  Yes  []  No   []  Would like to complete  Primary MPOA: Caty Shoemaker, sister  Secondary

## 2024-06-25 NOTE — PLAN OF CARE
Problem: Physical Therapy - Adult  Goal: By Discharge: Performs mobility at highest level of function for planned discharge setting.  See evaluation for individualized goals.  Description: FUNCTIONAL STATUS PRIOR TO ADMISSION: patient has been in/out hospitals/rehab over the last 6 months with recent 2 week stay in [facility].  Limited mobility and not ambulatory.  Patient was able to roll, get into sitting EOB and back to bed .    HOME SUPPORT PRIOR TO ADMISSION: currently living with sister and was to start home health.    Physical Therapy Goals  Initiated 6/19/2024  1.  Patient will move from supine to sit and sit to supine, scoot up and down, and roll side to side in bed with supervision/set-up within 7 day(s).    2.  Patient will  move sit to stand with max assist of 2 in preparation for transfers.  3.  Patient will transfer from bed to chair and chair to bed with maximal assistance x2 using the least restrictive device within 7 day(s).  Outcome: Progressing     PHYSICAL THERAPY TREATMENT    Patient: Dolores Pierre (64 y.o. female)  Date: 6/20/2024  Diagnosis: Hypernatremia [E87.0]  Acute pulmonary edema (HCC) [J81.0]  Hypoxia [R09.02]  CECI (acute kidney injury) (HCC) [N17.9]  Anemia, unspecified type [D64.9]  Acute hypoxic respiratory failure (HCC) [J96.01] Acute hypoxic respiratory failure (HCC)      Precautions: Contact (MRSA), Skin      ASSESSMENT:  Patient continues to benefit from skilled PT services and is progressing towards goals. Patient participated in several supine exercises (see below) with tactile and verbal cues. Patient is highly motivated to regain some functional independence as evident by her desire to attempt bladder hygiene in bed (did require minimal assist following). With patient's mattress deflated and HOB flat, patient able to roll L/R with overall moderate assist x 1 and additional time (+ bed rail utilization). Patient remains on 2 L/min O2 via nasal cannula with SpO2 94-95% with bed 
  Problem: Physical Therapy - Adult  Goal: By Discharge: Performs mobility at highest level of function for planned discharge setting.  See evaluation for individualized goals.  Description: FUNCTIONAL STATUS PRIOR TO ADMISSION: patient has been in/out hospitals/rehab over the last 6 months with recent 2 week stay in [facility].  Limited mobility and not ambulatory.  Patient was able to roll, get into sitting EOB and back to bed .    HOME SUPPORT PRIOR TO ADMISSION: currently living with sister and was to start home health.    Physical Therapy Goals  Initiated 6/19/2024  1.  Patient will move from supine to sit and sit to supine, scoot up and down, and roll side to side in bed with supervision/set-up within 7 day(s).    2.  Patient will  move sit to stand with max assist of 2 in preparation for transfers.  3.  Patient will transfer from bed to chair and chair to bed with maximal assistance x2 using the least restrictive device within 7 day(s).  Outcome: Progressing   PHYSICAL THERAPY EVALUATION    Patient: Dolores Pierre (64 y.o. female)  Date: 6/19/2024  Primary Diagnosis: Hypernatremia [E87.0]  Acute pulmonary edema (HCC) [J81.0]  Hypoxia [R09.02]  CECI (acute kidney injury) (HCC) [N17.9]  Anemia, unspecified type [D64.9]  Acute hypoxic respiratory failure (HCC) [J96.01]       Precautions:                        ASSESSMENT :   DEFICITS/IMPAIRMENTS:   The patient is limited by decreased functional mobility, ROM, strength, activity tolerance, endurance, balance following admission for  increased anxiety with recent 2 week hospitalization with no therapy.    Monitored oxygen saturation throughout session; received on 4 liters at 100%; able to reduce to 2 liters and resting at 95%    Based on the impairments listed above patient does well with directing her care and should be able to return to previous disposition of at home with sister.  .    Patient will benefit from skilled intervention to address the above 
  Problem: Safety - Adult  Goal: Free from fall injury  6/19/2024 1219 by Janee Robles RN  Outcome: Progressing  Flowsheets (Taken 6/19/2024 0918)  Free From Fall Injury: Instruct family/caregiver on patient safety  6/19/2024 0253 by Joyce Bonds RN  Outcome: Progressing     Problem: Discharge Planning  Goal: Discharge to home or other facility with appropriate resources  6/19/2024 1219 by Janee Robles RN  Outcome: Progressing  Flowsheets (Taken 6/19/2024 0918)  Discharge to home or other facility with appropriate resources: Identify barriers to discharge with patient and caregiver  6/19/2024 0253 by Joyce Bonds RN  Outcome: Progressing  Flowsheets (Taken 6/19/2024 0115)  Discharge to home or other facility with appropriate resources:   Identify barriers to discharge with patient and caregiver   Arrange for needed discharge resources and transportation as appropriate     Problem: Pain  Goal: Verbalizes/displays adequate comfort level or baseline comfort level  6/19/2024 1219 by Janee Robles RN  Outcome: Progressing  6/19/2024 0253 by Joyce Bonds RN  Outcome: Progressing  Flowsheets (Taken 6/19/2024 0115)  Verbalizes/displays adequate comfort level or baseline comfort level:   Consider cultural and social influences on pain and pain management   Encourage patient to monitor pain and request assistance   Assess pain using appropriate pain scale     Problem: Skin/Tissue Integrity  Goal: Absence of new skin breakdown  Description: 1.  Monitor for areas of redness and/or skin breakdown  2.  Assess vascular access sites hourly  3.  Every 4-6 hours minimum:  Change oxygen saturation probe site  4.  Every 4-6 hours:  If on nasal continuous positive airway pressure, respiratory therapy assess nares and determine need for appliance change or resting period.  6/19/2024 1219 by Janee Robles RN  Outcome: Progressing  6/19/2024 0253 by Joyce Bonds RN  Outcome: Progressing     Problem: ABCDS Injury 
  Problem: Safety - Adult  Goal: Free from fall injury  6/19/2024 2243 by Joyce Bonds RN  Outcome: Progressing  6/19/2024 1219 by Janee Robles RN  Outcome: Progressing  Flowsheets (Taken 6/19/2024 0918)  Free From Fall Injury: Instruct family/caregiver on patient safety     Problem: Discharge Planning  Goal: Discharge to home or other facility with appropriate resources  6/19/2024 2243 by Joyce Bonds RN  Outcome: Progressing  6/19/2024 1219 by Janee Robles RN  Outcome: Progressing  Flowsheets (Taken 6/19/2024 0918)  Discharge to home or other facility with appropriate resources: Identify barriers to discharge with patient and caregiver     Problem: Pain  Goal: Verbalizes/displays adequate comfort level or baseline comfort level  6/19/2024 1219 by Janee Robles RN  Outcome: Progressing     Problem: Skin/Tissue Integrity  Goal: Absence of new skin breakdown  Description: 1.  Monitor for areas of redness and/or skin breakdown  2.  Assess vascular access sites hourly  3.  Every 4-6 hours minimum:  Change oxygen saturation probe site  4.  Every 4-6 hours:  If on nasal continuous positive airway pressure, respiratory therapy assess nares and determine need for appliance change or resting period.  6/19/2024 2243 by Joyce Bonds RN  Outcome: Progressing  6/19/2024 1219 by Janee Robles RN  Outcome: Progressing     Problem: ABCDS Injury Assessment  Goal: Absence of physical injury  6/19/2024 2243 by Joyce Bonds RN  Outcome: Progressing  6/19/2024 1219 by Janee Robles RN  Outcome: Progressing  Flowsheets (Taken 6/19/2024 0918)  Absence of Physical Injury: Implement safety measures based on patient assessment     Problem: Physical Therapy - Adult  Goal: By Discharge: Performs mobility at highest level of function for planned discharge setting.  See evaluation for individualized goals.  Description: FUNCTIONAL STATUS PRIOR TO ADMISSION: patient has been in/out hospitals/rehab over the last 6 months with 
  Problem: Safety - Adult  Goal: Free from fall injury  6/21/2024 1543 by Janee Robles RN  Outcome: Progressing  Flowsheets (Taken 6/21/2024 0829)  Free From Fall Injury: Instruct family/caregiver on patient safety  6/21/2024 0333 by Antoinette Olivares RN  Outcome: Progressing     Problem: Discharge Planning  Goal: Discharge to home or other facility with appropriate resources  6/21/2024 1543 by Janee Robles RN  Outcome: Progressing  Flowsheets (Taken 6/21/2024 0829)  Discharge to home or other facility with appropriate resources: Identify barriers to discharge with patient and caregiver  6/21/2024 0333 by Antoinette Olivares RN  Outcome: Progressing     Problem: Pain  Goal: Verbalizes/displays adequate comfort level or baseline comfort level  6/21/2024 1543 by Janee Robles RN  Outcome: Progressing  6/21/2024 0333 by Antoinette Olivares RN  Outcome: Progressing     Problem: Skin/Tissue Integrity  Goal: Absence of new skin breakdown  Description: 1.  Monitor for areas of redness and/or skin breakdown  2.  Assess vascular access sites hourly  3.  Every 4-6 hours minimum:  Change oxygen saturation probe site  4.  Every 4-6 hours:  If on nasal continuous positive airway pressure, respiratory therapy assess nares and determine need for appliance change or resting period.  6/21/2024 1543 by Janee Robles RN  Outcome: Progressing  6/21/2024 0333 by Antoinette Olivares RN  Outcome: Progressing     Problem: ABCDS Injury Assessment  Goal: Absence of physical injury  6/21/2024 1543 by Janee Robles RN  Outcome: Progressing  Flowsheets (Taken 6/21/2024 0829)  Absence of Physical Injury: Implement safety measures based on patient assessment  6/21/2024 0333 by Antoinette Olivares RN  Outcome: Progressing     Problem: Chronic Conditions and Co-morbidities  Goal: Patient's chronic conditions and co-morbidity symptoms are monitored and maintained or improved  6/21/2024 1543 by Janee Robles RN  Outcome: 
  Problem: Safety - Adult  Goal: Free from fall injury  Outcome: Progressing     Problem: Discharge Planning  Goal: Discharge to home or other facility with appropriate resources  Outcome: Progressing     Problem: Pain  Goal: Verbalizes/displays adequate comfort level or baseline comfort level  Outcome: Progressing     Problem: Skin/Tissue Integrity  Goal: Absence of new skin breakdown  Description: 1.  Monitor for areas of redness and/or skin breakdown  2.  Assess vascular access sites hourly  3.  Every 4-6 hours minimum:  Change oxygen saturation probe site  4.  Every 4-6 hours:  If on nasal continuous positive airway pressure, respiratory therapy assess nares and determine need for appliance change or resting period.  Outcome: Progressing     Problem: ABCDS Injury Assessment  Goal: Absence of physical injury  Outcome: Progressing     Problem: Chronic Conditions and Co-morbidities  Goal: Patient's chronic conditions and co-morbidity symptoms are monitored and maintained or improved  Outcome: Progressing     
  Problem: Safety - Adult  Goal: Free from fall injury  Outcome: Progressing     Problem: Discharge Planning  Goal: Discharge to home or other facility with appropriate resources  Outcome: Progressing     Problem: Pain  Goal: Verbalizes/displays adequate comfort level or baseline comfort level  Outcome: Progressing  Flowsheets (Taken 6/19/2024 0115)  Verbalizes/displays adequate comfort level or baseline comfort level:   Consider cultural and social influences on pain and pain management   Encourage patient to monitor pain and request assistance   Assess pain using appropriate pain scale     Problem: Skin/Tissue Integrity  Goal: Absence of new skin breakdown  Description: 1.  Monitor for areas of redness and/or skin breakdown  2.  Assess vascular access sites hourly  3.  Every 4-6 hours minimum:  Change oxygen saturation probe site  4.  Every 4-6 hours:  If on nasal continuous positive airway pressure, respiratory therapy assess nares and determine need for appliance change or resting period.  Outcome: Progressing     Problem: ABCDS Injury Assessment  Goal: Absence of physical injury  Outcome: Progressing     
  Problem: Safety - Adult  Goal: Free from fall injury  Outcome: Progressing     Problem: Discharge Planning  Goal: Discharge to home or other facility with appropriate resources  Outcome: Progressing  Flowsheets (Taken 6/24/2024 2000)  Discharge to home or other facility with appropriate resources:   Identify barriers to discharge with patient and caregiver   Arrange for needed discharge resources and transportation as appropriate   Identify discharge learning needs (meds, wound care, etc)     Problem: Pain  Goal: Verbalizes/displays adequate comfort level or baseline comfort level  Outcome: Progressing  Flowsheets (Taken 6/24/2024 2000)  Verbalizes/displays adequate comfort level or baseline comfort level:   Encourage patient to monitor pain and request assistance   Assess pain using appropriate pain scale   Administer analgesics based on type and severity of pain and evaluate response   Implement non-pharmacological measures as appropriate and evaluate response     Problem: Skin/Tissue Integrity  Goal: Absence of new skin breakdown  Description: 1.  Monitor for areas of redness and/or skin breakdown  2.  Assess vascular access sites hourly  3.  Every 4-6 hours minimum:  Change oxygen saturation probe site  4.  Every 4-6 hours:  If on nasal continuous positive airway pressure, respiratory therapy assess nares and determine need for appliance change or resting period.  Outcome: Progressing     Problem: ABCDS Injury Assessment  Goal: Absence of physical injury  Outcome: Progressing  Flowsheets (Taken 6/24/2024 2000)  Absence of Physical Injury: Implement safety measures based on patient assessment     Problem: Chronic Conditions and Co-morbidities  Goal: Patient's chronic conditions and co-morbidity symptoms are monitored and maintained or improved  Outcome: Progressing  Flowsheets (Taken 6/24/2024 2000)  Care Plan - Patient's Chronic Conditions and Co-Morbidity Symptoms are Monitored and Maintained or Improved:   
Collaborate with multidisciplinary team to address chronic and comorbid conditions and prevent exacerbation or deterioration   Update acute care plan with appropriate goals if chronic or comorbid symptoms are exacerbated and prevent overall improvement and discharge     
  Fair  and requires rest breaks    After treatment:   Patient left in no apparent distress in bed, Call bell within reach, and Side rails x3    COMMUNICATION/EDUCATION:   The patient's plan of care was discussed with: physical therapist and registered nurse    Patient Education  Education Given To: Patient  Education Provided: Role of Therapy;Plan of Care;Precautions;ADL Adaptive Strategies;Fall Prevention Strategies;Transfer Training  Education Method: Demonstration;Verbal  Barriers to Learning: None  Education Outcome: Verbalized understanding;Demonstrated understanding    Thank you for this referral.  Lauren Goddard OT  Minutes: 46    Occupational Therapy Evaluation Charge Determination   History Examination Decision-Making   MEDIUM Complexity : Expanded review of history including physical, cognitive and psychocial history  MEDIUM Complexity: 3-5 Performance deficits relating to physical, cognitive, or psychosocial skills that result in activity limitations and/or participation restrictions MEDIUM Complexity: Patient may present with comorbidities that affect occupational performance. Minimal to moderate modifications of tasks or assist (eg. physical or verbal) with assist is necessary to enable pt to complete eval   Based on the above components, the patient evaluation is determined to be of the following complexity level: Medium

## 2024-06-25 NOTE — H&P
hospice support. Due to risk of airway compromise, high risk for rapid decompensation, and increasing somnolence, patient not appropriate to go home with hospice, and is appropriate for [facility].     The patient's principle diagnosis has resulted in increased work of breathing, restlessness, and signs of pain.      Functionally, the patient's Karnofsky and/or Palliative Performance Scale has declined over a period of weeks and is estimated at 10.     The patient is dependent on the following ADLs: all     Objective information that support this patients limited prognosis includes: See note above.       The patient/family chose comfort measures with the support of Hospice.      HOSPICE DIAGNOSES   Active Symptoms:  1.  Acute hypoxic respiratory failure  2.  CECI on CKD  3.  Increased work of breathing  4.  Restlessness/anxiety  5.  Signs of pain  6.  Hospice care      PLAN   Admit to [facility] level of care as pt requires frequent nursing assessment and administration and titration of parenteral medications to manage symptom burden; high risk for rapid decompensation and not stable to transport  Continues to demonstrate increased work of breathing; increase Dilaudid to 2 mg IV every 4 hours with prn dosing available every 15 minutes for breakthrough symptoms  Ativan 1 mg IV every 4 hours for restlessness/anxiety; with prn dosing available every 15 minutes for breakthrough symptoms  All other symptom management medications as needed  Continue Yeugn catheter to maintain bladder decompression; Yeung care per facility protocol  Oxygen 2L for comfort; may wean as tolerated but do not escalate  No family at bedside; liaison has spoken with sisters to review hospice philosophy and goals of care; agreeable to [facility] admission and hospice plan of care    and SW to support family needs  Disposition: anticipate death in hospital  Hospice Plan of care was reviewed in detail and agree with current plan of care     Prognosis estimated

## 2024-06-25 NOTE — PLAN OF CARE
Problem: Discharge Planning  Goal: Discharge to home or other facility with appropriate resources  Outcome: Progressing     Problem: Chronic Conditions and Co-morbidities  Goal: Patient's chronic conditions and co-morbidity symptoms are monitored and maintained or improved  Outcome: Progressing     Problem: Safety - Adult  Goal: Free from fall injury  Outcome: Progressing     Problem: Hospice Orientation  Goal: Demonstrate understanding of hospice philosophy, plan of care, and inpatient hospice program  Description: The patient/family/caregiver will demonstrate understanding of hospice philosophy, plan of care and the inpatient hospice program as evidenced by participation in meeting the patient's psychosocial, spiritual, medical, and physical needs inclusive of medical supplies/equipment focusing on symptoms.  Outcome: Progressing     Problem: Potential for Alteration in Skin Integrity  Goal: Monitor skin for areas of alteration in skin integrity  Description: Patient [unfilled] will remain free from alterations of or worsening skin integrity as evidenced by no changes to skin during assessment each shift during the inpatient hospice stay.  Outcome: Progressing     Problem: Risk for Falls  Goal: Fall prevention  Description: Patient  will remain free from falls as evidenced by no witnessed or reported falls each shift during the inpatient hospice stay.     Patient  and or family/caregiver will receive education on fall prevention as evidenced by verbalizing recall of using the call lights system, fall prevention devices, and asking for help during the admission process and ongoing as needed during the inpatient hospice stay.    Outcome: Progressing     Problem: Pain  Goal: Control of acute pain  Description: Patient  will exhibit a decrease in pain as evidenced by a pain rating less than 1 on the Adult Nonverbal Pain  Problem: Anticipatory Grief  Goal: Explore reactions to and verbalize acceptance of impending

## 2024-06-26 NOTE — DISCHARGE SUMMARY
Hospice Discharge Summary    [facility]  Good Help to Those in Need        Date of Admission: 6/25/2024  Date of Discharge: 6/26/24    Dolores Pierre is a 64 y.o. year old who was admitted to [facility] with a Hospice diagnosis of Acute hypoxic respiratory failure (HCC) [J96.01].      The patient's care was focused on comfort and the patient passed away on 6/26/24

## 2024-06-26 NOTE — PROGRESS NOTES
Spiritual Care Assessment/Progress Note  [facility]    Name: Dolores Pierre MRN: [id number]    Age: 64 y.o.     Sex: female   Language: English     Date: 6/26/2024            Total Time Calculated: 20 min              Spiritual Assessment begun in [facility] MED ONCOLOGY  Service Provided For: Family  Referral/Consult From: Nurse  Encounter Overview/Reason: Grief, Loss, and Adjustments    Spiritual beliefs:      [] Involved in a anton tradition/spiritual practice:      [] Supported by a anton community:      [] Claims no spiritual orientation:      [] Seeking spiritual identity:           [] Adheres to an individual form of spirituality:      [] Not able to assess:                Identified resources for coping and support system:   Support System: Family members       [] Prayer                  [] Devotional reading               [] Music                  [] Guided Imagery     [] Pet visits                                        [] Other: (COMMENT)     Specific area/focus of visit   Encounter:    Crisis: Type: Family Care  Spiritual/Emotional needs: Type: Emotional Distress  Ritual, Rites and Sacraments:    Grief, Loss, and Adjustments: Type: Death  Ethics/Mediation:    Behavioral Health:    Palliative Care:    Advance Care Planning:           Narrative:     The  was paged by the Nurse regarding the death of the PT and family care. The  visited the PT's room and the family was present. The  provided care and comfort. The family are coping yet grieving. The  ended the conversation with an assurance of prayer.     Andreia Mosqueda M.Div., Th.M., M.A.C.E.   Intern  Spiritual Health Services  Paging Service [phone] (ALYSE)

## 2024-06-28 NOTE — PROGRESS NOTES
[facility] SW Bereavement/Condolence Call:      This LMSW called pt's sister Caty to offer condolences and support.  Caty reported she and family are doing okay, but still shocked by how quickly pt declined/passed.  LMSW offered [facility] Bereavement Services information for ongoing support.       JUANPABLO Burgess  [facility] Social Worker   [phone]

## (undated) DEVICE — GOWN,PLEAT,SPECIALTY,XL,STRL: Brand: MEDLINE

## (undated) DEVICE — OPEN-END URETERAL CATHETER: Brand: COOK

## (undated) DEVICE — MARKER,SKIN,WI/RULER AND LABELS: Brand: MEDLINE

## (undated) DEVICE — SOLUTION IRRIG 3000ML 0.9% SOD CHL FLX CONT 0797208] ICU MEDICAL INC]

## (undated) DEVICE — SOLUTION IRRIG 1000ML STRL H2O USP PLAS POUR BTL

## (undated) DEVICE — SYR 10ML LUER LOK 1/5ML GRAD --

## (undated) DEVICE — GDWIRE UROL STR 150CM FLX TP -- BX/5 SENSOR

## (undated) DEVICE — TUBING, SUCTION, 1/4" X 12', STRAIGHT: Brand: MEDLINE

## (undated) DEVICE — GOWN,SIRUS,FABRNF,XL,20/CS: Brand: MEDLINE

## (undated) DEVICE — CYSTO/BLADDER IRRIGATION SET, REGULATING CLAMP

## (undated) DEVICE — CATHETER URETH 24FR BLLN 30CC STD LTX 3 W TWO OPP DRNGE EYE

## (undated) DEVICE — TIDISHIELD UROLOGY DRAIN BAGS FROSTY VINYL STERILE FITS SIEMENS UROSKOP ACCESS 20 PER CASE: Brand: TIDISHIELD

## (undated) DEVICE — GLOVE ORANGE PI 7 1/2   MSG9075

## (undated) DEVICE — SOLUTION IRRIG 3000ML 0.9% SOD CHL USP UROMATIC PLAS CONT

## (undated) DEVICE — Y-TYPE TUR/BLADDER IRRIGATION SET, REGULATING CLAMP

## (undated) DEVICE — INFECTION CONTROL KIT SYS

## (undated) DEVICE — SOLUTION SCRB 2OZ 10% POVIDONE IOD ANTIMIC BTL

## (undated) DEVICE — CYSTO-SMH: Brand: MEDLINE INDUSTRIES, INC.

## (undated) DEVICE — SOLUTION IRRIGATION H2O 0797305] ICU MEDICAL INC]

## (undated) DEVICE — PACK,CYSTOSCOPY,PK III,SIRUS: Brand: MEDLINE

## (undated) DEVICE — STERILE POLYISOPRENE POWDER-FREE SURGICAL GLOVES WITH EMOLLIENT COATING: Brand: PROTEXIS

## (undated) DEVICE — TOWEL SURG W17XL27IN STD BLU COT NONFENESTRATED PREWASHED

## (undated) DEVICE — Z INACTIVE USE 2527070 DRAPE SURG W40XL44IN UNDERBUTTOCK SMS POLYPR W/ PCH BK DISP

## (undated) DEVICE — STRAP,POSITIONING,KNEE/BODY,FOAM,4X60": Brand: MEDLINE

## (undated) DEVICE — URETERAL STENT: Brand: CONTOUR™

## (undated) DEVICE — 3M™ RANGER™ FLUID WARMING IRRIGATION SET, 24750, 10/CASE: Brand: 3M™ RANGER™

## (undated) DEVICE — GLOVE SURG SZ 65 L12IN FNGR THK79MIL GRN LTX FREE

## (undated) DEVICE — SHEET,DRAPE,UNDERBUTTOCK,GRAD POUCH,PORT: Brand: MEDLINE

## (undated) DEVICE — GARMENT,MEDLINE,DVT,INT,CALF,MED, GEN2: Brand: MEDLINE

## (undated) DEVICE — GLOVE SURG SZ 6 THK91MIL LTX FREE SYN POLYISOPRENE ANTI

## (undated) DEVICE — BAG COLLECTION FLD OR-TBL NS --

## (undated) DEVICE — GUIDEWIRE ENDOSCP L150CM DIA0.035IN TIP L15CM BENT PTFE

## (undated) DEVICE — BAG,DRAINAGE,4L,A/R TOWER,LL,SLIDE TAP: Brand: MEDLINE